# Patient Record
Sex: MALE | Race: WHITE | NOT HISPANIC OR LATINO | Employment: OTHER | URBAN - METROPOLITAN AREA
[De-identification: names, ages, dates, MRNs, and addresses within clinical notes are randomized per-mention and may not be internally consistent; named-entity substitution may affect disease eponyms.]

---

## 2017-01-03 ENCOUNTER — DOCUMENTATION ONLY (OUTPATIENT)
Dept: FAMILY MEDICINE | Facility: CLINIC | Age: 62
End: 2017-01-03

## 2017-01-03 NOTE — PROGRESS NOTES
Pre-Visit Chart Review  For Appointment Scheduled on 1/4/17    Health Maintenance Due   Topic Date Due    TETANUS VACCINE  01/01/1975    Zoster Vaccine  01/01/2017

## 2017-01-04 ENCOUNTER — LAB VISIT (OUTPATIENT)
Dept: LAB | Facility: HOSPITAL | Age: 62
End: 2017-01-04
Attending: FAMILY MEDICINE
Payer: MEDICARE

## 2017-01-04 ENCOUNTER — OFFICE VISIT (OUTPATIENT)
Dept: FAMILY MEDICINE | Facility: CLINIC | Age: 62
End: 2017-01-04
Payer: MEDICARE

## 2017-01-04 VITALS
DIASTOLIC BLOOD PRESSURE: 76 MMHG | TEMPERATURE: 98 F | HEIGHT: 72 IN | HEART RATE: 69 BPM | WEIGHT: 145.5 LBS | RESPIRATION RATE: 18 BRPM | BODY MASS INDEX: 19.71 KG/M2 | SYSTOLIC BLOOD PRESSURE: 134 MMHG

## 2017-01-04 DIAGNOSIS — Z29.9 PREVENTIVE MEASURE: ICD-10-CM

## 2017-01-04 DIAGNOSIS — K90.829 SHORT BOWEL SYNDROME: Primary | ICD-10-CM

## 2017-01-04 DIAGNOSIS — K50.90 CROHN'S DISEASE WITHOUT COMPLICATION, UNSPECIFIED GASTROINTESTINAL TRACT LOCATION: ICD-10-CM

## 2017-01-04 DIAGNOSIS — E53.8 VITAMIN B 12 DEFICIENCY: ICD-10-CM

## 2017-01-04 DIAGNOSIS — L98.9 SKIN LESION: ICD-10-CM

## 2017-01-04 DIAGNOSIS — Z11.4 ENCOUNTER FOR SCREENING FOR HIV: ICD-10-CM

## 2017-01-04 DIAGNOSIS — E78.5 DYSLIPIDEMIA: ICD-10-CM

## 2017-01-04 DIAGNOSIS — D61.818 PANCYTOPENIA: ICD-10-CM

## 2017-01-04 LAB
25(OH)D3+25(OH)D2 SERPL-MCNC: 20 NG/ML
ALBUMIN SERPL BCP-MCNC: 3.7 G/DL
ALP SERPL-CCNC: 191 U/L
ALT SERPL W/O P-5'-P-CCNC: 45 U/L
ANION GAP SERPL CALC-SCNC: 10 MMOL/L
AST SERPL-CCNC: 38 U/L
BASOPHILS # BLD AUTO: 0.01 K/UL
BASOPHILS NFR BLD: 0.3 %
BILIRUB SERPL-MCNC: 0.3 MG/DL
BUN SERPL-MCNC: 10 MG/DL
CALCIUM SERPL-MCNC: 9 MG/DL
CHLORIDE SERPL-SCNC: 105 MMOL/L
CHOLEST/HDLC SERPL: 4 {RATIO}
CO2 SERPL-SCNC: 26 MMOL/L
CREAT SERPL-MCNC: 0.7 MG/DL
DIFFERENTIAL METHOD: ABNORMAL
EOSINOPHIL # BLD AUTO: 0.1 K/UL
EOSINOPHIL NFR BLD: 3 %
ERYTHROCYTE [DISTWIDTH] IN BLOOD BY AUTOMATED COUNT: 13 %
EST. GFR  (AFRICAN AMERICAN): >60 ML/MIN/1.73 M^2
EST. GFR  (NON AFRICAN AMERICAN): >60 ML/MIN/1.73 M^2
GLUCOSE SERPL-MCNC: 104 MG/DL
HCT VFR BLD AUTO: 38.4 %
HDL/CHOLESTEROL RATIO: 25.2 %
HDLC SERPL-MCNC: 107 MG/DL
HDLC SERPL-MCNC: 27 MG/DL
HGB BLD-MCNC: 13.3 G/DL
HIV 1+2 AB+HIV1 P24 AG SERPL QL IA: NEGATIVE
LDLC SERPL CALC-MCNC: 59 MG/DL
LYMPHOCYTES # BLD AUTO: 0.6 K/UL
LYMPHOCYTES NFR BLD: 19.3 %
MCH RBC QN AUTO: 30.4 PG
MCHC RBC AUTO-ENTMCNC: 34.6 %
MCV RBC AUTO: 88 FL
MONOCYTES # BLD AUTO: 0.4 K/UL
MONOCYTES NFR BLD: 13.8 %
NEUTROPHILS # BLD AUTO: 1.9 K/UL
NEUTROPHILS NFR BLD: 63.6 %
NONHDLC SERPL-MCNC: 80 MG/DL
PLATELET # BLD AUTO: 103 K/UL
PMV BLD AUTO: 10.8 FL
POTASSIUM SERPL-SCNC: 4.2 MMOL/L
PROT SERPL-MCNC: 6.3 G/DL
RBC # BLD AUTO: 4.38 M/UL
SODIUM SERPL-SCNC: 141 MMOL/L
TRIGL SERPL-MCNC: 105 MG/DL
WBC # BLD AUTO: 3.05 K/UL

## 2017-01-04 PROCEDURE — 86703 HIV-1/HIV-2 1 RESULT ANTBDY: CPT

## 2017-01-04 PROCEDURE — 99214 OFFICE O/P EST MOD 30 MIN: CPT | Mod: S$GLB,,, | Performed by: FAMILY MEDICINE

## 2017-01-04 PROCEDURE — 80053 COMPREHEN METABOLIC PANEL: CPT

## 2017-01-04 PROCEDURE — 1159F MED LIST DOCD IN RCRD: CPT | Mod: S$GLB,,, | Performed by: FAMILY MEDICINE

## 2017-01-04 PROCEDURE — 80061 LIPID PANEL: CPT

## 2017-01-04 PROCEDURE — 82306 VITAMIN D 25 HYDROXY: CPT

## 2017-01-04 PROCEDURE — 85025 COMPLETE CBC W/AUTO DIFF WBC: CPT

## 2017-01-04 PROCEDURE — 86592 SYPHILIS TEST NON-TREP QUAL: CPT

## 2017-01-04 PROCEDURE — 99999 PR PBB SHADOW E&M-EST. PATIENT-LVL III: CPT | Mod: PBBFAC,,, | Performed by: FAMILY MEDICINE

## 2017-01-04 PROCEDURE — 36415 COLL VENOUS BLD VENIPUNCTURE: CPT | Mod: PO

## 2017-01-04 NOTE — PROGRESS NOTES
[facility]  Progress Note    Subjective:       Patient ID: Tristin Cerda is a 62 y.o. male.    Chief Complaint: Establish Care    HPI62 y.o.male with current medical history of Crohn's disease, short bowel syndrome, pancytopenia, and B12 deficiency is here today to establish care.  Patient is currently under the care of GI for Crohn's disease and short bowel syndrome.  Patient is under the care of hematology for pancytopenia and B12 deficiency.  Patient has been doing very well on current medications.  Patient does not endorse any complaints at today's visit.  Review of Systems   Constitutional: Negative for chills and fever.   HENT: Negative for congestion.    Eyes: Negative for pain.   Respiratory: Negative for shortness of breath and wheezing.    Cardiovascular: Negative for chest pain, palpitations and leg swelling.   Gastrointestinal: Negative for abdominal pain, nausea and vomiting.   Genitourinary: Negative for difficulty urinating.   Musculoskeletal: Negative for arthralgias, gait problem and myalgias.   Skin: Negative for rash.   Neurological: Negative for seizures.       Objective:      Vitals:    01/04/17 0850   BP: 134/76   BP Location: Right arm   Patient Position: Sitting   BP Method: Automatic   Pulse: 69   Resp: 18   Temp: 98.3 °F (36.8 °C)   TempSrc: Oral   Weight: 66 kg (145 lb 8.1 oz)   Height: 6' (1.829 m)     Body mass index is 19.73 kg/(m^2).  Physical Exam   Constitutional: He is oriented to person, place, and time. He appears well-developed and well-nourished.   HENT:   Head: Normocephalic and atraumatic.   Eyes: Conjunctivae and EOM are normal. Pupils are equal, round, and reactive to light.   Neck: Normal range of motion. Neck supple. No JVD present.   Cardiovascular: Normal rate, regular rhythm, normal heart sounds and intact distal pulses.  Exam reveals no gallop and no friction rub.    No murmur heard.  Pulmonary/Chest: Effort normal and breath sounds normal. No  respiratory distress. He has no wheezes.   Abdominal: Soft. Bowel sounds are normal. There is no tenderness.   Musculoskeletal: Normal range of motion.   Neurological: He is alert and oriented to person, place, and time. No cranial nerve deficit.   Skin: Skin is warm and dry.   Ulcerated lesion right temple   Psychiatric: He has a normal mood and affect. His behavior is normal. Judgment and thought content normal.   Nursing note and vitals reviewed.      Assessment:       1. Short bowel syndrome    2. Crohn's disease without complication, unspecified gastrointestinal tract location    3. Vitamin B 12 deficiency    4. Pancytopenia    5. Preventive measure    6. Encounter for screening for HIV    7. Dyslipidemia    8. Skin lesion        Plan:       Short bowel syndrome        - Well controlled continue current medications        - Follow up with GI for further recommendations     Crohn's disease without complication, unspecified gastrointestinal tract location  -     Comprehensive metabolic panel; Future; Expected date: 1/4/17  -     Vitamin D; Future; Expected date: 1/4/17        - Well controlled continue current medications    Vitamin B 12 deficiency        - Well controlled continue current medications    Pancytopenia  -     CBC auto differential; Future; Expected date: 1/4/17        - Follow up with hem/onc for further recommendations     Preventive measure  -     RPR; Future; Expected date: 1/4/17    Encounter for screening for HIV  -     HIV-1 and HIV-2 antibodies; Future; Expected date: 1/4/17    Dyslipidemia  -     Lipid panel; Future; Expected date: 1/4/17    Skin lesion        - Patient will make appointment with dermatology for evaluation     Return in about 6 months (around 7/4/2017).  Will Albrecht MD  [facility]  1/4/2017 9:04 AM

## 2017-01-04 NOTE — MR AVS SNAPSHOT
[facility]  [address]  [place] [address]  Phone: [phone]  Fax: [phone]                  Tristin Cerda   2017 8:20 AM   Office Visit    Description:  Male : 1955   Provider:  Will Albrecht MD   Department:  [place] [facility]           Reason for Visit     Establish Care           Diagnoses this Visit        Comments    Short bowel syndrome    -  Primary     Crohn's disease without complication, unspecified gastrointestinal tract location         Vitamin B 12 deficiency         Pancytopenia         Preventive measure         Encounter for screening for HIV         Dyslipidemia         Skin lesion                To Do List           Future Appointments        Provider Department Dept Phone    2017 11:15 AM LAB, [facility] - Lab [phone]    2017 7:30 AM [facility]ANIE [facility] - Gastroenterology [phone]    3/23/2017 8:00 AM [facility] - Pain Management [phone]    2017 9:00 AM Will Albrecht MD [facility] Family [facility] [phone]      Goals (5 Years of Data)     None      Follow-Up and Disposition     Return in about 6 months (around 2017).    Follow-up and Disposition History      [facility]     [facility] On Call Nurse Care Line -  Assistance  Registered nurses in the [facility] On Call Center provide clinical advisement, health education, appointment booking, and other advisory services.  Call for this free service at [phone].             Medications           Message regarding Medications     Verify the changes and/or additions to your medication regime listed below are the same as discussed with your clinician today.  If any of these changes or additions are incorrect, please notify your healthcare provider.             Verify that the below list of medications is an accurate representation of the medications you are currently taking.  If none reported, the list  may be blank. If incorrect, please contact your healthcare provider. Carry this list with you in case of emergency.           Current Medications     acetaminophen (TYLENOL) 500 mg Cap     CYANOCOBALAMIN, VITAMIN B-12, (VITAMIN B-12 ORAL) 1000 mg injection once a month    dicyclomine (BENTYL) 20 mg tablet Take 1 tablet (20 mg total) by mouth 4 (four) times daily.    diphenhydramine-acetaminophen 12.5-325 mg Tab     hydrocortisone 2.5 % cream     ketoconazole (NIZORAL) 2 % shampoo Wash face with medicated shampoo at least 2x/week - let sit on scalp at least 5 minutes prior to rinsing    meperidine (DEMEROL) 50 mg tablet Take 1 tablet (50 mg total) by mouth every 8 (eight) hours as needed for Pain.    ondansetron (ZOFRAN) 8 MG tablet Take 1 tablet (8 mg total) by mouth every 12 (twelve) hours as needed for Nausea.    urea (X-VIATE) 40 % Crea Apply topically 2 (two) times daily.    NAPROXEN SODIUM ORAL     phenylephrine-acetaminophen 5-325 mg Tab            Clinical Reference Information           Vital Signs - Last Recorded  Most recent update: 1/4/2017  8:52 AM by Inocente Allen MA    BP Pulse Temp Resp Ht Wt    134/76 (BP Location: Right arm, Patient Position: Sitting, BP Method: Automatic) 69 98.3 °F (36.8 °C) (Oral) 18 6' (1.829 m) 66 kg (145 lb 8.1 oz)    BMI                19.73 kg/m2          Blood Pressure          Most Recent Value    BP  134/76      Allergies as of 1/4/2017     Ciprofloxacin    Codeine    Compazine [Prochlorperazine]    Erythromycin    Keflex [Cephalexin]      Immunizations Administered on Date of Encounter - 1/4/2017     None      Orders Placed During Today's Visit     Future Labs/Procedures Expected by Expires    CBC auto differential  1/4/2017 3/5/2018    Comprehensive metabolic panel  1/4/2017 3/5/2018    HIV-1 and HIV-2 antibodies  1/4/2017 3/5/2018    Lipid panel  1/4/2017 3/5/2018    RPR  1/4/2017 3/5/2018    Vitamin D  1/4/2017 3/5/2018

## 2017-01-05 LAB — RPR SER QL: NORMAL

## 2017-01-09 ENCOUNTER — TELEPHONE (OUTPATIENT)
Dept: FAMILY MEDICINE | Facility: CLINIC | Age: 62
End: 2017-01-09

## 2017-01-09 NOTE — TELEPHONE ENCOUNTER
----- Message from Will Albrecht MD sent at 1/6/2017 10:47 AM CST -----  Please call and inform patient that he has vit d def. Patient also has elevated liver enzymes. Please have patient make appointment to discuss findings.

## 2017-01-12 ENCOUNTER — DOCUMENTATION ONLY (OUTPATIENT)
Dept: FAMILY MEDICINE | Facility: CLINIC | Age: 62
End: 2017-01-12

## 2017-01-12 NOTE — TELEPHONE ENCOUNTER
Spoke with patient about results; he voices understanding. Scheduled 1/13/17 1500  appointment for followup/plan of care

## 2017-01-12 NOTE — PROGRESS NOTES
Pre-Visit Chart Review  For Appointment Scheduled on 1/13/17.    Health Maintenance Due   Topic Date Due    TETANUS VACCINE  01/01/1975    Colonoscopy  08/24/2013    Zoster Vaccine  01/01/2017

## 2017-01-13 ENCOUNTER — LAB VISIT (OUTPATIENT)
Dept: LAB | Facility: HOSPITAL | Age: 62
End: 2017-01-13
Attending: FAMILY MEDICINE
Payer: MEDICARE

## 2017-01-13 ENCOUNTER — OFFICE VISIT (OUTPATIENT)
Dept: FAMILY MEDICINE | Facility: CLINIC | Age: 62
End: 2017-01-13
Payer: MEDICARE

## 2017-01-13 VITALS
DIASTOLIC BLOOD PRESSURE: 73 MMHG | TEMPERATURE: 98 F | SYSTOLIC BLOOD PRESSURE: 127 MMHG | HEART RATE: 67 BPM | HEIGHT: 72 IN | RESPIRATION RATE: 18 BRPM | BODY MASS INDEX: 19.98 KG/M2 | WEIGHT: 147.5 LBS

## 2017-01-13 DIAGNOSIS — R74.01 TRANSAMINITIS: ICD-10-CM

## 2017-01-13 DIAGNOSIS — L57.0 ACTINIC KERATOSIS: ICD-10-CM

## 2017-01-13 DIAGNOSIS — E55.9 VITAMIN D DEFICIENCY: Primary | ICD-10-CM

## 2017-01-13 PROCEDURE — 99214 OFFICE O/P EST MOD 30 MIN: CPT | Mod: S$GLB,,, | Performed by: FAMILY MEDICINE

## 2017-01-13 PROCEDURE — 1159F MED LIST DOCD IN RCRD: CPT | Mod: S$GLB,,, | Performed by: FAMILY MEDICINE

## 2017-01-13 PROCEDURE — 36415 COLL VENOUS BLD VENIPUNCTURE: CPT | Mod: PO

## 2017-01-13 PROCEDURE — 99999 PR PBB SHADOW E&M-EST. PATIENT-LVL III: CPT | Mod: PBBFAC,,, | Performed by: FAMILY MEDICINE

## 2017-01-13 PROCEDURE — 80074 ACUTE HEPATITIS PANEL: CPT

## 2017-01-13 RX ORDER — ERGOCALCIFEROL 1.25 MG/1
50000 CAPSULE ORAL
Qty: 8 CAPSULE | Refills: 0 | Status: SHIPPED | OUTPATIENT
Start: 2017-01-13 | End: 2017-06-06

## 2017-01-13 NOTE — PROGRESS NOTES
[facility]  Progress Note    Subjective:       Patient ID: Tristin Cerda is a 62 y.o. male.    Chief Complaint: Abnormal labs follow up     HPI62 y.o.male is here today to follow-up abnormal labs.  Patient was found to have elevated alkaline phosphatase and liver enzymes as well as vitamin D deficiency.  Patient endorses that he was told in the past that he had hepatitis B.  Patient does not have a recent hepatitis panel.  Patient has follow-up appointment with gastroenterology/hepatology for liver problems.  Patient is not currently on vitamin D supplementation.  No further complaints at the current time.  Review of Systems   Constitutional: Negative for chills and fever.   HENT: Negative for congestion.    Eyes: Negative for pain.   Respiratory: Negative for shortness of breath and wheezing.    Cardiovascular: Negative for chest pain, palpitations and leg swelling.   Gastrointestinal: Negative for abdominal pain, nausea and vomiting.   Genitourinary: Negative for difficulty urinating.   Musculoskeletal: Negative for arthralgias, gait problem and myalgias.   Skin: Negative for rash.   Neurological: Negative for seizures.       Objective:      Vitals:    01/13/17 1457   BP: 127/73   BP Location: Right arm   Patient Position: Sitting   BP Method: Automatic   Pulse: 67   Resp: 18   Temp: 97.7 °F (36.5 °C)   TempSrc: Oral   Weight: 66.9 kg (147 lb 7.8 oz)   Height: 6' (1.829 m)     Body mass index is 20 kg/(m^2).  Physical Exam   Constitutional: He is oriented to person, place, and time. He appears well-developed and well-nourished.   HENT:   Head: Normocephalic and atraumatic.   Eyes: Conjunctivae and EOM are normal. Pupils are equal, round, and reactive to light.   Neck: Normal range of motion. Neck supple. No JVD present.   Cardiovascular: Normal rate, regular rhythm, normal heart sounds and intact distal pulses.  Exam reveals no gallop and no friction rub.    No murmur heard.  Pulmonary/Chest:  Effort normal and breath sounds normal. No respiratory distress. He has no wheezes.   Abdominal: Soft. Bowel sounds are normal. There is no tenderness.   Musculoskeletal: Normal range of motion.   Neurological: He is alert and oriented to person, place, and time. No cranial nerve deficit.   Skin: Skin is warm and dry.   Psychiatric: He has a normal mood and affect. His behavior is normal. Judgment and thought content normal.   Nursing note and vitals reviewed.      Assessment:       1. Vitamin D deficiency    2. Transaminitis    3. Actinic keratosis        Plan:       Vitamin D deficiency  -     ergocalciferol (ERGOCALCIFEROL) 50,000 unit Cap; Take 1 capsule (50,000 Units total) by mouth every 7 days.  Dispense: 8 capsule; Refill: 0    Transaminitis  -     HEPATITIS PANEL, ACUTE; Future; Expected date: 1/13/17  -     US Abdomen Complete; Future; Expected date: 1/13/17    Actinic keratosis  -     Ambulatory referral to Dermatology      Return in about 3 months (around 4/13/2017).  Will Albrecht MD  [facility]  1/13/2017 3:25 PM

## 2017-01-13 NOTE — MR AVS SNAPSHOT
[place] [facility]  [address]  [place] [address]  Phone: [phone]  Fax: [phone]                  Tristin Cerda   2017 3:00 PM   Office Visit    Description:  Male : 1955   Provider:  Will Albrecht MD   Department:  [place] [facility]           Reason for Visit     Follow-up           Diagnoses this Visit        Comments    Vitamin D deficiency    -  Primary     Transaminitis         Actinic keratosis                To Do List           Future Appointments        Provider Department Dept Phone    2017 7:30 AM STE[facility]ANIE [facility]cl[facility] - Gastroenterology [phone]    2017 9:45 AM Ira Bang MD [place] - Dermatology [phone]    3/23/2017 8:00 AM [facility] - Pain Management [phone]    2017 10:40 AM Will Albrecht MD [facility] Family Medicine [phone]    2017 9:00 AM Will Albrecht MD [facility] Family [facility] [phone]      Goals (5 Years of Data)     None      Follow-Up and Disposition     Return in about 3 months (around 2017).       These Medications        Disp Refills Start End    ergocalciferol (ERGOCALCIFEROL) 50,000 unit Cap 8 capsule 0 2017     Take 1 capsule (50,000 Units total) by mouth every 7 days. - Oral    Pharmacy: [facility] #: [phone]         [facility]     [facility] On Call Nurse Care Line -  Assistance  Registered nurses in the [facility] On Call Center provide clinical advisement, health education, appointment booking, and other advisory services.  Call for this free service at [phone].             Medications           Message regarding Medications     Verify the changes and/or additions to your medication regime listed below are the same as discussed with your clinician today.  If any of these changes or additions are incorrect, please notify your healthcare provider.        START taking these  NEW medications        Refills    ergocalciferol (ERGOCALCIFEROL) 50,000 unit Cap 0    Sig: Take 1 capsule (50,000 Units total) by mouth every 7 days.    Class: Normal    Route: Oral      STOP taking these medications     ketoconazole (NIZORAL) 2 % shampoo Wash face with medicated shampoo at least 2x/week - let sit on scalp at least 5 minutes prior to rinsing           Verify that the below list of medications is an accurate representation of the medications you are currently taking.  If none reported, the list may be blank. If incorrect, please contact your healthcare provider. Carry this list with you in case of emergency.           Current Medications     acetaminophen (TYLENOL) 500 mg Cap     CYANOCOBALAMIN, VITAMIN B-12, (VITAMIN B-12 ORAL) 1000 mg injection once a month    dicyclomine (BENTYL) 20 mg tablet Take 1 tablet (20 mg total) by mouth 4 (four) times daily.    diphenhydramine-acetaminophen 12.5-325 mg Tab     hydrocortisone 2.5 % cream     meperidine (DEMEROL) 50 mg tablet Take 1 tablet (50 mg total) by mouth every 8 (eight) hours as needed for Pain.    NAPROXEN SODIUM ORAL     ondansetron (ZOFRAN) 8 MG tablet Take 1 tablet (8 mg total) by mouth every 12 (twelve) hours as needed for Nausea.    phenylephrine-acetaminophen 5-325 mg Tab     urea (X-VIATE) 40 % Crea Apply topically 2 (two) times daily.    ergocalciferol (ERGOCALCIFEROL) 50,000 unit Cap Take 1 capsule (50,000 Units total) by mouth every 7 days.           Clinical Reference Information           Vital Signs - Last Recorded  Most recent update: 1/13/2017  3:04 PM by Inocente Allen MA    BP Pulse Temp Resp Ht Wt    127/73 (BP Location: Right arm, Patient Position: Sitting, BP Method: Automatic) 67 97.7 °F (36.5 °C) (Oral) 18 6' (1.829 m) 66.9 kg (147 lb 7.8 oz)    BMI                20 kg/m2          Blood Pressure          Most Recent Value    BP  127/73      Allergies as of 1/13/2017     Ciprofloxacin    Codeine    Compazine  [Prochlorperazine]    Erythromycin    Keflex [Cephalexin]      Immunizations Administered on Date of Encounter - 1/13/2017     None      Orders Placed During Today's Visit      Normal Orders This Visit    Ambulatory referral to Dermatology     Future Labs/Procedures Expected by Expires    HEPATITIS PANEL, ACUTE  1/13/2017 3/14/2018    US Abdomen Complete  1/13/2017 1/13/2018

## 2017-01-16 LAB
HAV IGM SERPL QL IA: NEGATIVE
HBV CORE IGM SERPL QL IA: POSITIVE
HBV SURFACE AG SERPL QL IA: NEGATIVE
HCV AB SERPL QL IA: NEGATIVE

## 2017-01-18 ENCOUNTER — TELEPHONE (OUTPATIENT)
Dept: GASTROENTEROLOGY | Facility: CLINIC | Age: 62
End: 2017-01-18

## 2017-01-19 DIAGNOSIS — R76.8 HEPATITIS C ANTIBODY POSITIVE IN BLOOD: Primary | ICD-10-CM

## 2017-01-19 DIAGNOSIS — B19.10 HEP B W/O COMA: Primary | ICD-10-CM

## 2017-01-20 ENCOUNTER — TELEPHONE (OUTPATIENT)
Dept: FAMILY MEDICINE | Facility: CLINIC | Age: 62
End: 2017-01-20

## 2017-01-20 ENCOUNTER — TELEPHONE (OUTPATIENT)
Dept: GASTROENTEROLOGY | Facility: CLINIC | Age: 62
End: 2017-01-20

## 2017-01-20 NOTE — TELEPHONE ENCOUNTER
----- Message from Will Albrecht MD sent at 1/19/2017  3:07 PM CST -----  Please call informed patient that his recent lab work indicated hepatitis B.  We will need hepatology appointment.  Appointment has been scheduled.

## 2017-01-23 ENCOUNTER — OFFICE VISIT (OUTPATIENT)
Dept: GASTROENTEROLOGY | Facility: CLINIC | Age: 62
End: 2017-01-23
Payer: MEDICARE

## 2017-01-23 ENCOUNTER — TELEPHONE (OUTPATIENT)
Dept: ENDOSCOPY | Facility: HOSPITAL | Age: 62
End: 2017-01-23

## 2017-01-23 VITALS
SYSTOLIC BLOOD PRESSURE: 137 MMHG | DIASTOLIC BLOOD PRESSURE: 81 MMHG | BODY MASS INDEX: 19.77 KG/M2 | WEIGHT: 145.94 LBS | HEART RATE: 65 BPM | HEIGHT: 72 IN | RESPIRATION RATE: 20 BRPM | TEMPERATURE: 98 F

## 2017-01-23 DIAGNOSIS — K50.90 CROHN'S DISEASE WITHOUT COMPLICATION, UNSPECIFIED GASTROINTESTINAL TRACT LOCATION: Primary | ICD-10-CM

## 2017-01-23 DIAGNOSIS — K90.829 SHORT BOWEL SYNDROME: ICD-10-CM

## 2017-01-23 DIAGNOSIS — R19.7 DIARRHEA, UNSPECIFIED TYPE: ICD-10-CM

## 2017-01-23 PROCEDURE — 1159F MED LIST DOCD IN RCRD: CPT | Mod: S$GLB,,, | Performed by: INTERNAL MEDICINE

## 2017-01-23 PROCEDURE — 99215 OFFICE O/P EST HI 40 MIN: CPT | Mod: S$GLB,,, | Performed by: INTERNAL MEDICINE

## 2017-01-23 PROCEDURE — 99999 PR PBB SHADOW E&M-EST. PATIENT-LVL IV: CPT | Mod: PBBFAC,,, | Performed by: INTERNAL MEDICINE

## 2017-01-23 NOTE — PROGRESS NOTES
[facility]          Inflammatory Bowel Disease New Patient Consultation Note            Dr. Nela Sanchez    TODAY'S VISIT DATE:  1/23/2017    Reason for Consult:    Chief Complaint   Patient presents with    Crohn's Disease       PCP: Will Albrecht  [address] [place] [id number]    Referring MD:   Dr. Agapito Aranda    History of Present Illness:    Dear. Dr. Agapito Aranda    Thank you for requesting a consultation on your patient Tristin Cerda who is a 62 y.o. male seen today at the [facility] on 01/23/2017 for inflammatory bowel disease- Crohn's disease.  Pertinent past medical history includes TB (treated in 2003/2004- positive on bronchial lavage- pt only remembers with 1 tablet, positive TB quantiferon 6/2015), DVT, multiple abdominal surgeries, history of pneumonia (2002), positive Hep B IgM 1/2017.  Subclavian occlusion and vena cava narrowing so unable to get additional lines for TPN    As you know, Tristin Cerda was doing well until age 13 yo in 1969 when he had abdominal pain, constipation and was diagnosed with Crohn's disease by endoscopy and barium imaging.  Patient was started on azulfidine which made him nauseated.  In approximately 1972 he had a spontaneous bowel perforation. He was then intermittent courses of prednisone (steroid induced psychosis, insomnia) until at least 2002/2003.  Due to obstructive symptoms he underwent surgery in 1980. He continued prednisone after the 2nd surgery.  In 1988 he had his 3rd surgery for again obstructive symptoms at which time more small bowel was removed. He was told he only had 3-4 feet of small intestine left and reports having most of his large intestine still intact.  He was placed on TPN after his surgery for approximately 25 years from 3876-2433. In 2002 at the time of having an pneumonia he was on prednisone 10 mg daily and imuran 100 mg daily.  CT abd/pelvis  "during that time showed colonic distension with narrowing of the sigmoid colon and TI wall thickening.  There was a possible nodular lesion in RU lobe of lung on CT chest.  EGD 2/2002 showed grade 1 esophageal varices, grade II esophagitis, H. pylori negative gastritis, and a normal duodenum.  He started Imuran in 2004 (per clinic note) but was only on it for a couple of months due to a positive TB diagnosis.  He had infiltrates in the lung that required a thoracotomy, pleurodesis, and bronch washing.  He received treatment of Rifampin for 6 months for TB but was on no IBD treatment during this time.  ID recs post treatment were no good way to tell if he has been re-infected and has latent TB again (TB Quant may stay positive for life).  Notes indicated that he needed no additional TB treatment at that time.       Clinic notes indicate that he had a course of prednisone in 2006 for several months and remained on no IBD medications from 8600-3993.  He was seeing Dr. Silva, surgeon from 8583-0711.  He saw Dr. Ch with GI in 3/2015 at which time he reported 4-8 loose BMs with occasional nocturnal BMs and has been able to tolerate PO with no nausea or vomiting for the last 2 years.  Pain management clinic note from 9/27/2016 indicates that he was on Demerol and OTC lidocaine cream for chronic abdominal cramping and diarrhea.  The lidocaine cream specifically for his "diaper rash" due to diarrhea. He had a hospitalization 10/11/2016 for pancytopenia secondary to severe B12 deficiency and iron deficiency anemia with a bone marrow biopsy.  He had a f/u visit with Hem/Onc 10/18/2016 that suggest that there was no evidence of a primary marrow pathology from the biopsy.  They recommended B12 injections (weekly and then once monthly) and IV iron infusions.  He had a hospitalization 11/2016 for lower abdominal pain and diarrhea with 3-8 loose BMs/day.  Dr. Aranda with GI met the patient at that time and workup included CT " "and colonoscopy.  A CT scan on 2016 showed mild wall thickening of the rectosigmoid colon and mild distention of the colon.  Colonoscopy on 2016 during the hospitalization showed poor bowel prep with only advancement to the mid transverse colon though the visualized colon that was washed out showed no mucosal abnormalities (no path).  After this scope, it was recommended that the patient hold off on steroids and continue flagyl 250 mg TID and he took this for about 10 days. Patient reports that symptoms improved which may have been due to the flagyl.     Currently patient reports 8 loose nonbloody BMs/day with urgency.  He has nausea with no vomiting and this is controlled with zofran to some extent.  He reports that he prefers phenergan.  He is seeing Dr. Alvin Curry and Anabell Fuller in pain management clinic who are prescribing  demerol 50 mg po TID and OTC lidocaine cream.  He reports diffuse 0-8/10 constant abdominal cramping which worsens a few times/day which is improved with demerol.  He has lost 10 pounds in past 3 months. He reports bilateral knee pain since  but never saw a rheumatologist.   He "grazes" all day and due to tooth infection is edentulous.  He restricts lettuce, nuts, seeds., hard meat.  He has reported elevated LFTs with recent testing for hepatitis B (HBcIgM positive- plans to see hepatology 2017- Dr. King). He has taken lomotil in the past which has helped. Patient has no other gastrointestinal/constitutional complaints including no fevers or chills, weight loss, nausea/vomiting (including no hematemesis), dysphagia, abdominal pain. He reports anxiety and depression.  Also patient does not have any extraintestinal manifestations of their inflammatory bowel disease including no eye pain/redness, skin lesions/rashes, oral ulcers.    Pertinent Endoscopy/Imagin2002 CT abdomen & pelvis w/o IV contrast: mild splenomegaly; catheter into left common vein extending into " left iliac vein and ends at the beginning of the inferior vena cava; distention of the colon and the possibility of CD involvement in the sigmoid colon cannot be excluded due to narrowed segment; the TI is not very distended with thickening of wall raises suspicion for CD involvement; little amount of small bowel is demonstrated  2/4/2002 CT chest w/o contrast: findings involving the anterior segment of left upper lobe, posterior segment of the right temporal lobe, and right upper lobe and mid lung laterally are related to inflammatory process; possibility of nodular lesion in the posterior segment of the right upper lobe cannot be excluded what is being demonstrated is probably related to the atelectasis or scarring related to inflammatory process; f/u CT in 2-3 months; atrophic superior vena cava which appears developmental in nature   2/5/2002 NM Total body Gallium: activity in the right mid abdomen at the level of the pelvic brim is likely pooling of activity which in the cecum/ascending colon; this is a phenomenon which is often observed with gallium; clinical concern for active IBD in right colon, correlation with an Indium labeled WBC scan may be useful  2/7/2002 EGD: grade I varix with no bleeding in the upper third of the esophagus from 18-21, no stigmata of recent bleeding, 3 mm in largest diameter; grade II (confluent erosions) esophagitis with no bleeding found 40 from incisors (path-strip of hyperplastic squamous mucosa with a few intraepithelial inflammatory cells and an occasional zone suggesting elongation of submucosal papillae, features that can be associated with reflux; separate nodular fragment of glandular mucosa shows moderate chronic inflammation in the lamina propria with no prominence of goblet cell metaplasia evident; no tumor or ulceration); patchy erythematous mucosa with no bleeding in the antrum (path-mild chronic gastritis pattern evident characterized by a few scattered round cells  in the superficial lamina propria, no tumor, ulceration, glandular atrophy, or metaplasia-negative H. pylori); normal examined duodenum  10/12/2016 Abdominal US: fatty infiltration of the liver with minimal ascites; prior cholecystectomy; limited visualization of the pancreas  11/7/2016 CT abdomen & pelvis: mild wall thickening of the rectosigmoid colon which could be related to inadequate distention although given history infectious or inflammatory colitis could give a similar appearance; mild distention of the colon, with diffuse colonic fluid levels present-may be sequelae of prior small intestine resection; hepatic steatosis; non-obstructing left renal calculus; blunting of left costopherenic angle could indicate pleural parenchymal scarring or tiny left pleural effusion  11/7/2016 Xray abdomen: diffuse colonic fluid levels is a nonspecific finding which can be seen with diarrhea; blunting of left costophernic angle could indicate pleural parenchymal scarring or tiny left pleural effusion  11/9/2016 Colonoscopy (inpatient): advanced to approximately mid transverse colon due to very poor prep; multiple areas washed throughout the visualized colon without any mucosal abnormalities appreciated;    Previous Surgeries:  no surgical records available  1972, 1980, 1988    Pertinent Labs:  11/8/2016: HAV IgM Negative, HBsAG Negative, HBc IgM AB Positive, HCV AB < 0.1, Methylmalonic Acid Serum 040987 (high), Homocysteine 100.3 (high), HBsAB Non-reactive, HAV Total ABS Negative, HB AN Negative, HBsAF screen Negative, IBD serology Uyen 59 (high), ACCA 66 (normal), ALCA 6 (normal), AMCA 17 (normal), HBV DNA PCR Not detected  11/8/2016: WBC 3.4 (low), RBC 3.55 (low), HgB 11.5 (low), Hct 34.4 (low), MCV 96.9, Platelets 156, BUN 92, Creatinine 0.50 (low), CRP 0.07 (0.00-1.40), Albumin 4.2, Total protein 7.0, ALT 55 (high), AST 43 (high), Alk phos 247 (high), Total Bili 0.6, Lipase 38  11/9/2016: Stool Giardia Negative; Stool  Culture Negative, C. Diff Negative, Stool O/P Negative  No results found for: STOOLCULTURE, [id number], VOERZELLWD8R, CDIFFICILEAN, CDIFFTOX, CDIFFICILEBY  Lab Results   Component Value Date    CRP 0.3 06/11/2015     Lab Results   Component Value Date    [id number] 20 (L) 01/04/2017     Lab Results   Component Value Date    HEPBIGM Positive (A) 01/13/2017     No results found for: VARICELLAZOS, VARICELLAINT  Lab Results   Component Value Date    NIL 0.06 06/11/2015    TBAG 0.45 06/11/2015    TBAGNIL 0.40 06/11/2015    MITOGENNIL 3.11 06/11/2015    TBGOLD Positive (A) 06/11/2015     No results found for: TPMTRESULT  No results found for: ANSADAINIT, INFLIXIMAB, INFLIXINTERP    Prior IBD Therapies:  Imuran 100 mg   Prednisone  Azulfidine  Remicade    Current IBD Therapies:  None    Vaccinations:  Flu shot: 11/2016  Chickenpox status/Varicella vaccine:  No results found for: VARICELLAZOS, VARICELLAINT  Tetanus: recommended >10 years ago  Pneumonia vaccine: 11/2016  Hepatitis B: HBcIgM positive- seeing hepatology  No results found for: HEPBSAB  Hepatitis A: NA  HPV: NA   Meningococcal: NA     NSAID use/indication:  Yes    Narcotic use:  Demerol 50 mg TID, OTC lidocaine cream    Alternative/Complementary Meds for IBD:  No    Review of Systems   Constitutional: Positive for weight loss. Negative for chills and fever.   HENT:        No oral ulcers, dysphagia, oral thrush   Eyes: Negative for blurred vision, pain and redness.   Respiratory: Negative for cough and shortness of breath.    Cardiovascular: Negative for chest pain.   Gastrointestinal: Positive for abdominal pain and nausea. Negative for heartburn and vomiting.   Genitourinary: Negative for dysuria and hematuria.   Musculoskeletal: Negative for back pain and joint pain.   Skin: Negative for rash.   Psychiatric/Behavioral: Negative for depression. The patient is nervous/anxious. The patient does not have insomnia.      Medical/Surgical History:    has a past  medical history of Anxiety; Crohn's disease; Short bowel syndrome; Vitamin B deficiency; and Vitamin D deficiency.   has a past surgical history that includes Colon surgery; Tube thoracotomy; Cholecystectomy; Appendectomy; Small intestine surgery; Colonoscopy; Upper gastrointestinal endoscopy; 3 small bowel resections; and Knee surgery.    Family History: family history includes Diabetes in his father and mother; Irritable bowel syndrome in his cousin; Kidney disease in his father; Stroke in his mother. There is no history of Celiac disease, Cirrhosis, Colon cancer, Colon polyps, Cystic fibrosis, Esophageal cancer, Crohn's disease, Hemochromatosis, Inflammatory bowel disease, Liver cancer, Liver disease, Rectal cancer, Stomach cancer, Ulcerative colitis, or Addison's disease..     Social History:  reports that he quit smoking about 21 years ago. His smoking use included Cigarettes. He has a 15.00 pack-year smoking history. He has never used smokeless tobacco. He reports that he drinks about 1.2 oz of alcohol per week  He reports that he does not use illicit drugs.    Review of patient's allergies indicates:   Allergen Reactions    Ciprofloxacin     Codeine Itching    Compazine [prochlorperazine]     Erythromycin     Keflex [cephalexin]        Outpatient Prescriptions Marked as Taking for the 1/23/17 encounter (Office Visit) with Nela Sanchez MD   Medication Sig Dispense Refill    acetaminophen (TYLENOL) 500 mg Cap       CYANOCOBALAMIN, VITAMIN B-12, (VITAMIN B-12 ORAL) 1000 mg injection once a month      dicyclomine (BENTYL) 20 mg tablet Take 1 tablet (20 mg total) by mouth 4 (four) times daily. 360 tablet 0    diphenhydramine-acetaminophen 12.5-325 mg Tab       ergocalciferol (ERGOCALCIFEROL) 50,000 unit Cap Take 1 capsule (50,000 Units total) by mouth every 7 days. 8 capsule 0    hydrocortisone 2.5 % cream       meperidine (DEMEROL) 50 mg tablet Take 1 tablet (50 mg total) by mouth every 8 (eight)  hours as needed for Pain. 90 tablet 0    NAPROXEN SODIUM ORAL as needed.       ondansetron (ZOFRAN) 8 MG tablet Take 1 tablet (8 mg total) by mouth every 12 (twelve) hours as needed for Nausea. 90 tablet 1    phenylephrine-acetaminophen 5-325 mg Tab       urea (X-VIATE) 40 % Crea Apply topically 2 (two) times daily. 198.4 g 11     Vital Signs:  BP: 137/81 Temp: 98 °F (36.7 °C) Pulse: 65 Resp: 20  Weight: 66.2 kg (145 lb 15.1 oz)    Physical Exam   Constitutional: He is oriented to person, place, and time. He appears well-developed.   HENT:   Mouth/Throat: No oral lesions.   Eyes: Conjunctivae and EOM are normal. Pupils are equal, round, and reactive to light.   Cardiovascular: Normal rate and regular rhythm.    Pulmonary/Chest: Effort normal and breath sounds normal.   Abdominal: Soft. There is no tenderness.   Musculoskeletal:        Right lower leg: He exhibits no swelling.        Left lower leg: He exhibits no swelling.   Neurological: He is alert and oriented to person, place, and time.   Skin: No rash noted.   Psychiatric: He has a normal mood and affect.   Nursing note and vitals reviewed.    Labs:  No results found for: STOOLCULTURE, [id number], UZDCIFKAEL2C, CDIFFICILEAN, CDIFFTOX, CDIFFICILEBY  Lab Results   Component Value Date    WBC 3.05 (L) 01/04/2017    HGB 13.3 (L) 01/04/2017    HCT 38.4 (L) 01/04/2017    MCV 88 01/04/2017     (L) 01/04/2017    ALT 45 (H) 01/04/2017    AST 38 01/04/2017    ALKPHOS 191 (H) 01/04/2017    BILITOT 0.3 01/04/2017    TSH 1.9 04/03/2007    SEDRATE 0 06/11/2015    CRP 0.3 06/11/2015    TTGIGA 25 (H) 06/11/2015     06/11/2015    [id number] 20 (L) 01/04/2017     Lab Results   Component Value Date    HEPBIGM Positive (A) 01/13/2017     No results found for: VARICELLAZOS, VARICELLAINT  Lab Results   Component Value Date    NIL 0.06 06/11/2015    TBAG 0.45 06/11/2015    TBAGNIL 0.40 06/11/2015    MITOGENNIL 3.11 06/11/2015    TBGOLD Positive (A) 06/11/2015      No results found for: TPMTRESULT  No results found for: ANSADAINIT, INFLIXIMAB, INFLIXINTERP    Assessment/Plan:  Tristin Cerda is a 62 y.o. male with ???Ileal Crohn's disease diagnosis at age 14 in 1969 (previous small bowel surgeries 1972, 1980, 1988--no records).  Past medical history includes TB (treated in 2003/2004- positive on bronchial lavage- pt only remembers with 1 tablet, positive TB quantiferon 6/2015), DVT, multiple abdominal surgeries, history of pneumonia (2002), positive Hep B IgM 1/2017.  He also reports subclavian occlusion and vena cava narrowing so unable to get additional lines for TPN.  He was initially treated with azulfidine which caused nausea so it was discontinued.  His first surgery in 1972 was due to a spontaneous bowel perforation.  He was then started on prednisone and stayed on prednisone until approximately 9883-7515.  He had a 2nd and 3rd surgery in 1980 and 1988 due to obstructive symptoms and was told after his last surgery that he only had 3-4 feet of small bowel left with the majority of his colon still intact.  He was placed on TPN in 1988 and remained on it for 25 years.  At some point between 1988 and 2004 he started imuran 100 mg daily.  He had several pneumonia recurrences between 2002 and 2004 that eventually required a thoracotomy, pleurodesis, and a bronch washing.  Biopsies form the bronch washing revealed a positive TB diagnosis and received treatment with Rifampin for 6 months with recommendations for no additional TB treatment at that time.  He remained on no IBD medications from 6684-1793 and was being managed by Dr. Silva, surgeon from 7993-9953.  He established care with Dr. Ch in 3/2015 at which time he was having 4-8 loose BMs/day with occasional nocturnal BMs.  He had a hospitalization in 10/2016 for pancytopenia secondary to severe B12 deficiency and iron deficiency anemia with a normal bone marrow biopsy.  He had an additional hospitalization  in 11/2016 with a CT scan showing mild wall thickening of the rectosigmoid colon and mild colon distention.  Colonoscopy done during this admission showed poor bowel prep with advancement only to the mid transverse colon that showed no mucosal abnormalities.  He continues with 8 loose nonbloody BMs/day with urgency and nausea.  He is being managed by pain management for chronic abdominal pain with demerol 50 mg TID and OTC lidocaine cream.      Mr Cerda has complicated small bowel Crohn's disease though activity of disease and location of disease is unclear to me given limited records.  We discussed the various things that can contribute to his diarrhea including infection, short bowel syndrome related diarrhea including fatty acid and bile acid diarrhea.  In addition we don't have a proper assessment of if the Crohn's disease is active or how much SB is left. We know he has intact colon and per patient he has about 3-4 feet of SB left. By doing a colonoscopy and EGD with MR enterography I hope to better assess this.  If any active Crohn's disease then we will discuss further treatment for this.     # Diarrhea- multifactorial- DDX- short gut related diarrhea including bile acid diarrhea and fatty acid diarrhea, SIBO, active Crohn's may be possible,  infection:   - stool culture  - stool C difficile  - stool elastase  - TTG IgA and total serum IgA to assess for celiac disease  - TSH/FT4  to assess for thyroid disease  - takes demerol which helps with diarrhea  - treated for SIBO with flagyl recently by Dr. Aranda  - lomotil has helped in past but has gotten constipated from taking too much in the past  - may benefit from cholestyramine    # Short gut syndrome with small bowel Crohn's disease- extensive SB resection (per patient 3-4 feet left) with intact colon- much of this history is limited and based on patient history:  - - I had a long discussion with patient regarding epidemiology, potential causes/triggers  (avoiding NSAIDS, antibiotics if possible, stress and smoking), diagnosis, management goals and treatment options   - quit smoking 20 years ago  - EGD and colonoscopy to restage activity and distribution of Crohn's disease  - MR enterography- pt has foreign body in right knee- pt says he has had MRI in past; notation placed on MRI orders and pt told to remind the technician and radiologist to be sure okay to do MRE  - briefly discussed gattex for treatment of short gut syndrome  - basic labs: CBC, CMP, ESR, CRP, vitamin B12  - drug monitoring labs: none    # Nutrition due to short gut syndrome with vitamin B12/iron deficiency- Subclavian occlusion and vena cava narrowing so unable to get additional lines for TPN; on TPN for 25 yrs and stopped in 2012- has weight loss:  - encouraged pt to take ensure or boost- only able to tolerate clear supplements so told to get ensure clear  - low fat and high protein/calorie recommended  - chew food well- pt eats solid food but is edentulous  - needs referral to nutrition when services become available  - may benefit from gattex and briefly discussed this with patient today though this would need to be used with caution if he has any biliary/pancreatic disease or obstructive symptoms; will evaluate for colon polyps at time of colonoscopy  - vitamin deficiencies if pt has short gut syndrome- ordered vit D, A, E, K, B12, calcium, Mg, zinc and selenium today  - B12 deficiency- getting B12 shots and monitored by hematology care  - iron deficiency- getting iron infusions under hematology care    # Elevated LFTs with HBcIgM positive  - was on chronic TPN- ALP elevated with slight increase in ALT  - seeing hepatology on 2/23/17- Dr. King- referral prior to appt with me today by previous GI    # Chronic narcotic use- chronic abdominal pain:  - discussed increased mortality with Crohn's and narcotics  - managed by Dr. Curry in pain management  - on demerol 50 mg TID  - would prefer  transition to non-narcotic meds if possible but defer to pain management    # IBD specific health maintenance- possible plans for long term immunosuppression:  Colorectal cancer risk:    Risks factors: none-average risk    Opthamologic exam recommended yearly    Dermatologic exam recommended yearly     Bone health:  Risk of osteopenia/osteoporosis:  Risks factors: none  Vitamin D:   Lab Results   Component Value Date    [id number] 20 (L) 01/04/2017   - continue High dose Vitamin D, recommend Vitamin D3 2000 IU OTC daily once RX complete    Vaccine counseling:  - No LIVE VACCINES--if plans for immunosuppression  - VZV IgG, HBsAb today   - Tetanus every 10 years recommended with PCP    Follow up: ANISHA Wagner when Dr. Sanchez is in clinic 1-2 weeks after all testing    Total visit time was 80 minutes, more than 50% of which was spent in face-to-face counseling with patient regarding evaluation and management goals and treatment options for Crohn's disease and probable short gut syndrome    Thank you again for sending Tristin Cerda to see Dr. Nela Sanchez today at the [facility]. Please don't hesitate to contact Dr. Sanchez if there are any questions regarding this evaluation, or if you have any other patients with inflammatory bowel disease for whom you would like a consultation. You can reach Dr. Sanchez at [phone] or by email at [email or web address]    Nela Sanchez MD  Department of Gastroenterology  Medical Director, Inflammatory Bowel Disease    Kristy Gentile NP  Department of Gastroenterology

## 2017-01-23 NOTE — PATIENT INSTRUCTIONS
Instructions:  - labs today  - stool studies today  - colonoscopy and EGD--schedule in 4 weeks  - gattex is an option for treatment for short gut syndrome  - MRE--schedule next 1-2 weeks  - low fat, high calorie diet  - chew food well  - increase ensure/boost to 3 cans/daily (there is a juice/clear option)  - Avoid all NSAIDs (Advil, Ibuprofen, Motrin, Aspirin, Naprosyn, Aleve)--do not take any Aleve until after colonoscopy (can continue low dose aspirin)  - Yearly Eye exam  - Yearly Skin exam--wear sun block and hats  - Use antibiotics with caution  - Vaccines: Tetanus vaccination needed  - Follow up with ANISHA Wagner when Dr. Sanchez is in clinic (last appointment of the day on a Monday) 1-2 weeks after colonoscopy

## 2017-01-23 NOTE — LETTER
January 25, 2017      Agapito Aranda MD  [address]  [facility] [address]           [facility] - Gastroenterology  [address]  [facility] [address]  Phone: [phone]  Fax: [phone]          Patient: Tristin Cerda   MR Number: [id number]   YOB: 1955   Date of Visit: 1/23/2017       Dear Dr. Agapito Aranda:    Thank you for referring Tristin Cerda to me for evaluation. Attached you will find relevant portions of my assessment and plan of care.    If you have questions, please do not hesitate to call me. I look forward to following Tristin Cerda along with you.    Sincerely,    Nela Sanchez MD    Enclosure  CC:  No Recipients    If you would like to receive this communication electronically, please contact [email or web address] or [phone] to request more information on [employer] access.    For providers and/or their staff who would like to refer a patient to [facility], please contact us through our one-stop-shop provider referral line, [facility], at [phone].    If you feel you have received this communication in error or would no longer like to receive these types of communications, please e-mail [email or web address]

## 2017-01-23 NOTE — TELEPHONE ENCOUNTER
Patient is scheduled for EGD & Colonoscopy 2/14/2017 with Dr. EDE Sanchez.  Instructions given to patient in clinic.  Prep used: Full Liquid diet & 1 cap full of Miralax daily on 2/11 & 2/12.  PEG bowel prep with Clear Liquid diet on 2/13 & 2/14.

## 2017-01-23 NOTE — MR AVS SNAPSHOT
[facility] Gastroenterology  [address]  [place] [address]  Phone: [phone]  Fax: [phone]                  Tristin Cerda   2017 10:30 AM   Office Visit    Description:  Male : 1955   Provider:  Nela Sanchez MD   Department:  [facility] - Gastroenterology           Reason for Visit     Crohn's Disease           Diagnoses this Visit        Comments    Crohn's disease without complication, unspecified gastrointestinal tract location    -  Primary     Short bowel syndrome         Diarrhea, unspecified type                To Do List           Future Appointments        Provider Department Dept Phone    2017 12:45 PM LAB, SAME DAY [facility] [phone]    2017 9:00 AM [facility] MRI4 [facility] [phone]    2017 9:45 AM Ira Bang MD [place] - Dermatology [phone]    2017 11:20 AM Suzanne King MD [facility] Hepatology [phone]    2017 3:00 PM Kristy Gentile, [facility] Gastroenterology [phone]      Your Future Surgeries/Procedures     2017   Surgery with Nela Sanchez MD   [facility]    [address] [facility] [address] [phone]              Goals (5 Years of Data)     None      [facility]     [facility] Nurse Care Line -  Assistance  Registered nurses in the [facility] provide clinical advisement, health education, appointment booking, and other advisory services.  Call for this free service at [phone].             Medications           Message regarding Medications     Verify the changes and/or additions to your medication regime listed below are the same as discussed with your clinician today.  If any of these changes or additions are incorrect, please notify your healthcare provider.             Verify that the below list of medications is an accurate representation of the  medications you are currently taking.  If none reported, the list may be blank. If incorrect, please contact your healthcare provider. Carry this list with you in case of emergency.           Current Medications     acetaminophen (TYLENOL) 500 mg Cap     CYANOCOBALAMIN, VITAMIN B-12, (VITAMIN B-12 ORAL) 1000 mg injection once a month    dicyclomine (BENTYL) 20 mg tablet Take 1 tablet (20 mg total) by mouth 4 (four) times daily.    diphenhydramine-acetaminophen 12.5-325 mg Tab     ergocalciferol (ERGOCALCIFEROL) 50,000 unit Cap Take 1 capsule (50,000 Units total) by mouth every 7 days.    hydrocortisone 2.5 % cream     meperidine (DEMEROL) 50 mg tablet Take 1 tablet (50 mg total) by mouth every 8 (eight) hours as needed for Pain.    NAPROXEN SODIUM ORAL as needed.     ondansetron (ZOFRAN) 8 MG tablet Take 1 tablet (8 mg total) by mouth every 12 (twelve) hours as needed for Nausea.    phenylephrine-acetaminophen 5-325 mg Tab     urea (X-VIATE) 40 % Crea Apply topically 2 (two) times daily.           Clinical Reference Information           Vital Signs - Last Recorded  Most recent update: 1/23/2017 10:13 AM by Rossana Patel RN    BP Pulse Temp Resp Ht Wt    137/81 (BP Location: Left arm, Patient Position: Sitting, BP Method: Automatic) 65 98 °F (36.7 °C) 20 6' (1.829 m) 66.2 kg (145 lb 15.1 oz)    BMI                19.79 kg/m2          Blood Pressure          Most Recent Value    BP  137/81      Allergies as of 1/23/2017     Ciprofloxacin    Codeine    Compazine [Prochlorperazine]    Erythromycin    Keflex [Cephalexin]      Immunizations Administered on Date of Encounter - 1/23/2017     None      Orders Placed During Today's Visit      Normal Orders This Visit    Case request GI: Colonoscopy, ESOPHAGOGASTRODUODENOSCOPY (EGD)     Future Labs/Procedures Expected by Expires    C-reactive protein  1/23/2017 3/24/2018    Calcium  1/23/2017 3/24/2018    CBC auto differential  1/23/2017 3/24/2018    Clostridium difficile  EIA  1/23/2017 1/23/2018    Comprehensive metabolic panel  1/23/2017 3/24/2018    Gamma GT  1/23/2017 3/24/2018    Magnesium  1/23/2017 3/24/2018    MRI ENTEROGRAPHY  1/23/2017 1/23/2018    Pancreatic elastase, fecal  1/23/2017 3/24/2018    Sedimentation rate, manual  1/23/2017 3/24/2018    Selenium serum  1/23/2017 3/24/2018    TSH  1/23/2017 3/24/2018    Varicella zoster antibody, IgG  1/23/2017 3/24/2018    Vitamin A  1/23/2017 3/24/2018    Vitamin B12  1/23/2017 3/24/2018    Vitamin D  1/23/2017 3/24/2018    Vitamin E  1/23/2017 3/24/2018    Vitamin K  1/23/2017 3/24/2018    Zinc  1/23/2017 3/24/2018      Instructions    Instructions:  - labs today  - stool studies today  - colonoscopy and EGD--schedule in 4 weeks  - gattex is an option for treatment for short gut syndrome  - MRE--schedule next 1-2 weeks  - low fat, high calorie diet  - chew food well  - increase ensure/boost to 3 cans/daily (there is a juice/clear option)  - Avoid all NSAIDs (Advil, Ibuprofen, Motrin, Aspirin, Naprosyn, Aleve)--do not take any Aleve until after colonoscopy (can continue low dose aspirin)  - Yearly Eye exam  - Yearly Skin exam--wear sun block and hats  - Use antibiotics with caution  - Vaccines: Tetanus vaccination needed  - Follow up with ANISHA Wagner when Dr. Sanchez is in clinic (last appointment of the day on a Monday) 1-2 weeks after colonoscopy

## 2017-02-09 ENCOUNTER — TELEPHONE (OUTPATIENT)
Dept: FAMILY MEDICINE | Facility: CLINIC | Age: 62
End: 2017-02-09

## 2017-02-09 DIAGNOSIS — R10.9 CHRONIC ABDOMINAL PAIN: ICD-10-CM

## 2017-02-09 DIAGNOSIS — G89.29 CHRONIC ABDOMINAL PAIN: ICD-10-CM

## 2017-02-09 DIAGNOSIS — K90.829 SHORT BOWEL SYNDROME: ICD-10-CM

## 2017-02-09 RX ORDER — ONDANSETRON HYDROCHLORIDE 8 MG/1
8 TABLET, FILM COATED ORAL EVERY 12 HOURS PRN
Qty: 180 TABLET | Refills: 0 | Status: SHIPPED | OUTPATIENT
Start: 2017-02-09 | End: 2017-05-01 | Stop reason: SDUPTHER

## 2017-02-09 RX ORDER — DICYCLOMINE HYDROCHLORIDE 20 MG/1
20 TABLET ORAL 4 TIMES DAILY
Qty: 360 TABLET | Refills: 0 | Status: SHIPPED | OUTPATIENT
Start: 2017-02-09 | End: 2017-04-12 | Stop reason: SDUPTHER

## 2017-02-09 NOTE — TELEPHONE ENCOUNTER
----- Message from Kasia Centeno sent at 2/9/2017  9:55 AM CST -----  Contact: pt [phone]  Pt came into clinic today to request refills of Benzyl and Zofran.  Please send to [employer] through [employer].  Please call pt to verify complete at [phone].  Thank you.  abd

## 2017-02-13 DIAGNOSIS — Z12.11 SPECIAL SCREENING FOR MALIGNANT NEOPLASMS, COLON: Primary | ICD-10-CM

## 2017-02-13 RX ORDER — POLYETHYLENE GLYCOL 3350, SODIUM SULFATE ANHYDROUS, SODIUM BICARBONATE, SODIUM CHLORIDE, POTASSIUM CHLORIDE 236; 22.74; 6.74; 5.86; 2.97 G/4L; G/4L; G/4L; G/4L; G/4L
4 POWDER, FOR SOLUTION ORAL ONCE
Qty: 4000 ML | Refills: 0 | Status: SHIPPED | OUTPATIENT
Start: 2017-02-13 | End: 2017-02-14

## 2017-02-14 ENCOUNTER — TELEPHONE (OUTPATIENT)
Dept: ENDOSCOPY | Facility: HOSPITAL | Age: 62
End: 2017-02-14

## 2017-02-14 ENCOUNTER — HOSPITAL ENCOUNTER (OUTPATIENT)
Facility: HOSPITAL | Age: 62
Discharge: HOME | End: 2017-02-14
Attending: INTERNAL MEDICINE | Admitting: INTERNAL MEDICINE
Payer: MEDICARE

## 2017-02-14 ENCOUNTER — ANESTHESIA EVENT (OUTPATIENT)
Dept: ENDOSCOPY | Facility: HOSPITAL | Age: 62
End: 2017-02-14
Payer: MEDICARE

## 2017-02-14 ENCOUNTER — ANESTHESIA (OUTPATIENT)
Dept: ENDOSCOPY | Facility: HOSPITAL | Age: 62
End: 2017-02-14
Payer: MEDICARE

## 2017-02-14 ENCOUNTER — SURGERY (OUTPATIENT)
Age: 62
End: 2017-02-14

## 2017-02-14 VITALS
SYSTOLIC BLOOD PRESSURE: 133 MMHG | WEIGHT: 145 LBS | OXYGEN SATURATION: 96 % | DIASTOLIC BLOOD PRESSURE: 92 MMHG | HEIGHT: 72 IN | RESPIRATION RATE: 12 BRPM | RESPIRATION RATE: 20 BRPM | HEART RATE: 59 BPM | BODY MASS INDEX: 19.64 KG/M2 | TEMPERATURE: 98 F

## 2017-02-14 DIAGNOSIS — K50.90 CROHN'S DISEASE: ICD-10-CM

## 2017-02-14 DIAGNOSIS — K50.919 CROHN'S DISEASE WITH COMPLICATION, UNSPECIFIED GASTROINTESTINAL TRACT LOCATION: Primary | ICD-10-CM

## 2017-02-14 DIAGNOSIS — Z12.11 SPECIAL SCREENING FOR MALIGNANT NEOPLASMS, COLON: Primary | ICD-10-CM

## 2017-02-14 DIAGNOSIS — K50.90 CROHN'S DISEASE WITHOUT COMPLICATION, UNSPECIFIED GASTROINTESTINAL TRACT LOCATION: Primary | ICD-10-CM

## 2017-02-14 PROCEDURE — 88305 TISSUE EXAM BY PATHOLOGIST: CPT | Performed by: PATHOLOGY

## 2017-02-14 PROCEDURE — 25000003 PHARM REV CODE 250: Performed by: INTERNAL MEDICINE

## 2017-02-14 PROCEDURE — 27201012 HC FORCEPS, HOT/COLD, DISP: Performed by: INTERNAL MEDICINE

## 2017-02-14 PROCEDURE — 43239 EGD BIOPSY SINGLE/MULTIPLE: CPT | Mod: 51,,, | Performed by: INTERNAL MEDICINE

## 2017-02-14 PROCEDURE — D9220A PRA ANESTHESIA: Mod: CRNA,,, | Performed by: NURSE ANESTHETIST, CERTIFIED REGISTERED

## 2017-02-14 PROCEDURE — 45378 DIAGNOSTIC COLONOSCOPY: CPT | Performed by: INTERNAL MEDICINE

## 2017-02-14 PROCEDURE — D9220A PRA ANESTHESIA: Mod: ANES,,, | Performed by: ANESTHESIOLOGY

## 2017-02-14 PROCEDURE — 45378 DIAGNOSTIC COLONOSCOPY: CPT | Mod: 53,,, | Performed by: INTERNAL MEDICINE

## 2017-02-14 PROCEDURE — 37000008 HC ANESTHESIA 1ST 15 MINUTES: Performed by: INTERNAL MEDICINE

## 2017-02-14 PROCEDURE — 43239 EGD BIOPSY SINGLE/MULTIPLE: CPT | Performed by: INTERNAL MEDICINE

## 2017-02-14 PROCEDURE — 63600175 PHARM REV CODE 636 W HCPCS: Performed by: NURSE ANESTHETIST, CERTIFIED REGISTERED

## 2017-02-14 PROCEDURE — 37000009 HC ANESTHESIA EA ADD 15 MINS: Performed by: INTERNAL MEDICINE

## 2017-02-14 RX ORDER — PROPOFOL 10 MG/ML
VIAL (ML) INTRAVENOUS
Status: DISCONTINUED | OUTPATIENT
Start: 2017-02-14 | End: 2017-02-14

## 2017-02-14 RX ORDER — FENTANYL CITRATE 50 UG/ML
INJECTION, SOLUTION INTRAMUSCULAR; INTRAVENOUS
Status: DISCONTINUED | OUTPATIENT
Start: 2017-02-14 | End: 2017-02-14

## 2017-02-14 RX ORDER — POLYETHYLENE GLYCOL 3350, SODIUM SULFATE ANHYDROUS, SODIUM BICARBONATE, SODIUM CHLORIDE, POTASSIUM CHLORIDE 236; 22.74; 6.74; 5.86; 2.97 G/4L; G/4L; G/4L; G/4L; G/4L
4 POWDER, FOR SOLUTION ORAL ONCE
Qty: 4000 ML | Refills: 0 | Status: SHIPPED | OUTPATIENT
Start: 2017-02-14 | End: 2017-02-14

## 2017-02-14 RX ORDER — SODIUM CHLORIDE 9 MG/ML
INJECTION, SOLUTION INTRAVENOUS CONTINUOUS
Status: DISCONTINUED | OUTPATIENT
Start: 2017-02-14 | End: 2017-02-14 | Stop reason: HOSPADM

## 2017-02-14 RX ORDER — PROPOFOL 10 MG/ML
VIAL (ML) INTRAVENOUS CONTINUOUS PRN
Status: DISCONTINUED | OUTPATIENT
Start: 2017-02-14 | End: 2017-02-14

## 2017-02-14 RX ADMIN — FENTANYL CITRATE 100 MCG: 50 INJECTION, SOLUTION INTRAMUSCULAR; INTRAVENOUS at 11:02

## 2017-02-14 RX ADMIN — PROPOFOL 150 MCG/KG/MIN: 10 INJECTION, EMULSION INTRAVENOUS at 11:02

## 2017-02-14 RX ADMIN — SODIUM CHLORIDE: 0.9 INJECTION, SOLUTION INTRAVENOUS at 10:02

## 2017-02-14 RX ADMIN — PROPOFOL 20 MG: 10 INJECTION, EMULSION INTRAVENOUS at 11:02

## 2017-02-14 RX ADMIN — PROPOFOL 30 MG: 10 INJECTION, EMULSION INTRAVENOUS at 11:02

## 2017-02-14 RX ADMIN — SODIUM CHLORIDE: 0.9 INJECTION, SOLUTION INTRAVENOUS at 12:02

## 2017-02-14 NOTE — IP AVS SNAPSHOT
[facility]  [address]  [facility] [address]  Phone: [phone]           Patient Discharge Instructions     Our goal is to set you up for success. This packet includes information on your condition, medications, and your home care. It will help you to care for yourself so you don't get sicker and need to go back to the hospital.     Please ask your nurse if you have any questions.        There are many details to remember when preparing to leave the hospital. Here is what you will need to do:    1. Take your medicine. If you are prescribed medications, review your Medication List in the following pages. You may have new medications to  at the pharmacy and others that you'll need to stop taking. Review the instructions for how and when to take your medications. Talk with your doctor or nurses if you are unsure of what to do.     2. Go to your follow-up appointments. Specific follow-up information is listed in the following pages. Your may be contacted by a transition nurse or clinical provider about future appointments. Be sure we have all of the phone numbers to reach you, if needed. Please contact your provider's office if you are unable to make an appointment.     3. Watch for warning signs. Your doctor or nurse will give you detailed warning signs to watch for and when to call for assistance. These instructions may also include educational information about your condition. If you experience any of warning signs to your health, call your doctor.               [facility]  Unless otherwise directed by your provider, please contact [facility], our nurse care line that is available for 24/7 assistance.     [phone] (toll-free)    Registered nurses in the [facility] provide clinical advisement, health education, appointment booking, and other advisory services.                    ** Verify the list of medication(s) below is accurate and up  to date. Carry this with you in case of emergency. If your medications have changed, please notify your healthcare provider.             Medication List      CONTINUE taking these medications        Additional Info                      acetaminophen 500 mg Cap   Commonly known as:  TYLENOL   Refills:  0      Begin Date    AM    Noon    PM    Bedtime       dicyclomine 20 mg tablet   Commonly known as:  BENTYL   Quantity:  360 tablet   Refills:  0   Dose:  20 mg    Instructions:  Take 1 tablet (20 mg total) by mouth 4 (four) times daily.     Begin Date    AM    Noon    PM    Bedtime       diphenhydramine-acetaminophen 12.5-325 mg Tab   Refills:  0      Begin Date    AM    Noon    PM    Bedtime       ergocalciferol 50,000 unit Cap   Commonly known as:  ERGOCALCIFEROL   Quantity:  8 capsule   Refills:  0   Dose:  75275 Units    Instructions:  Take 1 capsule (50,000 Units total) by mouth every 7 days.     Begin Date    AM    Noon    PM    Bedtime       hydrocortisone 2.5 % cream   Refills:  0      Begin Date    AM    Noon    PM    Bedtime       NAPROXEN SODIUM ORAL   Refills:  0    Instructions:  as needed.     Begin Date    AM    Noon    PM    Bedtime       ondansetron 8 MG tablet   Commonly known as:  ZOFRAN   Quantity:  180 tablet   Refills:  0   Dose:  8 mg    Instructions:  Take 1 tablet (8 mg total) by mouth every 12 (twelve) hours as needed for Nausea.     Begin Date    AM    Noon    PM    Bedtime       phenylephrine-acetaminophen 5-325 mg Tab   Refills:  0      Begin Date    AM    Noon    PM    Bedtime       polyethylene glycol 236-22.74-6.74 -5.86 gram suspension   Commonly known as:  GoLYTELY,NuLYTELY   Quantity:  4000 mL   Refills:  0   Dose:  4 L    Instructions:  Take 4,000 mLs (4 L total) by mouth once.     Begin Date    AM    Noon    PM    Bedtime       urea 40 % Crea   Commonly known as:  X-VIATE   Quantity:  198.4 g   Refills:  11    Instructions:  Apply topically 2 (two) times daily.     Begin Date     AM    Noon    PM    Bedtime       VITAMIN B-12 ORAL   Refills:  0    Instructions:  1000 mg injection once a month     Begin Date    AM    Noon    PM    Bedtime                  Please bring to all follow up appointments:    1. A copy of your discharge instructions.  2. All medicines you are currently taking in their original bottles.  3. Identification and insurance card.    Please arrive 15 minutes ahead of scheduled appointment time.    Please call 24 hours in advance if you must reschedule your appointment and/or time.        Your Scheduled Appointments     Feb 16, 2017  9:45 AM CST   Consult with MD Ivonne Yunll - Dermatology ([place])    [address] [facility] [address]   [phone]            Feb 23, 2017 11:20 AM CST   Consult with Suzanne King MD   [facility] - Hepatology ([facility] )    [address]  [place] [address]   [phone]            Feb 27, 2017  3:00 PM CST   GASTROENTEROLOGY ESTABLISHED PATIENT with STEPHANIE Barclay   [facility] - Gastroenterology ([facility] )    [address]  [place] [address]   [phone]            Mar 23, 2017  8:00 AM CDT   Established Patient Visit with GINA Galeas - Pain Management ([facility] - [address]  [facility] [address]   [phone]            Apr 13, 2017 10:40 AM CDT   Established Patient Visit with MD Ivonne Samuelll - Family Medicine ([place])    [address]  [facility] [address]   [phone]                Discharge Instructions     Future Orders    Activity as tolerated     Diet general     Questions:    Total calories:      Fat restriction, if any:      Protein restriction, if any:      Na restriction, if any:      Fluid restriction:      Additional restrictions:          Discharge Instructions         Colonoscopy     A camera attached to a flexible tube with a viewing lens is used to take video pictures.      Colonoscopy is a test to view the inside of your lower digestive tract (colon and rectum). Sometimes it can show the last part of the small intestine (ileum). During the test, small pieces of tissue may be removed for testing. This is called a biopsy. Small growths, such as polyps, may also be removed.   Why is colonoscopy done?  The test is done to help look for colon cancer. And it can help find the source of abdominal pain, bleeding, and changes in bowel habits. It may be needed once a year, depending on factors such as your:  · Age  · Health history  · Family health history  · Symptoms  · Results from any prior colonoscopy  Risks and possible complications  These include:  · Bleeding               · A puncture or tear in the colon   · Risks of anesthesia  · A cancer lesion not being seen  Getting ready   To prepare for the test:  · Talk with your healthcare provider about the risks of the test (see below). Also ask your healthcare provider about alternatives to the test.  · Tell your healthcare provider about any medicines you take. Also tell him or her about any health conditions you may have.  · Make sure your rectum and colon are empty for the test. Follow the diet and bowel prep instructions exactly. If you dont, the test may need to be rescheduled.  · Plan for a friend or family member to drive you home after the test.     Colonoscopy provides an inside view of the entire colon.     You may discuss the results with your doctor right away or at a future visit.  During the test   The test is usually done in the hospital on an outpatient basis. This means you go home the same day. The procedure takes about 30 minutes. During that time:  · You are given relaxing (sedating) medicine through an IV line. You may be drowsy, or fully asleep.  · The healthcare provider will first give you a physical exam to check for anal and rectal problems.  · Then the anus is lubricated and the scope inserted.  · If you are  awake, you may have a feeling similar to needing to have a bowel movement. You may also feel pressure as air is pumped into the colon. Its OK to pass gas during the procedure.  · Biopsy, polyp removal, or other treatments may be done during the test.  After the test   You may have gas right after the test. It can help to try to pass it to help prevent later bloating. Your healthcare provider may discuss the results with you right away. Or you may need to schedule a follow-up visit to talk about the results. After the test, you can go back to your normal eating and other activities. You may be tired from the sedation and need to rest for a few hours.  Date Last Reviewed: 11/1/2016 © 2000-2016 [employer]. [address] [place] [address], [place] [address]. All rights reserved. This information is not intended as a substitute for professional medical care. Always follow your healthcare professional's instructions.        Upper GI Endoscopy     During endoscopy, a long, flexible tube is used to view the inside of your upper GI tract.      Upper GI endoscopy allows your healthcare provider to look directly into the beginning of your gastrointestinal (GI) tract. The esophagus, stomach, and duodenum (the first part of the small intestine) make up the upper GI tract.   Before the exam  Follow these and any other instructions you are given before your endoscopy. If you dont follow the healthcare providers instructions carefully, the test may need to be canceled or done over:  · Don't eat or drink anything after midnight the night before your exam. If your exam is in the afternoon, drink only clear liquids in the morning. Don't eat or drink anything for 8 hours before the exam. In some cases, you may be able to take medicines with sips of water until 2 hours before the procedure. Speak with your healthcare provider about this.   · Bring your X-rays and any other test results you have.  · Because you will be sedated,  arrange for an adult to drive you home after the exam.  · Tell your healthcare provider before the exam if you are taking any medicines or have any medical problems.  The procedure  Here is what to expect:  · You will lie on the endoscopy table. Usually patients lie on the left side.  · You will be monitored and given oxygen.  · Your throat may be numbed with a spray or gargle. You are given medicine through an intravenous (IV) line that will help you relax and remain comfortable. You may be awake or asleep during the procedure.  · The healthcare provider will put the endoscope in your mouth and down your esophagus. It is thinner than most pieces of food that you swallow. It will not affect your breathing. The medicine helps keep you from gagging.  · Air is put into your GI tract to expand it. It can make you burp.  · During the procedure, the healthcare provider can take biopsies (tissue samples), remove abnormalities, such as polyps, or treat abnormalities through a variety of devices placed through the endoscope. You will not feel this.   · The endoscope carries images of your upper GI tract to a video screen. If you are awake, you may be able to look at the images.  · After the procedure is done, you will rest for a time. An adult must drive you home.  When to call your healthcare provider  Contact your healthcare provider if you have:  · Black or tarry stools, or blood in your stool  · Fever  · Pain in your belly that does not go away  · Nausea and vomiting, or vomiting blood   Date Last Reviewed: 7/1/2016  © 7381-7024 [employer]. [address] [place] [address], [place] [address]. All rights reserved. This information is not intended as a substitute for professional medical care. Always follow your healthcare professional's instructions.            Admission Information     Date & Time Provider Department CSN    2/14/2017  9:36 AM Nela Sanchez MD [facility] [id number]      [facility]  Providers     Provider Role Specialty Primary office phone    Nela Sanchez MD Attending Provider Gastroenterology [phone]    Nela Sanchez MD Surgeon  Gastroenterology [phone]      Your Vitals Were     BP Pulse Temp Resp Height Weight    102/56 (BP Location: Left arm, Patient Position: Lying, BP Method: Automatic) 58 97.9 °F (36.6 °C) (Oral) 18 6' (1.829 m) 65.8 kg (145 lb)    SpO2 BMI             94% 19.67 kg/m2         Recent Lab Values     No lab values to display.      Pending Labs     Order Current Status    Specimen to Pathology - Surgery Collected (02/14/17 1212)      Allergies as of 2/14/2017        Reactions    Ciprofloxacin     Codeine Itching    Compazine [Prochlorperazine]     Erythromycin     Keflex [Cephalexin]       Advance Directives     An advance directive is a document which, in the event you are no longer able to make decisions for yourself, tells your healthcare team what kind of treatment you do or do not want to receive, or who you would like to make those decisions for you.  If you do not currently have an advance directive, [facility] encourages you to create one.  For more information call:  [phone]), [phone] ([phone]),  or log on to [email or web address].        Smoking Cessation     If you would like to quit smoking:   You may be eligible for free services if you are a [place] resident and started smoking cigarettes before September 1, 1988.  Call the Smoking Cessation Trust ([facility]) toll free at (878) 468-1258 or (348) 849-1027.   Call 5-350-QUIT-NOW if you do not meet the above criteria.            Language Assistance Services     ATTENTION: Language assistance services are available, free of charge. Please call [phone].      ATENCIÓN: Si habla español, tiene a cuadra disposición servicios gratuitos de asistencia lingüística. Llame al [phone].     CHÚ Ý: N?u b?n nói Ti?ng Vi?t, có các d?ch v? h? tr? ngôn ng? mi?n phí dành cho b?n.  G?i s? [phone].         [facility] complies with applicable Federal civil rights laws and does not discriminate on the basis of race, color, national origin, age, disability, or sex.

## 2017-02-14 NOTE — TELEPHONE ENCOUNTER
Patient in Endoscopy Scheduling office to reschedule colonoscopy due to poor prep. Per Dr. Sanchez, patient to reschedule tomorrow and to do 2 days of clear liquid diet. Patient refused to reschedule for tomorrow. Patient stated that he did not have a ride for tomorrow and would have to reschedule in March. Procedure scheduled for 3/14/17.  Patient educated on procedure prep. Verbalized understanding. A set of prep instructions given to patient.

## 2017-02-14 NOTE — TRANSFER OF CARE
Anesthesia Transfer of Care Note    Patient: Tristin Cerda    Procedure(s) Performed: Procedure(s) (LRB):  ESOPHAGOGASTRODUODENOSCOPY (EGD) (N/A)  COLONOSCOPY (N/A)    Patient location: GI    Anesthesia Type: general    Transport from OR: Transported from OR on room air with adequate spontaneous ventilation    Post pain: adequate analgesia    Post assessment: no apparent anesthetic complications and tolerated procedure well    Post vital signs: stable    Level of consciousness: awake    Nausea/Vomiting: no nausea/vomiting    Complications: none          Last vitals:   Visit Vitals    BP (!) 99/56 (BP Location: Left arm, Patient Position: Sitting, BP Method: Automatic)    Pulse 61    Temp 36.6 °C (97.9 °F) (Oral)    Resp 18    Ht 6' (1.829 m)    Wt 65.8 kg (145 lb)    SpO2 (!) 94%    BMI 19.67 kg/m2

## 2017-02-14 NOTE — H&P
Short Stay Endoscopy History and Physical    PCP - Will Albrecht MD  Referring Physician - Nela Sanchez MD  [address] [place] [address]    Procedure - EGD and Colonoscopy  ASA - per anesthesia  Mallampati - per anesthesia  History of Anesthesia problems - no  Family history Anesthesia problems -  no   Plan of anesthesia - General    HPI  62 y.o. male    Reason for procedure:  Crohn's disease follow up    ROS:  Constitutional: No fevers, chills, No weight loss  CV: No chest pain  Pulm: No cough, No shortness of breath  GI: see HPI    Medical History:  has a past medical history of Anxiety; Crohn's disease; Short bowel syndrome; Vitamin B deficiency; and Vitamin D deficiency.    Surgical History:  has a past surgical history that includes Colon surgery; Tube thoracotomy; Cholecystectomy; Appendectomy; Small intestine surgery; Colonoscopy; Upper gastrointestinal endoscopy; 3 small bowel resections; and Knee surgery.    Family History: family history includes Diabetes in his father and mother; Irritable bowel syndrome in his cousin; Kidney disease in his father; Stroke in his mother. There is no history of Celiac disease, Cirrhosis, Colon cancer, Colon polyps, Cystic fibrosis, Esophageal cancer, Crohn's disease, Hemochromatosis, Inflammatory bowel disease, Liver cancer, Liver disease, Rectal cancer, Stomach cancer, Ulcerative colitis, or Addison's disease., see HPI    Social History:  reports that he quit smoking about 21 years ago. His smoking use included Cigarettes. He has a 15.00 pack-year smoking history. He has never used smokeless tobacco. He reports that he drinks about 1.2 oz of alcohol per week  He reports that he does not use illicit drugs.    Review of patient's allergies indicates:   Allergen Reactions    Ciprofloxacin     Codeine Itching    Compazine [prochlorperazine]     Erythromycin     Keflex [cephalexin]        Medications:   Prescriptions Prior to Admission   Medication Sig  Dispense Refill Last Dose    acetaminophen (TYLENOL) 500 mg Cap    2/14/2017 at 0430    CYANOCOBALAMIN, VITAMIN B-12, (VITAMIN B-12 ORAL) 1000 mg injection once a month   Past Month at Unknown time    dicyclomine (BENTYL) 20 mg tablet Take 1 tablet (20 mg total) by mouth 4 (four) times daily. 360 tablet 0 Past Week at Unknown time    diphenhydramine-acetaminophen 12.5-325 mg Tab    Past Week at Unknown time    ergocalciferol (ERGOCALCIFEROL) 50,000 unit Cap Take 1 capsule (50,000 Units total) by mouth every 7 days. 8 capsule 0 Past Week at Unknown time    hydrocortisone 2.5 % cream    Past Week at Unknown time    NAPROXEN SODIUM ORAL as needed.    Past Week at Unknown time    ondansetron (ZOFRAN) 8 MG tablet Take 1 tablet (8 mg total) by mouth every 12 (twelve) hours as needed for Nausea. 180 tablet 0 Past Week at Unknown time    phenylephrine-acetaminophen 5-325 mg Tab    Past Month at Unknown time    polyethylene glycol (GOLYTELY,NULYTELY) 236-22.74-6.74 -5.86 gram suspension Take 4,000 mLs (4 L total) by mouth once. 4000 mL 0 2/14/2017 at 0600    urea (X-VIATE) 40 % Crea Apply topically 2 (two) times daily. 198.4 g 11 Past Month at Unknown time       Physical Exam:    Vital Signs:   Vitals:    02/14/17 1028   BP: (!) 159/73   Pulse: (!) 58   Resp: 18   Temp: 97.6 °F (36.4 °C)       General Appearance: Well appearing in no acute distress  Lungs: CTA anteriorly  Heart:  Regular rate, S1, S2 normal, no murmurs heard.  Abdomen: Soft, non tender, non distended with normal bowel sounds, no masses  Extremities: No edema    Labs:  Lab Results   Component Value Date    WBC 3.63 (L) 01/23/2017    HGB 13.4 (L) 01/23/2017    HCT 39.0 (L) 01/23/2017     (L) 01/23/2017    CHOL 107 (L) 01/04/2017    TRIG 105 01/04/2017    HDL 27 (L) 01/04/2017    ALT 27 01/23/2017    AST 25 01/23/2017     01/23/2017    K 4.7 01/23/2017     01/23/2017    CREATININE 0.8 01/23/2017    BUN 11 01/23/2017    CO2 29  01/23/2017    TSH 3.202 01/23/2017    PSA 1.6 03/10/2015       I have explained the risks and benefits of this endoscopic procedure to the patient including but not limited to bleeding, inflammation, infection, perforation, and death.      Nela Sanchez MD

## 2017-02-14 NOTE — ANESTHESIA PREPROCEDURE EVALUATION
02/14/2017  Tristin Cerda is a 62 y.o., male.    [facility] Anesthesia Evaluation    I have reviewed the Patient Summary Reports.    I have reviewed the Nursing Notes.   I have reviewed the Medications.     Review of Systems      Physical Exam  General:  Well nourished    Airway/Jaw/Neck:  Airway Findings: Mouth Opening: Normal Tongue: Normal  General Airway Assessment: Average  Mallampati: III  Improves to II with phonation.  TM Distance: Normal, at least 6 cm  Jaw/Neck Findings:  Neck ROM: Normal ROM            Mental Status:  Mental Status Findings:  Alert and Oriented         Anesthesia Plan  Type of Anesthesia, risks & benefits discussed:  Anesthesia Type:  general, MAC  Patient's Preference:   Intra-op Monitoring Plan:   Intra-op Monitoring Plan Comments:   Post Op Pain Control Plan:   Post Op Pain Control Plan Comments:   Induction:   IV  Beta Blocker:  Patient is not currently on a Beta-Blocker (No further documentation required).       Informed Consent: Patient understands risks and agrees with Anesthesia plan.  Questions answered. Anesthesia consent signed with patient.  ASA Score: 2     Day of Surgery Review of History & Physical:    H&P update referred to the surgeon.         Ready For Surgery From Anesthesia Perspective.

## 2017-02-14 NOTE — ANESTHESIA POSTPROCEDURE EVALUATION
Anesthesia Post Evaluation    Patient: Tristin Cerda    Procedure(s) Performed: Procedure(s) (LRB):  ESOPHAGOGASTRODUODENOSCOPY (EGD) (N/A)  COLONOSCOPY (N/A)    Final Anesthesia Type: general  Patient location during evaluation: PACU  Patient participation: Yes- Able to Participate  Level of consciousness: awake and alert and oriented  Post-procedure vital signs: reviewed and stable  Pain management: adequate  Airway patency: patent  PONV status at discharge: No PONV  Anesthetic complications: no      Cardiovascular status: stable  Respiratory status: unassisted  Hydration status: euvolemic  Follow-up not needed.        Visit Vitals    BP (!) 133/92    Pulse (!) 59    Temp 36.6 °C (97.9 °F) (Oral)    Resp 20    Ht 6' (1.829 m)    Wt 65.8 kg (145 lb)    SpO2 96%    BMI 19.67 kg/m2       Pain/Selena Score: Pain Assessment Performed: Yes (2/14/2017 12:30 PM)  Presence of Pain: denies (2/14/2017 12:30 PM)  Selena Score: 10 (2/14/2017 12:51 PM)

## 2017-02-14 NOTE — DISCHARGE INSTRUCTIONS

## 2017-02-16 ENCOUNTER — TELEPHONE (OUTPATIENT)
Dept: GASTROENTEROLOGY | Facility: CLINIC | Age: 62
End: 2017-02-16

## 2017-02-16 NOTE — TELEPHONE ENCOUNTER
Called and left msg for return call  To see if wants to reschedule 2/27 appt  Left direct line for call back

## 2017-02-17 NOTE — TELEPHONE ENCOUNTER
Called and spoke with pt  I explained message below. He understood and I rescheduled his 02/27 appoint to 03/16/2017 @ 8:40    Appoint reminder mailed

## 2017-02-17 NOTE — TELEPHONE ENCOUNTER
----- Message from Nela Sanchez MD sent at 2/17/2017 11:04 AM CST -----  Please call patient and let him know that it is best that we see him after his colonoscopy. His colo was bad bowel prep and he is rescheduled in March.  Maybe we can see him same day as his colonoscopy.  Cancel his appt with Kristy on 2/27    SS

## 2017-02-20 ENCOUNTER — TELEPHONE (OUTPATIENT)
Dept: GASTROENTEROLOGY | Facility: CLINIC | Age: 62
End: 2017-02-20

## 2017-02-20 NOTE — TELEPHONE ENCOUNTER
.Outside Labs:   quest  Date of labs:2/9/17    Results:pancreatic elastase  356      IBD MEDICATIONS CURRENTLY ON AND CURRENT DOSAGE (including Prednisone): na      IF CURRENTLY ON A BIOLOGIC, LAST DOSE AND NEXT SCHEDULED DOSE:na    NEXT SCHEDULED APPOINTMENT: procedure 3/14  Clinic 3/16

## 2017-02-23 ENCOUNTER — OFFICE VISIT (OUTPATIENT)
Dept: HEPATOLOGY | Facility: CLINIC | Age: 62
End: 2017-02-23
Payer: MEDICARE

## 2017-02-23 ENCOUNTER — PROCEDURE VISIT (OUTPATIENT)
Dept: HEPATOLOGY | Facility: CLINIC | Age: 62
End: 2017-02-23
Attending: INTERNAL MEDICINE
Payer: MEDICARE

## 2017-02-23 VITALS
HEIGHT: 72 IN | HEART RATE: 62 BPM | SYSTOLIC BLOOD PRESSURE: 118 MMHG | OXYGEN SATURATION: 95 % | TEMPERATURE: 99 F | BODY MASS INDEX: 20.1 KG/M2 | DIASTOLIC BLOOD PRESSURE: 63 MMHG | RESPIRATION RATE: 18 BRPM | WEIGHT: 148.38 LBS

## 2017-02-23 DIAGNOSIS — R74.8 ELEVATED ALKALINE PHOSPHATASE LEVEL: ICD-10-CM

## 2017-02-23 DIAGNOSIS — R76.8 HEPATITIS B CORE ANTIBODY POSITIVE: Primary | ICD-10-CM

## 2017-02-23 DIAGNOSIS — R94.5 ABNORMAL RESULTS OF LIVER FUNCTION STUDIES: ICD-10-CM

## 2017-02-23 PROCEDURE — 99999 PR PBB SHADOW E&M-EST. PATIENT-LVL IV: CPT | Mod: PBBFAC,,, | Performed by: INTERNAL MEDICINE

## 2017-02-23 PROCEDURE — 99214 OFFICE O/P EST MOD 30 MIN: CPT | Mod: S$GLB,,, | Performed by: INTERNAL MEDICINE

## 2017-02-23 PROCEDURE — 91200 LIVER ELASTOGRAPHY: CPT | Mod: S$GLB,,, | Performed by: INTERNAL MEDICINE

## 2017-02-23 PROCEDURE — 1160F RVW MEDS BY RX/DR IN RCRD: CPT | Mod: S$GLB,,, | Performed by: INTERNAL MEDICINE

## 2017-02-23 RX ORDER — MEPERIDINE HYDROCHLORIDE 50 MG/1
50 TABLET ORAL EVERY 8 HOURS
Refills: 0 | COMMUNITY
Start: 2017-01-25 | End: 2017-03-23 | Stop reason: SDUPTHER

## 2017-02-23 NOTE — PATIENT INSTRUCTIONS
You have elevated liver tests.  The cause is not clear.    We will order lab tests to determine cause of elevated alkaline phosphatase as well as the recent positive hepatitis B core antibody.  These results should be faxed to my office.    You would also benefit from MRI of bile ducts (MRCP) to evaluate for a condition called PSC (primary sclerosing cholangitis) that can occur with Crohn's disease.    Please schedule fibroscan for today.     Return to clinic in 3 months

## 2017-02-23 NOTE — MR AVS SNAPSHOT
[facility]  [address]  [place] [address]  Phone: [phone]  Fax: [phone]                  Tristin Cerda   2017 11:20 AM   Office Visit    Description:  Male : 1955   Provider:  Suzanne King MD   Department:  [facility] - Hepat[facility]           Reason for Visit     hep B ab positive           Diagnoses this Visit        Comments    Hepatitis B core antibody positive    -  Primary     Elevated alkaline phosphatase level         Abnormal results of liver function studies                To Do List           Future Appointments        Provider Department Dept Phone    3/16/2017 8:40 AM STEPHANIE Barclay [facility] [phone]    3/23/2017 8:00 AM GINA Galeasll - Pain Management [phone]    2017 10:40 AM MD Kevin Samuel - Family Medicine [phone]    2017 9:00 AM Will Albrecht MD [place] - Family Medicine [phone]      Your Future Surgeries/Procedures     Mar 14, 2017   Surgery with Nela Sanchez MD   [facility]    [address] [facility] [address] [phone]              Goals (5 Years of Data)     None      Follow-Up and Disposition     Return in about 3 months (around 2017).      [facility]     [facility] On [facility] Nurse Care Line -  Assistance  Registered nurses in the [facility] Center provide clinical advisement, health education, appointment booking, and other advisory services.  Call for this free service at [phone].             Medications           Message regarding Medications     Verify the changes and/or additions to your medication regime listed below are the same as discussed with your clinician today.  If any of these changes or additions are incorrect, please notify your healthcare provider.             Verify that the below list of medications is an accurate representation of the medications you  are currently taking.  If none reported, the list may be blank. If incorrect, please contact your healthcare provider. Carry this list with you in case of emergency.           Current Medications     acetaminophen (TYLENOL) 500 mg Cap     CYANOCOBALAMIN, VITAMIN B-12, (VITAMIN B-12 ORAL) 1000 mg injection once a month    dicyclomine (BENTYL) 20 mg tablet Take 1 tablet (20 mg total) by mouth 4 (four) times daily.    diphenhydramine-acetaminophen 12.5-325 mg Tab     ergocalciferol (ERGOCALCIFEROL) 50,000 unit Cap Take 1 capsule (50,000 Units total) by mouth every 7 days.    hydrocortisone 2.5 % cream     meperidine (DEMEROL) 50 mg tablet Take 50 mg by mouth every 8 (eight) hours.    NAPROXEN SODIUM ORAL as needed.     ondansetron (ZOFRAN) 8 MG tablet Take 1 tablet (8 mg total) by mouth every 12 (twelve) hours as needed for Nausea.    phenylephrine-acetaminophen 5-325 mg Tab     urea (X-VIATE) 40 % Crea Apply topically 2 (two) times daily.           Clinical Reference Information           Your Vitals Were     BP Pulse Temp Resp Height Weight    118/63 (BP Location: Left arm, Patient Position: Sitting) 62 98.7 °F (37.1 °C) (Oral) 18 6' (1.829 m) 67.3 kg (148 lb 5.9 oz)    SpO2 BMI             95% 20.12 kg/m2         Blood Pressure          Most Recent Value    BP  118/63      Allergies as of 2/23/2017     Ciprofloxacin    Codeine    Compazine [Prochlorperazine]    Erythromycin    Keflex [Cephalexin]      Immunizations Administered on Date of Encounter - 2/23/2017     None      Orders Placed During Today's Visit     Future Labs/Procedures Expected by Expires    ALKALINE PHOSPHATASE, ISOENZYMES  2/23/2017 (Approximate) 2/23/2018    Antimitochondrial antibody  2/23/2017 (Approximate) 2/23/2018    Gamma GT  2/23/2017 (Approximate) 2/23/2018    HEPATITIS B CORE ANTIBODY, IGM  2/23/2017 (Approximate) 2/23/2018    Hepatitis B core antibody, total  2/23/2017 (Approximate) 2/23/2018    Hepatitis B e antibody  2/23/2017  (Approximate) 2/23/2018    Hepatitis B e antigen  2/23/2017 (Approximate) 2/23/2018    Hepatitis B surface antigen  2/23/2017 (Approximate) 2/23/2018    HEPATITIS B VIRAL DNA, QUANTITATIVE  2/23/2017 (Approximate) 2/23/2018    MRI Abdomen W WO Contrast_INC MRCP  2/23/2017 2/23/2018    US Elastography Liver  2/23/2017 2/23/2018      Instructions    You have elevated liver tests.  The cause is not clear.    We will order lab tests to determine cause of elevated alkaline phosphatase as well as the recent positive hepatitis B core antibody.  These results should be faxed to my office.    You would also benefit from MRI of bile ducts (MRCP) to evaluate for a condition called PSC (primary sclerosing cholangitis) that can occur with Crohn's disease.    Please schedule fibroscan for today.     Return to clinic in 3 months       Language Assistance Services     ATTENTION: Language assistance services are available, free of charge. Please call [phone].      ATENCIÓN: Si habla too, tiene a cuadra disposición servicios gratuitos de asistencia lingüística. Llame al [phone].     ISABEL Ý: N?u b?n nói Ti?ng Vi?t, có các d?ch v? h? tr? ngôn ng? mi?n phí dành cho b?n. G?i s? [phone].         [facility] complies with applicable Federal civil rights laws and does not discriminate on the basis of race, color, national origin, age, disability, or sex.

## 2017-02-23 NOTE — LETTER
February 27, 2017      Bandar Hansen MD  [address]  [facility] [address]           [facility]  [address]  [facility] [address]  Phone: [phone]  Fax: [phone]          Patient: Tristin Cerda   MR Number: [id number]   YOB: 1955   Date of Visit: 2/23/2017       Dear Dr. Bandar Hansen:    Thank you for referring Tristin Cerda to me for evaluation. Attached you will find relevant portions of my assessment and plan of care.    If you have questions, please do not hesitate to call me. I look forward to following Tristin Cerda along with you.    Sincerely,    Suzanne King MD    Enclosure  CC:  MD Nela Samuel MD    If you would like to receive this communication electronically, please contact [email or web address] or [phone] to request more information on [facility] access.    For providers and/or their staff who would like to refer a patient to [facility], please contact us through our one-stop-shop provider referral line, [facility], at [phone].    If you feel you have received this communication in error or would no longer like to receive these types of communications, please e-mail [email or web address]

## 2017-02-23 NOTE — PROGRESS NOTES
Hepatology Consult Note    Referring provider: Dr. Bandar Hansen    Chief complaint:   Chief Complaint   Patient presents with    hep B ab positive       HPI:  Tristin Cerda is a 62 y.o. male that presents to hepatology clinic for consultation of positive hepatitis B core IgM.  He is accompanied by his wife.     The patient reports that he was told of abnormal liver tests more than 20 years ago.  He states that he is unsure of the etiology but has never had hepatic dysfunction associated with these abnormalities.  He specifically denies jaundice, ascites, encephalopathy or variceal bleeding.      The patient has a history of Crohn's disease and has required 3 bowel resections.  He reports that this left him with short bowel syndrome and required TPN for many years.  He reports that TPN was stopped approximately 5 years ago although provider notes report 2 years off of parenteral nutrition due to vascular access issues.  He has does fairly well off of TPN but continues to have issues with vitamin and mineral insufficiencies.  Most recently he has had hospitalization with LE edema, dyspnea and increased fatigue that he reports was associated with vitamin B12 deficiency and is currently receiving supplementation.      Regarding positive hepatitis B IgM, reports no recent high risk sexual activity or blood/body fluid contact.  He has not experienced recent jaundice or RUQ pain.      Patient Active Problem List   Diagnosis    Crohn's disease    Short bowel syndrome    Positive TB test    Inguinal hernia    Chronic abdominal pain    Fatty liver    Hepatitis B antibody positive    Protein malnutrition    Pancytopenia    Vitamin B 12 deficiency    Skin lesion    Actinic keratosis       Past Medical History   Diagnosis Date    Anxiety     Crohn's disease      age 17    Short bowel syndrome     Vitamin B deficiency     Vitamin D deficiency        Past Surgical History   Procedure Laterality  Date    Colon surgery       partial colectomy due to crohns    Tube thoracotomy      Cholecystectomy      Appendectomy      Small intestine surgery      Colonoscopy      Upper gastrointestinal endoscopy      3 small bowel resections      Knee surgery      Colonoscopy N/A 2/14/2017     Procedure: COLONOSCOPY;  Surgeon: Nela Sanchez MD;  Location: [facility] ([address] [place] [address]);  Service: Endoscopy;  Laterality: N/A;       Family History   Problem Relation Age of Onset    Diabetes Mother     Stroke Mother     Diabetes Father     Kidney disease Father     Irritable bowel syndrome Cousin     Celiac disease Neg Hx     Cirrhosis Neg Hx     Colon cancer Neg Hx     Colon polyps Neg Hx     Cystic fibrosis Neg Hx     Esophageal cancer Neg Hx     Crohn's disease Neg Hx     Hemochromatosis Neg Hx     Inflammatory bowel disease Neg Hx     Liver cancer Neg Hx     Liver disease Neg Hx     Rectal cancer Neg Hx     Stomach cancer Neg Hx     Ulcerative colitis Neg Hx     Addison's disease Neg Hx        Social History     Social History    Marital status:      Spouse name: N/A    Number of children: N/A    Years of education: N/A     Social History Main Topics    Smoking status: Former Smoker     Packs/day: 1.00     Years: 15.00     Types: Cigarettes     Quit date: 3/10/1995    Smokeless tobacco: Never Used    Alcohol use 1.2 oz/week     1 Cans of beer, 1 Shots of liquor per week      Comment: 1 every 6 -7 months    Drug use: No    Sexual activity: Yes     Other Topics Concern    None     Social History Narrative    Retired        Current Outpatient Prescriptions   Medication Sig Dispense Refill    acetaminophen (TYLENOL) 500 mg Cap       CYANOCOBALAMIN, VITAMIN B-12, (VITAMIN B-12 ORAL) 1000 mg injection once a month      dicyclomine (BENTYL) 20 mg tablet Take 1 tablet (20 mg total) by mouth 4 (four) times daily. 360 tablet 0    diphenhydramine-acetaminophen 12.5-325 mg Tab        ergocalciferol (ERGOCALCIFEROL) 50,000 unit Cap Take 1 capsule (50,000 Units total) by mouth every 7 days. 8 capsule 0    hydrocortisone 2.5 % cream       meperidine (DEMEROL) 50 mg tablet Take 50 mg by mouth every 8 (eight) hours.  0    NAPROXEN SODIUM ORAL as needed.       ondansetron (ZOFRAN) 8 MG tablet Take 1 tablet (8 mg total) by mouth every 12 (twelve) hours as needed for Nausea. 180 tablet 0    phenylephrine-acetaminophen 5-325 mg Tab       urea (X-VIATE) 40 % Crea Apply topically 2 (two) times daily. 198.4 g 11     No current facility-administered medications for this visit.        Review of patient's allergies indicates:   Allergen Reactions    Ciprofloxacin     Codeine Itching    Compazine [prochlorperazine]     Erythromycin     Keflex [cephalexin]        Review of Systems   Constitutional: Positive for malaise/fatigue. Negative for chills, fever and weight loss.   Eyes: Negative.    Respiratory: Negative for cough and shortness of breath.    Cardiovascular: Positive for leg swelling. Negative for chest pain.   Gastrointestinal: Positive for abdominal pain (chronic). Negative for heartburn, nausea and vomiting.   Musculoskeletal: Negative for joint pain and myalgias.   Skin: Negative for itching and rash.   Neurological: Negative for dizziness, focal weakness and headaches.   Endo/Heme/Allergies: Does not bruise/bleed easily.   Psychiatric/Behavioral: Negative for depression.       Vitals:    02/23/17 1112   BP: 118/63   Pulse: 62   Resp: 18   Temp: 98.7 °F (37.1 °C)   TempSrc: Oral   SpO2: 95%   Weight: 67.3 kg (148 lb 5.9 oz)   Height: 6' (1.829 m)       Physical Exam   Constitutional: He is oriented to person, place, and time. He appears well-developed and well-nourished. No distress.   HENT:   Head: Normocephalic and atraumatic.   Mouth/Throat: Oropharynx is clear and moist.   Eyes: Conjunctivae are normal. Pupils are equal, round, and reactive to light.   Neck: Normal range of motion.  Neck supple. No thyromegaly present.   Cardiovascular: Normal rate, regular rhythm and normal heart sounds.  Exam reveals no gallop and no friction rub.    No murmur heard.  Pulmonary/Chest: Effort normal and breath sounds normal. No respiratory distress. He has no wheezes. He has no rales.   Abdominal: Soft. Bowel sounds are normal. He exhibits no distension. There is tenderness (mild). There is no rebound and no guarding.   Well healed abdominal surgical scars   Musculoskeletal: Normal range of motion.   Lymphadenopathy:     He has no cervical adenopathy.   Neurological: He is alert and oriented to person, place, and time.   Skin: Skin is warm and dry. No erythema.   Psychiatric: He has a normal mood and affect. His behavior is normal.   Vitals reviewed.      Lab Results   Component Value Date    ALT 27 01/23/2017    AST 25 01/23/2017    GGT 90 (H) 01/23/2017    ALKPHOS 168 (H) 01/23/2017    BILITOT 0.6 01/23/2017       Lab Results   Component Value Date    WBC 3.63 (L) 01/23/2017    HGB 13.4 (L) 01/23/2017    HCT 39.0 (L) 01/23/2017    MCV 88 01/23/2017     (L) 01/23/2017       Lab Results   Component Value Date     01/23/2017    K 4.7 01/23/2017     01/23/2017    CO2 29 01/23/2017    BUN 11 01/23/2017    CREATININE 0.8 01/23/2017    CALCIUM 9.1 01/23/2017    CALCIUM 9.1 01/23/2017    ANIONGAP 6 (L) 01/23/2017    ESTGFRAFRICA >60.0 01/23/2017    EGFRNONAA >60.0 01/23/2017     Imaging: outside U/S and CT reports reviewed with hepatic steatosis    Assessment:  62 y.o. male presenting with abnormal liver tests of unclear etiology and positive hepatitis B core IgM without clinical evidence of acute hepatitis or chronic liver disease.    Hepatitis B core IgM:  Hepatitis B serologies ordered.  Patient requires labs externally and faxed number provided to obtain results.  Suspect this is a false positive given absence of risk factors and pattern of liver tests.  Patient may have had prior exposure  with clearance and IgG detected in IgM titer.  If patient is positive for acute hepatitis B, would not treat with antiviral therapy at this time given the absence of fulminant liver disease.  Would allow 3-6 months to determine achievement of spontaneous clearance given 85-90% of adults are able to clear infection without therapy.  This will have implications for the patient in long term management if he is core IgG positive as he would need consideration of antiviral therapy if immunosuppression required in the future.    Abnormal LFTs:  Patient noted to have abnormal liver tests, mostly AP.  Serologic w/u to include isoenzymes for fractionation to ensure hepatic source as well as AMA for evaluation of PBC.  The patient would benefit from MRCP for biliary evaluation including PSC given his history of IBD.  He is also scheduled for MR elastrography for Crohn's management and we will see if study can be protocoled for both evaluations simultaneously. If unable to perform MRI for biliary evaluation, then EUS with liver biopsy would also be an option.  The patient is noted to have F2 fibrosis and therefore understanding of the underlying cause for fibrosis is necessary to attempt to reduce or stop progression.  He is found to have steatosis on imaging.  Given limited alcohol use and lack of tradition risk factors for [facility], considerations include medications including prior TPN use.      RTC in 3 months

## 2017-02-23 NOTE — PROCEDURES
Procedures     Fibroscan Procedure     Name: Tristin Cerda  Date of Procedure : 2017   :: Suzanne King MD  Diagnosis: CRYPTOGENIC  Probe: M    Fibroscan readin.0 KPa    IQR/med:10 %    Fibrosis:F2

## 2017-03-01 ENCOUNTER — TELEPHONE (OUTPATIENT)
Dept: GASTROENTEROLOGY | Facility: CLINIC | Age: 62
End: 2017-03-01

## 2017-03-01 NOTE — TELEPHONE ENCOUNTER
.Outside Labs: [facility]    Date of labs:1/30/17    Results: c diff Not isolated      IBD MEDICATIONS CURRENTLY ON AND CURRENT DOSAGE (including Prednisone):na    IF CURRENTLY ON A BIOLOGIC, LAST DOSE AND NEXT SCHEDULED DOSE:na    NEXT SCHEDULED APPOINTMENT:  Clinic 3/16

## 2017-03-03 LAB
ALP ISOS SERPL LEV INH-CCNC: 155 U/L
BONE ISOENZYME: 25
DEPRECATED MITOCHONDRIA M2 IGG SER-ACNC: NEGATIVE
GGT: 80
HBV CORE IGM SERPL QL IA: NEGATIVE
HBV E AB SERPL QL IA: NORMAL
HBV E AG SERPL QL IA: NORMAL
HBV SURFACE AG SERPL QL IA: NEGATIVE
HEPATITIS B CORE AB TOTAL: NORMAL
HEPATITIS B VIRUS DNA: NORMAL
INTESTINAL ISOENZYMES: 0
LIVER ISOENZYME: 75
PLACENTAL ISOENZYME: 0

## 2017-03-08 ENCOUNTER — TELEPHONE (OUTPATIENT)
Dept: GASTROENTEROLOGY | Facility: CLINIC | Age: 62
End: 2017-03-08

## 2017-03-14 ENCOUNTER — HOSPITAL ENCOUNTER (OUTPATIENT)
Facility: HOSPITAL | Age: 62
Discharge: HOME | End: 2017-03-14
Attending: INTERNAL MEDICINE | Admitting: INTERNAL MEDICINE
Payer: MEDICARE

## 2017-03-14 ENCOUNTER — ANESTHESIA EVENT (OUTPATIENT)
Dept: ENDOSCOPY | Facility: HOSPITAL | Age: 62
End: 2017-03-14
Payer: MEDICARE

## 2017-03-14 ENCOUNTER — TELEPHONE (OUTPATIENT)
Dept: GASTROENTEROLOGY | Facility: CLINIC | Age: 62
End: 2017-03-14

## 2017-03-14 ENCOUNTER — SURGERY (OUTPATIENT)
Age: 62
End: 2017-03-14

## 2017-03-14 ENCOUNTER — ANESTHESIA (OUTPATIENT)
Dept: ENDOSCOPY | Facility: HOSPITAL | Age: 62
End: 2017-03-14
Payer: MEDICARE

## 2017-03-14 VITALS
BODY MASS INDEX: 19.64 KG/M2 | DIASTOLIC BLOOD PRESSURE: 70 MMHG | RESPIRATION RATE: 18 BRPM | TEMPERATURE: 97 F | HEART RATE: 64 BPM | RESPIRATION RATE: 9 BRPM | HEIGHT: 72 IN | WEIGHT: 145 LBS | OXYGEN SATURATION: 96 % | SYSTOLIC BLOOD PRESSURE: 109 MMHG

## 2017-03-14 DIAGNOSIS — K50.90 CROHN'S DISEASE: ICD-10-CM

## 2017-03-14 DIAGNOSIS — K50.90 CROHN'S DISEASE WITHOUT COMPLICATION, UNSPECIFIED GASTROINTESTINAL TRACT LOCATION: Primary | ICD-10-CM

## 2017-03-14 PROCEDURE — 25000003 PHARM REV CODE 250: Performed by: INTERNAL MEDICINE

## 2017-03-14 PROCEDURE — 45378 DIAGNOSTIC COLONOSCOPY: CPT | Mod: 53,,, | Performed by: INTERNAL MEDICINE

## 2017-03-14 PROCEDURE — 25000003 PHARM REV CODE 250: Performed by: NURSE ANESTHETIST, CERTIFIED REGISTERED

## 2017-03-14 PROCEDURE — 45378 DIAGNOSTIC COLONOSCOPY: CPT | Performed by: INTERNAL MEDICINE

## 2017-03-14 PROCEDURE — 63600175 PHARM REV CODE 636 W HCPCS: Performed by: NURSE ANESTHETIST, CERTIFIED REGISTERED

## 2017-03-14 PROCEDURE — 37000009 HC ANESTHESIA EA ADD 15 MINS: Performed by: INTERNAL MEDICINE

## 2017-03-14 PROCEDURE — D9220A PRA ANESTHESIA: Mod: CRNA,,, | Performed by: NURSE ANESTHETIST, CERTIFIED REGISTERED

## 2017-03-14 PROCEDURE — D9220A PRA ANESTHESIA: Mod: ANES,,, | Performed by: ANESTHESIOLOGY

## 2017-03-14 PROCEDURE — 37000008 HC ANESTHESIA 1ST 15 MINUTES: Performed by: INTERNAL MEDICINE

## 2017-03-14 RX ORDER — LIDOCAINE HCL/PF 100 MG/5ML
SYRINGE (ML) INTRAVENOUS
Status: DISCONTINUED | OUTPATIENT
Start: 2017-03-14 | End: 2017-03-14

## 2017-03-14 RX ORDER — SODIUM CHLORIDE 9 MG/ML
INJECTION, SOLUTION INTRAVENOUS CONTINUOUS
Status: DISCONTINUED | OUTPATIENT
Start: 2017-03-14 | End: 2017-03-14 | Stop reason: HOSPADM

## 2017-03-14 RX ORDER — PROPOFOL 10 MG/ML
VIAL (ML) INTRAVENOUS
Status: DISCONTINUED | OUTPATIENT
Start: 2017-03-14 | End: 2017-03-14

## 2017-03-14 RX ORDER — GLYCOPYRROLATE 0.2 MG/ML
INJECTION INTRAMUSCULAR; INTRAVENOUS
Status: DISCONTINUED | OUTPATIENT
Start: 2017-03-14 | End: 2017-03-14

## 2017-03-14 RX ORDER — PROPOFOL 10 MG/ML
VIAL (ML) INTRAVENOUS CONTINUOUS PRN
Status: DISCONTINUED | OUTPATIENT
Start: 2017-03-14 | End: 2017-03-14

## 2017-03-14 RX ADMIN — LIDOCAINE HYDROCHLORIDE 75 MG: 20 INJECTION, SOLUTION INTRAVENOUS at 09:03

## 2017-03-14 RX ADMIN — PROPOFOL 50 MG: 10 INJECTION, EMULSION INTRAVENOUS at 09:03

## 2017-03-14 RX ADMIN — SODIUM CHLORIDE: 0.9 INJECTION, SOLUTION INTRAVENOUS at 08:03

## 2017-03-14 RX ADMIN — PROPOFOL 70 MG: 10 INJECTION, EMULSION INTRAVENOUS at 09:03

## 2017-03-14 RX ADMIN — PROPOFOL 20 MG: 10 INJECTION, EMULSION INTRAVENOUS at 09:03

## 2017-03-14 RX ADMIN — GLYCOPYRROLATE 0.2 MG: 0.2 INJECTION, SOLUTION INTRAMUSCULAR; INTRAVENOUS at 09:03

## 2017-03-14 RX ADMIN — PROPOFOL 200 MCG/KG/MIN: 10 INJECTION, EMULSION INTRAVENOUS at 09:03

## 2017-03-14 NOTE — DISCHARGE INSTRUCTIONS

## 2017-03-14 NOTE — ANESTHESIA PREPROCEDURE EVALUATION
03/14/2017  Tristin Cerda is a 62 y.o., male.    [facility] Anesthesia Evaluation         Review of Systems  Anesthesia Hx:  No problems with previous Anesthesia   Social:  Former Smoker, No Alcohol Use    Cardiovascular:  Cardiovascular Normal Exercise tolerance: good     Pulmonary:  Pulmonary Normal    Hepatic/GI:   Crohns Disease   Endocrine:  Endocrine Normal    Psych:   anxiety          Physical Exam  General:  Well nourished    Airway/Jaw/Neck:  Airway Findings: Mouth Opening: Normal Tongue: Normal  General Airway Assessment: Adult  Mallampati: II  TM Distance: Normal, at least 6 cm  Jaw/Neck Findings:     Neck ROM: Normal ROM      Dental:  Dental Findings: Edentulous   Chest/Lungs:  Chest/Lungs Findings: Clear to auscultation, Normal Respiratory Rate     Heart/Vascular:  Heart Findings: Rate: Normal  Rhythm: Regular Rhythm  Sounds: Normal        Mental Status:  Mental Status Findings:  Alert and Oriented, Cooperative         Anesthesia Plan  Type of Anesthesia, risks & benefits discussed:  Anesthesia Type:  general  Patient's Preference:   Intra-op Monitoring Plan:   Intra-op Monitoring Plan Comments:   Post Op Pain Control Plan:   Post Op Pain Control Plan Comments:   Induction:   IV  Beta Blocker:  Patient is not currently on a Beta-Blocker (No further documentation required).       Informed Consent: Patient understands risks and agrees with Anesthesia plan.  Questions answered. Anesthesia consent signed with patient.  ASA Score: 2     Day of Surgery Review of History & Physical:            Ready For Surgery From Anesthesia Perspective.

## 2017-03-14 NOTE — TELEPHONE ENCOUNTER
Called and left msg we are canceling his clinic appt on 16th since he wasn't able to get procedure done today.  Left direct line for endo scheduling ppl  And our direct line for clinic appts

## 2017-03-14 NOTE — H&P
Short Stay Endoscopy History and Physical    PCP - Will Albrecht MD  Referring Physician - Nela Sanchez MD  [address] [place] [address]    Procedure - Colonoscopy  ASA - per anesthesia  Mallampati - per anesthesia  History of Anesthesia problems - no  Family history Anesthesia problems -  no   Plan of anesthesia - General    HPI  62 y.o. male  Reason for procedure: follow up for Crohn's disease to assess disease activity, diarrhea    ROS:  Constitutional: No fevers, chills, No weight loss  CV: No chest pain  Pulm: No cough, No shortness of breath  GI: see HPI    Medical History:  has a past medical history of Anxiety; Crohn's disease; Short bowel syndrome; Vitamin B deficiency; and Vitamin D deficiency.    Surgical History:  has a past surgical history that includes Colon surgery; Tube thoracotomy; Cholecystectomy; Appendectomy; Small intestine surgery; Colonoscopy; Upper gastrointestinal endoscopy; 3 small bowel resections; Knee surgery; and Colonoscopy (N/A, 2/14/2017).    Family History: family history includes Diabetes in his father and mother; Irritable bowel syndrome in his cousin; Kidney disease in his father; Stroke in his mother. There is no history of Celiac disease, Cirrhosis, Colon cancer, Colon polyps, Cystic fibrosis, Esophageal cancer, Crohn's disease, Hemochromatosis, Inflammatory bowel disease, Liver cancer, Liver disease, Rectal cancer, Stomach cancer, Ulcerative colitis, or Addison's disease.. Otherwise no colon cancer, inflammatory bowel disease, or GI malignancies.    Social History:  reports that he quit smoking about 22 years ago. His smoking use included Cigarettes. He has a 15.00 pack-year smoking history. He has never used smokeless tobacco. He reports that he drinks about 1.2 oz of alcohol per week  He reports that he does not use illicit drugs.    Review of patient's allergies indicates:   Allergen Reactions    Ciprofloxacin     Codeine Itching    Compazine  [prochlorperazine]     Erythromycin     Keflex [cephalexin]        Medications:   Prescriptions Prior to Admission   Medication Sig Dispense Refill Last Dose    acetaminophen (TYLENOL) 500 mg Cap    3/13/2017 at Unknown time    CYANOCOBALAMIN, VITAMIN B-12, (VITAMIN B-12 ORAL) 1000 mg injection once a month   Past Week at Unknown time    dicyclomine (BENTYL) 20 mg tablet Take 1 tablet (20 mg total) by mouth 4 (four) times daily. 360 tablet 0 Past Week at Unknown time    diphenhydramine-acetaminophen 12.5-325 mg Tab    Past Month at Unknown time    ergocalciferol (ERGOCALCIFEROL) 50,000 unit Cap Take 1 capsule (50,000 Units total) by mouth every 7 days. 8 capsule 0 Past Month at Unknown time    meperidine (DEMEROL) 50 mg tablet Take 50 mg by mouth every 8 (eight) hours.  0 Past Week at Unknown time    hydrocortisone 2.5 % cream    Taking    NAPROXEN SODIUM ORAL as needed.    More than a month at Unknown time    ondansetron (ZOFRAN) 8 MG tablet Take 1 tablet (8 mg total) by mouth every 12 (twelve) hours as needed for Nausea. 180 tablet 0 More than a month at Unknown time    phenylephrine-acetaminophen 5-325 mg Tab    Unknown at Unknown time    urea (X-VIATE) 40 % Crea Apply topically 2 (two) times daily. 198.4 g 11 Unknown at Unknown time       Physical Exam:    Vital Signs:   Vitals:    03/14/17 0817   BP: 130/61   Pulse: (!) 53   Resp: 17   Temp: 96.9 °F (36.1 °C)       General Appearance: Well appearing in no acute distress  Lungs: CTA anteriorly  Heart:  Regular rate, S1, S2 normal, no murmurs heard.  Abdomen: Soft, non tender, non distended with normal bowel sounds, no masses  Extremities: No edema    Labs:  Lab Results   Component Value Date    WBC 3.63 (L) 01/23/2017    HGB 13.4 (L) 01/23/2017    HCT 39.0 (L) 01/23/2017     (L) 01/23/2017    CHOL 107 (L) 01/04/2017    TRIG 105 01/04/2017    HDL 27 (L) 01/04/2017    ALT 27 01/23/2017    AST 25 01/23/2017     01/23/2017    K 4.7  01/23/2017     01/23/2017    CREATININE 0.8 01/23/2017    BUN 11 01/23/2017    CO2 29 01/23/2017    TSH 3.202 01/23/2017    PSA 1.6 03/10/2015       I have explained the risks and benefits of this endoscopic procedure to the patient including but not limited to bleeding, inflammation, infection, perforation, and death.      Nela Sanchez MD

## 2017-03-14 NOTE — ANESTHESIA POSTPROCEDURE EVALUATION
Anesthesia Post Evaluation    Patient: Tristin Cerda    Procedure(s) Performed: Procedure(s) (LRB):  COLONOSCOPY (N/A)    Final Anesthesia Type: general  Patient location during evaluation: PACU  Patient participation: Yes- Able to Participate  Level of consciousness: awake and alert  Post-procedure vital signs: reviewed and stable  Pain management: adequate  Airway patency: patent  PONV status at discharge: No PONV  Anesthetic complications: no      Cardiovascular status: hemodynamically stable and blood pressure returned to baseline  Respiratory status: unassisted and spontaneous ventilation  Hydration status: euvolemic  Follow-up not needed.        Visit Vitals    /70 (BP Location: Left arm, Patient Position: Lying, BP Method: Automatic)    Pulse 64    Temp 36.3 °C (97.4 °F) (Oral)    Resp 18    Ht 6' (1.829 m)    Wt 65.8 kg (145 lb)    SpO2 96%    BMI 19.67 kg/m2       Pain/Selena Score: Pain Assessment Performed: Yes (3/14/2017  9:53 AM)  Presence of Pain: denies (3/14/2017  9:53 AM)  Pain Rating Prior to Med Admin: 0 (3/14/2017  8:11 AM)  Selena Score: 10 (3/14/2017  9:53 AM)

## 2017-03-14 NOTE — TRANSFER OF CARE
Anesthesia Transfer of Care Note    Patient: Tristin Cerda    Procedure(s) Performed: Procedure(s) (LRB):  COLONOSCOPY (N/A)    Patient location: PACU    Anesthesia Type: general    Transport from OR: Transported from OR on room air with adequate spontaneous ventilation    Post pain: adequate analgesia    Post assessment: no apparent anesthetic complications and tolerated procedure well    Post vital signs: stable    Level of consciousness: sedated    Nausea/Vomiting: no nausea/vomiting    Complications: none          Last vitals:   Visit Vitals    BP (!) 97/58 (BP Location: Left arm, Patient Position: Lying, BP Method: Automatic)    Pulse 71    Temp 36.3 °C (97.4 °F) (Oral)    Resp 16    Ht 6' (1.829 m)    Wt 65.8 kg (145 lb)    SpO2 96%    BMI 19.67 kg/m2

## 2017-03-14 NOTE — IP AVS SNAPSHOT
[facility]  [address]  [facility] [address]  Phone: [phone]           Patient Discharge Instructions     Our goal is to set you up for success. This packet includes information on your condition, medications, and your home care. It will help you to care for yourself so you don't get sicker and need to go back to the hospital.     Please ask your nurse if you have any questions.        There are many details to remember when preparing to leave the hospital. Here is what you will need to do:    1. Take your medicine. If you are prescribed medications, review your Medication List in the following pages. You may have new medications to  at the pharmacy and others that you'll need to stop taking. Review the instructions for how and when to take your medications. Talk with your doctor or nurses if you are unsure of what to do.     2. Go to your follow-up appointments. Specific follow-up information is listed in the following pages. Your may be contacted by a transition nurse or clinical provider about future appointments. Be sure we have all of the phone numbers to reach you, if needed. Please contact your provider's office if you are unable to make an appointment.     3. Watch for warning signs. Your doctor or nurse will give you detailed warning signs to watch for and when to call for assistance. These instructions may also include educational information about your condition. If you experience any of warning signs to your health, call your doctor.               [facility]  Unless otherwise directed by your provider, please contact [facility], our nurse care line that is available for 24/7 assistance.     [phone] (toll-free)    Registered nurses in the [facility] provide clinical advisement, health education, appointment booking, and other advisory services.                    ** Verify the list of medication(s) below is accurate and up  to date. Carry this with you in case of emergency. If your medications have changed, please notify your healthcare provider.             Medication List      CONTINUE taking these medications        Additional Info                      acetaminophen 500 mg Cap   Commonly known as:  TYLENOL   Refills:  0      Begin Date    AM    Noon    PM    Bedtime       dicyclomine 20 mg tablet   Commonly known as:  BENTYL   Quantity:  360 tablet   Refills:  0   Dose:  20 mg    Instructions:  Take 1 tablet (20 mg total) by mouth 4 (four) times daily.     Begin Date    AM    Noon    PM    Bedtime       diphenhydramine-acetaminophen 12.5-325 mg Tab   Refills:  0      Begin Date    AM    Noon    PM    Bedtime       ergocalciferol 50,000 unit Cap   Commonly known as:  ERGOCALCIFEROL   Quantity:  8 capsule   Refills:  0   Dose:  48294 Units    Instructions:  Take 1 capsule (50,000 Units total) by mouth every 7 days.     Begin Date    AM    Noon    PM    Bedtime       hydrocortisone 2.5 % cream   Refills:  0      Begin Date    AM    Noon    PM    Bedtime       meperidine 50 mg tablet   Commonly known as:  DEMEROL   Refills:  0   Dose:  50 mg    Instructions:  Take 50 mg by mouth every 8 (eight) hours.     Begin Date    AM    Noon    PM    Bedtime       NAPROXEN SODIUM ORAL   Refills:  0    Instructions:  as needed.     Begin Date    AM    Noon    PM    Bedtime       ondansetron 8 MG tablet   Commonly known as:  ZOFRAN   Quantity:  180 tablet   Refills:  0   Dose:  8 mg    Instructions:  Take 1 tablet (8 mg total) by mouth every 12 (twelve) hours as needed for Nausea.     Begin Date    AM    Noon    PM    Bedtime       phenylephrine-acetaminophen 5-325 mg Tab   Refills:  0      Begin Date    AM    Noon    PM    Bedtime       urea 40 % Crea   Commonly known as:  X-VIATE   Quantity:  198.4 g   Refills:  11    Instructions:  Apply topically 2 (two) times daily.     Begin Date    AM    Noon    PM    Bedtime       VITAMIN B-12 ORAL   Refills:   0    Instructions:  1000 mg injection once a month     Begin Date    AM    Noon    PM    Bedtime                  Please bring to all follow up appointments:    1. A copy of your discharge instructions.  2. All medicines you are currently taking in their original bottles.  3. Identification and insurance card.    Please arrive 15 minutes ahead of scheduled appointment time.    Please call 24 hours in advance if you must reschedule your appointment and/or time.        Your Scheduled Appointments     Mar 16, 2017  8:40 AM CDT   GASTROENTEROLOGY ESTABLISHED PATIENT with STEPHANIE Barclay lyla - Gastroenterology ([facility] )    [address]  [place] [address]   [phone]            Mar 23, 2017  8:00 AM CDT   Established Patient Visit with GINA Galeas - Pain Management ([facility] - Building [address]  [facility] [address]   [phone]            Apr 13, 2017 10:40 AM CDT   [facility]    [address] [facility] [address]   [phone]            Jul 05, 2017  9:00 AM CDT   [facility]    [address] [facility] [address]   [phone]                Discharge Instructions     Future Orders    Activity as tolerated     Diet general     Questions:    Total calories:      Fat restriction, if any:      Protein restriction, if any:      Na restriction, if any:      Fluid restriction:      Additional restrictions:          Discharge Instructions         Understanding Colon and Rectal Polyps    The colon (also called the large intestine) is a muscular tube that forms the last part of the digestive tract. It absorbs water and stores food waste. The colon is about 4 to 6 feet long. The rectum is the last 6 inches of the colon. The colon and rectum have a smooth lining composed of  millions of cells. Changes in these cells can lead to growths in the colon that can become cancerous and should be removed. Multiple tests are available to screen for colon cancer, but the colonoscopy is the most recommended test. During colonoscopy, these polyps can be removed. How often you need this test depends on many things including your condition, your family history, symptoms, and what the findings were at the previous colonoscopy.   When the colon lining changes  Changes that happen in the cells that line the colon or rectum can lead to growths called polyps. Over a period of years, polyps can turn cancerous. Removing polyps early may prevent cancer from ever forming.  Polyps  Polyps are fleshy clumps of tissue that form on the lining of the colon or rectum. Small polyps are usually benign (not cancerous). However, over time, cells in a polyp can change and become cancerous. Certain types of polyps known as adenomatous polyps are premalignant. The risk for invasive cancer increases with the size of the polyp and certain cell and gene features. This means that they can become cancerous if they're not removed. Hyperplastic polyps are benign. They can grow quite large and not turn cancerous.   Cancer  Almost all colorectal cancers start when polyp cells begin growing abnormally. As a cancerous tumor grows, it may involve more and more of the colon or rectum. In time, cancer can also grow beyond the colon or rectum and spread to nearby organs or to glands called lymph nodes. The cells can also travel to other parts of the body. This is known as metastasis. The earlier a cancerous tumor is removed, the better the chance of preventing its spread.    Date Last Reviewed: 8/1/2016  © 2051-0817 [employer]. [address] [place] [address], [place] [address]. All rights reserved. This information is not intended as a substitute for professional medical care. Always follow your healthcare professional's  instructions.            Admission Information     Date & Time Provider Department CSN    3/14/2017  8:02 AM Nela Sanchez MD [facility] [id number]      Care Providers     Provider Role Specialty Primary office phone    Nela Sanchez MD Attending Provider Gastroenterology [phone]    Nela Sanchez MD Surgeon  Gastroenterology [phone]      Your Vitals Were     BP Pulse Temp Resp Height Weight    104/67 (BP Location: Left arm, Patient Position: Lying, BP Method: Automatic) 59 97.4 °F (36.3 °C) (Oral) 18 6' (1.829 m) 65.8 kg (145 lb)    SpO2 BMI             96% 19.67 kg/m2         Recent Lab Values     No lab values to display.      Allergies as of 3/14/2017        Reactions    Ciprofloxacin     Codeine Itching    Compazine [Prochlorperazine]     Erythromycin     Keflex [Cephalexin]       Advance Directives     An advance directive is a document which, in the event you are no longer able to make decisions for yourself, tells your healthcare team what kind of treatment you do or do not want to receive, or who you would like to make those decisions for you.  If you do not currently have an advance directive, [facility] encourages you to create one.  For more information call:  [phone]), [phone] ([phone]),  or log on to [email or web address].        Language Assistance Services     ATTENTION: Language assistance services are available, free of charge. Please call [phone].      ATENCIÓN: Si habla español, tiene a cuadra disposición servicios gratuitos de asistencia lingüística. Llame al [phone].     [facility] Ý: N?u b?n nói Ti?ng Vi?t, có các d?ch v? h? tr? ngôn ng? mi?n phí dành cho b?n. G?i s? [phone].         [facility] complies with applicable Federal civil rights laws and does not discriminate on the basis of race, color, national origin, age, disability, or sex.

## 2017-03-20 ENCOUNTER — TELEPHONE (OUTPATIENT)
Dept: HEPATOLOGY | Facility: CLINIC | Age: 62
End: 2017-03-20

## 2017-03-20 NOTE — TELEPHONE ENCOUNTER
MA attempted to call patient to inform him of his lab results. Patient unable to reached left him Vm to please give us a callback. LILY

## 2017-03-20 NOTE — TELEPHONE ENCOUNTER
----- Message from Suzanne King MD sent at 3/18/2017  7:39 AM CDT -----  Please inform patient that labs received.  Alkaline phosphatase remains slightly elevated at 155 (nl up to 135) but other labs for hepatitis B and causes of chronic liver disease negative.  This means that prior hepatitis B result was a false positive and the patient has not had recent or prior exposure.  MRI will be helpful for further evaluation to determine if liver biopsy will be necessary.

## 2017-03-21 ENCOUNTER — TELEPHONE (OUTPATIENT)
Dept: ENDOSCOPY | Facility: HOSPITAL | Age: 62
End: 2017-03-21

## 2017-03-22 ENCOUNTER — TELEPHONE (OUTPATIENT)
Dept: HEPATOLOGY | Facility: CLINIC | Age: 62
End: 2017-03-22

## 2017-03-22 NOTE — TELEPHONE ENCOUNTER
MA attempted to call patient to inform him of his labs, patient unable to reached left him VM to please give us a callback. LILY

## 2017-03-23 ENCOUNTER — OFFICE VISIT (OUTPATIENT)
Dept: PAIN MEDICINE | Facility: CLINIC | Age: 62
End: 2017-03-23
Payer: MEDICARE

## 2017-03-23 VITALS
WEIGHT: 139.13 LBS | HEART RATE: 52 BPM | HEIGHT: 72 IN | BODY MASS INDEX: 18.84 KG/M2 | SYSTOLIC BLOOD PRESSURE: 129 MMHG | DIASTOLIC BLOOD PRESSURE: 76 MMHG

## 2017-03-23 DIAGNOSIS — K50.90 CROHN'S DISEASE WITHOUT COMPLICATION, UNSPECIFIED GASTROINTESTINAL TRACT LOCATION: ICD-10-CM

## 2017-03-23 DIAGNOSIS — G89.29 CHRONIC ABDOMINAL PAIN: Primary | ICD-10-CM

## 2017-03-23 DIAGNOSIS — R10.9 CHRONIC ABDOMINAL PAIN: Primary | ICD-10-CM

## 2017-03-23 PROCEDURE — 99214 OFFICE O/P EST MOD 30 MIN: CPT | Mod: S$GLB,,, | Performed by: PHYSICIAN ASSISTANT

## 2017-03-23 PROCEDURE — 99999 PR PBB SHADOW E&M-EST. PATIENT-LVL V: CPT | Mod: PBBFAC,,, | Performed by: PHYSICIAN ASSISTANT

## 2017-03-23 PROCEDURE — 1160F RVW MEDS BY RX/DR IN RCRD: CPT | Mod: S$GLB,,, | Performed by: PHYSICIAN ASSISTANT

## 2017-03-23 RX ORDER — MEPERIDINE HYDROCHLORIDE 50 MG/1
50 TABLET ORAL EVERY 8 HOURS
Qty: 90 TABLET | Refills: 0 | Status: SHIPPED | OUTPATIENT
Start: 2017-04-21 | End: 2017-05-20

## 2017-03-23 RX ORDER — MEPERIDINE HYDROCHLORIDE 50 MG/1
50 TABLET ORAL EVERY 8 HOURS
Qty: 90 TABLET | Refills: 0 | Status: SHIPPED | OUTPATIENT
Start: 2017-05-20 | End: 2017-06-21 | Stop reason: SDUPTHER

## 2017-03-23 RX ORDER — MEPERIDINE HYDROCHLORIDE 50 MG/1
50 TABLET ORAL EVERY 8 HOURS
Qty: 90 TABLET | Refills: 0 | Status: SHIPPED | OUTPATIENT
Start: 2017-03-23 | End: 2017-04-21

## 2017-03-23 NOTE — MR AVS SNAPSHOT
[facility]  [address] [place] [address] Dr [address]  [place] [address]  Phone: [phone]                  Tristin Cerda   3/23/2017 8:00 AM   Office Visit    Description:  Male : 1955   Provider:  Yvonne Fuller PA-C   Department:  [facility]           Reason for Visit     Abdominal Pain                To Do List           Future Appointments        Provider Department Dept Phone    2017 10:40 AM MD [facility] - Family Medicine [phone]    2017 8:00 AM Yvonne Fuller PA-C [facility] [phone]    2017 9:00 AM [facility] [phone]      Goals (5 Years of Data)     None      [facility]     [facility] Nurse Care Line -  Assistance  Registered nurses in the [facility] Call Center provide clinical advisement, health education, appointment booking, and other advisory services.  Call for this free service at [phone].             Medications           Message regarding Medications     Verify the changes and/or additions to your medication regime listed below are the same as discussed with your clinician today.  If any of these changes or additions are incorrect, please notify your healthcare provider.             Verify that the below list of medications is an accurate representation of the medications you are currently taking.  If none reported, the list may be blank. If incorrect, please contact your healthcare provider. Carry this list with you in case of emergency.           Current Medications     acetaminophen (TYLENOL) 500 mg Cap     CYANOCOBALAMIN, VITAMIN B-12, (VITAMIN B-12 ORAL) 1000 mg injection once a month    dicyclomine (BENTYL) 20 mg tablet Take 1 tablet (20 mg total) by mouth 4 (four) times daily.    diphenhydramine-acetaminophen 12.5-325 mg Tab     ergocalciferol (ERGOCALCIFEROL) 50,000 unit Cap Take 1 capsule (50,000 Units total) by mouth every 7  days.    hydrocortisone 2.5 % cream     meperidine (DEMEROL) 50 mg tablet Take 50 mg by mouth every 8 (eight) hours.    NAPROXEN SODIUM ORAL as needed.     ondansetron (ZOFRAN) 8 MG tablet Take 1 tablet (8 mg total) by mouth every 12 (twelve) hours as needed for Nausea.    phenylephrine-acetaminophen 5-325 mg Tab     urea (X-VIATE) 40 % Crea Apply topically 2 (two) times daily.           Clinical Reference Information           Your Vitals Were     BP Pulse Height Weight BMI    129/76 52 6' (1.829 m) 63.1 kg (139 lb 1.8 oz) 18.87 kg/m2      Blood Pressure          Most Recent Value    BP  129/76      Allergies as of 3/23/2017     Ciprofloxacin    Codeine    Compazine [Prochlorperazine]    Erythromycin    Keflex [Cephalexin]      Immunizations Administered on Date of Encounter - 3/23/2017     None      Language Assistance Services     ATTENTION: Language assistance services are available, free of charge. Please call [phone].      ATENCIÓN: Si habla too, tiene a cuadra disposición servicios gratuitos de asistencia lingüística. Llame al [phone].     [facility] Ý: N?u b?n nói Ti?ng Vi?t, có các d?ch v? h? tr? ngôn ng? mi?n phí dành cho b?n. G?i s? [phone].         [facility] complies with applicable Federal civil rights laws and does not discriminate on the basis of race, color, national origin, age, disability, or sex.

## 2017-03-24 ENCOUNTER — TELEPHONE (OUTPATIENT)
Dept: HEPATOLOGY | Facility: CLINIC | Age: 62
End: 2017-03-24

## 2017-03-24 NOTE — TELEPHONE ENCOUNTER
MA called patient inform patient of his lab results. Patient understood but stated that he cannot schedule MRI right now, he said that he still paying for his Colonoscopy. He will call us when he is ready. LILY

## 2017-04-10 ENCOUNTER — DOCUMENTATION ONLY (OUTPATIENT)
Dept: FAMILY MEDICINE | Facility: CLINIC | Age: 62
End: 2017-04-10

## 2017-04-10 NOTE — PROGRESS NOTES
Pre-Visit Chart Review  For Appointment Scheduled on 4/13/17.    Health Maintenance Due   Topic Date Due    TETANUS VACCINE  01/01/1975    Zoster Vaccine  01/01/2017

## 2017-04-13 RX ORDER — DICYCLOMINE HYDROCHLORIDE 20 MG/1
TABLET ORAL
Qty: 360 TABLET | Refills: 0 | Status: SHIPPED | OUTPATIENT
Start: 2017-04-13 | End: 2017-07-06 | Stop reason: SDUPTHER

## 2017-04-28 ENCOUNTER — DOCUMENTATION ONLY (OUTPATIENT)
Dept: FAMILY MEDICINE | Facility: CLINIC | Age: 62
End: 2017-04-28

## 2017-04-28 NOTE — PROGRESS NOTES
Pre-Visit Chart Review  For Appointment Scheduled on 5/1/17.    Health Maintenance Due   Topic Date Due    TETANUS VACCINE  01/01/1975    Zoster Vaccine  01/01/2017

## 2017-05-01 ENCOUNTER — OFFICE VISIT (OUTPATIENT)
Dept: FAMILY MEDICINE | Facility: CLINIC | Age: 62
End: 2017-05-01
Payer: MEDICARE

## 2017-05-01 ENCOUNTER — LAB VISIT (OUTPATIENT)
Dept: LAB | Facility: HOSPITAL | Age: 62
End: 2017-05-01
Attending: FAMILY MEDICINE
Payer: MEDICARE

## 2017-05-01 VITALS
DIASTOLIC BLOOD PRESSURE: 74 MMHG | WEIGHT: 150.38 LBS | BODY MASS INDEX: 20.37 KG/M2 | SYSTOLIC BLOOD PRESSURE: 123 MMHG | TEMPERATURE: 98 F | HEIGHT: 72 IN | HEART RATE: 76 BPM

## 2017-05-01 DIAGNOSIS — R79.9 ABNORMAL BLOOD CHEMISTRY: ICD-10-CM

## 2017-05-01 DIAGNOSIS — G89.29 CHRONIC ABDOMINAL PAIN: ICD-10-CM

## 2017-05-01 DIAGNOSIS — K90.829 SHORT BOWEL SYNDROME: ICD-10-CM

## 2017-05-01 DIAGNOSIS — E55.9 VITAMIN D DEFICIENCY: ICD-10-CM

## 2017-05-01 DIAGNOSIS — R10.9 CHRONIC ABDOMINAL PAIN: ICD-10-CM

## 2017-05-01 DIAGNOSIS — E55.9 VITAMIN D DEFICIENCY: Primary | ICD-10-CM

## 2017-05-01 LAB — 25(OH)D3+25(OH)D2 SERPL-MCNC: 32 NG/ML

## 2017-05-01 PROCEDURE — 82306 VITAMIN D 25 HYDROXY: CPT

## 2017-05-01 PROCEDURE — 83036 HEMOGLOBIN GLYCOSYLATED A1C: CPT

## 2017-05-01 PROCEDURE — 1160F RVW MEDS BY RX/DR IN RCRD: CPT | Mod: S$GLB,,, | Performed by: FAMILY MEDICINE

## 2017-05-01 PROCEDURE — 36415 COLL VENOUS BLD VENIPUNCTURE: CPT | Mod: PO

## 2017-05-01 PROCEDURE — 99999 PR PBB SHADOW E&M-EST. PATIENT-LVL III: CPT | Mod: PBBFAC,,, | Performed by: FAMILY MEDICINE

## 2017-05-01 PROCEDURE — 99214 OFFICE O/P EST MOD 30 MIN: CPT | Mod: S$GLB,,, | Performed by: FAMILY MEDICINE

## 2017-05-01 RX ORDER — ONDANSETRON HYDROCHLORIDE 8 MG/1
8 TABLET, FILM COATED ORAL EVERY 12 HOURS PRN
Qty: 180 TABLET | Refills: 3 | Status: SHIPPED | OUTPATIENT
Start: 2017-05-01 | End: 2018-01-22 | Stop reason: SDUPTHER

## 2017-05-01 NOTE — PROGRESS NOTES
[facility]  Progress Note    Subjective:       Patient ID: Tristin Cerda is a 62 y.o. male.    Chief Complaint: hepatitis b follow up     HPI62 y.o.male is here today for hepatitis B follow-up.  Patient was found to have positive hepatitis B antibody.  Patient was sent to hepatology who have said he had a false positive reaction.  All other patient's medical conditions have been under good control with current medications.  Patient does not have any acute complaints at today's visit.  Review of Systems   Constitutional: Negative for chills, fatigue and fever.   HENT: Negative for congestion, ear pain, postnasal drip, sinus pressure, sneezing and sore throat.    Eyes: Negative for pain.   Respiratory: Negative for cough, shortness of breath and wheezing.    Cardiovascular: Negative for chest pain, palpitations and leg swelling.   Gastrointestinal: Negative for abdominal distention, abdominal pain, constipation, diarrhea, nausea and vomiting.   Genitourinary: Negative for difficulty urinating.   Musculoskeletal: Negative for back pain and myalgias.   Skin: Negative for rash.   Neurological: Negative for dizziness, seizures, syncope, weakness and numbness.   Psychiatric/Behavioral: Negative for sleep disturbance. The patient is not nervous/anxious.        Objective:      Vitals:    05/01/17 1338   BP: 123/74   BP Location: Right arm   Patient Position: Sitting   BP Method: Automatic   Pulse: 76   Temp: 98.2 °F (36.8 °C)   TempSrc: Oral   Weight: 68.2 kg (150 lb 5.7 oz)   Height: 6' (1.829 m)     Body mass index is 20.39 kg/(m^2).  Physical Exam   Constitutional: He is oriented to person, place, and time. He appears well-developed and well-nourished. No distress.   HENT:   Head: Normocephalic and atraumatic.   Cardiovascular: Normal rate, regular rhythm, normal heart sounds and intact distal pulses.  Exam reveals no gallop and no friction rub.    No murmur heard.  Pulmonary/Chest: Effort normal and  breath sounds normal. No respiratory distress. He has no rales.   Abdominal: Soft. He exhibits no distension and no mass. There is no rebound and no guarding. No hernia.   Musculoskeletal: Normal range of motion.   Neurological: He is alert and oriented to person, place, and time.   Skin: Skin is warm.   Psychiatric: He has a normal mood and affect. His behavior is normal. Judgment and thought content normal.   Vitals reviewed.      Assessment:       1. Vitamin D deficiency    2. Abnormal blood chemistry    3. Short bowel syndrome    4. Chronic abdominal pain        Plan:       Vitamin D deficiency  -     Vitamin D; Future; Expected date: 5/1/17    Abnormal blood chemistry  -     Hemoglobin A1c; Future; Expected date: 5/1/17    Short bowel syndrome  -     ondansetron (ZOFRAN) 8 MG tablet; Take 1 tablet (8 mg total) by mouth every 12 (twelve) hours as needed for Nausea.  Dispense: 180 tablet; Refill: 3    Chronic abdominal pain  -     ondansetron (ZOFRAN) 8 MG tablet; Take 1 tablet (8 mg total) by mouth every 12 (twelve) hours as needed for Nausea.  Dispense: 180 tablet; Refill: 3      Return in about 6 months (around 11/1/2017).  Will Albrecht MD  [facility]  5/1/2017 1:58 PM

## 2017-05-01 NOTE — MR AVS SNAPSHOT
[facility]  [address]  [place] [address]  Phone: [phone]  Fax: [phone]                  Tristin Cerda   2017 1:40 PM   Office Visit    Description:  Male : 1955   Provider:  Will Albrecht MD   Department:  [facility]           Reason for Visit     3 month follow-up           Diagnoses this Visit        Comments    Vitamin D deficiency    -  Primary     Abnormal blood chemistry         Short bowel syndrome         Chronic abdominal pain                To Do List           Future Appointments        Provider Department Dept Phone    2017 11:00 AM Ira Bang MD [facility] [phone]    2017 8:00 AM Yvonne Fuller PA-C [facility] [phone]    2017 9:00 AM Will Albrecht MD [facility] [phone]      Goals (5 Years of Data)     None      Follow-Up and Disposition     Return in about 6 months (around 2017).       These Medications        Disp Refills Start End    ondansetron (ZOFRAN) 8 MG tablet 180 tablet 3 2017    Take 1 tablet (8 mg total) by mouth every 12 (twelve) hours as needed for Nausea. - Oral    Pharmacy: [facility] - [place] [id number] [facility] Ph #: [phone]         [facility] On Call     [facility] On Call Nurse Care Line -  Assistance  Unless otherwise directed by your provider, please contact [facility] On-Call, our nurse care line that is available for  assistance.     Registered nurses in the [facility] provide: appointment scheduling, clinical advisement, health education, and other advisory services.  Call: [phone] (toll free)               Medications           Message regarding Medications     Verify the changes and/or additions to your medication regime listed below are the same as discussed with your clinician today.  If any of these changes or additions are incorrect, please  notify your healthcare provider.             Verify that the below list of medications is an accurate representation of the medications you are currently taking.  If none reported, the list may be blank. If incorrect, please contact your healthcare provider. Carry this list with you in case of emergency.           Current Medications     acetaminophen (TYLENOL) 500 mg Cap     CYANOCOBALAMIN, VITAMIN B-12, (VITAMIN B-12 ORAL) 1000 mg injection once a month    dicyclomine (BENTYL) 20 mg tablet TAKE 1 TABLET FOUR TIMES DAILY    diphenhydramine-acetaminophen 12.5-325 mg Tab     ergocalciferol (ERGOCALCIFEROL) 50,000 unit Cap Take 1 capsule (50,000 Units total) by mouth every 7 days.    hydrocortisone 2.5 % cream     meperidine (DEMEROL) 50 mg tablet Take 1 tablet (50 mg total) by mouth every 8 (eight) hours.    meperidine (DEMEROL) 50 mg tablet Starting on May 20, 2017. Take 1 tablet (50 mg total) by mouth every 8 (eight) hours.    NAPROXEN SODIUM ORAL as needed.     ondansetron (ZOFRAN) 8 MG tablet Take 1 tablet (8 mg total) by mouth every 12 (twelve) hours as needed for Nausea.    phenylephrine-acetaminophen 5-325 mg Tab     urea (X-VIATE) 40 % Crea Apply topically 2 (two) times daily.           Clinical Reference Information           Your Vitals Were     BP Pulse Temp Height Weight BMI    123/74 (BP Location: Right arm, Patient Position: Sitting, BP Method: Automatic) 76 98.2 °F (36.8 °C) (Oral) 6' (1.829 m) 68.2 kg (150 lb 5.7 oz) 20.39 kg/m2      Blood Pressure          Most Recent Value    BP  123/74      Allergies as of 5/1/2017     Ciprofloxacin    Codeine    Compazine [Prochlorperazine]    Erythromycin    Keflex [Cephalexin]      Immunizations Administered on Date of Encounter - 5/1/2017     None      Orders Placed During Today's Visit     Future Labs/Procedures Expected by Expires    Hemoglobin A1c  5/1/2017 6/30/2018    Vitamin D  5/1/2017 6/30/2018      [facility] Sign-Up     Activating your [facility]  account is as easy as 1-2-3!     1) Visit [email or web address], select Sign Up Now, enter this activation code and your date of birth, then select Next.  Activation code not generated  Current Patient Portal Status: Account disabled      2) Create a username and password to use when you visit [facility] in the future and select a security question in case you lose your password and select Next.    3) Enter your e-mail address and click Sign Up!    Additional Information  If you have questions, please e-mail [email or web address] or call [phone] to talk to our [facility] staff. Remember, [facility] is NOT to be used for urgent needs. For medical emergencies, dial 911.         Language Assistance Services     ATTENTION: Language assistance services are available, free of charge. Please call [phone].      ATENCIÓN: Si habla español, tiene a cuadra disposición servicios gratuitos de asistencia lingüística. Llame al [phone].     CHÚ Ý: N?u b?n nói Ti?ng Vi?t, có các d?ch v? h? tr? ngôn ng? mi?n phí dành cho b?n. G?i s? [phone].         [facility] complies with applicable Federal civil rights laws and does not discriminate on the basis of race, color, national origin, age, disability, or sex.

## 2017-05-02 LAB
ESTIMATED AVG GLUCOSE: 97 MG/DL
HBA1C MFR BLD HPLC: 5 %

## 2017-06-06 ENCOUNTER — OFFICE VISIT (OUTPATIENT)
Dept: DERMATOLOGY | Facility: CLINIC | Age: 62
End: 2017-06-06
Payer: MEDICARE

## 2017-06-06 VITALS — WEIGHT: 150 LBS | HEIGHT: 72 IN | BODY MASS INDEX: 20.32 KG/M2

## 2017-06-06 DIAGNOSIS — L81.4 SOLAR LENTIGO: ICD-10-CM

## 2017-06-06 DIAGNOSIS — D22.9 MULTIPLE BENIGN NEVI: ICD-10-CM

## 2017-06-06 DIAGNOSIS — D48.9 NEOPLASM OF UNCERTAIN BEHAVIOR: Primary | ICD-10-CM

## 2017-06-06 DIAGNOSIS — L57.0 ACTINIC KERATOSES: ICD-10-CM

## 2017-06-06 PROCEDURE — 99999 PR PBB SHADOW E&M-EST. PATIENT-LVL III: CPT | Mod: PBBFAC,,, | Performed by: DERMATOLOGY

## 2017-06-06 PROCEDURE — 99214 OFFICE O/P EST MOD 30 MIN: CPT | Mod: 25,S$GLB,, | Performed by: DERMATOLOGY

## 2017-06-06 PROCEDURE — 17000 DESTRUCT PREMALG LESION: CPT | Mod: S$GLB,,, | Performed by: DERMATOLOGY

## 2017-06-06 PROCEDURE — 11100 PR BIOPSY OF SKIN LESION: CPT | Mod: 59,S$GLB,, | Performed by: DERMATOLOGY

## 2017-06-06 PROCEDURE — 88305 TISSUE EXAM BY PATHOLOGIST: CPT | Mod: 59 | Performed by: PATHOLOGY

## 2017-06-06 PROCEDURE — 17003 DESTRUCT PREMALG LES 2-14: CPT | Mod: S$GLB,,, | Performed by: DERMATOLOGY

## 2017-06-06 PROCEDURE — 11101 PR BIOPSY, EACH ADDED LESION: CPT | Mod: S$GLB,,, | Performed by: DERMATOLOGY

## 2017-06-06 RX ORDER — ASPIRIN 81 MG/1
81 TABLET ORAL DAILY
COMMUNITY
End: 2022-11-04

## 2017-06-06 NOTE — PATIENT INSTRUCTIONS
Shave Biopsy Wound Care    Your doctor has performed a shave biopsy today.  A band aid and vaseline ointment has been placed over the site.  This should remain in place for 24 hours.  It is recommended that you keep the area dry for the first 24 hours.  After 24 hours, you may remove the band aid and wash the area with warm soap and water and apply Vaseline jelly.  Many patients prefer to use Neosporin or Bacitracin ointment.  This is acceptable; however, know that you can develop an allergy to this medication even if you have used it safely for years.  It is important to keep the area moist.  Letting it dry out and get air slows healing time, and will worsen the scar.  Band aid is optional after first 24 hours.      If you notice increasing redness, tenderness, pain, or yellow drainage at the biopsy site, please notify your doctor.  These are signs of an infection.    If your biopsy site is bleeding, apply firm pressure for 15 minutes straight.  Repeat for another 15 minutes, if it is still bleeding.   If the surgical site continues to bleed, then please contact your doctor.       [facility]  [facility]  [address]  [place] [address]  Dept: [phone]       CRYOSURGERY      Your doctor has used a method called cryosurgery to treat your skin condition. Cryosurgery refers to the use of very cold substances to treat a variety of skin conditions such as warts, pre-skin cancers, molluscum contagiosum, sun spots, and several benign growths. The substance we use in cryosurgery is liquid nitrogen and is so cold (-195 degrees Celsius) that is burns when administered.     Following treatment in the office, the skin may immediately burn and become red. You may find the area around the lesion is affected as well. It is sometimes necessary to treat not only the lesion, but a small area of the surrounding normal skin to achieve a good response.     A blister, and even a blood filled blister,  may form after treatment.   This is a normal response. If the blister is painful, it is acceptable to sterilize a needle and with rubbing alcohol and gently pop the blister. It is important that you gently wash the area with soap and warm water as the blister fluid may contain wart virus if a wart was treated. Do no remove the roof of the blister.     The area treated can take anywhere from 1-3 weeks to heal. Healing time depends on the kind skin lesion treated, the location, and how aggressively the lesion was treated. It is recommended that the areas treated are covered with Vaseline or bacitracin ointment and a band-aid. If a band-aid is not practical, just ointment applied several times per day will do. Keeping these areas moist will speed the healing time.    Treatment with liquid nitrogen can leave a scar. In dark skin, it may be a light or dark scar, in light skin it may be a white or pink scar. These will generally fade with time, but may never go away completely.     If you have any concerns after your treatment, please feel free to call the office.         [facility]  [facility]  [address] [place] [address]  Dept: [phone]

## 2017-06-06 NOTE — PROGRESS NOTES
Subjective:       Patient ID:  Tristin Cerda is a 62 y.o. male who presents for   Chief Complaint   Patient presents with    Skin Check     UBSE    Spot     Forehead     Patient last seen by Dr Martínez 8/2015 with AK's and seb derm  Lesion bx on left helix, [facility]  Here for UBSE and new complaints  No h/o skin cancer      Complex GI history, TPN x several years (Crohn's, Celiac? Not on gluten free diet), resumed PO nutrition 6 yrs ago        Spot  - Initial  Affected locations: forehead  Duration: 1 year  Signs / symptoms: scaling (tight feeling)  Severity: mild  Timing: constant  Aggravated by: nothing  Relieving factors/Treatments tried: nothing        Review of Systems   Constitutional: Positive for night sweats. Negative for fever, chills, weight loss, weight gain, fatigue and malaise.   Skin: Positive for activity-related sunscreen use and wears hat.   Hematologic/Lymphatic: Bruises/bleeds easily.        Objective:    Physical Exam   Constitutional: He appears well-developed and well-nourished. No distress.       HENT:   Mouth/Throat: Lips normal.    Eyes: Lids are normal.  No conjunctival no injection.   Neurological: He is alert and oriented to person, place, and time. He is not disoriented.   Psychiatric: He has a normal mood and affect.   Skin:   Areas Examined (abnormalities noted in diagram):   Scalp / Hair Palpated and Inspected  Head / Face Inspection Performed  Neck Inspection Performed  Chest / Axilla Inspection Performed  Abdomen Inspection Performed  Back Inspection Performed  RUE Inspected  LUE Inspection Performed                   Diagram Legend     Erythematous scaling macule/papule c/w actinic keratosis       Vascular papule c/w angioma      Pigmented verrucoid papule/plaque c/w seborrheic keratosis      Yellow umbilicated papule c/w sebaceous hyperplasia      Irregularly shaped tan macule c/w lentigo     1-2 mm smooth white papules consistent with Milia      Movable subcutaneous  cyst with punctum c/w epidermal inclusion cyst      Subcutaneous movable cyst c/w pilar cyst      Firm pink to brown papule c/w dermatofibroma      Pedunculated fleshy papule(s) c/w skin tag(s)      Evenly pigmented macule c/w junctional nevus     Mildly variegated pigmented, slightly irregular-bordered macule c/w mildly atypical nevus      Flesh colored to evenly pigmented papule c/w intradermal nevus       Pink pearly papule/plaque c/w basal cell carcinoma      Erythematous hyperkeratotic cursted plaque c/w SCC      Surgical scar with no sign of skin cancer recurrence      Open and closed comedones      Inflammatory papules and pustules      Verrucoid papule consistent consistent with wart     Erythematous eczematous patches and plaques     Dystrophic onycholytic nail with subungual debris c/w onychomycosis     Umbilicated papule    Erythematous-base heme-crusted tan verrucoid plaque consistent with inflamed seborrheic keratosis     Erythematous Silvery Scaling Plaque c/w Psoriasis     See annotation                  Assessment / Plan:      Pathology Orders:      Normal Orders This Visit    Tissue Specimen To Pathology, Dermatology     Questions:    Directional Terms:  Other(comment)    Clinical information:  1/3- R forehead, crusted atrophic plaque, BCC vs SCC vs other, 2/3- R temple, pearly papule, BCC vs other  3/3 R upper back, pearly papule, BCC vs other    Specific Site:  1/3- R forehead, 2/3- R temple,  3/3 R upper back        Neoplasm of uncertain behavior  -     Tissue Specimen To Pathology, Dermatology  Shave biopsy procedure note:x3    Shave biopsy performed after verbal consent including risk of infection, scar, recurrence, need for additional treatment of site. Area prepped with alcohol, anesthetized with approximately 1.0cc of 1% lidocaine with epinephrine. Lesional tissue shaved with razor blade. Hemostasis achieved with application of aluminum chloride followed by hyfrecation. No complications.  Dressing applied. Wound care explained.    Actinic keratoses  Cryosurgery Procedure Note    Verbal consent from the patient is obtained and the patient is aware of the precancerous quality and need for treatment of these lesions. Liquid nitrogen cryosurgery is applied to the 4 actinic keratoses, as detailed in the physical exam, to produce a freeze injury. The patient is aware that blisters may form and is instructed on wound care with gentle cleansing and use of vaseline ointment to keep moist until healed. The patient is supplied a handout on cryosurgery and is instructed to call if lesions do not completely resolve.    Multiple benign nevi  Discussed ABCDE's of nevi.  Monitor for new mole or moles that are becoming bigger, darker, irritated, or developing irregular borders. Brochure provided.    Solar lentigo  This is a benign hyperpigmented sun induced lesion. Daily sun protection will reduce the number of new lesions. Treatment of these benign lesions are considered cosmetic.    Patient instructed in importance in daily sun protection of at least spf 30. Sun avoidance and topical protection discussed.   Recommend Elta MD (physician office) COTZ sensitive (available online) for daily use on face and neck.  Patient encouraged to wear hat for all outdoor exposure.   Also discussed sun protective clothing.           Return in about 6 months (around 12/6/2017).

## 2017-06-21 ENCOUNTER — OFFICE VISIT (OUTPATIENT)
Dept: PAIN MEDICINE | Facility: CLINIC | Age: 62
End: 2017-06-21
Payer: MEDICARE

## 2017-06-21 ENCOUNTER — APPOINTMENT (OUTPATIENT)
Dept: LAB | Facility: HOSPITAL | Age: 62
End: 2017-06-21
Attending: ANESTHESIOLOGY
Payer: MEDICARE

## 2017-06-21 VITALS
BODY MASS INDEX: 19.88 KG/M2 | HEIGHT: 73 IN | WEIGHT: 150 LBS | SYSTOLIC BLOOD PRESSURE: 135 MMHG | DIASTOLIC BLOOD PRESSURE: 80 MMHG | HEART RATE: 69 BPM

## 2017-06-21 DIAGNOSIS — R10.9 CHRONIC ABDOMINAL PAIN: ICD-10-CM

## 2017-06-21 DIAGNOSIS — Z79.891 CHRONIC USE OF OPIATE FOR THERAPEUTIC PURPOSE: Primary | ICD-10-CM

## 2017-06-21 DIAGNOSIS — K50.90 CROHN'S DISEASE WITHOUT COMPLICATION, UNSPECIFIED GASTROINTESTINAL TRACT LOCATION: ICD-10-CM

## 2017-06-21 DIAGNOSIS — G89.29 CHRONIC ABDOMINAL PAIN: ICD-10-CM

## 2017-06-21 LAB
AMPHET+METHAMPHET UR QL: NEGATIVE
BARBITURATES UR QL SCN>200 NG/ML: NEGATIVE
BENZODIAZ UR QL SCN>200 NG/ML: NEGATIVE
BZE UR QL SCN: NEGATIVE
CANNABINOIDS UR QL SCN: NEGATIVE
CREAT UR-MCNC: 59 MG/DL
METHADONE UR QL SCN>300 NG/ML: NEGATIVE
OPIATES UR QL SCN: NEGATIVE
PCP UR QL SCN>25 NG/ML: NEGATIVE
TOXICOLOGY INFORMATION: NORMAL

## 2017-06-21 PROCEDURE — 99999 PR PBB SHADOW E&M-EST. PATIENT-LVL V: CPT | Mod: PBBFAC,,, | Performed by: PHYSICIAN ASSISTANT

## 2017-06-21 PROCEDURE — 80365 DRUG SCREENING OXYCODONE: CPT

## 2017-06-21 PROCEDURE — 99214 OFFICE O/P EST MOD 30 MIN: CPT | Mod: S$GLB,,, | Performed by: PHYSICIAN ASSISTANT

## 2017-06-21 PROCEDURE — 80307 DRUG TEST PRSMV CHEM ANLYZR: CPT

## 2017-06-21 RX ORDER — MEPERIDINE HYDROCHLORIDE 50 MG/1
50 TABLET ORAL EVERY 8 HOURS
Qty: 90 TABLET | Refills: 0 | Status: SHIPPED | OUTPATIENT
Start: 2017-06-21 | End: 2017-07-20

## 2017-06-21 RX ORDER — MEPERIDINE HYDROCHLORIDE 50 MG/1
50 TABLET ORAL EVERY 8 HOURS
Qty: 90 TABLET | Refills: 0 | Status: SHIPPED | OUTPATIENT
Start: 2017-07-20 | End: 2017-08-17 | Stop reason: SDUPTHER

## 2017-06-21 NOTE — PROGRESS NOTES
PCP: Will Albrecht MD      CC: abdominal pain    Interval history: Mr. Cerda is a 62 y.o. male with an extensive history of crohn's disease who presents today for medication refill. He continues to take demerol 50mg q8hrs as needed with moderate benefit given that his condition is stable.  No side effects reported.    Abdominal pain remains stable.   He does report diarrhea at times but no blood stools. Reports anal pain and itching 2/2 chronic diarrhea. OTC Lidocaine 2 %provides some minimal relief.  Compounding cream was too expensive.   He has not taken his Demerol in 3 days because he is participating in a study in [place] for short gut syndrome. He rates his pain 6/10.     Prior HPI:   Mr. Cerda is a 60 year old male with PMH of crohn's disease referred by Dr. Hansen.  Patient states being diagnosed with crohn's disease since the age of 14.    He has had multiple GI surgeries and large bowel and small bowel removals.  During that time, he was being managed by providers in [place].  He was TPN dependent for many years until about two years ago.  He is now stable on an oral diet.  He did not have a primary care physician nor a gastroenterologist for many years.  He is currently trying to establish care.  He has future appointment with Dr. Ch.  He states taking Demerol 50mg q8hrs as needed for pain. It has provided relief of his nausea and pain with diarrhea.  He was last prescribed Demerol in July 2014.  Since then, he has been bearing with the pain.  It is a sharp shooting pain in his mid abdomen.      ROS:  CONSTITUTIONAL: No fevers, chills, night sweats, wt. loss, appetite changes  SKIN: no rashes or itching  ENT: No headaches, head trauma, vision changes, or eye pain  LYMPH NODES: None noticed   CV: No chest pain, palpitations.   RESP: No shortness of breath, dyspnea on exertion, cough, wheezing, or hemoptysis  GI: No nausea, emesis, diarrhea, constipation, melena, hematochezia, pain.    :  No dysuria, hematuria, urgency, or frequency   HEME: No easy bruising, bleeding problems  PSYCHIATRIC: No depression, anxiety, psychosis, hallucinations.  NEURO: No seizures, memory loss, dizziness or difficulty sleeping  MSK: no joint pain      Past Medical History:   Diagnosis Date    Anxiety     Crohn's disease     age 17    Short bowel syndrome     Vitamin B deficiency     Vitamin D deficiency      Past Surgical History:   Procedure Laterality Date    3 small bowel resections      APPENDECTOMY      CHOLECYSTECTOMY      COLON SURGERY      partial colectomy due to crohns    COLONOSCOPY      COLONOSCOPY N/A 2/14/2017    Procedure: COLONOSCOPY;  Surgeon: Nela Sanchez MD;  Location: [facility] ENDO ([address] [place] [address]);  Service: Endoscopy;  Laterality: N/A;    COLONOSCOPY N/A 3/14/2017    Procedure: COLONOSCOPY;  Surgeon: Nela Sanchez MD;  Location: [facility] ([address] [place] [address]);  Service: Endoscopy;  Laterality: N/A;  2 days clear liquids before procedure    KNEE SURGERY      SMALL INTESTINE SURGERY      TUBE THORACOTOMY      UPPER GASTROINTESTINAL ENDOSCOPY       Family History   Problem Relation Age of Onset    Diabetes Mother     Stroke Mother     Diabetes Father     Kidney disease Father     Irritable bowel syndrome Cousin     Celiac disease Neg Hx     Cirrhosis Neg Hx     Colon cancer Neg Hx     Colon polyps Neg Hx     Cystic fibrosis Neg Hx     Esophageal cancer Neg Hx     Crohn's disease Neg Hx     Hemochromatosis Neg Hx     Inflammatory bowel disease Neg Hx     Liver cancer Neg Hx     Liver disease Neg Hx     Rectal cancer Neg Hx     Stomach cancer Neg Hx     Ulcerative colitis Neg Hx     Addison's disease Neg Hx     Melanoma Neg Hx     Psoriasis Neg Hx     Lupus Neg Hx     Eczema Neg Hx      Social History     Social History    Marital status:      Spouse name: N/A    Number of children: N/A    Years of education: N/A     Social History Main Topics    Smoking status: Former  "Smoker     Packs/day: 1.00     Years: 15.00     Types: Cigarettes     Quit date: 3/10/1995    Smokeless tobacco: Never Used    Alcohol use 1.2 oz/week     1 Cans of beer, 1 Shots of liquor per week      Comment: 1 every 6 -7 months    Drug use: No    Sexual activity: Yes     Other Topics Concern    None     Social History Narrative    Retired          Medications/Allergies: See med card    Vitals:    06/21/17 0907   BP: 135/80   Pulse: 69   Weight: 68 kg (150 lb)   Height: 6' 1" (1.854 m)   PainSc:   6   PainLoc: Abdomen         Physical exam:    GENERAL: A and O x3, the patient appears well groomed and is in no acute distress.  Skin: No rashes or obvious lesions  HEENT: normocephalic, atraumatic  CARDIOVASCULAR:  RRR  LUNGS: non labored breathing  ABDOMEN: soft, nontender. No rebound   UPPER EXTREMITIES: Normal alignment, normal range of motion, no atrophy, no skin changes,  hair growth and nail growth normal and equal bilaterally. No swelling, no tenderness.    LOWER EXTREMITIES:  Normal alignment, normal range of motion, no atrophy, no skin changes,  hair growth and nail growth normal and equal bilaterally. No swelling, no tenderness.    LUMBAR SPINE  Lumbar spine: ROM is full with flexion extension and oblique extension with no increased pain.    Lam's test causes no increased pain on either side.    Supine straight leg raise is negative bilaterally.    Internal and external rotation of the hip causes no increased pain on either side.  Myofascial exam: No tenderness to palpation across lumbar paraspinous muscles.      MENTAL STATUS: normal orientation, speech, language, and fund of knowledge for social situation.  Emotional state appropriate.    CRANIAL NERVES:  II:  PERRL bilaterally,   III,IV,VI: EOMI.    V:  Facial sensation equal bilaterally  VII:  Facial motor function normal.  VIII:  Hearing equal to finger rub bilaterally  IX/X: Gag normal, palate symmetric  XI:  Shoulder shrug equal, head turn " equal  XII:  Tongue midline without fasciculations      MOTOR: Tone and bulk: normal bilateral upper and lower Strength: normal   SENSATION: Light touch and pinprick intact bilaterally  REFLEXES: normal, symmetric, nonbrisk.  Toes down, no clonus. No hoffmans.  GAIT: normal rise, base, steps, and arm swing.        Imaging:  None    Assessment:  Mr. Cerda is a 62 y.o. male with abdominal pain  1. Chronic use of opiate for therapeutic purpose    2. Chronic abdominal pain    3. Crohn's disease without complication, unspecified gastrointestinal tract location        Plan:  1. Patient with long history of crohn's disease and chronic abdominal pain.  He is in the process of establishing care with appropriate providers. Continue care with GI.  2. He can continue Demerol as needed for pain.  reviewed.  No signs of aberrant behavior.  Demerol 50mg q8hrs as needed for pain today.  Script given for 2  Months. UDS today. Will repeat next visit if negative because he has not taken in 3 days.   3. Continue OTC lidocaine cream  4. F/u 3 months or sooner  All medication management was performed by Dr. Alvin Curry

## 2017-06-26 LAB
CODEINE UR-MCNC: NEGATIVE NG/ML
CODEINE UR-MCNC: NEGATIVE NG/ML
HYDROCODONE UR-MCNC: NEGATIVE NG/ML
HYDROMORPHONE UR-MCNC: NEGATIVE NG/ML
MORPHINE UR-MCNC: NEGATIVE NG/ML
NALOXONE BY LS MS/MS: NEGATIVE NG/ML
NORHYDROCODONE BY LC MS/MS: NEGATIVE NG/ML
NOROXYCODONE BY LC-MS/MS: NEGATIVE NG/ML
NOROXYMORPHONE BY LC-MS/MS: NEGATIVE NG/ML
OXYCODONE UR-MCNC: NEGATIVE NG/ML
OXYCODONE UR-MCNC: NEGATIVE NG/ML
TOXICOLOGIST REVIEW: NORMAL

## 2017-06-27 ENCOUNTER — TELEPHONE (OUTPATIENT)
Dept: DERMATOLOGY | Facility: CLINIC | Age: 62
End: 2017-06-27

## 2017-06-27 NOTE — TELEPHONE ENCOUNTER
----- Message from Mundo Shaver sent at 6/27/2017  8:53 AM CDT -----  Contact: Pt   Pt requesting to speak to staff in regard to rescheduling an appt set for 6/29. Please follow up at soonest convenience

## 2017-06-28 NOTE — TELEPHONE ENCOUNTER
Tried calling patient to confirm whether or not he wanted to keep his surgery appointment tomorrow. Will have to cancel if he does not call back

## 2017-07-05 NOTE — TELEPHONE ENCOUNTER
Pt rescheduled mohs surgery from 6/29 to 7/20 at 800 am for BCC on R [place] . Pt confirmed date, time and location. Appointment letter and pre-op instructions sent in the mail.

## 2017-07-06 RX ORDER — DICYCLOMINE HYDROCHLORIDE 20 MG/1
TABLET ORAL
Qty: 360 TABLET | Refills: 0 | Status: SHIPPED | OUTPATIENT
Start: 2017-07-06 | End: 2017-09-23 | Stop reason: SDUPTHER

## 2017-07-20 ENCOUNTER — PROCEDURE VISIT (OUTPATIENT)
Dept: DERMATOLOGY | Facility: CLINIC | Age: 62
End: 2017-07-20
Payer: MEDICARE

## 2017-07-20 VITALS
HEIGHT: 73 IN | DIASTOLIC BLOOD PRESSURE: 78 MMHG | BODY MASS INDEX: 19.88 KG/M2 | HEART RATE: 58 BPM | WEIGHT: 150 LBS | SYSTOLIC BLOOD PRESSURE: 125 MMHG

## 2017-07-20 DIAGNOSIS — C44.319 BASAL CELL CARCINOMA OF RIGHT TEMPLE REGION: ICD-10-CM

## 2017-07-20 DIAGNOSIS — C44.319 BASAL CELL CARCINOMA OF RIGHT FOREHEAD: Primary | ICD-10-CM

## 2017-07-20 PROCEDURE — 99499 UNLISTED E&M SERVICE: CPT | Mod: S$GLB,,, | Performed by: DERMATOLOGY

## 2017-07-20 PROCEDURE — 13132 CMPLX RPR F/C/C/M/N/AX/G/H/F: CPT | Mod: 51,S$GLB,, | Performed by: DERMATOLOGY

## 2017-07-20 PROCEDURE — 17312 MOHS ADDL STAGE: CPT | Mod: S$GLB,,, | Performed by: DERMATOLOGY

## 2017-07-20 PROCEDURE — 17311 MOHS 1 STAGE H/N/HF/G: CPT | Mod: S$GLB,,, | Performed by: DERMATOLOGY

## 2017-07-20 RX ORDER — TRAMADOL HYDROCHLORIDE 50 MG/1
50 TABLET ORAL 2 TIMES DAILY PRN
Qty: 30 TABLET | Refills: 0 | Status: CANCELLED | OUTPATIENT
Start: 2017-07-20

## 2017-07-20 RX ORDER — DOXYCYCLINE HYCLATE 100 MG
100 TABLET ORAL EVERY 12 HOURS
Qty: 28 TABLET | Refills: 0 | Status: SHIPPED | OUTPATIENT
Start: 2017-07-20 | End: 2017-08-03

## 2017-07-20 NOTE — PROGRESS NOTES
PROCEDURE: Mohs' Micrographic Surgery    INDICATION: Location in non-mask areas of face where maximum conservation of tumor-free tissue is needed. Size > 1.0 cm on face. Biopsy-proven skin cancer of cosmetically and functionally important areas, including head, neck, genital, hand, foot, or areas known for having difficulty in healing, such as the lower anterior legs. Tumors with aggressive clinical behavior (rapidly growing, greater than 1 cm in diameter). Tumor with ill-defined borders.    REFERRING MD: Ira Bang MD    CASE NUMBER:     ANESTHETIC: 7 cc 0.5% Lidocaine with Epi 1:200,000 mixed 1:1 with 0.5% Bupivacaine    SURGICAL PREP: Hibiclens    SURGEON: Teofilo Fernandez MD    ASSISTANTS: Maria Del Carmen Balbuena PA-C and Henrietta Montez, Surg Tech    PREOPERATIVE DIAGNOSIS: basal cell carcinoma    POSTOPERATIVE DIAGNOSIS: basal cell carcinoma    PATHOLOGIC DIAGNOSIS: basal cell carcinoma- nodular, infiltrating    HISTOLOGY OF SPECIMENS IN FIRST STAGE:   Tumor Type: Tumor seen. Nodular basal cell carcinoma: Nodular tumor in dermis composed of basaloid cells exhibiting peripheral palisading and retraction artifact.  Infiltrative basal cell carcinoma: Basaloid tumor in dermis characterized by thin elongated strands of basaloid cells infiltrating between dermal collagen bundles.   Depth of Invasion: epidermis, dermis and subcutaneous tissue  Perineural Invasion: No    HISTOLOGY OF SPECIMENS IN SUBSEQUENT STAGES:  Tumor Type: Tumor seen. Same as in first stage.   Depth of Invasion: epidermis and dermis  Perineural Invasion: No    STAGES OF MOHS' SURGERY PERFORMED: 3    TUMOR-FREE PLANE ACHIEVED: Yes    HEMOSTASIS: electrocoagulation and 4-0 vicryl suture ties     SPECIMENS: 8 (4 in stage A, 3 in stage B and 1 in stage C)    LOCATION: right forehead (lateral). Patient verified location.    INITIAL LESION SIZE: 1.0 x 2.0 cm    FINAL DEFECT SIZE: 2.2 x 3.0 cm    WOUND REPAIR/DISPOSITION: The patient tolerated Mohs'  "Micrographic Surgery for a basal cell carcinoma very well. When the tumor was completely removed, a repair of the surgical defect was undertaken.      PROCEDURE: Complex Linear Repair    INDICATION: Status post Mohs' Micrographic Surgery for basal cell carcinoma.    CASE NUMBER:     SURGEON: Teofilo Fernandez MD    ASSISTANTS: Maria Del Carmen Balbuena PA-C and Jacquelyn Caraballo MA    ANESTHETIC: 5 cc 0.5% Lidocaine with Epi 1:200,000 mixed 1:1 with 0.5% Bupivacaine    SURGICAL PREP: Hibiclens    LOCATION: right forehead (lateral)    DEFECT SIZE: 2.2 x 3.0 cm    WOUND REPAIR/DISPOSITION:  After the patient's carcinoma had been completely removed with Mohs' Micrographic Surgery, a repair of the surgical defect was undertaken. The patient was returned to the operating suite where the area of right forehead was prepped, draped, and anesthetized in the usual sterile fashion. The wound was widely undermined in all directions. Then, electrocoagulation was used to obtain meticulous hemostasis. 4-0 Vicryl buried vertical mattress sutures were placed into the subcutaneous and dermal plane to close the wound and emily the cutaneous wound edge. Bilateral dog ears were identified and were removed by a standard Burow's triangle technique. The cutaneous wound edges were closed using interrupted 5-0 Prolene suture.    The patient tolerated the procedure well.    The area was cleaned and dressed appropriately and the patient was given wound care instructions, as well as appointment for follow-up evaluation. Patient already has Demerol prn pain and was placed on doxycycline 100 mg BID x 2 weeks (disc photosens and GI).    LENGTH OF REPAIR: 5.3 cm    Vitals:    07/20/17 0756 07/20/17 1211   BP: 139/77 125/78   BP Location: Right arm Left arm   Patient Position: Sitting Sitting   BP Method: Automatic Automatic   Pulse: 64 (!) 58   Weight: 68 kg (150 lb)    Height: 6' 1" (1.854 m)          "

## 2017-07-27 ENCOUNTER — PROCEDURE VISIT (OUTPATIENT)
Dept: DERMATOLOGY | Facility: CLINIC | Age: 62
End: 2017-07-27
Payer: MEDICARE

## 2017-07-27 VITALS
SYSTOLIC BLOOD PRESSURE: 135 MMHG | WEIGHT: 150 LBS | DIASTOLIC BLOOD PRESSURE: 78 MMHG | HEIGHT: 73 IN | HEART RATE: 59 BPM | BODY MASS INDEX: 19.88 KG/M2

## 2017-07-27 DIAGNOSIS — C44.319 BASAL CELL CARCINOMA OF RIGHT TEMPLE REGION: Primary | ICD-10-CM

## 2017-07-27 PROCEDURE — 99499 UNLISTED E&M SERVICE: CPT | Mod: S$GLB,,, | Performed by: DERMATOLOGY

## 2017-07-27 PROCEDURE — 13132 CMPLX RPR F/C/C/M/N/AX/G/H/F: CPT | Mod: 79,51,S$GLB, | Performed by: DERMATOLOGY

## 2017-07-27 PROCEDURE — 17311 MOHS 1 STAGE H/N/HF/G: CPT | Mod: 79,S$GLB,, | Performed by: DERMATOLOGY

## 2017-07-27 NOTE — PROGRESS NOTES
PROCEDURE: Mohs' Micrographic Surgery    INDICATION: Location in mask areas of face including central face, nose, eyelids, eyebrows, lips, chin, preauricular, temple, and ear. Biopsy-proven skin cancer of cosmetically and functionally important areas, including head, neck, genital, hand, foot, or areas known for having difficulty in healing, such as the lower anterior legs. Tumor with ill-defined borders.    REFERRING MD: Ira Bang MD    CASE NUMBER:     ANESTHETIC: 3 cc 0.5% Lidocaine with Epi 1:200,000 mixed 1:1 with 0.5% Bupivacaine    SURGICAL PREP: Hibiclens    SURGEON: Teofilo Fernandez MD    ASSISTANTS: Maria Del Carmen Balbuena PA-C and Jacquelyn Caraballo MA    PREOPERATIVE DIAGNOSIS: basal cell carcinoma    POSTOPERATIVE DIAGNOSIS: basal cell carcinoma    PATHOLOGIC DIAGNOSIS: basal cell carcinoma- nodular    HISTOLOGY OF SPECIMENS IN FIRST STAGE:   Tumor Type: No tumor seen.    STAGES OF MOHS' SURGERY PERFORMED: 1    TUMOR-FREE PLANE ACHIEVED: Yes    HEMOSTASIS: electrocoagulation     SPECIMENS: 2     LOCATION: right temple (R lateral periorbital). Patient verified location.    INITIAL LESION SIZE: 0.5 x 0.6 cm    FINAL DEFECT SIZE: 0.8 x 0.9 cm    WOUND REPAIR/DISPOSITION: The patient tolerated Mohs' Micrographic Surgery for a basal cell carcinoma very well. When the tumor was completely removed, a repair of the surgical defect was undertaken.      PROCEDURE: Complex Linear Repair    INDICATION: Status post Mohs' Micrographic Surgery for basal cell carcinoma.    CASE NUMBER:     SURGEON: Teofilo Fernandez MD    ASSISTANTS: Maria Del Carmen Balbuena PA-C and Jacquelyn Caraballo MA    ANESTHETIC: 2 cc 1% Lidocaine with Epinephrine 1:100,000    SURGICAL PREP: Hibiclens    LOCATION: right temple (R lateral periorbital)    DEFECT SIZE: 0.8 x 0.9 cm    WOUND REPAIR/DISPOSITION:  After the patient's carcinoma had been completely removed with Mohs' Micrographic Surgery, a repair of the surgical defect was undertaken. The patient was  "returned to the operating suite where the area of right lateral periorbital was prepped, draped, and anesthetized in the usual sterile fashion. The wound was widely undermined in all directions. Then, electrocoagulation was used to obtain meticulous hemostasis. 5-0 Vicryl buried vertical mattress sutures were placed into the subcutaneous and dermal plane to close the wound and emily the cutaneous wound edge. Bilateral dog ears were identified and were removed by a standard Burow's triangle technique. The cutaneous wound edges were closed using interrupted 6-0 Prolene suture.    The patient tolerated the procedure well.    The area was cleaned and dressed appropriately and the patient was given wound care instructions, as well as appointment for follow-up evaluation.    LENGTH OF REPAIR: 2.7 cm    Vitals:    07/27/17 0725 07/27/17 0943   BP: (!) 160/91 135/78   BP Location: Left arm    Patient Position: Sitting    BP Method: Automatic    Pulse: 66 (!) 59   Weight: 68 kg (150 lb)    Height: 6' 1" (1.854 m)          "

## 2017-07-27 NOTE — PROGRESS NOTES
62 y.o. male patient is here for suture removal following Mohs' surgery.    Patient reports no problems.    WOUND PE:  The R forehead sutures intact. Wound healing well. Good skin edges. No signs or symptoms of infection.    IMPRESSION:  Healing operative site from Mohs' surgery, BCC R forehead s/p Mohs with CLC, postop day #7.    PLAN:  Sutures removed today. Steri-strips applied.  Continue wound care.  Keep moist with Aquaphor.    RTC:  In 1 week for suture removal on R temple s/p Mohs for BCC.

## 2017-08-03 ENCOUNTER — OFFICE VISIT (OUTPATIENT)
Dept: DERMATOLOGY | Facility: CLINIC | Age: 62
End: 2017-08-03
Payer: MEDICARE

## 2017-08-03 DIAGNOSIS — Z09 POSTOP CHECK: Primary | ICD-10-CM

## 2017-08-03 PROCEDURE — 99024 POSTOP FOLLOW-UP VISIT: CPT | Mod: S$GLB,,, | Performed by: DERMATOLOGY

## 2017-08-03 PROCEDURE — 99999 PR PBB SHADOW E&M-EST. PATIENT-LVL II: CPT | Mod: PBBFAC,,, | Performed by: DERMATOLOGY

## 2017-08-03 NOTE — PROGRESS NOTES
62 y.o. male patient is here for suture removal following Mohs' surgery.    Patient reports no problems.    WOUND PE:  The R temple sutures intact. Wound healing well. Good skin edges. No signs or symptoms of infection.    IMPRESSION:  Healing operative site from Mohs' surgery, BCC R temple s/p Mohs with CLC, postop day #7.    PLAN:  Sutures removed today. Steri-strips applied.  Continue wound care.  Keep moist with Aquaphor.    RTC:  In 3-6 months with Ira Bang MD for skin check or sooner if new concern arises.

## 2017-08-17 ENCOUNTER — APPOINTMENT (OUTPATIENT)
Dept: LAB | Facility: HOSPITAL | Age: 62
End: 2017-08-17
Attending: PHYSICIAN ASSISTANT
Payer: MEDICARE

## 2017-08-17 ENCOUNTER — OFFICE VISIT (OUTPATIENT)
Dept: PAIN MEDICINE | Facility: CLINIC | Age: 62
End: 2017-08-17
Payer: MEDICARE

## 2017-08-17 VITALS
DIASTOLIC BLOOD PRESSURE: 83 MMHG | BODY MASS INDEX: 19.88 KG/M2 | WEIGHT: 150 LBS | HEIGHT: 73 IN | SYSTOLIC BLOOD PRESSURE: 148 MMHG | HEART RATE: 77 BPM

## 2017-08-17 DIAGNOSIS — Z79.891 CHRONIC USE OF OPIATE FOR THERAPEUTIC PURPOSE: Primary | ICD-10-CM

## 2017-08-17 DIAGNOSIS — G89.29 CHRONIC ABDOMINAL PAIN: ICD-10-CM

## 2017-08-17 DIAGNOSIS — R10.9 CHRONIC ABDOMINAL PAIN: ICD-10-CM

## 2017-08-17 DIAGNOSIS — K50.90 CROHN'S DISEASE WITHOUT COMPLICATION, UNSPECIFIED GASTROINTESTINAL TRACT LOCATION: ICD-10-CM

## 2017-08-17 LAB
AMPHET+METHAMPHET UR QL: NEGATIVE
BARBITURATES UR QL SCN>200 NG/ML: NEGATIVE
BENZODIAZ UR QL SCN>200 NG/ML: NEGATIVE
BZE UR QL SCN: NEGATIVE
CANNABINOIDS UR QL SCN: NEGATIVE
CREAT UR-MCNC: 70.1 MG/DL
METHADONE UR QL SCN>300 NG/ML: NEGATIVE
OPIATES UR QL SCN: NEGATIVE
PCP UR QL SCN>25 NG/ML: NEGATIVE
TOXICOLOGY INFORMATION: NORMAL

## 2017-08-17 PROCEDURE — 80307 DRUG TEST PRSMV CHEM ANLYZR: CPT

## 2017-08-17 PROCEDURE — 80361 OPIATES 1 OR MORE: CPT

## 2017-08-17 PROCEDURE — 99214 OFFICE O/P EST MOD 30 MIN: CPT | Mod: S$GLB,,, | Performed by: PHYSICIAN ASSISTANT

## 2017-08-17 PROCEDURE — 99999 PR PBB SHADOW E&M-EST. PATIENT-LVL III: CPT | Mod: PBBFAC,,, | Performed by: PHYSICIAN ASSISTANT

## 2017-08-17 PROCEDURE — 3008F BODY MASS INDEX DOCD: CPT | Mod: S$GLB,,, | Performed by: PHYSICIAN ASSISTANT

## 2017-08-17 RX ORDER — MEPERIDINE HYDROCHLORIDE 50 MG/1
50 TABLET ORAL EVERY 8 HOURS
Qty: 90 TABLET | Refills: 0 | Status: SHIPPED | OUTPATIENT
Start: 2017-10-15 | End: 2017-11-06 | Stop reason: SDUPTHER

## 2017-08-17 RX ORDER — MEPERIDINE HYDROCHLORIDE 50 MG/1
50 TABLET ORAL EVERY 8 HOURS
Qty: 90 TABLET | Refills: 0 | Status: SHIPPED | OUTPATIENT
Start: 2017-08-18 | End: 2017-09-16

## 2017-08-17 RX ORDER — MEPERIDINE HYDROCHLORIDE 50 MG/1
50 TABLET ORAL EVERY 8 HOURS
Qty: 90 TABLET | Refills: 0 | Status: SHIPPED | OUTPATIENT
Start: 2017-09-16 | End: 2017-10-15

## 2017-08-17 NOTE — PROGRESS NOTES
PCP: Will Albrecht MD      CC: abdominal pain    Interval history: Mr. Cerda is a 62 y.o. male with an extensive history of crohn's disease who presents today for medication refill. He continues to take demerol 50mg q8hrs as needed with moderate benefit given that his condition is stable.  No side effects reported.    Abdominal pain remains stable.   He does report diarrhea at times but no blood stools. Reports anal pain and itching 2/2 chronic diarrhea. OTC Lidocaine 2 %provides some minimal relief.  Compounding cream was too expensive.  He rates his pain 0/10.     Prior HPI:   Mr. Cerda is a 60 year old male with PMH of crohn's disease referred by Dr. Hansen.  Patient states being diagnosed with crohn's disease since the age of 14.    He has had multiple GI surgeries and large bowel and small bowel removals.  During that time, he was being managed by providers in [place].  He was TPN dependent for many years until about two years ago.  He is now stable on an oral diet.  He did not have a primary care physician nor a gastroenterologist for many years.  He is currently trying to establish care.  He has future appointment with Dr. Ch.  He states taking Demerol 50mg q8hrs as needed for pain. It has provided relief of his nausea and pain with diarrhea.  He was last prescribed Demerol in July 2014.  Since then, he has been bearing with the pain.  It is a sharp shooting pain in his mid abdomen.      ROS:  CONSTITUTIONAL: No fevers, chills, night sweats, wt. loss, appetite changes  SKIN: no rashes or itching  ENT: No headaches, head trauma, vision changes, or eye pain  LYMPH NODES: None noticed   CV: No chest pain, palpitations.   RESP: No shortness of breath, dyspnea on exertion, cough, wheezing, or hemoptysis  GI: No nausea, emesis, diarrhea, constipation, melena, hematochezia, pain.    : No dysuria, hematuria, urgency, or frequency   HEME: No easy bruising, bleeding problems  PSYCHIATRIC: No  depression, anxiety, psychosis, hallucinations.  NEURO: No seizures, memory loss, dizziness or difficulty sleeping  MSK: no joint pain      Past Medical History:   Diagnosis Date    Anxiety     Basal cell carcinoma 07/2017    R forehead and R temple    Crohn's disease     age 17    Short bowel syndrome     Vitamin B deficiency     Vitamin D deficiency      Past Surgical History:   Procedure Laterality Date    3 small bowel resections      APPENDECTOMY      CHOLECYSTECTOMY      COLON SURGERY      partial colectomy due to crohns    COLONOSCOPY      COLONOSCOPY N/A 2/14/2017    Procedure: COLONOSCOPY;  Surgeon: Nela Sanchez MD;  [facility]  Service: Endoscopy;  Laterality: N/A;    COLONOSCOPY N/A 3/14/2017    Procedure: COLONOSCOPY;  Surgeon: Nela Sanchez MD;  [facility]  Service: Endoscopy;  Laterality: N/A;  2 days clear liquids before procedure    KNEE SURGERY      SMALL INTESTINE SURGERY      TUBE THORACOTOMY      UPPER GASTROINTESTINAL ENDOSCOPY       Family History   Problem Relation Age of Onset    Diabetes Mother     Stroke Mother     Diabetes Father     Kidney disease Father     Irritable bowel syndrome Cousin     Celiac disease Neg Hx     Cirrhosis Neg Hx     Colon cancer Neg Hx     Colon polyps Neg Hx     Cystic fibrosis Neg Hx     Esophageal cancer Neg Hx     Crohn's disease Neg Hx     Hemochromatosis Neg Hx     Inflammatory bowel disease Neg Hx     Liver cancer Neg Hx     Liver disease Neg Hx     Rectal cancer Neg Hx     Stomach cancer Neg Hx     Ulcerative colitis Neg Hx     Addison's disease Neg Hx     Melanoma Neg Hx     Psoriasis Neg Hx     Lupus Neg Hx     Eczema Neg Hx      Social History     Social History    Marital status:      Spouse name: N/A    Number of children: N/A    Years of education: N/A     Social History Main Topics    Smoking status: Former Smoker     Packs/day: 1.00     Years: 15.00      "Types: Cigarettes     Quit date: 3/10/1995    Smokeless tobacco: Never Used    Alcohol use 1.2 oz/week     1 Cans of beer, 1 Shots of liquor per week      Comment: 1 every 6 -7 months    Drug use: No    Sexual activity: Yes     Other Topics Concern    None     Social History Narrative    Retired          Medications/Allergies: See med card    Vitals:    08/17/17 0853   BP: (!) 148/83   Pulse: 77   Weight: 68 kg (150 lb)   Height: 6' 1" (1.854 m)   PainSc: 0-No pain         Physical exam:    GENERAL: A and O x3, the patient appears well groomed and is in no acute distress.  Skin: No rashes or obvious lesions  HEENT: normocephalic, atraumatic  CARDIOVASCULAR:  RRR  LUNGS: non labored breathing  ABDOMEN: soft, nontender. No rebound   UPPER EXTREMITIES: Normal alignment, normal range of motion, no atrophy, no skin changes,  hair growth and nail growth normal and equal bilaterally. No swelling, no tenderness.    LOWER EXTREMITIES:  Normal alignment, normal range of motion, no atrophy, no skin changes,  hair growth and nail growth normal and equal bilaterally. No swelling, no tenderness.    LUMBAR SPINE  Lumbar spine: ROM is full with flexion extension and oblique extension with no increased pain.    Lam's test causes no increased pain on either side.    Supine straight leg raise is negative bilaterally.    Internal and external rotation of the hip causes no increased pain on either side.  Myofascial exam: No tenderness to palpation across lumbar paraspinous muscles.      MENTAL STATUS: normal orientation, speech, language, and fund of knowledge for social situation.  Emotional state appropriate.    CRANIAL NERVES:  II:  PERRL bilaterally,   III,IV,VI: EOMI.    V:  Facial sensation equal bilaterally  VII:  Facial motor function normal.  VIII:  Hearing equal to finger rub bilaterally  IX/X: Gag normal, palate symmetric  XI:  Shoulder shrug equal, head turn equal  XII:  Tongue midline without " fasciculations      MOTOR: Tone and bulk: normal bilateral upper and lower Strength: normal   SENSATION: Light touch and pinprick intact bilaterally  REFLEXES: normal, symmetric, nonbrisk.  Toes down, no clonus. No hoffmans.  GAIT: normal rise, base, steps, and arm swing.        Imaging:  None    Assessment:  Mr. Cerda is a 62 y.o. male with abdominal pain  1. Chronic use of opiate for therapeutic purpose    2. Chronic abdominal pain    3. Crohn's disease without complication, unspecified gastrointestinal tract location        Plan:  1. Patient with long history of crohn's disease and chronic abdominal pain.  Continue care with GI.  2. He can continue Demerol as needed for pain.  reviewed.  No signs of aberrant behavior.  Demerol 50mg q8hrs as needed for pain today.  Script given for 3  Months. Repeat UDS today.   3. Continue OTC lidocaine cream  4. F/u 3 months or sooner  All medication management was performed by Dr. Alvin Curry

## 2017-08-21 LAB
CODEINE UR-MCNC: NEGATIVE NG/ML
CODEINE UR-MCNC: NEGATIVE NG/ML
HYDROCODONE UR-MCNC: NEGATIVE NG/ML
HYDROMORPHONE UR-MCNC: NEGATIVE NG/ML
MORPHINE UR-MCNC: NEGATIVE NG/ML
NALOXONE BY LS MS/MS: NEGATIVE NG/ML
NORHYDROCODONE BY LC MS/MS: NEGATIVE NG/ML
NOROXYCODONE BY LC-MS/MS: NEGATIVE NG/ML
NOROXYMORPHONE BY LC-MS/MS: NORMAL NG/ML
OXYCODONE UR-MCNC: NEGATIVE NG/ML
OXYCODONE UR-MCNC: NEGATIVE NG/ML
TOXICOLOGIST REVIEW: NORMAL

## 2017-09-25 RX ORDER — DICYCLOMINE HYDROCHLORIDE 20 MG/1
TABLET ORAL
Qty: 360 TABLET | Refills: 0 | Status: SHIPPED | OUTPATIENT
Start: 2017-09-25 | End: 2017-12-06 | Stop reason: SDUPTHER

## 2017-10-31 ENCOUNTER — DOCUMENTATION ONLY (OUTPATIENT)
Dept: FAMILY MEDICINE | Facility: CLINIC | Age: 62
End: 2017-10-31

## 2017-10-31 NOTE — PROGRESS NOTES
Pre-Visit Chart Review  For Appointment Scheduled on 11/1/17.      Health Maintenance Due   Topic Date Due    TETANUS VACCINE  01[birth year]01/1975    Zoster Vaccine  01/01/2017    Influenza Vaccine  08/01/2017

## 2017-11-03 ENCOUNTER — DOCUMENTATION ONLY (OUTPATIENT)
Dept: FAMILY MEDICINE | Facility: CLINIC | Age: 62
End: 2017-11-03

## 2017-11-03 NOTE — PROGRESS NOTES
Pre-Visit Chart Review  For Appointment Scheduled on 11/8/17.    Health Maintenance Due   Topic Date Due    TETANUS VACCINE  01/01/1975    Zoster Vaccine  01/01/2017    Influenza Vaccine  08/01/2017

## 2017-11-06 ENCOUNTER — OFFICE VISIT (OUTPATIENT)
Dept: PAIN MEDICINE | Facility: CLINIC | Age: 62
End: 2017-11-06
Payer: MEDICARE

## 2017-11-06 VITALS
SYSTOLIC BLOOD PRESSURE: 145 MMHG | HEART RATE: 72 BPM | WEIGHT: 150 LBS | BODY MASS INDEX: 19.88 KG/M2 | HEIGHT: 73 IN | DIASTOLIC BLOOD PRESSURE: 74 MMHG

## 2017-11-06 DIAGNOSIS — Z79.891 CHRONIC USE OF OPIATE FOR THERAPEUTIC PURPOSE: Primary | ICD-10-CM

## 2017-11-06 DIAGNOSIS — G89.29 CHRONIC ABDOMINAL PAIN: ICD-10-CM

## 2017-11-06 DIAGNOSIS — R10.9 CHRONIC ABDOMINAL PAIN: ICD-10-CM

## 2017-11-06 PROCEDURE — 99214 OFFICE O/P EST MOD 30 MIN: CPT | Mod: S$GLB,,, | Performed by: PHYSICIAN ASSISTANT

## 2017-11-06 PROCEDURE — 99999 PR PBB SHADOW E&M-EST. PATIENT-LVL III: CPT | Mod: PBBFAC,,, | Performed by: PHYSICIAN ASSISTANT

## 2017-11-06 RX ORDER — MEPERIDINE HYDROCHLORIDE 50 MG/1
50 TABLET ORAL EVERY 8 HOURS
Qty: 90 TABLET | Refills: 0 | Status: SHIPPED | OUTPATIENT
Start: 2017-12-12 | End: 2017-11-08 | Stop reason: SDUPTHER

## 2017-11-06 RX ORDER — MEPERIDINE HYDROCHLORIDE 50 MG/1
50 TABLET ORAL EVERY 8 HOURS
Qty: 90 TABLET | Refills: 0 | Status: SHIPPED | OUTPATIENT
Start: 2017-11-13 | End: 2018-01-22 | Stop reason: SDUPTHER

## 2017-11-06 RX ORDER — MEPERIDINE HYDROCHLORIDE 50 MG/1
50 TABLET ORAL EVERY 8 HOURS
Qty: 90 TABLET | Refills: 0 | Status: SHIPPED | OUTPATIENT
Start: 2018-01-10 | End: 2017-11-08 | Stop reason: SDUPTHER

## 2017-11-06 NOTE — PROGRESS NOTES
PCP: Will Albrecht MD      CC: abdominal pain    Interval history: Mr. Cerda is a 62 y.o. male with an extensive history of crohn's disease who presents today for medication refill. He continues to take demerol 50mg q8hrs as needed with moderate benefit given that his condition is stable.  No side effects reported.   Abdominal pain remains stable.   He does report diarrhea at times but no blood stools. Reports anal pain and itching 2/2 chronic diarrhea. OTC Lidocaine 2 %provides some minimal relief.  He has a new c/o right medial foot pain that began a couple of days ago. Compounding cream was too expensive.  He rates his pain 8/10.     Prior HPI:   Mr. Cerda is a 60 year old male with PMH of crohn's disease referred by Dr. Hansen.  Patient states being diagnosed with crohn's disease since the age of 14.    He has had multiple GI surgeries and large bowel and small bowel removals.  During that time, he was being managed by providers in [place].  He was TPN dependent for many years until about two years ago.  He is now stable on an oral diet.  He did not have a primary care physician nor a gastroenterologist for many years.  He is currently trying to establish care.  He has future appointment with Dr. Ch.  He states taking Demerol 50mg q8hrs as needed for pain. It has provided relief of his nausea and pain with diarrhea.  He was last prescribed Demerol in July 2014.  Since then, he has been bearing with the pain.  It is a sharp shooting pain in his mid abdomen.      ROS:  CONSTITUTIONAL: No fevers, chills, night sweats, wt. loss, appetite changes  SKIN: no rashes or itching  ENT: No headaches, head trauma, vision changes, or eye pain  LYMPH NODES: None noticed   CV: No chest pain, palpitations.   RESP: No shortness of breath, dyspnea on exertion, cough, wheezing, or hemoptysis  GI: No nausea, emesis, diarrhea, constipation, melena, hematochezia, pain.    : No dysuria, hematuria, urgency, or  frequency   HEME: No easy bruising, bleeding problems  PSYCHIATRIC: No depression, anxiety, psychosis, hallucinations.  NEURO: No seizures, memory loss, dizziness or difficulty sleeping  MSK: no joint pain      Past Medical History:   Diagnosis Date    Anxiety     Basal cell carcinoma 07/2017    R forehead and R temple    Crohn's disease     age 17    Short bowel syndrome     Vitamin B deficiency     Vitamin D deficiency      Past Surgical History:   Procedure Laterality Date    3 small bowel resections      APPENDECTOMY      CHOLECYSTECTOMY      COLON SURGERY      partial colectomy due to crohns    COLONOSCOPY      COLONOSCOPY N/A 2/14/2017    Procedure: COLONOSCOPY;  Surgeon: Nela Sanchez MD;  Location: [facility] ENDO ([address] [place] [address]);  Service: Endoscopy;  Laterality: N/A;    COLONOSCOPY N/A 3/14/2017    Procedure: COLONOSCOPY;  Surgeon: Nela Sanchez MD;  Location: [facility] ENDO ([address] [place] [address]);  Service: Endoscopy;  Laterality: N/A;  2 days clear liquids before procedure    KNEE SURGERY      SMALL INTESTINE SURGERY      TUBE THORACOTOMY      UPPER GASTROINTESTINAL ENDOSCOPY       Family History   Problem Relation Age of Onset    Diabetes Mother     Stroke Mother     Diabetes Father     Kidney disease Father     Irritable bowel syndrome Cousin     Celiac disease Neg Hx     Cirrhosis Neg Hx     Colon cancer Neg Hx     Colon polyps Neg Hx     Cystic fibrosis Neg Hx     Esophageal cancer Neg Hx     Crohn's disease Neg Hx     Hemochromatosis Neg Hx     Inflammatory bowel disease Neg Hx     Liver cancer Neg Hx     Liver disease Neg Hx     Rectal cancer Neg Hx     Stomach cancer Neg Hx     Ulcerative colitis Neg Hx     Addison's disease Neg Hx     Melanoma Neg Hx     Psoriasis Neg Hx     Lupus Neg Hx     Eczema Neg Hx      Social History     Social History    Marital status:      Spouse name: N/A    Number of children: N/A    Years of education: N/A     Social History Main Topics     "Smoking status: Former Smoker     Packs/day: 1.00     Years: 15.00     Types: Cigarettes     Quit date: 3/10/1995    Smokeless tobacco: Never Used    Alcohol use 1.2 oz/week     1 Cans of beer, 1 Shots of liquor per week      Comment: 1 every 6 -7 months    Drug use: No    Sexual activity: Yes     Other Topics Concern    None     Social History Narrative    Retired          Medications/Allergies: See med card    Vitals:    11/06/17 0925   BP: (!) 145/74   Pulse: 72   Weight: 68 kg (150 lb)   Height: 6' 1" (1.854 m)   PainSc:   8   PainLoc: Abdomen         Physical exam:    GENERAL: A and O x3, the patient appears well groomed and is in no acute distress.  Skin: No rashes or obvious lesions  HEENT: normocephalic, atraumatic  CARDIOVASCULAR:  RRR  LUNGS: non labored breathing  ABDOMEN: soft, nontender. No rebound   UPPER EXTREMITIES: Normal alignment, normal range of motion, no atrophy, no skin changes,  hair growth and nail growth normal and equal bilaterally. No swelling, no tenderness.    LOWER EXTREMITIES:  Normal alignment, normal range of motion, no atrophy, no skin changes,  hair growth and nail growth normal and equal bilaterally. No swelling, no tenderness.    LUMBAR SPINE  Lumbar spine: ROM is full with flexion extension and oblique extension with no increased pain.    Lam's test causes no increased pain on either side.    Supine straight leg raise is negative bilaterally.    Internal and external rotation of the hip causes no increased pain on either side.  Myofascial exam: No tenderness to palpation across lumbar paraspinous muscles.      MENTAL STATUS: normal orientation, speech, language, and fund of knowledge for social situation.  Emotional state appropriate.    CRANIAL NERVES:  II:  PERRL bilaterally,   III,IV,VI: EOMI.    V:  Facial sensation equal bilaterally  VII:  Facial motor function normal.  VIII:  Hearing equal to finger rub bilaterally  IX/X: Gag normal, palate symmetric  XI: "  Shoulder shrug equal, head turn equal  XII:  Tongue midline without fasciculations      MOTOR: Tone and bulk: normal bilateral upper and lower Strength: normal   SENSATION: Light touch and pinprick intact bilaterally  REFLEXES: normal, symmetric, nonbrisk.  Toes down, no clonus. No hoffmans.  GAIT: normal rise, base, steps, and arm swing.        Imaging:  None    Assessment:  Mr. Cerda is a 62 y.o. male with abdominal pain  1. Chronic use of opiate for therapeutic purpose    2. Chronic abdominal pain        Plan:  1. Patient with long history of crohn's disease and chronic abdominal pain.  Continue care with GI.  2. He can continue Demerol as needed for pain.  reviewed.  No signs of aberrant behavior.  Demerol 50mg q8hrs as needed for pain today.  Script given for 3  Months.  3. Continue OTC lidocaine cream  4. F/u 3 months or sooner  All medication management was performed by Dr. Alvin Curry

## 2017-11-08 ENCOUNTER — OFFICE VISIT (OUTPATIENT)
Dept: FAMILY MEDICINE | Facility: CLINIC | Age: 62
End: 2017-11-08
Payer: MEDICARE

## 2017-11-08 ENCOUNTER — HOSPITAL ENCOUNTER (OUTPATIENT)
Dept: RADIOLOGY | Facility: CLINIC | Age: 62
Discharge: HOME | End: 2017-11-08
Attending: FAMILY MEDICINE
Payer: MEDICARE

## 2017-11-08 VITALS
HEART RATE: 69 BPM | WEIGHT: 153 LBS | HEIGHT: 72 IN | TEMPERATURE: 98 F | DIASTOLIC BLOOD PRESSURE: 62 MMHG | SYSTOLIC BLOOD PRESSURE: 113 MMHG | RESPIRATION RATE: 20 BRPM | BODY MASS INDEX: 20.72 KG/M2

## 2017-11-08 DIAGNOSIS — M79.671 RIGHT FOOT PAIN: ICD-10-CM

## 2017-11-08 DIAGNOSIS — K76.0 FATTY LIVER: ICD-10-CM

## 2017-11-08 DIAGNOSIS — E46 PROTEIN MALNUTRITION: ICD-10-CM

## 2017-11-08 DIAGNOSIS — M10.9 GOUT, UNSPECIFIED CAUSE, UNSPECIFIED CHRONICITY, UNSPECIFIED SITE: ICD-10-CM

## 2017-11-08 DIAGNOSIS — K50.90 CROHN'S DISEASE WITHOUT COMPLICATION, UNSPECIFIED GASTROINTESTINAL TRACT LOCATION: Primary | ICD-10-CM

## 2017-11-08 DIAGNOSIS — D61.818 PANCYTOPENIA: ICD-10-CM

## 2017-11-08 DIAGNOSIS — E78.5 DYSLIPIDEMIA: ICD-10-CM

## 2017-11-08 PROCEDURE — 73630 X-RAY EXAM OF FOOT: CPT | Mod: TC,PO,RT

## 2017-11-08 PROCEDURE — 90686 IIV4 VACC NO PRSV 0.5 ML IM: CPT | Mod: S$GLB,,, | Performed by: FAMILY MEDICINE

## 2017-11-08 PROCEDURE — 73630 X-RAY EXAM OF FOOT: CPT | Mod: 26,RT,S$GLB, | Performed by: RADIOLOGY

## 2017-11-08 PROCEDURE — G0008 ADMIN INFLUENZA VIRUS VAC: HCPCS | Mod: 59,S$GLB,, | Performed by: FAMILY MEDICINE

## 2017-11-08 PROCEDURE — 96372 THER/PROPH/DIAG INJ SC/IM: CPT | Mod: S$GLB,,, | Performed by: FAMILY MEDICINE

## 2017-11-08 PROCEDURE — 99999 PR PBB SHADOW E&M-EST. PATIENT-LVL III: CPT | Mod: PBBFAC,,, | Performed by: FAMILY MEDICINE

## 2017-11-08 PROCEDURE — 99499 UNLISTED E&M SERVICE: CPT | Mod: S$GLB,,, | Performed by: FAMILY MEDICINE

## 2017-11-08 PROCEDURE — 99214 OFFICE O/P EST MOD 30 MIN: CPT | Mod: 25,S$GLB,, | Performed by: FAMILY MEDICINE

## 2017-11-08 RX ORDER — KETOROLAC TROMETHAMINE 30 MG/ML
30 INJECTION, SOLUTION INTRAMUSCULAR; INTRAVENOUS ONCE
Status: COMPLETED | OUTPATIENT
Start: 2017-11-08 | End: 2017-11-08

## 2017-11-08 RX ADMIN — KETOROLAC TROMETHAMINE 30 MG: 30 INJECTION, SOLUTION INTRAMUSCULAR; INTRAVENOUS at 11:11

## 2017-11-08 NOTE — PROGRESS NOTES
2 patient identifiers used ( name &  ).  Administered 30 mg Toradol IM RUOQG.  Patient tolerated well.  No bleeding at insertion site noted.  Pain scale 0/10.  Allergies reviewed.    Aseptic technique maintained      2 patient identifiers used ( name &  ).  Administered Flu vaccine IM right deltoid.  Patient tolerated well, no bleeding at insertion site noted.  Pain scale 0/10.  Aseptic technique maintained.  Immunization information given to patient. Advised patient to remain in clinic for 15 minutes to monitor for reaction.  No AR noted.

## 2017-11-08 NOTE — PROGRESS NOTES
Patient, Tristin Cerda (MRN #[id number]), presented with a recent Platelet count less than 150 K/uL consistent with the definition of thrombocytopenia (ICD10 - D69.6).    Platelets   Date Value Ref Range Status   01/23/2017 110 (L) 150 - 350 K/uL Final     The patient's thrombocytopenia was monitored, evaluated, addressed and/or treated. This addendum to the medical record is made on 11/08/2017.

## 2017-11-08 NOTE — PROGRESS NOTES
[facility]  Progress Note    Subjective:       Patient ID: Tristin Cerda is a 62 y.o. male.    Chief Complaint: right foot pain    HPI62 y.o.male is here today complaining of right foot pain.  Patient has pain near his big toe.  Patient has a history of gout.  Patient has not had a gout flare for many years.  Foot pain is made worse with any weightbearing activities.  Patient also has a history of fracture in same foot.  Patient is due for annual labs and flu vaccination today.  No further complaints at today's visit.  Review of Systems   Constitutional: Negative for chills and fever.   HENT: Negative for congestion.    Eyes: Negative for pain.   Respiratory: Negative for shortness of breath and wheezing.    Cardiovascular: Negative for chest pain, palpitations and leg swelling.   Gastrointestinal: Negative for abdominal pain, nausea and vomiting.   Genitourinary: Negative for difficulty urinating.   Musculoskeletal: Positive for arthralgias. Negative for gait problem and myalgias.        Right foot pain   Skin: Negative for rash.   Neurological: Negative for seizures.       Objective:      Vitals:    11/08/17 1117   BP: 113/62   BP Location: Right arm   Patient Position: Sitting   BP Method: Medium (Automatic)   Pulse: 69   Resp: 20   Temp: 98.1 °F (36.7 °C)   TempSrc: Oral   Weight: 69.4 kg (153 lb)   Height: 6' (1.829 m)     Body mass index is 20.75 kg/m².  Physical Exam   Constitutional: He is oriented to person, place, and time. He appears well-developed and well-nourished. No distress.   HENT:   Head: Normocephalic and atraumatic.   Cardiovascular: Normal rate, regular rhythm, normal heart sounds and intact distal pulses.  Exam reveals no gallop and no friction rub.    No murmur heard.  Pulmonary/Chest: Effort normal and breath sounds normal. No respiratory distress. He has no rales.   Abdominal: Soft. He exhibits no distension and no mass. There is no rebound and no guarding. No hernia.    Musculoskeletal: Normal range of motion.   Neurological: He is alert and oriented to person, place, and time.   Skin: Skin is warm.   Psychiatric: He has a normal mood and affect. His behavior is normal. Judgment and thought content normal.   Vitals reviewed.      Assessment:       1. Crohn's disease without complication, unspecified gastrointestinal tract location    2. Dyslipidemia    3. Pancytopenia    4. Fatty liver    5. Protein malnutrition    6. Right foot pain    7. Gout, unspecified cause, unspecified chronicity, unspecified site        Plan:       Crohn's disease without complication, unspecified gastrointestinal tract location  -     CBC auto differential; Future; Expected date: 11/08/2017  -     Comprehensive metabolic panel; Future; Expected date: 11/08/2017    Dyslipidemia  -     Lipid panel; Future; Expected date: 11/08/2017    Pancytopenia        - Follow up cbc    Fatty liver        - Follow up CMP    Protein malnutrition  -     TSH; Future; Expected date: 11/08/2017    Right foot pain  -     X-Ray Foot Complete Right; Future; Expected date: 11/08/2017  -     Uric acid; Future; Expected date: 11/22/2017  -     ketorolac injection 30 mg; Inject 30 mg into the muscle once.    Gout, unspecified cause, unspecified chronicity, unspecified site        - Follow up uric acid     Other orders  -     Influenza - Quadrivalent (3 years & older) (PF)      Return in about 4 weeks (around 12/6/2017).  Will Albrecht MD  [facility]  11/8/2017 12:38 PM

## 2017-11-17 ENCOUNTER — TELEPHONE (OUTPATIENT)
Dept: FAMILY MEDICINE | Facility: CLINIC | Age: 62
End: 2017-11-17

## 2017-11-17 NOTE — TELEPHONE ENCOUNTER
----- Message from Will Albrecht MD sent at 11/16/2017 10:02 AM CST -----  Please call informed patient that all his labs are stable.

## 2017-12-06 ENCOUNTER — DOCUMENTATION ONLY (OUTPATIENT)
Dept: FAMILY MEDICINE | Facility: CLINIC | Age: 62
End: 2017-12-06

## 2017-12-06 NOTE — PROGRESS NOTES
Pre-Visit Chart Review  For Appointment Scheduled on 12/7/17.    Health Maintenance Due   Topic Date Due    TETANUS VACCINE  01/01/1975    Zoster Vaccine  01/01/2017

## 2017-12-07 RX ORDER — DICYCLOMINE HYDROCHLORIDE 20 MG/1
TABLET ORAL
Qty: 360 TABLET | Refills: 0 | Status: SHIPPED | OUTPATIENT
Start: 2017-12-07 | End: 2018-02-09 | Stop reason: SDUPTHER

## 2018-01-22 DIAGNOSIS — K90.829 SHORT BOWEL SYNDROME: ICD-10-CM

## 2018-01-22 DIAGNOSIS — G89.29 CHRONIC ABDOMINAL PAIN: ICD-10-CM

## 2018-01-22 DIAGNOSIS — R10.9 CHRONIC ABDOMINAL PAIN: ICD-10-CM

## 2018-01-22 RX ORDER — MEPERIDINE HYDROCHLORIDE 50 MG/1
50 TABLET ORAL EVERY 8 HOURS
Qty: 90 TABLET | Refills: 0 | Status: SHIPPED | OUTPATIENT
Start: 2018-02-08 | End: 2018-02-28 | Stop reason: SDUPTHER

## 2018-01-22 RX ORDER — MEPERIDINE HYDROCHLORIDE 50 MG/1
50 TABLET ORAL EVERY 8 HOURS
Qty: 90 TABLET | Refills: 0 | Status: SHIPPED | OUTPATIENT
Start: 2018-01-22 | End: 2018-01-22 | Stop reason: SDUPTHER

## 2018-01-22 NOTE — TELEPHONE ENCOUNTER
----- Message from Lupe Hudson sent at 1/22/2018 11:48 AM CST -----  Contact: Patient  Tristin, patient [phone], Calling about getting his Rx's picked up. Please advise. Thanks.

## 2018-01-23 RX ORDER — ONDANSETRON HYDROCHLORIDE 8 MG/1
TABLET, FILM COATED ORAL
Qty: 180 TABLET | Refills: 1 | Status: SHIPPED | OUTPATIENT
Start: 2018-01-23 | End: 2018-06-22 | Stop reason: SDUPTHER

## 2018-02-12 RX ORDER — DICYCLOMINE HYDROCHLORIDE 20 MG/1
TABLET ORAL
Qty: 360 TABLET | Refills: 0 | Status: SHIPPED | OUTPATIENT
Start: 2018-02-12 | End: 2018-05-01 | Stop reason: SDUPTHER

## 2018-02-28 ENCOUNTER — OFFICE VISIT (OUTPATIENT)
Dept: PAIN MEDICINE | Facility: CLINIC | Age: 63
End: 2018-02-28
Payer: MEDICARE

## 2018-02-28 VITALS
WEIGHT: 153 LBS | BODY MASS INDEX: 20.28 KG/M2 | HEART RATE: 75 BPM | SYSTOLIC BLOOD PRESSURE: 140 MMHG | HEIGHT: 73 IN | DIASTOLIC BLOOD PRESSURE: 75 MMHG

## 2018-02-28 DIAGNOSIS — Z79.891 CHRONIC USE OF OPIATE FOR THERAPEUTIC PURPOSE: Primary | ICD-10-CM

## 2018-02-28 DIAGNOSIS — G89.29 CHRONIC ABDOMINAL PAIN: ICD-10-CM

## 2018-02-28 DIAGNOSIS — K50.90 CROHN'S DISEASE WITHOUT COMPLICATION, UNSPECIFIED GASTROINTESTINAL TRACT LOCATION: ICD-10-CM

## 2018-02-28 DIAGNOSIS — R10.9 CHRONIC ABDOMINAL PAIN: ICD-10-CM

## 2018-02-28 PROCEDURE — 99999 PR PBB SHADOW E&M-EST. PATIENT-LVL III: CPT | Mod: PBBFAC,,, | Performed by: PHYSICIAN ASSISTANT

## 2018-02-28 PROCEDURE — 99214 OFFICE O/P EST MOD 30 MIN: CPT | Mod: S$GLB,,, | Performed by: PHYSICIAN ASSISTANT

## 2018-02-28 PROCEDURE — 3008F BODY MASS INDEX DOCD: CPT | Mod: S$GLB,,, | Performed by: PHYSICIAN ASSISTANT

## 2018-02-28 RX ORDER — MEPERIDINE HYDROCHLORIDE 50 MG/1
50 TABLET ORAL EVERY 8 HOURS
Qty: 90 TABLET | Refills: 0 | Status: SHIPPED | OUTPATIENT
Start: 2018-03-09 | End: 2018-04-07

## 2018-02-28 RX ORDER — MEPERIDINE HYDROCHLORIDE 50 MG/1
50 TABLET ORAL EVERY 8 HOURS
Qty: 90 TABLET | Refills: 0 | Status: SHIPPED | OUTPATIENT
Start: 2018-04-07 | End: 2018-03-06 | Stop reason: SDUPTHER

## 2018-02-28 RX ORDER — MEPERIDINE HYDROCHLORIDE 50 MG/1
50 TABLET ORAL EVERY 8 HOURS
Qty: 90 TABLET | Refills: 0 | Status: SHIPPED | OUTPATIENT
Start: 2018-05-06 | End: 2018-05-23 | Stop reason: SDUPTHER

## 2018-02-28 NOTE — PROGRESS NOTES
PCP: Will Albrecht MD      CC: abdominal pain    Interval history: Mr. Cerda is a 63 y.o. male with an extensive history of crohn's disease who presents today for medication refill. He continues to take demerol 50mg q8hrs as needed with moderate benefit given that his condition is stable.  No side effects reported.   Abdominal pain remains stable.   He does report diarrhea at times but no blood stools. Reports anal pain and itching 2/2 chronic diarrhea. OTC Lidocaine 2 %provides some minimal relief.  He has a new c/o right medial foot pain that began a couple of days ago. Compounding cream was too expensive.  He rates his pain 8/10.     Prior HPI:   Mr. Cerda is a 60 year old male with PMH of crohn's disease referred by Dr. Hansen.  Patient states being diagnosed with crohn's disease since the age of 14.    He has had multiple GI surgeries and large bowel and small bowel removals.  During that time, he was being managed by providers in [place].  He was TPN dependent for many years until about two years ago.  He is now stable on an oral diet.  He did not have a primary care physician nor a gastroenterologist for many years.  He is currently trying to establish care.  He has future appointment with Dr. Ch.  He states taking Demerol 50mg q8hrs as needed for pain. It has provided relief of his nausea and pain with diarrhea.  He was last prescribed Demerol in July 2014.  Since then, he has been bearing with the pain.  It is a sharp shooting pain in his mid abdomen.      ROS:  CONSTITUTIONAL: No fevers, chills, night sweats, wt. loss, appetite changes  SKIN: no rashes or itching  ENT: No headaches, head trauma, vision changes, or eye pain  LYMPH NODES: None noticed   CV: No chest pain, palpitations.   RESP: No shortness of breath, dyspnea on exertion, cough, wheezing, or hemoptysis  GI: No nausea, emesis, diarrhea, constipation, melena, hematochezia, pain.    : No dysuria, hematuria, urgency, or  frequency   HEME: No easy bruising, bleeding problems  PSYCHIATRIC: No depression, anxiety, psychosis, hallucinations.  NEURO: No seizures, memory loss, dizziness or difficulty sleeping  MSK: no joint pain      Past Medical History:   Diagnosis Date    Anxiety     Basal cell carcinoma 07/2017    R forehead and R temple    Crohn's disease     age 17    Short bowel syndrome     Vitamin B deficiency     Vitamin D deficiency      Past Surgical History:   Procedure Laterality Date    3 small bowel resections      APPENDECTOMY      CHOLECYSTECTOMY      COLON SURGERY      partial colectomy due to crohns    COLONOSCOPY      COLONOSCOPY N/A 2/14/2017    Procedure: COLONOSCOPY;  Surgeon: Nela Sanchez MD;  Location: [facility] ENDO ([address] [place] [address]);  Service: Endoscopy;  Laterality: N/A;    COLONOSCOPY N/A 3/14/2017    Procedure: COLONOSCOPY;  Surgeon: Nela Sanchez MD;  Location: [facility] ENDO ([address] [place] [address]);  Service: Endoscopy;  Laterality: N/A;  2 days clear liquids before procedure    KNEE SURGERY      SMALL INTESTINE SURGERY      TUBE THORACOTOMY      UPPER GASTROINTESTINAL ENDOSCOPY       Family History   Problem Relation Age of Onset    Diabetes Mother     Stroke Mother     Diabetes Father     Kidney disease Father     Irritable bowel syndrome Cousin     Celiac disease Neg Hx     Cirrhosis Neg Hx     Colon cancer Neg Hx     Colon polyps Neg Hx     Cystic fibrosis Neg Hx     Esophageal cancer Neg Hx     Crohn's disease Neg Hx     Hemochromatosis Neg Hx     Inflammatory bowel disease Neg Hx     Liver cancer Neg Hx     Liver disease Neg Hx     Rectal cancer Neg Hx     Stomach cancer Neg Hx     Ulcerative colitis Neg Hx     Addison's disease Neg Hx     Melanoma Neg Hx     Psoriasis Neg Hx     Lupus Neg Hx     Eczema Neg Hx      Social History     Social History    Marital status:      Spouse name: N/A    Number of children: N/A    Years of education: N/A     Social History Main Topics     "Smoking status: Former Smoker     Packs/day: 1.00     Years: 15.00     Types: Cigarettes     Quit date: 3/10/1995    Smokeless tobacco: Never Used    Alcohol use 1.2 oz/week     1 Cans of beer, 1 Shots of liquor per week      Comment: 1 every 6 -7 months    Drug use: No    Sexual activity: Yes     Other Topics Concern    None     Social History Narrative    Retired          Medications/Allergies: See med card    Vitals:    02/28/18 0859   BP: (!) 140/75   Pulse: 75   Weight: 69.4 kg (153 lb)   Height: 6' 1" (1.854 m)   PainSc:   4   PainLoc: Abdomen         Physical exam:    GENERAL: A and O x3, the patient appears well groomed and is in no acute distress.  Skin: No rashes or obvious lesions  HEENT: normocephalic, atraumatic  CARDIOVASCULAR:  RRR  LUNGS: non labored breathing  ABDOMEN: soft, nontender. No rebound   UPPER EXTREMITIES: Normal alignment, normal range of motion, no atrophy, no skin changes,  hair growth and nail growth normal and equal bilaterally. No swelling, no tenderness.    LOWER EXTREMITIES:  Normal alignment, normal range of motion, no atrophy, no skin changes,  hair growth and nail growth normal and equal bilaterally. No swelling, no tenderness.    LUMBAR SPINE  Lumbar spine: ROM is full with flexion extension and oblique extension with no increased pain.    Lam's test causes no increased pain on either side.    Supine straight leg raise is negative bilaterally.    Internal and external rotation of the hip causes no increased pain on either side.  Myofascial exam: No tenderness to palpation across lumbar paraspinous muscles.      MENTAL STATUS: normal orientation, speech, language, and fund of knowledge for social situation.  Emotional state appropriate.    CRANIAL NERVES:  II:  PERRL bilaterally,   III,IV,VI: EOMI.    V:  Facial sensation equal bilaterally  VII:  Facial motor function normal.  VIII:  Hearing equal to finger rub bilaterally  IX/X: Gag normal, palate symmetric  XI: "  Shoulder shrug equal, head turn equal  XII:  Tongue midline without fasciculations      MOTOR: Tone and bulk: normal bilateral upper and lower Strength: normal   SENSATION: Light touch and pinprick intact bilaterally  REFLEXES: normal, symmetric, nonbrisk.  Toes down, no clonus. No hoffmans.  GAIT: normal rise, base, steps, and arm swing.      Imaging:  None    Assessment:  Mr. Cerda is a 63 y.o. male with abdominal pain  1. Chronic use of opiate for therapeutic purpose    2. Chronic abdominal pain    3. Crohn's disease without complication, unspecified gastrointestinal tract location        Plan:  1. Patient with long history of crohn's disease and chronic abdominal pain.  Continue care with GI.  2. He can continue Demerol as needed for pain.  reviewed.  No signs of aberrant behavior.  Demerol 50mg q8hrs as needed for pain today.  Script given for 3  Months.  3. Continue OTC lidocaine cream  4. F/u 3 months or sooner  All medication management was performed by Dr. Alvin Crury

## 2018-02-28 NOTE — PROGRESS NOTES
PCP: Will Albrecht MD      CC: abdominal pain    Interval history: Mr. Cerda is a 63 y.o. male with an extensive history of crohn's disease who presents today for medication refill. He continues to take demerol 50mg q8hrs as needed with moderate benefit given that his condition is stable.  No side effects reported.   Abdominal pain remains stable.   He does report diarrhea at times but no blood stools. Reports anal pain and itching 2/2 chronic diarrhea. OTC Lidocaine 2 %provides some minimal relief.  He has a new c/o right medial foot pain that began a couple of days ago. Compounding cream was too expensive.  He rates his pain 4/10.     Prior HPI:   Mr. Cerda is a 60 year old male with PMH of crohn's disease referred by Dr. Hansen.  Patient states being diagnosed with crohn's disease since the age of 14.    He has had multiple GI surgeries and large bowel and small bowel removals.  During that time, he was being managed by providers in [place].  He was TPN dependent for many years until about two years ago.  He is now stable on an oral diet.  He did not have a primary care physician nor a gastroenterologist for many years.  He is currently trying to establish care.  He has future appointment with Dr. Ch.  He states taking Demerol 50mg q8hrs as needed for pain. It has provided relief of his nausea and pain with diarrhea.  He was last prescribed Demerol in July 2014.  Since then, he has been bearing with the pain.  It is a sharp shooting pain in his mid abdomen.      ROS:  CONSTITUTIONAL: No fevers, chills, night sweats, wt. loss, appetite changes  SKIN: no rashes or itching  ENT: No headaches, head trauma, vision changes, or eye pain  LYMPH NODES: None noticed   CV: No chest pain, palpitations.   RESP: No shortness of breath, dyspnea on exertion, cough, wheezing, or hemoptysis  GI: No nausea, emesis, diarrhea, constipation, melena, hematochezia, pain.    : No dysuria, hematuria, urgency, or  frequency   HEME: No easy bruising, bleeding problems  PSYCHIATRIC: No depression, anxiety, psychosis, hallucinations.  NEURO: No seizures, memory loss, dizziness or difficulty sleeping  MSK: no joint pain      Past Medical History:   Diagnosis Date    Anxiety     Basal cell carcinoma 07/2017    R forehead and R temple    Crohn's disease     age 17    Short bowel syndrome     Vitamin B deficiency     Vitamin D deficiency      Past Surgical History:   Procedure Laterality Date    3 small bowel resections      APPENDECTOMY      CHOLECYSTECTOMY      COLON SURGERY      partial colectomy due to crohns    COLONOSCOPY      COLONOSCOPY N/A 2/14/2017    Procedure: COLONOSCOPY;  Surgeon: Nela Sanchez MD;  Location: [facility] ([address] [place] [address]);  Service: Endoscopy;  Laterality: N/A;    COLONOSCOPY N/A 3/14/2017    Procedure: COLONOSCOPY;  Surgeon: Nela Sanchez MD;  Location: [facility] ([address] [place] [address]);  Service: Endoscopy;  Laterality: N/A;  2 days clear liquids before procedure    KNEE SURGERY      SMALL INTESTINE SURGERY      TUBE THORACOTOMY      UPPER GASTROINTESTINAL ENDOSCOPY       Family History   Problem Relation Age of Onset    Diabetes Mother     Stroke Mother     Diabetes Father     Kidney disease Father     Irritable bowel syndrome Cousin     Celiac disease Neg Hx     Cirrhosis Neg Hx     Colon cancer Neg Hx     Colon polyps Neg Hx     Cystic fibrosis Neg Hx     Esophageal cancer Neg Hx     Crohn's disease Neg Hx     Hemochromatosis Neg Hx     Inflammatory bowel disease Neg Hx     Liver cancer Neg Hx     Liver disease Neg Hx     Rectal cancer Neg Hx     Stomach cancer Neg Hx     Ulcerative colitis Neg Hx     Addison's disease Neg Hx     Melanoma Neg Hx     Psoriasis Neg Hx     Lupus Neg Hx     Eczema Neg Hx      Social History     Social History    Marital status:      Spouse name: N/A    Number of children: N/A    Years of education: N/A     Social History Main Topics     "Smoking status: Former Smoker     Packs/day: 1.00     Years: 15.00     Types: Cigarettes     Quit date: 3/10/1995    Smokeless tobacco: Never Used    Alcohol use 1.2 oz/week     1 Cans of beer, 1 Shots of liquor per week      Comment: 1 every 6 -7 months    Drug use: No    Sexual activity: Yes     Other Topics Concern    None     Social History Narrative    Retired          Medications/Allergies: See med card    Vitals:    02/28/18 0859   BP: (!) 140/75   Pulse: 75   Weight: 69.4 kg (153 lb)   Height: 6' 1" (1.854 m)   PainSc:   4   PainLoc: Abdomen         Physical exam:    GENERAL: A and O x3, the patient appears well groomed and is in no acute distress.  Skin: No rashes or obvious lesions  HEENT: normocephalic, atraumatic  CARDIOVASCULAR:  RRR  LUNGS: non labored breathing  ABDOMEN: soft, nontender. No rebound   UPPER EXTREMITIES: Normal alignment, normal range of motion, no atrophy, no skin changes,  hair growth and nail growth normal and equal bilaterally. No swelling, no tenderness.    LOWER EXTREMITIES:  Normal alignment, normal range of motion, no atrophy, no skin changes,  hair growth and nail growth normal and equal bilaterally. No swelling, no tenderness.    LUMBAR SPINE  Lumbar spine: ROM is full with flexion extension and oblique extension with no increased pain.    Lam's test causes no increased pain on either side.    Supine straight leg raise is negative bilaterally.    Internal and external rotation of the hip causes no increased pain on either side.  Myofascial exam: No tenderness to palpation across lumbar paraspinous muscles.      MENTAL STATUS: normal orientation, speech, language, and fund of knowledge for social situation.  Emotional state appropriate.    CRANIAL NERVES:  II:  PERRL bilaterally,   III,IV,VI: EOMI.    V:  Facial sensation equal bilaterally  VII:  Facial motor function normal.  VIII:  Hearing equal to finger rub bilaterally  IX/X: Gag normal, palate symmetric  XI: "  Shoulder shrug equal, head turn equal  XII:  Tongue midline without fasciculations      MOTOR: Tone and bulk: normal bilateral upper and lower Strength: normal   SENSATION: Light touch and pinprick intact bilaterally  REFLEXES: normal, symmetric, nonbrisk.  Toes down, no clonus. No hoffmans.  GAIT: normal rise, base, steps, and arm swing.      Imaging:  None    Assessment:  Mr. Cerda is a 63 y.o. male with abdominal pain  1. Chronic use of opiate for therapeutic purpose    2. Chronic abdominal pain    3. Crohn's disease without complication, unspecified gastrointestinal tract location        Plan:  1. Patient with long history of crohn's disease and chronic abdominal pain.  Continue care with GI.  2. He can continue Demerol as needed for pain.  reviewed.  No signs of aberrant behavior.  Demerol 50mg q8hrs as needed for pain today.  Script given for 3  Months.  3. Continue OTC lidocaine cream  4. F/u 3 months or sooner  All medication management was performed by Dr. Alvin Curry

## 2018-03-06 ENCOUNTER — LAB VISIT (OUTPATIENT)
Dept: LAB | Facility: HOSPITAL | Age: 63
End: 2018-03-06
Attending: FAMILY MEDICINE
Payer: MEDICARE

## 2018-03-06 ENCOUNTER — OFFICE VISIT (OUTPATIENT)
Dept: FAMILY MEDICINE | Facility: CLINIC | Age: 63
End: 2018-03-06
Payer: MEDICARE

## 2018-03-06 VITALS
TEMPERATURE: 98 F | BODY MASS INDEX: 20.34 KG/M2 | WEIGHT: 153.44 LBS | HEIGHT: 73 IN | SYSTOLIC BLOOD PRESSURE: 124 MMHG | HEART RATE: 69 BPM | DIASTOLIC BLOOD PRESSURE: 68 MMHG

## 2018-03-06 DIAGNOSIS — D64.9 ANEMIA, UNSPECIFIED TYPE: ICD-10-CM

## 2018-03-06 DIAGNOSIS — K76.0 FATTY LIVER: ICD-10-CM

## 2018-03-06 DIAGNOSIS — R74.8 ELEVATED ALKALINE PHOSPHATASE LEVEL: ICD-10-CM

## 2018-03-06 DIAGNOSIS — R74.8 ELEVATED ALKALINE PHOSPHATASE LEVEL: Primary | ICD-10-CM

## 2018-03-06 DIAGNOSIS — L57.0 ACTINIC KERATOSIS: ICD-10-CM

## 2018-03-06 DIAGNOSIS — S00.31XA ABRASION OF NOSE, INITIAL ENCOUNTER: ICD-10-CM

## 2018-03-06 LAB
BASOPHILS # BLD AUTO: 0.01 K/UL
BASOPHILS NFR BLD: 0.3 %
DIFFERENTIAL METHOD: ABNORMAL
EOSINOPHIL # BLD AUTO: 0.1 K/UL
EOSINOPHIL NFR BLD: 2 %
ERYTHROCYTE [DISTWIDTH] IN BLOOD BY AUTOMATED COUNT: 13.9 %
GGT SERPL-CCNC: 70 U/L
HCT VFR BLD AUTO: 37.6 %
HGB BLD-MCNC: 12.5 G/DL
IMM GRANULOCYTES # BLD AUTO: 0.01 K/UL
IMM GRANULOCYTES NFR BLD AUTO: 0.3 %
LYMPHOCYTES # BLD AUTO: 0.7 K/UL
LYMPHOCYTES NFR BLD: 19.1 %
MCH RBC QN AUTO: 29.3 PG
MCHC RBC AUTO-ENTMCNC: 33.2 G/DL
MCV RBC AUTO: 88 FL
MONOCYTES # BLD AUTO: 0.4 K/UL
MONOCYTES NFR BLD: 10 %
NEUTROPHILS # BLD AUTO: 2.4 K/UL
NEUTROPHILS NFR BLD: 68.3 %
NRBC BLD-RTO: 0 /100 WBC
PLATELET # BLD AUTO: 126 K/UL
PMV BLD AUTO: 10.9 FL
RBC # BLD AUTO: 4.26 M/UL
WBC # BLD AUTO: 3.5 K/UL

## 2018-03-06 PROCEDURE — 85025 COMPLETE CBC W/AUTO DIFF WBC: CPT

## 2018-03-06 PROCEDURE — 99214 OFFICE O/P EST MOD 30 MIN: CPT | Mod: S$GLB,,, | Performed by: FAMILY MEDICINE

## 2018-03-06 PROCEDURE — 99999 PR PBB SHADOW E&M-EST. PATIENT-LVL IV: CPT | Mod: PBBFAC,,, | Performed by: FAMILY MEDICINE

## 2018-03-06 PROCEDURE — 36415 COLL VENOUS BLD VENIPUNCTURE: CPT | Mod: PO

## 2018-03-06 PROCEDURE — 82977 ASSAY OF GGT: CPT

## 2018-03-06 RX ORDER — MUPIROCIN 20 MG/G
OINTMENT TOPICAL 3 TIMES DAILY
Qty: 30 G | Refills: 0 | Status: SHIPPED | OUTPATIENT
Start: 2018-03-06 | End: 2019-07-29 | Stop reason: ALTCHOICE

## 2018-03-06 NOTE — PROGRESS NOTES
"[facility]  Progress Note    Subjective:       Patient ID: Tristin Cerda is a 63 y.o. male.    Chief Complaint: sores in nose    HPI63 y.o.male is here today complaining of sores in his nose.  Patient has taking Claritin on a daily basis.  Patient was also prescribed oral antibiotics another provider to help heal the sores.  Antibiotics did help resolve some of the sores but there are still some that have not healed completely.  All other medical conditions are currently stable.  Patient has a history of elevated alkaline phosphatase which has not been worked up.  Other complaints at today's visit.   Review of Systems   HENT:        Sores in nose       Objective:      Vitals:    03/06/18 0855   BP: 124/68   BP Location: Right arm   Patient Position: Sitting   BP Method: Medium (Automatic)   Pulse: 69   Temp: 98 °F (36.7 °C)   TempSrc: Oral   Weight: 69.6 kg (153 lb 7 oz)   Height: 6' 1" (1.854 m)     Body mass index is 20.24 kg/m².  Physical Exam   Constitutional: He is oriented to person, place, and time. He appears well-developed and well-nourished. No distress.   HENT:   Head: Normocephalic and atraumatic.   Cardiovascular: Normal rate, regular rhythm, normal heart sounds and intact distal pulses.  Exam reveals no gallop and no friction rub.    No murmur heard.  Pulmonary/Chest: Effort normal and breath sounds normal. No respiratory distress. He has no rales.   Abdominal: Soft. He exhibits no distension and no mass. There is no rebound and no guarding. No hernia.   Musculoskeletal: Normal range of motion.   Neurological: He is alert and oriented to person, place, and time.   Skin: Skin is warm.   Psychiatric: He has a normal mood and affect. His behavior is normal. Judgment and thought content normal.   Vitals reviewed.      Assessment:       1. Elevated alkaline phosphatase level    2. Fatty liver    3. Anemia, unspecified type    4. Abrasion of nose, initial encounter    5. Actinic keratosis  "       Plan:       Elevated alkaline phosphatase level  -     US Abdomen Complete; Future; Expected date: 03/06/2018  -     Gamma GT; Future; Expected date: 03/06/2018    Fatty liver        -  Stable continue to monitor     Anemia, unspecified type  -     CBC auto differential; Future; Expected date: 03/06/2018    Abrasion of nose, initial encounter  -     mupirocin (BACTROBAN) 2 % ointment; Apply topically 3 (three) times daily.  Dispense: 30 g; Refill: 0    Actinic keratosis  -     Ambulatory referral to Dermatology      Follow-up in about 3 months (around 6/6/2018).  Will Albrecht MD  [facility]  3/6/2018 12:58 PM

## 2018-03-07 ENCOUNTER — HOSPITAL ENCOUNTER (OUTPATIENT)
Dept: RADIOLOGY | Facility: CLINIC | Age: 63
Discharge: HOME | End: 2018-03-07
Attending: FAMILY MEDICINE
Payer: MEDICARE

## 2018-03-07 DIAGNOSIS — R74.8 ELEVATED ALKALINE PHOSPHATASE LEVEL: ICD-10-CM

## 2018-03-07 PROCEDURE — 76700 US EXAM ABDOM COMPLETE: CPT | Mod: 26,,, | Performed by: RADIOLOGY

## 2018-03-07 PROCEDURE — 76700 US EXAM ABDOM COMPLETE: CPT | Mod: TC,PO

## 2018-05-01 ENCOUNTER — TELEPHONE (OUTPATIENT)
Dept: FAMILY MEDICINE | Facility: CLINIC | Age: 63
End: 2018-05-01

## 2018-05-01 RX ORDER — DICYCLOMINE HYDROCHLORIDE 20 MG/1
TABLET ORAL
Qty: 360 TABLET | Refills: 0 | Status: SHIPPED | OUTPATIENT
Start: 2018-05-01 | End: 2018-07-30 | Stop reason: SDUPTHER

## 2018-05-01 NOTE — TELEPHONE ENCOUNTER
Attempted to contact patient to reschedule appointment on 5/25/18. Dr Albrecht not in clinic on 5/25/18.  No answer and VM not set up.

## 2018-05-03 ENCOUNTER — TELEPHONE (OUTPATIENT)
Dept: FAMILY MEDICINE | Facility: CLINIC | Age: 63
End: 2018-05-03

## 2018-05-03 NOTE — TELEPHONE ENCOUNTER
Second attempt to contact patient to reschedule May 25 appointment. Provider out of clinic.  No VM set up could not leave message.

## 2018-05-23 ENCOUNTER — OFFICE VISIT (OUTPATIENT)
Dept: PAIN MEDICINE | Facility: CLINIC | Age: 63
End: 2018-05-23
Payer: MEDICARE

## 2018-05-23 VITALS
DIASTOLIC BLOOD PRESSURE: 77 MMHG | BODY MASS INDEX: 20.28 KG/M2 | HEART RATE: 74 BPM | HEIGHT: 73 IN | SYSTOLIC BLOOD PRESSURE: 141 MMHG | WEIGHT: 153 LBS

## 2018-05-23 DIAGNOSIS — Z79.891 CHRONIC USE OF OPIATE FOR THERAPEUTIC PURPOSE: Primary | ICD-10-CM

## 2018-05-23 DIAGNOSIS — R10.9 CHRONIC ABDOMINAL PAIN: ICD-10-CM

## 2018-05-23 DIAGNOSIS — K50.90 CROHN'S DISEASE WITHOUT COMPLICATION, UNSPECIFIED GASTROINTESTINAL TRACT LOCATION: ICD-10-CM

## 2018-05-23 DIAGNOSIS — G89.29 CHRONIC ABDOMINAL PAIN: ICD-10-CM

## 2018-05-23 PROCEDURE — 99999 PR PBB SHADOW E&M-EST. PATIENT-LVL IV: CPT | Mod: PBBFAC,,, | Performed by: PHYSICIAN ASSISTANT

## 2018-05-23 PROCEDURE — 3008F BODY MASS INDEX DOCD: CPT | Mod: CPTII,S$GLB,, | Performed by: PHYSICIAN ASSISTANT

## 2018-05-23 PROCEDURE — 99214 OFFICE O/P EST MOD 30 MIN: CPT | Mod: S$GLB,,, | Performed by: PHYSICIAN ASSISTANT

## 2018-05-23 RX ORDER — MEPERIDINE HYDROCHLORIDE 50 MG/1
50 TABLET ORAL EVERY 8 HOURS
Qty: 90 TABLET | Refills: 0 | Status: SHIPPED | OUTPATIENT
Start: 2018-08-01 | End: 2018-08-30

## 2018-05-23 RX ORDER — MEPERIDINE HYDROCHLORIDE 50 MG/1
50 TABLET ORAL EVERY 8 HOURS
Qty: 90 TABLET | Refills: 0 | Status: SHIPPED | OUTPATIENT
Start: 2018-07-03 | End: 2018-08-01

## 2018-05-23 RX ORDER — MEPERIDINE HYDROCHLORIDE 50 MG/1
50 TABLET ORAL EVERY 8 HOURS
Qty: 90 TABLET | Refills: 0 | Status: SHIPPED | OUTPATIENT
Start: 2018-06-04 | End: 2018-07-03

## 2018-05-23 NOTE — PROGRESS NOTES
PCP: Will Albrecht MD      CC: abdominal pain    Interval history: Mr. Cerda is a 63 y.o. male with an extensive history of crohn's disease who presents today for medication refill. He continues to take Demerol 50mg q8hrs as needed with moderate benefit given that his condition is stable.  No side effects reported.   Abdominal pain remains stable.   He does report diarrhea at times but no blood stools. Reports anal pain and itching 2/2 chronic diarrhea. OTC Lidocaine 2 % provides some minimal relief.  He has a new c/o right medial foot pain that began a couple of days ago. Compounding cream was too expensive.  He rates his pain 4/10.     Prior HPI:   Mr. Cerda is a 60 year old male with PMH of crohn's disease referred by Dr. Hansen.  Patient states being diagnosed with crohn's disease since the age of 14.    He has had multiple GI surgeries and large bowel and small bowel removals.  During that time, he was being managed by providers in [place].  He was TPN dependent for many years until about two years ago.  He is now stable on an oral diet.  He did not have a primary care physician nor a gastroenterologist for many years.  He is currently trying to establish care.  He has future appointment with Dr. Ch.  He states taking Demerol 50mg q8hrs as needed for pain. It has provided relief of his nausea and pain with diarrhea.  He was last prescribed Demerol in July 2014.  Since then, he has been bearing with the pain.  It is a sharp shooting pain in his mid abdomen.      ROS:  CONSTITUTIONAL: No fevers, chills, night sweats, wt. loss, appetite changes  SKIN: no rashes or itching  ENT: No headaches, head trauma, vision changes, or eye pain  LYMPH NODES: None noticed   CV: No chest pain, palpitations.   RESP: No shortness of breath, dyspnea on exertion, cough, wheezing, or hemoptysis  GI: No nausea, emesis, diarrhea, constipation, melena, hematochezia, pain.    : No dysuria, hematuria, urgency, or  frequency   HEME: No easy bruising, bleeding problems  PSYCHIATRIC: No depression, anxiety, psychosis, hallucinations.  NEURO: No seizures, memory loss, dizziness or difficulty sleeping  MSK: no joint pain      Past Medical History:   Diagnosis Date    Anxiety     Basal cell carcinoma 07/2017    R forehead and R temple    Crohn's disease     age 17    Short bowel syndrome     Vitamin B deficiency     Vitamin D deficiency      Past Surgical History:   Procedure Laterality Date    3 small bowel resections      APPENDECTOMY      CHOLECYSTECTOMY      COLON SURGERY      partial colectomy due to crohns    COLONOSCOPY      COLONOSCOPY N/A 2/14/2017    Procedure: COLONOSCOPY;  Surgeon: Nela Sanchez MD;  Location: [facility] ([address] [place] [address]);  Service: Endoscopy;  Laterality: N/A;    COLONOSCOPY N/A 3/14/2017    Procedure: COLONOSCOPY;  Surgeon: Nela Sanchez MD;  Location: [facility] ([address] [place] [address]);  Service: Endoscopy;  Laterality: N/A;  2 days clear liquids before procedure    KNEE SURGERY      SMALL INTESTINE SURGERY      TUBE THORACOTOMY      UPPER GASTROINTESTINAL ENDOSCOPY       Family History   Problem Relation Age of Onset    Diabetes Mother     Stroke Mother     Diabetes Father     Kidney disease Father     Irritable bowel syndrome Cousin     Celiac disease Neg Hx     Cirrhosis Neg Hx     Colon cancer Neg Hx     Colon polyps Neg Hx     Cystic fibrosis Neg Hx     Esophageal cancer Neg Hx     Crohn's disease Neg Hx     Hemochromatosis Neg Hx     Inflammatory bowel disease Neg Hx     Liver cancer Neg Hx     Liver disease Neg Hx     Rectal cancer Neg Hx     Stomach cancer Neg Hx     Ulcerative colitis Neg Hx     Addison's disease Neg Hx     Melanoma Neg Hx     Psoriasis Neg Hx     Lupus Neg Hx     Eczema Neg Hx      Social History     Social History    Marital status:      Spouse name: N/A    Number of children: N/A    Years of education: N/A     Social History Main Topics     "Smoking status: Former Smoker     Packs/day: 1.00     Years: 15.00     Types: Cigarettes     Quit date: 3/10/1995    Smokeless tobacco: Never Used    Alcohol use 1.2 oz/week     1 Cans of beer, 1 Shots of liquor per week      Comment: 1 every 6 -7 months    Drug use: No    Sexual activity: Yes     Other Topics Concern    None     Social History Narrative    Retired      Medications/Allergies: See med card    Vitals:    05/23/18 0900   BP: (!) 141/77   Pulse: 74   Weight: 69.4 kg (153 lb)   Height: 6' 1" (1.854 m)   PainSc:   4   PainLoc: Abdomen     Physical exam:    GENERAL: A and O x3, the patient appears well groomed and is in no acute distress.  Skin: No rashes or obvious lesions  HEENT: normocephalic, atraumatic  CARDIOVASCULAR:  RRR  LUNGS: non labored breathing  ABDOMEN: soft, nontender. No rebound   UPPER EXTREMITIES: Normal alignment, normal range of motion, no atrophy, no skin changes,  hair growth and nail growth normal and equal bilaterally. No swelling, no tenderness.    LOWER EXTREMITIES:  Normal alignment, normal range of motion, no atrophy, no skin changes,  hair growth and nail growth normal and equal bilaterally. No swelling, no tenderness.    LUMBAR SPINE  Lumbar spine: ROM is full with flexion extension and oblique extension with no increased pain.    Lam's test causes no increased pain on either side.    Supine straight leg raise is negative bilaterally.    Internal and external rotation of the hip causes no increased pain on either side.  Myofascial exam: No tenderness to palpation across lumbar paraspinous muscles.    MENTAL STATUS: normal orientation, speech, language, and fund of knowledge for social situation.  Emotional state appropriate.    CRANIAL NERVES:  II:  PERRL bilaterally,   III,IV,VI: EOMI.    V:  Facial sensation equal bilaterally  VII:  Facial motor function normal.  VIII:  Hearing equal to finger rub bilaterally  IX/X: Gag normal, palate symmetric  XI:  Shoulder shrug " equal, head turn equal  XII:  Tongue midline without fasciculations    MOTOR: Tone and bulk: normal bilateral upper and lower Strength: normal   SENSATION: Light touch and pinprick intact bilaterally  REFLEXES: normal, symmetric, nonbrisk.  Toes down, no clonus. No hoffmans.  GAIT: normal rise, base, steps, and arm swing.      Imaging:  None    Assessment:  Mr. Cerda is a 63 y.o. male with abdominal pain  1. Chronic use of opiate for therapeutic purpose    2. Chronic abdominal pain    3. Crohn's disease without complication, unspecified gastrointestinal tract location      Plan:  1. Patient with long history of crohn's disease and chronic abdominal pain.  Continue care with GI.  2. He can continue Demerol as needed for pain.  reviewed.  No signs of aberrant behavior.  Demerol 50mg q8hrs as needed for pain today.  Script given for 3  Months.  3. Continue OTC lidocaine cream  4. F/u 3 months or sooner  All medication management was performed by Dr. Alvin Curry

## 2018-06-22 DIAGNOSIS — R10.9 CHRONIC ABDOMINAL PAIN: ICD-10-CM

## 2018-06-22 DIAGNOSIS — K90.829 SHORT BOWEL SYNDROME: ICD-10-CM

## 2018-06-22 DIAGNOSIS — G89.29 CHRONIC ABDOMINAL PAIN: ICD-10-CM

## 2018-07-06 RX ORDER — ONDANSETRON HYDROCHLORIDE 8 MG/1
TABLET, FILM COATED ORAL
Qty: 180 TABLET | Refills: 1 | Status: SHIPPED | OUTPATIENT
Start: 2018-07-06 | End: 2018-12-26 | Stop reason: SDUPTHER

## 2018-07-30 RX ORDER — DICYCLOMINE HYDROCHLORIDE 20 MG/1
TABLET ORAL
Qty: 360 TABLET | Refills: 0 | Status: SHIPPED | OUTPATIENT
Start: 2018-07-30 | End: 2018-10-31 | Stop reason: SDUPTHER

## 2018-08-29 ENCOUNTER — OFFICE VISIT (OUTPATIENT)
Dept: PAIN MEDICINE | Facility: CLINIC | Age: 63
End: 2018-08-29
Payer: MEDICARE

## 2018-08-29 VITALS
HEIGHT: 73 IN | DIASTOLIC BLOOD PRESSURE: 70 MMHG | BODY MASS INDEX: 20.28 KG/M2 | WEIGHT: 153 LBS | HEART RATE: 69 BPM | SYSTOLIC BLOOD PRESSURE: 121 MMHG

## 2018-08-29 DIAGNOSIS — R10.9 CHRONIC ABDOMINAL PAIN: Primary | ICD-10-CM

## 2018-08-29 DIAGNOSIS — K50.90 CROHN'S DISEASE WITHOUT COMPLICATION, UNSPECIFIED GASTROINTESTINAL TRACT LOCATION: ICD-10-CM

## 2018-08-29 DIAGNOSIS — G89.29 CHRONIC ABDOMINAL PAIN: Primary | ICD-10-CM

## 2018-08-29 PROCEDURE — 3008F BODY MASS INDEX DOCD: CPT | Mod: CPTII,S$GLB,, | Performed by: PHYSICIAN ASSISTANT

## 2018-08-29 PROCEDURE — 99999 PR PBB SHADOW E&M-EST. PATIENT-LVL III: CPT | Mod: PBBFAC,,, | Performed by: PHYSICIAN ASSISTANT

## 2018-08-29 PROCEDURE — 99214 OFFICE O/P EST MOD 30 MIN: CPT | Mod: S$GLB,,, | Performed by: PHYSICIAN ASSISTANT

## 2018-08-29 RX ORDER — ACETAMINOPHEN 325 MG/10.15ML
LIQUID ORAL
COMMUNITY
Start: 2018-06-01 | End: 2019-07-29 | Stop reason: ALTCHOICE

## 2018-08-29 RX ORDER — MEPERIDINE HYDROCHLORIDE 50 MG/1
50 TABLET ORAL EVERY 8 HOURS PRN
Qty: 90 TABLET | Refills: 0 | Status: SHIPPED | OUTPATIENT
Start: 2018-09-29 | End: 2018-10-28

## 2018-08-29 RX ORDER — MEPERIDINE HYDROCHLORIDE 50 MG/1
50 TABLET ORAL EVERY 8 HOURS PRN
Qty: 90 TABLET | Refills: 0 | Status: SHIPPED | OUTPATIENT
Start: 2018-08-31 | End: 2018-09-29

## 2018-08-29 RX ORDER — MEPERIDINE HYDROCHLORIDE 50 MG/1
50 TABLET ORAL EVERY 8 HOURS PRN
Qty: 90 TABLET | Refills: 0 | Status: SHIPPED | OUTPATIENT
Start: 2018-10-28 | End: 2018-11-26

## 2018-08-29 NOTE — PROGRESS NOTES
PCP: Will Albrecht MD (Inactive)      CC: abdominal pain    Interval history: Mr. Cedra is a 63 y.o. male with an extensive history of crohn's disease who presents today for medication refill. He continues to take Demerol 50mg q8hrs as needed with moderate benefit given that his condition is stable.  No side effects reported.   Abdominal pain remains stable.   He does report diarrhea at times but no blood stools. Reports anal pain and itching 2/2 chronic diarrhea. OTC Lidocaine 2 % provides some minimal relief.  Compounding cream was too expensive. Continues to c/o bilateral knee pain . He has had multiple surgeries in the past. He rates his pain 4/10.     Prior HPI:   Mr. Cerda is a 60 year old male with PMH of crohn's disease referred by Dr. Hansen.  Patient states being diagnosed with crohn's disease since the age of 14.    He has had multiple GI surgeries and large bowel and small bowel removals.  During that time, he was being managed by providers in [place].  He was TPN dependent for many years until about two years ago.  He is now stable on an oral diet.  He did not have a primary care physician nor a gastroenterologist for many years.  He is currently trying to establish care.  He has future appointment with Dr. Ch.  He states taking Demerol 50mg q8hrs as needed for pain. It has provided relief of his nausea and pain with diarrhea.  He was last prescribed Demerol in July 2014.  Since then, he has been bearing with the pain.  It is a sharp shooting pain in his mid abdomen.      ROS:  CONSTITUTIONAL: No fevers, chills, night sweats, wt. loss, appetite changes  SKIN: no rashes or itching  ENT: No headaches, head trauma, vision changes, or eye pain  LYMPH NODES: None noticed   CV: No chest pain, palpitations.   RESP: No shortness of breath, dyspnea on exertion, cough, wheezing, or hemoptysis  GI: No nausea, emesis, diarrhea, constipation, melena, hematochezia, pain.    : No dysuria, hematuria,  urgency, or frequency   HEME: No easy bruising, bleeding problems  PSYCHIATRIC: No depression, anxiety, psychosis, hallucinations.  NEURO: No seizures, memory loss, dizziness or difficulty sleeping  MSK: no joint pain      Past Medical History:   Diagnosis Date    Anxiety     Basal cell carcinoma 07/2017    R forehead and R temple    Crohn's disease     age 17    Short bowel syndrome     Vitamin B deficiency     Vitamin D deficiency      Past Surgical History:   Procedure Laterality Date    3 small bowel resections      APPENDECTOMY      CHOLECYSTECTOMY      COLON SURGERY      partial colectomy due to crohns    COLONOSCOPY      KNEE SURGERY      SMALL INTESTINE SURGERY      TUBE THORACOTOMY      UPPER GASTROINTESTINAL ENDOSCOPY       Family History   Problem Relation Age of Onset    Diabetes Mother     Stroke Mother     Diabetes Father     Kidney disease Father     Irritable bowel syndrome Cousin     Celiac disease Neg Hx     Cirrhosis Neg Hx     Colon cancer Neg Hx     Colon polyps Neg Hx     Cystic fibrosis Neg Hx     Esophageal cancer Neg Hx     Crohn's disease Neg Hx     Hemochromatosis Neg Hx     Inflammatory bowel disease Neg Hx     Liver cancer Neg Hx     Liver disease Neg Hx     Rectal cancer Neg Hx     Stomach cancer Neg Hx     Ulcerative colitis Neg Hx     Addison's disease Neg Hx     Melanoma Neg Hx     Psoriasis Neg Hx     Lupus Neg Hx     Eczema Neg Hx      Social History     Socioeconomic History    Marital status:      Spouse name: None    Number of children: None    Years of education: None    Highest education level: None   Social Needs    Financial resource strain: None    Food insecurity - worry: None    Food insecurity - inability: None    Transportation needs - medical: None    Transportation needs - non-medical: None   Occupational History    None   Tobacco Use    Smoking status: Former Smoker     Packs/day: 1.00     Years: 15.00     Pack  "years: 15.00     Types: Cigarettes     Last attempt to quit: 3/10/1995     Years since quittin.4    Smokeless tobacco: Never Used   Substance and Sexual Activity    Alcohol use: Yes     Alcohol/week: 1.2 oz     Types: 1 Cans of beer, 1 Shots of liquor per week     Comment: 1 every 6 -7 months    Drug use: No    Sexual activity: Yes   Other Topics Concern    None   Social History Narrative    Retired      Medications/Allergies: See med card    Vitals:    18 0908   BP: 121/70   Pulse: 69   Weight: 69.4 kg (153 lb)   Height: 6' 1" (1.854 m)   PainSc:   3   PainLoc: Abdomen     Physical exam:    GENERAL: A and O x3, the patient appears well groomed and is in no acute distress.  Skin: No rashes or obvious lesions  HEENT: normocephalic, atraumatic  CARDIOVASCULAR:  RRR  LUNGS: non labored breathing  ABDOMEN: soft, nontender. No rebound   UPPER EXTREMITIES: Normal alignment, normal range of motion, no atrophy, no skin changes,  hair growth and nail growth normal and equal bilaterally. No swelling, no tenderness.    LOWER EXTREMITIES:  Normal alignment, normal range of motion, no atrophy, no skin changes,  hair growth and nail growth normal and equal bilaterally. No swelling, no tenderness.    LUMBAR SPINE  Lumbar spine: ROM is full with flexion extension and oblique extension with no increased pain.    Lam's test causes no increased pain on either side.    Supine straight leg raise is negative bilaterally.    Internal and external rotation of the hip causes no increased pain on either side.  Myofascial exam: No tenderness to palpation across lumbar paraspinous muscles.    MENTAL STATUS: normal orientation, speech, language, and fund of knowledge for social situation.  Emotional state appropriate.    CRANIAL NERVES:  II:  PERRL bilaterally,   III,IV,VI: EOMI.    V:  Facial sensation equal bilaterally  VII:  Facial motor function normal.  VIII:  Hearing equal to finger rub bilaterally  IX/X: Gag normal, " palate symmetric  XI:  Shoulder shrug equal, head turn equal  XII:  Tongue midline without fasciculations    MOTOR: Tone and bulk: normal bilateral upper and lower Strength: normal   SENSATION: Light touch and pinprick intact bilaterally  REFLEXES: normal, symmetric, nonbrisk.  Toes down, no clonus. No hoffmans.  GAIT: normal rise, base, steps, and arm swing.      Imaging:  None    Assessment:  Mr. Cerda is a 63 y.o. male with abdominal pain  1. Chronic abdominal pain    2. Crohn's disease without complication, unspecified gastrointestinal tract location      Plan:  1. Patient with long history of crohn's disease and chronic abdominal pain.  Continue care with GI.  2. He can continue Demerol as needed for pain.  reviewed.  No signs of aberrant behavior.  Demerol 50mg q8hrs as needed for pain today.  Script given for 3  Months.  3. Continue OTC lidocaine cream  4. May benefit from bilateral knee genicular nerve blocks.   5. F/u 3 months or sooner  All medication management was performed by Dr. Alvin Curry

## 2018-10-31 DIAGNOSIS — R10.9 CHRONIC ABDOMINAL PAIN: Primary | ICD-10-CM

## 2018-10-31 DIAGNOSIS — G89.29 CHRONIC ABDOMINAL PAIN: Primary | ICD-10-CM

## 2018-10-31 NOTE — TELEPHONE ENCOUNTER
I received a refill request for patient's Bentyl.  Who is his gastroenterologist?  Recommend refills per GI due to history.  He also needs to establish care here with a new PCP please schedule appointment.  If he is not currently seeing GI and cannot get Bentyl refilled elsewhere I will go ahead and send in the prescription but we need to see him here in clinic within the next month

## 2018-10-31 NOTE — PROGRESS NOTES
Refill Authorization Note     is requesting a refill authorization.    Brief assessment and rationale for refill: DEFER; not on protocol; needs new pcp  Amount/Quantity of medication ordered: 90d        Refills Authorized: Yes  If authorized number of refills: 0        Medication-related problems identified:   Requires appointment  Therapeutic duplication  Medication Therapy Plan: needs new pcp; no labs needed; defer to you, will queue for 3 more  Name and strength of medication: dicyclomine (BENTYL) 20 mg tablet  How patient will take medication: t1t qid  Medication reconciliation completed: No        Comments:

## 2018-11-01 ENCOUNTER — OFFICE VISIT (OUTPATIENT)
Dept: DERMATOLOGY | Facility: CLINIC | Age: 63
End: 2018-11-01
Payer: MEDICARE

## 2018-11-01 DIAGNOSIS — L82.1 SEBORRHEIC KERATOSES: ICD-10-CM

## 2018-11-01 DIAGNOSIS — L57.0 ACTINIC KERATOSES: ICD-10-CM

## 2018-11-01 DIAGNOSIS — D48.5 NEOPLASM OF UNCERTAIN BEHAVIOR OF SKIN: Primary | ICD-10-CM

## 2018-11-01 DIAGNOSIS — L81.4 SOLAR LENTIGO: ICD-10-CM

## 2018-11-01 DIAGNOSIS — Z85.828 HISTORY OF NONMELANOMA SKIN CANCER: ICD-10-CM

## 2018-11-01 DIAGNOSIS — D22.9 MULTIPLE BENIGN NEVI: ICD-10-CM

## 2018-11-01 PROCEDURE — 88305 TISSUE EXAM BY PATHOLOGIST: CPT | Performed by: PATHOLOGY

## 2018-11-01 PROCEDURE — 99213 OFFICE O/P EST LOW 20 MIN: CPT | Mod: 25,S$GLB,, | Performed by: DERMATOLOGY

## 2018-11-01 PROCEDURE — 11100 PR BIOPSY OF SKIN LESION: CPT | Mod: 59,S$GLB,, | Performed by: DERMATOLOGY

## 2018-11-01 PROCEDURE — 17000 DESTRUCT PREMALG LESION: CPT | Mod: S$GLB,,, | Performed by: DERMATOLOGY

## 2018-11-01 PROCEDURE — 17003 DESTRUCT PREMALG LES 2-14: CPT | Mod: S$GLB,,, | Performed by: DERMATOLOGY

## 2018-11-01 PROCEDURE — 99999 PR PBB SHADOW E&M-EST. PATIENT-LVL III: CPT | Mod: PBBFAC,,, | Performed by: DERMATOLOGY

## 2018-11-01 PROCEDURE — 11101 PR BIOPSY, EACH ADDED LESION: CPT | Mod: S$GLB,,, | Performed by: DERMATOLOGY

## 2018-11-01 PROCEDURE — 99213 OFFICE O/P EST LOW 20 MIN: CPT | Mod: PBBFAC,PO | Performed by: DERMATOLOGY

## 2018-11-01 RX ORDER — HYDROCORTISONE 25 MG/ML
LOTION TOPICAL
Qty: 60 ML | Refills: 2 | Status: SHIPPED | OUTPATIENT
Start: 2018-11-01 | End: 2020-12-24 | Stop reason: CLARIF

## 2018-11-01 NOTE — PROGRESS NOTES
Subjective:       Patient ID:  Tristin Cerda is a 63 y.o. male who presents for   Chief Complaint   Patient presents with    Lesion     nose    Spot     behind L ear    Skin Check     UBSE     Patient last seen 6/2017, bx of 3 lesions, s/p MOhs Dr Fernandez to 2 lesions on face  Monitoring back site (neg bx margins)  This is a high risk patient here to check for the development of new lesions.  New complaints today    FINAL PATHOLOGIC DIAGNOSIS  1. Skin, right forehead, shave biopsy:  - BASAL CELL CARCINOMA WITH NODULAR GROWTH PATTERN.  - THE TUMOR EXTENDS TO THE DEEP AND LATERAL BIOPSY MARGINS.  - THE LESION IS ULCERATED.    2. Skin, right temple, shave biopsy:  - BASAL CELL CARCINOMA WITH NODULAR GROWTH PATTERN.  - THE TUMOR CLOSELY APPROACHES THE DEEP AND LATERAL BIOPSY MARGINS.    3. Skin, right upper back, shave biopsy:  - BASAL CELL CARCINOMA.  - MARGINS ARE NEGATIVE IN THE PLANES OF SECTION.    Complex GI history, TPN x several years (Crohn's), resumed PO nutrition 7 yrs ago      Lesion  - Initial  Affected locations: nose  Duration: 1 month  Signs / symptoms: redness and scaling  Severity: mild  Timing: constant  Aggravated by: nothing  Relieving factors/Treatments tried: nothing    Spot  - Initial  Affected locations: behind L ear.  Duration: few months.  Severity: mild  Timing: constant  Aggravated by: nothing  Relieving factors/Treatments tried: nothing        Review of Systems   Constitutional: Negative for fever, chills and fatigue.   Skin: Positive for activity-related sunscreen use and wears hat.   Hematologic/Lymphatic: Does not bruise/bleed easily.        Objective:    Physical Exam   Constitutional: He appears well-developed and well-nourished. No distress.       HENT:   Mouth/Throat: Lips normal.    Eyes: Lids are normal.  No conjunctival no injection.   Neurological: He is alert and oriented to person, place, and time. He is not disoriented.   Psychiatric: He has a normal mood and  affect.   Skin:   Areas Examined (abnormalities noted in diagram):   Scalp / Hair Palpated and Inspected  Head / Face Inspection Performed  Neck Inspection Performed  Chest / Axilla Inspection Performed  Abdomen Inspection Performed  Back Inspection Performed  RUE Inspected  LUE Inspection Performed                   Diagram Legend     Erythematous scaling macule/papule c/w actinic keratosis       Vascular papule c/w angioma      Pigmented verrucoid papule/plaque c/w seborrheic keratosis      Yellow umbilicated papule c/w sebaceous hyperplasia      Irregularly shaped tan macule c/w lentigo     1-2 mm smooth white papules consistent with Milia      Movable subcutaneous cyst with punctum c/w epidermal inclusion cyst      Subcutaneous movable cyst c/w pilar cyst      Firm pink to brown papule c/w dermatofibroma      Pedunculated fleshy papule(s) c/w skin tag(s)      Evenly pigmented macule c/w junctional nevus     Mildly variegated pigmented, slightly irregular-bordered macule c/w mildly atypical nevus      Flesh colored to evenly pigmented papule c/w intradermal nevus       Pink pearly papule/plaque c/w basal cell carcinoma      Erythematous hyperkeratotic cursted plaque c/w SCC      Surgical scar with no sign of skin cancer recurrence      Open and closed comedones      Inflammatory papules and pustules      Verrucoid papule consistent consistent with wart     Erythematous eczematous patches and plaques     Dystrophic onycholytic nail with subungual debris c/w onychomycosis     Umbilicated papule    Erythematous-base heme-crusted tan verrucoid plaque consistent with inflamed seborrheic keratosis     Erythematous Silvery Scaling Plaque c/w Psoriasis     See annotation                          Assessment / Plan:      Pathology Orders:     Normal Orders This Visit    Tissue Specimen To Pathology, Dermatology     Questions:    Directional Terms:  Other(comment)    Clinical information:  1/6- r/o BCC 2/6- r/o BCC vs SCC  3/6- r/o BCC, 4/6- r/o SCC 5/6- r/o BCC 6/6- r/o SCC    Specific Site:  1/6- R forehead, 2/6- R nasal tip 3/6- left INFRA auricular neck 4/6- left POST auricular neck, 5/6- left shoulder, 6/6- R upper arm        Neoplasm of uncertain behavior of skin  -     Tissue Specimen To Pathology, Dermatology  Shave biopsy procedure note:x6    Shave biopsy performed after verbal consent including risk of infection, scar, recurrence, need for additional treatment of site. Area prepped with alcohol, anesthetized with approximately 1.0cc of 1% lidocaine with epinephrine. Lesional tissue shaved with razor blade. Hemostasis achieved with application of aluminum chloride followed by hyfrecation. No complications. Dressing applied. Wound care explained.    Seborrheic keratoses  These are benign inherited growths without a malignant potential. Reassurance given to patient. No treatment is necessary.     Solar lentigo  This is a benign hyperpigmented sun induced lesion. Daily sun protection will reduce the number of new lesions. Treatment of these benign lesions are considered cosmetic.    Multiple benign nevi  Discussed ABCDE's of nevi.  Monitor for new mole or moles that are becoming bigger, darker, irritated, or developing irregular borders. Brochure provided.    History of nonmelanoma skin cancer  Area of previous NMSC  examined. Site well healed with no signs of recurrence.  Upper body skin examination performed today including at least 9 points as noted in physical examination. No lesions suspicious for malignancy noted.    AKs  Cryosurgery Procedure Note    Verbal consent from the patient is obtained and the patient is aware of the precancerous quality and need for treatment of these lesions. Liquid nitrogen cryosurgery is applied to the 5 actinic keratoses, as detailed in the physical exam, to produce a freeze injury. The patient is aware that blisters may form and is instructed on wound care with gentle cleansing and use of  vaseline ointment to keep moist until healed. The patient is supplied a handout on cryosurgery and is instructed to call if lesions do not completely resolve.    H/o seb derm, requests refill HC2.5%, sent to pharmacy. Uses sparingly, PRN flare           Follow-up in about 6 months (around 5/1/2019).

## 2018-11-01 NOTE — PATIENT INSTRUCTIONS
Shave Biopsy Wound Care    Your doctor has performed a shave biopsy today.  A band aid and vaseline ointment has been placed over the site.  This should remain in place for 24 hours.  It is recommended that you keep the area dry for the first 24 hours.  After 24 hours, you may remove the band aid and wash the area with warm soap and water and apply Vaseline jelly.  Many patients prefer to use Neosporin or Bacitracin ointment.  This is acceptable; however, know that you can develop an allergy to this medication even if you have used it safely for years.  It is important to keep the area moist.  Letting it dry out and get air slows healing time, and will worsen the scar.  Band aid is optional after first 24 hours.      If you notice increasing redness, tenderness, pain, or yellow drainage at the biopsy site, please notify your doctor.  These are signs of an infection.    If your biopsy site is bleeding, apply firm pressure for 15 minutes straight.  Repeat for another 15 minutes, if it is still bleeding.   If the surgical site continues to bleed, then please contact your doctor.       [facility]  [facility]  [address]  [place] [address]  Dept: [phone]  CRYOSURGERY      Your doctor has used a method called cryosurgery to treat your skin condition. Cryosurgery refers to the use of very cold substances to treat a variety of skin conditions such as warts, pre-skin cancers, molluscum contagiosum, sun spots, and several benign growths. The substance we use in cryosurgery is liquid nitrogen and is so cold (-195 degrees Celsius) that is burns when administered.     Following treatment in the office, the skin may immediately burn and become red. You may find the area around the lesion is affected as well. It is sometimes necessary to treat not only the lesion, but a small area of the surrounding normal skin to achieve a good response.     A blister, and even a blood filled blister, may  form after treatment.   This is a normal response. If the blister is painful, it is acceptable to sterilize a needle and with rubbing alcohol and gently pop the blister. It is important that you gently wash the area with soap and warm water as the blister fluid may contain wart virus if a wart was treated. Do no remove the roof of the blister.     The area treated can take anywhere from 1-3 weeks to heal. Healing time depends on the kind skin lesion treated, the location, and how aggressively the lesion was treated. It is recommended that the areas treated are covered with Vaseline or bacitracin ointment and a band-aid. If a band-aid is not practical, just ointment applied several times per day will do. Keeping these areas moist will speed the healing time.    Treatment with liquid nitrogen can leave a scar. In dark skin, it may be a light or dark scar, in light skin it may be a white or pink scar. These will generally fade with time, but may never go away completely.     If you have any concerns after your treatment, please feel free to call the office.         [facility]  [facility]  [address] [place] [address]  Dept: [phone]

## 2018-11-05 ENCOUNTER — DOCUMENTATION ONLY (OUTPATIENT)
Dept: FAMILY MEDICINE | Facility: CLINIC | Age: 63
End: 2018-11-05

## 2018-11-05 RX ORDER — DICYCLOMINE HYDROCHLORIDE 20 MG/1
TABLET ORAL
Qty: 360 TABLET | Refills: 0 | Status: SHIPPED | OUTPATIENT
Start: 2018-11-05 | End: 2019-01-31 | Stop reason: SDUPTHER

## 2018-11-05 NOTE — TELEPHONE ENCOUNTER
The pt stated that he is not seeing Gastroenterologist. He is scheduled to come in tomorrow 11/06/18 regarding his med refill. Scheduled pt for est care w/PCP 12/26/18.

## 2018-11-05 NOTE — PROGRESS NOTES
Pre-Visit Chart Review  For Appointment Scheduled on 11/06/2018    Health Maintenance Due   Topic Date Due    TETANUS VACCINE  01/01/1975    Zoster Vaccine  01/01/2017    Colonoscopy  03/14/2018    Influenza Vaccine  08/01/2018

## 2018-11-06 ENCOUNTER — TELEPHONE (OUTPATIENT)
Dept: FAMILY MEDICINE | Facility: CLINIC | Age: 63
End: 2018-11-06

## 2018-11-06 ENCOUNTER — OFFICE VISIT (OUTPATIENT)
Dept: FAMILY MEDICINE | Facility: CLINIC | Age: 63
End: 2018-11-06
Payer: MEDICARE

## 2018-11-06 VITALS
HEIGHT: 73 IN | WEIGHT: 157.88 LBS | DIASTOLIC BLOOD PRESSURE: 69 MMHG | TEMPERATURE: 98 F | HEART RATE: 60 BPM | SYSTOLIC BLOOD PRESSURE: 133 MMHG | BODY MASS INDEX: 20.92 KG/M2

## 2018-11-06 DIAGNOSIS — K90.829 SHORT BOWEL SYNDROME: ICD-10-CM

## 2018-11-06 DIAGNOSIS — K50.919 CROHN'S DISEASE WITH COMPLICATION, UNSPECIFIED GASTROINTESTINAL TRACT LOCATION: ICD-10-CM

## 2018-11-06 DIAGNOSIS — R74.8 ELEVATED ALKALINE PHOSPHATASE LEVEL: Primary | ICD-10-CM

## 2018-11-06 DIAGNOSIS — G89.29 CHRONIC ABDOMINAL PAIN: Primary | ICD-10-CM

## 2018-11-06 DIAGNOSIS — E53.8 VITAMIN B 12 DEFICIENCY: ICD-10-CM

## 2018-11-06 DIAGNOSIS — Z12.11 SCREEN FOR COLON CANCER: ICD-10-CM

## 2018-11-06 DIAGNOSIS — R10.9 CHRONIC ABDOMINAL PAIN: Primary | ICD-10-CM

## 2018-11-06 DIAGNOSIS — D61.818 PANCYTOPENIA: ICD-10-CM

## 2018-11-06 PROCEDURE — 99999 PR PBB SHADOW E&M-EST. PATIENT-LVL IV: CPT | Mod: PBBFAC,,, | Performed by: PHYSICIAN ASSISTANT

## 2018-11-06 PROCEDURE — 3008F BODY MASS INDEX DOCD: CPT | Mod: CPTII,S$GLB,, | Performed by: PHYSICIAN ASSISTANT

## 2018-11-06 PROCEDURE — 99213 OFFICE O/P EST LOW 20 MIN: CPT | Mod: S$GLB,,, | Performed by: PHYSICIAN ASSISTANT

## 2018-11-06 RX ORDER — DICYCLOMINE HYDROCHLORIDE 20 MG/1
TABLET ORAL
Qty: 360 TABLET | Refills: 0 | Status: CANCELLED | OUTPATIENT
Start: 2018-11-06

## 2018-11-06 NOTE — PROGRESS NOTES
Subjective:       Patient ID: Tristin Cerda is a 63 y.o. male.    Chief Complaint: Medication Refill    Patient with Crohn's disease status post small bowel resection over 15 years ago presents for routine follow-up.  Patient has not had a Crohn's flare in many years.  Symptoms are controlled at the present time.  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  He reports that TPN was stopped approximately 5 years ago although provider notes report 2 years off of parenteral nutrition due to vascular access issues.  He has does fairly well off of TPN but continues to have issues with vitamin and mineral insufficiencies.   He is not seen GI since last year when colonoscopy was recommended.  He unable to tolerate fasting and bowel prep.  He is followed by Hematology for B12 deficiency and pancytopenia.  He was seen by hepatology last year for positive hepatitis B antibody.  This was determined to be a false positive test.  He continues to have slightly elevated alkaline phosphatase and hepatology had considered MRI to determine if liver biopsy would be needed.  It appears he was lost to follow-up.   All of his chronic medical conditions are currently controlled and he has no acute complaints at this time.  Patients patient medical/surgical, social and family histories have been reviewed         Review of Systems   Constitutional: Negative for activity change, appetite change, fatigue and unexpected weight change.   Respiratory: Negative for cough, chest tightness and shortness of breath.    Cardiovascular: Negative for chest pain, palpitations and leg swelling.   Gastrointestinal: Negative for abdominal pain, anal bleeding, blood in stool, constipation, diarrhea and nausea.   Endocrine: Negative for polydipsia and polyuria.   Genitourinary: Negative for difficulty urinating, frequency, hematuria and urgency.   Musculoskeletal: Negative for  arthralgias.   Skin: Negative for rash.   Neurological: Negative for dizziness and headaches.   Psychiatric/Behavioral: Negative for dysphoric mood. The patient is not nervous/anxious.        Objective:      Physical Exam   Constitutional: He appears well-developed and well-nourished. He is cooperative. No distress.   Eyes: Conjunctivae and EOM are normal. No scleral icterus.   Neck: Carotid bruit is not present.   Cardiovascular: Normal rate, regular rhythm and normal heart sounds.   Pulmonary/Chest: Effort normal and breath sounds normal.   Abdominal: Soft. Bowel sounds are normal. There is no tenderness.   Musculoskeletal:        Right lower leg: He exhibits no edema.        Left lower leg: He exhibits no edema.   Neurological: He is alert.   Vitals reviewed.      Assessment:       1. Chronic abdominal pain    2. Short bowel syndrome    3. Pancytopenia    4. Crohn's disease with complication, unspecified gastrointestinal tract location    5. Vitamin B 12 deficiency        Plan:       Tristin was seen today for medication refill.    Diagnoses and all orders for this visit:    Chronic abdominal pain    Short bowel syndrome  Continue current medications  Pancytopenia  Continue per Hematology  Crohn's disease with complication, unspecified gastrointestinal tract location  Continue current medications  Vitamin B 12 deficiency  Continue current medications    Will need to have patient reestablish with hepatology and GI

## 2018-11-06 NOTE — TELEPHONE ENCOUNTER
Please let patient know that I was further reviewing his chart and we need to have him follow up with Gastroenterology- [place] and hepatology since he has been lost to follow-up.  I am placing referral for each.  Hepatology should call him to schedule.  Please try to schedule follow-up with GI doctor Sanchez

## 2018-11-07 ENCOUNTER — TELEPHONE (OUTPATIENT)
Dept: GASTROENTEROLOGY | Facility: CLINIC | Age: 63
End: 2018-11-07

## 2018-11-07 NOTE — TELEPHONE ENCOUNTER
----- Message from Syeda Luis LPN sent at 11/7/2018  2:59 PM CST -----  Per ERIN Mccauley's note:    Please let patient know that I was further reviewing his chart and we need to have him follow up with Gastroenterology- [place] and hepatology since he has been lost to follow-up.  I am placing referral for each.  Hepatology should call him to schedule.  Please try to schedule follow-up with GI doctor Daniel      Spoke with pt, informed the pt that we couldn't schedule for [place] - please contact the pt to get him in for f/u.

## 2018-11-07 NOTE — TELEPHONE ENCOUNTER
"Spoke to pt, advised him message from provider. He stated that he doesn't know why he has to go back to see Hepatologist again. He doesn't want to see Hepatology given that he'd seen them before and nothing "changed" and they couldn't "do anything for him and liver level is still elevated." He stated that he will give GI specialist a "try again."  Will contact Dr. Sanchez's office to get the pt scheduled.   "

## 2018-11-08 ENCOUNTER — TELEPHONE (OUTPATIENT)
Dept: FAMILY MEDICINE | Facility: CLINIC | Age: 63
End: 2018-11-08

## 2018-11-08 NOTE — TELEPHONE ENCOUNTER
Advised pt to please reach out to Dr. Agapito Aranda's office and schedule a clinic appointment.    Pt verbalized understanding. Pt states that he has Dr. Aranda's contact information. No further questions at this time.

## 2018-11-08 NOTE — TELEPHONE ENCOUNTER
Spoke to Kelly Cloud RN at Dr. Sanchez's office. She stated that the pt needs to be seen by primary GI first and only if they feel the need for further consultation, they will be the one sending referral for the pt to see GI specialist for that specific symptoms. She says that Dr. Sanchez is a consultant for IBD.

## 2018-11-08 NOTE — TELEPHONE ENCOUNTER
----- Message from Kelly Cloud RN sent at 11/7/2018  4:44 PM CST -----  Good Afternoon!    Can you please contact me regarding this referral, tomorrow?    [phone].    Thanks!  -Kelly  ----- Message -----  From: Syeda Luis LPN  Sent: 11/7/2018   2:59 PM  To: Daniel Rondon Staff    Per ERIN Mccauley's note:    Please let patient know that I was further reviewing his chart and we need to have him follow up with Gastroenterology- [place] and hepatology since he has been lost to follow-up.  I am placing referral for each.  Hepatology should call him to schedule.  Please try to schedule follow-up with GI doctor Daniel      Spoke with pt, informed the pt that we couldn't schedule for [place] - please contact the pt to get him in for f/u.

## 2018-11-09 ENCOUNTER — TELEPHONE (OUTPATIENT)
Dept: DERMATOLOGY | Facility: CLINIC | Age: 63
End: 2018-11-09

## 2018-11-15 ENCOUNTER — DOCUMENTATION ONLY (OUTPATIENT)
Dept: TRANSPLANT | Facility: CLINIC | Age: 63
End: 2018-11-15

## 2018-11-15 NOTE — LETTER
November 15, 2018    Tristin Muñoz [place] [id number]      Dear Tristin Cerda:    Your doctor has referred you to the [facility]. You will be contacted by our office and an initial appointment will then be scheduled for you.    We look forward to seeing you soon. If you have any further questions, please contact us at [phone].       Sincerely,        [facility]   [address] [place] [id number]  [phone]

## 2018-11-15 NOTE — LETTER
November 15, 2018    ERIN Canales  [address] [facility] [place] [address]      Dear Dr. Mccauley    Patient: Tristin Cerda   MR Number: [id number]   YOB: 1955     Thank you for the referral of Tristin Cerda to the [facility]. An initial appointment will be scheduled for your patient with one of our Hepatologists.      Thank you again for your trust in our program.  If there is anything we can do for you or your staff, please feel free to contact us.        Sincerely,        [facility]  [address] [place] [address]  [phone]

## 2018-11-16 ENCOUNTER — TELEPHONE (OUTPATIENT)
Dept: HEPATOLOGY | Facility: CLINIC | Age: 63
End: 2018-11-16

## 2018-11-16 DIAGNOSIS — R76.8 HEPATITIS B CORE ANTIBODY POSITIVE: Primary | ICD-10-CM

## 2018-11-16 DIAGNOSIS — K76.0 FATTY LIVER: ICD-10-CM

## 2018-11-16 DIAGNOSIS — R93.5 ABNORMAL FINDINGS ON DIAGNOSTIC IMAGING OF ABDOMEN: ICD-10-CM

## 2018-11-16 DIAGNOSIS — R74.8 ELEVATED ALKALINE PHOSPHATASE LEVEL: ICD-10-CM

## 2018-11-16 DIAGNOSIS — K76.9 LIVER DISEASE: ICD-10-CM

## 2018-11-16 NOTE — NURSING
Pt records reviewed.   Pt will be referred to Hepatology.  Elevated alkaline phosphatase level  Initial referral received  from the workque.   Referring Provider/diagnosis  ERIN Canales  Referral letter sent to patient.

## 2018-11-16 NOTE — TELEPHONE ENCOUNTER
MA called patient to schedule his appt he is unable to reached left him VM to please give us a callback.

## 2018-11-16 NOTE — TELEPHONE ENCOUNTER
----- Message from Corinna Bone LPN sent at 11/15/2018  7:18 PM CST -----  Patient needs to be scheduled for follow up appointment      Pt records reviewed.   Pt will be referred to Hepatology.  Elevated alkaline phosphatase level  Initial referral received  from the [facility].   Referring Provider/diagnosis  ERIN Canales  Referral letter sent to robby

## 2018-11-26 ENCOUNTER — TELEPHONE (OUTPATIENT)
Dept: HEPATOLOGY | Facility: CLINIC | Age: 63
End: 2018-11-26

## 2018-11-26 NOTE — TELEPHONE ENCOUNTER
MA called patient to schedule his labs, US and follow up visit to see Dr. King. Per patient he does not need to see his liver specialist nothing is wrong on his liver. Explained to patient that one of his liver enzymes are elevated. He stated that is normal for him.    Sent message to referring MD.

## 2018-11-26 NOTE — TELEPHONE ENCOUNTER
----- Message from Mara Yap MA sent at 11/16/2018  9:55 AM CST -----      ----- Message -----  From: Corinna Bone LPN  Sent: 11/15/2018   7:18 PM  To: [facility] Hep[facility] Non-Clinical Staff    Patient needs to be scheduled for follow up appointment      Pt records reviewed.   Pt will be referred to Hepatology.  Elevated alkaline phosphatase level  Initial referral received  from the workque.   Referring Provider/diagnosis  ERIN Canales  Referral letter sent to robby

## 2018-11-26 NOTE — TELEPHONE ENCOUNTER
He is scheduled to establish care with new PCP in 1 month will keep this appointment and discuss these issues further

## 2018-11-28 ENCOUNTER — OFFICE VISIT (OUTPATIENT)
Dept: PAIN MEDICINE | Facility: CLINIC | Age: 63
End: 2018-11-28
Payer: MEDICARE

## 2018-11-28 VITALS
HEART RATE: 69 BPM | BODY MASS INDEX: 20.81 KG/M2 | DIASTOLIC BLOOD PRESSURE: 80 MMHG | WEIGHT: 157 LBS | HEIGHT: 73 IN | SYSTOLIC BLOOD PRESSURE: 131 MMHG

## 2018-11-28 DIAGNOSIS — K50.90 CROHN'S DISEASE WITHOUT COMPLICATION, UNSPECIFIED GASTROINTESTINAL TRACT LOCATION: ICD-10-CM

## 2018-11-28 DIAGNOSIS — G89.29 CHRONIC ABDOMINAL PAIN: Primary | ICD-10-CM

## 2018-11-28 DIAGNOSIS — R10.9 CHRONIC ABDOMINAL PAIN: Primary | ICD-10-CM

## 2018-11-28 DIAGNOSIS — Z79.891 CHRONIC USE OF OPIATE FOR THERAPEUTIC PURPOSE: ICD-10-CM

## 2018-11-28 PROCEDURE — 3008F BODY MASS INDEX DOCD: CPT | Mod: CPTII,S$GLB,, | Performed by: PHYSICIAN ASSISTANT

## 2018-11-28 PROCEDURE — 99999 PR PBB SHADOW E&M-EST. PATIENT-LVL III: CPT | Mod: PBBFAC,,, | Performed by: PHYSICIAN ASSISTANT

## 2018-11-28 PROCEDURE — 99214 OFFICE O/P EST MOD 30 MIN: CPT | Mod: S$GLB,,, | Performed by: PHYSICIAN ASSISTANT

## 2018-11-28 RX ORDER — MEPERIDINE HYDROCHLORIDE 50 MG/1
50 TABLET ORAL EVERY 8 HOURS PRN
Qty: 90 TABLET | Refills: 0 | Status: SHIPPED | OUTPATIENT
Start: 2018-12-28 | End: 2019-01-17 | Stop reason: SDUPTHER

## 2018-11-28 RX ORDER — MEPERIDINE HYDROCHLORIDE 50 MG/1
50 TABLET ORAL EVERY 8 HOURS PRN
Qty: 90 TABLET | Refills: 0 | Status: SHIPPED | OUTPATIENT
Start: 2018-11-28 | End: 2018-12-28

## 2018-11-28 RX ORDER — MEPERIDINE HYDROCHLORIDE 50 MG/1
50 TABLET ORAL EVERY 8 HOURS PRN
Qty: 90 TABLET | Refills: 0 | Status: SHIPPED | OUTPATIENT
Start: 2019-01-27 | End: 2019-02-26

## 2018-11-28 NOTE — PROGRESS NOTES
PCP: ERIN De La Cruz      CC: abdominal pain    Interval history: Mr. Cerda is a 63 y.o. male with an extensive history of crohn's disease who presents today for medication refill. He continues to take Demerol 50mg q8hrs as needed with moderate benefit given that his condition is stable.  No side effects reported.   Abdominal pain remains stable.   He does report diarrhea at times but no blood stools. Reports anal pain and itching 2/2 chronic diarrhea. OTC Lidocaine 2 % provides some minimal relief.  Compounding cream was too expensive. Continues to c/o bilateral knee pain . He has had multiple surgeries in the past. He rates his pain 1/10.     Prior HPI:   Mr. Cerda is a 60 year old male with PMH of crohn's disease referred by Dr. Hansen.  Patient states being diagnosed with crohn's disease since the age of 14.    He has had multiple GI surgeries and large bowel and small bowel removals.  During that time, he was being managed by providers in [place].  He was TPN dependent for many years until about two years ago.  He is now stable on an oral diet.  He did not have a primary care physician nor a gastroenterologist for many years.  He is currently trying to establish care.  He has future appointment with Dr. Ch.  He states taking Demerol 50mg q8hrs as needed for pain. It has provided relief of his nausea and pain with diarrhea.  He was last prescribed Demerol in July 2014.  Since then, he has been bearing with the pain.  It is a sharp shooting pain in his mid abdomen.      ROS:  CONSTITUTIONAL: No fevers, chills, night sweats, wt. loss, appetite changes  SKIN: no rashes or itching  ENT: No headaches, head trauma, vision changes, or eye pain  LYMPH NODES: None noticed   CV: No chest pain, palpitations.   RESP: No shortness of breath, dyspnea on exertion, cough, wheezing, or hemoptysis  GI: No nausea, emesis, diarrhea, constipation, melena, hematochezia, pain.    : No dysuria, hematuria,  urgency, or frequency   HEME: No easy bruising, bleeding problems  PSYCHIATRIC: No depression, anxiety, psychosis, hallucinations.  NEURO: No seizures, memory loss, dizziness or difficulty sleeping  MSK: no joint pain      Past Medical History:   Diagnosis Date    Anxiety     Basal cell carcinoma 07/2017    R forehead and R temple    Crohn's disease     age 17    Short bowel syndrome     Vitamin B deficiency     Vitamin D deficiency      Past Surgical History:   Procedure Laterality Date    3 small bowel resections      APPENDECTOMY      CHOLECYSTECTOMY      COLONOSCOPY      COLONOSCOPY N/A 2/14/2017    Procedure: COLONOSCOPY;  Surgeon: Nela Sanchez MD;  [facility]  Service: Endoscopy;  Laterality: N/A;    COLONOSCOPY N/A 3/14/2017    Procedure: COLONOSCOPY;  Surgeon: Nela Sanchez MD;  [facility]  Service: Endoscopy;  Laterality: N/A;  2 days clear liquids before procedure    COLONOSCOPY N/A 3/14/2017    Performed by [facility] at [facility] ENDO (4TH FLR)    COLONOSCOPY N/A 2/14/2017    Performed by Nela Sanchez MD at [facility] (4TH FLR)    ESOPHAGOGASTRODUODENOSCOPY (EGD) N/A 2/14/2017    Performed by [facility] at [facility] ENDO (4TH FLR)    KNEE SURGERY      SMALL INTESTINE SURGERY      TUBE THORACOTOMY      UPPER GASTROINTESTINAL ENDOSCOPY       Family History   Problem Relation Age of Onset    Diabetes Mother     Stroke Mother     Diabetes Father     Kidney disease Father     Irritable bowel syndrome Cousin     Celiac disease Neg Hx     Cirrhosis Neg Hx     Colon cancer Neg Hx     Colon polyps Neg Hx     Cystic fibrosis Neg Hx     Esophageal cancer Neg Hx     Crohn's disease Neg Hx     Hemochromatosis Neg Hx     Inflammatory bowel disease Neg Hx     Liver cancer Neg Hx     Liver disease Neg Hx     Rectal cancer Neg Hx     Stomach cancer Neg Hx     Ulcerative colitis Neg Hx     Addison's disease Neg Hx     Melanoma  "Neg Hx     Psoriasis Neg Hx     Lupus Neg Hx     Eczema Neg Hx      Social History     Socioeconomic History    Marital status:      Spouse name: None    Number of children: None    Years of education: None    Highest education level: None   Social Needs    Financial resource strain: None    Food insecurity - worry: None    Food insecurity - inability: None    Transportation needs - medical: None    Transportation needs - non-medical: None   Occupational History    None   Tobacco Use    Smoking status: Former Smoker     Packs/day: 1.00     Years: 15.00     Pack years: 15.00     Types: Cigarettes     Last attempt to quit: 3/10/1995     Years since quittin.7    Smokeless tobacco: Never Used   Substance and Sexual Activity    Alcohol use: Yes     Alcohol/week: 1.2 oz     Types: 1 Cans of beer, 1 Shots of liquor per week     Comment: 1 every 6 -7 months    Drug use: No    Sexual activity: Yes   Other Topics Concern    None   Social History Narrative    Retired      Medications/Allergies: See med card    Vitals:    18 1005   BP: 131/80   Pulse: 69   Weight: 71.2 kg (157 lb)   Height: 6' 1" (1.854 m)   PainSc:   1   PainLoc: Abdomen     Physical exam:    GENERAL: A and O x3, the patient appears well groomed and is in no acute distress.  Skin: No rashes or obvious lesions  HEENT: normocephalic, atraumatic  CARDIOVASCULAR:  RRR  LUNGS: non labored breathing  ABDOMEN: soft, nontender. No rebound   UPPER EXTREMITIES: Normal alignment, normal range of motion, no atrophy, no skin changes,  hair growth and nail growth normal and equal bilaterally. No swelling, no tenderness.    LOWER EXTREMITIES:  Normal alignment, normal range of motion, no atrophy, no skin changes,  hair growth and nail growth normal and equal bilaterally. No swelling, no tenderness.    LUMBAR SPINE  Lumbar spine: ROM is full with flexion extension and oblique extension with no increased pain.    Lam's test causes no " increased pain on either side.    Supine straight leg raise is negative bilaterally.    Internal and external rotation of the hip causes no increased pain on either side.  Myofascial exam: No tenderness to palpation across lumbar paraspinous muscles.    MENTAL STATUS: normal orientation, speech, language, and fund of knowledge for social situation.  Emotional state appropriate.    CRANIAL NERVES:  II:  PERRL bilaterally,   III,IV,VI: EOMI.    V:  Facial sensation equal bilaterally  VII:  Facial motor function normal.  VIII:  Hearing equal to finger rub bilaterally  IX/X: Gag normal, palate symmetric  XI:  Shoulder shrug equal, head turn equal  XII:  Tongue midline without fasciculations    MOTOR: Tone and bulk: normal bilateral upper and lower Strength: normal   SENSATION: Light touch and pinprick intact bilaterally  REFLEXES: normal, symmetric, nonbrisk.  Toes down, no clonus. No hoffmans.  GAIT: normal rise, base, steps, and arm swing.      Imaging:  None    Assessment:  Mr. Cerda is a 63 y.o. male with abdominal pain  1. Chronic abdominal pain    2. Crohn's disease without complication, unspecified gastrointestinal tract location    3. Chronic use of opiate for therapeutic purpose      Plan:  1. Patient with long history of crohn's disease and chronic abdominal pain.  Continue care with GI.  2. He can continue Demerol as needed for pain.  reviewed.  No signs of aberrant behavior.  Demerol 50mg q8hrs as needed for pain today.  Script given for 3  Months.  3. Continue OTC lidocaine cream  4. May benefit from bilateral knee genicular nerve blocks.   5. F/u 3 months or sooner  All medication management was performed by Dr. Alvin Curry

## 2018-12-06 ENCOUNTER — TELEPHONE (OUTPATIENT)
Dept: DERMATOLOGY | Facility: CLINIC | Age: 63
End: 2018-12-06

## 2018-12-06 NOTE — TELEPHONE ENCOUNTER
Pt was scheduled for Mohs sx today for BCC right nasal tip at 7:30. Called pt and pt stated that he will not be able to make it in today due to illness. Pt is scheduled for next week for BCC right forehead on 12/13/18 and stated that he will rescheduled at that time.

## 2018-12-07 ENCOUNTER — PATIENT OUTREACH (OUTPATIENT)
Dept: ADMINISTRATIVE | Facility: HOSPITAL | Age: 63
End: 2018-12-07

## 2018-12-07 NOTE — LETTER
December 7, 2018    Tristin Cerda  [address] [place] [place] [address]             [facility]  [address]  [facility] [address]  Phone: [phone] Dear, Tristin Cerad      [facility].  To help you get the most out of each of your visits, we will review your information to make sure you are up to date on all of your recommended tests and/or procedures.      As a new patient to Dr.JOSEPH PATRICIO, we may not have your complete medical records.  He has found that your chart shows you may be due for a:    COLORECTAL CANCER SCREENING       If you have had any of the above done at another facility, please bring the records or information with you so that your record at [facility] will be complete.      If you are currently taking medication, please bring it with you to your appointment for review.    Also, if you have any type of Advanced Directives, please bring them with you to your office visit so we may scan them into your chart.        Neeta Loaiza LPN Clinical Care Coordinator  [facility] [address] [place] [address]  Phone [phone]  Fax [phone]

## 2018-12-13 ENCOUNTER — PROCEDURE VISIT (OUTPATIENT)
Dept: DERMATOLOGY | Facility: CLINIC | Age: 63
End: 2018-12-13
Payer: MEDICARE

## 2018-12-13 VITALS
WEIGHT: 157 LBS | HEART RATE: 67 BPM | SYSTOLIC BLOOD PRESSURE: 132 MMHG | BODY MASS INDEX: 20.81 KG/M2 | DIASTOLIC BLOOD PRESSURE: 72 MMHG | HEIGHT: 73 IN

## 2018-12-13 DIAGNOSIS — C44.311 BASAL CELL CARCINOMA OF RIGHT NASAL TIP: Primary | ICD-10-CM

## 2018-12-13 PROCEDURE — 99499 UNLISTED E&M SERVICE: CPT | Mod: S$GLB,,, | Performed by: DERMATOLOGY

## 2018-12-13 PROCEDURE — 17312 MOHS ADDL STAGE: CPT | Mod: S$GLB,,, | Performed by: DERMATOLOGY

## 2018-12-13 PROCEDURE — 14060 TIS TRNFR E/N/E/L 10 SQ CM/<: CPT | Mod: S$GLB,,, | Performed by: DERMATOLOGY

## 2018-12-13 PROCEDURE — 17311 MOHS 1 STAGE H/N/HF/G: CPT | Mod: 51,S$GLB,, | Performed by: DERMATOLOGY

## 2018-12-13 RX ORDER — DOXYCYCLINE HYCLATE 100 MG
100 TABLET ORAL EVERY 12 HOURS
Qty: 20 TABLET | Refills: 0 | Status: SHIPPED | OUTPATIENT
Start: 2018-12-13 | End: 2018-12-23

## 2018-12-13 RX ORDER — OXYCODONE AND ACETAMINOPHEN 5; 325 MG/1; MG/1
1 TABLET ORAL
Qty: 20 TABLET | Refills: 0 | Status: SHIPPED | OUTPATIENT
Start: 2018-12-13 | End: 2018-12-26

## 2018-12-13 NOTE — PROGRESS NOTES
PROCEDURE: Mohs' Micrographic Surgery    INDICATION: Location in mask areas of face including central face, nose, eyelids, eyebrows, lips, chin, preauricular, temple, and ear. Biopsy-proven skin cancer of cosmetically and functionally important areas, including head, neck, genital, hand, foot, or areas known for having difficulty in healing, such as the lower anterior legs. Tumor with ill-defined borders.    REFERRING MD: Ira Bang MD    CASE NUMBER:     ANESTHETIC: 2.5 cc 0.5% Lidocaine with Epi 1:200,000 mixed 1:1 with 0.5% Bupivacaine    SURGICAL PREP: Hibiclens    SURGEON: Teofilo Fernandez MD    ASSISTANTS: Maria Del Carmen Balbuena PA-C and Henrietta Montez, Surg Tech    PREOPERATIVE DIAGNOSIS: basal cell carcinoma    POSTOPERATIVE DIAGNOSIS: basal cell carcinoma    PATHOLOGIC DIAGNOSIS: basal cell carcinoma- nodular, infiltrating    HISTOLOGY OF SPECIMENS IN FIRST STAGE:   Tumor Type: Tumor seen. Nodular basal cell carcinoma: Nodular tumor in dermis composed of basaloid cells exhibiting peripheral palisading and retraction artifact.  Infiltrative basal cell carcinoma: Basaloid tumor in dermis characterized by thin elongated strands of basaloid cells infiltrating between dermal collagen bundles.   Depth of Invasion: epidermis and dermis  Perineural Invasion: No    HISTOLOGY OF SPECIMENS IN SUBSEQUENT STAGES:  · Tumor Type: No tumor seen.    STAGES OF MOHS' SURGERY PERFORMED: 2    TUMOR-FREE PLANE ACHIEVED: Yes    HEMOSTASIS: electrocoagulation     SPECIMENS: 3 (2 in stage A and 1 in stage B)    LOCATION: right nasal tip. Patient verified location with hand held mirror.    INITIAL LESION SIZE: 0.4 x 0.4 cm    FINAL DEFECT SIZE: 1.0 x 1.1 cm    WOUND REPAIR/DISPOSITION: The patient tolerated Mohs' Micrographic Surgery for a basal cell carcinoma very well. When the tumor was completely removed, a repair of the surgical defect was undertaken.      PROCEDURE: Burow's Advancement Flap (Adjacent Tissue  Transfer)    INDICATION: Status post Mohs' Micrographic Surgery for basal cell carcinoma.    CASE NUMBER:     ANESTHETIC: 3 cc 1% Lidocaine with Epinephrine 1:100,000    SURGICAL PREP: Hibiclens    SURGEON: Teofilo Fernandez MD    ASSISTANTS: Maria Del Carmen Balbuena PA-C, Henrietta Montez, Surg Tech and Yu Silva Surg Tech    LOCATION: right nasal tip    DEFECT SIZE: 1.0 x 1.1 cm    WOUND REPAIR/DISPOSITION:  After the patient's carcinoma had been completely removed with Mohs' Micrographic Surgery, a repair of the surgical defect was undertaken. The patient was returned to the operating suite where the area of right nasal tip was prepped, draped, and anesthetized in the usual sterile fashion. A Burow's advancement flap was designed in the inferior surrounding tissue of the nasal tip. A Burow's triangle was drawn in superiorly to help with tissue movement. Then with a #15 blade the flap was incised and the Burow's triangle was excised, the area was widely undermined in the submuscular tissue plane. Then, electrocoagulation was used to obtain meticulous hemostasis. A 5-0 Vicryl pexing suture was performed by attaching the underside of the most medial aspect of the flap to the nasal tip. The flap was then advanced in by a complex closure. Then, 5-0 Vicryl buried vertical mattress sutures were placed into the subcutaneous and dermal plane to close the wound and emily the cutaneous wound edge. The flap was then trimmed to fit the defect. The cutaneous wound edges were closed using interrupted 5-0 Prolene sutures.    The patient tolerated the procedure well.    The area was cleaned and dressed appropriately and the patient was given wound care instructions, as well as an appointment for follow-up evaluation. Patient was placed on Percocet 5 prn postop pain and doxycycline 100 mg BID x 10 days. Disc photosens, GI.     SIZE OF FLAP: 6 cm squared    Vitals:    12/13/18 0729   BP: 132/72   BP Location: Right arm   Patient Position:  "Sitting   BP Method: Small (Automatic)   Pulse: 67   Weight: 71.2 kg (157 lb)   Height: 6' 1" (1.854 m)         "

## 2018-12-20 ENCOUNTER — OFFICE VISIT (OUTPATIENT)
Dept: DERMATOLOGY | Facility: CLINIC | Age: 63
End: 2018-12-20
Payer: MEDICARE

## 2018-12-20 DIAGNOSIS — Z09 POSTOP CHECK: Primary | ICD-10-CM

## 2018-12-20 PROCEDURE — 99024 POSTOP FOLLOW-UP VISIT: CPT | Mod: S$GLB,,, | Performed by: DERMATOLOGY

## 2018-12-20 PROCEDURE — 99999 PR PBB SHADOW E&M-EST. PATIENT-LVL III: CPT | Mod: PBBFAC,,, | Performed by: DERMATOLOGY

## 2018-12-20 NOTE — PROGRESS NOTES
63 y.o. male patient is here for suture removal following Mohs' surgery.    Patient reports no problems.    WOUND PE:  The R nasal tip sutures intact. Wound healing well. Good skin edges. No signs or symptoms of infection. Flap is pink and viable.      IMPRESSION:  Healing operative site from Mohs' surgery, BCC R nasal tip s/p Mohs with Burow's Advancement Flap, postop day #7.    PLAN:  Sutures removed today. Steri-strips applied.  Continue wound care.  Keep moist with Aquaphor.    RTC:  In 3 weeks for Mohs surgery on R forehead for BCC.

## 2018-12-24 ENCOUNTER — DOCUMENTATION ONLY (OUTPATIENT)
Dept: FAMILY MEDICINE | Facility: CLINIC | Age: 63
End: 2018-12-24

## 2018-12-24 NOTE — PROGRESS NOTES
Pre-Visit Chart Review  For Appointment Scheduled on 12/26/18    Health Maintenance Due   Topic Date Due    TETANUS VACCINE  01/01/1975    Zoster Vaccine  01/01/2017    Pneumococcal Vaccine (Highest Risk) (2 of 3 - PPSV23) 01/02/2017    Colonoscopy  03/14/2018    Influenza Vaccine  08/01/2018

## 2018-12-26 ENCOUNTER — OFFICE VISIT (OUTPATIENT)
Dept: FAMILY MEDICINE | Facility: CLINIC | Age: 63
End: 2018-12-26
Payer: MEDICARE

## 2018-12-26 VITALS
SYSTOLIC BLOOD PRESSURE: 131 MMHG | HEART RATE: 62 BPM | DIASTOLIC BLOOD PRESSURE: 72 MMHG | TEMPERATURE: 98 F | BODY MASS INDEX: 21.24 KG/M2 | HEIGHT: 73 IN | WEIGHT: 160.25 LBS

## 2018-12-26 DIAGNOSIS — K76.0 FATTY LIVER: ICD-10-CM

## 2018-12-26 DIAGNOSIS — R10.9 CHRONIC ABDOMINAL PAIN: ICD-10-CM

## 2018-12-26 DIAGNOSIS — D63.8 ANEMIA IN OTHER CHRONIC DISEASES CLASSIFIED ELSEWHERE: ICD-10-CM

## 2018-12-26 DIAGNOSIS — K50.919 CROHN'S DISEASE WITH COMPLICATION, UNSPECIFIED GASTROINTESTINAL TRACT LOCATION: Primary | ICD-10-CM

## 2018-12-26 DIAGNOSIS — K90.829 SHORT BOWEL SYNDROME: ICD-10-CM

## 2018-12-26 DIAGNOSIS — G89.29 CHRONIC ABDOMINAL PAIN: ICD-10-CM

## 2018-12-26 PROCEDURE — 3008F BODY MASS INDEX DOCD: CPT | Mod: CPTII,S$GLB,, | Performed by: FAMILY MEDICINE

## 2018-12-26 PROCEDURE — 99999 PR PBB SHADOW E&M-EST. PATIENT-LVL III: CPT | Mod: PBBFAC,,, | Performed by: FAMILY MEDICINE

## 2018-12-26 PROCEDURE — 99214 OFFICE O/P EST MOD 30 MIN: CPT | Mod: 25,S$GLB,, | Performed by: FAMILY MEDICINE

## 2018-12-26 PROCEDURE — 99499 UNLISTED E&M SERVICE: CPT | Mod: S$GLB,,, | Performed by: FAMILY MEDICINE

## 2018-12-26 PROCEDURE — G0009 ADMIN PNEUMOCOCCAL VACCINE: HCPCS | Mod: S$GLB,,, | Performed by: FAMILY MEDICINE

## 2018-12-26 PROCEDURE — 90732 PPSV23 VACC 2 YRS+ SUBQ/IM: CPT | Mod: S$GLB,,, | Performed by: FAMILY MEDICINE

## 2018-12-26 RX ORDER — ONDANSETRON HYDROCHLORIDE 8 MG/1
TABLET, FILM COATED ORAL
Qty: 180 TABLET | Refills: 1 | Status: SHIPPED | OUTPATIENT
Start: 2018-12-26 | End: 2019-06-28 | Stop reason: SDUPTHER

## 2018-12-26 NOTE — PROGRESS NOTES
Administered Pneumovax 23 vaccine, IM. Identified patient's name&. Patient tolerated well, aseptic technique maintained. Pain scale 0/10 with injection. Instructed patient to wait in the clinic for 15 minutes after the shot was given.

## 2018-12-26 NOTE — PROGRESS NOTES
Patient, Tristin Cerda (MRN #[id number]), presented with a recent Platelet count less than 150 K/uL consistent with the definition of thrombocytopenia (ICD10 - D69.6).    Platelets   Date Value Ref Range Status   03/06/2018 126 (L) 150 - 350 K/uL Final     The patient's thrombocytopenia was monitored, evaluated, addressed and/or treated. This addendum to the medical record is made on 12/26/2018.

## 2018-12-26 NOTE — PROGRESS NOTES
Subjective:   Patient ID: Tristin Cerda is a 63 y.o. male     Chief Complaint:Establish Care      Patient with Crohn's disease currently stable.  Patient with chronic short bowel syndrome currently stable.  Patient with chronic fatty liver disease currently stable.      Review of Systems   Respiratory: Negative for shortness of breath.    Cardiovascular: Negative for chest pain.   Gastrointestinal: Positive for abdominal pain, nausea and vomiting.   Genitourinary: Negative for dysuria.     Past Medical History:   Diagnosis Date    Anxiety     Basal cell carcinoma 07/2017    R forehead and R temple    Crohn's disease     age 17    Short bowel syndrome     Vitamin B deficiency     Vitamin D deficiency      Objective:     Vitals:    12/26/18 0910   BP: 131/72   Pulse: 62   Temp: 97.8 °F (36.6 °C)     Body mass index is 21.15 kg/m².  Physical Exam   Neck: Neck supple. No tracheal deviation present.   Cardiovascular: Normal rate, regular rhythm and normal heart sounds.   Pulmonary/Chest: Effort normal. No respiratory distress.   Musculoskeletal: Normal range of motion. He exhibits no edema.     Assessment:     1. Crohn's disease with complication, unspecified gastrointestinal tract location    2. Abnormal finding of blood chemistry     3. Anemia     4. Fatty liver    5. Short bowel syndrome    6. Chronic abdominal pain      Plan:   Crohn's disease with complication, unspecified gastrointestinal tract location  -     CBC auto differential; Future; Expected date: 12/26/2018  -     Comprehensive metabolic panel; Future; Expected date: 12/26/2018  -     Lipid panel; Future; Expected date: 12/26/2018  -     TSH; Future; Expected date: 12/26/2018  -     CBC auto differential; Future; Expected date: 12/26/2018  -     Comprehensive metabolic panel; Future; Expected date: 12/26/2018    Abnormal finding of blood chemistry   -     Lipid panel; Future; Expected date: 12/26/2018    Anemia   -     TSH; Future; Expected  date: 12/26/2018  -     TSH; Future; Expected date: 12/26/2018  Review of blood work from March 2018 shows mild anemia.  Will continue to monitor this regularly.    Fatty liver  -     Lipid panel; Future; Expected date: 12/26/2018  Review of lipid panel from November 2017 shows very well-controlled LDL cholesterol at 50.  Will continue to monitor this annually.    Short bowel syndrome  -     ondansetron (ZOFRAN) 8 MG tablet; TAKE 1 TABLET EVERY 12 HOURS AS NEEDED FOR NAUSEA  Dispense: 180 tablet; Refill: 1    Chronic abdominal pain  -     ondansetron (ZOFRAN) 8 MG tablet; TAKE 1 TABLET EVERY 12 HOURS AS NEEDED FOR NAUSEA  Dispense: 180 tablet; Refill: 1    Other orders  -     (In Office Administered) Pneumococcal Polysaccharide Vaccine (23 Valent) (SQ/IM)    Time spent with patient: 30 minutes and over half of that time was spent on counseling an coordination of care.    Tyler Smith MD  12/26/2018    Portions of this note have been dictated with KAT Zapata

## 2018-12-27 LAB
ALBUMIN SERPL-MCNC: 4.2 G/DL (ref 3.6–5.1)
ALBUMIN/GLOB SERPL: 2 (CALC) (ref 1–2.5)
ALP SERPL-CCNC: 174 U/L (ref 40–115)
ALT SERPL-CCNC: 33 U/L (ref 9–46)
AST SERPL-CCNC: 25 U/L (ref 10–35)
BASOPHILS # BLD AUTO: 21 CELLS/UL (ref 0–200)
BASOPHILS NFR BLD AUTO: 0.6 %
BILIRUB SERPL-MCNC: 0.6 MG/DL (ref 0.2–1.2)
BUN SERPL-MCNC: 14 MG/DL (ref 7–25)
BUN/CREAT SERPL: ABNORMAL (CALC) (ref 6–22)
CALCIUM SERPL-MCNC: 9.1 MG/DL (ref 8.6–10.3)
CHLORIDE SERPL-SCNC: 102 MMOL/L (ref 98–110)
CHOLEST SERPL-MCNC: 121 MG/DL
CHOLEST/HDLC SERPL: 2.7 (CALC)
CO2 SERPL-SCNC: 27 MMOL/L (ref 20–32)
CREAT SERPL-MCNC: 0.73 MG/DL (ref 0.7–1.25)
EOSINOPHIL # BLD AUTO: 88 CELLS/UL (ref 15–500)
EOSINOPHIL NFR BLD AUTO: 2.5 %
ERYTHROCYTE [DISTWIDTH] IN BLOOD BY AUTOMATED COUNT: 13.1 % (ref 11–15)
GFR SERPL CREATININE-BSD FRML MDRD: 100 ML/MIN/1.73M2
GLOBULIN SER CALC-MCNC: 2.1 G/DL (CALC) (ref 1.9–3.7)
GLUCOSE SERPL-MCNC: 98 MG/DL (ref 65–139)
HCT VFR BLD AUTO: 40.9 % (ref 38.5–50)
HDLC SERPL-MCNC: 45 MG/DL
HGB BLD-MCNC: 13.6 G/DL (ref 13.2–17.1)
LDLC SERPL CALC-MCNC: 56 MG/DL (CALC)
LYMPHOCYTES # BLD AUTO: 655 CELLS/UL (ref 850–3900)
LYMPHOCYTES NFR BLD AUTO: 18.7 %
MCH RBC QN AUTO: 29.4 PG (ref 27–33)
MCHC RBC AUTO-ENTMCNC: 33.3 G/DL (ref 32–36)
MCV RBC AUTO: 88.3 FL (ref 80–100)
MONOCYTES # BLD AUTO: 417 CELLS/UL (ref 200–950)
MONOCYTES NFR BLD AUTO: 11.9 %
NEUTROPHILS # BLD AUTO: 2321 CELLS/UL (ref 1500–7800)
NEUTROPHILS NFR BLD AUTO: 66.3 %
NONHDLC SERPL-MCNC: 76 MG/DL (CALC)
PLATELET # BLD AUTO: 121 THOUSAND/UL (ref 140–400)
PMV BLD REES-ECKER: 10.6 FL (ref 7.5–12.5)
POTASSIUM SERPL-SCNC: 4 MMOL/L (ref 3.5–5.3)
PROT SERPL-MCNC: 6.3 G/DL (ref 6.1–8.1)
RBC # BLD AUTO: 4.63 MILLION/UL (ref 4.2–5.8)
SODIUM SERPL-SCNC: 140 MMOL/L (ref 135–146)
TRIGL SERPL-MCNC: 113 MG/DL
TSH SERPL-ACNC: 2.41 MIU/L (ref 0.4–4.5)
WBC # BLD AUTO: 3.5 THOUSAND/UL (ref 3.8–10.8)

## 2019-01-04 ENCOUNTER — PROCEDURE VISIT (OUTPATIENT)
Dept: DERMATOLOGY | Facility: CLINIC | Age: 64
End: 2019-01-04
Payer: MEDICARE

## 2019-01-04 DIAGNOSIS — C44.41 BASAL CELL CARCINOMA (BCC) OF LEFT SIDE OF NECK: Primary | ICD-10-CM

## 2019-01-04 DIAGNOSIS — C44.619 BASAL CELL CARCINOMA (BCC) OF LEFT SHOULDER: ICD-10-CM

## 2019-01-04 DIAGNOSIS — D48.9 NEOPLASM OF UNCERTAIN BEHAVIOR: ICD-10-CM

## 2019-01-04 PROCEDURE — 11102 PR TANGENTIAL BIOPSY, SKIN, SINGLE LESION: ICD-10-PCS | Mod: 59,51,S$GLB, | Performed by: DERMATOLOGY

## 2019-01-04 PROCEDURE — 99499 UNLISTED E&M SERVICE: CPT | Mod: S$GLB,,, | Performed by: DERMATOLOGY

## 2019-01-04 PROCEDURE — 12041 INTMD RPR N-HF/GENIT 2.5CM/<: CPT | Mod: 59,S$GLB,, | Performed by: DERMATOLOGY

## 2019-01-04 PROCEDURE — 17262 PR DESTR MALIG TRUNK,EXTREM 1.1-2 CM: ICD-10-PCS | Mod: 59,S$GLB,, | Performed by: DERMATOLOGY

## 2019-01-04 PROCEDURE — 88305 TISSUE SPECIMEN TO PATHOLOGY, DERMATOLOGY: ICD-10-PCS | Mod: 26,,, | Performed by: PATHOLOGY

## 2019-01-04 PROCEDURE — 88305 TISSUE EXAM BY PATHOLOGIST: CPT | Performed by: PATHOLOGY

## 2019-01-04 PROCEDURE — 12041 PR LAYR CLOS WND REST BODY <2.5 CM: ICD-10-PCS | Mod: 59,S$GLB,, | Performed by: DERMATOLOGY

## 2019-01-04 PROCEDURE — 99499 NO LOS: ICD-10-PCS | Mod: S$GLB,,, | Performed by: DERMATOLOGY

## 2019-01-04 PROCEDURE — 17262 DSTRJ MAL LES T/A/L 1.1-2.0: CPT | Mod: 59,S$GLB,, | Performed by: DERMATOLOGY

## 2019-01-04 PROCEDURE — 11622 PR EXC SKIN MALIG 1.1-2 CM REMAINDR BODY: ICD-10-PCS | Mod: 59,S$GLB,, | Performed by: DERMATOLOGY

## 2019-01-04 PROCEDURE — 11622 EXC S/N/H/F/G MAL+MRG 1.1-2: CPT | Mod: 59,S$GLB,, | Performed by: DERMATOLOGY

## 2019-01-04 PROCEDURE — 11102 TANGNTL BX SKIN SINGLE LES: CPT | Mod: 59,51,S$GLB, | Performed by: DERMATOLOGY

## 2019-01-04 NOTE — PROGRESS NOTES
Subjective:       Patient ID:  Tristin Cerda is a 64 y.o. male who presents for   Chief Complaint   Patient presents with    Basal Cell Carcinoma     L neck and L shoulder, excision and EDC     Pt here for definitive treatment of several lesions bx 11/2018  Feeling well today.   Denies pacemaker, defibrillator or blood thinners.     Also c/o new crusted non healing spot on R forearm x 1 month, bleeds easily      FINAL PATHOLOGIC DIAGNOSIS  1. Skin, right forehead, shave biopsy:-- AWAITING MOHS DR HAYWARD  - BASAL CELL CARCINOMA WITH MIXED SUPERFICIAL AND NODULAR GROWTH PATTERN.  - THE TUMOR CLOSELY APPROACHES THE DEEP BIOPSY MARGIN.    2. Skin, right nasal tip, shave biopsy: -- S/P MOHS DR HAYWARD  - BASAL CELL CARCINOMA WITH NODULAR GROWTH PATTERN.  - THE TUMOR EXTENDS TO THE DEEP AND LATERAL BIOPSY MARGINS.    3. Skin, left infra-auricular neck, shave biopsy: - e&s TODAY  - BASAL CELL CARCINOMA WITH MIXED SUPERFICIAL AND NODULAR GROWTH PATTERN.  - THE TUMOR EXTENDS TO A LATERAL BIOPSY MARGIN.    4. Skin, left postauricular neck, shave biopsy:  -MONITOR FOR RECURRENCE  - SQUAMOUS CELL CARCINOMA IN SITU/ BOWEN'S DISEASE.  - MARGINS ARE NEGATIVE IN THE PLANES OF SECTION.    5. Skin, left shoulder, shave biopsy: - ED&C TODAY  - BASAL CELL CARCINOMA WITH MIXED SUPERFICIAL AND NODULAR GROWTH PATTERN.  - THE TUMOR EXTENDS TO A LATERAL BIOPSY MARGIN.          Review of Systems   Constitutional: Negative for fever and chills.        Objective:    Physical Exam   Constitutional: He appears well-developed and well-nourished. No distress.   Neurological: He is alert and oriented to person, place, and time. He is not disoriented.   Psychiatric: He has a normal mood and affect.   Skin:   Areas Examined (abnormalities noted in diagram):   Neck Inspection Performed  RUE Inspected  LUE Inspection Performed                  Diagram Legend     Erythematous scaling macule/papule c/w actinic keratosis       Vascular papule c/w  angioma      Pigmented verrucoid papule/plaque c/w seborrheic keratosis      Yellow umbilicated papule c/w sebaceous hyperplasia      Irregularly shaped tan macule c/w lentigo     1-2 mm smooth white papules consistent with Milia      Movable subcutaneous cyst with punctum c/w epidermal inclusion cyst      Subcutaneous movable cyst c/w pilar cyst      Firm pink to brown papule c/w dermatofibroma      Pedunculated fleshy papule(s) c/w skin tag(s)      Evenly pigmented macule c/w junctional nevus     Mildly variegated pigmented, slightly irregular-bordered macule c/w mildly atypical nevus      Flesh colored to evenly pigmented papule c/w intradermal nevus       Pink pearly papule/plaque c/w basal cell carcinoma      Erythematous hyperkeratotic cursted plaque c/w SCC      Surgical scar with no sign of skin cancer recurrence      Open and closed comedones      Inflammatory papules and pustules      Verrucoid papule consistent consistent with wart     Erythematous eczematous patches and plaques     Dystrophic onycholytic nail with subungual debris c/w onychomycosis     Umbilicated papule    Erythematous-base heme-crusted tan verrucoid plaque consistent with inflamed seborrheic keratosis     Erythematous Silvery Scaling Plaque c/w Psoriasis     See annotation              Assessment / Plan:      Pathology Orders:     Normal Orders This Visit    Tissue Specimen To Pathology, Dermatology     Questions:    Directional Terms:  Other(comment)    Clinical information:  1/2-left neck, bx proven BCC check margins 2/2- R forearm, non healing crusted papule, r/o SCC vs BCC    Specific Site:  1/2- left neck 2/2- R forearm        Basal cell carcinoma (BCC) of left side of neck  PROCEDURE: Elliptical excision with intermediate layered repair in order to decrease dead space, decrease tension and maintain function of area.    ANESTHETIC: 5 cc 1% Xylocaine with Epinephrine 1:100,000, buffered    SURGEON: Ira Bang MD  ASSISTANTS:  Claudia Ruiz LPN      PREOPERATIVE DIAGNOSIS:  Biopsy-proven Basal Cell Carcinoma    POSTOPERATIVE DIAGNOSIS:  Same as preoperative diagnosis    PATHOLOGIC DIAGNOSIS: Pending    LOCATION: left infra-auricular neck    INITIAL LESION SIZE: 0.3x0.8 cm    EXCISED DIAMETER: 1.6 cm    PREPARATION: The diagnosis, procedure, alternatives, benefits and risks, including but not limited to: infection, bleeding/bruising, drug reactions, pain, scar or cosmetic defect, local sensation disturbances, wound dehiscence (separation of wound edges after sutures removed) and/or recurrence of present condition were explained to the patient. The patient elected to proceed.  Patient's identity was verified using 2 patient identifiers and the side and site was verified.  Time out period with surgeon, assistant and patient in surgical suite was taken.    PROCEDURE: The location noted above was prepped and draped in the usual sterile fashion. The area was anesthetized with intradermal buffered xylocaine. Lesional tissue was carefully marked with at least 3 mm margins of clinically normal skin in all directions. A fusiform elliptical excision was done with #15 blade carried down completely through the dermis into the subcutaneous tissues to the level of the subcutaneous fat, and dissection was carried out in that plane.  Electrocoagulation was used to obtain hemostasis. Blood loss was minimal. The wound was then approximated in a layered fashion with subcutaneous and intradermal sutures of 5.0 Monocryl, approximately 4 in number, and the wound was then superficially closed with simple interrupted sutures of 5.0 Prolene.    The patient tolerated the procedure well.    The area was cleaned and dressed appropriately and the patient was given wound care instructions, as well as an appointment for follow-up evaluation.    LENGTH OF REPAIR: 2.5 cm      Basal cell carcinoma (BCC) of left shoulder    Electrodessication and Curettage Procedure  note:    Verbal consent obtained.Time out period with surgeon, assistant and patient in surgical suite was taken. Lesional tissue marked and prepped with alcohol. Lesion anesthetized with 1% lidocaine with epinephrine. Curettage and Desiccation x 3 cycles to base. Aluminum chloride for hemostasis. Lesion size after primary curettage: 1.4 cm    Area bandaged and wound care explained.    Neoplasm of uncertain behavior  Shave biopsy procedure note:    Shave biopsy performed after verbal consent including risk of infection, scar, recurrence, need for additional treatment of site. Area prepped with alcohol, anesthetized with approximately 1.0cc of 1% lidocaine with epinephrine. Lesional tissue shaved with razor blade. Hemostasis achieved with application of aluminum chloride followed by hyfrecation. No complications. Dressing applied. Wound care explained.             No Follow-up on file.

## 2019-01-04 NOTE — PATIENT INSTRUCTIONS
Surgery Wound Care    Your doctor has performed an excision today.  A bandage and vaseline ointment has been placed over the site.  This should remain in place for 24 hours.  It is recommended that you keep the area dry for the first 24 hours.  After 24 hours, you may remove the band aid and wash the area with warm soap and water and apply Vaseline jelly or aquaphore.  Many patients prefer to use Neosporin or Bacitracin ointment.  This is acceptable; however know that you can develop an allergy to this medication even if you have used it safely for years.  It is important to keep the area moist.  Letting it dry out and get air slows healing time, will worsen the scar, and make it more difficult to remove the stitches if they were placed.        If you notice increasing redness, tenderness, pain, or yellow drainage at the biopsy or surgical site, please notify your doctor.  These are signs of an infection.    If your biopsy/surgical site is bleeding, apply firm pressure for 15 minutes straight.  Repeat for another 15 minutes, if it is still bleeding.   If the surgical site continues to bleed, then please contact your doctor.     [facility]  [facility]  [address] [place] [id number]  Dept: [phone]

## 2019-01-08 ENCOUNTER — DOCUMENTATION ONLY (OUTPATIENT)
Dept: FAMILY MEDICINE | Facility: CLINIC | Age: 64
End: 2019-01-08

## 2019-01-08 NOTE — PROGRESS NOTES
Pre-Visit Chart Review  For Appointment Scheduled on 01/11/2019    Health Maintenance Due   Topic Date Due    TETANUS VACCINE  01/01/1975    Zoster Vaccine  01/01/2017    Colonoscopy  03/14/2018

## 2019-01-10 ENCOUNTER — PROCEDURE VISIT (OUTPATIENT)
Dept: DERMATOLOGY | Facility: CLINIC | Age: 64
End: 2019-01-10
Payer: MEDICARE

## 2019-01-10 VITALS
WEIGHT: 160 LBS | HEIGHT: 73 IN | HEART RATE: 60 BPM | BODY MASS INDEX: 21.2 KG/M2 | SYSTOLIC BLOOD PRESSURE: 114 MMHG | DIASTOLIC BLOOD PRESSURE: 67 MMHG

## 2019-01-10 DIAGNOSIS — C44.319 BASAL CELL CARCINOMA OF RIGHT FOREHEAD: Primary | ICD-10-CM

## 2019-01-10 PROCEDURE — 13131 CMPLX RPR F/C/C/M/N/AX/G/H/F: CPT | Mod: 79,51,S$GLB, | Performed by: DERMATOLOGY

## 2019-01-10 PROCEDURE — 17311: ICD-10-PCS | Mod: 79,S$GLB,, | Performed by: DERMATOLOGY

## 2019-01-10 PROCEDURE — 17311 MOHS 1 STAGE H/N/HF/G: CPT | Mod: 79,S$GLB,, | Performed by: DERMATOLOGY

## 2019-01-10 PROCEDURE — 13131 PR RECMPL WND HEAD,FAC,HAND 1.1-2.5 CM: ICD-10-PCS | Mod: 79,51,S$GLB, | Performed by: DERMATOLOGY

## 2019-01-10 PROCEDURE — 99499 UNLISTED E&M SERVICE: CPT | Mod: S$GLB,,, | Performed by: DERMATOLOGY

## 2019-01-10 PROCEDURE — 99499 NO LOS: ICD-10-PCS | Mod: S$GLB,,, | Performed by: DERMATOLOGY

## 2019-01-10 RX ORDER — OXYCODONE AND ACETAMINOPHEN 5; 325 MG/1; MG/1
1 TABLET ORAL
Qty: 20 TABLET | Refills: 0 | Status: SHIPPED | OUTPATIENT
Start: 2019-01-10 | End: 2019-06-12

## 2019-01-10 NOTE — PROGRESS NOTES
PROCEDURE: Mohs' Micrographic Surgery    INDICATION: Location in non-mask areas of face where maximum conservation of tumor-free tissue is needed. Biopsy-proven skin cancer of cosmetically and functionally important areas, including head, neck, genital, hand, foot, or areas known for having difficulty in healing, such as the lower anterior legs.    REFERRING MD: Ira Bang MD    CASE NUMBER:     ANESTHETIC: 3 cc 0.5% Lidocaine with Epi 1:200,000 mixed 1:1 with 0.5% Bupivacaine    SURGICAL PREP: Hibiclens    SURGEON: Teofilo Fernandez MD    ASSISTANTS: Maria Del Carmen Balbuena PA-C and Henrietta Montez Surg Tech    PREOPERATIVE DIAGNOSIS: basal cell carcinoma    POSTOPERATIVE DIAGNOSIS: basal cell carcinoma    PATHOLOGIC DIAGNOSIS: basal cell carcinoma- superficial, nodular    HISTOLOGY OF SPECIMENS IN FIRST STAGE:   Tumor Type: No tumor seen.    STAGES OF MOHS' SURGERY PERFORMED: 1    TUMOR-FREE PLANE ACHIEVED: Yes    HEMOSTASIS: electrocoagulation and 4-0 vicryl suture ties     SPECIMENS: 2     LOCATION: right (superolateral) forehead. Patient verified location with hand held mirror.    INITIAL LESION SIZE: 0.6 x 0.8 cm    FINAL DEFECT SIZE: 0.9 x 1.0 cm    WOUND REPAIR/DISPOSITION: The patient tolerated Mohs' Micrographic Surgery for a basal cell carcinoma very well. When the tumor was completely removed, a repair of the surgical defect was undertaken.      PROCEDURE: Complex Linear Repair    INDICATION: Status post Mohs' Micrographic Surgery for basal cell carcinoma.    CASE NUMBER:     SURGEON: Teofilo Fernandez MD    ASSISTANTS: Maria Del Carmen Balbuena PA-C and Yu Silva Surg Tech    ANESTHETIC: 1.5 cc 1% Lidocaine with Epinephrine 1:100,000    SURGICAL PREP: Hibiclens    LOCATION: right (superolateral) forehead    DEFECT SIZE: 0.9 x 1.0 cm    WOUND REPAIR/DISPOSITION:  After the patient's carcinoma had been completely removed with Mohs' Micrographic Surgery, a repair of the surgical defect was undertaken. The patient  "was returned to the operating suite where the area of right superolateral forehead was prepped, draped, and anesthetized in the usual sterile fashion. The wound was widely undermined in all directions. Then, electrocoagulation was used to obtain meticulous hemostasis. 4-0 Vicryl buried vertical mattress sutures were placed into the subcutaneous and dermal plane to close the wound and emily the cutaneous wound edge. Bilateral dog ears were identified and were removed by a standard Burow's triangle technique. The cutaneous wound edges were closed using interrupted 5-0 Prolene suture.    The patient tolerated the procedure well.    The area was cleaned and dressed appropriately and the patient was given wound care instructions, as well as appointment for follow-up evaluation. Patient was placed on Percocet 5 prn postop pain.    LENGTH OF REPAIR: 1.6 cm    Vitals:    01/10/19 1050 01/10/19 1159   BP: 127/73 114/67   BP Location: Right arm Left arm   Patient Position: Sitting Sitting   BP Method: Small (Automatic) Small (Automatic)   Pulse: 63 60   Weight: 72.6 kg (160 lb) 72.6 kg (160 lb)   Height: 6' 1" (1.854 m) 6' 1" (1.854 m)         "

## 2019-01-11 ENCOUNTER — OFFICE VISIT (OUTPATIENT)
Dept: FAMILY MEDICINE | Facility: CLINIC | Age: 64
End: 2019-01-11
Payer: MEDICARE

## 2019-01-11 VITALS
HEIGHT: 72 IN | HEART RATE: 75 BPM | DIASTOLIC BLOOD PRESSURE: 74 MMHG | BODY MASS INDEX: 21.68 KG/M2 | SYSTOLIC BLOOD PRESSURE: 128 MMHG | TEMPERATURE: 98 F | WEIGHT: 160.06 LBS

## 2019-01-11 DIAGNOSIS — K90.829 SHORT BOWEL SYNDROME: ICD-10-CM

## 2019-01-11 DIAGNOSIS — K50.919 CROHN'S DISEASE WITH COMPLICATION, UNSPECIFIED GASTROINTESTINAL TRACT LOCATION: ICD-10-CM

## 2019-01-11 DIAGNOSIS — D61.818 PANCYTOPENIA: ICD-10-CM

## 2019-01-11 DIAGNOSIS — E53.8 VITAMIN B 12 DEFICIENCY: Primary | ICD-10-CM

## 2019-01-11 PROCEDURE — 99999 PR PBB SHADOW E&M-EST. PATIENT-LVL V: ICD-10-PCS | Mod: PBBFAC,,, | Performed by: PHYSICIAN ASSISTANT

## 2019-01-11 PROCEDURE — 3008F PR BODY MASS INDEX (BMI) DOCUMENTED: ICD-10-PCS | Mod: CPTII,S$GLB,, | Performed by: PHYSICIAN ASSISTANT

## 2019-01-11 PROCEDURE — 99213 PR OFFICE/OUTPT VISIT, EST, LEVL III, 20-29 MIN: ICD-10-PCS | Mod: S$GLB,,, | Performed by: PHYSICIAN ASSISTANT

## 2019-01-11 PROCEDURE — 3008F BODY MASS INDEX DOCD: CPT | Mod: CPTII,S$GLB,, | Performed by: PHYSICIAN ASSISTANT

## 2019-01-11 PROCEDURE — 99999 PR PBB SHADOW E&M-EST. PATIENT-LVL V: CPT | Mod: PBBFAC,,, | Performed by: PHYSICIAN ASSISTANT

## 2019-01-11 PROCEDURE — 99213 OFFICE O/P EST LOW 20 MIN: CPT | Mod: S$GLB,,, | Performed by: PHYSICIAN ASSISTANT

## 2019-01-11 RX ORDER — CYANOCOBALAMIN 1000 UG/ML
1000 INJECTION, SOLUTION INTRAMUSCULAR; SUBCUTANEOUS
Qty: 10 ML | Refills: 11 | Status: SHIPPED | OUTPATIENT
Start: 2019-01-11 | End: 2023-10-27

## 2019-01-11 RX ORDER — SYRINGE-NEEDLE,INSULIN,0.5 ML 27GX1/2"
SYRINGE, EMPTY DISPOSABLE MISCELLANEOUS
Qty: 3 EACH | Refills: 3 | Status: SHIPPED | OUTPATIENT
Start: 2019-01-11 | End: 2019-06-12

## 2019-01-11 NOTE — PROGRESS NOTES
Subjective:       Patient ID: Tristin Cerda is a 64 y.o. male.    Chief Complaint: Medication Management (discuss B12 injection)    Patient with Crohn's disease status post small bowel resection over 15 years ago presents for routine follow-up.  Patient has not had a Crohn's flare in many years.  Symptoms are controlled at the present time.  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  He reports that TPN was stopped approximately 5 years ago although provider notes report 2 years off of parenteral nutrition due to vascular access issues.  He has does fairly well off of TPN but continues to have issues with vitamin and mineral insufficiencies.   He is not seen GI since last year when colonoscopy was recommended.  He unable to tolerate fasting and bowel prep.  He was previously followed by external Hematology for B12 deficiency and pancytopenia.  He was seen by hepatology last year for positive hepatitis B antibody.  This was determined to be a false positive test.  He continues to have slightly elevated alkaline phosphatase and hepatology had considered MRI to determine if liver biopsy would be needed.  He is not ready to follow up with this recommendation.    Presently he is requesting a refill of his B12.  He states that his previous hematologist has left the practice.  He was receiving B12 injections on a monthly basis.  He has no other complaints today and he is feeling well      Review of Systems   Constitutional: Negative for activity change, appetite change, fatigue and unexpected weight change.   Respiratory: Negative for cough, chest tightness and shortness of breath.    Cardiovascular: Negative for chest pain, palpitations and leg swelling.   Gastrointestinal: Negative for abdominal pain, anal bleeding, blood in stool, constipation, diarrhea and nausea.   Endocrine: Negative for polydipsia and polyuria.   Genitourinary: Negative for  "difficulty urinating, frequency, hematuria and urgency.   Musculoskeletal: Negative for arthralgias.   Skin: Negative for rash.   Neurological: Negative for dizziness and headaches.   Psychiatric/Behavioral: Negative for dysphoric mood. The patient is not nervous/anxious.        Objective:      Physical Exam   Constitutional: He appears well-developed and well-nourished. He is cooperative. No distress.   Eyes: Conjunctivae and EOM are normal. No scleral icterus.   Neck: Carotid bruit is not present.   Cardiovascular: Normal rate, regular rhythm and normal heart sounds.   Pulmonary/Chest: Effort normal and breath sounds normal.   Abdominal: Soft. Bowel sounds are normal. There is no tenderness.   Musculoskeletal:        Right lower leg: He exhibits no edema.        Left lower leg: He exhibits no edema.   Neurological: He is alert.   Vitals reviewed.      Assessment:       1. Vitamin B 12 deficiency    2. Crohn's disease with complication, unspecified gastrointestinal tract location    3. Short bowel syndrome    4. Pancytopenia        Plan:       Tristin was seen today for medication management.    Diagnoses and all orders for this visit:    Vitamin B 12 deficiency  -     Vitamin B12; Future  -     Vitamin B12  -     Ambulatory referral to Hematology / Oncology   cyanocobalamin 1,000 mcg/mL injection; Inject 1 mL (1,000 mcg total) into the muscle every 30 days.  -     insulin syringe-needle U-100 (INSULIN SYRINGE) 1 mL 28 gauge x 1/2" Syrg; For use with b12      Crohn's disease with complication, unspecified gastrointestinal tract location  Again recommending GI follow-up  Short bowel syndrome  Again recommending GI follow-up  Pancytopenia  -     Ambulatory referral to Hematology / Oncology    Other orders  -      "

## 2019-01-13 LAB — VIT B12 SERPL-MCNC: 457 PG/ML (ref 200–1100)

## 2019-01-15 ENCOUNTER — CLINICAL SUPPORT (OUTPATIENT)
Dept: DERMATOLOGY | Facility: CLINIC | Age: 64
End: 2019-01-15
Payer: MEDICARE

## 2019-01-15 VITALS — HEIGHT: 72 IN | WEIGHT: 160 LBS | BODY MASS INDEX: 21.67 KG/M2

## 2019-01-15 DIAGNOSIS — T14.8XXA SUTURE OF SKIN WOUND: Primary | ICD-10-CM

## 2019-01-15 PROCEDURE — 99024 PR POST-OP FOLLOW-UP VISIT: ICD-10-PCS | Mod: S$GLB,,, | Performed by: DERMATOLOGY

## 2019-01-15 PROCEDURE — 99999 PR PBB SHADOW E&M-EST. PATIENT-LVL II: ICD-10-PCS | Mod: PBBFAC,,,

## 2019-01-15 PROCEDURE — 99999 PR PBB SHADOW E&M-EST. PATIENT-LVL II: CPT | Mod: PBBFAC,,,

## 2019-01-15 PROCEDURE — 99024 POSTOP FOLLOW-UP VISIT: CPT | Mod: S$GLB,,, | Performed by: DERMATOLOGY

## 2019-01-15 NOTE — PROGRESS NOTES
Suture Removal note:  CC: 64 y.o. male patient is here for suture removal.         HPI: Patient is s/p excision of left neck.  Patient reports no problems.    WOUND PE:  Sutures intact.  Wound healing well.  Good approximation of skin edges.  No signs or symptoms of infection.        PLAN:  Sutures removed today from neck.  Has f/u with Dr. Fernandez for other sites Thursday.  Continue wound care.    RTC to clinic as planned for re eval of other sites.

## 2019-01-17 ENCOUNTER — TELEPHONE (OUTPATIENT)
Dept: HEMATOLOGY/ONCOLOGY | Facility: CLINIC | Age: 64
End: 2019-01-17

## 2019-01-17 ENCOUNTER — OFFICE VISIT (OUTPATIENT)
Dept: DERMATOLOGY | Facility: CLINIC | Age: 64
End: 2019-01-17
Payer: MEDICARE

## 2019-01-17 DIAGNOSIS — Z09 POSTOP CHECK: Primary | ICD-10-CM

## 2019-01-17 PROCEDURE — 99024 POSTOP FOLLOW-UP VISIT: CPT | Mod: S$GLB,,, | Performed by: DERMATOLOGY

## 2019-01-17 PROCEDURE — 99999 PR PBB SHADOW E&M-EST. PATIENT-LVL III: CPT | Mod: PBBFAC,,, | Performed by: DERMATOLOGY

## 2019-01-17 PROCEDURE — 99999 PR PBB SHADOW E&M-EST. PATIENT-LVL III: ICD-10-PCS | Mod: PBBFAC,,, | Performed by: DERMATOLOGY

## 2019-01-17 PROCEDURE — 99024 PR POST-OP FOLLOW-UP VISIT: ICD-10-PCS | Mod: S$GLB,,, | Performed by: DERMATOLOGY

## 2019-01-17 NOTE — PROGRESS NOTES
64 y.o. male patient is here for suture removal following Mohs' surgery.    Patient reports no problems.    WOUND PE:  The R forehead sutures intact. Wound healing well. Good skin edges. No signs or symptoms of infection.    IMPRESSION:  Healing operative site from Mohs' surgery, BCC R forehead s/p Mohs with CLC, postop day #7.    PLAN:  Sutures removed today. Steri-strips applied.  Continue wound care.  Keep moist with Aquaphor.    RTC:  In 3-6 months with Ira Bang MD for skin check or sooner if new concern arises.

## 2019-01-17 NOTE — TELEPHONE ENCOUNTER
Patient called on both number to schedule referral appointment for  Pancytopenia and B12 def. No voicemail on one number and mailbox full on other. Will continue to try and contact patient.

## 2019-01-18 ENCOUNTER — TELEPHONE (OUTPATIENT)
Dept: FAMILY MEDICINE | Facility: CLINIC | Age: 64
End: 2019-01-18

## 2019-01-18 NOTE — TELEPHONE ENCOUNTER
----- Message from Flex Kennedy sent at 1/18/2019  3:22 PM CST -----  Contact: pt  He's calling in regards to his Rx medication, pt states this an issue with his B12 Injections and his INS, pls advise, [phone] (cell)

## 2019-01-30 ENCOUNTER — INITIAL CONSULT (OUTPATIENT)
Dept: HEMATOLOGY/ONCOLOGY | Facility: CLINIC | Age: 64
End: 2019-01-30
Payer: MEDICARE

## 2019-01-30 VITALS
DIASTOLIC BLOOD PRESSURE: 68 MMHG | HEART RATE: 98 BPM | SYSTOLIC BLOOD PRESSURE: 145 MMHG | RESPIRATION RATE: 16 BRPM | HEIGHT: 72 IN | OXYGEN SATURATION: 96 % | BODY MASS INDEX: 21.36 KG/M2 | WEIGHT: 157.69 LBS

## 2019-01-30 DIAGNOSIS — Z86.2 HISTORY OF ANEMIA: ICD-10-CM

## 2019-01-30 DIAGNOSIS — E63.9 NUTRITIONAL DEFICIENCY DISORDER: ICD-10-CM

## 2019-01-30 DIAGNOSIS — E53.8 VITAMIN B 12 DEFICIENCY: Primary | ICD-10-CM

## 2019-01-30 DIAGNOSIS — D69.6 THROMBOCYTOPENIA: ICD-10-CM

## 2019-01-30 DIAGNOSIS — D72.819 LEUKOPENIA, UNSPECIFIED TYPE: ICD-10-CM

## 2019-01-30 PROCEDURE — 99204 PR OFFICE/OUTPT VISIT, NEW, LEVL IV, 45-59 MIN: ICD-10-PCS | Mod: S$GLB,,, | Performed by: INTERNAL MEDICINE

## 2019-01-30 PROCEDURE — 99999 PR PBB SHADOW E&M-EST. PATIENT-LVL III: ICD-10-PCS | Mod: PBBFAC,,, | Performed by: INTERNAL MEDICINE

## 2019-01-30 PROCEDURE — 99204 OFFICE O/P NEW MOD 45 MIN: CPT | Mod: S$GLB,,, | Performed by: INTERNAL MEDICINE

## 2019-01-30 PROCEDURE — 99999 PR PBB SHADOW E&M-EST. PATIENT-LVL III: CPT | Mod: PBBFAC,,, | Performed by: INTERNAL MEDICINE

## 2019-01-30 PROCEDURE — 3008F BODY MASS INDEX DOCD: CPT | Mod: CPTII,S$GLB,, | Performed by: INTERNAL MEDICINE

## 2019-01-30 PROCEDURE — 3008F PR BODY MASS INDEX (BMI) DOCUMENTED: ICD-10-PCS | Mod: CPTII,S$GLB,, | Performed by: INTERNAL MEDICINE

## 2019-01-30 NOTE — PROGRESS NOTES
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  This pt was referred by KAT Mccauley for B 12 deficiency and anemia. He was on TPN for years and now has no access to continue such  Pt was on B12 injections monthly and ran out after December 2018. His doctor is in [place] and would like to try and get his care here at [place]  Labs recently show normal B12 levels , he is not anemia. He does have leukopenia. He does not suffer with recurring infections . He does have Crohns and suffers with malabsorption    Past Medical History:   Diagnosis Date    Anxiety     Basal cell carcinoma 07/2017    R forehead and R temple    Crohn's disease     age 17    Short bowel syndrome     Vitamin B deficiency     Vitamin D deficiency      Past Surgical History:   Procedure Laterality Date    3 small bowel resections      APPENDECTOMY      CHOLECYSTECTOMY      COLONOSCOPY      COLONOSCOPY N/A 3/14/2017    [facility] ([facility]LR)    COLONOSCOPY N/A 2/14/2017    [facility] ENDO (4TH FLR)    ESOPHAGOGASTRODUODENOSCOPY (EGD) N/A 2/14/2017    [facility] ENDO (4TH FLR)    KNEE SURGERY      SMALL INTESTINE SURGERY      TUBE THORACOTOMY      UPPER GASTROINTESTINAL ENDOSCOPY         Current Outpatient Medications:     acetaminophen (TYLENOL) 325 mg/10.15 mL Soln, , Disp: , Rfl:     acetaminophen (TYLENOL) 500 mg Cap, , Disp: , Rfl:     ASPIRIN (ASPIR-81 ORAL), Take by mouth., Disp: , Rfl:     cyanocobalamin 1,000 mcg/mL injection, Inject 1 mL (1,000 mcg total) into the muscle every 30 days., Disp: 10 mL, Rfl: 11    dicyclomine (BENTYL) 20 mg tablet, TAKE 1 TABLET FOUR TIMES DAILY, Disp: 360 tablet, Rfl: 0    diphenhydramine-acetaminophen 12.5-325 mg Tab, , Disp: , Rfl:     hydrocortisone 2.5 % lotion, AAA face QHS PRN flare, Disp: 60 mL, Rfl: 2    insulin syringe-needle U-100  "(INSULIN SYRINGE) 1 mL 28 gauge x 1/2" Syrg, For use with b12, Disp: 3 each, Rfl: 3    meperidine (DEMEROL) 50 mg tablet, Take 1 tablet (50 mg total) by mouth every 8 (eight) hours as needed for Pain., Disp: 90 tablet, Rfl: 0    mupirocin (BACTROBAN) 2 % ointment, Apply topically 3 (three) times daily., Disp: 30 g, Rfl: 0    NAPROXEN SODIUM ORAL, as needed. , Disp: , Rfl:     ondansetron (ZOFRAN) 8 MG tablet, TAKE 1 TABLET EVERY 12 HOURS AS NEEDED FOR NAUSEA, Disp: 180 tablet, Rfl: 1    oxyCODONE-acetaminophen (PERCOCET) 5-325 mg per tablet, Take 1 tablet by mouth every 4 to 6 hours as needed for Pain., Disp: 20 tablet, Rfl: 0    phenylephrine-acetaminophen 5-325 mg Cap, , Disp: , Rfl:   Review of patient's allergies indicates:   Allergen Reactions    Ciprofloxacin     Codeine Itching    Compazine [prochlorperazine]     Erythromycin     Keflex [cephalexin]      Social History     Tobacco Use    Smoking status: Former Smoker     Packs/day: 1.00     Years: 15.00     Pack years: 15.00     Types: Cigarettes     Last attempt to quit: 3/10/1995     Years since quittin.9    Smokeless tobacco: Never Used   Substance Use Topics    Alcohol use: Yes     Alcohol/week: 1.2 oz     Types: 1 Cans of beer, 1 Shots of liquor per week     Comment: 1 every 6 -7 months    Drug use: No     Family History   Problem Relation Age of Onset    Diabetes Mother     Stroke Mother     Diabetes Father     Kidney disease Father     Irritable bowel syndrome Cousin     Celiac disease Neg Hx     Cirrhosis Neg Hx     Colon cancer Neg Hx     Colon polyps Neg Hx     Cystic fibrosis Neg Hx     Esophageal cancer Neg Hx     Crohn's disease Neg Hx     Hemochromatosis Neg Hx     Inflammatory bowel disease Neg Hx     Liver cancer Neg Hx     Liver disease Neg Hx     Rectal cancer Neg Hx     Stomach cancer Neg Hx     Ulcerative colitis Neg Hx     Addison's disease Neg Hx     Melanoma Neg Hx     Psoriasis Neg Hx     Lupus " Neg Hx     Eczema Neg Hx        CONSTITUTIONAL: No fevers, chills, night sweats, wt. loss, appetite changes  SKIN: no rashes or itching  ENT: No headaches, head trauma, vision changes, or eye pain  LYMPH NODES: None noticed   CV: No chest pain, palpitations.   RESP: No dyspnea on exertion, cough, wheezing, or hemoptysis  GI: No nausea, emesis, diarrhea, constipation, melena, hematochezia, pain.   : No dysuria, hematuria, urgency, or frequency   HEME: No easy bruising, bleeding problems  PSYCHIATRIC: No depression, anxiety, psychosis, hallucinations.  NEURO: No seizures, memory loss, dizziness or difficulty sleeping  MSK: No arthralgias or joint swelling         BP (!) 145/68 (BP Location: Left arm, Patient Position: Sitting, BP Method: Large (Automatic))   Pulse 98   Resp 16   Ht 6' (1.829 m)   Wt 71.5 kg (157 lb 11.2 oz)   SpO2 96%   BMI 21.39 kg/m²   Gen: NAD, A and O x3,   Psych: pleasant affect, normal thought process  Eyes: Pupils round and non dilated, EOM intact  Nose: Nares patent  OP clear, mucosa patent  Neck: suppple, no JVD, trachea midline, no palpable mass, no adenopathy  Lungs: CTAB, no wheezes, no use of accessory muscles  CV: S1S2 with RRR, No mrg  Abd: soft, NTND, + BS, No HSM, no ascites  Extr: No CCE, DENISSE, strength normal, good capillary refill  Neuro: steady gait, CNs grossly intact  Skin: intact, no lesions noted  Rheum: No joint swelling    Lab Results   Component Value Date    WBC 3.5 (L) 12/26/2018    HGB 13.6 12/26/2018    HCT 40.9 12/26/2018    MCV 88.3 12/26/2018     (L) 12/26/2018     CMP  Sodium   Date Value Ref Range Status   12/26/2018 140 135 - 146 mmol/L Final     Potassium   Date Value Ref Range Status   12/26/2018 4.0 3.5 - 5.3 mmol/L Final     Chloride   Date Value Ref Range Status   12/26/2018 102 98 - 110 mmol/L Final     CO2   Date Value Ref Range Status   12/26/2018 27 20 - 32 mmol/L Final     Glucose   Date Value Ref Range Status   12/26/2018 98 65 - 139  mg/dL Final     Comment:               Non-fasting reference interval          BUN, Bld   Date Value Ref Range Status   12/26/2018 14 7 - 25 mg/dL Final     Creatinine   Date Value Ref Range Status   12/26/2018 0.73 0.70 - 1.25 mg/dL Final     Comment:     For patients >49 years of age, the reference limit  for Creatinine is approximately 13% higher for people  identified as -American.          Calcium   Date Value Ref Range Status   12/26/2018 9.1 8.6 - 10.3 mg/dL Final     Total Protein   Date Value Ref Range Status   12/26/2018 6.3 6.1 - 8.1 g/dL Final     Albumin   Date Value Ref Range Status   12/26/2018 4.2 3.6 - 5.1 g/dL Final     Total Bilirubin   Date Value Ref Range Status   12/26/2018 0.6 0.2 - 1.2 mg/dL Final     Alkaline Phosphatase   Date Value Ref Range Status   12/26/2018 174 (H) 40 - 115 U/L Final     AST   Date Value Ref Range Status   12/26/2018 25 10 - 35 U/L Final     ALT   Date Value Ref Range Status   12/26/2018 33 9 - 46 U/L Final     Anion Gap   Date Value Ref Range Status   11/08/2017 6 (L) 8 - 16 mmol/L Final     eGFR if    Date Value Ref Range Status   12/26/2018 116 > OR = 60 mL/min/1.73m2 Final     eGFR if non    Date Value Ref Range Status   12/26/2018 100 > OR = 60 mL/min/1.73m2 Final       Vitamin B 12 deficiency  -     Intrinsic Factor Blocking Ab; Future; Expected date: 01/30/2019  -     Methylmalonic acid, serum; Future; Expected date: 01/30/2019  -     Folate; Future; Expected date: 01/30/2019    Nutritional deficiency disorder  -     Folate; Future; Expected date: 01/30/2019    History of anemia    Leukopenia, unspecified type  -     Folate; Future; Expected date: 01/30/2019    Thrombocytopenia  -     Folate; Future; Expected date: 01/30/2019    Other orders  -     cyanocobalamin injection 1,000 mcg      Pt stats he needs B 12 yet  have no documentation that he is deficient  I explained I need to document tat he needs this replacement  I  will check MMA to see if he has deficiency so that we can get auth to resume his injections here at [place]. I did explain that his alcohol use is causing mild bone marrow suppression leading to leukopenia and thrombocytopenia and it would be best if he could abstain from using alcohol regularly  He should not take any H2 blockers ie pepcid or tagamet or any NSAIDS as this could suppress his marrow further   He does not need a platelet transfusion at this time  He has no evidence of bleeding   RTC after labs for results and further recs  Thank you for allowing me to evaluate and participate in the care of this pleasant patient. Please fell free to call me with any questions or concerns.    Kelsi Andres MD    This note was dictated with Dragon and briefly proofread. Please excuse any sentences that may be unclear or words misspelled

## 2019-01-30 NOTE — LETTER
January 31, 2019      ERIN Canales  [address] [place] [address]           [facility] H[facility]ology [facility]cology  [address] [place] [address]  Phone: [phone]          Patient: Tristin Cerda   MR Number: [id number]   YOB: 1955   Date of Visit: 1/30/2019       Dear Latonia Mccauley:    Thank you for referring Tristin Cerda to me for evaluation. Attached you will find relevant portions of my assessment and plan of care.    If you have questions, please do not hesitate to call me. I look forward to following Tristin Cerda along with you.    Sincerely,    Kelsi Garcia MD    Enclosure  CC:  No Recipients    If you would like to receive this communication electronically, please contact [email or web address] or [phone] to request more information on [employer] Link access.    For providers and/or their staff who would like to refer a patient to [facility], please contact us through our one-stop-shop provider referral line, [facility], at [phone].    If you feel you have received this communication in error or would no longer like to receive these types of communications, please e-mail [email or web address]

## 2019-01-31 DIAGNOSIS — R10.9 CHRONIC ABDOMINAL PAIN: ICD-10-CM

## 2019-01-31 DIAGNOSIS — G89.29 CHRONIC ABDOMINAL PAIN: ICD-10-CM

## 2019-01-31 RX ORDER — DICYCLOMINE HYDROCHLORIDE 20 MG/1
TABLET ORAL
Qty: 360 TABLET | Refills: 3 | Status: SHIPPED | OUTPATIENT
Start: 2019-01-31 | End: 2019-10-16 | Stop reason: SDUPTHER

## 2019-01-31 RX ORDER — CYANOCOBALAMIN 1000 UG/ML
1000 INJECTION, SOLUTION INTRAMUSCULAR; SUBCUTANEOUS
Status: CANCELLED | OUTPATIENT
Start: 2019-02-27

## 2019-01-31 NOTE — TELEPHONE ENCOUNTER
----- Message from Marni Gonzales sent at 1/31/2019  7:46 AM CST -----  Contact: Self  Mr. Crow needs a refill on his dicyclomine (BENTYL) 20 mg tablet. He gets his medication mailed to him through [facility]

## 2019-02-03 LAB — FOLATE SERPL-MCNC: 12.7 NG/ML

## 2019-02-18 RX ORDER — MEPERIDINE HYDROCHLORIDE 50 MG/1
50 TABLET ORAL EVERY 8 HOURS PRN
Qty: 90 TABLET | Refills: 0 | Status: SHIPPED | OUTPATIENT
Start: 2019-02-27 | End: 2019-03-28

## 2019-02-18 RX ORDER — MEPERIDINE HYDROCHLORIDE 50 MG/1
50 TABLET ORAL EVERY 8 HOURS PRN
Qty: 90 TABLET | Refills: 0 | Status: SHIPPED | OUTPATIENT
Start: 2019-03-28 | End: 2019-04-26

## 2019-02-18 RX ORDER — MEPERIDINE HYDROCHLORIDE 50 MG/1
50 TABLET ORAL EVERY 8 HOURS PRN
Qty: 90 TABLET | Refills: 0 | Status: SHIPPED | OUTPATIENT
Start: 2019-04-26 | End: 2019-05-25

## 2019-02-20 ENCOUNTER — OFFICE VISIT (OUTPATIENT)
Dept: PAIN MEDICINE | Facility: CLINIC | Age: 64
End: 2019-02-20
Payer: MEDICARE

## 2019-02-20 VITALS
WEIGHT: 157 LBS | HEIGHT: 73 IN | BODY MASS INDEX: 20.81 KG/M2 | DIASTOLIC BLOOD PRESSURE: 86 MMHG | SYSTOLIC BLOOD PRESSURE: 140 MMHG | HEART RATE: 88 BPM

## 2019-02-20 DIAGNOSIS — K50.90 CROHN'S DISEASE WITHOUT COMPLICATION, UNSPECIFIED GASTROINTESTINAL TRACT LOCATION: ICD-10-CM

## 2019-02-20 DIAGNOSIS — R10.9 CHRONIC ABDOMINAL PAIN: Primary | ICD-10-CM

## 2019-02-20 DIAGNOSIS — G89.29 CHRONIC ABDOMINAL PAIN: Primary | ICD-10-CM

## 2019-02-20 PROCEDURE — 99214 PR OFFICE/OUTPT VISIT, EST, LEVL IV, 30-39 MIN: ICD-10-PCS | Mod: S$GLB,,, | Performed by: PHYSICIAN ASSISTANT

## 2019-02-20 PROCEDURE — 99999 PR PBB SHADOW E&M-EST. PATIENT-LVL IV: ICD-10-PCS | Mod: PBBFAC,,, | Performed by: PHYSICIAN ASSISTANT

## 2019-02-20 PROCEDURE — 99999 PR PBB SHADOW E&M-EST. PATIENT-LVL IV: CPT | Mod: PBBFAC,,, | Performed by: PHYSICIAN ASSISTANT

## 2019-02-20 PROCEDURE — 3008F PR BODY MASS INDEX (BMI) DOCUMENTED: ICD-10-PCS | Mod: CPTII,S$GLB,, | Performed by: PHYSICIAN ASSISTANT

## 2019-02-20 PROCEDURE — 99214 OFFICE O/P EST MOD 30 MIN: CPT | Mod: S$GLB,,, | Performed by: PHYSICIAN ASSISTANT

## 2019-02-20 PROCEDURE — 3008F BODY MASS INDEX DOCD: CPT | Mod: CPTII,S$GLB,, | Performed by: PHYSICIAN ASSISTANT

## 2019-02-20 NOTE — PROGRESS NOTES
PCP: Tyler Smith MD      CC: abdominal pain    Interval history: Mr. Cerda is a 64 y.o. male with an extensive history of crohn's disease who presents today for medication refill. He continues to take Demerol 50mg q 8 hrs as needed with moderate benefit given that his condition is stable.  No side effects reported.   Abdominal pain remains stable.   He does report diarrhea at times but no blood stools. Reports anal pain and itching 2/2 chronic diarrhea. OTC Lidocaine 2 % provides some minimal relief.  Compounding cream was too expensive. Continues to c/o bilateral knee pain . He has had multiple surgeries in the past. He rates his pain 3/10.     Prior HPI:   Mr. Cerda is a 60 year old male with PMH of crohn's disease referred by Dr. Hansen.  Patient states being diagnosed with crohn's disease since the age of 14.    He has had multiple GI surgeries and large bowel and small bowel removals.  During that time, he was being managed by providers in [place].  He was TPN dependent for many years until about two years ago.  He is now stable on an oral diet.  He did not have a primary care physician nor a gastroenterologist for many years.  He is currently trying to establish care.  He has future appointment with Dr. Ch.  He states taking Demerol 50mg q8hrs as needed for pain. It has provided relief of his nausea and pain with diarrhea.  He was last prescribed Demerol in July 2014.  Since then, he has been bearing with the pain.  It is a sharp shooting pain in his mid abdomen.      ROS:  CONSTITUTIONAL: No fevers, chills, night sweats, wt. loss, appetite changes  SKIN: no rashes or itching  ENT: No headaches, head trauma, vision changes, or eye pain  LYMPH NODES: None noticed   CV: No chest pain, palpitations.   RESP: No shortness of breath, dyspnea on exertion, cough, wheezing, or hemoptysis  GI: No nausea, emesis, diarrhea, constipation, melena, hematochezia, pain.    : No dysuria, hematuria,  urgency, or frequency   HEME: No easy bruising, bleeding problems  PSYCHIATRIC: No depression, anxiety, psychosis, hallucinations.  NEURO: No seizures, memory loss, dizziness or difficulty sleeping  MSK: no joint pain      Past Medical History:   Diagnosis Date    Anxiety     Basal cell carcinoma 07/2017    R forehead and R temple    Crohn's disease     age 17    Short bowel syndrome     Vitamin B deficiency     Vitamin D deficiency      Past Surgical History:   Procedure Laterality Date    3 small bowel resections      APPENDECTOMY      CHOLECYSTECTOMY      COLONOSCOPY      COLONOSCOPY N/A 3/14/2017    [facility] END[facility] (4TH FLR)    COLONOSCOPY N/A 2/14/2017    [facility] ENDO (4TH FLR)    ESOPHAGOGASTRODUODENOSCOPY (EGD) N/A 2/14/2017    [facility] ENDO (4TH FLR)    KNEE SURGERY      SMALL INTESTINE SURGERY      TUBE THORACOTOMY      UPPER GASTROINTESTINAL ENDOSCOPY       Family History   Problem Relation Age of Onset    Diabetes Mother     Stroke Mother     Diabetes Father     Kidney disease Father     Irritable bowel syndrome Cousin     Celiac disease Neg Hx     Cirrhosis Neg Hx     Colon cancer Neg Hx     Colon polyps Neg Hx     Cystic fibrosis Neg Hx     Esophageal cancer Neg Hx     Crohn's disease Neg Hx     Hemochromatosis Neg Hx     Inflammatory bowel disease Neg Hx     Liver cancer Neg Hx     Liver disease Neg Hx     Rectal cancer Neg Hx     Stomach cancer Neg Hx     Ulcerative colitis Neg Hx     Addison's disease Neg Hx     Melanoma Neg Hx     Psoriasis Neg Hx     Lupus Neg Hx     Eczema Neg Hx      Social History     Socioeconomic History    Marital status:      Spouse name: None    Number of children: None    Years of education: None    Highest education level: None   Social Needs    Financial resource strain: None    Food insecurity - worry: None    Food  "insecurity - inability: None    Transportation needs - medical: None    Transportation needs - non-medical: None   Occupational History    None   Tobacco Use    Smoking status: Former Smoker     Packs/day: 1.00     Years: 15.00     Pack years: 15.00     Types: Cigarettes     Last attempt to quit: 3/10/1995     Years since quittin.9    Smokeless tobacco: Never Used   Substance and Sexual Activity    Alcohol use: Yes     Alcohol/week: 1.2 oz     Types: 1 Cans of beer, 1 Shots of liquor per week     Comment: 1 every 6 -7 months    Drug use: No    Sexual activity: Yes   Other Topics Concern    None   Social History Narrative    Retired      Medications/Allergies: See med card    Vitals:    19 1021   BP: (!) 140/86   Pulse: 88   Weight: 71.2 kg (157 lb)   Height: 6' 1" (1.854 m)   PainSc:   3   PainLoc: Abdomen     Physical exam:    GENERAL: A and O x3, the patient appears well groomed and is in no acute distress.  Skin: No rashes or obvious lesions  HEENT: normocephalic, atraumatic  CARDIOVASCULAR:  RRR  LUNGS: non labored breathing  ABDOMEN: soft, nontender. No rebound   UPPER EXTREMITIES: Normal alignment, normal range of motion, no atrophy, no skin changes,  hair growth and nail growth normal and equal bilaterally. No swelling, no tenderness.    LOWER EXTREMITIES:  Normal alignment, normal range of motion, no atrophy, no skin changes,  hair growth and nail growth normal and equal bilaterally. No swelling, no tenderness.    LUMBAR SPINE  Lumbar spine: ROM is full with flexion extension and oblique extension with no increased pain.    Lam's test causes no increased pain on either side.    Supine straight leg raise is negative bilaterally.    Internal and external rotation of the hip causes no increased pain on either side.  Myofascial exam: No tenderness to palpation across lumbar paraspinous muscles.    MENTAL STATUS: normal orientation, speech, language, and fund of knowledge for social " situation.  Emotional state appropriate.    CRANIAL NERVES:  II:  PERRL bilaterally,   III,IV,VI: EOMI.    V:  Facial sensation equal bilaterally  VII:  Facial motor function normal.  VIII:  Hearing equal to finger rub bilaterally  IX/X: Gag normal, palate symmetric  XI:  Shoulder shrug equal, head turn equal  XII:  Tongue midline without fasciculations    MOTOR: Tone and bulk: normal bilateral upper and lower Strength: normal   SENSATION: Light touch and pinprick intact bilaterally  REFLEXES: normal, symmetric, nonbrisk.  Toes down, no clonus. No hoffmans.  GAIT: normal rise, base, steps, and arm swing.      Imaging:  None    Assessment:  Mr. Cerda is a 64 y.o. male with abdominal pain  1. Chronic abdominal pain    2. Crohn's disease without complication, unspecified gastrointestinal tract location      Plan:  1. Patient with long history of crohn's disease and chronic abdominal pain.  Continue care with GI.  2. He can continue Demerol as needed for pain.  reviewed.  No signs of aberrant behavior.  Demerol 50mg q8hrs as needed for pain today.  Script given for 3  Months.  3. Continue OTC lidocaine cream  4. May benefit from bilateral knee genicular nerve blocks.   5. F/u 3 months or sooner  All medication management was performed by Dr. Alvin Curry

## 2019-02-26 ENCOUNTER — TELEPHONE (OUTPATIENT)
Dept: HEMATOLOGY/ONCOLOGY | Facility: CLINIC | Age: 64
End: 2019-02-26

## 2019-02-26 NOTE — TELEPHONE ENCOUNTER
Called and left message for pt informing him of his apt tomorrow in [place] with Dr. Garcia, pt was supposed to get labs done at [facility] but I dont see any labs completed. Instructed pt to call

## 2019-02-27 ENCOUNTER — OFFICE VISIT (OUTPATIENT)
Dept: HEMATOLOGY/ONCOLOGY | Facility: CLINIC | Age: 64
End: 2019-02-27
Payer: MEDICARE

## 2019-02-27 VITALS
HEART RATE: 75 BPM | RESPIRATION RATE: 16 BRPM | TEMPERATURE: 98 F | SYSTOLIC BLOOD PRESSURE: 145 MMHG | BODY MASS INDEX: 21.07 KG/M2 | WEIGHT: 159 LBS | HEIGHT: 73 IN | DIASTOLIC BLOOD PRESSURE: 81 MMHG

## 2019-02-27 DIAGNOSIS — E53.8 VITAMIN B 12 DEFICIENCY: ICD-10-CM

## 2019-02-27 DIAGNOSIS — Z86.2 HISTORY OF ANEMIA: ICD-10-CM

## 2019-02-27 DIAGNOSIS — K50.919 CROHN'S DISEASE WITH COMPLICATION, UNSPECIFIED GASTROINTESTINAL TRACT LOCATION: ICD-10-CM

## 2019-02-27 DIAGNOSIS — D69.6 THROMBOCYTOPENIA: ICD-10-CM

## 2019-02-27 DIAGNOSIS — K90.829 SHORT BOWEL SYNDROME: ICD-10-CM

## 2019-02-27 DIAGNOSIS — R76.11 POSITIVE TB TEST: Primary | ICD-10-CM

## 2019-02-27 DIAGNOSIS — D72.810 LYMPHOCYTOPENIA: ICD-10-CM

## 2019-02-27 PROCEDURE — 99215 PR OFFICE/OUTPT VISIT, EST, LEVL V, 40-54 MIN: ICD-10-PCS | Mod: S$GLB,,, | Performed by: INTERNAL MEDICINE

## 2019-02-27 PROCEDURE — 3008F BODY MASS INDEX DOCD: CPT | Mod: CPTII,S$GLB,, | Performed by: INTERNAL MEDICINE

## 2019-02-27 PROCEDURE — 99999 PR PBB SHADOW E&M-EST. PATIENT-LVL III: ICD-10-PCS | Mod: PBBFAC,,, | Performed by: INTERNAL MEDICINE

## 2019-02-27 PROCEDURE — 99215 OFFICE O/P EST HI 40 MIN: CPT | Mod: S$GLB,,, | Performed by: INTERNAL MEDICINE

## 2019-02-27 PROCEDURE — 3008F PR BODY MASS INDEX (BMI) DOCUMENTED: ICD-10-PCS | Mod: CPTII,S$GLB,, | Performed by: INTERNAL MEDICINE

## 2019-02-27 PROCEDURE — 99999 PR PBB SHADOW E&M-EST. PATIENT-LVL III: CPT | Mod: PBBFAC,,, | Performed by: INTERNAL MEDICINE

## 2019-02-27 NOTE — PROGRESS NOTES
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  This pt was referred by KAT Mccauley for B 12 deficiency and anemia. He was on TPN for years and now has no access to continue such  Pt was on B12 injections monthly and ran out after December 2018.   Labs recently show normal B12 levels , he is severely fatigued and has documented short bowel syndrome.  He does have leukopenia. With thrombocytopenia    He has a history  of recurring infections . He does have Crohns and suffers with malabsorption    Past Medical History:   Diagnosis Date    Anxiety     Basal cell carcinoma 07/2017    R forehead and R temple    Crohn's disease     age 17    Short bowel syndrome     Vitamin B deficiency     Vitamin D deficiency      Past Surgical History:   Procedure Laterality Date    3 small bowel resections      APPENDECTOMY      CHOLECYSTECTOMY      COLONOSCOPY      COLONOSCOPY N/A 3/14/2017    [facility] at [facility] (4TH FLR)    COLONOSCOPY N/A 2/14/2017    [facility] at [facility] (4TH FLR)    ESOPHAGOGASTRODUODENOSCOPY (EGD) N/A 2/14/2017    [facility] at [facility] (4TH FLR)    KNEE SURGERY      SMALL INTESTINE SURGERY      TUBE THORACOTOMY      UPPER GASTROINTESTINAL ENDOSCOPY         Current Outpatient Medications:     acetaminophen (TYLENOL) 325 mg/10.15 mL Soln, , Disp: , Rfl:     acetaminophen (TYLENOL) 500 mg Cap, , Disp: , Rfl:     ASPIRIN (ASPIR-81 ORAL), Take by mouth., Disp: , Rfl:     cyanocobalamin 1,000 mcg/mL injection, Inject 1 mL (1,000 mcg total) into the muscle every 30 days., Disp: 10 mL, Rfl: 11    dicyclomine (BENTYL) 20 mg tablet, TAKE 1 TABLET FOUR TIMES DAILY, Disp: 360 tablet, Rfl: 3    diphenhydramine-acetaminophen 12.5-325 mg Tab, , Disp: , Rfl:     hydrocortisone 2.5 % lotion, AAA face QHS PRN flare, Disp: 60 mL, Rfl: 2    insulin syringe-needle U-100 (INSULIN  "SYRINGE) 1 mL 28 gauge x 1/2" Syrg, For use with b12, Disp: 3 each, Rfl: 3    meperidine (DEMEROL) 50 mg tablet, Take 1 tablet (50 mg total) by mouth every 8 (eight) hours as needed for Pain., Disp: 90 tablet, Rfl: 0    [START ON 3/28/2019] meperidine (DEMEROL) 50 mg tablet, Take 1 tablet (50 mg total) by mouth every 8 (eight) hours as needed for Pain., Disp: 90 tablet, Rfl: 0    [START ON 2019] meperidine (DEMEROL) 50 mg tablet, Take 1 tablet (50 mg total) by mouth every 8 (eight) hours as needed for Pain., Disp: 90 tablet, Rfl: 0    mupirocin (BACTROBAN) 2 % ointment, Apply topically 3 (three) times daily., Disp: 30 g, Rfl: 0    NAPROXEN SODIUM ORAL, as needed. , Disp: , Rfl:     ondansetron (ZOFRAN) 8 MG tablet, TAKE 1 TABLET EVERY 12 HOURS AS NEEDED FOR NAUSEA, Disp: 180 tablet, Rfl: 1    oxyCODONE-acetaminophen (PERCOCET) 5-325 mg per tablet, Take 1 tablet by mouth every 4 to 6 hours as needed for Pain., Disp: 20 tablet, Rfl: 0    phenylephrine-acetaminophen 5-325 mg Cap, , Disp: , Rfl:   Review of patient's allergies indicates:   Allergen Reactions    Ciprofloxacin     Codeine Itching    Compazine [prochlorperazine]     Erythromycin     Keflex [cephalexin]      Social History     Tobacco Use    Smoking status: Former Smoker     Packs/day: 1.00     Years: 15.00     Pack years: 15.00     Types: Cigarettes     Last attempt to quit: 3/10/1995     Years since quittin.9    Smokeless tobacco: Never Used   Substance Use Topics    Alcohol use: Yes     Alcohol/week: 1.2 oz     Types: 1 Cans of beer, 1 Shots of liquor per week     Comment: 1 every 6 -7 months    Drug use: No     Family History   Problem Relation Age of Onset    Diabetes Mother     Stroke Mother     Diabetes Father     Kidney disease Father     Irritable bowel syndrome Cousin     Celiac disease Neg Hx     Cirrhosis Neg Hx     Colon cancer Neg Hx     Colon polyps Neg Hx     Cystic fibrosis Neg Hx     Esophageal cancer " "Neg Hx     Crohn's disease Neg Hx     Hemochromatosis Neg Hx     Inflammatory bowel disease Neg Hx     Liver cancer Neg Hx     Liver disease Neg Hx     Rectal cancer Neg Hx     Stomach cancer Neg Hx     Ulcerative colitis Neg Hx     Addison's disease Neg Hx     Melanoma Neg Hx     Psoriasis Neg Hx     Lupus Neg Hx     Eczema Neg Hx        CONSTITUTIONAL: No fevers, chills, night sweats, NO further wt. loss, appetite changes  SKIN: no rashes or itching  ENT: No headaches, head trauma, vision changes, or eye pain  LYMPH NODES: None noticed   CV: No chest pain, palpitations.   RESP: No dyspnea on exertion, cough, wheezing, or hemoptysis  GI: No nausea, emesis, diarrhea, constipation, melena, hematochezia, pain.   : No dysuria, hematuria, urgency, or frequency   HEME: No easy bruising, bleeding problems  PSYCHIATRIC: No depression, anxiety, psychosis   NEURO: No seizures, memory loss, dizziness     Excessive sleeping  MSK: No arthralgias or joint swelling         BP (!) 145/81   Pulse 75   Temp 98 °F (36.7 °C) (Oral)   Resp 16   Ht 6' 1" (1.854 m)   Wt 72.1 kg (159 lb)   BMI 20.98 kg/m²   Gen: NAD, A and O x3, thin body habitus  Psych: pleasant affect, normal thought process  Eyes: Pupils round and non dilated, EOM intact , conj pink   Nose: Nares patent  OP clear, mucosa patent  Neck: suppple, no JVD, trachea midline, no palpable mass, no adenopathy  Lungs: CTAB, no wheezes, no FREMITUS   CV: S1S2 with RRR, No mrg  Abd: soft, NTND, + BS, No HSM, no ascites  Extr: No CCE, DENISSE, strength normal, good capillary refill  Neuro: steady gait  Skin: intact, no lesions noted  Rheum: No joint swelling    Lab Results   Component Value Date    WBC 3.5 (L) 12/26/2018    HGB 13.6 12/26/2018    HCT 40.9 12/26/2018    MCV 88.3 12/26/2018     (L) 12/26/2018     CMP  Sodium   Date Value Ref Range Status   12/26/2018 140 135 - 146 mmol/L Final     Potassium   Date Value Ref Range Status   12/26/2018 4.0 3.5 - " 5.3 mmol/L Final     Chloride   Date Value Ref Range Status   12/26/2018 102 98 - 110 mmol/L Final     CO2   Date Value Ref Range Status   12/26/2018 27 20 - 32 mmol/L Final     Glucose   Date Value Ref Range Status   12/26/2018 98 65 - 139 mg/dL Final     Comment:               Non-fasting reference interval          BUN, Bld   Date Value Ref Range Status   12/26/2018 14 7 - 25 mg/dL Final     Creatinine   Date Value Ref Range Status   12/26/2018 0.73 0.70 - 1.25 mg/dL Final     Comment:     For patients >49 years of age, the reference limit  for Creatinine is approximately 13% higher for people  identified as -American.          Calcium   Date Value Ref Range Status   12/26/2018 9.1 8.6 - 10.3 mg/dL Final     Total Protein   Date Value Ref Range Status   12/26/2018 6.3 6.1 - 8.1 g/dL Final     Albumin   Date Value Ref Range Status   12/26/2018 4.2 3.6 - 5.1 g/dL Final     Total Bilirubin   Date Value Ref Range Status   12/26/2018 0.6 0.2 - 1.2 mg/dL Final     Alkaline Phosphatase   Date Value Ref Range Status   12/26/2018 174 (H) 40 - 115 U/L Final     AST   Date Value Ref Range Status   12/26/2018 25 10 - 35 U/L Final     ALT   Date Value Ref Range Status   12/26/2018 33 9 - 46 U/L Final     Anion Gap   Date Value Ref Range Status   11/08/2017 6 (L) 8 - 16 mmol/L Final     eGFR if    Date Value Ref Range Status   12/26/2018 116 > OR = 60 mL/min/1.73m2 Final     eGFR if non    Date Value Ref Range Status   12/26/2018 100 > OR = 60 mL/min/1.73m2 Final       Positive TB test, s/p tx     History of anemia  -     CBC auto differential; Future; Expected date: 02/27/2019  -     Iron and TIBC; Future; Expected date: 02/27/2019    Lymphocytopenia  -     CBC auto differential; Future; Expected date: 02/27/2019  -     Iron and TIBC; Future; Expected date: 02/27/2019    Thrombocytopenia  -     CBC auto differential; Future; Expected date: 02/27/2019  -     Iron and TIBC; Future;  Expected date: 02/27/2019    Short bowel syndrome    Vitamin B 12 deficiency    Crohn's disease with complication, unspecified gastrointestinal tract location      He is due to resume B12 . His baseline Hb likely 14-15   Discussed  Abstaining  from using alcohol regularly  He should not take any H2 blockers ie pepcid or tagamet or any NSAIDS as this could suppress his marrow further   He does not need a platelet transfusion at this time  He has no evidence of bleeding   Cont zofran prn nausea  Cont bentyl for muscle spasms  He is to abstain from NSAIDS   RTC 3 months with B12 and iron levels with cbc       Advance Care Planning     Living Will  During this visit, I engaged the patient in the advance care planning process.  The patient and I reviewed the role for advance directives and their purpose in directing future healthcare if the patient's unable to speak for him/herself.  At this point in time, the patient does have full decision-making capacity.  We discussed different extreme health states that he could experience, and reviewed what kind of medical care he would want in those situations.  The patient communicated that if he were comatose and had little chance of a meaningful recovery, he would not want machines/life-sustaining treatments used.    The patient  Has  already designated a healthcare power of  to make decisions on his behalf.   I spent a total of 10  minutes engaging the patient in this advance care planning discussion.           Thank you for allowing me to evaluate and participate in the care of this pleasant patient. Please fell free to call me with any questions or concerns.    Warmly,  Kelsi Garcia MD    This note was dictated with Dragon and briefly proofread. Please excuse any sentences that may be unclear or words misspelled

## 2019-02-28 ENCOUNTER — INFUSION (OUTPATIENT)
Dept: INFUSION THERAPY | Facility: HOSPITAL | Age: 64
End: 2019-02-28
Attending: INTERNAL MEDICINE
Payer: MEDICARE

## 2019-02-28 VITALS
OXYGEN SATURATION: 96 % | RESPIRATION RATE: 16 BRPM | SYSTOLIC BLOOD PRESSURE: 114 MMHG | HEART RATE: 61 BPM | TEMPERATURE: 98 F | DIASTOLIC BLOOD PRESSURE: 65 MMHG

## 2019-02-28 DIAGNOSIS — D72.810 LYMPHOCYTOPENIA: ICD-10-CM

## 2019-02-28 DIAGNOSIS — K50.919 CROHN'S DISEASE WITH COMPLICATION, UNSPECIFIED GASTROINTESTINAL TRACT LOCATION: ICD-10-CM

## 2019-02-28 DIAGNOSIS — K90.829 SHORT BOWEL SYNDROME: ICD-10-CM

## 2019-02-28 DIAGNOSIS — E53.8 VITAMIN B 12 DEFICIENCY: ICD-10-CM

## 2019-02-28 DIAGNOSIS — Z86.2 HISTORY OF ANEMIA: Primary | ICD-10-CM

## 2019-02-28 DIAGNOSIS — D69.6 THROMBOCYTOPENIA: ICD-10-CM

## 2019-02-28 PROCEDURE — 96372 THER/PROPH/DIAG INJ SC/IM: CPT

## 2019-02-28 PROCEDURE — 63600175 PHARM REV CODE 636 W HCPCS: Performed by: INTERNAL MEDICINE

## 2019-02-28 RX ORDER — CYANOCOBALAMIN 1000 UG/ML
1000 INJECTION, SOLUTION INTRAMUSCULAR; SUBCUTANEOUS
Status: CANCELLED | OUTPATIENT
Start: 2019-02-28

## 2019-02-28 RX ORDER — CYANOCOBALAMIN 1000 UG/ML
1000 INJECTION, SOLUTION INTRAMUSCULAR; SUBCUTANEOUS
Status: DISCONTINUED | OUTPATIENT
Start: 2019-02-28 | End: 2019-02-28 | Stop reason: HOSPADM

## 2019-02-28 RX ADMIN — CYANOCOBALAMIN 1000 MCG: 1000 INJECTION, SOLUTION INTRAMUSCULAR at 09:02

## 2019-03-06 ENCOUNTER — TELEPHONE (OUTPATIENT)
Dept: DERMATOLOGY | Facility: CLINIC | Age: 64
End: 2019-03-06

## 2019-03-06 NOTE — TELEPHONE ENCOUNTER
Pt had surgery on 1/10 for BCC on R forehead. Pt states it is not healing with scab. Advised pt to remove the scab with peroxide and keep moist with Aquaphor until I show Dr. Fernandez on Monday. Pt will send photo. Understands I will call him on Monday to re-evaluate.

## 2019-03-13 NOTE — TELEPHONE ENCOUNTER
Called pt today to check on the area that he said was not healing and he stated once he removed the scab and kept it moist it healed over and patient is fine.

## 2019-03-25 ENCOUNTER — TELEPHONE (OUTPATIENT)
Dept: PAIN MEDICINE | Facility: CLINIC | Age: 64
End: 2019-03-25

## 2019-03-25 NOTE — TELEPHONE ENCOUNTER
----- Message from Sisi Holbrook sent at 3/25/2019 12:17 PM CDT -----  Contact: self  Patient [phone] is calling to speak with the nurse concerning his medication/please call

## 2019-03-25 NOTE — TELEPHONE ENCOUNTER
Pt was not approved per insurance for demerol and he stated it is to expensive to pay out of pocket.

## 2019-03-28 ENCOUNTER — INFUSION (OUTPATIENT)
Dept: INFUSION THERAPY | Facility: HOSPITAL | Age: 64
End: 2019-03-28
Attending: INTERNAL MEDICINE
Payer: MEDICARE

## 2019-03-28 VITALS
DIASTOLIC BLOOD PRESSURE: 75 MMHG | TEMPERATURE: 96 F | HEART RATE: 56 BPM | SYSTOLIC BLOOD PRESSURE: 149 MMHG | RESPIRATION RATE: 18 BRPM

## 2019-03-28 DIAGNOSIS — Z86.2 HISTORY OF ANEMIA: Primary | ICD-10-CM

## 2019-03-28 DIAGNOSIS — D69.6 THROMBOCYTOPENIA: ICD-10-CM

## 2019-03-28 DIAGNOSIS — K90.829 SHORT BOWEL SYNDROME: ICD-10-CM

## 2019-03-28 DIAGNOSIS — D72.810 LYMPHOCYTOPENIA: ICD-10-CM

## 2019-03-28 DIAGNOSIS — K50.919 CROHN'S DISEASE WITH COMPLICATION, UNSPECIFIED GASTROINTESTINAL TRACT LOCATION: ICD-10-CM

## 2019-03-28 DIAGNOSIS — E53.8 VITAMIN B 12 DEFICIENCY: ICD-10-CM

## 2019-03-28 PROCEDURE — 96372 THER/PROPH/DIAG INJ SC/IM: CPT

## 2019-03-28 PROCEDURE — 63600175 PHARM REV CODE 636 W HCPCS: Performed by: INTERNAL MEDICINE

## 2019-03-28 RX ORDER — CYANOCOBALAMIN 1000 UG/ML
1000 INJECTION, SOLUTION INTRAMUSCULAR; SUBCUTANEOUS
Status: CANCELLED | OUTPATIENT
Start: 2019-03-28

## 2019-03-28 RX ORDER — CYANOCOBALAMIN 1000 UG/ML
1000 INJECTION, SOLUTION INTRAMUSCULAR; SUBCUTANEOUS
Status: DISCONTINUED | OUTPATIENT
Start: 2019-03-28 | End: 2019-03-28 | Stop reason: HOSPADM

## 2019-03-28 RX ADMIN — CYANOCOBALAMIN 1000 MCG: 1000 INJECTION, SOLUTION INTRAMUSCULAR at 09:03

## 2019-04-25 ENCOUNTER — INFUSION (OUTPATIENT)
Dept: INFUSION THERAPY | Facility: HOSPITAL | Age: 64
End: 2019-04-25
Attending: INTERNAL MEDICINE
Payer: MEDICARE

## 2019-04-25 VITALS
TEMPERATURE: 97 F | DIASTOLIC BLOOD PRESSURE: 61 MMHG | HEART RATE: 56 BPM | RESPIRATION RATE: 18 BRPM | SYSTOLIC BLOOD PRESSURE: 128 MMHG

## 2019-04-25 DIAGNOSIS — K50.919 CROHN'S DISEASE WITH COMPLICATION, UNSPECIFIED GASTROINTESTINAL TRACT LOCATION: ICD-10-CM

## 2019-04-25 DIAGNOSIS — E53.8 VITAMIN B 12 DEFICIENCY: ICD-10-CM

## 2019-04-25 DIAGNOSIS — Z86.2 HISTORY OF ANEMIA: Primary | ICD-10-CM

## 2019-04-25 DIAGNOSIS — D69.6 THROMBOCYTOPENIA: ICD-10-CM

## 2019-04-25 DIAGNOSIS — D72.810 LYMPHOCYTOPENIA: ICD-10-CM

## 2019-04-25 DIAGNOSIS — K90.829 SHORT BOWEL SYNDROME: ICD-10-CM

## 2019-04-25 PROCEDURE — 96372 THER/PROPH/DIAG INJ SC/IM: CPT

## 2019-04-25 PROCEDURE — 63600175 PHARM REV CODE 636 W HCPCS: Performed by: INTERNAL MEDICINE

## 2019-04-25 RX ORDER — CYANOCOBALAMIN 1000 UG/ML
1000 INJECTION, SOLUTION INTRAMUSCULAR; SUBCUTANEOUS
Status: CANCELLED | OUTPATIENT
Start: 2019-04-25

## 2019-04-25 RX ORDER — CYANOCOBALAMIN 1000 UG/ML
1000 INJECTION, SOLUTION INTRAMUSCULAR; SUBCUTANEOUS
Status: DISCONTINUED | OUTPATIENT
Start: 2019-04-25 | End: 2019-04-25 | Stop reason: HOSPADM

## 2019-04-25 RX ADMIN — CYANOCOBALAMIN 1000 MCG: 1000 INJECTION, SOLUTION INTRAMUSCULAR at 09:04

## 2019-05-21 ENCOUNTER — TELEPHONE (OUTPATIENT)
Dept: HEMATOLOGY/ONCOLOGY | Facility: CLINIC | Age: 64
End: 2019-05-21

## 2019-05-21 NOTE — TELEPHONE ENCOUNTER
----- Message from Syeda Coronel sent at 5/21/2019  9:15 AM CDT -----    Pt is calling to reschedule  The   Yolanda // please call [phone]

## 2019-05-22 ENCOUNTER — OFFICE VISIT (OUTPATIENT)
Dept: PAIN MEDICINE | Facility: CLINIC | Age: 64
End: 2019-05-22
Payer: MEDICARE

## 2019-05-22 VITALS
SYSTOLIC BLOOD PRESSURE: 131 MMHG | DIASTOLIC BLOOD PRESSURE: 70 MMHG | HEART RATE: 71 BPM | BODY MASS INDEX: 21.07 KG/M2 | HEIGHT: 73 IN | WEIGHT: 159 LBS

## 2019-05-22 DIAGNOSIS — K50.90 CROHN'S DISEASE WITHOUT COMPLICATION, UNSPECIFIED GASTROINTESTINAL TRACT LOCATION: ICD-10-CM

## 2019-05-22 DIAGNOSIS — G89.29 CHRONIC ABDOMINAL PAIN: Primary | ICD-10-CM

## 2019-05-22 DIAGNOSIS — R10.9 CHRONIC ABDOMINAL PAIN: Primary | ICD-10-CM

## 2019-05-22 PROCEDURE — 99999 PR PBB SHADOW E&M-EST. PATIENT-LVL IV: CPT | Mod: PBBFAC,,, | Performed by: PHYSICIAN ASSISTANT

## 2019-05-22 PROCEDURE — 3008F PR BODY MASS INDEX (BMI) DOCUMENTED: ICD-10-PCS | Mod: CPTII,S$GLB,, | Performed by: PHYSICIAN ASSISTANT

## 2019-05-22 PROCEDURE — 99214 OFFICE O/P EST MOD 30 MIN: CPT | Mod: S$GLB,,, | Performed by: PHYSICIAN ASSISTANT

## 2019-05-22 PROCEDURE — 99999 PR PBB SHADOW E&M-EST. PATIENT-LVL IV: ICD-10-PCS | Mod: PBBFAC,,, | Performed by: PHYSICIAN ASSISTANT

## 2019-05-22 PROCEDURE — 3008F BODY MASS INDEX DOCD: CPT | Mod: CPTII,S$GLB,, | Performed by: PHYSICIAN ASSISTANT

## 2019-05-22 PROCEDURE — 99214 PR OFFICE/OUTPT VISIT, EST, LEVL IV, 30-39 MIN: ICD-10-PCS | Mod: S$GLB,,, | Performed by: PHYSICIAN ASSISTANT

## 2019-05-22 RX ORDER — TRAMADOL HYDROCHLORIDE 50 MG/1
50 TABLET ORAL EVERY 8 HOURS PRN
Qty: 90 TABLET | Refills: 2 | Status: SHIPPED | OUTPATIENT
Start: 2019-05-25 | End: 2019-06-23

## 2019-05-22 RX ORDER — TRAMADOL HYDROCHLORIDE 50 MG/1
TABLET ORAL
Refills: 1 | COMMUNITY
Start: 2019-04-26 | End: 2019-06-12

## 2019-05-22 NOTE — PROGRESS NOTES
PCP: Tyler Smith MD      CC: abdominal pain    Interval history: Mr. Cerda is a 64 y.o. male with an extensive history of crohn's disease who presents today for medication refill. LCV we discontinued Demerol 50mg q 8 hrs and started Tramadol 50 mg q 8 h. He reports this is beneficial.  No side effects reported.   Abdominal pain remains stable.   He does report diarrhea at times but no blood stools. Reports anal pain and itching 2/2 chronic diarrhea. OTC Lidocaine 2 % provides some minimal relief.  Compounding cream was too expensive. Continues to c/o bilateral knee pain . He has had multiple surgeries in the past. He rates his pain 2/10.     Prior HPI:   Mr. Cerda is a 60 year old male with PMH of crohn's disease referred by Dr. Hansen.  Patient states being diagnosed with crohn's disease since the age of 14.    He has had multiple GI surgeries and large bowel and small bowel removals.  During that time, he was being managed by providers in [place].  He was TPN dependent for many years until about two years ago.  He is now stable on an oral diet.  He did not have a primary care physician nor a gastroenterologist for many years.  He is currently trying to establish care.  He has future appointment with Dr. Ch.  He states taking Demerol 50mg q8hrs as needed for pain. It has provided relief of his nausea and pain with diarrhea.  He was last prescribed Demerol in July 2014.  Since then, he has been bearing with the pain.  It is a sharp shooting pain in his mid abdomen.      ROS:  CONSTITUTIONAL: No fevers, chills, night sweats, wt. loss, appetite changes  SKIN: no rashes or itching  ENT: No headaches, head trauma, vision changes, or eye pain  LYMPH NODES: None noticed   CV: No chest pain, palpitations.   RESP: No shortness of breath, dyspnea on exertion, cough, wheezing, or hemoptysis  GI: No nausea, emesis, diarrhea, constipation, melena, hematochezia, pain.    : No dysuria, hematuria, urgency,  or frequency   HEME: No easy bruising, bleeding problems  PSYCHIATRIC: No depression, anxiety, psychosis, hallucinations.  NEURO: No seizures, memory loss, dizziness or difficulty sleeping  MSK: no joint pain      Past Medical History:   Diagnosis Date    Anxiety     Basal cell carcinoma 07/2017    R forehead and R temple    Crohn's disease     age 17    Short bowel syndrome     Vitamin B deficiency     Vitamin D deficiency      Past Surgical History:   Procedure Laterality Date    3 small bowel resections      APPENDECTOMY      CHOLECYSTECTOMY      COLONOSCOPY      COLONOSCOPY N/A 3/14/2017    [facility] (4TH FLR)    COLONOSCOPY N/A 2/14/2017    [facility] (4TH FLR)    ESOPHAGOGASTRODUODENOSCOPY (EGD) N/A 2/14/2017    [facility] (4TH FLR)    KNEE SURGERY      SMALL INTESTINE SURGERY      TUBE THORACOTOMY      UPPER GASTROINTESTINAL ENDOSCOPY       Family History   Problem Relation Age of Onset    Diabetes Mother     Stroke Mother     Diabetes Father     Kidney disease Father     Irritable bowel syndrome Cousin     Celiac disease Neg Hx     Cirrhosis Neg Hx     Colon cancer Neg Hx     Colon polyps Neg Hx     Cystic fibrosis Neg Hx     Esophageal cancer Neg Hx     Crohn's disease Neg Hx     Hemochromatosis Neg Hx     Inflammatory bowel disease Neg Hx     Liver cancer Neg Hx     Liver disease Neg Hx     Rectal cancer Neg Hx     Stomach cancer Neg Hx     Ulcerative colitis Neg Hx     Addison's disease Neg Hx     Melanoma Neg Hx     Psoriasis Neg Hx     Lupus Neg Hx     Eczema Neg Hx      Social History     Socioeconomic History    Marital status:      Spouse name: Not on file    Number of children: Not on file    Years of education: Not on file    Highest education level: Not on file   Occupational History    Not on file   Social Needs    Financial resource  "strain: Not on file    Food insecurity:     Worry: Not on file     Inability: Not on file    Transportation needs:     Medical: Not on file     Non-medical: Not on file   Tobacco Use    Smoking status: Former Smoker     Packs/day: 1.00     Years: 15.00     Pack years: 15.00     Types: Cigarettes     Last attempt to quit: 3/10/1995     Years since quittin.2    Smokeless tobacco: Never Used   Substance and Sexual Activity    Alcohol use: Yes     Alcohol/week: 1.2 oz     Types: 1 Cans of beer, 1 Shots of liquor per week     Comment: 1 every 6 -7 months    Drug use: No    Sexual activity: Yes   Lifestyle    Physical activity:     Days per week: Not on file     Minutes per session: Not on file    Stress: Not on file   Relationships    Social connections:     Talks on phone: Not on file     Gets together: Not on file     Attends Congregation service: Not on file     Active member of club or organization: Not on file     Attends meetings of clubs or organizations: Not on file     Relationship status: Not on file   Other Topics Concern    Not on file   Social History Narrative    Retired      Medications/Allergies: See med card    Vitals:    19 0949   BP: 131/70   Pulse: 71   Weight: 72.1 kg (159 lb)   Height: 6' 1" (1.854 m)   PainSc:   2   PainLoc: Abdomen     Physical exam:    GENERAL: A and O x3, the patient appears well groomed and is in no acute distress.  Skin: No rashes or obvious lesions  HEENT: normocephalic, atraumatic  CARDIOVASCULAR:  RRR  LUNGS: non labored breathing  ABDOMEN: soft, nontender. No rebound   UPPER EXTREMITIES: Normal alignment, normal range of motion, no atrophy, no skin changes,  hair growth and nail growth normal and equal bilaterally. No swelling, no tenderness.    LOWER EXTREMITIES:  Normal alignment, normal range of motion, no atrophy, no skin changes,  hair growth and nail growth normal and equal bilaterally. No swelling, no tenderness.    LUMBAR SPINE  Lumbar spine: ROM " is full with flexion extension and oblique extension with no increased pain.    Lam's test causes no increased pain on either side.    Supine straight leg raise is negative bilaterally.    Internal and external rotation of the hip causes no increased pain on either side.  Myofascial exam: No tenderness to palpation across lumbar paraspinous muscles.    MENTAL STATUS: normal orientation, speech, language, and fund of knowledge for social situation.  Emotional state appropriate.    CRANIAL NERVES:  II:  PERRL bilaterally,   III,IV,VI: EOMI.    V:  Facial sensation equal bilaterally  VII:  Facial motor function normal.  VIII:  Hearing equal to finger rub bilaterally  IX/X: Gag normal, palate symmetric  XI:  Shoulder shrug equal, head turn equal  XII:  Tongue midline without fasciculations    MOTOR: Tone and bulk: normal bilateral upper and lower Strength: normal   SENSATION: Light touch and pinprick intact bilaterally  REFLEXES: normal, symmetric, nonbrisk.  Toes down, no clonus. No hoffmans.  GAIT: normal rise, base, steps, and arm swing.      Imaging:  None    Assessment:  Mr. Cerda is a 64 y.o. male with abdominal pain  1. Chronic abdominal pain    2. Crohn's disease without complication, unspecified gastrointestinal tract location         Plan:  1. Patient with long history of crohn's disease and chronic abdominal pain.  Continue care with GI.  2. Tramadol 50 mg q 8 h prn  as needed for pain.  reviewed.  No signs of aberrant behavior.    3. Continue OTC lidocaine cream  4. May benefit from bilateral knee genicular nerve blocks.   5. F/u 3 months or sooner  All medication management was performed by Dr. Alvin Curry

## 2019-05-23 ENCOUNTER — INFUSION (OUTPATIENT)
Dept: INFUSION THERAPY | Facility: HOSPITAL | Age: 64
End: 2019-05-23
Attending: INTERNAL MEDICINE
Payer: MEDICARE

## 2019-05-23 NOTE — NURSING
0900:  Scheduling error.  Patient only had 3 doses ordered for monthly injection.  Patient needs to follow up with Dr. Garcia. Appointment 6/12/19 with Dr. Chan-ks

## 2019-05-24 LAB
BASOPHILS # BLD AUTO: 19 CELLS/UL (ref 0–200)
BASOPHILS NFR BLD AUTO: 0.6 %
EOSINOPHIL # BLD AUTO: 42 CELLS/UL (ref 15–500)
EOSINOPHIL NFR BLD AUTO: 1.3 %
ERYTHROCYTE [DISTWIDTH] IN BLOOD BY AUTOMATED COUNT: 13.2 % (ref 11–15)
HCT VFR BLD AUTO: 41.2 % (ref 38.5–50)
HGB BLD-MCNC: 13.2 G/DL (ref 13.2–17.1)
IRON SATN MFR SERPL: 34 % (CALC) (ref 15–60)
IRON SERPL-MCNC: 118 MCG/DL (ref 50–180)
LYMPHOCYTES # BLD AUTO: 723 CELLS/UL (ref 850–3900)
LYMPHOCYTES NFR BLD AUTO: 22.6 %
MCH RBC QN AUTO: 28.7 PG (ref 27–33)
MCHC RBC AUTO-ENTMCNC: 32 G/DL (ref 32–36)
MCV RBC AUTO: 89.6 FL (ref 80–100)
MONOCYTES # BLD AUTO: 291 CELLS/UL (ref 200–950)
MONOCYTES NFR BLD AUTO: 9.1 %
NEUTROPHILS # BLD AUTO: 2125 CELLS/UL (ref 1500–7800)
NEUTROPHILS NFR BLD AUTO: 66.4 %
PLATELET # BLD AUTO: 129 THOUSAND/UL (ref 140–400)
PMV BLD REES-ECKER: 11.1 FL (ref 7.5–12.5)
RBC # BLD AUTO: 4.6 MILLION/UL (ref 4.2–5.8)
SERVICE CMNT-IMP: ABNORMAL
TIBC SERPL-MCNC: 352 MCG/DL (CALC) (ref 250–425)
WBC # BLD AUTO: 3.2 THOUSAND/UL (ref 3.8–10.8)

## 2019-06-12 ENCOUNTER — OFFICE VISIT (OUTPATIENT)
Dept: HEMATOLOGY/ONCOLOGY | Facility: CLINIC | Age: 64
End: 2019-06-12
Payer: MEDICARE

## 2019-06-12 VITALS
HEART RATE: 66 BPM | TEMPERATURE: 98 F | WEIGHT: 155 LBS | BODY MASS INDEX: 20.99 KG/M2 | SYSTOLIC BLOOD PRESSURE: 137 MMHG | DIASTOLIC BLOOD PRESSURE: 80 MMHG | HEIGHT: 72 IN

## 2019-06-12 DIAGNOSIS — R10.9 ABDOMINAL CRAMPING: ICD-10-CM

## 2019-06-12 DIAGNOSIS — D69.6 THROMBOCYTOPENIA: ICD-10-CM

## 2019-06-12 DIAGNOSIS — D72.819 LEUKOPENIA, UNSPECIFIED TYPE: ICD-10-CM

## 2019-06-12 DIAGNOSIS — E53.8 B12 DEFICIENCY: Primary | ICD-10-CM

## 2019-06-12 DIAGNOSIS — Z78.9 ALCOHOL USE: ICD-10-CM

## 2019-06-12 PROCEDURE — 3008F BODY MASS INDEX DOCD: CPT | Mod: CPTII,S$GLB,, | Performed by: INTERNAL MEDICINE

## 2019-06-12 PROCEDURE — 99214 PR OFFICE/OUTPT VISIT, EST, LEVL IV, 30-39 MIN: ICD-10-PCS | Mod: S$GLB,,, | Performed by: INTERNAL MEDICINE

## 2019-06-12 PROCEDURE — 99214 OFFICE O/P EST MOD 30 MIN: CPT | Mod: S$GLB,,, | Performed by: INTERNAL MEDICINE

## 2019-06-12 PROCEDURE — 99999 PR PBB SHADOW E&M-EST. PATIENT-LVL III: ICD-10-PCS | Mod: PBBFAC,,, | Performed by: INTERNAL MEDICINE

## 2019-06-12 PROCEDURE — 99999 PR PBB SHADOW E&M-EST. PATIENT-LVL III: CPT | Mod: PBBFAC,,, | Performed by: INTERNAL MEDICINE

## 2019-06-12 PROCEDURE — 3008F PR BODY MASS INDEX (BMI) DOCUMENTED: ICD-10-PCS | Mod: CPTII,S$GLB,, | Performed by: INTERNAL MEDICINE

## 2019-06-12 RX ORDER — CYANOCOBALAMIN 1000 UG/ML
1000 INJECTION, SOLUTION INTRAMUSCULAR; SUBCUTANEOUS
Status: CANCELLED | OUTPATIENT
Start: 2019-06-12

## 2019-06-12 NOTE — PROGRESS NOTES
CC I have some loose stools    Tristin Cerda is a 64 y.o.  This pt was referred by KAT Mccauley for B 12 deficiency and anemia. He was on TPN for years and now has no access to continue such  Pt was on B12 injections monthly and ran out after December 2018.   Labs recently show normal B12 levels , he is severely fatigued and has documented short bowel syndrome.  He has run out of his B12 again  He does have leukopenia. With thrombocytopenia  he is experiencing fatigue no melena or hematochezia intermittent loose stools   He has a history  of recurring infections . He does have Crohns and suffers with malabsorption    Past Medical History:   Diagnosis Date    Anxiety     Basal cell carcinoma 07/2017    R forehead and R temple    Crohn's disease     age 17    Short bowel syndrome     Vitamin B deficiency     Vitamin D deficiency      Past Surgical History:   Procedure Laterality Date    3 small bowel resections      APPENDECTOMY      CHOLECYSTECTOMY      COLONOSCOPY      COLONOSCOPY N/A 3/14/2017    [facility] ([facility])    COLONOSCOPY N/A 2/14/2017    [facility] ([facility])    ESOPHAGOGASTRODUODENOSCOPY (EGD) N/A 2/14/2017    [facility] (4TH FLR)    KNEE SURGERY      SMALL INTESTINE SURGERY      TUBE THORACOTOMY      UPPER GASTROINTESTINAL ENDOSCOPY      He remains on Bentyl which helps with his abdominal cramping and Zofran as needed for intermittent nausea.  He is on chronic pain medications    Current Outpatient Medications:     acetaminophen (TYLENOL) 325 mg/10.15 mL Soln, , Disp: , Rfl:     acetaminophen (TYLENOL) 500 mg Cap, , Disp: , Rfl:     ASPIRIN (ASPIR-81 ORAL), Take by mouth., Disp: , Rfl:     cyanocobalamin 1,000 mcg/mL injection, Inject 1 mL (1,000 mcg total) into the muscle every 30 days., Disp: 10 mL, Rfl: 11    dicyclomine (BENTYL) 20 mg tablet, TAKE 1 TABLET FOUR TIMES  "DAILY, Disp: 360 tablet, Rfl: 3    diphenhydramine-acetaminophen 12.5-325 mg Tab, , Disp: , Rfl:     hydrocortisone 2.5 % lotion, AAA face QHS PRN flare, Disp: 60 mL, Rfl: 2    insulin syringe-needle U-100 (INSULIN SYRINGE) 1 mL 28 gauge x 1/2" Syrg, For use with b12, Disp: 3 each, Rfl: 3    mupirocin (BACTROBAN) 2 % ointment, Apply topically 3 (three) times daily., Disp: 30 g, Rfl: 0    NAPROXEN SODIUM ORAL, as needed. , Disp: , Rfl:     ondansetron (ZOFRAN) 8 MG tablet, TAKE 1 TABLET EVERY 12 HOURS AS NEEDED FOR NAUSEA, Disp: 180 tablet, Rfl: 1    oxyCODONE-acetaminophen (PERCOCET) 5-325 mg per tablet, Take 1 tablet by mouth every 4 to 6 hours as needed for Pain., Disp: 20 tablet, Rfl: 0    phenylephrine-acetaminophen 5-325 mg Cap, , Disp: , Rfl:     traMADol (ULTRAM) 50 mg tablet, TK 1 T PO  Q 8 H, Disp: , Rfl: 1    traMADol (ULTRAM) 50 mg tablet, Take 1 tablet (50 mg total) by mouth every 8 (eight) hours as needed for Pain., Disp: 90 tablet, Rfl: 2  Review of patient's allergies indicates:   Allergen Reactions    Ciprofloxacin     Codeine Itching    Compazine [prochlorperazine]     Erythromycin     Keflex [cephalexin]      Social History     Tobacco Use    Smoking status: Former Smoker     Packs/day: 1.00     Years: 15.00     Pack years: 15.00     Types: Cigarettes     Last attempt to quit: 3/10/1995     Years since quittin.2    Smokeless tobacco: Never Used   Substance Use Topics    Alcohol use: Yes     Alcohol/week: 1.2 oz     Types: 1 Cans of beer, 1 Shots of liquor per week     Comment: 1 every 6 -7 months    Drug use: No     Family History   Problem Relation Age of Onset    Diabetes Mother     Stroke Mother     Diabetes Father     Kidney disease Father     Irritable bowel syndrome Cousin     Celiac disease Neg Hx     Cirrhosis Neg Hx     Colon cancer Neg Hx     Colon polyps Neg Hx     Cystic fibrosis Neg Hx     Esophageal cancer Neg Hx     Crohn's disease Neg Hx     " Hemochromatosis Neg Hx     Inflammatory bowel disease Neg Hx     Liver cancer Neg Hx     Liver disease Neg Hx     Rectal cancer Neg Hx     Stomach cancer Neg Hx     Ulcerative colitis Neg Hx     Addison's disease Neg Hx     Melanoma Neg Hx     Psoriasis Neg Hx     Lupus Neg Hx     Eczema Neg Hx        CONSTITUTIONAL: No fevers, chills, night sweats, NO further wt. loss, appetite changes  SKIN: no rashes or itching  ENT: No headaches, head trauma, vision changes, or eye pain  LYMPH NODES: None noticed   CV: No chest pain, palpitations.  No orthopnea or PND  RESP: No dyspnea on exertion, cough, wheezing, or hemoptysis  GI:  Intermittent diarrhea intermittent nausea and no emesis no melena or hematochezia currently no pain today  : No dysuria, hematuria, urgency, or frequency   HEME: No easy bruising, bleeding problems  PSYCHIATRIC: No depression, anxiety, psychosis   NEURO: No seizures, memory loss, dizziness  intermittent insomnia  MSK: No arthralgias or joint swelling         /80   Pulse 66   Temp 97.8 °F (36.6 °C) (Oral)   Ht 6' (1.829 m)   Wt 70.3 kg (155 lb)   BMI 21.02 kg/m²   Gen: NAD, A and O x3, thin body habitus  Psych: pleasant affect, normal thought process  Eyes: Pupils round and non dilated, EOM intact , conj pink   Nose: Nares patent  OP clear, mucosa patent  Neck: suppple, no JVD, trachea midline,no palpable thyromegaly  Lungs: CTAB, no wheezes, no FREMITUS   CV: S1S2 with RRR, No mrg no loud S1 or S2  Abd: soft, NTND, + BS, No HSM, no ascites  Extr: No CCE, DENISSE, strength normal, good capillary refill  Neuro: steady gait  Skin: intact, no lesions noted  Rheum: No joint swelling  Hepatitis B Surface AgNegative Hep B C IgMPositiveAbnormal  Hep A IgMNegative Hepatitis C AbNegative   NILSee text IU/mL0.06 TB AntigenSee text IU/mL0.45 TB Antigen - NilSee text IU/mL0.40 Mitogen - NilSee text IU/mL3.11 TB GoldPositiveAbnormal    TTG IgA<20 [id number]      Ref Range & Units 2wk ago 5mo  ago   WBC 3.8 - 10.8 Thousand/uL 3.2Low   3.5Low     RBC 4.20 - 5.80 Million/uL 4.60  4.63    Hemoglobin 13.2 - 17.1 g/dL 13.2  13.6    Hematocrit 38.5 - 50.0 % 41.2  40.9    Mean Corpuscular Volume 80.0 - 100.0 fL 89.6  88.3    Mean Corpuscular Hemoglobin 27.0 - 33.0 pg 28.7  29.4    Mean Corpuscular Hemoglobin Conc 32.0 - 36.0 g/dL 32.0  33.3    RDW 11.0 - 15.0 % 13.2  13.1    Platelets 140 - 400 Thousand/uL 129Low   121Low     Comment: Review of the peripheral smear reveals decreased numbers of platelets.   MPV 7.5 - 12.5 fL 11.1  10.6    Neutrophils Absolute 1,500 - 7,800 cells/uL 2,125  2,321    Lymph # 850 - 3,900 cells/uL 723Low   655Low     Mono # 200 - 950 cells/uL 291  417    Eos # 15 - 500 cells/uL 42  88    Baso # 0 - 200 cells/uL 19  21    Neutrophils Relative % 66.4  66.3    Lymph% % 22.6  18.7    Mono% % 9.1  11.9    Eosinophil% % 1.3  2.5    Basophil% % 0.6  0.6    Differential Comment  Review of peripheral smear confirms automated results.     Resulting Agency  [employer]      Narrative   Performed by: [facility]   FASTING:NO  FASTING: NO   Resulting Agency's Comment     Performing Organization Information:      Site ID: [id number]      [facility]      Address: [address] [place] [id number]      Director: Deepika Garcia       Pt also positive for varicella       Lab Results   Component Value Date    WBC 3.2 (L) 05/23/2019    HGB 13.2 05/23/2019    HCT 41.2 05/23/2019    MCV 89.6 05/23/2019     (L) 05/23/2019     CMP  Sodium   Date Value Ref Range Status   12/26/2018 140 135 - 146 mmol/L Final     Potassium   Date Value Ref Range Status   12/26/2018 4.0 3.5 - 5.3 mmol/L Final     Chloride   Date Value Ref Range Status   12/26/2018 102 98 - 110 mmol/L Final     CO2   Date Value Ref Range Status   12/26/2018 27 20 - 32 mmol/L Final     Glucose   Date Value Ref Range Status   12/26/2018 98 65 - 139 mg/dL Final     Comment:               Non-fasting reference interval           BUN, Bld   Date Value Ref Range Status   12/26/2018 14 7 - 25 mg/dL Final     Creatinine   Date Value Ref Range Status   12/26/2018 0.73 0.70 - 1.25 mg/dL Final     Comment:     For patients >49 years of age, the reference limit  for Creatinine is approximately 13% higher for people  identified as -American.          Calcium   Date Value Ref Range Status   12/26/2018 9.1 8.6 - 10.3 mg/dL Final     Total Protein   Date Value Ref Range Status   12/26/2018 6.3 6.1 - 8.1 g/dL Final     Albumin   Date Value Ref Range Status   12/26/2018 4.2 3.6 - 5.1 g/dL Final     Total Bilirubin   Date Value Ref Range Status   12/26/2018 0.6 0.2 - 1.2 mg/dL Final     Alkaline Phosphatase   Date Value Ref Range Status   12/26/2018 174 (H) 40 - 115 U/L Final     AST   Date Value Ref Range Status   12/26/2018 25 10 - 35 U/L Final     ALT   Date Value Ref Range Status   12/26/2018 33 9 - 46 U/L Final     Anion Gap   Date Value Ref Range Status   11/08/2017 6 (L) 8 - 16 mmol/L Final     eGFR if    Date Value Ref Range Status   12/26/2018 116 > OR = 60 mL/min/1.73m2 Final     eGFR if non    Date Value Ref Range Status   12/26/2018 100 > OR = 60 mL/min/1.73m2 Final       B12 deficiency  -     CBC auto differential; Future; Expected date: 06/12/2019    Leukopenia, unspecified type  -     CBC auto differential; Future; Expected date: 06/12/2019    Thrombocytopenia    Alcohol use    Abdominal cramping      He is due to resume B12 . His baseline Hb likely 14-15   Leukopenia and thrombocytopenia relatively stable no evidence of bleeding no need for transfusion at this time  Malabsorption: positive TTA celiac   He is having increased gas and bloating which can be controlled with diet.  Chewable Pepto-Bismol and Tums also safe from to use intermittently  Diet explained over 15 minutes for foods rich in iron folic acid as well as B12 and foods that will not cause further colitis  Resume B 12  Discussed   Abstaining  from using alcohol regularly  Cont zofran prn nausea  Cont bentyl for muscle spasms  He is to abstain from NSAIDS as this can increase his risk of bleeding and also cause further bone marrow suppression  RTC 3 months with B12 and iron levels with cbc       Advance Care Planning     Living Will  During this visit, I engaged the patient in the advance care planning process.  The patient and I reviewed the role for advance directives and their purpose in directing future healthcare if the patient's unable to speak for him/herself.  At this point in time, the patient does have full decision-making capacity.  We discussed different extreme health states that he could experience, and reviewed what kind of medical care he would want in those situations.  The patient communicated that if he were comatose and had little chance of a meaningful recovery, he would not want machines/life-sustaining treatments used.    The patient  Has  already designated a healthcare power of  to make decisions on his behalf.   I spent a total of 10  minutes engaging the patient in this advance care planning discussion.           Thank you for allowing me to evaluate and participate in the care of this pleasant patient. Please fell free to call me with any questions or concerns.    Warmly,  Kelsi Garcia MD    This note was dictated with Dragon and briefly proofread. Please excuse any sentences that may be unclear or words misspelled

## 2019-06-12 NOTE — LETTER
June 17, 2019      ERIN Canales  [address] [facility] [place] [address]           [facility]  [address] [place] [address]  Phone: [phone]          Patient: Tristin Cerda   MR Number: [id number]   YOB: 1955   Date of Visit: 6/12/2019       Dear Latonia Mccauley:    Thank you for referring Tristin Cerda to me for evaluation. Attached you will find relevant portions of my assessment and plan of care.    If you have questions, please do not hesitate to call me. I look forward to following Tristin Cerda along with you.    Sincerely,    Kelsi Garcia MD    Enclosure  CC:  No Recipients    If you would like to receive this communication electronically, please contact [email or web address] or [phone] to request more information on [facility] Link access.    For providers and/or their staff who would like to refer a patient to [facility], please contact us through our one-stop-shop provider referral line, [facility] , at [phone].    If you feel you have received this communication in error or would no longer like to receive these types of communications, please e-mail [email or web address]

## 2019-06-28 ENCOUNTER — LAB VISIT (OUTPATIENT)
Dept: LAB | Facility: HOSPITAL | Age: 64
End: 2019-06-28
Attending: INTERNAL MEDICINE
Payer: MEDICARE

## 2019-06-28 ENCOUNTER — OFFICE VISIT (OUTPATIENT)
Dept: GASTROENTEROLOGY | Facility: CLINIC | Age: 64
End: 2019-06-28
Payer: MEDICARE

## 2019-06-28 VITALS
OXYGEN SATURATION: 98 % | DIASTOLIC BLOOD PRESSURE: 87 MMHG | BODY MASS INDEX: 21.02 KG/M2 | HEIGHT: 72 IN | WEIGHT: 155.19 LBS | HEART RATE: 79 BPM | SYSTOLIC BLOOD PRESSURE: 149 MMHG

## 2019-06-28 DIAGNOSIS — E53.8 B12 DEFICIENCY: ICD-10-CM

## 2019-06-28 DIAGNOSIS — K52.9 CHRONIC DIARRHEA: ICD-10-CM

## 2019-06-28 DIAGNOSIS — R10.9 CHRONIC ABDOMINAL PAIN: ICD-10-CM

## 2019-06-28 DIAGNOSIS — K90.829 SHORT BOWEL SYNDROME: ICD-10-CM

## 2019-06-28 DIAGNOSIS — K50.018 CROHN'S DISEASE OF SMALL INTESTINE WITH OTHER COMPLICATION: Primary | ICD-10-CM

## 2019-06-28 DIAGNOSIS — G89.29 CHRONIC ABDOMINAL PAIN: ICD-10-CM

## 2019-06-28 DIAGNOSIS — K50.018 CROHN'S DISEASE OF SMALL INTESTINE WITH OTHER COMPLICATION: ICD-10-CM

## 2019-06-28 PROCEDURE — 99214 OFFICE O/P EST MOD 30 MIN: CPT | Mod: 25,S$GLB,, | Performed by: INTERNAL MEDICINE

## 2019-06-28 PROCEDURE — 36415 COLL VENOUS BLD VENIPUNCTURE: CPT

## 2019-06-28 PROCEDURE — 83516 IMMUNOASSAY NONANTIBODY: CPT | Mod: 59

## 2019-06-28 PROCEDURE — 99214 PR OFFICE/OUTPT VISIT, EST, LEVL IV, 30-39 MIN: ICD-10-PCS | Mod: 25,S$GLB,, | Performed by: INTERNAL MEDICINE

## 2019-06-28 PROCEDURE — 3008F PR BODY MASS INDEX (BMI) DOCUMENTED: ICD-10-PCS | Mod: CPTII,S$GLB,, | Performed by: INTERNAL MEDICINE

## 2019-06-28 PROCEDURE — 99999 PR PBB SHADOW E&M-EST. PATIENT-LVL IV: ICD-10-PCS | Mod: PBBFAC,,, | Performed by: INTERNAL MEDICINE

## 2019-06-28 PROCEDURE — 3008F BODY MASS INDEX DOCD: CPT | Mod: CPTII,S$GLB,, | Performed by: INTERNAL MEDICINE

## 2019-06-28 PROCEDURE — 96372 THER/PROPH/DIAG INJ SC/IM: CPT | Mod: S$GLB,,, | Performed by: INTERNAL MEDICINE

## 2019-06-28 PROCEDURE — 96372 PR INJECTION,THERAP/PROPH/DIAG2ST, IM OR SUBCUT: ICD-10-PCS | Mod: S$GLB,,, | Performed by: INTERNAL MEDICINE

## 2019-06-28 PROCEDURE — 99999 PR PBB SHADOW E&M-EST. PATIENT-LVL IV: CPT | Mod: PBBFAC,,, | Performed by: INTERNAL MEDICINE

## 2019-06-28 RX ORDER — CYANOCOBALAMIN 1000 UG/ML
1000 INJECTION, SOLUTION INTRAMUSCULAR; SUBCUTANEOUS
Status: COMPLETED | OUTPATIENT
Start: 2019-06-28 | End: 2019-06-28

## 2019-06-28 RX ORDER — ONDANSETRON HYDROCHLORIDE 8 MG/1
TABLET, FILM COATED ORAL
Qty: 180 TABLET | Refills: 1 | Status: SHIPPED | OUTPATIENT
Start: 2019-06-28 | End: 2019-10-16 | Stop reason: SDUPTHER

## 2019-06-28 RX ORDER — TRAMADOL HYDROCHLORIDE 50 MG/1
50 TABLET ORAL EVERY 8 HOURS
Refills: 2 | COMMUNITY
Start: 2019-06-23 | End: 2019-09-18

## 2019-06-28 RX ADMIN — CYANOCOBALAMIN 1000 MCG: 1000 INJECTION, SOLUTION INTRAMUSCULAR; SUBCUTANEOUS at 09:06

## 2019-06-28 NOTE — LETTER
June 28, 2019      ERIN Canales  [address] [place] [address]           [facility]  [place] [facility]  [address] [place] [address]  [place] [address]  Phone: [phone]  Fax: [phone]          Patient: Tristin Cerda   MR Number: [id number]   YOB: 1955   Date of Visit: 6/28/2019       Dear Latonia Mccauley:    Thank you for referring Tristin Cerda to me for evaluation. Attached you will find relevant portions of my assessment and plan of care.    If you have questions, please do not hesitate to call me. I look forward to following Tristin Cerda along with you.    Sincerely,    Andrea Shaver MD    Enclosure  CC:  No Recipients    If you would like to receive this communication electronically, please contact [email or web address] or [phone] to request more information on [facility] Link access.    For providers and/or their staff who would like to refer a patient to [facility], please contact us through our one-stop-shop provider referral line, [facility], at [phone].    If you feel you have received this communication in error or would no longer like to receive these types of communications, please e-mail [email or web address]

## 2019-06-28 NOTE — PATIENT INSTRUCTIONS
He will continue his current medications and activities.  He continues his current diet.  Ultrasound and upper GI series will be scheduled.  He has possibly could celiac disease and adequate labs will be obtained. I will review his hospital records.

## 2019-06-28 NOTE — PROGRESS NOTES
Subjective:       Patient ID: Tristin Cerda is a 64 y.o. male.    Chief Complaint: Crohn's Disease    He was diagnosis age 14 with Crohn's disease.  Apparently at a perforation.  He states he has had 3 small bowel resections and has a short bowel .  He was on TPN until 6 years ago.  Then he ran out of vascular access.  In the last 6 years he has been able to maintain his weight at 150 lb.  He uses tramadol does slow my intestine down  .  He believes his Crohn's disease is in remission.  He has 4-6 bowel movements per day.  Sometimes postprandial.  They are not associated with the specific food.  He was told that he might have celiac disease and he has tried to decrease the ingestion of grains.  This is not influenced his symptoms.  He is intolerant to milk.  He is edentulous.  He has ingest a a soft diet.  He has difficulty with foods such as steak.  He can eat a hamburger.  He denies dysphagia aspiration.  He denies cardiopulmonary symptoms.  He has quite a bit of flatulence.  He states he has been unable to have a colonoscopy because he has known large colon and is unable to take the prep kit.  He denies fever chills. He developed a per kilos is when he was on the Remicade.  He remembers the Remicade did not help him.      Allergies:  Review of patient's allergies indicates:   Allergen Reactions    Ciprofloxacin     Codeine Itching    Compazine [prochlorperazine]     Erythromycin     Keflex [cephalexin]        Medications:    Current Outpatient Medications:     acetaminophen (TYLENOL) 500 mg Cap, , Disp: , Rfl:     ASPIRIN (ASPIR-81 ORAL), Take by mouth., Disp: , Rfl:     cyanocobalamin 1,000 mcg/mL injection, Inject 1 mL (1,000 mcg total) into the muscle every 30 days., Disp: 10 mL, Rfl: 11    dicyclomine (BENTYL) 20 mg tablet, TAKE 1 TABLET FOUR TIMES DAILY, Disp: 360 tablet, Rfl: 3    hydrocortisone 2.5 % lotion, AAA face QHS PRN flare, Disp: 60 mL, Rfl: 2    ondansetron (ZOFRAN) 8 MG  tablet, TAKE 1 TABLET EVERY 12 HOURS AS NEEDED FOR NAUSEA, Disp: 180 tablet, Rfl: 1    traMADol (ULTRAM) 50 mg tablet, Take 50 mg by mouth every 8 (eight) hours., Disp: , Rfl: 2    acetaminophen (TYLENOL) 325 mg/10.15 mL Soln, , Disp: , Rfl:     diphenhydramine-acetaminophen 12.5-325 mg Tab, , Disp: , Rfl:     mupirocin (BACTROBAN) 2 % ointment, Apply topically 3 (three) times daily., Disp: 30 g, Rfl: 0    phenylephrine-acetaminophen 5-325 mg Cap, , Disp: , Rfl:     Current Facility-Administered Medications:     cyanocobalamin injection 1,000 mcg, 1,000 mcg, Intramuscular, 1 time in Clinic/HOD, Andrea Shaver MD    Past Medical History:   Diagnosis Date    Anxiety     Basal cell carcinoma 07/2017    R forehead and R temple    Crohn's disease     age 17    Short bowel syndrome     Vitamin B deficiency     Vitamin D deficiency        Past Surgical History:   Procedure Laterality Date    3 small bowel resections      APPENDECTOMY      CHOLECYSTECTOMY      COLONOSCOPY      COLONOSCOPY N/A 3/14/2017    [facility] (4TH F[facility])    COLONOSCOPY N/A 2/14/2017    [facility] (4TH FLR)    ESOPHAGOGASTRODUODENOSCOPY (EGD) N/A 2/14/2017    [facility] ([facility])    KNEE SURGERY      SMALL INTESTINE SURGERY      TUBE THORACOTOMY      UPPER GASTROINTESTINAL ENDOSCOPY           Review of Systems   Constitutional: Negative for appetite change, fever and unexpected weight change.   HENT: Negative for trouble swallowing.         No jaundice.   Respiratory: Negative for cough, shortness of breath and wheezing.         He is a former cigarette smoker   Cardiovascular: Negative for chest pain.        He denies cardiopulmonary symptoms.   Gastrointestinal:        He complains of nausea without vomiting. He states the dicyclomine has helped the abdominal cramping.  He denies jaundice hematemesis hematochezia.  At 1 time  was thought he might have hepatitis B although this was thought to be a false positive.  He has had a cholecystectomy.  He has been told that he may have a fatty liver.   Musculoskeletal: Positive for arthralgias and back pain. Negative for neck pain.        He has had difficulty with calcium and arthritis.  He has had knee surgery.  He complains of arthralgias and nonspecific back pain.  He denies swollen   Skin: Negative for pallor and rash.   Neurological: Negative for dizziness, seizures, syncope, speech difficulty, weakness and numbness.   Hematological: Negative for adenopathy.        He is followed by the hematologist for his his cytopenia and also for his B12 deficiency.   Psychiatric/Behavioral: Negative for confusion.       Objective:      Physical Exam   Constitutional: He is oriented to person, place, and time. He appears well-developed and well-nourished.   Thin nonicteric white male who is edentulous   HENT:   Head: Normocephalic.   Eyes: Pupils are equal, round, and reactive to light. EOM are normal.   Neck: Normal range of motion. Neck supple. No tracheal deviation present. No thyromegaly present.   Cardiovascular: Normal rate, regular rhythm and normal heart sounds.   Pulmonary/Chest: Effort normal and breath sounds normal.   Abdominal: Soft.   The abdomen is above plane.  There are multiple healed scars.  There is very prominent vascular pattern.  The liver appears to be 11-12 cm.  There is mild tenderness in the epigastrium.  Bowel sounds are normal. Masses are absent.   Musculoskeletal: Normal range of motion.   He can't ambulate without difficulty. He can go from the sitting to the standing position without difficulty. He can get on the exam table without difficulty   Lymphadenopathy:     He has no cervical adenopathy.   Neurological: He is alert and oriented to person, place, and time. No cranial nerve deficit.   Skin: Skin is warm and dry.   Psychiatric: He has a normal mood and affect. His  behavior is normal.   Vitals reviewed.        Plan:       Crohn's disease of small intestine with other complication  -     Celiac Disease Panel; Future; Expected date: 06/28/2019  -     US Abdomen Complete; Future; Expected date: 06/28/2019  -     FL Upper GI With Small Bowel; Future; Expected date: 06/28/2019    Chronic diarrhea  -     Celiac Disease Panel; Future; Expected date: 06/28/2019  -     US Abdomen Complete; Future; Expected date: 06/28/2019  -     FL Upper GI With Small Bowel; Future; Expected date: 06/28/2019    B12 deficiency  -     cyanocobalamin injection 1,000 mcg    Short bowel syndrome  -     ondansetron (ZOFRAN) 8 MG tablet; TAKE 1 TABLET EVERY 12 HOURS AS NEEDED FOR NAUSEA  Dispense: 180 tablet; Refill: 1    Chronic abdominal pain  -     ondansetron (ZOFRAN) 8 MG tablet; TAKE 1 TABLET EVERY 12 HOURS AS NEEDED FOR NAUSEA  Dispense: 180 tablet; Refill: 1

## 2019-06-28 NOTE — PROGRESS NOTES
He is given the Zocor for nausea.  He denies vomiting.  He is given the B12 today.  Ultrasound and upper GI series will be scheduled.  Additionally a celiac disease panel will be obtained. He will continue his other medications vitamins and minerals.

## 2019-07-01 LAB
GLIADIN PEPTIDE IGA SER-ACNC: 3 UNITS
GLIADIN PEPTIDE IGG SER-ACNC: 3 UNITS
IGA SERPL-MCNC: 222 MG/DL (ref 70–400)
TTG IGA SER-ACNC: 7 UNITS
TTG IGG SER-ACNC: 2 UNITS

## 2019-07-19 ENCOUNTER — HOSPITAL ENCOUNTER (OUTPATIENT)
Dept: RADIOLOGY | Facility: HOSPITAL | Age: 64
Discharge: HOME | End: 2019-07-19
Attending: INTERNAL MEDICINE
Payer: MEDICARE

## 2019-07-19 DIAGNOSIS — K50.018 CROHN'S DISEASE OF SMALL INTESTINE WITH OTHER COMPLICATION: ICD-10-CM

## 2019-07-19 DIAGNOSIS — K52.9 CHRONIC DIARRHEA: ICD-10-CM

## 2019-07-19 PROCEDURE — 76700 US EXAM ABDOM COMPLETE: CPT | Mod: 26,,, | Performed by: RADIOLOGY

## 2019-07-19 PROCEDURE — 25500020 PHARM REV CODE 255: Performed by: INTERNAL MEDICINE

## 2019-07-19 PROCEDURE — 76700 US ABDOMEN COMPLETE: ICD-10-PCS | Mod: 26,,, | Performed by: RADIOLOGY

## 2019-07-19 PROCEDURE — 74245 FL UPPER GI WITH SMALL BOWEL: CPT | Mod: TC,FY

## 2019-07-19 PROCEDURE — 74245 FL UPPER GI WITH SMALL BOWEL: ICD-10-PCS | Mod: 26,,, | Performed by: RADIOLOGY

## 2019-07-19 PROCEDURE — 74245 FL UPPER GI WITH SMALL BOWEL: CPT | Mod: 26,,, | Performed by: RADIOLOGY

## 2019-07-19 PROCEDURE — 76700 US EXAM ABDOM COMPLETE: CPT | Mod: TC

## 2019-07-19 PROCEDURE — A9698 NON-RAD CONTRAST MATERIALNOC: HCPCS | Performed by: INTERNAL MEDICINE

## 2019-07-19 RX ADMIN — BARIUM SULFATE 135 ML: 980 POWDER, FOR SUSPENSION ORAL at 10:07

## 2019-07-29 ENCOUNTER — OFFICE VISIT (OUTPATIENT)
Dept: GASTROENTEROLOGY | Facility: CLINIC | Age: 64
End: 2019-07-29
Payer: MEDICARE

## 2019-07-29 VITALS
BODY MASS INDEX: 21.13 KG/M2 | HEIGHT: 72 IN | OXYGEN SATURATION: 97 % | TEMPERATURE: 98 F | SYSTOLIC BLOOD PRESSURE: 125 MMHG | DIASTOLIC BLOOD PRESSURE: 72 MMHG | HEART RATE: 65 BPM | WEIGHT: 156 LBS

## 2019-07-29 DIAGNOSIS — Z87.19 HISTORY OF CROHN'S DISEASE: ICD-10-CM

## 2019-07-29 DIAGNOSIS — E53.8 B12 DEFICIENCY: ICD-10-CM

## 2019-07-29 DIAGNOSIS — R10.9 CHRONIC ABDOMINAL PAIN: Primary | ICD-10-CM

## 2019-07-29 DIAGNOSIS — R16.1 SPLENOMEGALY: ICD-10-CM

## 2019-07-29 DIAGNOSIS — G89.29 CHRONIC ABDOMINAL PAIN: Primary | ICD-10-CM

## 2019-07-29 DIAGNOSIS — D72.819 LEUKOPENIA, UNSPECIFIED TYPE: ICD-10-CM

## 2019-07-29 DIAGNOSIS — D69.6 THROMBOCYTOPENIC: ICD-10-CM

## 2019-07-29 PROCEDURE — 3008F PR BODY MASS INDEX (BMI) DOCUMENTED: ICD-10-PCS | Mod: CPTII,S$GLB,, | Performed by: INTERNAL MEDICINE

## 2019-07-29 PROCEDURE — 99213 PR OFFICE/OUTPT VISIT, EST, LEVL III, 20-29 MIN: ICD-10-PCS | Mod: S$GLB,,, | Performed by: INTERNAL MEDICINE

## 2019-07-29 PROCEDURE — 99999 PR PBB SHADOW E&M-EST. PATIENT-LVL IV: CPT | Mod: PBBFAC,,, | Performed by: INTERNAL MEDICINE

## 2019-07-29 PROCEDURE — 3008F BODY MASS INDEX DOCD: CPT | Mod: CPTII,S$GLB,, | Performed by: INTERNAL MEDICINE

## 2019-07-29 PROCEDURE — 99213 OFFICE O/P EST LOW 20 MIN: CPT | Mod: S$GLB,,, | Performed by: INTERNAL MEDICINE

## 2019-07-29 PROCEDURE — 99999 PR PBB SHADOW E&M-EST. PATIENT-LVL IV: ICD-10-PCS | Mod: PBBFAC,,, | Performed by: INTERNAL MEDICINE

## 2019-07-29 RX ORDER — MAGNESIUM HYDROXIDE 400 MG/5ML
SUSPENSION, ORAL (FINAL DOSE FORM) ORAL
Status: ON HOLD | COMMUNITY
Start: 2019-07-01 | End: 2020-07-29 | Stop reason: CLARIF

## 2019-07-29 RX ORDER — LANOLIN ALCOHOL/MO/W.PET/CERES
400 CREAM (GRAM) TOPICAL DAILY
Status: ON HOLD | COMMUNITY
End: 2020-07-29 | Stop reason: CLARIF

## 2019-07-29 NOTE — PATIENT INSTRUCTIONS
He will continue follow-up in the Pain Clinic.  The pain medications have reduced his diarrhea to 3-4 times per day.  He continues his vitamins and minerals particularly the vitamin B12.  He is referred back to the hematologist because his white count and platelets are low.  The up ultrasound showed that his spleen was enlarged.  The x-ray showed that the stomach was normal.  The small bowel pattern was normal. He will continue the nutritious diet.

## 2019-07-29 NOTE — LETTER
July 29, 2019      Tyler Smith MD  [address] [facility] [address]           [facility]  [place] [facility]  [address], [address]  [place] [address]  Phone: [phone]  Fax: [phone]          Patient: Tristin Cerda   MR Number: [id number]   YOB: 1955   Date of Visit: 7/29/2019       Dear Dr. Tyler Smtih:    Thank you for referring Tristin Cerda to me for evaluation. Attached you will find relevant portions of my assessment and plan of care.    If you have questions, please do not hesitate to call me. I look forward to following Tristin Cerda along with you.    Sincerely,    Andrea Shaver MD    Enclosure  CC:  No Recipients    If you would like to receive this communication electronically, please contact [email or web address] or [phone] to request more information on [facility] Link access.    For providers and/or their staff who would like to refer a patient to [facility], please contact us through our one-stop-shop provider referral line, [facility], at [phone].    If you feel you have received this communication in error or would no longer like to receive these types of communications, please e-mail [email or web address]

## 2019-07-29 NOTE — PROGRESS NOTES
Subjective:       Patient ID: Tristin Cerda is a 64 y.o. male.    Chief Complaint: Follow-up (1 mo f/u)    The upper GI series revealed the stomach was normal.  The small bowel was shortened but the mucosal pattern was normal.  Inflammation was not seen. He states that he has been in remission from Crohn's disease for 10 years.  He is having 3-4 bowel movements per day.  He states that the pain medications have helped with the diarrhea.  He denies fever chills hematochezia jaundice or bleeding.  He goes to the [facility] for chronic joint symptoms back pain and abdominal pain.  He only takes his medications as prescribed.  He was found to have splenomegaly leukopenia and thrombocytopenia.  He has had a cholecystectomy.  He states that he did not have the vitamin B12 for 2 months prior to his 1st visit with me.  He has a history of B12 deficiency and is seen the hematologist.  He also has a history of iron deficiency.  He denies GI bleeding.      Allergies:  Review of patient's allergies indicates:   Allergen Reactions    Ciprofloxacin     Codeine Itching    Compazine [prochlorperazine]     Erythromycin     Keflex [cephalexin]        Medications:    Current Outpatient Medications:     acetaminophen (TYLENOL) 500 mg Cap, , Disp: , Rfl:     ASPIRIN (ASPIR-81 ORAL), Take by mouth., Disp: , Rfl:     aspirin-acetaminophen-caffeine 250-250-65 mg (EXCEDRIN MIGRAINE) 250-250-65 mg per tablet, , Disp: , Rfl:     cyanocobalamin 1,000 mcg/mL injection, Inject 1 mL (1,000 mcg total) into the muscle every 30 days., Disp: 10 mL, Rfl: 11    dicyclomine (BENTYL) 20 mg tablet, TAKE 1 TABLET FOUR TIMES DAILY, Disp: 360 tablet, Rfl: 3    diphenhydramine-acetaminophen 12.5-325 mg Tab, , Disp: , Rfl:     hydrocortisone 2.5 % lotion, AAA face QHS PRN flare, Disp: 60 mL, Rfl: 2    magnesium oxide (MAG-OX) 400 mg (241.3 mg magnesium) tablet, Take 400 mg by mouth once daily., Disp: , Rfl:     ondansetron (ZOFRAN) 8 MG  tablet, TAKE 1 TABLET EVERY 12 HOURS AS NEEDED FOR NAUSEA, Disp: 180 tablet, Rfl: 1    phenylephrine-acetaminophen 5-325 mg Cap, , Disp: , Rfl:     potassium gluconate 595 mg (99 mg) Tab, , Disp: , Rfl:     traMADol (ULTRAM) 50 mg tablet, Take 50 mg by mouth every 8 (eight) hours., Disp: , Rfl: 2    Past Medical History:   Diagnosis Date    Anxiety     Basal cell carcinoma 07/2017    R forehead and R temple    Crohn's disease     age 17    Short bowel syndrome     Vitamin B deficiency     Vitamin D deficiency        Past Surgical History:   Procedure Laterality Date    3 small bowel resections      APPENDECTOMY      CHOLECYSTECTOMY      COLONOSCOPY      COLONOSCOPY N/A 3/14/2017    [facility] (4TH FLR)    COLONOSCOPY N/A 2/14/2017    [facility] END[facility] (4TH FLR)    ESOPHAGOGASTRODUODENOSCOPY (EGD) N/A 2/14/2017    Performed by [facility] at [facility] END[facility] (4TH FLR)    KNEE SURGERY      SMALL INTESTINE SURGERY      TUBE THORACOTOMY      UPPER GASTROINTESTINAL ENDOSCOPY           Review of Systems   Constitutional: Negative for appetite change, fever and unexpected weight change.   HENT: Negative for trouble swallowing.         No jaundice.   Respiratory: Negative for cough, shortness of breath and wheezing.         No Rales, Rhonchi, or Dyspnea.   Cardiovascular: Negative for chest pain.   Gastrointestinal: Positive for abdominal distention, diarrhea and nausea. Negative for abdominal pain, blood in stool and constipation.        He denies significant pyrosis and dyspepsia.  He denies dysphagia aspiration.  He denies jaundice or bleeding.  His bowel movements a semi solid without incontinence.  There is mild urgency.  They are not nocturnal.  He has occasional nausea without vomiting.   Musculoskeletal: Positive for arthralgias and back pain. Negative for neck pain.   Skin: Negative for pallor and rash.   Neurological: Negative for  dizziness, seizures, syncope, speech difficulty, weakness and numbness.   Hematological: Negative for adenopathy.   Psychiatric/Behavioral: Negative for confusion.       Objective:      Physical Exam   Constitutional: He is oriented to person, place, and time. He appears well-developed and well-nourished.   Thin well-nourished well-hydrated nonicteric white male   HENT:   Head: Normocephalic.   Eyes: Pupils are equal, round, and reactive to light. EOM are normal.   Neck: Normal range of motion. Neck supple. No tracheal deviation present. No thyromegaly present.   Cardiovascular: Normal rate, regular rhythm and normal heart sounds.   Pulmonary/Chest: Effort normal and breath sounds normal.   Abdominal: Soft. Bowel sounds are normal. He exhibits no distension and no mass. There is tenderness. There is no guarding.   Mildly obese with healed scars.  Definite masses organomegaly not detected.  Bowel sounds are normal.  The spleen tip is not felt.  Masses are absent.  There is minimal tenderness in the epigastrium.   Musculoskeletal: Normal range of motion.   He can ambulate normally.  He can go from the sitting to the standing position without difficulty.  He can get on the examination table without difficulty.   Lymphadenopathy:     He has no cervical adenopathy.   Neurological: He is alert and oriented to person, place, and time. No cranial nerve deficit.   Skin: Skin is warm and dry.   Psychiatric: He has a normal mood and affect. His behavior is normal.   Vitals reviewed.        Plan:       Chronic abdominal pain  -     Cancel: Ambulatory Referral to Hematology / Oncology    History of Crohn's disease    Splenomegaly    Leukopenia, unspecified type    Thrombocytopenic    B12 deficiency        he will continue his current medications vitamins and minerals.  He is referred to the hematologist.  Additionally he will follow up in the Pain Clinic.  He will be given the B12.  He is to continue his usual activity and  nutritious diet.

## 2019-08-21 ENCOUNTER — OFFICE VISIT (OUTPATIENT)
Dept: PAIN MEDICINE | Facility: CLINIC | Age: 64
End: 2019-08-21
Payer: MEDICARE

## 2019-08-21 VITALS
BODY MASS INDEX: 20.67 KG/M2 | DIASTOLIC BLOOD PRESSURE: 77 MMHG | WEIGHT: 156 LBS | HEIGHT: 73 IN | SYSTOLIC BLOOD PRESSURE: 120 MMHG

## 2019-08-21 DIAGNOSIS — Z79.891 CHRONIC USE OF OPIATE FOR THERAPEUTIC PURPOSE: ICD-10-CM

## 2019-08-21 DIAGNOSIS — K50.90 CROHN'S DISEASE WITHOUT COMPLICATION, UNSPECIFIED GASTROINTESTINAL TRACT LOCATION: ICD-10-CM

## 2019-08-21 DIAGNOSIS — G89.29 CHRONIC ABDOMINAL PAIN: Primary | ICD-10-CM

## 2019-08-21 DIAGNOSIS — R10.9 CHRONIC ABDOMINAL PAIN: Primary | ICD-10-CM

## 2019-08-21 PROCEDURE — 99999 PR PBB SHADOW E&M-EST. PATIENT-LVL III: CPT | Mod: PBBFAC,,, | Performed by: PHYSICIAN ASSISTANT

## 2019-08-21 PROCEDURE — 99214 PR OFFICE/OUTPT VISIT, EST, LEVL IV, 30-39 MIN: ICD-10-PCS | Mod: S$GLB,,, | Performed by: PHYSICIAN ASSISTANT

## 2019-08-21 PROCEDURE — 99999 PR PBB SHADOW E&M-EST. PATIENT-LVL III: ICD-10-PCS | Mod: PBBFAC,,, | Performed by: PHYSICIAN ASSISTANT

## 2019-08-21 PROCEDURE — 3008F PR BODY MASS INDEX (BMI) DOCUMENTED: ICD-10-PCS | Mod: CPTII,S$GLB,, | Performed by: PHYSICIAN ASSISTANT

## 2019-08-21 PROCEDURE — 99214 OFFICE O/P EST MOD 30 MIN: CPT | Mod: S$GLB,,, | Performed by: PHYSICIAN ASSISTANT

## 2019-08-21 PROCEDURE — 3008F BODY MASS INDEX DOCD: CPT | Mod: CPTII,S$GLB,, | Performed by: PHYSICIAN ASSISTANT

## 2019-08-21 RX ORDER — TRAMADOL HYDROCHLORIDE 50 MG/1
50 TABLET ORAL EVERY 8 HOURS PRN
Qty: 90 TABLET | Refills: 2 | Status: SHIPPED | OUTPATIENT
Start: 2019-08-21 | End: 2019-09-19

## 2019-08-21 NOTE — PROGRESS NOTES
PCP: Tyler Smith MD      CC: abdominal pain    Interval history: Mr. Cerda is a 64 y.o. male with an extensive history of crohn's disease who presents today for medication refill. We discontinued Demerol 50mg q 8 hrs and started Tramadol 50 mg q 8 h. He reports this is beneficial.  No side effects reported.   Abdominal pain remains stable.   He does report diarrhea at times but no blood stools. Reports anal pain and itching 2/2 chronic diarrhea. OTC Lidocaine 2 % provides some minimal relief.  Compounding cream was too expensive. Continues to c/o bilateral knee pain . He has had multiple surgeries in the past. He rates his pain 4/10.     Prior HPI:   Mr. Cerda is a 60 year old male with PMH of crohn's disease referred by Dr. Hansen.  Patient states being diagnosed with crohn's disease since the age of 14.    He has had multiple GI surgeries and large bowel and small bowel removals.  During that time, he was being managed by providers in [place].  He was TPN dependent for many years until about two years ago.  He is now stable on an oral diet.  He did not have a primary care physician nor a gastroenterologist for many years.  He is currently trying to establish care.  He has future appointment with Dr. Ch.  He states taking Demerol 50mg q8hrs as needed for pain. It has provided relief of his nausea and pain with diarrhea.  He was last prescribed Demerol in July 2014.  Since then, he has been bearing with the pain.  It is a sharp shooting pain in his mid abdomen.      ROS:  CONSTITUTIONAL: No fevers, chills, night sweats, wt. loss, appetite changes  SKIN: no rashes or itching  ENT: No headaches, head trauma, vision changes, or eye pain  LYMPH NODES: None noticed   CV: No chest pain, palpitations.   RESP: No shortness of breath, dyspnea on exertion, cough, wheezing, or hemoptysis  GI: No nausea, emesis, diarrhea, constipation, melena, hematochezia, pain.    : No dysuria, hematuria, urgency, or  frequency   HEME: No easy bruising, bleeding problems  PSYCHIATRIC: No depression, anxiety, psychosis, hallucinations.  NEURO: No seizures, memory loss, dizziness or difficulty sleeping  MSK: no joint pain      Past Medical History:   Diagnosis Date    Anxiety     Basal cell carcinoma 07/2017    R forehead and R temple    Crohn's disease     age 17    Short bowel syndrome     Vitamin B deficiency     Vitamin D deficiency      Past Surgical History:   Procedure Laterality Date    3 small bowel resections      APPENDECTOMY      CHOLECYSTECTOMY      COLONOSCOPY      COLONOSCOPY N/A 3/14/2017    [facility] (4TH FLR)    COLONOSCOPY N/A 2/14/2017    [facility] ENDO (4TH FLR)    ESOPHAGOGASTRODUODENOSCOPY (EGD) N/A 2/14/2017    Per[facility] by [facility] ENDO (4TH FLR)    KNEE SURGERY      SMALL INTESTINE SURGERY      TUBE THORACOTOMY      UPPER GASTROINTESTINAL ENDOSCOPY       Family History   Problem Relation Age of Onset    Diabetes Mother     Stroke Mother     Diabetes Father     Kidney disease Father     Irritable bowel syndrome Cousin     Celiac disease Neg Hx     Cirrhosis Neg Hx     Colon cancer Neg Hx     Colon polyps Neg Hx     Cystic fibrosis Neg Hx     Esophageal cancer Neg Hx     Crohn's disease Neg Hx     Hemochromatosis Neg Hx     Inflammatory bowel disease Neg Hx     Liver cancer Neg Hx     Liver disease Neg Hx     Rectal cancer Neg Hx     Stomach cancer Neg Hx     Ulcerative colitis Neg Hx     Addison's disease Neg Hx     Melanoma Neg Hx     Psoriasis Neg Hx     Lupus Neg Hx     Eczema Neg Hx      Social History     Socioeconomic History    Marital status:      Spouse name: Not on file    Number of children: Not on file    Years of education: Not on file    Highest education level: Not on file   Occupational History    Not on file   Social Needs    Financial resource strain:  "Not on file    Food insecurity:     Worry: Not on file     Inability: Not on file    Transportation needs:     Medical: Not on file     Non-medical: Not on file   Tobacco Use    Smoking status: Former Smoker     Packs/day: 1.00     Years: 15.00     Pack years: 15.00     Types: Cigarettes     Last attempt to quit: 3/10/1995     Years since quittin.4    Smokeless tobacco: Never Used   Substance and Sexual Activity    Alcohol use: Yes     Alcohol/week: 1.2 oz     Types: 1 Cans of beer, 1 Shots of liquor per week     Comment: 1 every 6 -7 months    Drug use: No    Sexual activity: Yes   Lifestyle    Physical activity:     Days per week: Not on file     Minutes per session: Not on file    Stress: Not on file   Relationships    Social connections:     Talks on phone: Not on file     Gets together: Not on file     Attends Latter day service: Not on file     Active member of club or organization: Not on file     Attends meetings of clubs or organizations: Not on file     Relationship status: Not on file   Other Topics Concern    Not on file   Social History Narrative    Retired      Medications/Allergies: See med card    Vitals:    19 1004   BP: 120/77   Weight: 70.8 kg (156 lb)   Height: 6' 1" (1.854 m)   PainSc:   4   PainLoc: Knee     Physical exam:    GENERAL: A and O x3, the patient appears well groomed and is in no acute distress.  Skin: No rashes or obvious lesions  HEENT: normocephalic, atraumatic  CARDIOVASCULAR:  RRR  LUNGS: non labored breathing  ABDOMEN: soft, nontender. No rebound   UPPER EXTREMITIES: Normal alignment, normal range of motion, no atrophy, no skin changes,  hair growth and nail growth normal and equal bilaterally. No swelling, no tenderness.    LOWER EXTREMITIES:  Normal alignment, normal range of motion, no atrophy, no skin changes,  hair growth and nail growth normal and equal bilaterally. No swelling, no tenderness.    LUMBAR SPINE  Lumbar spine: ROM is full with flexion " extension and oblique extension with no increased pain.    Lam's test causes no increased pain on either side.    Supine straight leg raise is negative bilaterally.    Internal and external rotation of the hip causes no increased pain on either side.  Myofascial exam: No tenderness to palpation across lumbar paraspinous muscles.    MENTAL STATUS: normal orientation, speech, language, and fund of knowledge for social situation.  Emotional state appropriate.    CRANIAL NERVES:  II:  PERRL bilaterally,   III,IV,VI: EOMI.    V:  Facial sensation equal bilaterally  VII:  Facial motor function normal.  VIII:  Hearing equal to finger rub bilaterally  IX/X: Gag normal, palate symmetric  XI:  Shoulder shrug equal, head turn equal  XII:  Tongue midline without fasciculations    MOTOR: Tone and bulk: normal bilateral upper and lower Strength: normal   SENSATION: Light touch and pinprick intact bilaterally  REFLEXES: normal, symmetric, nonbrisk.  Toes down, no clonus. No hoffmans.  GAIT: normal rise, base, steps, and arm swing.      Imaging:  None    Assessment:  Mr. Cerda is a 64 y.o. male with abdominal pain  1. Chronic abdominal pain    2. Crohn's disease without complication, unspecified gastrointestinal tract location    3. Chronic use of opiate for therapeutic purpose         Plan:  1. Patient with long history of crohn's disease and chronic abdominal pain.  Continue care with GI.  2. Tramadol 50 mg q 8 h prn  as needed for pain.  reviewed.  No signs of aberrant behavior.    3. Continue OTC lidocaine cream  4. May benefit from bilateral knee genicular nerve blocks.   5. F/u 3 months or sooner  All medication management was performed by Dr. Alvin Curry

## 2019-09-09 ENCOUNTER — TELEPHONE (OUTPATIENT)
Dept: HEMATOLOGY/ONCOLOGY | Facility: CLINIC | Age: 64
End: 2019-09-09

## 2019-09-09 NOTE — TELEPHONE ENCOUNTER
----- Message from Nikkie Vick sent at 9/9/2019  9:33 AM CDT -----  Type:  Sooner Apoointment Request    Caller is requesting a sooner appointment.  Caller declined first available appointment listed below.  Caller will not accept being placed on the waitlist and is requesting a message be sent to doctor.    Name of Caller:  Self   When is the first available appointment?  Symptoms:    Best Call Back Number:  [phone]   Additional Information: Pt requesting to reschedule appointment for next week.

## 2019-09-18 ENCOUNTER — OFFICE VISIT (OUTPATIENT)
Dept: HEMATOLOGY/ONCOLOGY | Facility: CLINIC | Age: 64
End: 2019-09-18
Payer: MEDICARE

## 2019-09-18 ENCOUNTER — LAB VISIT (OUTPATIENT)
Dept: LAB | Facility: HOSPITAL | Age: 64
End: 2019-09-18
Attending: INTERNAL MEDICINE
Payer: MEDICARE

## 2019-09-18 VITALS
BODY MASS INDEX: 21.26 KG/M2 | DIASTOLIC BLOOD PRESSURE: 68 MMHG | HEART RATE: 64 BPM | HEIGHT: 72 IN | WEIGHT: 157 LBS | SYSTOLIC BLOOD PRESSURE: 142 MMHG | OXYGEN SATURATION: 95 % | RESPIRATION RATE: 16 BRPM

## 2019-09-18 DIAGNOSIS — E53.8 B12 DEFICIENCY: Primary | ICD-10-CM

## 2019-09-18 DIAGNOSIS — D69.6 THROMBOCYTOPENIC: ICD-10-CM

## 2019-09-18 DIAGNOSIS — E53.8 B12 DEFICIENCY: ICD-10-CM

## 2019-09-18 DIAGNOSIS — Z86.2 HISTORY OF ANEMIA: ICD-10-CM

## 2019-09-18 DIAGNOSIS — D69.6 THROMBOCYTOPENIA: ICD-10-CM

## 2019-09-18 DIAGNOSIS — D72.819 LEUKOPENIA, UNSPECIFIED TYPE: ICD-10-CM

## 2019-09-18 DIAGNOSIS — K90.829 SHORT BOWEL SYNDROME: ICD-10-CM

## 2019-09-18 DIAGNOSIS — R16.1 SPLENOMEGALY: ICD-10-CM

## 2019-09-18 DIAGNOSIS — D72.810 LYMPHOCYTOPENIA: ICD-10-CM

## 2019-09-18 DIAGNOSIS — K50.018 CROHN'S DISEASE OF SMALL INTESTINE WITH OTHER COMPLICATION: ICD-10-CM

## 2019-09-18 DIAGNOSIS — E53.8 VITAMIN B 12 DEFICIENCY: ICD-10-CM

## 2019-09-18 DIAGNOSIS — E63.9 NUTRITIONAL DEFICIENCY DISORDER: ICD-10-CM

## 2019-09-18 LAB
BASOPHILS # BLD AUTO: 0.02 K/UL (ref 0–0.2)
BASOPHILS # BLD AUTO: 0.02 K/UL (ref 0–0.2)
BASOPHILS NFR BLD: 0.4 % (ref 0–1.9)
BASOPHILS NFR BLD: 0.4 % (ref 0–1.9)
DIFFERENTIAL METHOD: ABNORMAL
DIFFERENTIAL METHOD: ABNORMAL
EOSINOPHIL # BLD AUTO: 0.2 K/UL (ref 0–0.5)
EOSINOPHIL # BLD AUTO: 0.2 K/UL (ref 0–0.5)
EOSINOPHIL NFR BLD: 3.9 % (ref 0–8)
EOSINOPHIL NFR BLD: 3.9 % (ref 0–8)
ERYTHROCYTE [DISTWIDTH] IN BLOOD BY AUTOMATED COUNT: 13.1 % (ref 11.5–14.5)
ERYTHROCYTE [DISTWIDTH] IN BLOOD BY AUTOMATED COUNT: 13.1 % (ref 11.5–14.5)
HCT VFR BLD AUTO: 40 % (ref 40–54)
HCT VFR BLD AUTO: 40 % (ref 40–54)
HGB BLD-MCNC: 13.1 G/DL (ref 14–18)
HGB BLD-MCNC: 13.1 G/DL (ref 14–18)
IMM GRANULOCYTES # BLD AUTO: 0.01 K/UL (ref 0–0.04)
IMM GRANULOCYTES # BLD AUTO: 0.01 K/UL (ref 0–0.04)
IMM GRANULOCYTES NFR BLD AUTO: 0.2 % (ref 0–0.5)
IMM GRANULOCYTES NFR BLD AUTO: 0.2 % (ref 0–0.5)
IRON SERPL-MCNC: 109 UG/DL (ref 45–160)
LYMPHOCYTES # BLD AUTO: 0.9 K/UL (ref 1–4.8)
LYMPHOCYTES # BLD AUTO: 0.9 K/UL (ref 1–4.8)
LYMPHOCYTES NFR BLD: 19.8 % (ref 18–48)
LYMPHOCYTES NFR BLD: 19.8 % (ref 18–48)
MCH RBC QN AUTO: 29 PG (ref 27–31)
MCH RBC QN AUTO: 29 PG (ref 27–31)
MCHC RBC AUTO-ENTMCNC: 32.8 G/DL (ref 32–36)
MCHC RBC AUTO-ENTMCNC: 32.8 G/DL (ref 32–36)
MCV RBC AUTO: 89 FL (ref 82–98)
MCV RBC AUTO: 89 FL (ref 82–98)
MONOCYTES # BLD AUTO: 0.4 K/UL (ref 0.3–1)
MONOCYTES # BLD AUTO: 0.4 K/UL (ref 0.3–1)
MONOCYTES NFR BLD: 8.1 % (ref 4–15)
MONOCYTES NFR BLD: 8.1 % (ref 4–15)
NEUTROPHILS # BLD AUTO: 3.1 K/UL (ref 1.8–7.7)
NEUTROPHILS # BLD AUTO: 3.1 K/UL (ref 1.8–7.7)
NEUTROPHILS NFR BLD: 67.6 % (ref 38–73)
NEUTROPHILS NFR BLD: 67.6 % (ref 38–73)
NRBC BLD-RTO: 0 /100 WBC
NRBC BLD-RTO: 0 /100 WBC
PLATELET # BLD AUTO: 132 K/UL (ref 150–350)
PLATELET # BLD AUTO: 132 K/UL (ref 150–350)
PMV BLD AUTO: 10.3 FL (ref 9.2–12.9)
PMV BLD AUTO: 10.3 FL (ref 9.2–12.9)
RBC # BLD AUTO: 4.51 M/UL (ref 4.6–6.2)
RBC # BLD AUTO: 4.51 M/UL (ref 4.6–6.2)
SATURATED IRON: 26 % (ref 20–50)
TOTAL IRON BINDING CAPACITY: 414 UG/DL (ref 250–450)
TRANSFERRIN SERPL-MCNC: 280 MG/DL (ref 200–375)
WBC # BLD AUTO: 4.59 K/UL (ref 3.9–12.7)
WBC # BLD AUTO: 4.59 K/UL (ref 3.9–12.7)

## 2019-09-18 PROCEDURE — 83540 ASSAY OF IRON: CPT

## 2019-09-18 PROCEDURE — 99999 PR PBB SHADOW E&M-EST. PATIENT-LVL III: CPT | Mod: PBBFAC,,, | Performed by: INTERNAL MEDICINE

## 2019-09-18 PROCEDURE — 83921 ORGANIC ACID SINGLE QUANT: CPT

## 2019-09-18 PROCEDURE — 82746 ASSAY OF FOLIC ACID SERUM: CPT

## 2019-09-18 PROCEDURE — 3008F PR BODY MASS INDEX (BMI) DOCUMENTED: ICD-10-PCS | Mod: CPTII,S$GLB,, | Performed by: INTERNAL MEDICINE

## 2019-09-18 PROCEDURE — 99214 OFFICE O/P EST MOD 30 MIN: CPT | Mod: S$GLB,,, | Performed by: INTERNAL MEDICINE

## 2019-09-18 PROCEDURE — 3008F BODY MASS INDEX DOCD: CPT | Mod: CPTII,S$GLB,, | Performed by: INTERNAL MEDICINE

## 2019-09-18 PROCEDURE — 36415 COLL VENOUS BLD VENIPUNCTURE: CPT

## 2019-09-18 PROCEDURE — 85025 COMPLETE CBC W/AUTO DIFF WBC: CPT

## 2019-09-18 PROCEDURE — 86340 INTRINSIC FACTOR ANTIBODY: CPT

## 2019-09-18 PROCEDURE — 99999 PR PBB SHADOW E&M-EST. PATIENT-LVL III: ICD-10-PCS | Mod: PBBFAC,,, | Performed by: INTERNAL MEDICINE

## 2019-09-18 PROCEDURE — 99214 PR OFFICE/OUTPT VISIT, EST, LEVL IV, 30-39 MIN: ICD-10-PCS | Mod: S$GLB,,, | Performed by: INTERNAL MEDICINE

## 2019-09-18 NOTE — PROGRESS NOTES
CC I had I labs done at [facility]    Tristin Sahil Cerda is a 64 y.o.  This pt was referred by KAT Mccauley for B 12 deficiency and anemia. He was on TPN for years and now has no access to continue such  Pt was on B12 injections monthly and ran out after December 2018.   Labs recently show normal B12 levels , he is severely fatigued and has documented short bowel syndrome.  He has run out of his B12 again  He does have leukopenia. With thrombocytopenia  he is experiencing fatigue no melena or hematochezia intermittent loose stools   He has a history  of recurring infections . He does have Crohns and suffers with malabsorption    Patient here for lab review yet [facility] states they do not have any results for this patient as they did not draw his blood  He is not having weight loss no neuropathy  Is not having any symptoms of depression no crying spells no excessive fatigue he is remaining active  Past Medical History:   Diagnosis Date    Anxiety     Basal cell carcinoma 07/2017    R forehead and R temple    Crohn's disease     age 17    Short bowel syndrome     Vitamin B deficiency     Vitamin D deficiency      Past Surgical History:   Procedure Laterality Date    3 small bowel resections      APPENDECTOMY      CHOLECYSTECTOMY      COLONOSCOPY      COLONOSCOPY N/A 3/14/2017    Performed by [facility] at [facility] ENDO (4TH FLR)    COLONOSCOPY N/A 2/14/2017    Performed by [facility] at [facility] ENDO (4TH FLR)    ESOPHAGOGASTRODUODENOSCOPY (EGD) N/A 2/14/2017    Performed by [facility] at [facility] ENDO (4TH FLR)    KNEE SURGERY      SMALL INTESTINE SURGERY      TUBE THORACOTOMY      UPPER GASTROINTESTINAL ENDOSCOPY      He remains on Bentyl which helps with his abdominal cramping and Zofran as needed for intermittent nausea.  He is on chronic pain medications    Current Outpatient Medications:     acetaminophen (TYLENOL) 500 mg Cap, , Disp: , Rfl:     ASPIRIN (ASPIR-81 ORAL), Take by  mouth., Disp: , Rfl:     aspirin-acetaminophen-caffeine 250-250-65 mg (EXCEDRIN MIGRAINE) 250-250-65 mg per tablet, , Disp: , Rfl:     cyanocobalamin 1,000 mcg/mL injection, Inject 1 mL (1,000 mcg total) into the muscle every 30 days., Disp: 10 mL, Rfl: 11    dicyclomine (BENTYL) 20 mg tablet, TAKE 1 TABLET FOUR TIMES DAILY, Disp: 360 tablet, Rfl: 3    diphenhydramine-acetaminophen 12.5-325 mg Tab, , Disp: , Rfl:     hydrocortisone 2.5 % lotion, AAA face QHS PRN flare, Disp: 60 mL, Rfl: 2    magnesium oxide (MAG-OX) 400 mg (241.3 mg magnesium) tablet, Take 400 mg by mouth once daily., Disp: , Rfl:     ondansetron (ZOFRAN) 8 MG tablet, TAKE 1 TABLET EVERY 12 HOURS AS NEEDED FOR NAUSEA, Disp: 180 tablet, Rfl: 1    phenylephrine-acetaminophen 5-325 mg Cap, , Disp: , Rfl:     potassium gluconate 595 mg (99 mg) Tab, , Disp: , Rfl:     traMADol (ULTRAM) 50 mg tablet, Take 50 mg by mouth every 8 (eight) hours., Disp: , Rfl: 2    traMADol (ULTRAM) 50 mg tablet, Take 1 tablet (50 mg total) by mouth every 8 (eight) hours as needed for Pain (medically necessary for > 7 days for chronic pain)., Disp: 90 tablet, Rfl: 2  Review of patient's allergies indicates:   Allergen Reactions    Ciprofloxacin     Codeine Itching    Compazine [prochlorperazine]     Erythromycin     Keflex [cephalexin]      Social History     Tobacco Use    Smoking status: Former Smoker     Packs/day: 1.00     Years: 15.00     Pack years: 15.00     Types: Cigarettes     Last attempt to quit: 3/10/1995     Years since quittin.5    Smokeless tobacco: Never Used   Substance Use Topics    Alcohol use: Yes     Alcohol/week: 1.2 oz     Types: 1 Cans of beer, 1 Shots of liquor per week     Comment: 1 every 6 -7 months    Drug use: No         CONSTITUTIONAL: No fevers, chills, night sweats, no further wt. loss, appetite changes  SKIN: no rashes or itching  ENT: No headaches, head trauma, vision changes, or eye pain  LYMPH NODES: None noticed    CV: No chest pain, palpitations.  No orthopnea or PND  RESP: No dyspnea on exertion, cough, wheezing, or hemoptysis  GI:  Intermittent diarrhea intermittent nausea and no emesis no melena or hematochezia   : No dysuria, hematuria, urgency, or frequency   HEME: No easy bruising, bleeding problems  PSYCHIATRIC: No depression, anxiety, psychosis   NEURO: No seizures, memory loss, dizziness  intermittent insomnia  MSK: No arthralgias   No pain       BP (!) 142/68   Pulse 64   Resp 16   Ht 6' (1.829 m)   Wt 71.2 kg (157 lb)   SpO2 95%   BMI 21.29 kg/m²   Gen: NAD, A and O x3, thin body habitus  Psych: pleasant affect, normal thought process  Eyes: Pupils round and non dilated, EOM intact , conj pink   Nose: Nares patent  OP clear, mucosa patent  Neck: suppple, no JVD, trachea midline,no palpable thyromegaly  Lungs: CTAB, no wheezes, no fremitus  CV: S1S2 with RRR, No mrg no loud S1 or S2  Abd: soft, NTND, + BS, No HSM, no ascites  Extr: No CCE, DENISSE, strength normal, good capillary refill  Neuro: steady gait  Skin: intact, no lesions noted  Rheum: No joint swelling  Hepatitis B Surface AgNegative Hep B C IgMPositiveAbnormal  Hep A IgMNegative Hepatitis C AbNegative   NILSee text IU/mL0.06 TB AntigenSee text IU/mL0.45 TB Antigen - NilSee text IU/mL0.40 Mitogen - NilSee text IU/mL3.11 TB GoldPositiveAbnormal    TTG IgA<20 [id number]          Ref Range & Units 2wk ago 5mo ago   WBC 3.8 - 10.8 Thousand/uL 3.2Low   3.5Low     RBC 4.20 - 5.80 Million/uL 4.60  4.63    Hemoglobin 13.2 - 17.1 g/dL 13.2  13.6    Hematocrit 38.5 - 50.0 % 41.2  40.9    Mean Corpuscular Volume 80.0 - 100.0 fL 89.6  88.3    Mean Corpuscular Hemoglobin 27.0 - 33.0 pg 28.7  29.4    Mean Corpuscular Hemoglobin Conc 32.0 - 36.0 g/dL 32.0  33.3    RDW 11.0 - 15.0 % 13.2  13.1    Platelets 140 - 400 Thousand/uL 129Low   121Low     Comment: Review of the peripheral smear reveals decreased numbers of platelets.   MPV 7.5 - 12.5 fL 11.1  10.6     Neutrophils Absolute 1,500 - 7,800 cells/uL 2,125  2,321    Lymph # 850 - 3,900 cells/uL 723Low   655Low     Mono # 200 - 950 cells/uL 291  417    Eos # 15 - 500 cells/uL 42  88    Baso # 0 - 200 cells/uL 19  21    Neutrophils Relative % 66.4  66.3    Lymph% % 22.6  18.7    Mono% % 9.1  11.9    Eosinophil% % 1.3  2.5    Basophil% % 0.6  0.6    Differential Comment  Review of peripheral smear confirms automated results.     Resulting Agency  [facility]      Narrative   Performed by: [employer]   FASTING:NO  FASTING: NO   Resulting Agency's Comment     Performing Organization Information:      Site ID: [id number]      [facility]      Address: [address] [place] [id number]      Director: Deepika Garcia       Pt also positive for varicella       Lab Results   Component Value Date    WBC 3.2 (L) 05/23/2019    HGB 13.2 05/23/2019    HCT 41.2 05/23/2019    MCV 89.6 05/23/2019     (L) 05/23/2019     CMP  Sodium   Date Value Ref Range Status   12/26/2018 140 135 - 146 mmol/L Final     Potassium   Date Value Ref Range Status   12/26/2018 4.0 3.5 - 5.3 mmol/L Final     Chloride   Date Value Ref Range Status   12/26/2018 102 98 - 110 mmol/L Final     CO2   Date Value Ref Range Status   12/26/2018 27 20 - 32 mmol/L Final     Glucose   Date Value Ref Range Status   12/26/2018 98 65 - 139 mg/dL Final     Comment:               Non-fasting reference interval          BUN, Bld   Date Value Ref Range Status   12/26/2018 14 7 - 25 mg/dL Final     Creatinine   Date Value Ref Range Status   12/26/2018 0.73 0.70 - 1.25 mg/dL Final     Comment:     For patients >49 years of age, the reference limit  for Creatinine is approximately 13% higher for people  identified as -American.          Calcium   Date Value Ref Range Status   12/26/2018 9.1 8.6 - 10.3 mg/dL Final     Total Protein   Date Value Ref Range Status   12/26/2018 6.3 6.1 - 8.1 g/dL Final     Albumin   Date Value Ref Range Status    12/26/2018 4.2 3.6 - 5.1 g/dL Final     Total Bilirubin   Date Value Ref Range Status   12/26/2018 0.6 0.2 - 1.2 mg/dL Final     Alkaline Phosphatase   Date Value Ref Range Status   12/26/2018 174 (H) 40 - 115 U/L Final     AST   Date Value Ref Range Status   12/26/2018 25 10 - 35 U/L Final     ALT   Date Value Ref Range Status   12/26/2018 33 9 - 46 U/L Final     Anion Gap   Date Value Ref Range Status   11/08/2017 6 (L) 8 - 16 mmol/L Final     eGFR if    Date Value Ref Range Status   12/26/2018 116 > OR = 60 mL/min/1.73m2 Final     eGFR if non    Date Value Ref Range Status   12/26/2018 100 > OR = 60 mL/min/1.73m2 Final       B12 deficiency    Leukopenia, unspecified type    Thrombocytopenic    Crohn's disease of small intestine with other complication    Short bowel syndrome    Splenomegaly       Splenomegaly is also adding to his cytopenias.  He is to increase his muscle capacity decreased BMI and increase his activity  I had explained to the patient I have no further recommendations for him at this time is I need to see more recent blood work in his response to taking B12  Leukopenia and thrombocytopenia relatively stable no evidence of bleeding no need for transfusion at this time  Malabsorption: positive TTA celiac   He politely agrees to have his labs drawn here today side again assess them and  I will call his results make further recommendations as needed.  Discussed  Abstaining  from using alcohol regularly as this will cause bone marrow suppression and also cause progression of the cytopenias which could then lead to recurring infections and bleeding difficulties.  Cont zofran prn nausea  Cont bentyl for muscle spasms  He is to abstain from NSAIDS as this can increase his risk of bleeding and also cause further bone marrow suppression  RTC 3 months with B12 and iron levels with cbc       Addendum methylmalonic acid is elevated calling patient to resume B12  injections    Advance Care Planning     Living Will  During this visit, I engaged the patient in the advance care planning process.  The patient and I reviewed the role for advance directives and their purpose in directing future healthcare if the patient's unable to speak for him/herself.  At this point in time, the patient does have full decision-making capacity.  We discussed different extreme health states that he could experience, and reviewed what kind of medical care he would want in those situations.  The patient communicated that if he were comatose and had little chance of a meaningful recovery, he would not want machines/life-sustaining treatments used.    The patient  Has  already designated a healthcare power of  to make decisions on his behalf.   I spent a total of 10  minutes engaging the patient in this advance care planning discussion.           Thank you for allowing me to evaluate and participate in the care of this pleasant patient. Please fell free to call me with any questions or concerns.    Warmly,  Kelsi Garcia MD    This note was dictated with Dragon and briefly proofread. Please excuse any sentences that may be unclear or words misspelled

## 2019-09-18 NOTE — LETTER
September 25, 2019      ERIN Canales  [address] [place] [address]           [facility]  [address] [place] [address]  Phone: [phone]          Patient: Tristin Cerda   MR Number: [id number]   YOB: 1955   Date of Visit: 9/18/2019       Dear Latonia Mccauley:    Thank you for referring Tristin Cerda to me for evaluation. Attached you will find relevant portions of my assessment and plan of care.    If you have questions, please do not hesitate to call me. I look forward to following Tristin Cerda along with you.    Sincerely,    Kelsi Garcia MD    Enclosure  CC:  No Recipients    If you would like to receive this communication electronically, please contact [email or web address] or [phone] to request more information on [employer] access.    For providers and/or their staff who would like to refer a patient to [facility], please contact us through our one-stop-shop provider referral line, [facility] , at [phone].    If you feel you have received this communication in error or would no longer like to receive these types of communications, please e-mail [email or web address]

## 2019-09-19 LAB — FOLATE SERPL-MCNC: 12.8 NG/ML (ref 4–24)

## 2019-09-20 LAB
ANNOTATION COMMENT IMP: NORMAL
IF BLOCK AB SER QL: NEGATIVE

## 2019-09-21 LAB — METHYLMALONATE SERPL-SCNC: 1.12 UMOL/L

## 2019-09-25 ENCOUNTER — TELEPHONE (OUTPATIENT)
Dept: HEMATOLOGY/ONCOLOGY | Facility: CLINIC | Age: 64
End: 2019-09-25

## 2019-09-25 NOTE — TELEPHONE ENCOUNTER
----- Message from Aileen Roman sent at 9/25/2019  3:36 PM CDT -----  Contact: [phone]  Patient is returning nurse's phone call.  Please call patient back at [phone].

## 2019-09-25 NOTE — TELEPHONE ENCOUNTER
Left voicemail for patient to return call to schedule appt for B12 injection in [place], 3 month f/u appt with Dr. Garcia and labs.

## 2019-09-25 NOTE — TELEPHONE ENCOUNTER
----- Message from Kelsi Garcia MD sent at 9/25/2019  9:33 AM CDT -----  Can you please call him and set him up to see [facility] for B12 injections again and I will see him in 3 months.  Can use schedule him to have B12 levels which is methylmalonic acid and a CBC I am entering orders now

## 2019-09-26 ENCOUNTER — INFUSION (OUTPATIENT)
Dept: INFUSION THERAPY | Facility: HOSPITAL | Age: 64
End: 2019-09-26
Payer: MEDICARE

## 2019-09-26 VITALS
HEART RATE: 56 BPM | RESPIRATION RATE: 18 BRPM | OXYGEN SATURATION: 97 % | SYSTOLIC BLOOD PRESSURE: 154 MMHG | TEMPERATURE: 97 F | DIASTOLIC BLOOD PRESSURE: 82 MMHG

## 2019-09-26 DIAGNOSIS — K90.829 SHORT BOWEL SYNDROME: ICD-10-CM

## 2019-09-26 DIAGNOSIS — E53.8 B12 DEFICIENCY: ICD-10-CM

## 2019-09-26 DIAGNOSIS — Z86.2 HISTORY OF ANEMIA: Primary | ICD-10-CM

## 2019-09-26 DIAGNOSIS — K50.919 CROHN'S DISEASE WITH COMPLICATION, UNSPECIFIED GASTROINTESTINAL TRACT LOCATION: ICD-10-CM

## 2019-09-26 DIAGNOSIS — D69.6 THROMBOCYTOPENIC: ICD-10-CM

## 2019-09-26 DIAGNOSIS — D72.810 LYMPHOCYTOPENIA: ICD-10-CM

## 2019-09-26 PROCEDURE — 63600175 PHARM REV CODE 636 W HCPCS: Performed by: INTERNAL MEDICINE

## 2019-09-26 PROCEDURE — 96372 THER/PROPH/DIAG INJ SC/IM: CPT

## 2019-09-26 RX ORDER — CYANOCOBALAMIN 1000 UG/ML
1000 INJECTION, SOLUTION INTRAMUSCULAR; SUBCUTANEOUS
Status: CANCELLED | OUTPATIENT
Start: 2019-09-26

## 2019-09-26 RX ORDER — CYANOCOBALAMIN 1000 UG/ML
1000 INJECTION, SOLUTION INTRAMUSCULAR; SUBCUTANEOUS
Status: DISCONTINUED | OUTPATIENT
Start: 2019-09-26 | End: 2019-09-26 | Stop reason: HOSPADM

## 2019-09-26 RX ADMIN — CYANOCOBALAMIN 1000 MCG: 1000 INJECTION, SOLUTION INTRAMUSCULAR at 08:09

## 2019-09-27 ENCOUNTER — TELEPHONE (OUTPATIENT)
Dept: HEMATOLOGY/ONCOLOGY | Facility: CLINIC | Age: 64
End: 2019-09-27

## 2019-09-27 NOTE — TELEPHONE ENCOUNTER
9/25/19 - patient came in to clinic to schedule appts.  Pt took hard copy of lab orders to bring to [facility].  Pt verbalized understanding to have labs completed and sent to us before future office visit.

## 2019-10-16 DIAGNOSIS — G89.29 CHRONIC ABDOMINAL PAIN: ICD-10-CM

## 2019-10-16 DIAGNOSIS — R10.9 CHRONIC ABDOMINAL PAIN: ICD-10-CM

## 2019-10-16 DIAGNOSIS — K90.829 SHORT BOWEL SYNDROME: ICD-10-CM

## 2019-10-16 RX ORDER — ONDANSETRON HYDROCHLORIDE 8 MG/1
TABLET, FILM COATED ORAL
Qty: 180 TABLET | Refills: 1 | Status: SHIPPED | OUTPATIENT
Start: 2019-10-16 | End: 2020-04-06 | Stop reason: SDUPTHER

## 2019-10-16 RX ORDER — DICYCLOMINE HYDROCHLORIDE 20 MG/1
TABLET ORAL
Qty: 360 TABLET | Refills: 3 | Status: SHIPPED | OUTPATIENT
Start: 2019-10-16 | End: 2020-06-09 | Stop reason: SDUPTHER

## 2019-10-28 ENCOUNTER — INFUSION (OUTPATIENT)
Dept: INFUSION THERAPY | Facility: HOSPITAL | Age: 64
End: 2019-10-28
Attending: INTERNAL MEDICINE
Payer: MEDICARE

## 2019-10-28 VITALS
SYSTOLIC BLOOD PRESSURE: 142 MMHG | RESPIRATION RATE: 16 BRPM | DIASTOLIC BLOOD PRESSURE: 65 MMHG | OXYGEN SATURATION: 97 % | HEART RATE: 60 BPM | TEMPERATURE: 96 F

## 2019-10-28 DIAGNOSIS — K50.919 CROHN'S DISEASE WITH COMPLICATION, UNSPECIFIED GASTROINTESTINAL TRACT LOCATION: ICD-10-CM

## 2019-10-28 DIAGNOSIS — E53.8 B12 DEFICIENCY: ICD-10-CM

## 2019-10-28 DIAGNOSIS — D72.810 LYMPHOCYTOPENIA: ICD-10-CM

## 2019-10-28 DIAGNOSIS — K90.829 SHORT BOWEL SYNDROME: ICD-10-CM

## 2019-10-28 DIAGNOSIS — Z86.2 HISTORY OF ANEMIA: Primary | ICD-10-CM

## 2019-10-28 DIAGNOSIS — D69.6 THROMBOCYTOPENIC: ICD-10-CM

## 2019-10-28 PROCEDURE — 63600175 PHARM REV CODE 636 W HCPCS: Performed by: INTERNAL MEDICINE

## 2019-10-28 PROCEDURE — 96372 THER/PROPH/DIAG INJ SC/IM: CPT

## 2019-10-28 RX ORDER — CYANOCOBALAMIN 1000 UG/ML
1000 INJECTION, SOLUTION INTRAMUSCULAR; SUBCUTANEOUS
Status: DISCONTINUED | OUTPATIENT
Start: 2019-10-28 | End: 2019-10-28 | Stop reason: HOSPADM

## 2019-10-28 RX ORDER — CYANOCOBALAMIN 1000 UG/ML
1000 INJECTION, SOLUTION INTRAMUSCULAR; SUBCUTANEOUS
Status: CANCELLED | OUTPATIENT
Start: 2019-10-28

## 2019-10-28 RX ADMIN — CYANOCOBALAMIN 1000 MCG: 1000 INJECTION, SOLUTION INTRAMUSCULAR at 08:10

## 2019-10-28 NOTE — NURSING
Patient arrived for B12 injection.  VSS. Alert and oriented.  Medication administered to right buttock as ordered.  Patient tolerated well. AZAM, RN

## 2019-10-30 ENCOUNTER — OFFICE VISIT (OUTPATIENT)
Dept: GASTROENTEROLOGY | Facility: CLINIC | Age: 64
End: 2019-10-30
Payer: MEDICARE

## 2019-10-30 ENCOUNTER — LAB VISIT (OUTPATIENT)
Dept: LAB | Facility: HOSPITAL | Age: 64
End: 2019-10-30
Attending: INTERNAL MEDICINE
Payer: MEDICARE

## 2019-10-30 ENCOUNTER — TELEPHONE (OUTPATIENT)
Dept: LAB | Facility: HOSPITAL | Age: 64
End: 2019-10-30

## 2019-10-30 VITALS
HEIGHT: 72 IN | SYSTOLIC BLOOD PRESSURE: 129 MMHG | HEART RATE: 60 BPM | BODY MASS INDEX: 21.54 KG/M2 | WEIGHT: 159 LBS | RESPIRATION RATE: 16 BRPM | OXYGEN SATURATION: 96 % | DIASTOLIC BLOOD PRESSURE: 9 MMHG

## 2019-10-30 DIAGNOSIS — R16.1 SPLENOMEGALY: ICD-10-CM

## 2019-10-30 DIAGNOSIS — E53.8 B12 DEFICIENCY: ICD-10-CM

## 2019-10-30 DIAGNOSIS — Z90.49 HISTORY OF CHOLECYSTECTOMY: ICD-10-CM

## 2019-10-30 DIAGNOSIS — Z01.818 PRE-OP EXAM: ICD-10-CM

## 2019-10-30 DIAGNOSIS — G89.29 CHRONIC ABDOMINAL PAIN: Primary | ICD-10-CM

## 2019-10-30 DIAGNOSIS — K90.0 CELIAC DISEASE: ICD-10-CM

## 2019-10-30 DIAGNOSIS — K90.829 SHORT BOWEL SYNDROME: ICD-10-CM

## 2019-10-30 DIAGNOSIS — Z87.19 HISTORY OF CROHN'S DISEASE: ICD-10-CM

## 2019-10-30 DIAGNOSIS — G89.29 CHRONIC ABDOMINAL PAIN: ICD-10-CM

## 2019-10-30 DIAGNOSIS — D69.6 THROMBOCYTOPENIC: ICD-10-CM

## 2019-10-30 DIAGNOSIS — K52.9 CHRONIC DIARRHEA: ICD-10-CM

## 2019-10-30 DIAGNOSIS — R10.9 CHRONIC ABDOMINAL PAIN: ICD-10-CM

## 2019-10-30 DIAGNOSIS — K44.9 HIATAL HERNIA: ICD-10-CM

## 2019-10-30 DIAGNOSIS — R10.9 CHRONIC ABDOMINAL PAIN: Primary | ICD-10-CM

## 2019-10-30 LAB
ALBUMIN SERPL BCP-MCNC: 4.1 G/DL (ref 3.5–5.2)
ALP SERPL-CCNC: 106 U/L (ref 55–135)
ALT SERPL W/O P-5'-P-CCNC: 57 U/L (ref 10–44)
ANION GAP SERPL CALC-SCNC: 9 MMOL/L (ref 8–16)
AST SERPL-CCNC: 45 U/L (ref 10–40)
BASOPHILS # BLD AUTO: 0.04 K/UL (ref 0–0.2)
BASOPHILS NFR BLD: 1.2 % (ref 0–1.9)
BILIRUB SERPL-MCNC: 0.5 MG/DL (ref 0.1–1)
BUN SERPL-MCNC: 11 MG/DL (ref 8–23)
CALCIUM SERPL-MCNC: 9.1 MG/DL (ref 8.7–10.5)
CHLORIDE SERPL-SCNC: 105 MMOL/L (ref 95–110)
CO2 SERPL-SCNC: 27 MMOL/L (ref 23–29)
CREAT SERPL-MCNC: 0.7 MG/DL (ref 0.5–1.4)
DIFFERENTIAL METHOD: ABNORMAL
EOSINOPHIL # BLD AUTO: 0.2 K/UL (ref 0–0.5)
EOSINOPHIL NFR BLD: 5.5 % (ref 0–8)
ERYTHROCYTE [DISTWIDTH] IN BLOOD BY AUTOMATED COUNT: 12.9 % (ref 11.5–14.5)
EST. GFR  (AFRICAN AMERICAN): >60 ML/MIN/1.73 M^2
EST. GFR  (NON AFRICAN AMERICAN): >60 ML/MIN/1.73 M^2
GLUCOSE SERPL-MCNC: 96 MG/DL (ref 70–110)
HCT VFR BLD AUTO: 40.9 % (ref 40–54)
HGB BLD-MCNC: 13.4 G/DL (ref 14–18)
IMM GRANULOCYTES # BLD AUTO: 0 K/UL (ref 0–0.04)
IMM GRANULOCYTES NFR BLD AUTO: 0 % (ref 0–0.5)
LYMPHOCYTES # BLD AUTO: 0.7 K/UL (ref 1–4.8)
LYMPHOCYTES NFR BLD: 21.4 % (ref 18–48)
MCH RBC QN AUTO: 29.6 PG (ref 27–31)
MCHC RBC AUTO-ENTMCNC: 32.8 G/DL (ref 32–36)
MCV RBC AUTO: 90 FL (ref 82–98)
MONOCYTES # BLD AUTO: 0.3 K/UL (ref 0.3–1)
MONOCYTES NFR BLD: 9.8 % (ref 4–15)
NEUTROPHILS # BLD AUTO: 2 K/UL (ref 1.8–7.7)
NEUTROPHILS NFR BLD: 62.1 % (ref 38–73)
NRBC BLD-RTO: 0 /100 WBC
PLATELET # BLD AUTO: 117 K/UL (ref 150–350)
PMV BLD AUTO: 11.3 FL (ref 9.2–12.9)
POTASSIUM SERPL-SCNC: 4 MMOL/L (ref 3.5–5.1)
PROT SERPL-MCNC: 6.6 G/DL (ref 6–8.4)
RBC # BLD AUTO: 4.53 M/UL (ref 4.6–6.2)
SODIUM SERPL-SCNC: 141 MMOL/L (ref 136–145)
WBC # BLD AUTO: 3.27 K/UL (ref 3.9–12.7)

## 2019-10-30 PROCEDURE — 99214 PR OFFICE/OUTPT VISIT, EST, LEVL IV, 30-39 MIN: ICD-10-PCS | Mod: S$GLB,,, | Performed by: INTERNAL MEDICINE

## 2019-10-30 PROCEDURE — 99214 OFFICE O/P EST MOD 30 MIN: CPT | Mod: S$GLB,,, | Performed by: INTERNAL MEDICINE

## 2019-10-30 PROCEDURE — 3008F PR BODY MASS INDEX (BMI) DOCUMENTED: ICD-10-PCS | Mod: CPTII,S$GLB,, | Performed by: INTERNAL MEDICINE

## 2019-10-30 PROCEDURE — 99999 PR PBB SHADOW E&M-EST. PATIENT-LVL IV: ICD-10-PCS | Mod: PBBFAC,,, | Performed by: INTERNAL MEDICINE

## 2019-10-30 PROCEDURE — 36415 COLL VENOUS BLD VENIPUNCTURE: CPT

## 2019-10-30 PROCEDURE — 80074 ACUTE HEPATITIS PANEL: CPT

## 2019-10-30 PROCEDURE — 99999 PR PBB SHADOW E&M-EST. PATIENT-LVL IV: CPT | Mod: PBBFAC,,, | Performed by: INTERNAL MEDICINE

## 2019-10-30 PROCEDURE — 80053 COMPREHEN METABOLIC PANEL: CPT

## 2019-10-30 PROCEDURE — 85025 COMPLETE CBC W/AUTO DIFF WBC: CPT

## 2019-10-30 PROCEDURE — 3008F BODY MASS INDEX DOCD: CPT | Mod: CPTII,S$GLB,, | Performed by: INTERNAL MEDICINE

## 2019-10-30 RX ORDER — TRAMADOL HYDROCHLORIDE 50 MG/1
TABLET ORAL
Refills: 1 | COMMUNITY
Start: 2019-10-18 | End: 2019-11-14 | Stop reason: SDUPTHER

## 2019-10-30 RX ORDER — MONTELUKAST SODIUM 4 MG/1
1 TABLET, CHEWABLE ORAL 2 TIMES DAILY
Qty: 180 TABLET | Refills: 3 | Status: ON HOLD | OUTPATIENT
Start: 2019-10-30 | End: 2022-10-21

## 2019-10-30 NOTE — PROGRESS NOTES
He will be scheduled for upper endoscopy.  He has good health knowledge.  He understands the procedure of upper endoscopy.  Specifically he realizes there is an extremely small incidence of bleeding perforation or aspiration.  He has a hiatal hernia in positive serology for celiac disease.  He follows up in the Pain Clinic.  He follows up with the hematologist.  He continues his vitamins minerals and current medications.

## 2019-10-30 NOTE — TELEPHONE ENCOUNTER
Pt called and requested Colestipol to be sent to [facility] in [place], medication called into pharmacy.

## 2019-10-30 NOTE — PATIENT INSTRUCTIONS
He has positive serology for celiac disease.  He be scheduled for upper endoscopy.  The x-rays revealed a hiatal hernia. The small bowel x-rays showed that the intestine was shortened but signs of Crohn's disease was not present.  He is having 4-5 bowel movements per day.  He is started on the Colestid.  He has had a cholecystectomy.  He will follow up with the hematologist and also in the Pain Clinic.  He continues his current medications vitamins and minerals.

## 2019-10-30 NOTE — PROGRESS NOTES
Subjective:       Patient ID: Tristin Cerda is a 64 y.o. male.    Chief Complaint: Follow-up    He has positive serology for celiac disease.  He will need upper endoscopy.  He also has a hiatal hernia.  Years ago he states he had severe pyrosis and dyspepsia but then went on the TPN with the low oral intake any symptoms diminished.  He denies dysphagia hematemesis hematochezia jaundice or bleeding.  He has a chronic pain syndrome and is followed in the Pain Clinic.  He states the tramadol has helped with the diarrhea.  He no longer has 15 bowel movements per day but has only 4 bowel per day.  He has quite a bit of flatulence.  He denies incontinence or fever chills. The small-bowel series shows that he has short-bowel but signed of Crohn's disease are present.  In 2000 for he was put on Remicade for flare of his Crohn's disease and it did not help him.  He developed tuberculin uses he states.  His illness started when he was a child with severe constipation. He states he was hospitalized and found to have Crohn's disease.  Then fiber section clears later is a hospitalized for small bowel perforation and then had another episode of a small bowel perforation.  He has had 3 small bowel resections including the 1 in 2000.  He has multiple vitamin deficiencies and is followed by the hematologist.  He denies jaundice hematemesis hematochezia.  The ultrasound shows splenomegaly as thrombocytopenia with a fatty liver.  He appears to have chronic liver disease but he denies a history of hepatitis.      Allergies:  Review of patient's allergies indicates:   Allergen Reactions    Ciprofloxacin     Codeine Itching    Compazine [prochlorperazine]     Erythromycin     Keflex [cephalexin]        Medications:    Current Outpatient Medications:     acetaminophen (TYLENOL) 500 mg Cap, , Disp: , Rfl:     ASPIRIN (ASPIR-81 ORAL), Take by mouth., Disp: , Rfl:     aspirin-acetaminophen-caffeine 250-250-65 mg (EXCEDRIN  MIGRAINE) 250-250-65 mg per tablet, , Disp: , Rfl:     cyanocobalamin 1,000 mcg/mL injection, Inject 1 mL (1,000 mcg total) into the muscle every 30 days., Disp: 10 mL, Rfl: 11    dicyclomine (BENTYL) 20 mg tablet, TAKE 1 TABLET FOUR TIMES DAILY, Disp: 360 tablet, Rfl: 3    diphenhydramine-acetaminophen 12.5-325 mg Tab, , Disp: , Rfl:     hydrocortisone 2.5 % lotion, AAA face QHS PRN flare, Disp: 60 mL, Rfl: 2    magnesium oxide (MAG-OX) 400 mg (241.3 mg magnesium) tablet, Take 400 mg by mouth once daily., Disp: , Rfl:     ondansetron (ZOFRAN) 8 MG tablet, TAKE 1 TABLET EVERY 12 HOURS AS NEEDED FOR NAUSEA, Disp: 180 tablet, Rfl: 1    phenylephrine-acetaminophen 5-325 mg Cap, , Disp: , Rfl:     potassium gluconate 595 mg (99 mg) Tab, , Disp: , Rfl:     traMADol (ULTRAM) 50 mg tablet, TK 1 T PO Q 8 H PRN P, Disp: , Rfl: 1    Past Medical History:   Diagnosis Date    Anxiety     Basal cell carcinoma 07/2017    R forehead and R temple    Crohn's disease     age 17    Short bowel syndrome     Vitamin B deficiency     Vitamin D deficiency        Past Surgical History:   Procedure Laterality Date    3 small bowel resections      APPENDECTOMY      CHOLECYSTECTOMY      COLONOSCOPY      COLONOSCOPY N/A 2/14/2017    Procedure: COLONOSCOPY;  Surgeon: Nela Sanchez MD;  Location: [address]);  Service: Endoscopy;  Laterality: N/A;    COLONOSCOPY N/A 3/14/2017    Procedure: COLONOSCOPY;  Surgeon: Nela Sanchez MD;  Location: [address];  Service: Endoscopy;  Laterality: N/A;  2 days clear liquids before procedure    KNEE SURGERY      SMALL INTESTINE SURGERY      TUBE THORACOTOMY      UPPER GASTROINTESTINAL ENDOSCOPY           Review of Systems   Constitutional: Negative for appetite change, fever and unexpected weight change.   HENT: Negative for trouble swallowing.         No jaundice.   Respiratory: Negative for cough, shortness of breath and wheezing.         He is a former  smoker.  He denies dyspnea with exertion but is not as physically active.  He denies aspiration or hemoptysis.  Has occasional coughing but he denies chronic cough or chronic sputum production.   Cardiovascular: Negative for chest pain.        He denies exertional chest pain or rhythm disturbances.   Gastrointestinal: Positive for abdominal distention, abdominal pain, diarrhea and nausea. Negative for anal bleeding, blood in stool and constipation.   Musculoskeletal: Positive for arthralgias and back pain. Negative for neck pain.   Skin: Negative for pallor and rash.   Neurological: Negative for dizziness, seizures, syncope, speech difficulty, weakness and numbness.   Hematological: Negative for adenopathy.   Psychiatric/Behavioral: Negative for confusion.       Objective:      Physical Exam   Constitutional: He is oriented to person, place, and time.   Thin nonicteric white male.  He has a firm hand .   HENT:   Head: Normocephalic.   Eyes: Pupils are equal, round, and reactive to light. EOM are normal.   Neck: Normal range of motion. Neck supple. No tracheal deviation present. No thyromegaly present.   Cardiovascular: Normal rate, regular rhythm and normal heart sounds.   Pulmonary/Chest: Effort normal and breath sounds normal.   Abdominal: Soft. Bowel sounds are normal. He exhibits no distension. There is tenderness. There is no rebound and no guarding.   Abdomen is soft with mild tenderness in the epigastrium.  Has multiple healed scars.  Masses are absent.  Bowel sounds are normal.  The spleen cannot be palpated.   Musculoskeletal: Normal range of motion.   He can ambulate normally.  He can go from the sitting to the standing position without difficulty.   Lymphadenopathy:     He has no cervical adenopathy.   Neurological: He is alert and oriented to person, place, and time. No cranial nerve deficit.   Skin: Skin is warm and dry.   Psychiatric: He has a normal mood and affect. His behavior is normal.    Vitals reviewed.        Plan:       Chronic abdominal pain  -     Hepatitis panel, acute; Future; Expected date: 10/30/2019  -     Comprehensive metabolic panel; Future; Expected date: 10/30/2019    Thrombocytopenic  -     CBC auto differential; Future; Expected date: 10/30/2019    Short bowel syndrome    History of Crohn's disease    B12 deficiency  -     CBC auto differential; Future; Expected date: 10/30/2019  -     Comprehensive metabolic panel; Future; Expected date: 10/30/2019    Splenomegaly  -     Hepatitis panel, acute; Future; Expected date: 10/30/2019    Chronic diarrhea    Hiatal hernia    Celiac disease  -     Comprehensive metabolic panel; Future; Expected date: 10/30/2019    Pre-op exam  -     IN OFFICE EKG 12-LEAD (to Muse); Future; Expected date: 11/06/2019     He will be scheduled for upper endoscopy small-bowel biopsies.  He has positive serology for celiac disease. He has good health knowledge.  He understands the procedures of upper endoscopy.  Specifically he realizes there is an extremely small incidence of bleeding perforation or aspiration.  He will be put on the Colestid.  He has had a cholecystectomy.  He will continue his treatment in the Pain Clinic can follow up with his hematologist and continue his vitamins and minerals.  He has a fatty liver.  Further evaluation of the splenomegaly and the elevated alkaline phosphatases in progress.

## 2019-10-30 NOTE — H&P (VIEW-ONLY)
Subjective:       Patient ID: Tristin Cerda is a 64 y.o. male.    Chief Complaint: Follow-up    He has positive serology for celiac disease.  He will need upper endoscopy.  He also has a hiatal hernia.  Years ago he states he had severe pyrosis and dyspepsia but then went on the TPN with the low oral intake any symptoms diminished.  He denies dysphagia hematemesis hematochezia jaundice or bleeding.  He has a chronic pain syndrome and is followed in the Pain Clinic.  He states the tramadol has helped with the diarrhea.  He no longer has 15 bowel movements per day but has only 4 bowel per day.  He has quite a bit of flatulence.  He denies incontinence or fever chills. The small-bowel series shows that he has short-bowel but signed of Crohn's disease are present.  In 2000 for he was put on Remicade for flare of his Crohn's disease and it did not help him.  He developed tuberculin uses he states.  His illness started when he was a child with severe constipation. He states he was hospitalized and found to have Crohn's disease.  Then fiber section clears later is a hospitalized for small bowel perforation and then had another episode of a small bowel perforation.  He has had 3 small bowel resections including the 1 in 2000.  He has multiple vitamin deficiencies and is followed by the hematologist.  He denies jaundice hematemesis hematochezia.  The ultrasound shows splenomegaly as thrombocytopenia with a fatty liver.  He appears to have chronic liver disease but he denies a history of hepatitis.      Allergies:  Review of patient's allergies indicates:   Allergen Reactions    Ciprofloxacin     Codeine Itching    Compazine [prochlorperazine]     Erythromycin     Keflex [cephalexin]        Medications:    Current Outpatient Medications:     acetaminophen (TYLENOL) 500 mg Cap, , Disp: , Rfl:     ASPIRIN (ASPIR-81 ORAL), Take by mouth., Disp: , Rfl:     aspirin-acetaminophen-caffeine 250-250-65 mg (EXCEDRIN  MIGRAINE) 250-250-65 mg per tablet, , Disp: , Rfl:     cyanocobalamin 1,000 mcg/mL injection, Inject 1 mL (1,000 mcg total) into the muscle every 30 days., Disp: 10 mL, Rfl: 11    dicyclomine (BENTYL) 20 mg tablet, TAKE 1 TABLET FOUR TIMES DAILY, Disp: 360 tablet, Rfl: 3    diphenhydramine-acetaminophen 12.5-325 mg Tab, , Disp: , Rfl:     hydrocortisone 2.5 % lotion, AAA face QHS PRN flare, Disp: 60 mL, Rfl: 2    magnesium oxide (MAG-OX) 400 mg (241.3 mg magnesium) tablet, Take 400 mg by mouth once daily., Disp: , Rfl:     ondansetron (ZOFRAN) 8 MG tablet, TAKE 1 TABLET EVERY 12 HOURS AS NEEDED FOR NAUSEA, Disp: 180 tablet, Rfl: 1    phenylephrine-acetaminophen 5-325 mg Cap, , Disp: , Rfl:     potassium gluconate 595 mg (99 mg) Tab, , Disp: , Rfl:     traMADol (ULTRAM) 50 mg tablet, TK 1 T PO Q 8 H PRN P, Disp: , Rfl: 1    Past Medical History:   Diagnosis Date    Anxiety     Basal cell carcinoma 07/2017    R forehead and R temple    Crohn's disease     age 17    Short bowel syndrome     Vitamin B deficiency     Vitamin D deficiency        Past Surgical History:   Procedure Laterality Date    3 small bowel resections      APPENDECTOMY      CHOLECYSTECTOMY      COLONOSCOPY      COLONOSCOPY N/A 2/14/2017    Procedure: COLONOSCOPY;  Surgeon: Nela Sanchez MD;  Location: [address]);  Service: Endoscopy;  Laterality: N/A;    COLONOSCOPY N/A 3/14/2017    Procedure: COLONOSCOPY;  Surgeon: Nela Sanchez MD;  Location: [address];  Service: Endoscopy;  Laterality: N/A;  2 days clear liquids before procedure    KNEE SURGERY      SMALL INTESTINE SURGERY      TUBE THORACOTOMY      UPPER GASTROINTESTINAL ENDOSCOPY           Review of Systems   Constitutional: Negative for appetite change, fever and unexpected weight change.   HENT: Negative for trouble swallowing.         No jaundice.   Respiratory: Negative for cough, shortness of breath and wheezing.         He is a former  smoker.  He denies dyspnea with exertion but is not as physically active.  He denies aspiration or hemoptysis.  Has occasional coughing but he denies chronic cough or chronic sputum production.   Cardiovascular: Negative for chest pain.        He denies exertional chest pain or rhythm disturbances.   Gastrointestinal: Positive for abdominal distention, abdominal pain, diarrhea and nausea. Negative for anal bleeding, blood in stool and constipation.   Musculoskeletal: Positive for arthralgias and back pain. Negative for neck pain.   Skin: Negative for pallor and rash.   Neurological: Negative for dizziness, seizures, syncope, speech difficulty, weakness and numbness.   Hematological: Negative for adenopathy.   Psychiatric/Behavioral: Negative for confusion.       Objective:      Physical Exam   Constitutional: He is oriented to person, place, and time.   Thin nonicteric white male.  He has a firm hand .   HENT:   Head: Normocephalic.   Eyes: Pupils are equal, round, and reactive to light. EOM are normal.   Neck: Normal range of motion. Neck supple. No tracheal deviation present. No thyromegaly present.   Cardiovascular: Normal rate, regular rhythm and normal heart sounds.   Pulmonary/Chest: Effort normal and breath sounds normal.   Abdominal: Soft. Bowel sounds are normal. He exhibits no distension. There is tenderness. There is no rebound and no guarding.   Abdomen is soft with mild tenderness in the epigastrium.  Has multiple healed scars.  Masses are absent.  Bowel sounds are normal.  The spleen cannot be palpated.   Musculoskeletal: Normal range of motion.   He can ambulate normally.  He can go from the sitting to the standing position without difficulty.   Lymphadenopathy:     He has no cervical adenopathy.   Neurological: He is alert and oriented to person, place, and time. No cranial nerve deficit.   Skin: Skin is warm and dry.   Psychiatric: He has a normal mood and affect. His behavior is normal.    Vitals reviewed.        Plan:       Chronic abdominal pain  -     Hepatitis panel, acute; Future; Expected date: 10/30/2019  -     Comprehensive metabolic panel; Future; Expected date: 10/30/2019    Thrombocytopenic  -     CBC auto differential; Future; Expected date: 10/30/2019    Short bowel syndrome    History of Crohn's disease    B12 deficiency  -     CBC auto differential; Future; Expected date: 10/30/2019  -     Comprehensive metabolic panel; Future; Expected date: 10/30/2019    Splenomegaly  -     Hepatitis panel, acute; Future; Expected date: 10/30/2019    Chronic diarrhea    Hiatal hernia    Celiac disease  -     Comprehensive metabolic panel; Future; Expected date: 10/30/2019    Pre-op exam  -     IN OFFICE EKG 12-LEAD (to Muse); Future; Expected date: 11/06/2019     He will be scheduled for upper endoscopy small-bowel biopsies.  He has positive serology for celiac disease. He has good health knowledge.  He understands the procedures of upper endoscopy.  Specifically he realizes there is an extremely small incidence of bleeding perforation or aspiration.  He will be put on the Colestid.  He has had a cholecystectomy.  He will continue his treatment in the Pain Clinic can follow up with his hematologist and continue his vitamins and minerals.  He has a fatty liver.  Further evaluation of the splenomegaly and the elevated alkaline phosphatases in progress.

## 2019-10-30 NOTE — LETTER
October 30, 2019      Tyler Smith MD  [address] [facility] [address]           [facility]  [place] [facility]  [address], [address]  [place] [address]  Phone: [phone]  Fax: [phone]          Patient: Tristin Cerda   MR Number: [id number]   YOB: 1955   Date of Visit: 10/30/2019       Dear Dr. Tyler Smith:    Thank you for referring Tristin Cerda to me for evaluation. Attached you will find relevant portions of my assessment and plan of care.    If you have questions, please do not hesitate to call me. I look forward to following Tristin Cerda along with you.    Sincerely,    Andrea Shaver MD    Enclosure  CC:  No Recipients    If you would like to receive this communication electronically, please contact [email or web address] or [phone] to request more information on [facility] access.    For providers and/or their staff who would like to refer a patient to [facility], please contact us through our one-stop-shop provider referral line, [facility], at [phone].    If you feel you have received this communication in error or would no longer like to receive these types of communications, please e-mail [email or web address]

## 2019-10-31 LAB
HAV IGM SERPL QL IA: NEGATIVE
HBV CORE IGM SERPL QL IA: ABNORMAL
HBV SURFACE AG SERPL QL IA: NEGATIVE
HCV AB SERPL QL IA: NEGATIVE

## 2019-11-01 ENCOUNTER — HOSPITAL ENCOUNTER (OUTPATIENT)
Dept: CARDIOLOGY | Facility: HOSPITAL | Age: 64
Discharge: HOME | End: 2019-11-01
Attending: INTERNAL MEDICINE
Payer: MEDICARE

## 2019-11-01 DIAGNOSIS — Z01.818 PRE-OP EXAM: ICD-10-CM

## 2019-11-01 PROCEDURE — 93005 ELECTROCARDIOGRAM TRACING: CPT

## 2019-11-01 PROCEDURE — 93010 EKG 12-LEAD: ICD-10-PCS | Mod: ,,, | Performed by: INTERNAL MEDICINE

## 2019-11-01 PROCEDURE — 93010 ELECTROCARDIOGRAM REPORT: CPT | Mod: ,,, | Performed by: INTERNAL MEDICINE

## 2019-11-04 DIAGNOSIS — K44.9 HIATAL HERNIA: Primary | ICD-10-CM

## 2019-11-04 DIAGNOSIS — Z01.818 PREOP EXAMINATION: ICD-10-CM

## 2019-11-14 ENCOUNTER — OFFICE VISIT (OUTPATIENT)
Dept: PAIN MEDICINE | Facility: CLINIC | Age: 64
End: 2019-11-14
Payer: MEDICARE

## 2019-11-14 VITALS
SYSTOLIC BLOOD PRESSURE: 134 MMHG | HEIGHT: 73 IN | DIASTOLIC BLOOD PRESSURE: 76 MMHG | WEIGHT: 159 LBS | HEART RATE: 72 BPM | BODY MASS INDEX: 21.07 KG/M2

## 2019-11-14 DIAGNOSIS — R10.9 CHRONIC ABDOMINAL PAIN: Primary | ICD-10-CM

## 2019-11-14 DIAGNOSIS — K50.90 CROHN'S DISEASE WITHOUT COMPLICATION, UNSPECIFIED GASTROINTESTINAL TRACT LOCATION: ICD-10-CM

## 2019-11-14 DIAGNOSIS — G89.29 CHRONIC ABDOMINAL PAIN: Primary | ICD-10-CM

## 2019-11-14 PROCEDURE — 3008F BODY MASS INDEX DOCD: CPT | Mod: CPTII,S$GLB,, | Performed by: ANESTHESIOLOGY

## 2019-11-14 PROCEDURE — 99213 OFFICE O/P EST LOW 20 MIN: CPT | Mod: S$GLB,,, | Performed by: ANESTHESIOLOGY

## 2019-11-14 PROCEDURE — 99999 PR PBB SHADOW E&M-EST. PATIENT-LVL IV: ICD-10-PCS | Mod: PBBFAC,,, | Performed by: ANESTHESIOLOGY

## 2019-11-14 PROCEDURE — 3008F PR BODY MASS INDEX (BMI) DOCUMENTED: ICD-10-PCS | Mod: CPTII,S$GLB,, | Performed by: ANESTHESIOLOGY

## 2019-11-14 PROCEDURE — 99213 PR OFFICE/OUTPT VISIT, EST, LEVL III, 20-29 MIN: ICD-10-PCS | Mod: S$GLB,,, | Performed by: ANESTHESIOLOGY

## 2019-11-14 PROCEDURE — 99999 PR PBB SHADOW E&M-EST. PATIENT-LVL IV: CPT | Mod: PBBFAC,,, | Performed by: ANESTHESIOLOGY

## 2019-11-14 RX ORDER — TRAMADOL HYDROCHLORIDE 50 MG/1
50 TABLET ORAL EVERY 8 HOURS PRN
Qty: 90 TABLET | Refills: 2 | Status: SHIPPED | OUTPATIENT
Start: 2019-11-14 | End: 2020-02-10 | Stop reason: SDUPTHER

## 2019-11-14 NOTE — PROGRESS NOTES
PCP: Tyler Smith MD      CC: abdominal pain    Interval history: Mr. Cerda is a 64 y.o. male with an extensive history of crohn's disease who presents today for medication refill. We discontinued Demerol 50mg q 8 hrs and started Tramadol 50 mg q 8 h. He reports this is beneficial.  No side effects reported.   Abdominal pain remains stable.   He does report diarrhea at times but no blood stools. Reports anal pain and itching 2/2 chronic diarrhea.  He is currently being evaluated by gastroenterology.  OTC Lidocaine 2 % provides some minimal relief.  Compounding cream was too expensive. Continues to c/o bilateral knee pain . He has had multiple surgeries in the past. He rates his pain 4/10.     Prior HPI:   Mr. Cerda is a 60 year old male with PMH of crohn's disease referred by Dr. Hansen.  Patient states being diagnosed with crohn's disease since the age of 14.    He has had multiple GI surgeries and large bowel and small bowel removals.  During that time, he was being managed by providers in [place].  He was TPN dependent for many years until about two years ago.  He is now stable on an oral diet.  He did not have a primary care physician nor a gastroenterologist for many years.  He is currently trying to establish care.  He has future appointment with Dr. Ch.  He states taking Demerol 50mg q8hrs as needed for pain. It has provided relief of his nausea and pain with diarrhea.  He was last prescribed Demerol in July 2014.  Since then, he has been bearing with the pain.  It is a sharp shooting pain in his mid abdomen.      ROS:  CONSTITUTIONAL: No fevers, chills, night sweats, wt. loss, appetite changes  SKIN: no rashes or itching  ENT: No headaches, head trauma, vision changes, or eye pain  LYMPH NODES: None noticed   CV: No chest pain, palpitations.   RESP: No shortness of breath, dyspnea on exertion, cough, wheezing, or hemoptysis  GI: No nausea, emesis, diarrhea, constipation, melena,  hematochezia, pain.    : No dysuria, hematuria, urgency, or frequency   HEME: No easy bruising, bleeding problems  PSYCHIATRIC: No depression, anxiety, psychosis, hallucinations.  NEURO: No seizures, memory loss, dizziness or difficulty sleeping  MSK: no joint pain      Past Medical History:   Diagnosis Date    Anxiety     Basal cell carcinoma 07/2017    R forehead and R temple    Crohn's disease     age 17    Short bowel syndrome     Vitamin B deficiency     Vitamin D deficiency      Past Surgical History:   Procedure Laterality Date    3 small bowel resections      APPENDECTOMY      CHOLECYSTECTOMY      COLONOSCOPY      COLONOSCOPY N/A 2/14/2017    Procedure: COLONOSCOPY;  Surgeon: Nela Sanchez MD;  [facility]  Service: Endoscopy;  Laterality: N/A;    COLONOSCOPY N/A 3/14/2017    Procedure: COLONOSCOPY;  Surgeon: Nela Sanchez MD;  [facility]  Service: Endoscopy;  Laterality: N/A;  2 days clear liquids before procedure    KNEE SURGERY      SMALL INTESTINE SURGERY      TUBE THORACOTOMY      UPPER GASTROINTESTINAL ENDOSCOPY       Family History   Problem Relation Age of Onset    Diabetes Mother     Stroke Mother     Diabetes Father     Kidney disease Father     Irritable bowel syndrome Cousin     Celiac disease Neg Hx     Cirrhosis Neg Hx     Colon cancer Neg Hx     Colon polyps Neg Hx     Cystic fibrosis Neg Hx     Esophageal cancer Neg Hx     Crohn's disease Neg Hx     Hemochromatosis Neg Hx     Inflammatory bowel disease Neg Hx     Liver cancer Neg Hx     Liver disease Neg Hx     Rectal cancer Neg Hx     Stomach cancer Neg Hx     Ulcerative colitis Neg Hx     Addison's disease Neg Hx     Melanoma Neg Hx     Psoriasis Neg Hx     Lupus Neg Hx     Eczema Neg Hx      Social History     Socioeconomic History    Marital status:      Spouse name: Not on file    Number of children: Not on file    Years of education: Not on  "file    Highest education level: Not on file   Occupational History    Not on file   Social Needs    Financial resource strain: Not on file    Food insecurity:     Worry: Not on file     Inability: Not on file    Transportation needs:     Medical: Not on file     Non-medical: Not on file   Tobacco Use    Smoking status: Former Smoker     Packs/day: 1.00     Years: 15.00     Pack years: 15.00     Types: Cigarettes     Last attempt to quit: 3/10/1995     Years since quittin.6    Smokeless tobacco: Never Used   Substance and Sexual Activity    Alcohol use: Yes     Alcohol/week: 2.0 standard drinks     Types: 1 Cans of beer, 1 Shots of liquor per week     Comment: 1 every 6 -7 months    Drug use: No    Sexual activity: Yes   Lifestyle    Physical activity:     Days per week: Not on file     Minutes per session: Not on file    Stress: Not on file   Relationships    Social connections:     Talks on phone: Not on file     Gets together: Not on file     Attends Sabianist service: Not on file     Active member of club or organization: Not on file     Attends meetings of clubs or organizations: Not on file     Relationship status: Not on file   Other Topics Concern    Not on file   Social History Narrative    Retired      Medications/Allergies: See med card    Vitals:    19 0914   BP: 134/76   Pulse: 72   Weight: 72.1 kg (159 lb)   Height: 6' 1" (1.854 m)   PainSc:   3   PainLoc: Abdomen     Physical exam:    GENERAL: A and O x3, the patient appears well groomed and is in no acute distress.  Skin: No rashes or obvious lesions  HEENT: normocephalic, atraumatic  CARDIOVASCULAR:  RRR  LUNGS: non labored breathing  ABDOMEN: soft, nontender. No rebound   UPPER EXTREMITIES: Normal alignment, normal range of motion, no atrophy, no skin changes,  hair growth and nail growth normal and equal bilaterally. No swelling, no tenderness.    LOWER EXTREMITIES:  Normal alignment, normal range of motion, no atrophy, no " skin changes,  hair growth and nail growth normal and equal bilaterally. No swelling, no tenderness.    LUMBAR SPINE  Lumbar spine: ROM is full with flexion extension and oblique extension with no increased pain.    Lam's test causes no increased pain on either side.    Supine straight leg raise is negative bilaterally.    Internal and external rotation of the hip causes no increased pain on either side.  Myofascial exam: No tenderness to palpation across lumbar paraspinous muscles.    MENTAL STATUS: normal orientation, speech, language, and fund of knowledge for social situation.  Emotional state appropriate.    CRANIAL NERVES:  II:  PERRL bilaterally,   III,IV,VI: EOMI.    V:  Facial sensation equal bilaterally  VII:  Facial motor function normal.  VIII:  Hearing equal to finger rub bilaterally  IX/X: Gag normal, palate symmetric  XI:  Shoulder shrug equal, head turn equal  XII:  Tongue midline without fasciculations    MOTOR: Tone and bulk: normal bilateral upper and lower Strength: normal   SENSATION: Light touch and pinprick intact bilaterally  REFLEXES: normal, symmetric, nonbrisk.  Toes down, no clonus. No hoffmans.  GAIT: normal rise, base, steps, and arm swing.      Imaging:  None    Assessment:  Mr. Cerda is a 64 y.o. male with abdominal pain  1. Chronic abdominal pain    2. Crohn's disease without complication, unspecified gastrointestinal tract location         Plan:  1. Patient with long history of crohn's disease and chronic abdominal pain.  Continue care with GI.  2. Tramadol 50 mg q 8 h prn  as needed for pain.  reviewed.  No signs of aberrant behavior.  Plan for UDS next visit  3. Continue OTC lidocaine cream  4. May benefit from bilateral knee genicular nerve blocks.   5. F/u 3 months or sooner

## 2019-11-19 ENCOUNTER — HOSPITAL ENCOUNTER (OUTPATIENT)
Facility: HOSPITAL | Age: 64
Discharge: HOME | End: 2019-11-19
Attending: INTERNAL MEDICINE | Admitting: INTERNAL MEDICINE
Payer: MEDICARE

## 2019-11-19 ENCOUNTER — ANESTHESIA (OUTPATIENT)
Dept: SURGERY | Facility: HOSPITAL | Age: 64
End: 2019-11-19
Payer: MEDICARE

## 2019-11-19 ENCOUNTER — ANESTHESIA EVENT (OUTPATIENT)
Dept: SURGERY | Facility: HOSPITAL | Age: 64
End: 2019-11-19
Payer: MEDICARE

## 2019-11-19 VITALS
TEMPERATURE: 98 F | WEIGHT: 150 LBS | RESPIRATION RATE: 13 BRPM | OXYGEN SATURATION: 96 % | SYSTOLIC BLOOD PRESSURE: 126 MMHG | DIASTOLIC BLOOD PRESSURE: 67 MMHG | BODY MASS INDEX: 20.32 KG/M2 | HEIGHT: 72 IN | HEART RATE: 55 BPM

## 2019-11-19 DIAGNOSIS — K44.9 HIATAL HERNIA: ICD-10-CM

## 2019-11-19 DIAGNOSIS — Z01.818 PREOP EXAMINATION: ICD-10-CM

## 2019-11-19 PROCEDURE — 88305 TISSUE EXAM BY PATHOLOGIST: CPT | Mod: 26,,, | Performed by: PATHOLOGY

## 2019-11-19 PROCEDURE — D9220A PRA ANESTHESIA: Mod: CRNA,,, | Performed by: NURSE ANESTHETIST, CERTIFIED REGISTERED

## 2019-11-19 PROCEDURE — 88305 TISSUE EXAM BY PATHOLOGIST: CPT | Performed by: PATHOLOGY

## 2019-11-19 PROCEDURE — 88305 TISSUE EXAM BY PATHOLOGIST: ICD-10-PCS | Mod: 26,,, | Performed by: PATHOLOGY

## 2019-11-19 PROCEDURE — 43239 EGD BIOPSY SINGLE/MULTIPLE: CPT | Mod: ,,, | Performed by: INTERNAL MEDICINE

## 2019-11-19 PROCEDURE — 27201012 HC FORCEPS, HOT/COLD, DISP: Performed by: INTERNAL MEDICINE

## 2019-11-19 PROCEDURE — 37000008 HC ANESTHESIA 1ST 15 MINUTES: Performed by: INTERNAL MEDICINE

## 2019-11-19 PROCEDURE — D9220A PRA ANESTHESIA: Mod: ANES,,, | Performed by: ANESTHESIOLOGY

## 2019-11-19 PROCEDURE — 37000009 HC ANESTHESIA EA ADD 15 MINS: Performed by: INTERNAL MEDICINE

## 2019-11-19 PROCEDURE — 43239 EGD BIOPSY SINGLE/MULTIPLE: CPT | Performed by: INTERNAL MEDICINE

## 2019-11-19 PROCEDURE — 63600175 PHARM REV CODE 636 W HCPCS: Performed by: NURSE ANESTHETIST, CERTIFIED REGISTERED

## 2019-11-19 PROCEDURE — D9220A PRA ANESTHESIA: ICD-10-PCS | Mod: CRNA,,, | Performed by: NURSE ANESTHETIST, CERTIFIED REGISTERED

## 2019-11-19 PROCEDURE — 43239 PR EGD, FLEX, W/BIOPSY, SGL/MULTI: ICD-10-PCS | Mod: ,,, | Performed by: INTERNAL MEDICINE

## 2019-11-19 PROCEDURE — D9220A PRA ANESTHESIA: ICD-10-PCS | Mod: ANES,,, | Performed by: ANESTHESIOLOGY

## 2019-11-19 RX ORDER — SODIUM CHLORIDE, SODIUM LACTATE, POTASSIUM CHLORIDE, CALCIUM CHLORIDE 600; 310; 30; 20 MG/100ML; MG/100ML; MG/100ML; MG/100ML
INJECTION, SOLUTION INTRAVENOUS CONTINUOUS PRN
Status: DISCONTINUED | OUTPATIENT
Start: 2019-11-19 | End: 2019-11-19

## 2019-11-19 RX ORDER — PROPOFOL 10 MG/ML
VIAL (ML) INTRAVENOUS
Status: DISCONTINUED | OUTPATIENT
Start: 2019-11-19 | End: 2019-11-19

## 2019-11-19 RX ADMIN — PROPOFOL 30 MG: 10 INJECTION, EMULSION INTRAVENOUS at 02:11

## 2019-11-19 RX ADMIN — SODIUM CHLORIDE, POTASSIUM CHLORIDE, SODIUM LACTATE AND CALCIUM CHLORIDE: 600; 310; 30; 20 INJECTION, SOLUTION INTRAVENOUS at 02:11

## 2019-11-19 RX ADMIN — PROPOFOL 20 MG: 10 INJECTION, EMULSION INTRAVENOUS at 02:11

## 2019-11-19 NOTE — ANESTHESIA PREPROCEDURE EVALUATION
11/19/2019  Tristin Cerda is a 64 y.o., male.    Pre-op Assessment    I have reviewed the Patient Summary Reports.    I have reviewed the Nursing Notes.   I have reviewed the Medications.     Review of Systems  Anesthesia Hx:  No problems with previous Anesthesia  Neg history of prior surgery. Denies Family Hx of Anesthesia complications.   Denies Personal Hx of Anesthesia complications.   Social:  Non-Smoker    Hematology/Oncology:  Hematology Normal   Oncology Normal     EENT/Dental:EENT/Dental Normal   Cardiovascular:  Cardiovascular Normal     Pulmonary:  Pulmonary Normal    Renal/:  Renal/ Normal     Hepatic/GI:   Hiatal Hernia, Liver Disease,    Musculoskeletal:  Musculoskeletal Normal    Neurological:  Neurology Normal    Endocrine:  Endocrine Normal    Dermatological:  Skin Normal    Psych:  Psychiatric Normal           Physical Exam  General:  Well nourished    Airway/Jaw/Neck:  Airway Findings: Mouth Opening: Normal Tongue: Normal  General Airway Assessment: Adult  Mallampati: II  TM Distance: 4 - 6 cm        Eyes/Ears/Nose:  EYES/EARS/NOSE FINDINGS: Normal   Dental:  DENTAL FINDINGS: Normal   Chest/Lungs:  Chest/Lungs Clear    Heart/Vascular:  Heart Findings: Normal Heart murmur: negative Vascular Findings: Normal    Abdomen:  Abdomen Findings: Normal    Musculoskeletal:  Musculoskeletal Findings: Normal   Skin:  Skin Findings: Normal    Mental Status:  Mental Status Findings: Normal        Anesthesia Plan  Type of Anesthesia, risks & benefits discussed:  Anesthesia Type:  general  Patient's Preference:   Intra-op Monitoring Plan: standard ASA monitors  Intra-op Monitoring Plan Comments:   Post Op Pain Control Plan:   Post Op Pain Control Plan Comments:   Induction:   IV  Beta Blocker:  Patient is not currently on a Beta-Blocker (No further documentation required).       Informed  Consent: Patient understands risks and agrees with Anesthesia plan.  Questions answered. Anesthesia consent signed with patient.  ASA Score: 2     Day of Surgery Review of History & Physical:    H&P update referred to the provider.

## 2019-11-19 NOTE — OP NOTE
Procedure. Esophageal gastroduodenoscopy with biopsies.  Indications pyrosis dyspepsia and positive celiac serology.  He has good health knowledge.  He understands the procedures of upper endoscopy and biopsies.  Specifically he realizes there is an extremely small incidence of bleeding perforation or aspiration.  Anesthesia.  Monitored anesthesia coverage without difficulty. Hyper because 39 cm this could junction at 38-39 cm.  Scope was passed in the stomach in the cardia body and antrum was mildly hyperemic.  There is minimal retained secretions.  There was minimal hyperemia of the mucosa.  There was minimal deformity of the pylorus.  There was mild hyperemia of the 2nd and 3rd parts of the duodenum. Biopsies were obtained.  There were limited by his easy bleeding.  Patient tolerated the procedure well.  Impression 1.  Esophagitis LA grade A 2.  Gastritis 3.  Duodenitis possibly celiac disease.

## 2019-11-19 NOTE — ANESTHESIA POSTPROCEDURE EVALUATION
Anesthesia Post Evaluation    Patient: Tristin Cerda    Procedure(s) Performed: Procedure(s) (LRB):  EGD (ESOPHAGOGASTRODUODENOSCOPY) (N/A)    Final Anesthesia Type: general    Patient location during evaluation: PACU  Patient participation: Yes- Able to Participate  Level of consciousness: awake and awake and alert  Post-procedure vital signs: reviewed and stable  Pain management: adequate  Airway patency: patent    PONV status at discharge: No PONV  Anesthetic complications: no      Cardiovascular status: blood pressure returned to baseline  Respiratory status: unassisted and spontaneous ventilation  Hydration status: euvolemic  Follow-up not needed.          Vitals Value Taken Time   /67 11/19/2019  3:01 PM   Temp 36.6 °C (97.9 °F) 11/19/2019 12:23 PM   Pulse 57 11/19/2019  3:05 PM   Resp 15 11/19/2019  3:05 PM   SpO2 96 % 11/19/2019  3:05 PM   Vitals shown include unvalidated device data.      Event Time     Out of Recovery 15:00:00          Pain/Selena Score: Selena Score: 10 (11/19/2019  3:00 PM)

## 2019-11-19 NOTE — TRANSFER OF CARE
Anesthesia Transfer of Care Note    Patient: Tristin Cerda    Procedure(s) Performed: Procedure(s) (LRB):  EGD (ESOPHAGOGASTRODUODENOSCOPY) (N/A)    Patient location: PACU    Anesthesia Type: general    Transport from OR: Transported from OR on room air with adequate spontaneous ventilation    Post pain: adequate analgesia    Post assessment: no apparent anesthetic complications and tolerated procedure well    Post vital signs: stable    Level of consciousness: awake, alert and oriented    Nausea/Vomiting: no nausea/vomiting    Complications: none    Transfer of care protocol was followed      Last vitals:   Visit Vitals  /81 (BP Location: Right arm, Patient Position: Lying)   Pulse (!) 52   Temp 36.6 °C (97.9 °F) (Oral)   Resp 12   Ht 6' (1.829 m)   Wt 68 kg (150 lb)   SpO2 98%   BMI 20.34 kg/m²

## 2019-11-19 NOTE — H&P
Office Visit     10/30/2019  [facility] - Gastro   Andrea Shaver MD   Gastroenterology   Chronic abdominal pain +10 more   Dx   Follow-up ; Referred by Tyler Smith MD   Reason for Visit    Additional Documentation     Vitals:    /9  (BP Location: Left arm, Patient Position: Sitting, BP Method: Medium (Automatic))    Pulse 60    Resp 16    Ht 6' (1.829 m)    Wt 72.1 kg (159 lb)    SpO2 96%    BMI 21.56 kg/m²    BSA 1.91 m²    Flowsheets:    Anthropometrics       SmartForms:     [facility] AMB - FALL RISK       Encounter Info:    Billing Info,    History,    Allergies,    Detailed Report       Instructions         Follow up in about 4 weeks (around 11/27/2019).   He has positive serology for celiac disease.  He be scheduled for upper endoscopy.  The x-rays revealed a hiatal hernia. The small bowel x-rays showed that the intestine was shortened but signs of Crohn's disease was not present.  He is having 4-5 bowel movements per day.  He is started on the Colestid.  He has had a cholecystectomy.  He will follow up with the hematologist and also in the Pain Clinic.  He continues his current medications vitamins and minerals.           After Visit Summary (Printed 10/30/2019)   Progress Notes        He will be scheduled for upper endoscopy.  He has good health knowledge.  He understands the procedure of upper endoscopy.  Specifically he realizes there is an extremely small incidence of bleeding perforation or aspiration.  He has a hiatal hernia in positive serology for celiac disease.  He follows up in the Pain Clinic.  He follows up with the hematologist.  He continues his vitamins minerals and current medications.      Progress Notes        Subjective:       Patient ID: Tristin Cerda is a 64 y.o. male.     Chief Complaint: Follow-up     He has positive serology for celiac disease.  He will need upper endoscopy.  He also has a hiatal hernia.  Years ago he states he had severe  pyrosis and dyspepsia but then went on the TPN with the low oral intake any symptoms diminished.  He denies dysphagia hematemesis hematochezia jaundice or bleeding.  He has a chronic pain syndrome and is followed in the Pain Clinic.  He states the tramadol has helped with the diarrhea.  He no longer has 15 bowel movements per day but has only 4 bowel per day.  He has quite a bit of flatulence.  He denies incontinence or fever chills. The small-bowel series shows that he has short-bowel but signed of Crohn's disease are present.  In 2000 for he was put on Remicade for flare of his Crohn's disease and it did not help him.  He developed tuberculin uses he states.  His illness started when he was a child with severe constipation. He states he was hospitalized and found to have Crohn's disease.  Then fiber section clears later is a hospitalized for small bowel perforation and then had another episode of a small bowel perforation.  He has had 3 small bowel resections including the 1 in 2000.  He has multiple vitamin deficiencies and is followed by the hematologist.  He denies jaundice hematemesis hematochezia.  The ultrasound shows splenomegaly as thrombocytopenia with a fatty liver.  He appears to have chronic liver disease but he denies a history of hepatitis.        Allergies:       Review of patient's allergies indicates:   Allergen Reactions    Ciprofloxacin      Codeine Itching    Compazine [prochlorperazine]      Erythromycin      Keflex [cephalexin]           Medications:     Current Outpatient Medications:     acetaminophen (TYLENOL) 500 mg Cap, , Disp: , Rfl:     ASPIRIN (ASPIR-81 ORAL), Take by mouth., Disp: , Rfl:     aspirin-acetaminophen-caffeine 250-250-65 mg (EXCEDRIN MIGRAINE) 250-250-65 mg per tablet, , Disp: , Rfl:     cyanocobalamin 1,000 mcg/mL injection, Inject 1 mL (1,000 mcg total) into the muscle every 30 days., Disp: 10 mL, Rfl: 11    dicyclomine (BENTYL) 20 mg tablet, TAKE 1 TABLET  FOUR TIMES DAILY, Disp: 360 tablet, Rfl: 3    diphenhydramine-acetaminophen 12.5-325 mg Tab, , Disp: , Rfl:     hydrocortisone 2.5 % lotion, AAA face QHS PRN flare, Disp: 60 mL, Rfl: 2    magnesium oxide (MAG-OX) 400 mg (241.3 mg magnesium) tablet, Take 400 mg by mouth once daily., Disp: , Rfl:     ondansetron (ZOFRAN) 8 MG tablet, TAKE 1 TABLET EVERY 12 HOURS AS NEEDED FOR NAUSEA, Disp: 180 tablet, Rfl: 1    phenylephrine-acetaminophen 5-325 mg Cap, , Disp: , Rfl:     potassium gluconate 595 mg (99 mg) Tab, , Disp: , Rfl:     traMADol (ULTRAM) 50 mg tablet, TK 1 T PO Q 8 H PRN P, Disp: , Rfl: 1          Past Medical History:   Diagnosis Date    Anxiety      Basal cell carcinoma 07/2017     R forehead and R temple    Crohn's disease       age 17    Short bowel syndrome      Vitamin B deficiency      Vitamin D deficiency                 Past Surgical History:   Procedure Laterality Date    3 small bowel resections        APPENDECTOMY        CHOLECYSTECTOMY        COLONOSCOPY        COLONOSCOPY N/A 2/14/2017     Procedure: COLONOSCOPY;  Surgeon: Nela Sanchez MD;  Location: [address]);  Service: Endoscopy;  Laterality: N/A;    COLONOSCOPY N/A 3/14/2017     Procedure: COLONOSCOPY;  Surgeon: Nela Sanchez MD;  Location: [address]);  Service: Endoscopy;  Laterality: N/A;  2 days clear liquids before procedure    KNEE SURGERY        SMALL INTESTINE SURGERY        TUBE THORACOTOMY        UPPER GASTROINTESTINAL ENDOSCOPY                Review of Systems   Constitutional: Negative for appetite change, fever and unexpected weight change.   HENT: Negative for trouble swallowing.         No jaundice.   Respiratory: Negative for cough, shortness of breath and wheezing.         He is a former smoker.  He denies dyspnea with exertion but is not as physically active.  He denies aspiration or hemoptysis.  Has occasional coughing but he denies chronic cough or chronic sputum production.    Cardiovascular: Negative for chest pain.        He denies exertional chest pain or rhythm disturbances.   Gastrointestinal: Positive for abdominal distention, abdominal pain, diarrhea and nausea. Negative for anal bleeding, blood in stool and constipation.   Musculoskeletal: Positive for arthralgias and back pain. Negative for neck pain.   Skin: Negative for pallor and rash.   Neurological: Negative for dizziness, seizures, syncope, speech difficulty, weakness and numbness.   Hematological: Negative for adenopathy.   Psychiatric/Behavioral: Negative for confusion.       Objective:   Physical Exam   Constitutional: He is oriented to person, place, and time.   Thin nonicteric white male.  He has a firm hand .   HENT:   Head: Normocephalic.   Eyes: Pupils are equal, round, and reactive to light. EOM are normal.   Neck: Normal range of motion. Neck supple. No tracheal deviation present. No thyromegaly present.   Cardiovascular: Normal rate, regular rhythm and normal heart sounds.   Pulmonary/Chest: Effort normal and breath sounds normal.   Abdominal: Soft. Bowel sounds are normal. He exhibits no distension. There is tenderness. There is no rebound and no guarding.   Abdomen is soft with mild tenderness in the epigastrium.  Has multiple healed scars.  Masses are absent.  Bowel sounds are normal.  The spleen cannot be palpated.   Musculoskeletal: Normal range of motion.   He can ambulate normally.  He can go from the sitting to the standing position without difficulty.   Lymphadenopathy:     He has no cervical adenopathy.   Neurological: He is alert and oriented to person, place, and time. No cranial nerve deficit.   Skin: Skin is warm and dry.   Psychiatric: He has a normal mood and affect. His behavior is normal.   Vitals reviewed.         Plan:       Chronic abdominal pain  -     Hepatitis panel, acute; Future; Expected date: 10/30/2019  -     Comprehensive metabolic panel; Future; Expected date:  10/30/2019     Thrombocytopenic  -     CBC auto differential; Future; Expected date: 10/30/2019     Short bowel syndrome     History of Crohn's disease     B12 deficiency  -     CBC auto differential; Future; Expected date: 10/30/2019  -     Comprehensive metabolic panel; Future; Expected date: 10/30/2019     Splenomegaly  -     Hepatitis panel, acute; Future; Expected date: 10/30/2019     Chronic diarrhea     Hiatal hernia     Celiac disease  -     Comprehensive metabolic panel; Future; Expected date: 10/30/2019     Pre-op exam  -     IN OFFICE EKG 12-LEAD (to Muse); Future; Expected date: 11/06/2019      He will be scheduled for upper endoscopy small-bowel biopsies.  He has positive serology for celiac disease. He has good health knowledge.  He understands the procedures of upper endoscopy.  Specifically he realizes there is an extremely small incidence of bleeding perforation or aspiration.  He will be put on the Colestid.  He has had a cholecystectomy.  He will continue his treatment in the Pain Clinic can follow up with his hematologist and continue his vitamins and minerals.  He has a fatty liver.  Further evaluation of the splenomegaly and the elevated alkaline phosphatases in progress.          Other Notes      All notes   Communications         Letter sent to Tyler Smith MD    AMB Visit Summary: Provider Version   Active Diagnosis Review (HCC)     Active Diagnosis Review (HCC)   Not recorded   All Charges for This Encounter     Code Description Service Date Service Provider Modifiers Qty   31917 UT OFFICE/OUTPT VISIT,EST,LEVL IV 10/30/2019 Andrea Shaver MD S$GLB 1   3008F UT BODY MASS INDEX (BMI) DOCUMENTED 10/30/2019 Andrea Shaver MD S$GLB, CPTII 1   [id number] UT PBB SHADOW E&M-EST. PATIENT-LVL IV 10/30/2019 Andrea Shaver MD PBBFAC 1   Level of Service     Level of Service   UT OFFICE/OUTPT VISIT,EST,LEVL IV [95938]   LOS History   BestPractice Advisories     Click to view BestPractice Advisory  history   AVS Reports     Date/Time Report Action User   10/30/2019  9:00 AM After Visit Summary Printed Trang Tyler RN   Encounter-Level Documents - 10/30/2019:     After Visit Summary - Document on 10/30/2019 9:00 AM by Trang Tyler RN: After Visit Summary   Orders Placed         CBC auto differential       Comprehensive metabolic panel       Hepatitis panel, acute       IN OFFICE EKG 12-LEAD (to Muse)      All Encounter Results    Medication Changes         colestipol HCl 1 g Oral 2 times daily      Medication List    Fall Risk     Patient Mobility Status: Ambulatory   Number of falls in the past 12 months?: 0   Fall Risk?: No   Visit Diagnoses         Chronic abdominal pain      Thrombocytopenic      Short bowel syndrome      History of Crohn's disease      B12 deficiency      Splenomegaly      Chronic diarrhea      Hiatal hernia      Celiac disease      Pre-op exam      History of cholecystectomy      Problem List    He is here for upper endoscopy.  He has good health knowledge.  He understands the procedure. Specifically he realizes there is an extremely small incidence of bleeding perforation or aspiration.  He has positive serology for Crohn's disease. He has a history of Crohn's disease.  He describes pyrosis dyspepsia with occasional nausea but no vomiting.  History and physical are unchanged.

## 2019-11-19 NOTE — [employer] PATIENT INSTRUCTIONS
Discharge Summary/Instructions after an Endoscopic Procedure  Patient Name: Tristin Cerda  Patient MRN: [id number]  Patient YOB: 1955 Tuesday, November 19, 2019  Andrea Shaver MD  RESTRICTIONS:  During your procedure today, you received medications for sedation.  These   medications may affect your judgment, balance and coordination.  Therefore,   for 24 hours, you have the following restrictions:   - DO NOT drive a car, operate machinery, make legal/financial decisions,   sign important papers or drink alcohol.    ACTIVITY:  Today: no heavy lifting, straining or running due to procedural   sedation/anesthesia.  The following day: return to full activity including work.  DIET:  Eat and drink normally unless instructed otherwise.     TREATMENT FOR COMMON SIDE EFFECTS:  - Mild abdominal pain, nausea, belching, bloating or excessive gas:  rest,   eat lightly and use a heating pad.  - Sore Throat: treat with throat lozenges and/or gargle with warm salt   water.  - Because air was used during the procedure, expelling large amounts of air   from your rectum or belching is normal.  - If a bowel prep was taken, you may not have a bowel movement for 1-3 days.    This is normal.  SYMPTOMS TO WATCH FOR AND REPORT TO YOUR PHYSICIAN:  1. Abdominal pain or bloating, other than gas cramps.  2. Chest pain.  3. Back pain.  4. Signs of infection such as: chills or fever occurring within 24 hours   after the procedure.  5. Rectal bleeding, which would show as bright red, maroon, or black stools.   (A tablespoon of blood from the rectum is not serious, especially if   hemorrhoids are present.)  6. Vomiting.  7. Weakness or dizziness.  GO DIRECTLY TO THE NEAREST EMERGENCY ROOM IF YOU HAVE ANY OF THE FOLLOWING:      Difficulty breathing              Chills and/or fever over 101 F   Persistent vomiting and/or vomiting blood   Severe abdominal pain   Severe chest pain   Black, tarry stools   Bleeding- more than one  tablespoon   Any other symptom or condition that you feel may need urgent attention  Your doctor recommends these additional instructions:  If any biopsies were taken, your doctors clinic will contact you in 1 to 2   weeks with any results.  - Discharge patient to home (ambulatory).   - Resume previous diet.  For questions, problems or results please call your physician - Andrea Shaver MD at Work:  [phone].  [facility] EMERGENCY ROOM PHONE NUMBER: [phone]  IF A COMPLICATION OR EMERGENCY SITUATION ARISES AND YOU ARE UNABLE TO REACH   YOUR PHYSICIAN - GO DIRECTLY TO THE EMERGENCY ROOM.  MD Andrea Alvarado MD  11/19/2019 2:31:53 PM  This report has been verified and signed electronically.  [employer]

## 2019-11-19 NOTE — DISCHARGE SUMMARY
He was found to have gastroesophageal reflux.  The duodenal biopsies pending to evaluate him for celiac disease.  He continues his vitamins minerals and reflux regimen.  He has a followup upon the office.  He can resume his usual activities.

## 2019-11-22 ENCOUNTER — TELEPHONE (OUTPATIENT)
Dept: GASTROENTEROLOGY | Facility: CLINIC | Age: 64
End: 2019-11-22

## 2019-11-22 NOTE — TELEPHONE ENCOUNTER
Spoke with pt. Rescheduled appt per request. Verbalized an understanding.     ----- Message from Sushma Bui sent at 11/22/2019 11:19 AM CST -----  Contact: Tristin pt  Type:  Sooner Apoointment Request    Caller is requesting a sooner appointment.  Caller declined first available appointment listed below.  Caller will not accept being placed on the waitlist and is requesting a message be sent to doctor.    Name of Caller:  Tristin  When is the first available appointment?  11/25/19  Symptoms:  Follow up/EGD  Best Call Back Number:  [phone]  Additional Information:  Pls call pt regarding rescheduling appt. It will not allow me to schedule w/ dr cadet. Says cannot schedule

## 2019-11-26 LAB
FINAL PATHOLOGIC DIAGNOSIS: NORMAL
GROSS: NORMAL

## 2019-12-09 LAB
BASOPHILS # BLD AUTO: 20 CELLS/UL (ref 0–200)
BASOPHILS NFR BLD AUTO: 0.5 %
EOSINOPHIL # BLD AUTO: 140 CELLS/UL (ref 15–500)
EOSINOPHIL NFR BLD AUTO: 3.5 %
ERYTHROCYTE [DISTWIDTH] IN BLOOD BY AUTOMATED COUNT: 13.1 % (ref 11–15)
HCT VFR BLD AUTO: 41.5 % (ref 38.5–50)
HGB BLD-MCNC: 13.3 G/DL (ref 13.2–17.1)
LYMPHOCYTES # BLD AUTO: 912 CELLS/UL (ref 850–3900)
LYMPHOCYTES NFR BLD AUTO: 22.8 %
MCH RBC QN AUTO: 29.2 PG (ref 27–33)
MCHC RBC AUTO-ENTMCNC: 32 G/DL (ref 32–36)
MCV RBC AUTO: 91 FL (ref 80–100)
METHYLMALONATE SERPL-SCNC: 536 NMOL/L (ref 87–318)
MONOCYTES # BLD AUTO: 408 CELLS/UL (ref 200–950)
MONOCYTES NFR BLD AUTO: 10.2 %
NEUTROPHILS # BLD AUTO: 2520 CELLS/UL (ref 1500–7800)
NEUTROPHILS NFR BLD AUTO: 63 %
PLATELET # BLD AUTO: 163 THOUSAND/UL (ref 140–400)
PMV BLD REES-ECKER: 10.7 FL (ref 7.5–12.5)
RBC # BLD AUTO: 4.56 MILLION/UL (ref 4.2–5.8)
WBC # BLD AUTO: 4 THOUSAND/UL (ref 3.8–10.8)

## 2019-12-12 ENCOUNTER — OFFICE VISIT (OUTPATIENT)
Dept: GASTROENTEROLOGY | Facility: CLINIC | Age: 64
End: 2019-12-12
Payer: MEDICARE

## 2019-12-12 VITALS
BODY MASS INDEX: 20.99 KG/M2 | WEIGHT: 155 LBS | DIASTOLIC BLOOD PRESSURE: 68 MMHG | HEART RATE: 57 BPM | SYSTOLIC BLOOD PRESSURE: 137 MMHG | HEIGHT: 72 IN | RESPIRATION RATE: 18 BRPM | OXYGEN SATURATION: 98 %

## 2019-12-12 DIAGNOSIS — E53.8 B12 DEFICIENCY: ICD-10-CM

## 2019-12-12 DIAGNOSIS — R10.9 CHRONIC ABDOMINAL PAIN: ICD-10-CM

## 2019-12-12 DIAGNOSIS — G89.29 CHRONIC ABDOMINAL PAIN: ICD-10-CM

## 2019-12-12 DIAGNOSIS — K44.9 HIATAL HERNIA: ICD-10-CM

## 2019-12-12 DIAGNOSIS — B19.10 HEPATITIS B INFECTION WITHOUT DELTA AGENT WITHOUT HEPATIC COMA, UNSPECIFIED CHRONICITY: Primary | ICD-10-CM

## 2019-12-12 DIAGNOSIS — Z87.19 HISTORY OF CROHN'S DISEASE: ICD-10-CM

## 2019-12-12 DIAGNOSIS — K52.9 CHRONIC DIARRHEA: ICD-10-CM

## 2019-12-12 PROCEDURE — G0008 FLU VACCINE (QUAD) GREATER THAN OR EQUAL TO 3YO PRESERVATIVE FREE IM: ICD-10-PCS | Mod: 59,S$GLB,, | Performed by: INTERNAL MEDICINE

## 2019-12-12 PROCEDURE — 3008F PR BODY MASS INDEX (BMI) DOCUMENTED: ICD-10-PCS | Mod: CPTII,S$GLB,, | Performed by: INTERNAL MEDICINE

## 2019-12-12 PROCEDURE — 90686 FLU VACCINE (QUAD) GREATER THAN OR EQUAL TO 3YO PRESERVATIVE FREE IM: ICD-10-PCS | Mod: S$GLB,,, | Performed by: INTERNAL MEDICINE

## 2019-12-12 PROCEDURE — G0008 ADMIN INFLUENZA VIRUS VAC: HCPCS | Mod: 59,S$GLB,, | Performed by: INTERNAL MEDICINE

## 2019-12-12 PROCEDURE — 99213 PR OFFICE/OUTPT VISIT, EST, LEVL III, 20-29 MIN: ICD-10-PCS | Mod: 25,S$GLB,, | Performed by: INTERNAL MEDICINE

## 2019-12-12 PROCEDURE — 99999 PR PBB SHADOW E&M-EST. PATIENT-LVL III: ICD-10-PCS | Mod: PBBFAC,,, | Performed by: INTERNAL MEDICINE

## 2019-12-12 PROCEDURE — 99999 PR PBB SHADOW E&M-EST. PATIENT-LVL III: CPT | Mod: PBBFAC,,, | Performed by: INTERNAL MEDICINE

## 2019-12-12 PROCEDURE — 99213 OFFICE O/P EST LOW 20 MIN: CPT | Mod: 25,S$GLB,, | Performed by: INTERNAL MEDICINE

## 2019-12-12 PROCEDURE — 3008F BODY MASS INDEX DOCD: CPT | Mod: CPTII,S$GLB,, | Performed by: INTERNAL MEDICINE

## 2019-12-12 PROCEDURE — 90686 IIV4 VACC NO PRSV 0.5 ML IM: CPT | Mod: S$GLB,,, | Performed by: INTERNAL MEDICINE

## 2019-12-12 PROCEDURE — 96372 PR INJECTION,THERAP/PROPH/DIAG2ST, IM OR SUBCUT: ICD-10-PCS | Mod: S$GLB,,, | Performed by: INTERNAL MEDICINE

## 2019-12-12 PROCEDURE — 96372 THER/PROPH/DIAG INJ SC/IM: CPT | Mod: S$GLB,,, | Performed by: INTERNAL MEDICINE

## 2019-12-12 RX ORDER — CYANOCOBALAMIN 1000 UG/ML
1000 INJECTION, SOLUTION INTRAMUSCULAR; SUBCUTANEOUS
Status: DISCONTINUED | OUTPATIENT
Start: 2019-12-12 | End: 2023-10-27

## 2019-12-12 RX ADMIN — CYANOCOBALAMIN 1000 MCG: 1000 INJECTION, SOLUTION INTRAMUSCULAR; SUBCUTANEOUS at 01:12

## 2019-12-12 NOTE — PATIENT INSTRUCTIONS
Continue his reflux regimen.  Upper endoscopy revealed hiatal hernia gastroesophageal reflux.  The biopsies were negative for celiac disease.  Current the Crohn's disease is in remission.  You continue the nutritious diet.  You will need to receive  B12 on a monthly basis.  He was immunized for the flu today.  He continues his vitamins minerals a follow up in the Pain Clinic.

## 2019-12-12 NOTE — PROGRESS NOTES
Subjective:       Patient ID: Tristin Cerda is a 64 y.o. male.    Chief Complaint: Follow-up (procedure f/u)    Upper endoscopy revealed a hiatal hernia gastroesophageal reflux.  Biopsies were negative for celiac disease. He denies pyrosis dyspepsia dysphagia aspiration hematemesis hematochezia jaundice or bleeding.  He is having daily bowel movements generally 1-2 times per day.  He states that his Crohn's disease can be in remission for several years at a time.  He has abdominal pain is followed in the Pain Clinic and uses tramadol.  He avoids the anti-inflammatory agents.  He denies cardiopulmonary symptoms.  He denies fever chills and is physically active.  The hepatitis B c antibody was present.  He denies a history of hepatitis.  The A and C panels were negative.  He has had extensive gastrointestinal surgery.  She has had an appendectomy and cholecystectomy.      Allergies:  Review of patient's allergies indicates:   Allergen Reactions    Ciprofloxacin     Codeine Itching    Compazine [prochlorperazine]     Erythromycin     Keflex [cephalexin]        Medications:    Current Outpatient Medications:     acetaminophen (TYLENOL) 500 mg Cap, , Disp: , Rfl:     ASPIRIN (ASPIR-81 ORAL), Take by mouth., Disp: , Rfl:     aspirin-acetaminophen-caffeine 250-250-65 mg (EXCEDRIN MIGRAINE) 250-250-65 mg per tablet, , Disp: , Rfl:     colestipol (COLESTID) 1 gram Tab, Take 1 tablet (1 g total) by mouth 2 (two) times daily., Disp: 180 tablet, Rfl: 3    cyanocobalamin 1,000 mcg/mL injection, Inject 1 mL (1,000 mcg total) into the muscle every 30 days., Disp: 10 mL, Rfl: 11    dicyclomine (BENTYL) 20 mg tablet, TAKE 1 TABLET FOUR TIMES DAILY, Disp: 360 tablet, Rfl: 3    diphenhydramine-acetaminophen 12.5-325 mg Tab, , Disp: , Rfl:     hydrocortisone 2.5 % lotion, AAA face QHS PRN flare, Disp: 60 mL, Rfl: 2    magnesium oxide (MAG-OX) 400 mg (241.3 mg magnesium) tablet, Take 400 mg by mouth once daily.,  Disp: , Rfl:     ondansetron (ZOFRAN) 8 MG tablet, TAKE 1 TABLET EVERY 12 HOURS AS NEEDED FOR NAUSEA, Disp: 180 tablet, Rfl: 1    phenylephrine-acetaminophen 5-325 mg Cap, , Disp: , Rfl:     potassium gluconate 595 mg (99 mg) Tab, , Disp: , Rfl:     traMADol (ULTRAM) 50 mg tablet, Take 1 tablet (50 mg total) by mouth every 8 (eight) hours as needed for Pain. Medically necessary for greater than 7 days for chronic pain, Disp: 90 tablet, Rfl: 2    Current Facility-Administered Medications:     cyanocobalamin injection 1,000 mcg, 1,000 mcg, Intramuscular, Q30 Days, Andrea Shaver MD, 1,000 mcg at 12/12/19 1326    Past Medical History:   Diagnosis Date    Anxiety     Basal cell carcinoma 07/2017    R forehead and R temple    Crohn's disease     age 17    Short bowel syndrome     Vitamin B deficiency     Vitamin D deficiency        Past Surgical History:   Procedure Laterality Date    3 small bowel resections      APPENDECTOMY      CHOLECYSTECTOMY      COLONOSCOPY      COLONOSCOPY N/A 2/14/2017    Procedure: COLONOSCOPY;  Surgeon: Nela Sanchez MD;  Location: [address]);  Service: Endoscopy;  Laterality: N/A;    COLONOSCOPY N/A 3/14/2017    Procedure: COLONOSCOPY;  Surgeon: Nela Sanchez MD;  Location: [address]);  Service: Endoscopy;  Laterality: N/A;  2 days clear liquids before procedure    ESOPHAGOGASTRODUODENOSCOPY N/A 11/19/2019    Procedure: EGD (ESOPHAGOGASTRODUODENOSCOPY);  Surgeon: Andrea Shaver MD;  Location: [facility] [place] [place];  Service: Endoscopy;  Laterality: N/A;    KNEE SURGERY      SMALL INTESTINE SURGERY      TUBE THORACOTOMY      UPPER GASTROINTESTINAL ENDOSCOPY           Review of Systems   Constitutional: Negative for appetite change, fever and unexpected weight change.   HENT: Negative for trouble swallowing.         No jaundice.   Respiratory: Negative for cough, shortness of breath and wheezing.         He is a former cigarette smoker.  He denies dyspnea on  exertion.  He denies aspiration hemoptysis chronic cough or chronic sputum production.   Cardiovascular: Negative for chest pain.        He denies exertional chest pain or rhythm disturbance.   Gastrointestinal: Positive for abdominal pain, diarrhea and nausea. Negative for abdominal distention, anal bleeding, blood in stool and constipation.        He has occasional nausea without vomiting.  He denies a precipitating factor.  He generally has 2-3 loose bowel movements per day.   Musculoskeletal: Positive for arthralgias. Negative for back pain and neck pain.        He has a chronic pain syndrome.  He used to be on the Demerol but denies on the tramadol.  He states that is controlled his symptoms.  He denies swollen joints. He has occasional stiffness in the back.   Skin: Negative for pallor and rash.   Neurological: Negative for dizziness, seizures, syncope, speech difficulty, weakness and numbness.   Hematological: Negative for adenopathy.        He is followed by the hematologist for the B12 deficiency.  He has not had his B12 the last couple months and wants it today.  He wants the B12 on a monthly basis.  He does better when he receives his B12.  He generally takes his vitamins including vitamin-D on a daily basis.   Psychiatric/Behavioral: Negative for confusion.       Objective:      Physical Exam   Constitutional: He is oriented to person, place, and time. He appears well-developed and well-nourished.   Thin well-nourished well hydrated nonicteric white man.  He has a firm hand .   HENT:   Head: Normocephalic.   Eyes: Pupils are equal, round, and reactive to light. EOM are normal.   Neck: Normal range of motion. Neck supple. No tracheal deviation present. No thyromegaly present.   Cardiovascular: Normal rate, regular rhythm and normal heart sounds.   Pulmonary/Chest: Effort normal and breath sounds normal.   Abdominal: Soft. Bowel sounds are normal. He exhibits no distension and no mass. There is  tenderness. There is no rebound and no guarding.   There surgical scars.  The abdomen is soft with mild tenderness in the epigastrium.  Organomegaly masses are not detected.  Bowel sounds normal.   Musculoskeletal: Normal range of motion.   He can ambulate normally.  He can go from the sitting to the standing position without difficulty.   Lymphadenopathy:     He has no cervical adenopathy.   Neurological: He is alert and oriented to person, place, and time. No cranial nerve deficit.   Skin: Skin is warm and dry.   Psychiatric: He has a normal mood and affect. His behavior is normal.   Vitals reviewed.        Plan:       Hepatitis B infection without delta agent without hepatic coma, unspecified chronicity  -     Hepatitis B DNA, Quant; Future; Expected date: 12/12/2019    Hiatal hernia    Chronic diarrhea    History of Crohn's disease    Chronic abdominal pain    B12 deficiency    Other orders  -     cyanocobalamin injection 1,000 mcg  -     Influenza - Quadrivalent (6 months+) (PF)     He will continue his reflux regimen current medications vitamins and minerals.  He will receive the B12 a monthly basis.  He continues his fiber supplements.  He continues his usual activities.  He follows up in the Pain Clinic.  He avoids the anti-inflammatory agents.  He was immunized for the flu today.

## 2019-12-16 LAB
HBV DNA SERPL NAA+PROBE-ACNC: NORMAL IU/ML
HBV DNA SERPL NAA+PROBE-LOG IU: NORMAL LOG IU/ML

## 2019-12-17 ENCOUNTER — TELEPHONE (OUTPATIENT)
Dept: GASTROENTEROLOGY | Facility: CLINIC | Age: 64
End: 2019-12-17

## 2019-12-17 NOTE — TELEPHONE ENCOUNTER
----- Message from Andrea Shaver MD sent at 12/17/2019  7:17 AM CST -----  Tell me does not have hepatitis B.

## 2019-12-20 ENCOUNTER — DOCUMENTATION ONLY (OUTPATIENT)
Dept: FAMILY MEDICINE | Facility: CLINIC | Age: 64
End: 2019-12-20

## 2019-12-20 NOTE — PROGRESS NOTES
Pre-Visit Chart Review  For Appointment Scheduled on 12/26/19    Health Maintenance Due   Topic Date Due    TETANUS VACCINE  01/01/1975    Colonoscopy  03/14/2018

## 2020-01-20 ENCOUNTER — TELEPHONE (OUTPATIENT)
Dept: GASTROENTEROLOGY | Facility: CLINIC | Age: 65
End: 2020-01-20

## 2020-01-20 ENCOUNTER — CLINICAL SUPPORT (OUTPATIENT)
Dept: GASTROENTEROLOGY | Facility: CLINIC | Age: 65
End: 2020-01-20
Payer: MEDICARE

## 2020-01-20 DIAGNOSIS — E53.8 B12 DEFICIENCY: Primary | ICD-10-CM

## 2020-01-20 PROCEDURE — 96372 THER/PROPH/DIAG INJ SC/IM: CPT | Mod: S$GLB,,, | Performed by: INTERNAL MEDICINE

## 2020-01-20 PROCEDURE — 96372 PR INJECTION,THERAP/PROPH/DIAG2ST, IM OR SUBCUT: ICD-10-PCS | Mod: S$GLB,,, | Performed by: INTERNAL MEDICINE

## 2020-01-20 RX ORDER — CYANOCOBALAMIN 1000 UG/ML
1000 INJECTION, SOLUTION INTRAMUSCULAR; SUBCUTANEOUS
Status: COMPLETED | OUTPATIENT
Start: 2020-01-20 | End: 2020-01-20

## 2020-01-20 RX ADMIN — CYANOCOBALAMIN 1000 MCG: 1000 INJECTION, SOLUTION INTRAMUSCULAR; SUBCUTANEOUS at 11:01

## 2020-01-20 NOTE — TELEPHONE ENCOUNTER
Called pt. No answer. LVM stating he could come in any time, but to please call back with an estimated time so that he could be added to the schedule.

## 2020-01-20 NOTE — TELEPHONE ENCOUNTER
----- Message from Massiel Reilly sent at 1/20/2020  9:08 AM CST -----  Contact: self  Patient wants to come in the get his B12 shot today.  Call back at [phone] (home).  Thanks

## 2020-01-20 NOTE — TELEPHONE ENCOUNTER
----- Message from Tamika Hunter sent at 1/20/2020 10:15 AM CST -----  Type:  Patient Returning Call    Who Called:  Patient  Who Left Message for Patient:  n/a  Does the patient know what this is regarding?:  B 12 shot  Best Call Back Number:  [phone] (home)

## 2020-01-20 NOTE — PROGRESS NOTES
Pt arrived for nurse visit. B12 1,000mcg given IM on the right dorso-gluteal. Pt tolerated well. No bleeding noted. Pt stated he would return in a month for next injection.

## 2020-02-04 ENCOUNTER — TELEPHONE (OUTPATIENT)
Dept: PAIN MEDICINE | Facility: CLINIC | Age: 65
End: 2020-02-04

## 2020-02-04 NOTE — TELEPHONE ENCOUNTER
----- Message from Bentley Guevara sent at 2/4/2020  8:18 AM CST -----  Contact: self   Patient want to remind office he will be out of tramadol rx on February 14th please send a refill to [facility], any questions please call back at [phone] (home)       [id number], [place] [address] AT [facility] [address]  [address] [place] [address]  Phone: [phone] Fax: [phone]

## 2020-02-05 ENCOUNTER — PATIENT OUTREACH (OUTPATIENT)
Dept: ADMINISTRATIVE | Facility: OTHER | Age: 65
End: 2020-02-05

## 2020-02-05 NOTE — PROGRESS NOTES
Chart reviewed.   Requested updates from Care Everywhere.  Immunizations reconciled.   HM updated.

## 2020-02-10 ENCOUNTER — OFFICE VISIT (OUTPATIENT)
Dept: PAIN MEDICINE | Facility: CLINIC | Age: 65
End: 2020-02-10
Payer: MEDICARE

## 2020-02-10 VITALS
SYSTOLIC BLOOD PRESSURE: 133 MMHG | DIASTOLIC BLOOD PRESSURE: 81 MMHG | BODY MASS INDEX: 20.54 KG/M2 | WEIGHT: 155 LBS | HEIGHT: 73 IN | HEART RATE: 69 BPM

## 2020-02-10 DIAGNOSIS — K50.90 CROHN'S DISEASE WITHOUT COMPLICATION, UNSPECIFIED GASTROINTESTINAL TRACT LOCATION: ICD-10-CM

## 2020-02-10 DIAGNOSIS — G89.29 CHRONIC ABDOMINAL PAIN: ICD-10-CM

## 2020-02-10 DIAGNOSIS — G89.4 CHRONIC PAIN DISORDER: ICD-10-CM

## 2020-02-10 DIAGNOSIS — F11.90 CHRONIC, CONTINUOUS USE OF OPIOIDS: Primary | ICD-10-CM

## 2020-02-10 DIAGNOSIS — R10.9 CHRONIC ABDOMINAL PAIN: ICD-10-CM

## 2020-02-10 PROCEDURE — 99999 PR PBB SHADOW E&M-EST. PATIENT-LVL IV: ICD-10-PCS | Mod: PBBFAC,,, | Performed by: PHYSICIAN ASSISTANT

## 2020-02-10 PROCEDURE — 99999 PR PBB SHADOW E&M-EST. PATIENT-LVL IV: CPT | Mod: PBBFAC,,, | Performed by: PHYSICIAN ASSISTANT

## 2020-02-10 PROCEDURE — 80307 DRUG TEST PRSMV CHEM ANLYZR: CPT

## 2020-02-10 PROCEDURE — 99214 OFFICE O/P EST MOD 30 MIN: CPT | Mod: S$GLB,,, | Performed by: PHYSICIAN ASSISTANT

## 2020-02-10 PROCEDURE — 3008F BODY MASS INDEX DOCD: CPT | Mod: CPTII,S$GLB,, | Performed by: PHYSICIAN ASSISTANT

## 2020-02-10 PROCEDURE — 99214 PR OFFICE/OUTPT VISIT, EST, LEVL IV, 30-39 MIN: ICD-10-PCS | Mod: S$GLB,,, | Performed by: PHYSICIAN ASSISTANT

## 2020-02-10 PROCEDURE — 3008F PR BODY MASS INDEX (BMI) DOCUMENTED: ICD-10-PCS | Mod: CPTII,S$GLB,, | Performed by: PHYSICIAN ASSISTANT

## 2020-02-10 RX ORDER — TRAMADOL HYDROCHLORIDE 50 MG/1
50 TABLET ORAL EVERY 8 HOURS PRN
Qty: 90 TABLET | Refills: 2 | Status: SHIPPED | OUTPATIENT
Start: 2020-02-12 | End: 2020-03-12

## 2020-02-10 NOTE — PROGRESS NOTES
PCP: Tyler Smith MD      CC: abdominal pain    Interval history: Mr. Cerda is a 65 y.o. male with an extensive history of crohn's disease who presents today for medication refill. We discontinued Demerol 50mg q 8 hrs and started Tramadol 50 mg q 8 h. He reports this is beneficial.  No side effects reported.   Abdominal pain remains stable.   He does report diarrhea at times but no blood stools. Reports anal pain and itching 2/2 chronic diarrhea.  He is currently being evaluated by gastroenterology.  OTC Lidocaine 2 % provides some minimal relief.  Compounding cream was too expensive. Continues to c/o bilateral knee pain . He has had multiple surgeries in the past. He rates his pain 4/10.     Prior HPI:   Mr. Cerda is a 60 year old male with PMH of crohn's disease referred by Dr. Hansen.  Patient states being diagnosed with crohn's disease since the age of 14.    He has had multiple GI surgeries and large bowel and small bowel removals.  During that time, he was being managed by providers in [place].  He was TPN dependent for many years until about two years ago.  He is now stable on an oral diet.  He did not have a primary care physician nor a gastroenterologist for many years.  He is currently trying to establish care.  He has future appointment with Dr. Ch.  He states taking Demerol 50mg q8hrs as needed for pain. It has provided relief of his nausea and pain with diarrhea.  He was last prescribed Demerol in July 2014.  Since then, he has been bearing with the pain.  It is a sharp shooting pain in his mid abdomen.      ROS:  CONSTITUTIONAL: No fevers, chills, night sweats, wt. loss, appetite changes  SKIN: no rashes or itching  ENT: No headaches, head trauma, vision changes, or eye pain  LYMPH NODES: None noticed   CV: No chest pain, palpitations.   RESP: No shortness of breath, dyspnea on exertion, cough, wheezing, or hemoptysis  GI: No nausea, emesis, diarrhea, constipation, melena,  hematochezia, pain.    : No dysuria, hematuria, urgency, or frequency   HEME: No easy bruising, bleeding problems  PSYCHIATRIC: No depression, anxiety, psychosis, hallucinations.  NEURO: No seizures, memory loss, dizziness or difficulty sleeping  MSK: no joint pain      Past Medical History:   Diagnosis Date    Anxiety     Basal cell carcinoma 07/2017    R forehead and R temple    Crohn's disease     age 17    Short bowel syndrome     Vitamin B deficiency     Vitamin D deficiency      Past Surgical History:   Procedure Laterality Date    3 small bowel resections      APPENDECTOMY      CHOLECYSTECTOMY      COLONOSCOPY      COLONOSCOPY N/A 2/14/2017    Procedure: COLONOSCOPY;  Surgeon: Nlea Sanchez MD;  Location: [address]);  Service: Endoscopy;  Laterality: N/A;    COLONOSCOPY N/A 3/14/2017    Procedure: COLONOSCOPY;  Surgeon: Nela Sanchez MD;  Location: [facility] ([address] [place] [address]);  Service: Endoscopy;  Laterality: N/A;  2 days clear liquids before procedure    ESOPHAGOGASTRODUODENOSCOPY N/A 11/19/2019    Procedure: EGD (ESOPHAGOGASTRODUODENOSCOPY);  Surgeon: Andrea Shaver MD;  Location: [facility];  Service: Endoscopy;  Laterality: N/A;    KNEE SURGERY      SMALL INTESTINE SURGERY      TUBE THORACOTOMY      UPPER GASTROINTESTINAL ENDOSCOPY       Family History   Problem Relation Age of Onset    Diabetes Mother     Stroke Mother     Diabetes Father     Kidney disease Father     Irritable bowel syndrome Cousin     Celiac disease Neg Hx     Cirrhosis Neg Hx     Colon cancer Neg Hx     Colon polyps Neg Hx     Cystic fibrosis Neg Hx     Esophageal cancer Neg Hx     Crohn's disease Neg Hx     Hemochromatosis Neg Hx     Inflammatory bowel disease Neg Hx     Liver cancer Neg Hx     Liver disease Neg Hx     Rectal cancer Neg Hx     Stomach cancer Neg Hx     Ulcerative colitis Neg Hx     Addison's disease Neg Hx     Melanoma Neg Hx     Psoriasis Neg Hx     Lupus Neg Hx      "Eczema Neg Hx      Social History     Socioeconomic History    Marital status:      Spouse name: Not on file    Number of children: Not on file    Years of education: Not on file    Highest education level: Not on file   Occupational History    Not on file   Social Needs    Financial resource strain: Not on file    Food insecurity:     Worry: Not on file     Inability: Not on file    Transportation needs:     Medical: Not on file     Non-medical: Not on file   Tobacco Use    Smoking status: Former Smoker     Packs/day: 1.00     Years: 15.00     Pack years: 15.00     Types: Cigarettes     Last attempt to quit: 3/10/1995     Years since quittin.9    Smokeless tobacco: Never Used   Substance and Sexual Activity    Alcohol use: Yes     Alcohol/week: 2.0 standard drinks     Types: 1 Cans of beer, 1 Shots of liquor per week     Comment: 1 every 6 -7 months    Drug use: No    Sexual activity: Yes   Lifestyle    Physical activity:     Days per week: Not on file     Minutes per session: Not on file    Stress: Not on file   Relationships    Social connections:     Talks on phone: Not on file     Gets together: Not on file     Attends Jainism service: Not on file     Active member of club or organization: Not on file     Attends meetings of clubs or organizations: Not on file     Relationship status: Not on file   Other Topics Concern    Not on file   Social History Narrative    Retired      Medications/Allergies: See med card    Vitals:    02/10/20 1019   BP: 133/81   Pulse: 69   Weight: 70.3 kg (155 lb)   Height: 6' 1" (1.854 m)   PainSc:   2   PainLoc: Abdomen     Physical exam:    GENERAL: A and O x3, the patient appears well groomed and is in no acute distress.  Skin: No rashes or obvious lesions  HEENT: normocephalic, atraumatic  CARDIOVASCULAR:  RRR  LUNGS: non labored breathing  ABDOMEN: soft, nontender. No rebound   UPPER EXTREMITIES: Normal alignment, normal range of motion, no atrophy, " no skin changes,  hair growth and nail growth normal and equal bilaterally. No swelling, no tenderness.    LOWER EXTREMITIES:  Normal alignment, normal range of motion, no atrophy, no skin changes,  hair growth and nail growth normal and equal bilaterally. No swelling, no tenderness.    LUMBAR SPINE  Lumbar spine: ROM is full with flexion extension and oblique extension with no increased pain.    Lam's test causes no increased pain on either side.    Supine straight leg raise is negative bilaterally.    Internal and external rotation of the hip causes no increased pain on either side.  Myofascial exam: No tenderness to palpation across lumbar paraspinous muscles.    MENTAL STATUS: normal orientation, speech, language, and fund of knowledge for social situation.  Emotional state appropriate.    CRANIAL NERVES:  II:  PERRL bilaterally,   III,IV,VI: EOMI.    V:  Facial sensation equal bilaterally  VII:  Facial motor function normal.  VIII:  Hearing equal to finger rub bilaterally  IX/X: Gag normal, palate symmetric  XI:  Shoulder shrug equal, head turn equal  XII:  Tongue midline without fasciculations    MOTOR: Tone and bulk: normal bilateral upper and lower Strength: normal   SENSATION: Light touch and pinprick intact bilaterally  REFLEXES: normal, symmetric, nonbrisk.  Toes down, no clonus. No hoffmans.  GAIT: normal rise, base, steps, and arm swing.      Imaging:  None    Assessment:  Mr. Cerda is a 65 y.o. male with abdominal pain  1. Chronic, continuous use of opioids    2. Chronic abdominal pain    3. Crohn's disease without complication, unspecified gastrointestinal tract location       Plan:  1. Patient with long history of crohn's disease and chronic abdominal pain.  Continue care with GI.  2. Tramadol 50 mg q 8 h prn  as needed for pain.  reviewed.  No signs of aberrant behavior.  UDS today  3. Continue OTC lidocaine cream  4. May benefit from bilateral knee genicular nerve blocks.   5. F/u 3 months  or sooner

## 2020-02-14 LAB
6MAM UR QL: NOT DETECTED
7AMINOCLONAZEPAM UR QL: NOT DETECTED
A-OH ALPRAZ UR QL: NOT DETECTED
ALPRAZ UR QL: NOT DETECTED
AMPHET UR QL SCN: NOT DETECTED
ANNOTATION COMMENT IMP: NORMAL
ANNOTATION COMMENT IMP: NORMAL
BARBITURATES UR QL: NOT DETECTED
BUPRENORPHINE UR QL: NOT DETECTED
BZE UR QL: NOT DETECTED
CARBOXYTHC UR QL: NOT DETECTED
CARISOPRODOL UR QL: NOT DETECTED
CLONAZEPAM UR QL: NOT DETECTED
CODEINE UR QL: NOT DETECTED
CREAT UR-MCNC: 132 MG/DL (ref 20–400)
DIAZEPAM UR QL: NOT DETECTED
ETHYL GLUCURONIDE UR QL: NOT DETECTED
FENTANYL UR QL: NOT DETECTED
HYDROCODONE UR QL: NOT DETECTED
HYDROMORPHONE UR QL: NOT DETECTED
LORAZEPAM UR QL: NOT DETECTED
MDA UR QL: NOT DETECTED
MDEA UR QL: NOT DETECTED
MDMA UR QL: NOT DETECTED
ME-PHENIDATE UR QL: NOT DETECTED
MEPERIDINE UR QL: NOT DETECTED
METHADONE UR QL: NOT DETECTED
METHAMPHET UR QL: NOT DETECTED
MIDAZOLAM UR QL SCN: NOT DETECTED
MORPHINE UR QL: NOT DETECTED
NORBUPRENORPHINE UR QL CFM: NOT DETECTED
NORDIAZEPAM UR QL: NOT DETECTED
NORFENTANYL UR QL: NOT DETECTED
NORHYDROCODONE UR QL CFM: NOT DETECTED
NOROXYCODONE UR QL CFM: NOT DETECTED
NOROXYMORPHONE: NOT DETECTED
OXAZEPAM UR QL: NOT DETECTED
OXYCODONE UR QL: NOT DETECTED
OXYMORPHONE UR QL: NOT DETECTED
PATHOLOGY STUDY: NORMAL
PCP UR QL: NOT DETECTED
PHENTERMINE UR QL: NOT DETECTED
PROPOXYPH UR QL: NOT DETECTED
SERVICE CMNT-IMP: NORMAL
TAPENTADOL UR QL SCN: NOT DETECTED
TAPENTADOL-O-SULF: NOT DETECTED
TEMAZEPAM UR QL: NOT DETECTED
TRAMADOL UR QL: PRESENT
ZOLPIDEM UR QL: NOT DETECTED

## 2020-02-27 ENCOUNTER — TELEPHONE (OUTPATIENT)
Dept: GASTROENTEROLOGY | Facility: CLINIC | Age: 65
End: 2020-02-27

## 2020-02-27 NOTE — TELEPHONE ENCOUNTER
----- Message from Gladys Lemos LPN sent at 2/27/2020  9:24 AM CST -----  Contact: Ptnt       ----- Message -----  From: Joel Garcia  Sent: 2/27/2020   9:07 AM CST  To: [facility] Staff    Type: Needs Medical Advice    Who Called:  Ptnt     Additional Information:     Please call to schedule nurse visit for B-12 shot.

## 2020-03-12 ENCOUNTER — OFFICE VISIT (OUTPATIENT)
Dept: GASTROENTEROLOGY | Facility: CLINIC | Age: 65
End: 2020-03-12
Payer: MEDICARE

## 2020-03-12 VITALS
OXYGEN SATURATION: 96 % | RESPIRATION RATE: 16 BRPM | HEIGHT: 73 IN | HEART RATE: 67 BPM | SYSTOLIC BLOOD PRESSURE: 131 MMHG | WEIGHT: 157 LBS | DIASTOLIC BLOOD PRESSURE: 75 MMHG | BODY MASS INDEX: 20.81 KG/M2

## 2020-03-12 DIAGNOSIS — K44.9 HIATAL HERNIA: ICD-10-CM

## 2020-03-12 DIAGNOSIS — K52.9 CHRONIC DIARRHEA: ICD-10-CM

## 2020-03-12 DIAGNOSIS — K90.829 SHORT BOWEL SYNDROME: ICD-10-CM

## 2020-03-12 DIAGNOSIS — Z87.19 HISTORY OF CROHN'S DISEASE: Primary | ICD-10-CM

## 2020-03-12 DIAGNOSIS — E53.8 B12 DEFICIENCY: ICD-10-CM

## 2020-03-12 PROCEDURE — 3008F PR BODY MASS INDEX (BMI) DOCUMENTED: ICD-10-PCS | Mod: CPTII,S$GLB,, | Performed by: INTERNAL MEDICINE

## 2020-03-12 PROCEDURE — 96372 THER/PROPH/DIAG INJ SC/IM: CPT | Mod: S$GLB,,, | Performed by: INTERNAL MEDICINE

## 2020-03-12 PROCEDURE — 96372 PR INJECTION,THERAP/PROPH/DIAG2ST, IM OR SUBCUT: ICD-10-PCS | Mod: S$GLB,,, | Performed by: INTERNAL MEDICINE

## 2020-03-12 PROCEDURE — 99213 PR OFFICE/OUTPT VISIT, EST, LEVL III, 20-29 MIN: ICD-10-PCS | Mod: 25,S$GLB,, | Performed by: INTERNAL MEDICINE

## 2020-03-12 PROCEDURE — 3008F BODY MASS INDEX DOCD: CPT | Mod: CPTII,S$GLB,, | Performed by: INTERNAL MEDICINE

## 2020-03-12 PROCEDURE — 99213 OFFICE O/P EST LOW 20 MIN: CPT | Mod: 25,S$GLB,, | Performed by: INTERNAL MEDICINE

## 2020-03-12 PROCEDURE — 99999 PR PBB SHADOW E&M-EST. PATIENT-LVL IV: ICD-10-PCS | Mod: PBBFAC,,, | Performed by: INTERNAL MEDICINE

## 2020-03-12 PROCEDURE — 99999 PR PBB SHADOW E&M-EST. PATIENT-LVL IV: CPT | Mod: PBBFAC,,, | Performed by: INTERNAL MEDICINE

## 2020-03-12 RX ORDER — CYANOCOBALAMIN 1000 UG/ML
1000 INJECTION, SOLUTION INTRAMUSCULAR; SUBCUTANEOUS ONCE
Status: COMPLETED | OUTPATIENT
Start: 2020-03-12 | End: 2020-03-12

## 2020-03-12 RX ADMIN — CYANOCOBALAMIN 1000 MCG: 1000 INJECTION, SOLUTION INTRAMUSCULAR; SUBCUTANEOUS at 02:03

## 2020-03-12 NOTE — LETTER
March 16, 2020      Tyler Smith MD  [address] [place] [address]           [facility]  [address] [facility], [address]  [place] [address]  Phone: [phone]  Fax: [phone]          Patient: Tristin Cerda   MR Number: [id number]   YOB: 1955   Date of Visit: 3/12/2020       Dear Dr. Tyler Smith:    Thank you for referring Tristin Cerda to me for evaluation. Attached you will find relevant portions of my assessment and plan of care.    If you have questions, please do not hesitate to call me. I look forward to following Tristin Cerda along with you.    Sincerely,    Andrea Shaver MD    Enclosure  CC:  No Recipients    If you would like to receive this communication electronically, please contact [email or web address] or [phone] to request more information on [employer] access.    For providers and/or their staff who would like to refer a patient to [facility], please contact us through our one-stop-shop provider referral line, [facility], at [phone].    If you feel you have received this communication in error or would no longer like to receive these types of communications, please e-mail [email or web address]

## 2020-03-12 NOTE — PATIENT INSTRUCTIONS
He receives the vitamin B12 on a monthly basis.  He has had extensive gastrointestinal surgery for Crohn's disease. He believes his Crohn's disease is in remission because he is not having abdominal pain.  He is having frequent bowel movements and stool analysis will be obtained. He continues his other medications vitamins and minerals.  He continues the Colestid.

## 2020-03-12 NOTE — PROGRESS NOTES
Subjective:       Patient ID: Tristin Cerda is a 65 y.o. male.    Chief Complaint: Follow-up (3m-b12 def, hep b, abd pain)    He has a history of Crohn's disease and has had extensive gastrointestinal surgery.  He was started on the Colestid and the diarrhea has decreased to 5- 6 times per day.  He describes the diarrhea as semi solid.  He does not associate the diarrhea with pain specific food.  Does have urgency and occasional nocturnal diarrhea without incontinence.  He denies fever chills hematemesis hematochezia jaundice .  He believes that his Crohn's disease is in remission because he does not have abdominal pain.  He does have a chronic pain syndrome with chronic back hip and joint pain. He is followed in Pain Clinic and receives his pain medications.  Avoids the anti-inflammatory agents.  Receives the monthly B12 injections.  His weight remains stable. He denies pyrosis dyspepsia aspiration or dysphagia.  He has a history of hiatal hernia gastroesophageal reflux but he is symptom-free with the current medications.      Allergies:  Review of patient's allergies indicates:   Allergen Reactions    Ciprofloxacin     Codeine Itching    Compazine [prochlorperazine]     Erythromycin     Keflex [cephalexin]        Medications:    Current Outpatient Medications:     acetaminophen (TYLENOL) 500 mg Cap, , Disp: , Rfl:     ASPIRIN (ASPIR-81 ORAL), Take by mouth., Disp: , Rfl:     aspirin-acetaminophen-caffeine 250-250-65 mg (EXCEDRIN MIGRAINE) 250-250-65 mg per tablet, , Disp: , Rfl:     colestipol (COLESTID) 1 gram Tab, Take 1 tablet (1 g total) by mouth 2 (two) times daily., Disp: 180 tablet, Rfl: 3    cyanocobalamin 1,000 mcg/mL injection, Inject 1 mL (1,000 mcg total) into the muscle every 30 days., Disp: 10 mL, Rfl: 11    dicyclomine (BENTYL) 20 mg tablet, TAKE 1 TABLET FOUR TIMES DAILY, Disp: 360 tablet, Rfl: 3    diphenhydramine-acetaminophen 12.5-325 mg Tab, , Disp: , Rfl:      hydrocortisone 2.5 % lotion, AAA face QHS PRN flare, Disp: 60 mL, Rfl: 2    magnesium oxide (MAG-OX) 400 mg (241.3 mg magnesium) tablet, Take 400 mg by mouth once daily., Disp: , Rfl:     ondansetron (ZOFRAN) 8 MG tablet, TAKE 1 TABLET EVERY 12 HOURS AS NEEDED FOR NAUSEA, Disp: 180 tablet, Rfl: 1    phenylephrine-acetaminophen 5-325 mg Cap, , Disp: , Rfl:     potassium gluconate 595 mg (99 mg) Tab, , Disp: , Rfl:     traMADol (ULTRAM) 50 mg tablet, Take 1 tablet (50 mg total) by mouth every 8 (eight) hours as needed for Pain. Medically necessary for greater than 7 days for chronic pain, Disp: 90 tablet, Rfl: 2    Current Facility-Administered Medications:     cyanocobalamin injection 1,000 mcg, 1,000 mcg, Intramuscular, Q30 Days, Andrea Shaver MD, 1,000 mcg at 12/12/19 1326    cyanocobalamin injection 1,000 mcg, 1,000 mcg, Intramuscular, Once, Andrea Shaver MD    Past Medical History:   Diagnosis Date    Anxiety     Basal cell carcinoma 07/2017    R forehead and R temple    Crohn's disease     age 17    Short bowel syndrome     Vitamin B deficiency     Vitamin D deficiency        Past Surgical History:   Procedure Laterality Date    3 small bowel resections      APPENDECTOMY      CHOLECYSTECTOMY      COLONOSCOPY      COLONOSCOPY N/A 2/14/2017    Procedure: COLONOSCOPY;  Surgeon: Nela Sanchez MD;  Location: [address]);  Service: Endoscopy;  Laterality: N/A;    COLONOSCOPY N/A 3/14/2017    Procedure: COLONOSCOPY;  Surgeon: Nela Sanchez MD;  Location: [address]);  Service: Endoscopy;  Laterality: N/A;  2 days clear liquids before procedure    ESOPHAGOGASTRODUODENOSCOPY N/A 11/19/2019    Procedure: EGD (ESOPHAGOGASTRODUODENOSCOPY);  Surgeon: Andrea Shaver MD;  Location: [facility];  Service: Endoscopy;  Laterality: N/A;    KNEE SURGERY      SMALL INTESTINE SURGERY      TUBE THORACOTOMY      UPPER GASTROINTESTINAL ENDOSCOPY           Review of Systems    Objective:       Physical Exam      Plan:       History of Crohn's disease    Short bowel syndrome    Hiatal hernia    Chronic diarrhea  -     Gastrointestinal Pathogens Panel, PCR; Future; Expected date: 03/12/2020  -     Pancreatic elastase, fecal; Future; Expected date: 03/12/2020    B12 deficiency    Other orders  -     cyanocobalamin injection 1,000 mcg

## 2020-03-19 ENCOUNTER — LAB VISIT (OUTPATIENT)
Dept: LAB | Facility: HOSPITAL | Age: 65
End: 2020-03-19
Attending: INTERNAL MEDICINE
Payer: MEDICARE

## 2020-03-19 DIAGNOSIS — K52.9 CHRONIC DIARRHEA: ICD-10-CM

## 2020-03-19 PROCEDURE — 83993 ASSAY FOR CALPROTECTIN FECAL: CPT

## 2020-03-19 PROCEDURE — 87507 IADNA-DNA/RNA PROBE TQ 12-25: CPT

## 2020-03-19 PROCEDURE — 82656 EL-1 FECAL QUAL/SEMIQ: CPT

## 2020-03-24 LAB
CALPROTECTIN STL-MCNT: <27.1 MCG/G
GPP - ADENOVIRUS 40/41: NOT DETECTED
GPP - CAMPYLOBACTER: NOT DETECTED
GPP - CLOSTRIDIUM DIFFICILE TOXIN A/B: NOT DETECTED
GPP - CRYPTOSPORIDIUM: NOT DETECTED
GPP - E COLI O157: NOT DETECTED
GPP - ENTAMOEBA HISTOLYTICA: NOT DETECTED
GPP - ENTEROTOXIGENIC E COLI (ETEC): NOT DETECTED
GPP - GIARDIA LAMBLIA: NOT DETECTED
GPP - NOROVIRUS GI/GII: NOT DETECTED
GPP - ROTAVIRUS A: NOT DETECTED
GPP - SALMONELLA: NOT DETECTED
GPP - SHIGELLA: NOT DETECTED
GPP - VIBRIO CHOLERA: NOT DETECTED
GPP - YERSINIA ENTEROCOLITICA: NOT DETECTED
LACTATE PLASV-SCNC: NOT DETECTED MMOL/L

## 2020-03-31 LAB — ELASTASE 1, FECAL: 406 MCG/G

## 2020-04-06 DIAGNOSIS — G89.29 CHRONIC ABDOMINAL PAIN: ICD-10-CM

## 2020-04-06 DIAGNOSIS — R10.9 CHRONIC ABDOMINAL PAIN: ICD-10-CM

## 2020-04-06 DIAGNOSIS — K90.829 SHORT BOWEL SYNDROME: ICD-10-CM

## 2020-04-06 NOTE — TELEPHONE ENCOUNTER
----- Message from Scott Bekc sent at 4/6/2020  3:16 PM CDT -----  Type:  RX Refill Request    Who Called:  Patient  Refill or New Rx:  Refill  RX Name and Strength:  ondansetron (ZOFRAN) 8 MG tablet  How is the patient currently taking it? (ex. 1XDay):  TAKE 1 TABLET EVERY 12 HOURS AS NEEDED FOR NAUSEA  Is this a 30 day or 90 day RX:  90  Preferred Pharmacy with phone number:   [facility] - [place], [place] - [address]  [address] [facility] [address]  Phone: [phone] Fax: [phone]    Local or Mail Order:  Mail Order  Ordering Provider:  Sivakumar Vázquez Call Back Number:  [phone]  Additional Information:

## 2020-04-07 RX ORDER — ONDANSETRON HYDROCHLORIDE 8 MG/1
TABLET, FILM COATED ORAL
Qty: 180 TABLET | Refills: 1 | Status: SHIPPED | OUTPATIENT
Start: 2020-04-07 | End: 2020-09-29 | Stop reason: SDUPTHER

## 2020-04-09 ENCOUNTER — PATIENT OUTREACH (OUTPATIENT)
Dept: ADMINISTRATIVE | Facility: OTHER | Age: 65
End: 2020-04-09

## 2020-04-13 ENCOUNTER — CLINICAL SUPPORT (OUTPATIENT)
Dept: GASTROENTEROLOGY | Facility: CLINIC | Age: 65
End: 2020-04-13
Payer: MEDICARE

## 2020-04-13 DIAGNOSIS — E53.8 B12 DEFICIENCY: Primary | ICD-10-CM

## 2020-04-13 PROCEDURE — 96372 PR INJECTION,THERAP/PROPH/DIAG2ST, IM OR SUBCUT: ICD-10-PCS | Mod: S$GLB,,, | Performed by: INTERNAL MEDICINE

## 2020-04-13 PROCEDURE — 96372 THER/PROPH/DIAG INJ SC/IM: CPT | Mod: S$GLB,,, | Performed by: INTERNAL MEDICINE

## 2020-04-13 RX ORDER — CYANOCOBALAMIN 1000 UG/ML
1000 INJECTION, SOLUTION INTRAMUSCULAR; SUBCUTANEOUS
Status: COMPLETED | OUTPATIENT
Start: 2020-04-13 | End: 2020-04-13

## 2020-04-13 RX ADMIN — CYANOCOBALAMIN 1000 MCG: 1000 INJECTION, SOLUTION INTRAMUSCULAR; SUBCUTANEOUS at 08:04

## 2020-05-08 ENCOUNTER — PATIENT OUTREACH (OUTPATIENT)
Dept: ADMINISTRATIVE | Facility: OTHER | Age: 65
End: 2020-05-08

## 2020-05-11 ENCOUNTER — DOCUMENTATION ONLY (OUTPATIENT)
Dept: GASTROENTEROLOGY | Facility: CLINIC | Age: 65
End: 2020-05-11

## 2020-05-11 ENCOUNTER — CLINICAL SUPPORT (OUTPATIENT)
Dept: GASTROENTEROLOGY | Facility: CLINIC | Age: 65
End: 2020-05-11
Payer: MEDICARE

## 2020-05-11 DIAGNOSIS — E53.8 B12 DEFICIENCY: Primary | ICD-10-CM

## 2020-05-11 PROCEDURE — 96372 THER/PROPH/DIAG INJ SC/IM: CPT | Mod: S$GLB,,, | Performed by: INTERNAL MEDICINE

## 2020-05-11 PROCEDURE — 96372 PR INJECTION,THERAP/PROPH/DIAG2ST, IM OR SUBCUT: ICD-10-PCS | Mod: S$GLB,,, | Performed by: INTERNAL MEDICINE

## 2020-05-11 RX ADMIN — CYANOCOBALAMIN 1000 MCG: 1000 INJECTION, SOLUTION INTRAMUSCULAR; SUBCUTANEOUS at 08:05

## 2020-05-11 NOTE — PROGRESS NOTES
Pt came into office for nurse visit. B12 administered IM into right dorsogluteal. Pt tolerated well.

## 2020-05-12 ENCOUNTER — OFFICE VISIT (OUTPATIENT)
Dept: PAIN MEDICINE | Facility: CLINIC | Age: 65
End: 2020-05-12
Payer: MEDICARE

## 2020-05-12 DIAGNOSIS — G89.29 CHRONIC ABDOMINAL PAIN: Primary | ICD-10-CM

## 2020-05-12 DIAGNOSIS — Z79.891 CHRONIC USE OF OPIATE FOR THERAPEUTIC PURPOSE: ICD-10-CM

## 2020-05-12 DIAGNOSIS — G89.4 CHRONIC PAIN DISORDER: Primary | ICD-10-CM

## 2020-05-12 DIAGNOSIS — R10.9 CHRONIC ABDOMINAL PAIN: Primary | ICD-10-CM

## 2020-05-12 DIAGNOSIS — K50.90 CROHN'S DISEASE WITHOUT COMPLICATION, UNSPECIFIED GASTROINTESTINAL TRACT LOCATION: ICD-10-CM

## 2020-05-12 PROCEDURE — 99442 PR PHYSICIAN TELEPHONE EVALUATION 11-20 MIN: ICD-10-PCS | Mod: 95,,, | Performed by: PHYSICIAN ASSISTANT

## 2020-05-12 PROCEDURE — 99442 PR PHYSICIAN TELEPHONE EVALUATION 11-20 MIN: CPT | Mod: 95,,, | Performed by: PHYSICIAN ASSISTANT

## 2020-05-12 RX ORDER — TRAMADOL HYDROCHLORIDE 50 MG/1
50 TABLET ORAL EVERY 8 HOURS PRN
Qty: 90 TABLET | Refills: 0 | Status: SHIPPED | OUTPATIENT
Start: 2020-05-12 | End: 2020-06-11 | Stop reason: SDUPTHER

## 2020-05-12 RX ORDER — TRAMADOL HYDROCHLORIDE 50 MG/1
50 TABLET ORAL EVERY 8 HOURS PRN
Qty: 90 TABLET | Refills: 0 | Status: SHIPPED | OUTPATIENT
Start: 2020-05-12 | End: 2020-05-12 | Stop reason: SDUPTHER

## 2020-05-12 NOTE — TELEPHONE ENCOUNTER
----- Message from Barry Avila sent at 5/12/2020 12:43 PM CDT -----  Contact: Patient  Type: Needs Medical Advice    Who Called:  Patient  Pharmacy name and phone #:    [id number] - [place], [place] - [address]  [address]  [place] [address]  Phone: [phone] Fax: [phone]  Best Call Back Number: [phone]  Additional Information: Patient states traMADoL (ULTRAM) 50 mg tablet was requested for the above pharmacy. Please call call to advise. Thanks!

## 2020-05-12 NOTE — PROGRESS NOTES
Established Patient - Audio Only Telehealth Visit     The patient location is: safe at home in [place]  The chief complaint leading to consultation is: abdominal pain   Visit type: Virtual visit with audio only (telephone)  Total time spent with patient: 12 minutes       The reason for the audio only service rather than synchronous audio and video virtual visit was related to technical difficulties or patient preference/necessity.     Each patient to whom I provide medical services by telemedicine is:  (1) informed of the relationship between the physician and patient and the respective role of any other health care provider with respect to management of the patient; and (2) notified that they may decline to receive medical services by telemedicine and may withdraw from such care at any time. Patient verbally consented to receive this service via voice-only telephone call.       HPI: Mr. Cerda is a 65 y.o. male with an extensive history of crohn's disease.. We discontinued Demerol 50mg q 8 hrs and started Tramadol 50 mg q 8 h. This continues to bebeneficial.  No side effects reported.   Abdominal pain remains stable.   He does report diarrhea at times but no blood stools. Reports anal pain and itching 2/2 chronic diarrhea.  He is currently being evaluated by gastroenterology.  OTC Lidocaine 2 % provides some minimal relief.  Compounding cream was too expensive. Continues to c/o bilateral knee pain . He has had multiple surgeries in the past. He rates his pain 0/10.      Assessment and plan:    1. Chronic abdominal pain     2. Crohn's disease without complication, unspecified gastrointestinal tract location     3. Chronic use of opiate for therapeutic purpose       1. Patient with long history of crohn's disease and chronic abdominal pain.  Continue care with GI.  2. Tramadol 50 mg q 8 h prn  as needed for pain.  reviewed.  No signs of aberrant behavior.  UDS LCV consistent   3. Continue OTC lidocaine  cream  4. May benefit from bilateral knee genicular nerve blocks.   5. F/u 1 month or sooner if needed.     This service was not originating from a related E/M service provided within the previous 7 days nor will  to an E/M service or procedure within the next 24 hours or my soonest available appointment.  Prevailing standard of care was able to be met in this audio-only visit.

## 2020-05-21 NOTE — ANESTHESIA RELEASE NOTE
Anesthesia Release from PACU Note    Patient: Tristin Cerda    Procedure(s) Performed: Procedure(s) (LRB):  ESOPHAGOGASTRODUODENOSCOPY (EGD) (N/A)  COLONOSCOPY (N/A)    Anesthesia type: general    Post pain: Adequate analgesia    Post assessment: no apparent anesthetic complications, tolerated procedure well and no evidence of recall    Last Vitals:   Visit Vitals    BP (!) 133/92    Pulse (!) 59    Temp 36.6 °C (97.9 °F) (Oral)    Resp 20    Ht 6' (1.829 m)    Wt 65.8 kg (145 lb)    SpO2 96%    BMI 19.67 kg/m2       Post vital signs: stable    Level of consciousness: awake, alert  and oriented    Nausea/Vomiting: no nausea/no vomiting    Complications: none    Airway Patency: patent    Respiratory: unassisted    Cardiovascular: stable and blood pressure at baseline    Hydration: euvolemic   Stephanie

## 2020-05-27 ENCOUNTER — TELEPHONE (OUTPATIENT)
Dept: PAIN MEDICINE | Facility: CLINIC | Age: 65
End: 2020-05-27

## 2020-05-27 NOTE — TELEPHONE ENCOUNTER
----- Message from Wade Swanson sent at 5/27/2020  9:31 AM CDT -----  Contact: pt  Type: Needs Medical Advice    Who Called:  pt    Best Call Back Number: [phone]  Additional Information: pt needs to discuss the medication traMADoL (ULTRAM) 50 mg tablet. Please call to advise.

## 2020-06-04 ENCOUNTER — PATIENT OUTREACH (OUTPATIENT)
Dept: ADMINISTRATIVE | Facility: OTHER | Age: 65
End: 2020-06-04

## 2020-06-09 ENCOUNTER — OFFICE VISIT (OUTPATIENT)
Dept: GASTROENTEROLOGY | Facility: CLINIC | Age: 65
End: 2020-06-09
Payer: MEDICARE

## 2020-06-09 VITALS
TEMPERATURE: 99 F | BODY MASS INDEX: 20.41 KG/M2 | HEART RATE: 64 BPM | OXYGEN SATURATION: 98 % | DIASTOLIC BLOOD PRESSURE: 74 MMHG | RESPIRATION RATE: 18 BRPM | SYSTOLIC BLOOD PRESSURE: 136 MMHG | HEIGHT: 73 IN | WEIGHT: 154 LBS

## 2020-06-09 DIAGNOSIS — R19.7 DIARRHEA, UNSPECIFIED TYPE: ICD-10-CM

## 2020-06-09 DIAGNOSIS — R10.9 CHRONIC ABDOMINAL PAIN: ICD-10-CM

## 2020-06-09 DIAGNOSIS — K50.919 CROHN'S DISEASE WITH COMPLICATION, UNSPECIFIED GASTROINTESTINAL TRACT LOCATION: Primary | ICD-10-CM

## 2020-06-09 DIAGNOSIS — Z90.49 HISTORY OF CHOLECYSTECTOMY: ICD-10-CM

## 2020-06-09 DIAGNOSIS — Z01.818 PREOP TESTING: ICD-10-CM

## 2020-06-09 DIAGNOSIS — Z87.19 HISTORY OF CROHN'S DISEASE: ICD-10-CM

## 2020-06-09 DIAGNOSIS — E53.8 B12 DEFICIENCY: Primary | ICD-10-CM

## 2020-06-09 DIAGNOSIS — G89.29 CHRONIC ABDOMINAL PAIN: ICD-10-CM

## 2020-06-09 DIAGNOSIS — K52.9 CHRONIC DIARRHEA: ICD-10-CM

## 2020-06-09 DIAGNOSIS — K90.829 SHORT BOWEL SYNDROME: ICD-10-CM

## 2020-06-09 DIAGNOSIS — K44.9 HIATAL HERNIA: ICD-10-CM

## 2020-06-09 DIAGNOSIS — B18.1 CHRONIC VIRAL HEPATITIS B WITHOUT DELTA AGENT AND WITHOUT COMA: ICD-10-CM

## 2020-06-09 DIAGNOSIS — K50.90 CROHN'S DISEASE: ICD-10-CM

## 2020-06-09 PROCEDURE — 96372 PR INJECTION,THERAP/PROPH/DIAG2ST, IM OR SUBCUT: ICD-10-PCS | Mod: S$GLB,,, | Performed by: INTERNAL MEDICINE

## 2020-06-09 PROCEDURE — 99214 OFFICE O/P EST MOD 30 MIN: CPT | Mod: 25,S$GLB,, | Performed by: INTERNAL MEDICINE

## 2020-06-09 PROCEDURE — 99214 PR OFFICE/OUTPT VISIT, EST, LEVL IV, 30-39 MIN: ICD-10-PCS | Mod: 25,S$GLB,, | Performed by: INTERNAL MEDICINE

## 2020-06-09 PROCEDURE — 99999 PR PBB SHADOW E&M-EST. PATIENT-LVL III: CPT | Mod: PBBFAC,,, | Performed by: INTERNAL MEDICINE

## 2020-06-09 PROCEDURE — 3008F BODY MASS INDEX DOCD: CPT | Mod: CPTII,S$GLB,, | Performed by: INTERNAL MEDICINE

## 2020-06-09 PROCEDURE — 96372 THER/PROPH/DIAG INJ SC/IM: CPT | Mod: S$GLB,,, | Performed by: INTERNAL MEDICINE

## 2020-06-09 PROCEDURE — 99999 PR PBB SHADOW E&M-EST. PATIENT-LVL III: ICD-10-PCS | Mod: PBBFAC,,, | Performed by: INTERNAL MEDICINE

## 2020-06-09 PROCEDURE — 3008F PR BODY MASS INDEX (BMI) DOCUMENTED: ICD-10-PCS | Mod: CPTII,S$GLB,, | Performed by: INTERNAL MEDICINE

## 2020-06-09 RX ORDER — CYANOCOBALAMIN 1000 UG/ML
1000 INJECTION, SOLUTION INTRAMUSCULAR; SUBCUTANEOUS
Status: COMPLETED | OUTPATIENT
Start: 2020-06-09 | End: 2020-06-09

## 2020-06-09 RX ORDER — DICYCLOMINE HYDROCHLORIDE 20 MG/1
TABLET ORAL
Qty: 360 TABLET | Refills: 3 | Status: SHIPPED | OUTPATIENT
Start: 2020-06-09 | End: 2021-01-29 | Stop reason: SDUPTHER

## 2020-06-09 RX ORDER — SODIUM, POTASSIUM,MAG SULFATES 17.5-3.13G
1 SOLUTION, RECONSTITUTED, ORAL ORAL DAILY
Qty: 1 KIT | Refills: 0 | Status: SHIPPED | OUTPATIENT
Start: 2020-06-09 | End: 2020-06-11

## 2020-06-09 RX ADMIN — CYANOCOBALAMIN 1000 MCG: 1000 INJECTION, SOLUTION INTRAMUSCULAR; SUBCUTANEOUS at 09:06

## 2020-06-09 NOTE — LETTER
June 12, 2020      Tyler Smith MD  [address] [place] [address]           [facility]  [address], [address]  [place] [address]  Phone: [phone]  Fax: [phone]          Patient: Tristin Cerda   MR Number: [id number]   YOB: 1955   Date of Visit: 6/9/2020       Dear Dr. Tyler Smith:    Thank you for referring Tristin Cerda to me for evaluation. Attached you will find relevant portions of my assessment and plan of care.    If you have questions, please do not hesitate to call me. I look forward to following Tristin Cerda along with you.    Sincerely,    Andrea Shaver MD    Enclosure  CC:  No Recipients    If you would like to receive this communication electronically, please contact [email or web address] or [phone] to request more information on [employer] access.    For providers and/or their staff who would like to refer a patient to [facility], please contact us through our one-stop-shop provider referral line, [facility], at [phone].    If you feel you have received this communication in error or would no longer like to receive these types of communications, please e-mail [email or web address]

## 2020-06-09 NOTE — PATIENT INSTRUCTIONS
He receives his vitamin B12 on a monthly basis.  He is taking his vitamins and minerals.  On the Colestid.  The dicyclomine has helped.  He is scheduled for colonoscopy.  He is having cramping with increasing diarrhea and increased gassiness.  He will make a food diary and avoid the offending foods.  He continues his reflux regimen.

## 2020-06-09 NOTE — PROGRESS NOTES
Subjective:       Patient ID: Tristin Cerda is a 65 y.o. male.    Chief Complaint: Follow-up    He has a history of gastroesophageal reflux.  Additionally as Crohn's disease these is had multiple surgeries.  He has been negative.  He has chronic back and joint pain and is followed in the Pain Clinic.  He avoids the anti-inflammatory agents he states.  He denies hematemesis hematochezia jaundice or bleeding.  He receives the B12 on a monthly basis.  He takes his medications vitamins and minerals.  He has had a cholecystectomy.  He has been on the Colestid which has helped.  He also has use the dicyclomine for occasional abdominal cramping.  He generally has postprandial urge to defecate.  He is able to maintain his weight.  Years ago he was on TPN.  He denies dysphagia aspiration.  He has experienced increase gassiness and he means increased flatulence.  He does not associated with the specific food.  He is on tramadol for the chronic pain syndrome.  He believes that he has helped with the diarrhea.      Allergies:  Review of patient's allergies indicates:   Allergen Reactions    Ciprofloxacin     Codeine Itching    Compazine [prochlorperazine]     Erythromycin     Keflex [cephalexin]        Medications:    Current Outpatient Medications:     acetaminophen (TYLENOL) 500 mg Cap, , Disp: , Rfl:     ASPIRIN (ASPIR-81 ORAL), Take by mouth., Disp: , Rfl:     aspirin-acetaminophen-caffeine 250-250-65 mg (EXCEDRIN MIGRAINE) 250-250-65 mg per tablet, , Disp: , Rfl:     colestipol (COLESTID) 1 gram Tab, Take 1 tablet (1 g total) by mouth 2 (two) times daily., Disp: 180 tablet, Rfl: 3    cyanocobalamin 1,000 mcg/mL injection, Inject 1 mL (1,000 mcg total) into the muscle every 30 days., Disp: 10 mL, Rfl: 11    dicyclomine (BENTYL) 20 mg tablet, TAKE 1 TABLET FOUR TIMES DAILY, Disp: 360 tablet, Rfl: 3    diphenhydramine-acetaminophen 12.5-325 mg Tab, , Disp: , Rfl:     hydrocortisone 2.5 % lotion, AAA face  QHS PRN flare, Disp: 60 mL, Rfl: 2    magnesium oxide (MAG-OX) 400 mg (241.3 mg magnesium) tablet, Take 400 mg by mouth once daily., Disp: , Rfl:     ondansetron (ZOFRAN) 8 MG tablet, TAKE 1 TABLET EVERY 12 HOURS AS NEEDED FOR NAUSEA, Disp: 180 tablet, Rfl: 1    phenylephrine-acetaminophen 5-325 mg Cap, , Disp: , Rfl:     potassium gluconate 595 mg (99 mg) Tab, , Disp: , Rfl:     traMADoL (ULTRAM) 50 mg tablet, Take 1 tablet (50 mg total) by mouth every 8 (eight) hours as needed for Pain., Disp: 90 tablet, Rfl: 0    Current Facility-Administered Medications:     cyanocobalamin injection 1,000 mcg, 1,000 mcg, Intramuscular, Q30 Days, Andrea Shaver MD, 1,000 mcg at 05/11/20 0857    Past Medical History:   Diagnosis Date    Anxiety     Basal cell carcinoma 07/2017    R forehead and R temple    Crohn's disease     age 17    Short bowel syndrome     Vitamin B deficiency     Vitamin D deficiency        Past Surgical History:   Procedure Laterality Date    3 small bowel resections      APPENDECTOMY      CHOLECYSTECTOMY      COLONOSCOPY      COLONOSCOPY N/A 2/14/2017    Procedure: COLONOSCOPY;  Surgeon: Nela Sanchez MD;  Location: [address]);  Service: Endoscopy;  Laterality: N/A;    COLONOSCOPY N/A 3/14/2017    Procedure: COLONOSCOPY;  Surgeon: Nela Sanchez MD;  Location: [address]);  Service: Endoscopy;  Laterality: N/A;  2 days clear liquids before procedure    ESOPHAGOGASTRODUODENOSCOPY N/A 11/19/2019    Procedure: EGD (ESOPHAGOGASTRODUODENOSCOPY);  Surgeon: Andrea Shaver MD;  Location: [facility];  Service: Endoscopy;  Laterality: N/A;    KNEE SURGERY      SMALL INTESTINE SURGERY      TUBE THORACOTOMY      UPPER GASTROINTESTINAL ENDOSCOPY           Review of Systems   Constitutional: Negative for appetite change, fever and unexpected weight change.   HENT: Negative for trouble swallowing.         No jaundice.   Respiratory: Negative for cough, shortness of breath and  wheezing.         He is a former smoker.  He denies dysphagia aspiration or hemoptysis.  He denies significant coughing or sputum production.  He denies dyspnea on exertion.   Cardiovascular: Negative for chest pain.        He denies exertional chest pain or rhythm disturbance.   Gastrointestinal: Positive for abdominal pain, diarrhea and nausea. Negative for abdominal distention, anal bleeding, blood in stool, constipation, rectal pain and vomiting.   Musculoskeletal: Positive for arthralgias and back pain. Negative for neck pain.        He has chronic back and joint pain but denies swollen joints.  He is able to perform his usual activities.   Skin: Positive for rash. Negative for pallor.        He has developed a skin rash of the left forearm.  He states he has had is skin cancer removed from that area.  Additionally he has had other skin cancers.  He scheduled to see the dermatologist.  He denies significant pruritus.   Neurological: Negative for dizziness, seizures, syncope, speech difficulty, weakness and numbness.   Hematological: Negative for adenopathy.   Psychiatric/Behavioral: Negative for confusion.       Objective:      Physical Exam   Constitutional: He is oriented to person, place, and time. He appears well-developed and well-nourished.   Thin afebrile nonicteric white male.  He is normocephalic.  Jaundice is absent.  Pupils are normal.  He is oriented x3.  He can relate his history and answer questions appropriately.   HENT:   Head: Normocephalic.   Eyes: Pupils are equal, round, and reactive to light. EOM are normal.   Neck: Normal range of motion. Neck supple. No tracheal deviation present. No thyromegaly present.   Cardiovascular: Normal rate, regular rhythm and normal heart sounds.   Pulmonary/Chest: Effort normal and breath sounds normal.   Abdominal: Soft. Bowel sounds are normal. He exhibits no distension and no mass. There is tenderness. There is no rebound and no guarding. No hernia.   The   abdomen is soft on plane with surgical scars.  There is mild tenderness in left lower quadrant.  Organomegaly masses are not detected.  Bowel sounds are normal.   Musculoskeletal: Normal range of motion.   He can ambulate normally.  He can go from the sitting to the standing position without difficulty.   Lymphadenopathy:     He has no cervical adenopathy.   Neurological: He is alert and oriented to person, place, and time. No cranial nerve deficit.   Skin: Skin is warm and dry.   Psychiatric: He has a normal mood and affect. His behavior is normal.   Vitals reviewed.        Plan:       There are no diagnoses linked to this encounter.

## 2020-06-09 NOTE — PROGRESS NOTES
Subjective:       Patient ID: Tristin Cerda is a 65 y.o. male.    Chief Complaint: Follow-up    HPI    Allergies:  Review of patient's allergies indicates:   Allergen Reactions    Ciprofloxacin     Codeine Itching    Compazine [prochlorperazine]     Erythromycin     Keflex [cephalexin]        Medications:    Current Outpatient Medications:     acetaminophen (TYLENOL) 500 mg Cap, , Disp: , Rfl:     ASPIRIN (ASPIR-81 ORAL), Take by mouth., Disp: , Rfl:     aspirin-acetaminophen-caffeine 250-250-65 mg (EXCEDRIN MIGRAINE) 250-250-65 mg per tablet, , Disp: , Rfl:     colestipol (COLESTID) 1 gram Tab, Take 1 tablet (1 g total) by mouth 2 (two) times daily., Disp: 180 tablet, Rfl: 3    cyanocobalamin 1,000 mcg/mL injection, Inject 1 mL (1,000 mcg total) into the muscle every 30 days., Disp: 10 mL, Rfl: 11    dicyclomine (BENTYL) 20 mg tablet, TAKE 1 TABLET FOUR TIMES DAILY, Disp: 360 tablet, Rfl: 3    diphenhydramine-acetaminophen 12.5-325 mg Tab, , Disp: , Rfl:     hydrocortisone 2.5 % lotion, AAA face QHS PRN flare, Disp: 60 mL, Rfl: 2    magnesium oxide (MAG-OX) 400 mg (241.3 mg magnesium) tablet, Take 400 mg by mouth once daily., Disp: , Rfl:     ondansetron (ZOFRAN) 8 MG tablet, TAKE 1 TABLET EVERY 12 HOURS AS NEEDED FOR NAUSEA, Disp: 180 tablet, Rfl: 1    phenylephrine-acetaminophen 5-325 mg Cap, , Disp: , Rfl:     potassium gluconate 595 mg (99 mg) Tab, , Disp: , Rfl:     traMADoL (ULTRAM) 50 mg tablet, Take 1 tablet (50 mg total) by mouth every 8 (eight) hours as needed for Pain., Disp: 90 tablet, Rfl: 2    Current Facility-Administered Medications:     cyanocobalamin injection 1,000 mcg, 1,000 mcg, Intramuscular, Q30 Days, Andrea Shaver MD, 1,000 mcg at 05/11/20 0857    Past Medical History:   Diagnosis Date    Anxiety     Basal cell carcinoma 07/2017    R forehead and R temple    Crohn's disease     age 17    Short bowel syndrome     Vitamin B deficiency     Vitamin D deficiency         Past Surgical History:   Procedure Laterality Date    3 small bowel resections      APPENDECTOMY      CHOLECYSTECTOMY      COLONOSCOPY      COLONOSCOPY N/A 2/14/2017    Procedure: COLONOSCOPY;  Surgeon: Nela Sanchez MD;  Location: [address]);  Service: Endoscopy;  Laterality: N/A;    COLONOSCOPY N/A 3/14/2017    Procedure: COLONOSCOPY;  Surgeon: Nela Sanchez MD;  [facility]  Service: Endoscopy;  Laterality: N/A;  2 days clear liquids before procedure    ESOPHAGOGASTRODUODENOSCOPY N/A 11/19/2019    Procedure: EGD (ESOPHAGOGASTRODUODENOSCOPY);  Surgeon: Andrea Shaver MD;  Location: [facility];  Service: Endoscopy;  Laterality: N/A;    KNEE SURGERY      SMALL INTESTINE SURGERY      TUBE THORACOTOMY      UPPER GASTROINTESTINAL ENDOSCOPY           Review of Systems    Objective:      Physical Exam      Plan:       B12 deficiency  -     cyanocobalamin injection 1,000 mcg    Hiatal hernia    History of Crohn's disease    Chronic diarrhea    Short bowel syndrome    History of cholecystectomy    Chronic abdominal pain  -     dicyclomine (BENTYL) 20 mg tablet; TAKE 1 TABLET FOUR TIMES DAILY  Dispense: 360 tablet; Refill: 3    Chronic viral hepatitis B without delta agent and without coma  -     HEPATITIS B VIRAL DNA, QUANTITATIVE; Future; Expected date: 06/09/2020  -     CBC auto differential; Future; Expected date: 06/09/2020  -     Comprehensive metabolic panel; Future; Expected date: 06/09/2020  -     C-reactive protein; Future; Expected date: 06/09/2020    Preop testing  -     COVID-19 Routine Screening; Future; Expected date: 06/12/2020  -     sodium,potassium,mag sulfates (SUPREP BOWEL PREP KIT) 17.5-3.13-1.6 gram SolR; Take 177 mLs by mouth once daily. for 2 days  Dispense: 1 kit; Refill: 0     He will continue his vitamins and minerals.  He is given the B12.  He will continue his nutritious diet.  He scheduled for colonoscopy.  He has good health knowledge.  He  understands the procedures.  Specifically he realizes there is an extremely small incidence of bleeding perforation or aspiration.    Pt requested print out of labs. He stated there is a place in [place] he has his blood work done that is much cheaper than [facility]. Lab orders printed and given to pt with provider card with fax number for results.

## 2020-06-10 ENCOUNTER — PATIENT OUTREACH (OUTPATIENT)
Dept: ADMINISTRATIVE | Facility: OTHER | Age: 65
End: 2020-06-10

## 2020-06-11 ENCOUNTER — OFFICE VISIT (OUTPATIENT)
Dept: PAIN MEDICINE | Facility: CLINIC | Age: 65
End: 2020-06-11
Payer: MEDICARE

## 2020-06-11 VITALS
DIASTOLIC BLOOD PRESSURE: 80 MMHG | SYSTOLIC BLOOD PRESSURE: 123 MMHG | WEIGHT: 154 LBS | HEIGHT: 73 IN | BODY MASS INDEX: 20.41 KG/M2 | HEART RATE: 62 BPM

## 2020-06-11 DIAGNOSIS — K50.90 CROHN'S DISEASE WITHOUT COMPLICATION, UNSPECIFIED GASTROINTESTINAL TRACT LOCATION: ICD-10-CM

## 2020-06-11 DIAGNOSIS — G89.29 CHRONIC ABDOMINAL PAIN: Primary | ICD-10-CM

## 2020-06-11 DIAGNOSIS — R10.9 CHRONIC ABDOMINAL PAIN: Primary | ICD-10-CM

## 2020-06-11 DIAGNOSIS — G89.4 CHRONIC PAIN DISORDER: ICD-10-CM

## 2020-06-11 PROCEDURE — 99213 OFFICE O/P EST LOW 20 MIN: CPT | Mod: S$GLB,,, | Performed by: ANESTHESIOLOGY

## 2020-06-11 PROCEDURE — 99999 PR PBB SHADOW E&M-EST. PATIENT-LVL IV: ICD-10-PCS | Mod: PBBFAC,,, | Performed by: ANESTHESIOLOGY

## 2020-06-11 PROCEDURE — 3008F BODY MASS INDEX DOCD: CPT | Mod: CPTII,S$GLB,, | Performed by: ANESTHESIOLOGY

## 2020-06-11 PROCEDURE — 99213 PR OFFICE/OUTPT VISIT, EST, LEVL III, 20-29 MIN: ICD-10-PCS | Mod: S$GLB,,, | Performed by: ANESTHESIOLOGY

## 2020-06-11 PROCEDURE — 99999 PR PBB SHADOW E&M-EST. PATIENT-LVL IV: CPT | Mod: PBBFAC,,, | Performed by: ANESTHESIOLOGY

## 2020-06-11 PROCEDURE — 3008F PR BODY MASS INDEX (BMI) DOCUMENTED: ICD-10-PCS | Mod: CPTII,S$GLB,, | Performed by: ANESTHESIOLOGY

## 2020-06-11 RX ORDER — TRAMADOL HYDROCHLORIDE 50 MG/1
50 TABLET ORAL EVERY 8 HOURS PRN
Qty: 90 TABLET | Refills: 2 | Status: SHIPPED | OUTPATIENT
Start: 2020-06-11 | End: 2020-09-10

## 2020-06-11 NOTE — PROGRESS NOTES
PCP: Tyler Smith MD      CC: abdominal pain    Interval history: Mr. Cerda is a 65 y.o. male with an extensive history of crohn's disease who returns to our clinic. We discontinued Demerol 50mg q 8 hrs and started Tramadol 50 mg q 8 h. He reports this is beneficial.  No side effects reported.   Abdominal pain remains stable.   He does report diarrhea at times but no blood stools. Reports anal pain and itching 2/2 chronic diarrhea.  He is currently being evaluated by gastroenterology.  OTC Lidocaine 2 % provides some minimal relief.  Compounding cream was too expensive. Continues to c/o bilateral knee pain. He has had multiple surgeries in the past.  Currently not interested in genicular nerve blocks at the moment.  He rates his pain 4/10.     Prior HPI:   Mr. Cerda is a 60 year old male with PMH of crohn's disease referred by Dr. Hansen.  Patient states being diagnosed with crohn's disease since the age of 14.    He has had multiple GI surgeries and large bowel and small bowel removals.  During that time, he was being managed by providers in [place].  He was TPN dependent for many years until about two years ago.  He is now stable on an oral diet.  He did not have a primary care physician nor a gastroenterologist for many years.  He is currently trying to establish care.  He has future appointment with Dr. Ch.  He states taking Demerol 50mg q8hrs as needed for pain. It has provided relief of his nausea and pain with diarrhea.  He was last prescribed Demerol in July 2014.  Since then, he has been bearing with the pain.  It is a sharp shooting pain in his mid abdomen.      ROS:  CONSTITUTIONAL: No fevers, chills, night sweats, wt. loss, appetite changes  SKIN: no rashes or itching  ENT: No headaches, head trauma, vision changes, or eye pain  LYMPH NODES: None noticed   CV: No chest pain, palpitations.   RESP: No shortness of breath, dyspnea on exertion, cough, wheezing, or hemoptysis  GI: No  nausea, emesis, diarrhea, constipation, melena, hematochezia, pain.    : No dysuria, hematuria, urgency, or frequency   HEME: No easy bruising, bleeding problems  PSYCHIATRIC: No depression, anxiety, psychosis, hallucinations.  NEURO: No seizures, memory loss, dizziness or difficulty sleeping  MSK: no joint pain      Past Medical History:   Diagnosis Date    Anxiety     Basal cell carcinoma 07/2017    R forehead and R temple    Crohn's disease     age 17    Short bowel syndrome     Vitamin B deficiency     Vitamin D deficiency      Past Surgical History:   Procedure Laterality Date    3 small bowel resections      APPENDECTOMY      CHOLECYSTECTOMY      COLONOSCOPY      COLONOSCOPY N/A 2/14/2017    Procedure: COLONOSCOPY;  Surgeon: Nela Sanchez MD;  Location: [address]);  Service: Endoscopy;  Laterality: N/A;    COLONOSCOPY N/A 3/14/2017    Procedure: COLONOSCOPY;  Surgeon: Nela Sanchez MD;  Location: [address]);  Service: Endoscopy;  Laterality: N/A;  2 days clear liquids before procedure    ESOPHAGOGASTRODUODENOSCOPY N/A 11/19/2019    Procedure: EGD (ESOPHAGOGASTRODUODENOSCOPY);  Surgeon: Andrea Shaver MD;  Location: [facility];  Service: Endoscopy;  Laterality: N/A;    KNEE SURGERY      SMALL INTESTINE SURGERY      TUBE THORACOTOMY      UPPER GASTROINTESTINAL ENDOSCOPY       Family History   Problem Relation Age of Onset    Diabetes Mother     Stroke Mother     Diabetes Father     Kidney disease Father     Irritable bowel syndrome Cousin     Celiac disease Neg Hx     Cirrhosis Neg Hx     Colon cancer Neg Hx     Colon polyps Neg Hx     Cystic fibrosis Neg Hx     Esophageal cancer Neg Hx     Crohn's disease Neg Hx     Hemochromatosis Neg Hx     Inflammatory bowel disease Neg Hx     Liver cancer Neg Hx     Liver disease Neg Hx     Rectal cancer Neg Hx     Stomach cancer Neg Hx     Ulcerative colitis Neg Hx     Addison's disease Neg Hx     Melanoma Neg  "Hx     Psoriasis Neg Hx     Lupus Neg Hx     Eczema Neg Hx      Social History     Socioeconomic History    Marital status:      Spouse name: Not on file    Number of children: Not on file    Years of education: Not on file    Highest education level: Not on file   Occupational History    Not on file   Social Needs    Financial resource strain: Not on file    Food insecurity:     Worry: Not on file     Inability: Not on file    Transportation needs:     Medical: Not on file     Non-medical: Not on file   Tobacco Use    Smoking status: Former Smoker     Packs/day: 1.00     Years: 15.00     Pack years: 15.00     Types: Cigarettes     Last attempt to quit: 3/10/1995     Years since quittin.2    Smokeless tobacco: Never Used   Substance and Sexual Activity    Alcohol use: Yes     Alcohol/week: 2.0 standard drinks     Types: 1 Cans of beer, 1 Shots of liquor per week     Comment: 1 every 6 -7 months    Drug use: No    Sexual activity: Yes   Lifestyle    Physical activity:     Days per week: Not on file     Minutes per session: Not on file    Stress: Not on file   Relationships    Social connections:     Talks on phone: Not on file     Gets together: Not on file     Attends Church service: Not on file     Active member of club or organization: Not on file     Attends meetings of clubs or organizations: Not on file     Relationship status: Not on file   Other Topics Concern    Not on file   Social History Narrative    Retired      Medications/Allergies: See med card    Vitals:    20 1109   BP: 123/80   Pulse: 62   Weight: 69.9 kg (154 lb)   Height: 6' 1" (1.854 m)   PainSc: 0-No pain   PainLoc: Abdomen     Physical exam:    GENERAL: A and O x3, the patient appears well groomed and is in no acute distress.  Skin: No rashes or obvious lesions  HEENT: normocephalic, atraumatic  CARDIOVASCULAR:  RRR  LUNGS: non labored breathing  ABDOMEN: soft, nontender. No rebound   UPPER EXTREMITIES: " Normal alignment, normal range of motion, no atrophy, no skin changes,  hair growth and nail growth normal and equal bilaterally. No swelling, no tenderness.    LOWER EXTREMITIES:  Normal alignment, normal range of motion, no atrophy, no skin changes,  hair growth and nail growth normal and equal bilaterally. No swelling, no tenderness.    LUMBAR SPINE  Lumbar spine: ROM is full with flexion extension and oblique extension with no increased pain.    Lam's test causes no increased pain on either side.    Supine straight leg raise is negative bilaterally.    Internal and external rotation of the hip causes no increased pain on either side.  Myofascial exam: No tenderness to palpation across lumbar paraspinous muscles.    MENTAL STATUS: normal orientation, speech, language, and fund of knowledge for social situation.  Emotional state appropriate.    CRANIAL NERVES:  II:  PERRL bilaterally,   III,IV,VI: EOMI.    V:  Facial sensation equal bilaterally  VII:  Facial motor function normal.  VIII:  Hearing equal to finger rub bilaterally  IX/X: Gag normal, palate symmetric  XI:  Shoulder shrug equal, head turn equal  XII:  Tongue midline without fasciculations    MOTOR: Tone and bulk: normal bilateral upper and lower Strength: normal   SENSATION: Light touch and pinprick intact bilaterally  REFLEXES: normal, symmetric, nonbrisk.  Toes down, no clonus. No hoffmans.  GAIT: normal rise, base, steps, and arm swing.      Imaging:  None    Assessment:  Mr. Cerda is a 65 y.o. male with abdominal pain  1. Chronic abdominal pain    2. Chronic pain disorder    3. Crohn's disease without complication, unspecified gastrointestinal tract location       Plan:  1. Patient with long history of crohn's disease and chronic abdominal pain.  Continue care with GI.  2. Tramadol 50 mg q 8 h prn  as needed for pain.  reviewed.  No signs of aberrant behavior.  UDS last visit consistent with use.  Script provided for 3 months  3. Continue  OTC lidocaine cream  4. May benefit from bilateral knee genicular nerve blocks.   5. F/u 3 months or sooner

## 2020-06-12 PROBLEM — Z01.818 PREOP TESTING: Status: ACTIVE | Noted: 2020-06-12

## 2020-06-15 ENCOUNTER — TELEPHONE (OUTPATIENT)
Dept: DERMATOLOGY | Facility: CLINIC | Age: 65
End: 2020-06-15

## 2020-06-15 LAB
ALBUMIN SERPL-MCNC: 4.2 G/DL (ref 3.6–5.1)
ALBUMIN/GLOB SERPL: 2.3 (CALC) (ref 1–2.5)
ALP SERPL-CCNC: 119 U/L (ref 35–144)
ALT SERPL-CCNC: 33 U/L (ref 9–46)
AST SERPL-CCNC: 26 U/L (ref 10–35)
BASOPHILS # BLD AUTO: 32 CELLS/UL (ref 0–200)
BASOPHILS NFR BLD AUTO: 0.8 %
BILIRUB SERPL-MCNC: 0.5 MG/DL (ref 0.2–1.2)
BUN SERPL-MCNC: 17 MG/DL (ref 7–25)
BUN/CREAT SERPL: ABNORMAL (CALC) (ref 6–22)
CALCIUM SERPL-MCNC: 8.9 MG/DL (ref 8.6–10.3)
CHLORIDE SERPL-SCNC: 108 MMOL/L (ref 98–110)
CO2 SERPL-SCNC: 27 MMOL/L (ref 20–32)
CREAT SERPL-MCNC: 0.73 MG/DL (ref 0.7–1.25)
CRP SERPL-MCNC: 0.3 MG/L
EOSINOPHIL # BLD AUTO: 92 CELLS/UL (ref 15–500)
EOSINOPHIL NFR BLD AUTO: 2.3 %
ERYTHROCYTE [DISTWIDTH] IN BLOOD BY AUTOMATED COUNT: 13.2 % (ref 11–15)
GFRSERPLBLD MDRD-ARVRAT: 99 ML/MIN/1.73M2
GLOBULIN SER CALC-MCNC: 1.8 G/DL (CALC) (ref 1.9–3.7)
GLUCOSE SERPL-MCNC: 86 MG/DL (ref 65–139)
HBV DNA SERPL NAA+PROBE-ACNC: NORMAL IU/ML
HBV DNA SERPL NAA+PROBE-LOG IU: NORMAL LOG IU/ML
HCT VFR BLD AUTO: 40.5 % (ref 38.5–50)
HGB BLD-MCNC: 13.2 G/DL (ref 13.2–17.1)
LYMPHOCYTES # BLD AUTO: 880 CELLS/UL (ref 850–3900)
LYMPHOCYTES NFR BLD AUTO: 22 %
MCH RBC QN AUTO: 29.3 PG (ref 27–33)
MCHC RBC AUTO-ENTMCNC: 32.6 G/DL (ref 32–36)
MCV RBC AUTO: 89.8 FL (ref 80–100)
MONOCYTES # BLD AUTO: 356 CELLS/UL (ref 200–950)
MONOCYTES NFR BLD AUTO: 8.9 %
NEUTROPHILS # BLD AUTO: 2640 CELLS/UL (ref 1500–7800)
NEUTROPHILS NFR BLD AUTO: 66 %
PLATELET # BLD AUTO: 142 THOUSAND/UL (ref 140–400)
PMV BLD REES-ECKER: 11.1 FL (ref 7.5–12.5)
POTASSIUM SERPL-SCNC: 3.8 MMOL/L (ref 3.5–5.3)
PROT SERPL-MCNC: 6 G/DL (ref 6.1–8.1)
RBC # BLD AUTO: 4.51 MILLION/UL (ref 4.2–5.8)
SODIUM SERPL-SCNC: 143 MMOL/L (ref 135–146)
WBC # BLD AUTO: 4 THOUSAND/UL (ref 3.8–10.8)

## 2020-06-15 NOTE — TELEPHONE ENCOUNTER
----- Message from Emilie Cho sent at 6/15/2020  9:33 AM CDT -----  Type:  Sooner Apoointment Request    Caller is requesting a sooner appointment.  Caller declined first available appointment listed below.  Caller will not accept being placed on the waitlist and is requesting a message be sent to doctor.    Name of Caller:  Patient  When is the first available appointment?  Scheduled for 7/16 states it is urgent now  Symptoms:  Lump on arm  Best Call Back Number:    Additional Information:  Please advise-thank you

## 2020-06-16 ENCOUNTER — PATIENT OUTREACH (OUTPATIENT)
Dept: ADMINISTRATIVE | Facility: OTHER | Age: 65
End: 2020-06-16

## 2020-06-17 ENCOUNTER — OFFICE VISIT (OUTPATIENT)
Dept: DERMATOLOGY | Facility: CLINIC | Age: 65
End: 2020-06-17
Payer: MEDICARE

## 2020-06-17 DIAGNOSIS — D48.5 NEOPLASM OF UNCERTAIN BEHAVIOR OF SKIN: Primary | ICD-10-CM

## 2020-06-17 DIAGNOSIS — Z85.828 PERSONAL HISTORY OF OTHER MALIGNANT NEOPLASM OF SKIN: ICD-10-CM

## 2020-06-17 PROCEDURE — 11102 TANGNTL BX SKIN SINGLE LES: CPT | Mod: S$GLB,,, | Performed by: DERMATOLOGY

## 2020-06-17 PROCEDURE — 88305 TISSUE EXAM BY PATHOLOGIST: ICD-10-PCS | Mod: 26,,, | Performed by: PATHOLOGY

## 2020-06-17 PROCEDURE — 99999 PR PBB SHADOW E&M-EST. PATIENT-LVL III: CPT | Mod: PBBFAC,,, | Performed by: DERMATOLOGY

## 2020-06-17 PROCEDURE — 11103 TANGNTL BX SKIN EA SEP/ADDL: CPT | Mod: S$GLB,,, | Performed by: DERMATOLOGY

## 2020-06-17 PROCEDURE — 99499 UNLISTED E&M SERVICE: CPT | Mod: S$GLB,,, | Performed by: DERMATOLOGY

## 2020-06-17 PROCEDURE — 11102 PR TANGENTIAL BIOPSY, SKIN, SINGLE LESION: ICD-10-PCS | Mod: S$GLB,,, | Performed by: DERMATOLOGY

## 2020-06-17 PROCEDURE — 11103 PR TANGENTIAL BIOPSY, SKIN, EA ADDTL LESION: ICD-10-PCS | Mod: S$GLB,,, | Performed by: DERMATOLOGY

## 2020-06-17 PROCEDURE — 99499 NO LOS: ICD-10-PCS | Mod: S$GLB,,, | Performed by: DERMATOLOGY

## 2020-06-17 PROCEDURE — 88305 TISSUE EXAM BY PATHOLOGIST: CPT | Mod: 59 | Performed by: PATHOLOGY

## 2020-06-17 PROCEDURE — 88305 TISSUE EXAM BY PATHOLOGIST: CPT | Mod: 26,,, | Performed by: PATHOLOGY

## 2020-06-17 PROCEDURE — 99999 PR PBB SHADOW E&M-EST. PATIENT-LVL III: ICD-10-PCS | Mod: PBBFAC,,, | Performed by: DERMATOLOGY

## 2020-06-17 NOTE — PROGRESS NOTES
"  Subjective:       Patient ID:  Tristin Cerda is a 65 y.o. male who presents for   Chief Complaint   Patient presents with    Mass     LOV: 01- by Dr. Fernandez     65 y.o. male who presents for a spot on his L arm. Started off as a small bump, about 2 weeks ago it started to grow and within the last week has started "oozing" and is very sore to touch.   Spot on his L temple and cheek that he would like checked.   Spot on neck that itches.     Derm HX:  BCC - R upper back, R temple, R forehead x 2, R nasal tip, L infra-auricular neck, L postauricular Neck, L shoulder  SCC in Situ  - R forearm       Review of Systems   Constitutional: Negative for fever and chills.   Respiratory: Negative for cough and shortness of breath.    Skin: Negative for itching, rash, dry skin and sun sensitivity.   Hematologic/Lymphatic: Bruises/bleeds easily.      Past Medical History:   Diagnosis Date    Anxiety     Basal cell carcinoma 07/2017    R forehead and R temple    Crohn's disease     age 17    Short bowel syndrome     Vitamin B deficiency     Vitamin D deficiency        Objective:    Physical Exam   Constitutional: He appears well-developed and well-nourished. No distress.   Neurological: He is alert and oriented to person, place, and time. He is not disoriented.   Psychiatric: He has a normal mood and affect.   Skin:   Areas Examined (abnormalities noted in diagram):   Neck Inspection Performed  RUE Inspected  LUE Inspection Performed                       Diagram Legend     Erythematous scaling macule/papule c/w actinic keratosis       Vascular papule c/w angioma      Pigmented verrucoid papule/plaque c/w seborrheic keratosis      Yellow umbilicated papule c/w sebaceous hyperplasia      Irregularly shaped tan macule c/w lentigo     1-2 mm smooth white papules consistent with Milia      Movable subcutaneous cyst with punctum c/w epidermal inclusion cyst      Subcutaneous movable cyst c/w pilar cyst      Firm " pink to brown papule c/w dermatofibroma      Pedunculated fleshy papule(s) c/w skin tag(s)      Evenly pigmented macule c/w junctional nevus     Mildly variegated pigmented, slightly irregular-bordered macule c/w mildly atypical nevus      Flesh colored to evenly pigmented papule c/w intradermal nevus       Pink pearly papule/plaque c/w basal cell carcinoma      Erythematous hyperkeratotic cursted plaque c/w SCC      Surgical scar with no sign of skin cancer recurrence      Open and closed comedones      Inflammatory papules and pustules      Verrucoid papule consistent consistent with wart     Erythematous eczematous patches and plaques     Dystrophic onycholytic nail with subungual debris c/w onychomycosis     Umbilicated papule    Erythematous-base heme-crusted tan verrucoid plaque consistent with inflamed seborrheic keratosis     Erythematous Silvery Scaling Plaque c/w Psoriasis     See annotation                  Assessment / Plan:      Pathology Orders:     Normal Orders This Visit    Specimen to Pathology, Dermatology     Comments:    Number of Specimens:->3  ------------------------->-------------------------  Spec 1 Procedure:->Biopsy  Spec 1 Clinical Impression:->scc vs other  Spec 1 Source:->left forearm  ------------------------->-------------------------  Spec 2 Procedure:->Biopsy  Spec 2 Clinical Impression:->bcc vs scc vs other  Spec 2 Source:->left cheek  ------------------------->-------------------------  Spec 3 Procedure:->Biopsy  Spec 3 Clinical Impression:->scc vs bcc vs other  Spec 3 Source:->right neck    Questions:    Procedure Type: Dermatology and skin neoplasms    Number of Specimens: 3    ------------------------: -------------------------    Spec 1 Procedure: Biopsy    Spec 1 Clinical Impression: scc vs other    Spec 1 Source: left forearm    ------------------------: -------------------------    Spec 2 Procedure: Biopsy    Spec 2 Clinical Impression: bcc vs scc vs other    Spec 2  Source: left cheek    ------------------------: -------------------------    Spec 3 Procedure: Biopsy    Spec 3 Clinical Impression: scc vs bcc vs other    Spec 3 Source: right neck    Clinical Information: 1/3 painful friable pink plaque 2/3 pink papule 3/3 crusted erosion        Neoplasm of uncertain behavior of skin  -     Specimen to Pathology, Dermatology    Shave biopsy procedure note:    Shave biopsy performed after verbal consent including risk of infection, scar, recurrence, need for additional treatment of site. Area prepped with alcohol, anesthetized with approximately 1.0cc of 1% lidocaine with epinephrine. Lesional tissue shaved with razor blade. Hemostasis achieved with application of aluminum chloride followed by hyfrecation. No complications. Dressing applied. Wound care explained.    Will need mohs Dr. Alan if malignant    Personal history of other malignant neoplasm of skin  Needs UBSE, will schedule within next 3 months           Follow up in about 3 months (around 9/17/2020).

## 2020-06-22 LAB
FINAL PATHOLOGIC DIAGNOSIS: NORMAL
GROSS: NORMAL

## 2020-06-23 ENCOUNTER — TELEPHONE (OUTPATIENT)
Dept: DERMATOLOGY | Facility: CLINIC | Age: 65
End: 2020-06-23

## 2020-06-23 DIAGNOSIS — C44.319 BASAL CELL CARCINOMA (BCC) OF LEFT CHEEK: Primary | ICD-10-CM

## 2020-06-23 RX ORDER — FLUOROURACIL 50 MG/G
CREAM TOPICAL 2 TIMES DAILY
Qty: 40 G | Refills: 1 | Status: SHIPPED | OUTPATIENT
Start: 2020-06-23 | End: 2020-12-24 | Stop reason: CLARIF

## 2020-06-23 NOTE — TELEPHONE ENCOUNTER
Called patient with path report and he is scheduled for 7-9-20. Told patient no surgery will be done thhis day.

## 2020-06-23 NOTE — TELEPHONE ENCOUNTER
----- Message from Celena Martínez MD sent at 6/22/2020  5:37 PM CDT -----  Please call pt with results and schedule consultation with Dr. Alan for Mohs surgery on [place] for right neck and left arm malignancies. The left cheek lesion was a superficial cancer. Can treat with efudex bid x 4 weeks. Does he have any at home? Thank you.         Diagnosis 1.  Skin, left arm, shave biopsy:   - INVASIVE SQUAMOUS CELL CARCINOMA.   - THE TUMOR EXTENDS TO THE DEEP BIOPSY MARGIN.   MICROSCOPIC DESCRIPTION: Sections show a proliferation of squamous cells   exhibiting atypia and infiltrating within the dermis.   2.  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   - THE TUMOR EXTENDS TO A LATERAL BIOPSY MARGIN.   MICROSCOPIC DESCRIPTION: Sections show epidermis with full-thickness atypia,   parakeratosis and variable epidermal maturation. Dermal involvement is not   seen.   3.  Skin, right neck, shave biopsy:   - BASAL CELL CARCINOMA.   - THE TUMOR EXTENDS TO THE DEEP BIOPSY MARGIN.   MICROSCOPIC DESCRIPTION: Sections show a basaloid tumor within the dermis   exhibiting peripheral palisading, retraction artifact and apoptosis.   Comment: Interpreted by: Jaqueline Whitlock M.D., Signed on 06/22/2020 at 16:39

## 2020-06-23 NOTE — TELEPHONE ENCOUNTER
Spoke w/ pt. Informed pt about results and recommendations per provider. pt verbalized understanding.    Pended order for efudex to [facility] for cheaper cost.

## 2020-06-23 NOTE — TELEPHONE ENCOUNTER
----- Message from Celena Martínez MD sent at 6/22/2020  5:37 PM CDT -----  Please call pt with results and schedule consultation with Dr. Alan for Mohs surgery on [place] for right neck and left arm malignancies. The left cheek lesion was a superficial cancer. Can treat with efudex bid x 4 weeks. Does he have any at home? Thank you.         Diagnosis 1.  Skin, left arm, shave biopsy:   - INVASIVE SQUAMOUS CELL CARCINOMA.   - THE TUMOR EXTENDS TO THE DEEP BIOPSY MARGIN.   MICROSCOPIC DESCRIPTION: Sections show a proliferation of squamous cells   exhibiting atypia and infiltrating within the dermis.   2.  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   - THE TUMOR EXTENDS TO A LATERAL BIOPSY MARGIN.   MICROSCOPIC DESCRIPTION: Sections show epidermis with full-thickness atypia,   parakeratosis and variable epidermal maturation. Dermal involvement is not   seen.   3.  Skin, right neck, shave biopsy:   - BASAL CELL CARCINOMA.   - THE TUMOR EXTENDS TO THE DEEP BIOPSY MARGIN.   MICROSCOPIC DESCRIPTION: Sections show a basaloid tumor within the dermis   exhibiting peripheral palisading, retraction artifact and apoptosis.   Comment: Interpreted by: Jaqueline Whitlock M.D., Signed on 06/22/2020 at 16:39

## 2020-07-07 ENCOUNTER — CLINICAL SUPPORT (OUTPATIENT)
Dept: GASTROENTEROLOGY | Facility: CLINIC | Age: 65
End: 2020-07-07
Payer: MEDICARE

## 2020-07-07 DIAGNOSIS — E53.8 B12 DEFICIENCY: Primary | ICD-10-CM

## 2020-07-07 PROCEDURE — 96372 PR INJECTION,THERAP/PROPH/DIAG2ST, IM OR SUBCUT: ICD-10-PCS | Mod: S$GLB,,, | Performed by: INTERNAL MEDICINE

## 2020-07-07 PROCEDURE — 96372 THER/PROPH/DIAG INJ SC/IM: CPT | Mod: S$GLB,,, | Performed by: INTERNAL MEDICINE

## 2020-07-07 RX ORDER — CYANOCOBALAMIN 1000 UG/ML
1000 INJECTION, SOLUTION INTRAMUSCULAR; SUBCUTANEOUS
Status: COMPLETED | OUTPATIENT
Start: 2020-07-07 | End: 2020-07-07

## 2020-07-07 RX ADMIN — CYANOCOBALAMIN 1000 MCG: 1000 INJECTION, SOLUTION INTRAMUSCULAR; SUBCUTANEOUS at 08:07

## 2020-07-08 ENCOUNTER — PATIENT OUTREACH (OUTPATIENT)
Dept: ADMINISTRATIVE | Facility: OTHER | Age: 65
End: 2020-07-08

## 2020-07-08 NOTE — PROGRESS NOTES
Requested updates within Care Everywhere.  Patient's chart was reviewed for overdue YINKA topics.  Immunizations reconciled.

## 2020-07-09 ENCOUNTER — INITIAL CONSULT (OUTPATIENT)
Dept: DERMATOLOGY | Facility: CLINIC | Age: 65
End: 2020-07-09
Payer: MEDICARE

## 2020-07-09 VITALS
HEART RATE: 58 BPM | WEIGHT: 155 LBS | DIASTOLIC BLOOD PRESSURE: 77 MMHG | HEIGHT: 72 IN | SYSTOLIC BLOOD PRESSURE: 132 MMHG | BODY MASS INDEX: 20.99 KG/M2

## 2020-07-09 DIAGNOSIS — C44.41 BASAL CELL CARCINOMA OF NECK: Primary | ICD-10-CM

## 2020-07-09 DIAGNOSIS — C44.629 SQUAMOUS CELL CANCER OF SKIN OF FOREARM, LEFT: ICD-10-CM

## 2020-07-09 PROCEDURE — 3008F BODY MASS INDEX DOCD: CPT | Mod: CPTII,S$GLB,, | Performed by: DERMATOLOGY

## 2020-07-09 PROCEDURE — 99214 PR OFFICE/OUTPT VISIT, EST, LEVL IV, 30-39 MIN: ICD-10-PCS | Mod: S$GLB,,, | Performed by: DERMATOLOGY

## 2020-07-09 PROCEDURE — 3008F PR BODY MASS INDEX (BMI) DOCUMENTED: ICD-10-PCS | Mod: CPTII,S$GLB,, | Performed by: DERMATOLOGY

## 2020-07-09 PROCEDURE — 99214 OFFICE O/P EST MOD 30 MIN: CPT | Mod: S$GLB,,, | Performed by: DERMATOLOGY

## 2020-07-09 PROCEDURE — 99999 PR PBB SHADOW E&M-EST. PATIENT-LVL IV: ICD-10-PCS | Mod: PBBFAC,,, | Performed by: DERMATOLOGY

## 2020-07-09 PROCEDURE — 99999 PR PBB SHADOW E&M-EST. PATIENT-LVL IV: CPT | Mod: PBBFAC,,, | Performed by: DERMATOLOGY

## 2020-07-09 NOTE — LETTER
July 10, 2020      Celena Martínez MD  [address] [place] [address] Dr. Lee  [facility] [address]           [facility] [address] [place] [address]  Phone: [phone]          Patient: Tristin Cerda   MR Number: [id number]   YOB: 1955   Date of Visit: 7/9/2020       Dear Dr. Celena Martínez:    Thank you for referring Tristin Cerda to me for evaluation. Attached you will find relevant portions of my assessment and plan of care.    If you have questions, please do not hesitate to call me. I look forward to following Tristin Cerda along with you.    Sincerely,    Ketan Alan MD    Enclosure  CC:  No Recipients    If you would like to receive this communication electronically, please contact [email or web address] or [phone] to request more information on [employer] access.    For providers and/or their staff who would like to refer a patient to [facility], please contact us through our one-stop-shop provider referral line, [facility], at [phone].    If you feel you have received this communication in error or would no longer like to receive these types of communications, please e-mail [email or web address]

## 2020-07-09 NOTE — PROGRESS NOTES
ALLERGIES:  Ciprofloxacin, Codeine, Compazine [prochlorperazine], Erythromycin, and Keflex [cephalexin]    CHIEF COMPLAINT:  This 65 y.o. male comes for evaluation for Mohs' Micrographic Surgery, Fresh Tissue Technique, for treatment of a biopsy-proven invasive squamous cell carcinoma on the left arm and of a biopsy-proven basal cell carcinoma on the right neck. Consultation requested by Celena Martínez M.D..    HISTORY OF PRESENT ILLNESS:   Location(s): left arm and right neck  Duration: 3-4 months right neck and 1-2 months left arm  Quality: persistent   Context: status post biopsies by Celena Martínez M.D.; path = invasive squamous cell carcinoma on the left arm and basal cell carcinoma on the right neck; pathology accession #[id number]  Pathology [facility]   Prior Treatment: none  See also the handwritten notes/diagrams scanned to chart for additional details.    Defibrillator: No  Pacemaker: No  Artificial heart valves: No  Artificial joints: No    REVIEW OF SYSTEMS:   General: general health good  Skin: previous skin cancer(s) Yes   If yes, details: face and arm  Relevant other:  No   Cardiovascular:   High Blood Pressure: No   Chest Pain:  No   Defibrillator: as above  Pacemaker: as above  Artificial heart valves: as above  Prior Endocarditis: No   Prior Heart Attack/MI: No     If yes, when:   Prior Cardiac Bypass or Stents:  No   If yes, when:   Mitral Valve Prolapse: No   Relevant other:  No   Respiratory:   Shortness of breath:  No   Relevant other:  No   Endocrine:   Diabetes:  No   Relevant other:  No   Hem/Lymph:   Taking Prescribed Blood Thinners:  No   Easy Bleeding:  No   Relevant other:  No   Allergy/Immuno: as noted above  Relevant other:  No   GI:   Prior Hepatitis:  No     If yes, details:   Relevant other:  Yes; see below  Musculoskeletal:   Artificial joints: as above  Relevant other:  No   Neurologic:   Prior Stroke:  No     If yes, details:   Relevant other:  No   Relevant other info:  Yes   crohns disease,small bowel resection x 3,tube thoracotomy,vit B and D deficiency     PAST MEDICAL HISTORY:  Past Medical History:   Diagnosis Date    Anxiety     Basal cell carcinoma 2017    R forehead and R temple    Crohn's disease     age 17    Short bowel syndrome     Vitamin B deficiency     Vitamin D deficiency        PAST SURGICAL HISTORY:  Past Surgical History:   Procedure Laterality Date    3 small bowel resections      APPENDECTOMY      CHOLECYSTECTOMY      COLONOSCOPY      COLONOSCOPY N/A 2017    Procedure: COLONOSCOPY;  Surgeon: Nela Sanchez MD;  Location: [address]);  Service: Endoscopy;  Laterality: N/A;    COLONOSCOPY N/A 3/14/2017    Procedure: COLONOSCOPY;  Surgeon: Nela Sanchez MD;  Location: [facility] ([address] [place] [address]);  Service: Endoscopy;  Laterality: N/A;  2 days clear liquids before procedure    ESOPHAGOGASTRODUODENOSCOPY N/A 2019    Procedure: EGD (ESOPHAGOGASTRODUODENOSCOPY);  Surgeon: Andrea Shaver MD;  Location: [facility];  Service: Endoscopy;  Laterality: N/A;    KNEE SURGERY      SMALL INTESTINE SURGERY      TUBE THORACOTOMY      UPPER GASTROINTESTINAL ENDOSCOPY          SOCIAL HISTORY:  Dependencies: smoking status as noted below  Social History     Tobacco Use    Smoking status: Former Smoker     Packs/day: 1.00     Years: 15.00     Pack years: 15.00     Types: Cigarettes     Quit date: 3/10/1995     Years since quittin.3    Smokeless tobacco: Never Used   Substance Use Topics    Alcohol use: Yes     Alcohol/week: 2.0 standard drinks     Types: 1 Cans of beer, 1 Shots of liquor per week     Comment: 1 every 6 -7 months    Drug use: No       PERTINENT MEDICATIONS:  See medications list.  Current Outpatient Medications on File Prior to Visit   Medication Sig Dispense Refill    acetaminophen (TYLENOL) 500 mg Cap       ASPIRIN (ASPIR-81 ORAL) Take by mouth.      aspirin-acetaminophen-caffeine 250-250-65 mg (EXCEDRIN MIGRAINE) 250-250-65  mg per tablet       colestipol (COLESTID) 1 gram Tab Take 1 tablet (1 g total) by mouth 2 (two) times daily. 180 tablet 3    cyanocobalamin 1,000 mcg/mL injection Inject 1 mL (1,000 mcg total) into the muscle every 30 days. 10 mL 11    dicyclomine (BENTYL) 20 mg tablet TAKE 1 TABLET FOUR TIMES DAILY 360 tablet 3    diphenhydramine-acetaminophen 12.5-325 mg Tab       fluorouraciL (EFUDEX) 5 % cream Apply topically 2 (two) times daily. For 4 weeks to L cheek 40 g 1    hydrocortisone 2.5 % lotion AAA face QHS PRN flare 60 mL 2    ondansetron (ZOFRAN) 8 MG tablet TAKE 1 TABLET EVERY 12 HOURS AS NEEDED FOR NAUSEA 180 tablet 1    phenylephrine-acetaminophen 5-325 mg Cap       potassium gluconate 595 mg (99 mg) Tab       traMADoL (ULTRAM) 50 mg tablet Take 1 tablet (50 mg total) by mouth every 8 (eight) hours as needed for Pain. 90 tablet 2    magnesium oxide (MAG-OX) 400 mg (241.3 mg magnesium) tablet Take 400 mg by mouth once daily.       Current Facility-Administered Medications on File Prior to Visit   Medication Dose Route Frequency Provider Last Rate Last Dose    cyanocobalamin injection 1,000 mcg  1,000 mcg Intramuscular Q30 Days Andrea Shaver MD   1,000 mcg at 05/11/20 0857       ALLERGIES:  Ciprofloxacin, Codeine, Compazine [prochlorperazine], Erythromycin, and Keflex [cephalexin]    EXAM:  See also the handwritten notes/diagrams scanned to chart for additional details.  Constitutional  General appearance: well-developed, well-nourished, well-kempt older white male    Eyes  Inspection of conjunctivae and lids reveals no abnormalities; sclerae anicteric  Neurologic/Psychiatric  Alert,  normal orientation to time, place, person  Normal mood and affect with no evidence of depression, anxiety, agitation  Skin: see photo(s)  Head: background marked solar damage to exposed areas of skin  Neck: examination reveals marked chronic solar damage  On his right neck there is an approximately 8 mm somewhat scaly  violaceous site at the site of prior biopsy   Right upper extremity: examination reveals marked chronic solar damage; some ecchymosis/ecchymoses   Left upper extremity: examination reveals marked chronic solar damage; some ecchymosis/ecchymoses   On his left proximal forearm there is a 2.5+ cm somewhat crateriform tumor which is draining some purulosanguinous fluid, as noted in the photo below   he confirmed these sites as the sites of the prior biopsies, site(s) confirmed by reference to the photograph(s) attached below taken at the time of the biopsy/biopsies by the referring physician    Photo(s) this visit:               Photo(s) from biopsy visit:             ASSESSMENT: biopsy-proven squamous cell carcinoma  of the left forearm;  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 In particular, the discussion included, but was not limited to, the following:    I discussed with the him the diagnosis of squamous cell carcinoma vs keratoacanthoma-type squamous cell carcinoma at length and in detail. I reviewed the usual natural history of such lesions, including the relatively rapid growth which is commonly seen, and the potential for spontaneous resolution over 3-4 months, either without any treatment or following a biopsy. I explained that, at the same time, the lesion might not resolve and might instead prove to be a non-keratoacanthomatous squamous  cell carcinoma. I explained the difficulty in predicting how such a lesion might evolve or behave. I discussed the small but real risk of metastasis with the patient. I reviewed options for management of this lesion, as noted below, and the risks and benefits of these alternatives. I also discussed the possibility of recurrence of the lesion despite clear margins on Mohs' surgery if it is performed.    One alternative at this point would be to defer further treatment and observe the lesions. With some skin cancers of this kind, it is possible that a biopsy can be sufficient to definitively treat a small skin cancer of this kind. Alternatively, some skin cancers are slow growing and do not require immediate treatment. The potential advantage of this choice would be to avoid the need for possibly unnecessary additional surgery. Among the potential disadvantages of this would be the possibility of enlargement of the lesions, more extensive spread of the lesions or recurrence at a later date, which might necessitate larger and more complex surgeries.    Radiation treatment can be an effective treatment for these types of skin cancer. The usual course of treatment is every weekday for several weeks. Local irritation will result from treatment, although no systemic side effects are expected. The potential advantage of radiation treatment is that it avoids the need for surgery. Among the disadvantages of radiation treatment are the length of treatment, the local inflammatory response, the absence of pathologic confirmation of the removal of the skin cancer, a possible increased risk of additional skin cancer in the treated area in later years, and a somewhat increased risk of recurrence at a later date.     Excisional surgery can be an effective treatment for these types of skin cancer. This would involve excision of the lesions with margin evaluation by submitting the specimens to a pathologist for either immediate  marginal assessment via frozen section processing, or delayed marginal assessment by fixed-tissue processing. The potential advantage of this technique is that it offers a way of treating the lesions with some degree of histologic confirmation of tumor removal. Among the disadvantages of this treatment are the possible need for re-excision if marginal involvement is identified, a somewhat greater likelihood of recurrence as compared to Mohs' surgery because of the less comprehensive margin evaluation inherent in the technique, and the general potential risks of surgery, including allergic reactions to the anesthetic and other materials used, infection, injury to nerves in the area with consequent loss of sensation or muscle function, and scarring or distortion of surrounding structures.    Mohs' surgery is a very effective treatment for these types of skin cancer. The potential advantage of Mohs' surgery is that this technique offers the greatest possible certainty of knowing that the skin cancer has been completely removed, with the removal of the least amount of normal tissue. The potential disadvantages of Mohs' surgery include the duration of the surgery, the possible need for a separate surgery for reconstruction following tumor removal, and scarring as a result. In addition, general potential risks of surgery as noted above also apply to treatment via Mohs' surgery.    In light of the nature of these tumors and the size and locations, Mohs' micrographic surgery was thought to be the most appropriate management choice, and these diagnoses are appropriate for treatment by Mohs' micrographic surgery.     Sufficient time was available for questions, and all questions were answered to his satisfaction. He fully understands the aims, risks, alternatives, and possible complications, and has elected to proceed with the surgery, and verbally consented to do so. We will prioritize treatment of the left arm lesion, and  surgery for this will be scheduled in the near future. Treatment of the right neck lesion will be deferred until after treatment of the left arm lesion.    Routine pre-op instructions were given to him.  --------------------------------------  Note: Some or all of this note may have been generated using voice recognition software. There may be voice recognition errors including grammatical and/or spelling errors found in the text. Attempts were made to correct these errors prior to signature.

## 2020-07-12 ENCOUNTER — TELEPHONE (OUTPATIENT)
Dept: FAMILY MEDICINE | Facility: CLINIC | Age: 65
End: 2020-07-12

## 2020-07-12 NOTE — TELEPHONE ENCOUNTER
I called mr bazan in regards to the covid test he misses o 07/12, he informed me that he will need to cancel  His procedure that is scheduled for Wed.   Pt said he will try to call the office on Monday as well

## 2020-07-15 PROCEDURE — 99900103 DSU ONLY-NO CHARGE-INITIAL HR (STAT)

## 2020-07-16 ENCOUNTER — OFFICE VISIT (OUTPATIENT)
Dept: FAMILY MEDICINE | Facility: CLINIC | Age: 65
End: 2020-07-16
Payer: MEDICARE

## 2020-07-16 VITALS
HEART RATE: 65 BPM | OXYGEN SATURATION: 96 % | SYSTOLIC BLOOD PRESSURE: 118 MMHG | TEMPERATURE: 98 F | HEIGHT: 72 IN | BODY MASS INDEX: 21.08 KG/M2 | WEIGHT: 155.63 LBS | DIASTOLIC BLOOD PRESSURE: 64 MMHG

## 2020-07-16 DIAGNOSIS — E53.8 B12 DEFICIENCY: Primary | ICD-10-CM

## 2020-07-16 DIAGNOSIS — Z12.5 SCREENING FOR PROSTATE CANCER: ICD-10-CM

## 2020-07-16 DIAGNOSIS — K50.919 CROHN'S DISEASE WITH COMPLICATION, UNSPECIFIED GASTROINTESTINAL TRACT LOCATION: ICD-10-CM

## 2020-07-16 DIAGNOSIS — Z13.220 SCREENING FOR LIPID DISORDERS: ICD-10-CM

## 2020-07-16 DIAGNOSIS — K90.829 SHORT BOWEL SYNDROME: ICD-10-CM

## 2020-07-16 DIAGNOSIS — Z13.6 SCREENING FOR CARDIOVASCULAR CONDITION: ICD-10-CM

## 2020-07-16 PROCEDURE — 3008F BODY MASS INDEX DOCD: CPT | Mod: CPTII,S$GLB,, | Performed by: PHYSICIAN ASSISTANT

## 2020-07-16 PROCEDURE — 99999 PR PBB SHADOW E&M-EST. PATIENT-LVL V: ICD-10-PCS | Mod: PBBFAC,,, | Performed by: PHYSICIAN ASSISTANT

## 2020-07-16 PROCEDURE — 3008F PR BODY MASS INDEX (BMI) DOCUMENTED: ICD-10-PCS | Mod: CPTII,S$GLB,, | Performed by: PHYSICIAN ASSISTANT

## 2020-07-16 PROCEDURE — 99213 OFFICE O/P EST LOW 20 MIN: CPT | Mod: S$GLB,,, | Performed by: PHYSICIAN ASSISTANT

## 2020-07-16 PROCEDURE — 99213 PR OFFICE/OUTPT VISIT, EST, LEVL III, 20-29 MIN: ICD-10-PCS | Mod: S$GLB,,, | Performed by: PHYSICIAN ASSISTANT

## 2020-07-16 PROCEDURE — 99999 PR PBB SHADOW E&M-EST. PATIENT-LVL V: CPT | Mod: PBBFAC,,, | Performed by: PHYSICIAN ASSISTANT

## 2020-07-16 NOTE — PROGRESS NOTES
Subjective:       Patient ID: Tristin Cerda is a 65 y.o. male.    Chief Complaint: Annual Exam    Patient with Crohn's disease status post small bowel resection over 15 years ago presents for routine follow-up.  Patient has not had a Crohn's flare in many years.  Symptoms are controlled at the present time.  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    He is scheduled for Gastro follow up and probable scope.   he is followed by derm for skin cancers.     Patients patient medical/surgical, social and family histories have been reviewed            Review of Systems   Constitutional: Negative for activity change, appetite change, fatigue and unexpected weight change.   Respiratory: Negative for cough, chest tightness and shortness of breath.    Cardiovascular: Negative for chest pain, palpitations and leg swelling.   Gastrointestinal: Positive for abdominal pain. Negative for anal bleeding, blood in stool, constipation, diarrhea and nausea.   Endocrine: Negative for polydipsia and polyuria.   Genitourinary: Negative for difficulty urinating, frequency, hematuria and urgency.   Musculoskeletal: Negative for arthralgias.   Skin: Negative for rash.   Neurological: Negative for dizziness and headaches.   Psychiatric/Behavioral: Negative for dysphoric mood. The patient is not nervous/anxious.        Objective:      Physical Exam  Vitals signs reviewed.   Constitutional:       General: He is not in acute distress.     Appearance: He is well-developed.   Eyes:      General: No scleral icterus.     Conjunctiva/sclera: Conjunctivae normal.   Neck:      Vascular: No carotid bruit.   Cardiovascular:      Rate and Rhythm: Normal rate and regular rhythm.      Heart sounds: Normal heart sounds.   Pulmonary:      Effort: Pulmonary effort is normal.      Breath sounds: Normal breath sounds.   Abdominal:      General: Bowel sounds are normal.      Palpations:  Abdomen is soft.      Tenderness: There is no abdominal tenderness.   Musculoskeletal:      Right lower leg: No edema.      Left lower leg: No edema.   Neurological:      Mental Status: He is alert.   Psychiatric:         Behavior: Behavior is cooperative.         Assessment:       1. B12 deficiency    2. Screening for prostate cancer    3. Screening for lipid disorders    4. Screening for cardiovascular condition    5. Short bowel syndrome    6. Crohn's disease with complication, unspecified gastrointestinal tract location        Plan:       Tristin was seen today for annual exam.    Diagnoses and all orders for this visit:    B12 deficiency  Continue q mo injection   Screening for prostate cancer  -     PSA, Screening; Future  -     PSA, Screening    Screening for lipid disorders    Screening for cardiovascular condition  -     Lipid Panel; Future  -     Lipid Panel    Short bowel syndrome  Continue per GI   Crohn's disease with complication, unspecified gastrointestinal tract location     per GI    Recommend tdap and shingrix at local pharmacy       Follow up in about 6 months (around 1/16/2021) for pcp.

## 2020-07-17 LAB
CHOLEST SERPL-MCNC: 107 MG/DL
CHOLEST/HDLC SERPL: 2.8 (CALC)
HDLC SERPL-MCNC: 38 MG/DL
LDLC SERPL CALC-MCNC: 55 MG/DL (CALC)
NONHDLC SERPL-MCNC: 69 MG/DL (CALC)
PSA SERPL-MCNC: 1.8 NG/ML
TRIGL SERPL-MCNC: 68 MG/DL

## 2020-07-19 ENCOUNTER — PATIENT OUTREACH (OUTPATIENT)
Dept: ADMINISTRATIVE | Facility: OTHER | Age: 65
End: 2020-07-19

## 2020-07-19 NOTE — PROGRESS NOTES
Prior photo(s) of site(s) to confirm location(s):      ALLERGIES:   Ciprofloxacin, Codeine, Compazine [prochlorperazine], Erythromycin, and Keflex [cephalexin]      Current Outpatient Medications:     acetaminophen (TYLENOL) 500 mg Cap, , Disp: , Rfl:     ASPIRIN (ASPIR-81 ORAL), Take by mouth., Disp: , Rfl:     aspirin-acetaminophen-caffeine 250-250-65 mg (EXCEDRIN MIGRAINE) 250-250-65 mg per tablet, , Disp: , Rfl:     colestipol (COLESTID) 1 gram Tab, Take 1 tablet (1 g total) by mouth 2 (two) times daily., Disp: 180 tablet, Rfl: 3    cyanocobalamin 1,000 mcg/mL injection, Inject 1 mL (1,000 mcg total) into the muscle every 30 days., Disp: 10 mL, Rfl: 11    dicyclomine (BENTYL) 20 mg tablet, TAKE 1 TABLET FOUR TIMES DAILY, Disp: 360 tablet, Rfl: 3    diphenhydramine-acetaminophen 12.5-325 mg Tab, , Disp: , Rfl:     fluorouraciL (EFUDEX) 5 % cream, Apply topically 2 (two) times daily. For 4 weeks to L cheek, Disp: 40 g, Rfl: 1    hydrocortisone 2.5 % lotion, AAA face QHS PRN flare, Disp: 60 mL, Rfl: 2    magnesium oxide (MAG-OX) 400 mg (241.3 mg magnesium) tablet, Take 400 mg by mouth once daily., Disp: , Rfl:     ondansetron (ZOFRAN) 8 MG tablet, TAKE 1 TABLET EVERY 12 HOURS AS NEEDED FOR NAUSEA, Disp: 180 tablet, Rfl: 1    phenylephrine-acetaminophen 5-325 mg Cap, , Disp: , Rfl:     potassium gluconate 595 mg (99 mg) Tab, , Disp: , Rfl:     traMADoL (ULTRAM) 50 mg tablet, Take 1 tablet (50 mg total) by mouth every 8 (eight) hours as needed for Pain., Disp: 90 tablet, Rfl: 2    Current Facility-Administered Medications:     cyanocobalamin injection 1,000 mcg, 1,000 mcg, Intramuscular, Q30 Days, Andrea Shaver MD, 1,000 mcg at 05/11/20 0857  -------------------------------------------------------------  PROCEDURE: Mohs' Micrographic Surgery    SITE: left arm    INDICATION: squamous cell carcinoma, greater than 2 cm     CASE NUMBER: [id number]      ANESTHETIC: 4.5 mL 1% Lidocaine with Epinephrine  1:100,000    SURGICAL PREP: Ethanol and Hibiclens    SURGEON: Ketan Alan MD    ASSISTANTS: Angel Armenta CST     PREOPERATIVE DIAGNOSIS: squamous cell carcinoma     POSTOPERATIVE DIAGNOSIS: squamous cell carcinoma     PATHOLOGIC DIAGNOSIS: squamous cell carcinoma     STAGES OF MOHS' SURGERY PERFORMED: one    TUMOR-FREE PLANE ACHIEVED: yes    HEMOSTASIS: Hyfrecation    SPECIMENS: two (two in stage A)    INITIAL LESION SIZE: 2.5 x 3.7 cm    FINAL DEFECT SIZE: 3.0 x 3.8 cm    WOUND REPAIR/DISPOSITION: see below    NARRATIVE:    The patient is a 65 y.o.male referred by Celena Martínez MD with a history of cancer on the left forearm which was biopsied - pathology accession #[id number]-, [facility]. Findings revealed squamous cell carcinoma. Examination revealed a greater than 2 cm area of pink tumor and hyperkeratotic changes on the proximal left dorsal forearm at the site of prior biopsy, which was confirmed by reference to the photograph taken at the previous patient visit, as attached above. In light of the nature of this tumor and the location on the forearm and the size greater than 2 cm, Mohs' micrographic surgery was thought to be the most appropriate management choice, and this diagnosis is appropriate for treatment by Mohs' micrographic surgery.  I discussed it with the patient and he fully understands the aims, risks, alternatives, and possible complications, and elects to proceed.  There are no medical or surgical contraindications to the procedure.     A signed informed consent was obtained.    PROCEDURE:  The patient was placed in the semi-recumbent position on the operating table in the Mohs' Surgery Suite, with his arm resting on an arm board. Hair overlying and adjacent to the site was removed with an electric clipper. The area in question was thoroughly prepped with ethanol and Hibiclens. A sterile surgical marker was used to outline the clinically apparent margins of the involved area,  "and a narrow margin of normal-appearing skin. Reference marks were made at the periphery of the outlined area with the surgical marker. The proposed area of excision was measured and photographed. Local anesthesia as noted above was administered.  The total volume of anesthetic used throughout this portion of the procedure was as documented above. The area was prepared and draped in the standard manner. All of the grossly identifiable area of clinically abnormal tissue and an underlying/peripheral layer was taken and processed by the Mohs' technique.  Hemostasis was obtained with the hyfrecator. Tissue was taken from any areas of residual marginal involvement (if present) and processed by the Mohs' technique in as many stages as needed until a tumor-free plane was achieved.    Colors of inks used in the reference nicks at epidermal margins (if present) and/or inking of non-epithelial edges, if applicable, is represented on the Mohs map as follows: solid lines represent red ink, dots represent blue ink, jagged lines represent black ink, curlicues represent green ink, "xxx" represents yellow ink.    The first Mohs' layer consisted of two section(s) with 12 slide(s) evaluated. No residual tumor was noted at the margins of the first Mohs' layer. Histology of the specimen(s) showed changes consistent with chronic solar damage.    A total of two section(s) and 12 slide(s) were examined under the microscope via the Mohs technique.  A cancer free plane was reached after layer number one. Defect final size was as noted above.      The wound was covered with a nonadherent dressing between stages, and the patient allowed to wait in the waiting area during these periods. The final defect was photographed at the completion of the Mohs' procedure.    See the separate procedure note which follows regarding repair of the defect following Mohs' surgery.        -----------------------------------------------    REPAIR FOLLOWING MOHS' " MICROGRAPHIC SURGERY    PREOPERATIVE DIAGNOSIS: defect following Mohs' surgery for a squamous cell carcinoma    POSTOPERATIVE DIAGNOSIS: same    PROCEDURE PERFORMED: intermediate (layered) closure     ANESTHETIC: 6 mL 1% Lidocaine with Epinephrine 1:100,000     SURGICAL PREP: Hibiclens    SURGEON: Ketan Alan MD     ASSISTANTS: as above    LOCATION: left arm      INDICATIONS:  Earlier in the day, the patient underwent Mohs' micrographic surgical excision of a squamous cell carcinoma on the left forearm. Tumor free margins were achieved after layer number one.  Later in the day, the management of the resulting wound was addressed with the patient. I discussed the various wound management options with the patient and he fully understands the aims, risks, alternatives, and possible complications of the alternatives, and he elects to proceed with closure of the defect in the manner noted below.  There are no medical or surgical contraindications to the procedure.    A signed informed consent was previously obtained.    PROCEDURE:  Repair via intermediate closure:  The patient was returned to the procedure room following completion of the Mohs' procedure and final slide review. Because of the size, shape and location of the defect, simple closure could not be achieved without excessive tension on the wound margins and an unacceptable risk of wound dehiscence and standing cone deformities. After surgical prepping, additional anesthetic was infiltrated into the tissues surrounding the defect and the anticipated area of repair, to maintain anesthesia during the procedure. Preparation of the site was carried out by extending the defect through excision of small triangles of superfluous tissue on either side of the wound to square the shoulders of the defect and to allow closure without distortion by standing cone deformities, creating a semi-fusiform defect with a distal M-plasty. Wound margins were minimally  undermined to allow closure with minimal tension. After hemostasis was achieved with the hyfrecator, closure was accomplished in layered fashion with:      multiple #4-0 buried interrupted Vicryl suture(s) and    multiple #3-0 simple interrupted, horizontal mattress and vertical mattress Prolene suture(s) for final approximation of the wound margins.    Total length of the final closure, including the limbs of the M-plasty, was 8.0 cm.     The site was photographed following completion of the repair. Final dressing consisted of petrolatum, Telfa and tape.    Estimated blood loss for the total procedure was less than 10 mL.    Total operative time including tissue processing in the Mohs' laboratory and microscopic Mohs' frozen section slide review was 2 hour(s). Verbal and written wound care instructions were given to the patient, and he expressed understanding of these instructions. The patient tolerated the procedure well and left the operating room in good condition; he is to return in 14 days for suture removal.     Dr. Alan's cell phone number was given to the patient with instructions to call prn with any problems.

## 2020-07-20 ENCOUNTER — PROCEDURE VISIT (OUTPATIENT)
Dept: DERMATOLOGY | Facility: CLINIC | Age: 65
End: 2020-07-20
Payer: MEDICARE

## 2020-07-20 VITALS
WEIGHT: 155 LBS | HEIGHT: 72 IN | DIASTOLIC BLOOD PRESSURE: 78 MMHG | BODY MASS INDEX: 20.99 KG/M2 | HEART RATE: 59 BPM | SYSTOLIC BLOOD PRESSURE: 149 MMHG

## 2020-07-20 DIAGNOSIS — C44.629 SQUAMOUS CELL CANCER OF SKIN OF FOREARM, LEFT: Primary | ICD-10-CM

## 2020-07-20 PROCEDURE — 17313 MOHS 1 STAGE T/A/L: CPT | Mod: S$GLB,,, | Performed by: DERMATOLOGY

## 2020-07-20 PROCEDURE — 99499 NO LOS: ICD-10-PCS | Mod: S$GLB,,, | Performed by: DERMATOLOGY

## 2020-07-20 PROCEDURE — 12034 PR LAYR CLOS WND TRUNK,ARM,LEG 7.6-12.5 CM: ICD-10-PCS | Mod: 51,S$GLB,, | Performed by: DERMATOLOGY

## 2020-07-20 PROCEDURE — 17313: ICD-10-PCS | Mod: S$GLB,,, | Performed by: DERMATOLOGY

## 2020-07-20 PROCEDURE — 12034 INTMD RPR S/TR/EXT 7.6-12.5: CPT | Mod: 51,S$GLB,, | Performed by: DERMATOLOGY

## 2020-07-20 PROCEDURE — 99499 UNLISTED E&M SERVICE: CPT | Mod: S$GLB,,, | Performed by: DERMATOLOGY

## 2020-07-26 ENCOUNTER — LAB VISIT (OUTPATIENT)
Dept: FAMILY MEDICINE | Facility: CLINIC | Age: 65
End: 2020-07-26
Payer: MEDICARE

## 2020-07-26 DIAGNOSIS — Z01.818 PREOP TESTING: ICD-10-CM

## 2020-07-26 PROCEDURE — U0003 INFECTIOUS AGENT DETECTION BY NUCLEIC ACID (DNA OR RNA); SEVERE ACUTE RESPIRATORY SYNDROME CORONAVIRUS 2 (SARS-COV-2) (CORONAVIRUS DISEASE [COVID-19]), AMPLIFIED PROBE TECHNIQUE, MAKING USE OF HIGH THROUGHPUT TECHNOLOGIES AS DESCRIBED BY CMS-2020-01-R: HCPCS

## 2020-07-27 LAB — SARS-COV-2 RNA RESP QL NAA+PROBE: NOT DETECTED

## 2020-07-29 ENCOUNTER — ANESTHESIA EVENT (OUTPATIENT)
Dept: SURGERY | Facility: HOSPITAL | Age: 65
End: 2020-07-29
Payer: MEDICARE

## 2020-07-29 ENCOUNTER — HOSPITAL ENCOUNTER (OUTPATIENT)
Facility: HOSPITAL | Age: 65
Discharge: HOME | End: 2020-07-29
Attending: INTERNAL MEDICINE | Admitting: INTERNAL MEDICINE
Payer: MEDICARE

## 2020-07-29 ENCOUNTER — ANESTHESIA (OUTPATIENT)
Dept: SURGERY | Facility: HOSPITAL | Age: 65
End: 2020-07-29
Payer: MEDICARE

## 2020-07-29 VITALS
OXYGEN SATURATION: 95 % | DIASTOLIC BLOOD PRESSURE: 62 MMHG | HEIGHT: 72 IN | WEIGHT: 150 LBS | RESPIRATION RATE: 13 BRPM | BODY MASS INDEX: 20.32 KG/M2 | SYSTOLIC BLOOD PRESSURE: 111 MMHG | TEMPERATURE: 98 F | HEART RATE: 51 BPM

## 2020-07-29 DIAGNOSIS — R19.7 DIARRHEA, UNSPECIFIED TYPE: ICD-10-CM

## 2020-07-29 DIAGNOSIS — K50.90 CROHN'S DISEASE: ICD-10-CM

## 2020-07-29 DIAGNOSIS — K50.919 CROHN'S DISEASE WITH COMPLICATION, UNSPECIFIED GASTROINTESTINAL TRACT LOCATION: ICD-10-CM

## 2020-07-29 PROCEDURE — 37000008 HC ANESTHESIA 1ST 15 MINUTES: Performed by: INTERNAL MEDICINE

## 2020-07-29 PROCEDURE — 87507 IADNA-DNA/RNA PROBE TQ 12-25: CPT

## 2020-07-29 PROCEDURE — 45380 COLONOSCOPY AND BIOPSY: CPT | Mod: ,,, | Performed by: INTERNAL MEDICINE

## 2020-07-29 PROCEDURE — D9220A PRA ANESTHESIA: Mod: CRNA,,, | Performed by: NURSE ANESTHETIST, CERTIFIED REGISTERED

## 2020-07-29 PROCEDURE — 88305 TISSUE EXAM BY PATHOLOGIST: ICD-10-PCS | Mod: 26,,, | Performed by: PATHOLOGY

## 2020-07-29 PROCEDURE — D9220A PRA ANESTHESIA: ICD-10-PCS | Mod: CRNA,,, | Performed by: NURSE ANESTHETIST, CERTIFIED REGISTERED

## 2020-07-29 PROCEDURE — 45380 COLONOSCOPY AND BIOPSY: CPT | Performed by: INTERNAL MEDICINE

## 2020-07-29 PROCEDURE — 37000009 HC ANESTHESIA EA ADD 15 MINS: Performed by: INTERNAL MEDICINE

## 2020-07-29 PROCEDURE — 45380 PR COLONOSCOPY,BIOPSY: ICD-10-PCS | Mod: ,,, | Performed by: INTERNAL MEDICINE

## 2020-07-29 PROCEDURE — 27201423 OPTIME MED/SURG SUP & DEVICES STERILE SUPPLY: Performed by: INTERNAL MEDICINE

## 2020-07-29 PROCEDURE — D9220A PRA ANESTHESIA: Mod: ANES,,, | Performed by: ANESTHESIOLOGY

## 2020-07-29 PROCEDURE — 88305 TISSUE EXAM BY PATHOLOGIST: CPT | Mod: 26,,, | Performed by: PATHOLOGY

## 2020-07-29 PROCEDURE — 63600175 PHARM REV CODE 636 W HCPCS: Performed by: NURSE ANESTHETIST, CERTIFIED REGISTERED

## 2020-07-29 PROCEDURE — D9220A PRA ANESTHESIA: ICD-10-PCS | Mod: ANES,,, | Performed by: ANESTHESIOLOGY

## 2020-07-29 PROCEDURE — 25000003 PHARM REV CODE 250: Performed by: NURSE ANESTHETIST, CERTIFIED REGISTERED

## 2020-07-29 PROCEDURE — 88305 TISSUE EXAM BY PATHOLOGIST: CPT | Performed by: PATHOLOGY

## 2020-07-29 RX ORDER — SODIUM CHLORIDE, SODIUM LACTATE, POTASSIUM CHLORIDE, CALCIUM CHLORIDE 600; 310; 30; 20 MG/100ML; MG/100ML; MG/100ML; MG/100ML
INJECTION, SOLUTION INTRAVENOUS CONTINUOUS PRN
Status: DISCONTINUED | OUTPATIENT
Start: 2020-07-29 | End: 2020-07-29

## 2020-07-29 RX ORDER — ONDANSETRON 2 MG/ML
4 INJECTION INTRAMUSCULAR; INTRAVENOUS DAILY PRN
Status: DISCONTINUED | OUTPATIENT
Start: 2020-07-29 | End: 2020-07-29 | Stop reason: HOSPADM

## 2020-07-29 RX ORDER — SODIUM CHLORIDE, SODIUM LACTATE, POTASSIUM CHLORIDE, CALCIUM CHLORIDE 600; 310; 30; 20 MG/100ML; MG/100ML; MG/100ML; MG/100ML
INJECTION, SOLUTION INTRAVENOUS CONTINUOUS
Status: CANCELLED | OUTPATIENT
Start: 2020-07-29

## 2020-07-29 RX ORDER — DIPHENHYDRAMINE HYDROCHLORIDE 50 MG/ML
12.5 INJECTION INTRAMUSCULAR; INTRAVENOUS
Status: DISCONTINUED | OUTPATIENT
Start: 2020-07-29 | End: 2020-07-29 | Stop reason: HOSPADM

## 2020-07-29 RX ORDER — LIDOCAINE HYDROCHLORIDE 10 MG/ML
1 INJECTION, SOLUTION EPIDURAL; INFILTRATION; INTRACAUDAL; PERINEURAL ONCE
Status: CANCELLED | OUTPATIENT
Start: 2020-07-29 | End: 2020-07-29

## 2020-07-29 RX ORDER — LIDOCAINE HYDROCHLORIDE 20 MG/ML
INJECTION, SOLUTION EPIDURAL; INFILTRATION; INTRACAUDAL; PERINEURAL
Status: DISCONTINUED | OUTPATIENT
Start: 2020-07-29 | End: 2020-07-29

## 2020-07-29 RX ORDER — FAMOTIDINE 10 MG/ML
20 INJECTION INTRAVENOUS ONCE
Status: CANCELLED | OUTPATIENT
Start: 2020-07-29 | End: 2020-07-29

## 2020-07-29 RX ORDER — PROPOFOL 10 MG/ML
VIAL (ML) INTRAVENOUS
Status: DISCONTINUED | OUTPATIENT
Start: 2020-07-29 | End: 2020-07-29

## 2020-07-29 RX ORDER — SODIUM CHLORIDE, SODIUM LACTATE, POTASSIUM CHLORIDE, CALCIUM CHLORIDE 600; 310; 30; 20 MG/100ML; MG/100ML; MG/100ML; MG/100ML
125 INJECTION, SOLUTION INTRAVENOUS CONTINUOUS
Status: DISCONTINUED | OUTPATIENT
Start: 2020-07-29 | End: 2020-07-29 | Stop reason: HOSPADM

## 2020-07-29 RX ADMIN — PROPOFOL 50 MG: 10 INJECTION, EMULSION INTRAVENOUS at 10:07

## 2020-07-29 RX ADMIN — SODIUM CHLORIDE, POTASSIUM CHLORIDE, SODIUM LACTATE AND CALCIUM CHLORIDE: 600; 310; 30; 20 INJECTION, SOLUTION INTRAVENOUS at 10:07

## 2020-07-29 RX ADMIN — LIDOCAINE HYDROCHLORIDE 50 MG: 20 INJECTION, SOLUTION EPIDURAL; INFILTRATION; INTRACAUDAL; PERINEURAL at 10:07

## 2020-07-29 NOTE — ANESTHESIA POSTPROCEDURE EVALUATION
Anesthesia Post Evaluation    Patient: Tristin Cerda    Procedure(s) Performed: Procedure(s) (LRB):  COLONOSCOPY (N/A)    Final Anesthesia Type: general    Patient location during evaluation: PACU  Patient participation: Yes- Able to Participate  Level of consciousness: awake and alert  Post-procedure vital signs: reviewed and stable  Pain management: adequate  Airway patency: patent    PONV status at discharge: No PONV  Anesthetic complications: no      Cardiovascular status: blood pressure returned to baseline  Respiratory status: unassisted  Hydration status: euvolemic  Follow-up not needed.          Vitals Value Taken Time   /62 07/29/20 1047   Temp 36.7 °C (98 °F) 07/29/20 1030   Pulse 51 07/29/20 1046   Resp 10 07/29/20 1047   SpO2 95 % 07/29/20 1046   Vitals shown include unvalidated device data.      Event Time   Out of Recovery 10:45:00         Pain/Selena Score: Selena Score: 10 (7/29/2020 10:40 AM)  Modified Selena Score: 20 (7/29/2020 11:05 AM)

## 2020-07-29 NOTE — PLAN OF CARE
Ambulating to bathroom. Steady gait observed. Clothing placed in bathroom. Call light cord placed in reach. Instructed on use. V/u.

## 2020-07-29 NOTE — PLAN OF CARE
Has met unit/department guidelines for discharge from each phase of the post procedure continuum. Leaving floor per w/c with RN. LIZZY x3. Resp even and unlabored room air. No distress noted. All personal belongings returned to pt. VALERIA paperwork reviewed with pts daughter.

## 2020-07-29 NOTE — OP NOTE
Procedure.  Colonoscopy.  Indications Crohn's disease with history of multiple intestinal surgeries with diarrhea and abdominal cramping left lower quadrant.  He has good health knowledge.  He understands the procedures of colonoscopy.  Specifically he realizes there is an extremely small incidence of bleeding perforation or aspiration.  He thinks he is prepared but had difficulty the prep kit and had some nausea and vomiting.  Anesthesia.  Monitored anesthesia coverage.  Procedure.  With the patient the procedure room left lateral supine position the perianal area was examined there is noted be perianal hyperemia and palpable hemorrhoids.  The Olympus colonoscope I believe was passed to the cecum identified by the ileocecal valve.  It was a difficult procedure with retained fecal material.  Vigorous washing was applied.  Definite polyps were not identified.  The hyperemic of sigmoid colon was biopsied.  Stool was obtained for culture.  The scope was retroflexed the rectum there is no be hemorrhoids.  Impression 1.  History of Crohn's disease 2.  Hyperemia the rectosigmoid 3.  Hemorrhoids.  Patient tolerated the procedure well.

## 2020-07-29 NOTE — ANESTHESIA PREPROCEDURE EVALUATION
07/29/2020  Tristin Cerda is a 65 y.o., male.    Anesthesia Evaluation    I have reviewed the Patient Summary Reports.    I have reviewed the Nursing Notes. I have reviewed the NPO Status.   I have reviewed the Medications.     Review of Systems  Social:  Non-Smoker    Hematology/Oncology:     Oncology Normal     EENT/Dental:EENT/Dental Normal   Cardiovascular:  Cardiovascular Normal     Pulmonary:  Pulmonary Normal    Hepatic/GI:   Hiatal Hernia, Hepatitis    Musculoskeletal:  Musculoskeletal Normal    Neurological:  Neurology Normal    Endocrine:  Endocrine Normal    Dermatological:  Skin Normal    Psych:  Psychiatric Normal           Physical Exam  General:  Well nourished    Airway/Jaw/Neck:  Airway Findings: Tongue: Normal General Airway Assessment: Adult  Mallampati: II  TM Distance: Normal, at least 6 cm      Dental:  Dental Findings: Edentulous   Chest/Lungs:  Chest/Lungs Findings: Clear to auscultation     Heart/Vascular:  Heart Findings: Rate: Normal  Rhythm: Regular Rhythm        Mental Status:  Mental Status Findings:  Cooperative, Alert and Oriented         Anesthesia Plan  Type of Anesthesia, risks & benefits discussed:  Anesthesia Type:  general  Patient's Preference:   Intra-op Monitoring Plan: standard ASA monitors  Intra-op Monitoring Plan Comments:   Post Op Pain Control Plan: IV/PO Opioids PRN  Post Op Pain Control Plan Comments:   Induction:   IV  Beta Blocker:  Patient is not currently on a Beta-Blocker (No further documentation required).       Informed Consent: Patient understands risks and agrees with Anesthesia plan.  Questions answered. Anesthesia consent signed with patient.  ASA Score: 2     Day of Surgery Review of History & Physical: I have interviewed and examined the patient. I have reviewed the patient's H&P dated:            Ready For Surgery From Anesthesia  Perspective.

## 2020-07-29 NOTE — DISCHARGE INSTRUCTIONS
[facility]   [phone]  [facility]      [phone]    Managing nausea    Some people have an upset stomach after surgery. This is often because of anesthesia, pain, or pain medicine, or the stress of surgery. These tips will help you handle nausea and eat healthy foods as you get better. If you were on a special food plan before surgery, ask your healthcare provider if you should follow it while you get better. These tips may help:  · Do not push yourself to eat. Your body will tell you when to eat and how much.  · Start off with clear liquids and soup. They are easier to digest.  · Next try semi-solid foods, such as mashed potatoes, applesauce, and gelatin, as you feel ready.  · Slowly move to solid foods. Dont eat fatty, rich, or spicy foods at first.  · Do not force yourself to have 3 large meals a day. Instead eat smaller amounts more often.  · Take pain medicines with a small amount of solid food, such as crackers or toast, to avoid nausea.

## 2020-07-29 NOTE — DISCHARGE SUMMARY
Colonoscopy revealed hemorrhoids and hyperemia of the sigmoid colon.  The prep was extremely poor.  He had difficulty tolerating the prep kit and had nausea and vomiting.  The procedure was degraded because of retained fecal material.  Stool was obtained for analysis and is pending.  He continues his vitamins minerals and nutritious diet.  He will make a food diary and avoid the offending foods.  Has a follow-up appointment in the office.

## 2020-07-29 NOTE — H&P
"Office Visit    6/9/2020  [facility] - Gastro     Andrea Shaver MD  Gastroenterology  B12 deficiency +8 more  Dx  Follow-up ; Referred by Tyler Smith MD  Reason for Visit   Additional Documentation    Vitals:    /74 (BP Location: Right arm, Patient Position: Sitting, BP Method: Medium (Automatic))   Pulse 64   Temp 98.8 °F (37.1 °C) (Oral)   Resp 18   Ht 6' 1" (1.854 m)   Wt 69.9 kg (154 lb)   SpO2 98%   BMI 20.32 kg/m²   BSA 1.9 m²   Flowsheets:    Anthropometrics      SmartForms:     [facility] AMB - FALL RISK     [facility] Y[facility]A HM TOPICS ADVANCED      Encounter Info:    Billing Info,   History,   Allergies,   Detailed Report      Instructions       Follow up in about 6 weeks (around 7/21/2020).  He receives his vitamin B12 on a monthly basis.  He is taking his vitamins and minerals.  On the Colestid.  The dicyclomine has helped.  He is scheduled for colonoscopy.  He is having cramping with increasing diarrhea and increased gassiness.  He will make a food diary and avoid the offending foods.  He continues his reflux regimen.          After Visit Summary (Printed 6/9/2020)  Progress Notes  Andrea Shaver MD (Physician)   Gastroenterology   6/9/2020  9:40 AM   Signed     Subjective:       Patient ID: Tristin Cerda is a 65 y.o. male.     Chief Complaint: Follow-up    HPI     Allergies:       Review of patient's allergies indicates:   Allergen Reactions    Ciprofloxacin      Codeine Itching    Compazine [prochlorperazine]      Erythromycin      Keflex [cephalexin]          Medications:    Current Outpatient Medications:     acetaminophen (TYLENOL) 500 mg Cap, , Disp: , Rfl:     ASPIRIN (ASPIR-81 ORAL), Take by mouth., Disp: , Rfl:     aspirin-acetaminophen-caffeine 250-250-65 mg (EXCEDRIN MIGRAINE) 250-250-65 mg per tablet, , Disp: , Rfl:     colestipol (COLESTID) 1 gram Tab, Take 1 tablet (1 g total) by mouth 2 (two) times daily., Disp: 180 tablet, Rfl: 3    " cyanocobalamin 1,000 mcg/mL injection, Inject 1 mL (1,000 mcg total) into the muscle every 30 days., Disp: 10 mL, Rfl: 11    dicyclomine (BENTYL) 20 mg tablet, TAKE 1 TABLET FOUR TIMES DAILY, Disp: 360 tablet, Rfl: 3    diphenhydramine-acetaminophen 12.5-325 mg Tab, , Disp: , Rfl:     hydrocortisone 2.5 % lotion, AAA face QHS PRN flare, Disp: 60 mL, Rfl: 2    magnesium oxide (MAG-OX) 400 mg (241.3 mg magnesium) tablet, Take 400 mg by mouth once daily., Disp: , Rfl:     ondansetron (ZOFRAN) 8 MG tablet, TAKE 1 TABLET EVERY 12 HOURS AS NEEDED FOR NAUSEA, Disp: 180 tablet, Rfl: 1    phenylephrine-acetaminophen 5-325 mg Cap, , Disp: , Rfl:     potassium gluconate 595 mg (99 mg) Tab, , Disp: , Rfl:     traMADoL (ULTRAM) 50 mg tablet, Take 1 tablet (50 mg total) by mouth every 8 (eight) hours as needed for Pain., Disp: 90 tablet, Rfl: 2     Current Facility-Administered Medications:     cyanocobalamin injection 1,000 mcg, 1,000 mcg, Intramuscular, Q30 Days, Andrea Shaver MD, 1,000 mcg at 05/11/20 0857          Past Medical History:   Diagnosis Date    Anxiety      Basal cell carcinoma 07/2017     R forehead and R temple    Crohn's disease       age 17    Short bowel syndrome      Vitamin B deficiency      Vitamin D deficiency                Past Surgical History:   Procedure Laterality Date    3 small bowel resections        APPENDECTOMY        CHOLECYSTECTOMY        COLONOSCOPY        COLONOSCOPY N/A 2/14/2017     Procedure: COLONOSCOPY;  Surgeon: Nela Sanchez MD;  Location: [address]);  Service: Endoscopy;  Laterality: N/A;    COLONOSCOPY N/A 3/14/2017     Procedure: COLONOSCOPY;  Surgeon: Nela Sanchez MD;  Location: [facility] ([address] [place] [address]);  Service: Endoscopy;  Laterality: N/A;  2 days clear liquids before procedure    ESOPHAGOGASTRODUODENOSCOPY N/A 11/19/2019     Procedure: EGD (ESOPHAGOGASTRODUODENOSCOPY);  Surgeon: Andrea Shaver MD;  Location: [facility];  Service: Endoscopy;   Laterality: N/A;    KNEE SURGERY        SMALL INTESTINE SURGERY        TUBE THORACOTOMY        UPPER GASTROINTESTINAL ENDOSCOPY               Review of Systems    Objective:      Physical Exam       Plan:       B12 deficiency  -     cyanocobalamin injection 1,000 mcg     Hiatal hernia     History of Crohn's disease     Chronic diarrhea     Short bowel syndrome     History of cholecystectomy     Chronic abdominal pain  -     dicyclomine (BENTYL) 20 mg tablet; TAKE 1 TABLET FOUR TIMES DAILY  Dispense: 360 tablet; Refill: 3     Chronic viral hepatitis B without delta agent and without coma  -     HEPATITIS B VIRAL DNA, QUANTITATIVE; Future; Expected date: 06/09/2020  -     CBC auto differential; Future; Expected date: 06/09/2020  -     Comprehensive metabolic panel; Future; Expected date: 06/09/2020  -     C-reactive protein; Future; Expected date: 06/09/2020     Preop testing  -     COVID-19 Routine Screening; Future; Expected date: 06/12/2020  -     sodium,potassium,mag sulfates (SUPREP BOWEL PREP KIT) 17.5-3.13-1.6 gram SolR; Take 177 mLs by mouth once daily. for 2 days  Dispense: 1 kit; Refill: 0      He will continue his vitamins and minerals.  He is given the B12.  He will continue his nutritious diet.  He scheduled for colonoscopy.  He has good health knowledge.  He understands the procedures.  Specifically he realizes there is an extremely small incidence of bleeding perforation or aspiration.    Pt requested print out of labs. He stated there is a place in [place] he has his blood work done that is much cheaper than [facility]. Lab orders printed and given to pt with provider card with fax number for results.         Progress Notes  Andrea Shaver MD (Physician)   Gastroenterology   6/9/2020  9:40 AM   Signed     Subjective:       Patient ID: Tristin Cerda is a 65 y.o. male.     Chief Complaint: Follow-up    He has a history of gastroesophageal reflux.  Additionally as Crohn's disease these is  had multiple surgeries.  He has been negative.  He has chronic back and joint pain and is followed in the [facility].  He avoids the anti-inflammatory agents he states.  He denies hematemesis hematochezia jaundice or bleeding.  He receives the B12 on a monthly basis.  He takes his medications vitamins and minerals.  He has had a cholecystectomy.  He has been on the Colestid which has helped.  He also has use the dicyclomine for occasional abdominal cramping.  He generally has postprandial urge to defecate.  He is able to maintain his weight.  Years ago he was on TPN.  He denies dysphagia aspiration.  He has experienced increase gassiness and he means increased flatulence.  He does not associated with the specific food.  He is on tramadol for the chronic pain syndrome.  He believes that he has helped with the diarrhea.        Allergies:       Review of patient's allergies indicates:   Allergen Reactions    Ciprofloxacin      Codeine Itching    Compazine [prochlorperazine]      Erythromycin      Keflex [cephalexin]          Medications:    Current Outpatient Medications:     acetaminophen (TYLENOL) 500 mg Cap, , Disp: , Rfl:     ASPIRIN (ASPIR-81 ORAL), Take by mouth., Disp: , Rfl:     aspirin-acetaminophen-caffeine 250-250-65 mg (EXCEDRIN MIGRAINE) 250-250-65 mg per tablet, , Disp: , Rfl:     colestipol (COLESTID) 1 gram Tab, Take 1 tablet (1 g total) by mouth 2 (two) times daily., Disp: 180 tablet, Rfl: 3    cyanocobalamin 1,000 mcg/mL injection, Inject 1 mL (1,000 mcg total) into the muscle every 30 days., Disp: 10 mL, Rfl: 11    dicyclomine (BENTYL) 20 mg tablet, TAKE 1 TABLET FOUR TIMES DAILY, Disp: 360 tablet, Rfl: 3    diphenhydramine-acetaminophen 12.5-325 mg Tab, , Disp: , Rfl:     hydrocortisone 2.5 % lotion, AAA face QHS PRN flare, Disp: 60 mL, Rfl: 2    magnesium oxide (MAG-OX) 400 mg (241.3 mg magnesium) tablet, Take 400 mg by mouth once daily., Disp: , Rfl:     ondansetron (ZOFRAN) 8 MG  tablet, TAKE 1 TABLET EVERY 12 HOURS AS NEEDED FOR NAUSEA, Disp: 180 tablet, Rfl: 1    phenylephrine-acetaminophen 5-325 mg Cap, , Disp: , Rfl:     potassium gluconate 595 mg (99 mg) Tab, , Disp: , Rfl:     traMADoL (ULTRAM) 50 mg tablet, Take 1 tablet (50 mg total) by mouth every 8 (eight) hours as needed for Pain., Disp: 90 tablet, Rfl: 0     Current Facility-Administered Medications:     cyanocobalamin injection 1,000 mcg, 1,000 mcg, Intramuscular, Q30 Days, Andrea Shaver MD, 1,000 mcg at 05/11/20 0857          Past Medical History:   Diagnosis Date    Anxiety      Basal cell carcinoma 07/2017     R forehead and R temple    Crohn's disease       age 17    Short bowel syndrome      Vitamin B deficiency      Vitamin D deficiency                Past Surgical History:   Procedure Laterality Date    3 small bowel resections        APPENDECTOMY        CHOLECYSTECTOMY        COLONOSCOPY        COLONOSCOPY N/A 2/14/2017     Procedure: COLONOSCOPY;  Surgeon: Nela Sanchez MD;  Location: [address]);  Service: Endoscopy;  Laterality: N/A;    COLONOSCOPY N/A 3/14/2017     Procedure: COLONOSCOPY;  Surgeon: Nela Sanchez MD;  Location: [address]);  Service: Endoscopy;  Laterality: N/A;  2 days clear liquids before procedure    ESOPHAGOGASTRODUODENOSCOPY N/A 11/19/2019     Procedure: EGD (ESOPHAGOGASTRODUODENOSCOPY);  Surgeon: Andrea Shaver MD;  Location: [facility];  Service: Endoscopy;  Laterality: N/A;    KNEE SURGERY        SMALL INTESTINE SURGERY        TUBE THORACOTOMY        UPPER GASTROINTESTINAL ENDOSCOPY               Review of Systems   Constitutional: Negative for appetite change, fever and unexpected weight change.   HENT: Negative for trouble swallowing.         No jaundice.   Respiratory: Negative for cough, shortness of breath and wheezing.         He is a former smoker.  He denies dysphagia aspiration or hemoptysis.  He denies significant coughing or sputum  production.  He denies dyspnea on exertion.   Cardiovascular: Negative for chest pain.        He denies exertional chest pain or rhythm disturbance.   Gastrointestinal: Positive for abdominal pain, diarrhea and nausea. Negative for abdominal distention, anal bleeding, blood in stool, constipation, rectal pain and vomiting.   Musculoskeletal: Positive for arthralgias and back pain. Negative for neck pain.        He has chronic back and joint pain but denies swollen joints.  He is able to perform his usual activities.   Skin: Positive for rash. Negative for pallor.        He has developed a skin rash of the left forearm.  He states he has had is skin cancer removed from that area.  Additionally he has had other skin cancers.  He scheduled to see the dermatologist.  He denies significant pruritus.   Neurological: Negative for dizziness, seizures, syncope, speech difficulty, weakness and numbness.   Hematological: Negative for adenopathy.   Psychiatric/Behavioral: Negative for confusion.       Objective:      Physical Exam   Constitutional: He is oriented to person, place, and time. He appears well-developed and well-nourished.   Thin afebrile nonicteric white male.  He is normocephalic.  Jaundice is absent.  Pupils are normal.  He is oriented x3.  He can relate his history and answer questions appropriately.   HENT:   Head: Normocephalic.   Eyes: Pupils are equal, round, and reactive to light. EOM are normal.   Neck: Normal range of motion. Neck supple. No tracheal deviation present. No thyromegaly present.   Cardiovascular: Normal rate, regular rhythm and normal heart sounds.   Pulmonary/Chest: Effort normal and breath sounds normal.   Abdominal: Soft. Bowel sounds are normal. He exhibits no distension and no mass. There is tenderness. There is no rebound and no guarding. No hernia.   The  abdomen is soft on plane with surgical scars.  There is mild tenderness in left lower quadrant.  Organomegaly masses are not  detected.  Bowel sounds are normal.   Musculoskeletal: Normal range of motion.   He can ambulate normally.  He can go from the sitting to the standing position without difficulty.   Lymphadenopathy:     He has no cervical adenopathy.   Neurological: He is alert and oriented to person, place, and time. No cranial nerve deficit.   Skin: Skin is warm and dry.   Psychiatric: He has a normal mood and affect. His behavior is normal.   Vitals reviewed.         Plan:       There are no diagnoses linked to this encounter.             Other Notes     All notes  Communications       Letter sent to Tyler Smith MD    AMB Visit Summary: Provider Version   Sent 6/12/2020  Active Diagnosis Review (HCC)    Active Diagnosis Review ([facility])   Not recorded   All Charges for This Encounter    Code Description Service Date Service Provider Modifiers Qty    [place] VITAMIN B12 INJ UP TO 1,000 MCG 6/9/2020 Andrea Shaver MD S$GLB 1   69426 [place] OFFICE/OUTPT VISIT,EST,LEVL IV 6/9/2020 Andrea Shaver MD 25, S$GLB 1   [id number] OH PBB SHADOW E&M-EST. PATIENT-LVL III 6/9/2020 Andrea Shaver MD PBBFAC 1   38779 OH INJECTION,THERAP/PROPH/OVME9ZK, IM OR SUBCUT 6/9/2020 Andrea Shaver MD S$GLB 1   3008F [place] BODY MASS INDEX (BMI) DOCUMENTED 6/9/2020 Andrea Shaver MD S$GLB, CPTII 1   Level of Service    Level of Service   OH OFFICE/OUTPT VISIT,EST,LEVL IV [25507]   LOS History   BestPractice Advisories    Click to view BestPractice Advisory history   AVS Reports    Date/Time Report Action User   6/9/2020  9:52 AM After Visit Summary Printed Toyin Alvarez LPN   Encounter-Level Documents - 06/09/2020:    After Visit Summary - Document on 6/9/2020 9:52 AM by Toyin Alvarez LPN: After Visit Summary  Orders Placed       COVID-19 Routine Screening     C-reactive protein     CBC auto differential     Comprehensive metabolic panel     HEPATITIS B VIRAL DNA, QUANTITATIVE  Medication Changes       sodium, potassium,mag sulfates 17.5-3.13-1.6 gram 177 mLs Oral  Daily     Medication List   Fall Risk    Patient Mobility Status: Ambulatory   Number of falls in the past 12 months?: 0   Fall Risk?: No  Medications Administered     cyanocobalamin (vitamin B-12) 1000 mcg  Visit Diagnoses       B12 deficiency     Hiatal hernia     History of Crohn's disease     Chronic diarrhea     Short bowel syndrome     History of cholecystectomy     Chronic abdominal pain     Chronic viral hepatitis B without delta agent and without coma     Preop testing     Problem List     He is here for colonoscopy.  He has Crohn's disease abdominal pain and diarrhea.  He understands the procedures of colonoscopy.  Specifically he realizes there is an extremely small incidence of bleeding perforation or aspiration.  History and physical is unchanged.  Physical examination.  Well-nourished well-hydrated thin nonicteric white male.  He is normocephalic.  Pupils are normal.  Lungs reveal symmetrical respirations at rales rhonchi.  Heart reveals regular rhythm.  The abdomen is soft with tenderness in both lower quadrants.  There surgical scars.  Bowel sounds are normal.  Neurologic reveals he is oriented x3.

## 2020-07-29 NOTE — TRANSFER OF CARE
Anesthesia Transfer of Care Note    Patient: Tristin Cerda    Procedure(s) Performed: Procedure(s) (LRB):  COLONOSCOPY (N/A)    Patient location: PACU    Anesthesia Type: MAC    Transport from OR: Transported from OR on 2-3 L/min O2 by NC with adequate spontaneous ventilation    Post pain: adequate analgesia    Post assessment: no apparent anesthetic complications    Post vital signs: stable    Level of consciousness: awake, alert and oriented    Nausea/Vomiting: no nausea/vomiting    Complications: none    Transfer of care protocol was followed      Last vitals:   Visit Vitals  BP (!) 153/78 (BP Location: Right arm)   Pulse 61   Temp 36.7 °C (98 °F) (Oral)   Resp 15   Ht 6' (1.829 m)   Wt 68 kg (150 lb)   SpO2 96%   BMI 20.34 kg/m²

## 2020-07-29 NOTE — [employer] PATIENT INSTRUCTIONS
Discharge Summary/Instructions after an Endoscopic Procedure  Patient Name: Tristin Cerda  Patient MRN: [id number]  Patient YOB: 1955 Wednesday, July 29, 2020  Andrea Shaver MD  RESTRICTIONS:  During your procedure today, you received medications for sedation.  These   medications may affect your judgment, balance and coordination.  Therefore,   for 24 hours, you have the following restrictions:   - DO NOT drive a car, operate machinery, make legal/financial decisions,   sign important papers or drink alcohol.    ACTIVITY:  Today: no heavy lifting, straining or running due to procedural   sedation/anesthesia.  The following day: return to full activity including work.  DIET:  Eat and drink normally unless instructed otherwise.     TREATMENT FOR COMMON SIDE EFFECTS:  - Mild abdominal pain, nausea, belching, bloating or excessive gas:  rest,   eat lightly and use a heating pad.  - Sore Throat: treat with throat lozenges and/or gargle with warm salt   water.  - Because air was used during the procedure, expelling large amounts of air   from your rectum or belching is normal.  - If a bowel prep was taken, you may not have a bowel movement for 1-3 days.    This is normal.  SYMPTOMS TO WATCH FOR AND REPORT TO YOUR PHYSICIAN:  1. Abdominal pain or bloating, other than gas cramps.  2. Chest pain.  3. Back pain.  4. Signs of infection such as: chills or fever occurring within 24 hours   after the procedure.  5. Rectal bleeding, which would show as bright red, maroon, or black stools.   (A tablespoon of blood from the rectum is not serious, especially if   hemorrhoids are present.)  6. Vomiting.  7. Weakness or dizziness.  GO DIRECTLY TO THE NEAREST EMERGENCY ROOM IF YOU HAVE ANY OF THE FOLLOWING:      Difficulty breathing              Chills and/or fever over 101 F   Persistent vomiting and/or vomiting blood   Severe abdominal pain   Severe chest pain   Black, tarry stools   Bleeding- more than one tablespoon   Any  other symptom or condition that you feel may need urgent attention  Your doctor recommends these additional instructions:  If any biopsies were taken, your doctors clinic will contact you in 1 to 2   weeks with any results.  - Discharge patient to home (ambulatory).   - Resume previous diet.  For questions, problems or results please call your physician - Andrea Shaver MD at Work:  [phone].  [facility] EMERGENCY ROOM PHONE NUMBER: [phone]  IF A COMPLICATION OR EMERGENCY SITUATION ARISES AND YOU ARE UNABLE TO REACH   YOUR PHYSICIAN - GO DIRECTLY TO THE EMERGENCY ROOM.  MD Andrea Alvarado MD  7/29/2020 10:35:30 AM  This report has been verified and signed electronically.  [employer]

## 2020-07-31 LAB
FINAL PATHOLOGIC DIAGNOSIS: NORMAL
GROSS: NORMAL

## 2020-08-03 ENCOUNTER — OFFICE VISIT (OUTPATIENT)
Dept: DERMATOLOGY | Facility: CLINIC | Age: 65
End: 2020-08-03
Payer: MEDICARE

## 2020-08-03 DIAGNOSIS — Z48.02 VISIT FOR SUTURE REMOVAL: Primary | ICD-10-CM

## 2020-08-03 LAB
GPP - ADENOVIRUS 40/41: NOT DETECTED
GPP - CAMPYLOBACTER: NOT DETECTED
GPP - CLOSTRIDIUM DIFFICILE TOXIN A/B: NOT DETECTED
GPP - CRYPTOSPORIDIUM: NOT DETECTED
GPP - E COLI O157: NOT DETECTED
GPP - ENTAMOEBA HISTOLYTICA: NOT DETECTED
GPP - ENTEROTOXIGENIC E COLI (ETEC): NOT DETECTED
GPP - GIARDIA LAMBLIA: NOT DETECTED
GPP - NOROVIRUS GI/GII: NOT DETECTED
GPP - ROTAVIRUS A: NOT DETECTED
GPP - SALMONELLA: NOT DETECTED
GPP - SHIGELLA: NOT DETECTED
GPP - VIBRIO CHOLERA: NOT DETECTED
GPP - YERSINIA ENTEROCOLITICA: NOT DETECTED
LACTATE PLASV-SCNC: NOT DETECTED MMOL/L

## 2020-08-03 PROCEDURE — 99024 PR POST-OP FOLLOW-UP VISIT: ICD-10-PCS | Mod: S$GLB,,, | Performed by: DERMATOLOGY

## 2020-08-03 PROCEDURE — 99024 POSTOP FOLLOW-UP VISIT: CPT | Mod: S$GLB,,, | Performed by: DERMATOLOGY

## 2020-08-03 PROCEDURE — 99999 PR PBB SHADOW E&M-EST. PATIENT-LVL III: ICD-10-PCS | Mod: PBBFAC,,, | Performed by: DERMATOLOGY

## 2020-08-03 PROCEDURE — 99999 PR PBB SHADOW E&M-EST. PATIENT-LVL III: CPT | Mod: PBBFAC,,, | Performed by: DERMATOLOGY

## 2020-08-03 NOTE — PROGRESS NOTES
CC: 65 y.o.male patient is here for suture removal.     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    EXAM:  Sutures intact.  Wound healing well.  Good approximation of skin edges.  No undue erythema to surrounding skin or signs or symptoms of infection.    IMPRESSION:  Healing well post Mohs' micrographic surgery and repair    PLAN:  Site cleaned with peroxide, sutures removed  Dressed with petrolatum   Reviewed further care and expected course  Will schedule for Mohs surgery to basal cell carcinoma right neck in the near future

## 2020-08-07 ENCOUNTER — PATIENT OUTREACH (OUTPATIENT)
Dept: ADMINISTRATIVE | Facility: OTHER | Age: 65
End: 2020-08-07

## 2020-08-07 NOTE — PROGRESS NOTES
Requested updates within Care Everywhere.  Patient's chart was reviewed for overdue YINKA topics.  Immunizations not able to be reconciled.

## 2020-08-11 ENCOUNTER — CLINICAL SUPPORT (OUTPATIENT)
Dept: GASTROENTEROLOGY | Facility: CLINIC | Age: 65
End: 2020-08-11
Payer: MEDICARE

## 2020-08-11 DIAGNOSIS — E53.8 B12 DEFICIENCY: Primary | ICD-10-CM

## 2020-08-11 PROCEDURE — 96372 THER/PROPH/DIAG INJ SC/IM: CPT | Mod: S$GLB,,, | Performed by: INTERNAL MEDICINE

## 2020-08-11 PROCEDURE — 96372 PR INJECTION,THERAP/PROPH/DIAG2ST, IM OR SUBCUT: ICD-10-PCS | Mod: S$GLB,,, | Performed by: INTERNAL MEDICINE

## 2020-08-11 RX ORDER — CYANOCOBALAMIN 1000 UG/ML
1000 INJECTION, SOLUTION INTRAMUSCULAR; SUBCUTANEOUS
Status: COMPLETED | OUTPATIENT
Start: 2020-08-11 | End: 2020-08-11

## 2020-08-11 RX ADMIN — CYANOCOBALAMIN 1000 MCG: 1000 INJECTION, SOLUTION INTRAMUSCULAR; SUBCUTANEOUS at 08:08

## 2020-09-08 RX ORDER — ONDANSETRON HYDROCHLORIDE 8 MG/1
TABLET, FILM COATED ORAL
Qty: 180 TABLET | Refills: 1 | Status: SHIPPED | OUTPATIENT
Start: 2020-09-08 | End: 2023-06-06 | Stop reason: SDUPTHER

## 2020-09-09 ENCOUNTER — PATIENT OUTREACH (OUTPATIENT)
Dept: ADMINISTRATIVE | Facility: OTHER | Age: 65
End: 2020-09-09

## 2020-09-09 NOTE — PROGRESS NOTES
Chart was reviewed for overdue Proactive [facility] Encounters (YINKA)  topics  Updates were requested from care everywhere  Health Maintenance has been updated  [place] immunization registry triggered

## 2020-09-10 ENCOUNTER — OFFICE VISIT (OUTPATIENT)
Dept: PAIN MEDICINE | Facility: CLINIC | Age: 65
End: 2020-09-10
Payer: MEDICARE

## 2020-09-10 VITALS
WEIGHT: 150 LBS | HEIGHT: 73 IN | BODY MASS INDEX: 19.88 KG/M2 | HEART RATE: 62 BPM | SYSTOLIC BLOOD PRESSURE: 124 MMHG | DIASTOLIC BLOOD PRESSURE: 68 MMHG

## 2020-09-10 DIAGNOSIS — R10.9 CHRONIC ABDOMINAL PAIN: ICD-10-CM

## 2020-09-10 DIAGNOSIS — M17.11 OSTEOARTHRITIS OF RIGHT KNEE, UNSPECIFIED OSTEOARTHRITIS TYPE: ICD-10-CM

## 2020-09-10 DIAGNOSIS — G89.29 CHRONIC ABDOMINAL PAIN: ICD-10-CM

## 2020-09-10 DIAGNOSIS — G89.4 CHRONIC PAIN DISORDER: Primary | ICD-10-CM

## 2020-09-10 DIAGNOSIS — K50.90 CROHN'S DISEASE WITHOUT COMPLICATION, UNSPECIFIED GASTROINTESTINAL TRACT LOCATION: ICD-10-CM

## 2020-09-10 PROCEDURE — 99214 OFFICE O/P EST MOD 30 MIN: CPT | Mod: S$GLB,,, | Performed by: ANESTHESIOLOGY

## 2020-09-10 PROCEDURE — 99999 PR PBB SHADOW E&M-EST. PATIENT-LVL IV: ICD-10-PCS | Mod: PBBFAC,,, | Performed by: ANESTHESIOLOGY

## 2020-09-10 PROCEDURE — 99999 PR PBB SHADOW E&M-EST. PATIENT-LVL IV: CPT | Mod: PBBFAC,,, | Performed by: ANESTHESIOLOGY

## 2020-09-10 PROCEDURE — 99214 PR OFFICE/OUTPT VISIT, EST, LEVL IV, 30-39 MIN: ICD-10-PCS | Mod: S$GLB,,, | Performed by: ANESTHESIOLOGY

## 2020-09-10 PROCEDURE — 3008F BODY MASS INDEX DOCD: CPT | Mod: CPTII,S$GLB,, | Performed by: ANESTHESIOLOGY

## 2020-09-10 PROCEDURE — 3008F PR BODY MASS INDEX (BMI) DOCUMENTED: ICD-10-PCS | Mod: CPTII,S$GLB,, | Performed by: ANESTHESIOLOGY

## 2020-09-10 RX ORDER — TRAMADOL HYDROCHLORIDE 50 MG/1
50 TABLET ORAL EVERY 8 HOURS PRN
Qty: 90 TABLET | Refills: 2 | Status: SHIPPED | OUTPATIENT
Start: 2020-09-10 | End: 2020-12-09 | Stop reason: SDUPTHER

## 2020-09-10 NOTE — PROGRESS NOTES
PCP: Tyler Smith MD      CC: abdominal pain    Interval history: Mr. Cerda is a 65 y.o. male with an extensive history of crohn's disease who returns to our clinic. We discontinued Demerol 50mg q 8 hrs and started Tramadol 50 mg q 8 h. He reports this is beneficial.  No side effects reported.   Abdominal pain remains stable.   He does report diarrhea at times but no blood stools. Reports anal pain and itching 2/2 chronic diarrhea.  He is currently being evaluated by gastroenterology.  OTC Lidocaine 2 % provides some minimal relief.  Compounding cream was too expensive. Continues to c/o bilateral knee pain. He has had multiple surgeries in the past.  He may be interested in genicular nerve blocks in the future.  He rates his pain 4/10.     Prior HPI:   Mr. Cerda is a 60 year old male with PMH of crohn's disease referred by Dr. Hansen.  Patient states being diagnosed with crohn's disease since the age of 14.    He has had multiple GI surgeries and large bowel and small bowel removals.  During that time, he was being managed by providers in [place].  He was TPN dependent for many years until about two years ago.  He is now stable on an oral diet.  He did not have a primary care physician nor a gastroenterologist for many years.  He is currently trying to establish care.  He has future appointment with Dr. Ch.  He states taking Demerol 50mg q8hrs as needed for pain. It has provided relief of his nausea and pain with diarrhea.  He was last prescribed Demerol in July 2014.  Since then, he has been bearing with the pain.  It is a sharp shooting pain in his mid abdomen.      ROS:  CONSTITUTIONAL: No fevers, chills, night sweats, wt. loss, appetite changes  SKIN: no rashes or itching  ENT: No headaches, head trauma, vision changes, or eye pain  LYMPH NODES: None noticed   CV: No chest pain, palpitations.   RESP: No shortness of breath, dyspnea on exertion, cough, wheezing, or hemoptysis  GI: No nausea,  emesis, diarrhea, constipation, melena, hematochezia, pain.    : No dysuria, hematuria, urgency, or frequency   HEME: No easy bruising, bleeding problems  PSYCHIATRIC: No depression, anxiety, psychosis, hallucinations.  NEURO: No seizures, memory loss, dizziness or difficulty sleeping  MSK: no joint pain      Past Medical History:   Diagnosis Date    Anxiety     Basal cell carcinoma 07/2017    R forehead and R temple    Crohn's disease     age 17    Short bowel syndrome     Vitamin B deficiency     Vitamin D deficiency      Past Surgical History:   Procedure Laterality Date    3 small bowel resections      APPENDECTOMY      CHOLECYSTECTOMY      COLONOSCOPY      COLONOSCOPY N/A 2/14/2017    Procedure: COLONOSCOPY;  Surgeon: Nela Sanchez MD;  Location: [facility] ([address] [place] [address]);  Service: Endoscopy;  Laterality: N/A;    COLONOSCOPY N/A 3/14/2017    Procedure: COLONOSCOPY;  Surgeon: Nela Sanchez MD;  Location: [facility] ([address] [place] [address]);  Service: Endoscopy;  Laterality: N/A;  2 days clear liquids before procedure    COLONOSCOPY N/A 7/29/2020    Procedure: COLONOSCOPY;  Surgeon: Andrea Shaver MD;  Location: [facility];  Service: Endoscopy;  Laterality: N/A;    ESOPHAGOGASTRODUODENOSCOPY N/A 11/19/2019    Procedure: EGD (ESOPHAGOGASTRODUODENOSCOPY);  Surgeon: Andrea Shaver MD;  Location: [facility];  Service: Endoscopy;  Laterality: N/A;    KNEE SURGERY      SMALL INTESTINE SURGERY      TUBE THORACOTOMY      UPPER GASTROINTESTINAL ENDOSCOPY       Family History   Problem Relation Age of Onset    Diabetes Mother     Stroke Mother     Diabetes Father     Kidney disease Father     Irritable bowel syndrome Cousin     Celiac disease Neg Hx     Cirrhosis Neg Hx     Colon cancer Neg Hx     Colon polyps Neg Hx     Cystic fibrosis Neg Hx     Esophageal cancer Neg Hx     Crohn's disease Neg Hx     Hemochromatosis Neg Hx     Inflammatory bowel disease Neg Hx     Liver cancer Neg Hx     Liver disease  "Neg Hx     Rectal cancer Neg Hx     Stomach cancer Neg Hx     Ulcerative colitis Neg Hx     Addison's disease Neg Hx     Melanoma Neg Hx     Psoriasis Neg Hx     Lupus Neg Hx     Eczema Neg Hx      Social History     Socioeconomic History    Marital status:      Spouse name: Not on file    Number of children: Not on file    Years of education: Not on file    Highest education level: Not on file   Occupational History    Not on file   Social Needs    Financial resource strain: Not on file    Food insecurity     Worry: Not on file     Inability: Not on file    Transportation needs     Medical: Not on file     Non-medical: Not on file   Tobacco Use    Smoking status: Former Smoker     Packs/day: 1.00     Years: 15.00     Pack years: 15.00     Types: Cigarettes     Quit date: 3/10/1995     Years since quittin.5    Smokeless tobacco: Never Used   Substance and Sexual Activity    Alcohol use: Yes     Alcohol/week: 2.0 standard drinks     Types: 1 Cans of beer, 1 Shots of liquor per week     Comment: 1 every 6 -7 months    Drug use: No    Sexual activity: Yes   Lifestyle    Physical activity     Days per week: Not on file     Minutes per session: Not on file    Stress: Not on file   Relationships    Social connections     Talks on phone: Not on file     Gets together: Not on file     Attends Scientologist service: Not on file     Active member of club or organization: Not on file     Attends meetings of clubs or organizations: Not on file     Relationship status: Not on file   Other Topics Concern    Not on file   Social History Narrative    Retired      Medications/Allergies: See med card    Vitals:    09/10/20 1032   BP: 124/68   Pulse: 62   Weight: 68 kg (150 lb)   Height: 6' 1" (1.854 m)   PainSc:   4   PainLoc: Abdomen     Physical exam:    GENERAL: A and O x3, the patient appears well groomed and is in no acute distress.  Skin: No rashes or obvious lesions  HEENT: normocephalic, " atraumatic  CARDIOVASCULAR:  RRR  LUNGS: non labored breathing  ABDOMEN: soft, nontender. No rebound   UPPER EXTREMITIES: Normal alignment, normal range of motion, no atrophy, no skin changes,  hair growth and nail growth normal and equal bilaterally. No swelling, no tenderness.    LOWER EXTREMITIES:  Normal alignment, normal range of motion, no atrophy, no skin changes,  hair growth and nail growth normal and equal bilaterally. No swelling, no tenderness.    LUMBAR SPINE  Lumbar spine: ROM is full with flexion extension and oblique extension with no increased pain.    Lam's test causes no increased pain on either side.    Supine straight leg raise is negative bilaterally.    Internal and external rotation of the hip causes no increased pain on either side.  Myofascial exam: No tenderness to palpation across lumbar paraspinous muscles.    MENTAL STATUS: normal orientation, speech, language, and fund of knowledge for social situation.  Emotional state appropriate.    CRANIAL NERVES:  II:  PERRL bilaterally,   III,IV,VI: EOMI.    V:  Facial sensation equal bilaterally  VII:  Facial motor function normal.  VIII:  Hearing equal to finger rub bilaterally  IX/X: Gag normal, palate symmetric  XI:  Shoulder shrug equal, head turn equal  XII:  Tongue midline without fasciculations    MOTOR: Tone and bulk: normal bilateral upper and lower Strength: normal   SENSATION: Light touch and pinprick intact bilaterally  REFLEXES: normal, symmetric, nonbrisk.  Toes down, no clonus. No hoffmans.  GAIT: normal rise, base, steps, and arm swing.      Imaging:  None    Assessment:  Mr. Cerda is a 65 y.o. male with abdominal pain  1. Chronic pain disorder    2. Chronic abdominal pain    3. Crohn's disease without complication, unspecified gastrointestinal tract location    4. Osteoarthritis of right knee, unspecified osteoarthritis type       Plan:  1. Patient with long history of crohn's disease and chronic abdominal pain.  Continue  care with GI.  2. Tramadol 50 mg q 8 h prn  as needed for pain.  reviewed.  No signs of aberrant behavior.  UDS last visit consistent with use.  Script provided for 3 months  3. Continue OTC lidocaine cream  4. May benefit from bilateral knee genicular nerve blocks.  He will consider this option contact us he wishes to proceed   5. F/u 3 months or sooner

## 2020-09-29 ENCOUNTER — OFFICE VISIT (OUTPATIENT)
Dept: GASTROENTEROLOGY | Facility: CLINIC | Age: 65
End: 2020-09-29
Payer: MEDICARE

## 2020-09-29 VITALS
BODY MASS INDEX: 20.41 KG/M2 | WEIGHT: 154 LBS | RESPIRATION RATE: 18 BRPM | HEART RATE: 75 BPM | DIASTOLIC BLOOD PRESSURE: 76 MMHG | SYSTOLIC BLOOD PRESSURE: 138 MMHG | OXYGEN SATURATION: 95 % | TEMPERATURE: 98 F | HEIGHT: 73 IN

## 2020-09-29 DIAGNOSIS — Z87.19 HISTORY OF CROHN'S DISEASE: ICD-10-CM

## 2020-09-29 DIAGNOSIS — K90.0 CELIAC DISEASE: ICD-10-CM

## 2020-09-29 DIAGNOSIS — Z23 ENCOUNTER FOR ADMINISTRATION OF VACCINE: ICD-10-CM

## 2020-09-29 DIAGNOSIS — B19.10 HEPATITIS B INFECTION WITHOUT DELTA AGENT WITHOUT HEPATIC COMA, UNSPECIFIED CHRONICITY: ICD-10-CM

## 2020-09-29 DIAGNOSIS — K44.9 HIATAL HERNIA: ICD-10-CM

## 2020-09-29 DIAGNOSIS — R10.9 CHRONIC ABDOMINAL PAIN: ICD-10-CM

## 2020-09-29 DIAGNOSIS — K90.829 SHORT BOWEL SYNDROME: ICD-10-CM

## 2020-09-29 DIAGNOSIS — E53.8 B12 DEFICIENCY: Primary | ICD-10-CM

## 2020-09-29 DIAGNOSIS — K76.0 FATTY LIVER: ICD-10-CM

## 2020-09-29 DIAGNOSIS — R16.1 SPLENOMEGALY: ICD-10-CM

## 2020-09-29 DIAGNOSIS — K50.018 CROHN'S DISEASE OF SMALL INTESTINE WITH OTHER COMPLICATION: ICD-10-CM

## 2020-09-29 DIAGNOSIS — Z90.49 HISTORY OF CHOLECYSTECTOMY: ICD-10-CM

## 2020-09-29 DIAGNOSIS — G89.29 CHRONIC ABDOMINAL PAIN: ICD-10-CM

## 2020-09-29 DIAGNOSIS — K52.9 CHRONIC DIARRHEA: ICD-10-CM

## 2020-09-29 PROCEDURE — 90686 FLU VACCINE (QUAD) GREATER THAN OR EQUAL TO 3YO PRESERVATIVE FREE IM: ICD-10-PCS | Mod: S$GLB,,, | Performed by: INTERNAL MEDICINE

## 2020-09-29 PROCEDURE — 90686 IIV4 VACC NO PRSV 0.5 ML IM: CPT | Mod: S$GLB,,, | Performed by: INTERNAL MEDICINE

## 2020-09-29 PROCEDURE — 3008F BODY MASS INDEX DOCD: CPT | Mod: CPTII,S$GLB,, | Performed by: INTERNAL MEDICINE

## 2020-09-29 PROCEDURE — 96372 PR INJECTION,THERAP/PROPH/DIAG2ST, IM OR SUBCUT: ICD-10-PCS | Mod: S$GLB,,, | Performed by: INTERNAL MEDICINE

## 2020-09-29 PROCEDURE — 3008F PR BODY MASS INDEX (BMI) DOCUMENTED: ICD-10-PCS | Mod: CPTII,S$GLB,, | Performed by: INTERNAL MEDICINE

## 2020-09-29 PROCEDURE — 99999 PR PBB SHADOW E&M-EST. PATIENT-LVL IV: ICD-10-PCS | Mod: PBBFAC,,, | Performed by: INTERNAL MEDICINE

## 2020-09-29 PROCEDURE — 96372 THER/PROPH/DIAG INJ SC/IM: CPT | Mod: S$GLB,,, | Performed by: INTERNAL MEDICINE

## 2020-09-29 PROCEDURE — G0008 ADMIN INFLUENZA VIRUS VAC: HCPCS | Mod: 59,S$GLB,, | Performed by: INTERNAL MEDICINE

## 2020-09-29 PROCEDURE — 99999 PR PBB SHADOW E&M-EST. PATIENT-LVL IV: CPT | Mod: PBBFAC,,, | Performed by: INTERNAL MEDICINE

## 2020-09-29 PROCEDURE — 99214 OFFICE O/P EST MOD 30 MIN: CPT | Mod: 25,S$GLB,, | Performed by: INTERNAL MEDICINE

## 2020-09-29 PROCEDURE — G0008 FLU VACCINE (QUAD) GREATER THAN OR EQUAL TO 3YO PRESERVATIVE FREE IM: ICD-10-PCS | Mod: 59,S$GLB,, | Performed by: INTERNAL MEDICINE

## 2020-09-29 PROCEDURE — 99214 PR OFFICE/OUTPT VISIT, EST, LEVL IV, 30-39 MIN: ICD-10-PCS | Mod: 25,S$GLB,, | Performed by: INTERNAL MEDICINE

## 2020-09-29 RX ORDER — CYANOCOBALAMIN 1000 UG/ML
1000 INJECTION, SOLUTION INTRAMUSCULAR; SUBCUTANEOUS
Status: COMPLETED | OUTPATIENT
Start: 2020-09-29 | End: 2020-09-29

## 2020-09-29 RX ORDER — ONDANSETRON HYDROCHLORIDE 8 MG/1
TABLET, FILM COATED ORAL
Qty: 180 TABLET | Refills: 1 | Status: SHIPPED | OUTPATIENT
Start: 2020-09-29 | End: 2021-01-29 | Stop reason: SDUPTHER

## 2020-09-29 RX ADMIN — CYANOCOBALAMIN 1000 MCG: 1000 INJECTION, SOLUTION INTRAMUSCULAR; SUBCUTANEOUS at 09:09

## 2020-09-29 NOTE — LETTER
September 29, 2020      Tyler Smith MD  [address] [place] [id number]           [facility]  [address], [address]  [place] [address]  Phone: [phone]  Fax: [phone]          Patient: Tristin Cerda   MR Number: [id number]   YOB: 1955   Date of Visit: 9/29/2020       Dear Dr. Tyler Smith:    Thank you for referring Tristin Cerda to me for evaluation. Attached you will find relevant portions of my assessment and plan of care.    If you have questions, please do not hesitate to call me. I look forward to following Tristin Cerda along with you.    Sincerely,    Andrea Shaver MD    Enclosure  CC:  No Recipients    If you would like to receive this communication electronically, please contact [email or web address] or [phone] to request more information on [employer] access.    For providers and/or their staff who would like to refer a patient to [facility], please contact us through our one-stop-shop provider referral line, [facility], at [phone].    If you feel you have received this communication in error or would no longer like to receive these types of communications, please e-mail [email or web address]

## 2020-09-29 NOTE — H&P (VIEW-ONLY)
Subjective:       Patient ID: Tristin Cerda is a 65 y.o. male.    Chief Complaint: Follow-up    Colonoscopy revealed hemorrhoids.  The biopsies of the hyperemic sigmoid colon were negative.  The colonoscopy prep was in adequate.  The prep was only fair.  He apparently had difficulty with the prep kit.  He could not tolerate the split dose.  He has a history of multiple gastrointestinal surgeries probably small-bowel in nature which required prolonged TPN for Crohn's disease.  He has been receiving the B12.  He is able to tolerate his oral intake and maintain his weight.  He denies fever chills.  He states years ago he had a colonoscopy and that it took 1 week prep to get me clear .  He realizes he was not iwell prepared.  He is tolerating his diet.  He generally has 2 3 bowel movements per 24 hr without incontinence but does have urgency.  He denies fever chills hematemesis hematochezia jaundice or bleeding.  The Zofran is controlled the occasional nausea.  He cannot pinpoint a precipitating factor for the nausea such as a specific food..  He does have a PCP in [place] that he sees occasionally.      Allergies:  Review of patient's allergies indicates:   Allergen Reactions    Ciprofloxacin     Codeine Itching    Compazine [prochlorperazine]     Erythromycin     Keflex [cephalexin]        Medications:    Current Outpatient Medications:     acetaminophen (TYLENOL) 500 mg Cap, , Disp: , Rfl:     ASPIRIN (ASPIR-81 ORAL), Take by mouth., Disp: , Rfl:     aspirin-acetaminophen-caffeine 250-250-65 mg (EXCEDRIN MIGRAINE) 250-250-65 mg per tablet, , Disp: , Rfl:     colestipol (COLESTID) 1 gram Tab, Take 1 tablet (1 g total) by mouth 2 (two) times daily., Disp: 180 tablet, Rfl: 3    cyanocobalamin 1,000 mcg/mL injection, Inject 1 mL (1,000 mcg total) into the muscle every 30 days., Disp: 10 mL, Rfl: 11    dicyclomine (BENTYL) 20 mg tablet, TAKE 1 TABLET FOUR TIMES DAILY, Disp: 360 tablet, Rfl: 3     diphenhydramine-acetaminophen 12.5-325 mg Tab, , Disp: , Rfl:     fluorouraciL (EFUDEX) 5 % cream, Apply topically 2 (two) times daily. For 4 weeks to L cheek, Disp: 40 g, Rfl: 1    hydrocortisone 2.5 % lotion, AAA face QHS PRN flare, Disp: 60 mL, Rfl: 2    ondansetron (ZOFRAN) 8 MG tablet, TAKE 1 TABLET EVERY 12 HOURS AS NEEDED FOR NAUSEA, Disp: 180 tablet, Rfl: 1    phenylephrine-acetaminophen 5-325 mg Cap, , Disp: , Rfl:     traMADoL (ULTRAM) 50 mg tablet, Take 1 tablet (50 mg total) by mouth every 8 (eight) hours as needed for Pain. Medically necessary for greater than 7 days for chronic pain, Disp: 90 tablet, Rfl: 2    Current Facility-Administered Medications:     cyanocobalamin injection 1,000 mcg, 1,000 mcg, Intramuscular, Q30 Days, Andrea Shaver MD, 1,000 mcg at 05/11/20 0857    cyanocobalamin injection 1,000 mcg, 1,000 mcg, Intramuscular, 1 time in Clinic/HOD, Andrea Shaver MD    diptheria-tetanus toxoids 2-2 Lf unit/0.5 mL injection 0.5 mL, 0.5 mL, Intramuscular, 1 time in Clinic/HOD, Andrea Shaver MD    Past Medical History:   Diagnosis Date    Anxiety     Basal cell carcinoma 07/2017    R forehead and R temple    Crohn's disease     age 17    Short bowel syndrome     Vitamin B deficiency     Vitamin D deficiency        Past Surgical History:   Procedure Laterality Date    3 small bowel resections      APPENDECTOMY      CHOLECYSTECTOMY      COLONOSCOPY      COLONOSCOPY N/A 2/14/2017    Procedure: COLONOSCOPY;  Surgeon: Nela Sanchez MD;  Location: [address]);  Service: Endoscopy;  Laterality: N/A;    COLONOSCOPY N/A 3/14/2017    Procedure: COLONOSCOPY;  Surgeon: Nela Sanchez MD;  Location: [facility] ([address] [place] [address]);  Service: Endoscopy;  Laterality: N/A;  2 days clear liquids before procedure    COLONOSCOPY N/A 7/29/2020    Procedure: COLONOSCOPY;  Surgeon: Andrea Shaver MD;  Location: [facility];  Service: Endoscopy;  Laterality: N/A;    ESOPHAGOGASTRODUODENOSCOPY N/A  11/19/2019    Procedure: EGD (ESOPHAGOGASTRODUODENOSCOPY);  Surgeon: Andrea Shaver MD;  Location: [facility];  Service: Endoscopy;  Laterality: N/A;    KNEE SURGERY      SMALL INTESTINE SURGERY      TUBE THORACOTOMY      UPPER GASTROINTESTINAL ENDOSCOPY           Review of Systems   Constitutional: Negative for appetite change, fever and unexpected weight change.   HENT: Negative for trouble swallowing.         No jaundice.   Respiratory: Negative for cough, shortness of breath and wheezing.         He has never been a cigarette smoker.  He currently is not using tobacco.  He does consume small amounts of alcohol.  He denies dysphagia aspiration hemoptysis chronic cough chronic sputum production or dyspnea on exertion.   Cardiovascular: Negative for chest pain.        He denies exertional chest pain or rhythm disturbance.   Gastrointestinal: Positive for abdominal pain, diarrhea and nausea. Negative for abdominal distention, anal bleeding, blood in stool and constipation.        He has had multiple small-bowel resections for Crohn's disease.  He required TPN for years.  He is able tolerate the oral intake and maintaining his weight.  He has occasional nausea.  He cannot pinpoint a precipitating factor.  She has had a cholecystectomy.  He denies specific abdominal pain except for occasional cramping with signal a bowel movement.  He states he was told the past he had celiac disease.  He avoids gluten.  He is taking his vitamins.  He has fatty liver.  In the past it was thought he had chronic hepatitis B but his DNA viral titers were negative.   Musculoskeletal: Positive for arthralgias. Negative for back pain and neck pain.   Skin: Negative for pallor and rash.   Neurological: Negative for dizziness, seizures, syncope, speech difficulty, weakness and numbness.   Hematological: Negative for adenopathy.   Psychiatric/Behavioral: Negative for confusion.       Objective:      Physical Exam  Vitals signs reviewed.    Constitutional:       Appearance: He is well-developed.      Comments: Thin afebrile nonicteric well-nourished well-hydrated white male.  He is sitting comfortably in the chair.  He is oriented x3.  He can answer questions relate his history normally.  He is normocephalic.  Pupils are normal.   HENT:      Head: Normocephalic.   Eyes:      Pupils: Pupils are equal, round, and reactive to light.   Neck:      Musculoskeletal: Normal range of motion and neck supple.      Thyroid: No thyromegaly.      Trachea: No tracheal deviation.   Cardiovascular:      Rate and Rhythm: Normal rate and regular rhythm.      Heart sounds: Normal heart sounds.   Pulmonary:      Effort: Pulmonary effort is normal.      Breath sounds: Normal breath sounds.   Abdominal:      General: Bowel sounds are normal. There is no distension.      Palpations: Abdomen is soft. There is no mass.      Tenderness: There is no abdominal tenderness. There is no right CVA tenderness, left CVA tenderness, guarding or rebound.      Hernia: No hernia is present.      Comments: The abdomen is soft with surgical scars.  Organomegaly masses or tenderness are not present.   Musculoskeletal: Normal range of motion.      Comments: He can ambulate normally.  He can go from the sitting the standing position without difficulty   Lymphadenopathy:      Cervical: No cervical adenopathy.   Skin:     General: Skin is warm and dry.   Neurological:      Mental Status: He is alert and oriented to person, place, and time.      Cranial Nerves: No cranial nerve deficit.   Psychiatric:         Behavior: Behavior normal.           Plan:       B12 deficiency  -     cyanocobalamin injection 1,000 mcg    Encounter for administration of vaccine  -     Influenza - Quadrivalent *Preferred* (6 months+) (PF)  -     diptheria-tetanus toxoids 2-2 Lf unit/0.5 mL injection 0.5 mL    Short bowel syndrome  -     ondansetron (ZOFRAN) 8 MG tablet; TAKE 1 TABLET EVERY 12 HOURS AS NEEDED FOR NAUSEA   Dispense: 180 tablet; Refill: 1    Chronic abdominal pain  -     ondansetron (ZOFRAN) 8 MG tablet; TAKE 1 TABLET EVERY 12 HOURS AS NEEDED FOR NAUSEA  Dispense: 180 tablet; Refill: 1    History of cholecystectomy    Hepatitis B infection without delta agent without hepatic coma, unspecified chronicity    Hiatal hernia    Celiac disease    History of Crohn's disease    Splenomegaly    Crohn's disease of small intestine with other complication    Chronic diarrhea    Fatty liver      .  He continues his vitamins and minerals.  He will continue his gluten free diet.  He continues his reflux regimen.  He will make a food diary avoid the offending foods.  He had more fiber to the diet.  He continues his vitamin B12.  He will continue his current medications.  He does not want further evaluation of his GI tract at this point.

## 2020-09-29 NOTE — PATIENT INSTRUCTIONS
Colonoscopy revealed hemorrhoids and the biopsies were negative for Crohn's disease.  He is tolerating his diet without difficulty.  He gets monthly B12 injections.  He will make a food diary and avoid the offending foods.  He uses Zofran as needed for nausea.  He continues his other medications vitamins and minerals.  He may need to add fiber to the diet.

## 2020-09-29 NOTE — PROGRESS NOTES
Subjective:       Patient ID: Tristin Cerda is a 65 y.o. male.    Chief Complaint: Follow-up    Colonoscopy revealed hemorrhoids.  The biopsies of the hyperemic sigmoid colon were negative.  The colonoscopy prep was in adequate.  The prep was only fair.  He apparently had difficulty with the prep kit.  He could not tolerate the split dose.  He has a history of multiple gastrointestinal surgeries probably small-bowel in nature which required prolonged TPN for Crohn's disease.  He has been receiving the B12.  He is able to tolerate his oral intake and maintain his weight.  He denies fever chills.  He states years ago he had a colonoscopy and that it took 1 week prep to get me clear .  He realizes he was not iwell prepared.  He is tolerating his diet.  He generally has 2 3 bowel movements per 24 hr without incontinence but does have urgency.  He denies fever chills hematemesis hematochezia jaundice or bleeding.  The Zofran is controlled the occasional nausea.  He cannot pinpoint a precipitating factor for the nausea such as a specific food..  He does have a PCP in [place] that he sees occasionally.      Allergies:  Review of patient's allergies indicates:   Allergen Reactions    Ciprofloxacin     Codeine Itching    Compazine [prochlorperazine]     Erythromycin     Keflex [cephalexin]        Medications:    Current Outpatient Medications:     acetaminophen (TYLENOL) 500 mg Cap, , Disp: , Rfl:     ASPIRIN (ASPIR-81 ORAL), Take by mouth., Disp: , Rfl:     aspirin-acetaminophen-caffeine 250-250-65 mg (EXCEDRIN MIGRAINE) 250-250-65 mg per tablet, , Disp: , Rfl:     colestipol (COLESTID) 1 gram Tab, Take 1 tablet (1 g total) by mouth 2 (two) times daily., Disp: 180 tablet, Rfl: 3    cyanocobalamin 1,000 mcg/mL injection, Inject 1 mL (1,000 mcg total) into the muscle every 30 days., Disp: 10 mL, Rfl: 11    dicyclomine (BENTYL) 20 mg tablet, TAKE 1 TABLET FOUR TIMES DAILY, Disp: 360 tablet, Rfl: 3     diphenhydramine-acetaminophen 12.5-325 mg Tab, , Disp: , Rfl:     fluorouraciL (EFUDEX) 5 % cream, Apply topically 2 (two) times daily. For 4 weeks to L cheek, Disp: 40 g, Rfl: 1    hydrocortisone 2.5 % lotion, AAA face QHS PRN flare, Disp: 60 mL, Rfl: 2    ondansetron (ZOFRAN) 8 MG tablet, TAKE 1 TABLET EVERY 12 HOURS AS NEEDED FOR NAUSEA, Disp: 180 tablet, Rfl: 1    phenylephrine-acetaminophen 5-325 mg Cap, , Disp: , Rfl:     traMADoL (ULTRAM) 50 mg tablet, Take 1 tablet (50 mg total) by mouth every 8 (eight) hours as needed for Pain. Medically necessary for greater than 7 days for chronic pain, Disp: 90 tablet, Rfl: 2    Current Facility-Administered Medications:     cyanocobalamin injection 1,000 mcg, 1,000 mcg, Intramuscular, Q30 Days, Andrea Shaver MD, 1,000 mcg at 05/11/20 0857    cyanocobalamin injection 1,000 mcg, 1,000 mcg, Intramuscular, 1 time in Clinic/HOD, Andrea Shaver MD    diptheria-tetanus toxoids 2-2 Lf unit/0.5 mL injection 0.5 mL, 0.5 mL, Intramuscular, 1 time in Clinic/HOD, Andrea Shaver MD    Past Medical History:   Diagnosis Date    Anxiety     Basal cell carcinoma 07/2017    R forehead and R temple    Crohn's disease     age 17    Short bowel syndrome     Vitamin B deficiency     Vitamin D deficiency        Past Surgical History:   Procedure Laterality Date    3 small bowel resections      APPENDECTOMY      CHOLECYSTECTOMY      COLONOSCOPY      COLONOSCOPY N/A 2/14/2017    Procedure: COLONOSCOPY;  Surgeon: Nela Sanchez MD;  Location: [address]);  Service: Endoscopy;  Laterality: N/A;    COLONOSCOPY N/A 3/14/2017    Procedure: COLONOSCOPY;  Surgeon: Nela Sanchez MD;  Location: [facility] ([address] [place] [address]);  Service: Endoscopy;  Laterality: N/A;  2 days clear liquids before procedure    COLONOSCOPY N/A 7/29/2020    Procedure: COLONOSCOPY;  Surgeon: Andrea Shaver MD;  Location: [facility];  Service: Endoscopy;  Laterality: N/A;    ESOPHAGOGASTRODUODENOSCOPY N/A  11/19/2019    Procedure: EGD (ESOPHAGOGASTRODUODENOSCOPY);  Surgeon: Andrea Shaver MD;  Location[facility];  Service: Endoscopy;  Laterality: N/A;    KNEE SURGERY      SMALL INTESTINE SURGERY      TUBE THORACOTOMY      UPPER GASTROINTESTINAL ENDOSCOPY           Review of Systems   Constitutional: Negative for appetite change, fever and unexpected weight change.   HENT: Negative for trouble swallowing.         No jaundice.   Respiratory: Negative for cough, shortness of breath and wheezing.         He has never been a cigarette smoker.  He currently is not using tobacco.  He does consume small amounts of alcohol.  He denies dysphagia aspiration hemoptysis chronic cough chronic sputum production or dyspnea on exertion.   Cardiovascular: Negative for chest pain.        He denies exertional chest pain or rhythm disturbance.   Gastrointestinal: Positive for abdominal pain, diarrhea and nausea. Negative for abdominal distention, anal bleeding, blood in stool and constipation.        He has had multiple small-bowel resections for Crohn's disease.  He required TPN for years.  He is able tolerate the oral intake and maintaining his weight.  He has occasional nausea.  He cannot pinpoint a precipitating factor.  She has had a cholecystectomy.  He denies specific abdominal pain except for occasional cramping with signal a bowel movement.  He states he was told the past he had celiac disease.  He avoids gluten.  He is taking his vitamins.  He has fatty liver.  In the past it was thought he had chronic hepatitis B but his DNA viral titers were negative.   Musculoskeletal: Positive for arthralgias. Negative for back pain and neck pain.   Skin: Negative for pallor and rash.   Neurological: Negative for dizziness, seizures, syncope, speech difficulty, weakness and numbness.   Hematological: Negative for adenopathy.   Psychiatric/Behavioral: Negative for confusion.       Objective:      Physical Exam  Vitals signs reviewed.    Constitutional:       Appearance: He is well-developed.      Comments: Thin afebrile nonicteric well-nourished well-hydrated white male.  He is sitting comfortably in the chair.  He is oriented x3.  He can answer questions relate his history normally.  He is normocephalic.  Pupils are normal.   HENT:      Head: Normocephalic.   Eyes:      Pupils: Pupils are equal, round, and reactive to light.   Neck:      Musculoskeletal: Normal range of motion and neck supple.      Thyroid: No thyromegaly.      Trachea: No tracheal deviation.   Cardiovascular:      Rate and Rhythm: Normal rate and regular rhythm.      Heart sounds: Normal heart sounds.   Pulmonary:      Effort: Pulmonary effort is normal.      Breath sounds: Normal breath sounds.   Abdominal:      General: Bowel sounds are normal. There is no distension.      Palpations: Abdomen is soft. There is no mass.      Tenderness: There is no abdominal tenderness. There is no right CVA tenderness, left CVA tenderness, guarding or rebound.      Hernia: No hernia is present.      Comments: The abdomen is soft with surgical scars.  Organomegaly masses or tenderness are not present.   Musculoskeletal: Normal range of motion.      Comments: He can ambulate normally.  He can go from the sitting the standing position without difficulty   Lymphadenopathy:      Cervical: No cervical adenopathy.   Skin:     General: Skin is warm and dry.   Neurological:      Mental Status: He is alert and oriented to person, place, and time.      Cranial Nerves: No cranial nerve deficit.   Psychiatric:         Behavior: Behavior normal.           Plan:       B12 deficiency  -     cyanocobalamin injection 1,000 mcg    Encounter for administration of vaccine  -     Influenza - Quadrivalent *Preferred* (6 months+) (PF)  -     diptheria-tetanus toxoids 2-2 Lf unit/0.5 mL injection 0.5 mL    Short bowel syndrome  -     ondansetron (ZOFRAN) 8 MG tablet; TAKE 1 TABLET EVERY 12 HOURS AS NEEDED FOR NAUSEA   Dispense: 180 tablet; Refill: 1    Chronic abdominal pain  -     ondansetron (ZOFRAN) 8 MG tablet; TAKE 1 TABLET EVERY 12 HOURS AS NEEDED FOR NAUSEA  Dispense: 180 tablet; Refill: 1    History of cholecystectomy    Hepatitis B infection without delta agent without hepatic coma, unspecified chronicity    Hiatal hernia    Celiac disease    History of Crohn's disease    Splenomegaly    Crohn's disease of small intestine with other complication    Chronic diarrhea    Fatty liver      .  He continues his vitamins and minerals.  He will continue his gluten free diet.  He continues his reflux regimen.  He will make a food diary avoid the offending foods.  He had more fiber to the diet.  He continues his vitamin B12.  He will continue his current medications.  He does not want further evaluation of his GI tract at this point.

## 2020-10-05 ENCOUNTER — PATIENT MESSAGE (OUTPATIENT)
Dept: RESEARCH | Facility: OTHER | Age: 65
End: 2020-10-05

## 2020-10-06 ENCOUNTER — PATIENT MESSAGE (OUTPATIENT)
Dept: PAIN MEDICINE | Facility: CLINIC | Age: 65
End: 2020-10-06

## 2020-10-09 DIAGNOSIS — G89.29 CHRONIC PAIN OF RIGHT KNEE: Primary | ICD-10-CM

## 2020-10-09 DIAGNOSIS — M25.561 CHRONIC PAIN OF RIGHT KNEE: Primary | ICD-10-CM

## 2020-10-09 DIAGNOSIS — Z01.818 PRE-OP TESTING: ICD-10-CM

## 2020-10-17 ENCOUNTER — LAB VISIT (OUTPATIENT)
Dept: PRIMARY CARE CLINIC | Facility: CLINIC | Age: 65
End: 2020-10-17
Payer: MEDICARE

## 2020-10-17 DIAGNOSIS — Z01.818 PRE-OP TESTING: ICD-10-CM

## 2020-10-17 PROCEDURE — U0003 INFECTIOUS AGENT DETECTION BY NUCLEIC ACID (DNA OR RNA); SEVERE ACUTE RESPIRATORY SYNDROME CORONAVIRUS 2 (SARS-COV-2) (CORONAVIRUS DISEASE [COVID-19]), AMPLIFIED PROBE TECHNIQUE, MAKING USE OF HIGH THROUGHPUT TECHNOLOGIES AS DESCRIBED BY CMS-2020-01-R: HCPCS

## 2020-10-18 LAB — SARS-COV-2 RNA RESP QL NAA+PROBE: NOT DETECTED

## 2020-10-18 NOTE — PROGRESS NOTES
Prior photo(s) of site(s) to confirm location(s):  3    Defibrillator: No  Pacemaker: No  Artificial heart valves: No  Artificial joints: No    ALLERGIES:   Ciprofloxacin, Codeine, Compazine [prochlorperazine], Erythromycin, and Keflex [cephalexin]      Current Outpatient Medications:     acetaminophen (TYLENOL) 500 mg Cap, , Disp: , Rfl:     ASPIRIN (ASPIR-81 ORAL), Take by mouth., Disp: , Rfl:     aspirin-acetaminophen-caffeine 250-250-65 mg (EXCEDRIN MIGRAINE) 250-250-65 mg per tablet, , Disp: , Rfl:     colestipol (COLESTID) 1 gram Tab, Take 1 tablet (1 g total) by mouth 2 (two) times daily., Disp: 180 tablet, Rfl: 3    cyanocobalamin 1,000 mcg/mL injection, Inject 1 mL (1,000 mcg total) into the muscle every 30 days., Disp: 10 mL, Rfl: 11    dicyclomine (BENTYL) 20 mg tablet, TAKE 1 TABLET FOUR TIMES DAILY, Disp: 360 tablet, Rfl: 3    diphenhydramine-acetaminophen 12.5-325 mg Tab, , Disp: , Rfl:     fluorouraciL (EFUDEX) 5 % cream, Apply topically 2 (two) times daily. For 4 weeks to L cheek, Disp: 40 g, Rfl: 1    hydrocortisone 2.5 % lotion, AAA face QHS PRN flare, Disp: 60 mL, Rfl: 2    ondansetron (ZOFRAN) 8 MG tablet, TAKE 1 TABLET EVERY 12 HOURS AS NEEDED FOR NAUSEA, Disp: 180 tablet, Rfl: 1    phenylephrine-acetaminophen 5-325 mg Cap, , Disp: , Rfl:     traMADoL (ULTRAM) 50 mg tablet, Take 1 tablet (50 mg total) by mouth every 8 (eight) hours as needed for Pain. Medically necessary for greater than 7 days for chronic pain, Disp: 90 tablet, Rfl: 2    Current Facility-Administered Medications:     cyanocobalamin injection 1,000 mcg, 1,000 mcg, Intramuscular, Q30 Days, Andrea Shaver MD, 1,000 mcg at 05/11/20 0857  -------------------------------------------------------------  PROCEDURE: Mohs' Micrographic Surgery    SITE: right neck    INDICATION: basal cell carcinoma in an area at increased risk of recurrence    CASE NUMBER: [id number]      ANESTHETIC: 3.5 mL 1% Lidocaine with Epinephrine  1:100,000    SURGICAL PREP: Ethanol and Hibiclens    SURGEON: Ketan Alan MD    ASSISTANTS: Mary Mcmanus CST     PREOPERATIVE DIAGNOSIS: basal cell carcinoma    POSTOPERATIVE DIAGNOSIS: basal cell carcinoma    PATHOLOGIC DIAGNOSIS: basal cell carcinoma    STAGES OF MOHS' SURGERY PERFORMED: one    TUMOR-FREE PLANE ACHIEVED: yes    HEMOSTASIS: Hyfrecation     SPECIMENS: one (one in stage A)    INITIAL LESION SIZE: 1.8 x 2.2 cm    FINAL DEFECT SIZE: 2.2 x 2.2 cm    WOUND REPAIR/DISPOSITION: see below    NARRATIVE:    The patient is a 65 y.o.male referred by Celena Martínez MD with a history of cancer on the right neck which was biopsied - pathology accession #[id number]-, [facility]. Findings revealed basal cell carcinoma. Examination revealed a pearly plaque on the right neck at the site of prior biopsy, which was confirmed by reference to the photograph taken at the previous patient visit, as attached above. In light of the nature of this tumor and the location on the neck, Mohs' micrographic surgery was thought to be the most appropriate management choice, and this diagnosis is appropriate for treatment by Mohs' micrographic surgery.  I discussed it with the patient and he fully understands the aims, risks, alternatives, and possible complications, and elects to proceed.  There are no medical or surgical contraindications to the procedure.     A signed informed consent was obtained.    PROCEDURE:  The patient was placed in the left lateral decubitus position on the operating table in the Mohs' Surgery Suite. The area in question was thoroughly prepped with ethanol and Hibiclens, with particular care to avoid introduction into the external auditory meatus. A sterile surgical marker was used to outline the clinically apparent margins of the involved area, and a narrow margin of normal-appearing skin. Reference marks were made at the periphery of the outlined area with the surgical marker. The proposed  "area of excision was measured and photographed. Local anesthesia as noted above was administered.  The total volume of anesthetic used throughout this portion of the procedure was as documented above. The area was prepared and draped in the standard manner. All of the grossly identifiable area of clinically abnormal tissue and an underlying/peripheral layer was taken and processed by the Mohs' technique.  Hemostasis was obtained with the hyfrecator. Tissue was taken from any areas of residual marginal involvement (if present) and processed by the Mohs' technique in as many stages as needed until a tumor-free plane was achieved.    Colors of inks used in the reference nicks at epidermal margins (if present) and/or inking of non-epithelial edges, if applicable, is represented on the Mohs map as follows: solid lines represent red ink, dots represent blue ink, jagged lines represent black ink, curlicues represent green ink, "xxx" represents yellow ink.    The first Mohs' layer consisted of one section(s) with 7 slide(s) evaluated. No residual tumor was noted at the margins of the first Mohs' layer. Histology of the specimen(s) showed changes consistent with chronic solar damage.     A total of one section(s) and 7 slide(s) were examined under the microscope via the Mohs technique.  A cancer free plane was reached after layer number one. Defect final size was as noted above.      The wound was covered with a nonadherent dressing between stages, and the patient allowed to wait in the waiting area during these periods. The final defect was photographed at the completion of the Mohs' procedure.    See the separate procedure note which follows regarding repair of the defect following Mohs' surgery.       -----------------------------------------------    REPAIR FOLLOWING MOHS' MICROGRAPHIC SURGERY    PREOPERATIVE DIAGNOSIS: defect following Mohs' surgery for a basal cell carcinoma    POSTOPERATIVE DIAGNOSIS: " same    PROCEDURE PERFORMED: rhombic transposition flap    ANESTHETIC: 5.5 mL 1% Lidocaine with Epinephrine 1:100,000    SURGICAL PREP: Hibiclens    SURGEON: Ketan Alan MD     ASSISTANTS: as above    LOCATION: right neck      INDICATIONS:  Earlier in the day, the patient underwent Mohs' micrographic surgical excision of a basal cell carcinoma on the right neck. Tumor free margins were achieved after layer number one.  Later in the day, the management of the resulting wound was addressed with the patient. I discussed the various wound management options with the patient and he fully understands the aims, risks, alternatives, and possible complications of the alternatives, and he elects to proceed with closure of the defect in the manner noted below.  There are no medical or surgical contraindications to the procedure.    A signed informed consent was previously obtained.    PROCEDURE:  Repair via adjacent tissue transfer:  The patient was returned to the procedure room following completion of the Mohs' procedure and final slide review. Because of the size and location of the defect, primary closure could not be achieved without significant distortion of the surrounding structures and an unacceptable risk of wound dehiscence. After discussing options for management of the wound with the patient, a local rhombic transposition flap was chosen to close the wound. The flap was outlined on the adjacent skin with a sterile surgical marker. Infiltration of local anesthetic to include the defect and the area of the flap was carried out. The flap was incised and undermined sufficiently to allow movement into the defect. The surrounding skin was undermined to minimize the tension of closure. Areas of redundant skin were excised to permit closure without creation of standing cone deformities. Hemostasis was achieved by hyfrecation. The flap was sutured into place using:      multiple #4-0 buried interrupted Vicryl  suture(s) and    multiple #4-0 simple interrupted Prolene suture(s) and    two #4-0 running locked Prolene suture(s) for final approximation of the wound margins.    Total area of the closure was approximately 6 square centimeters.      The site was photographed following completion of the repair. Final dressing consisted of petrolatum, Telfa and tape.    Estimated blood loss for the total procedure was less than 10 mL.    Total operative time including tissue processing in the Mohs' laboratory and microscopic Mohs' frozen section slide review was 2 hour(s). Verbal and written wound care instructions were given to the patient, and he expressed understanding of these instructions. The patient tolerated the procedure well and left the operating room in good condition; he is to return in 7 days for suture removal.     Dr. Alan's cell phone number was given to the patient with instructions to call prn with any problems.

## 2020-10-19 ENCOUNTER — PROCEDURE VISIT (OUTPATIENT)
Dept: DERMATOLOGY | Facility: CLINIC | Age: 65
End: 2020-10-19
Payer: MEDICARE

## 2020-10-19 VITALS
HEART RATE: 66 BPM | SYSTOLIC BLOOD PRESSURE: 128 MMHG | HEIGHT: 72 IN | BODY MASS INDEX: 20.72 KG/M2 | WEIGHT: 153 LBS | DIASTOLIC BLOOD PRESSURE: 75 MMHG

## 2020-10-19 DIAGNOSIS — C44.41 BASAL CELL CARCINOMA OF NECK: Primary | ICD-10-CM

## 2020-10-19 PROCEDURE — 14040 PR ADJ TISS XFER HEAD,FAC,HAND <10 SQCM: ICD-10-PCS | Mod: S$GLB,,, | Performed by: DERMATOLOGY

## 2020-10-19 PROCEDURE — 14040 TIS TRNFR F/C/C/M/N/A/G/H/F: CPT | Mod: S$GLB,,, | Performed by: DERMATOLOGY

## 2020-10-19 PROCEDURE — 17311 MOHS 1 STAGE H/N/HF/G: CPT | Mod: 51,S$GLB,, | Performed by: DERMATOLOGY

## 2020-10-19 PROCEDURE — 17311: ICD-10-PCS | Mod: 51,S$GLB,, | Performed by: DERMATOLOGY

## 2020-10-19 PROCEDURE — 99499 NO LOS: ICD-10-PCS | Mod: S$GLB,,, | Performed by: DERMATOLOGY

## 2020-10-19 PROCEDURE — 99499 UNLISTED E&M SERVICE: CPT | Mod: S$GLB,,, | Performed by: DERMATOLOGY

## 2020-10-20 ENCOUNTER — HOSPITAL ENCOUNTER (OUTPATIENT)
Facility: AMBULARY SURGERY CENTER | Age: 65
Discharge: HOME | End: 2020-10-20
Attending: ANESTHESIOLOGY | Admitting: ANESTHESIOLOGY
Payer: MEDICARE

## 2020-10-20 DIAGNOSIS — M25.569 KNEE PAIN: ICD-10-CM

## 2020-10-20 DIAGNOSIS — M25.569 KNEE PAIN, UNSPECIFIED CHRONICITY, UNSPECIFIED LATERALITY: Primary | ICD-10-CM

## 2020-10-20 PROCEDURE — 77002 NEEDLE LOCALIZATION BY XRAY: CPT | Performed by: ANESTHESIOLOGY

## 2020-10-20 PROCEDURE — 64454 NJX AA&/STRD GNCLR NRV BRNCH: CPT | Mod: RT,,, | Performed by: ANESTHESIOLOGY

## 2020-10-20 PROCEDURE — 64454 NJX AA&/STRD GNCLR NRV BRNCH: CPT | Performed by: ANESTHESIOLOGY

## 2020-10-20 PROCEDURE — 64454 PR NERVE BLOCK INJ, ANES/STEROID, GENICULAR NERVE, W/IMG: ICD-10-PCS | Mod: RT,,, | Performed by: ANESTHESIOLOGY

## 2020-10-20 PROCEDURE — 64640 INJECTION TREATMENT OF NERVE: CPT | Performed by: ANESTHESIOLOGY

## 2020-10-20 RX ORDER — SODIUM CHLORIDE, SODIUM LACTATE, POTASSIUM CHLORIDE, CALCIUM CHLORIDE 600; 310; 30; 20 MG/100ML; MG/100ML; MG/100ML; MG/100ML
INJECTION, SOLUTION INTRAVENOUS ONCE AS NEEDED
Status: DISCONTINUED | OUTPATIENT
Start: 2020-10-20 | End: 2020-10-20 | Stop reason: HOSPADM

## 2020-10-20 RX ORDER — MIDAZOLAM HYDROCHLORIDE 1 MG/ML
INJECTION INTRAMUSCULAR; INTRAVENOUS
Status: DISCONTINUED | OUTPATIENT
Start: 2020-10-20 | End: 2020-10-20 | Stop reason: HOSPADM

## 2020-10-20 RX ORDER — LIDOCAINE HYDROCHLORIDE 20 MG/ML
INJECTION, SOLUTION EPIDURAL; INFILTRATION; INTRACAUDAL; PERINEURAL
Status: DISCONTINUED | OUTPATIENT
Start: 2020-10-20 | End: 2020-10-20 | Stop reason: HOSPADM

## 2020-10-20 NOTE — DISCHARGE INSTRUCTIONS
Nerve Block  This was a test, not a treatment. Your pain may return.  Keep your pain journal Dr office will call to check your pain levels within 3 days   Perform activities that normally cause you pain during this testing period    Home care instructions  You may apply ice pack to the injection site for 2 days , NO HEAT for 2 days  You may take a shower but no soaking above level of injection in tub or pool for 2 days  Resume Aspirin, Plavix, or Coumadin the day after the procedure unless otherwise instructed.  Do not drive for 24 hr      SEEK  IMMEDIATE MEDICAL HELP FOR:  Severe increase in your usual pain or appearance of new pain  Prolonged  ( more than 8 hours)or increasing weakness or numbness in the legs or arms  Drainage, redness, active bleeding, or increased swelling at the injection site  Temperature over 100.0 degrees F.  Headache that increases when your head is upright and decreases when you lie flat  Shortness of breath, chest pain, or problems breathing      After Surgery:  Always be aware that any surgery can cause these symptoms:    Pain- After this  test, we expect your pain to decrease but  then it may return as the local anesthetic wears off. Try to NOT take your pain medicine during this testing period. Ice and rest can help with pain relief.    Bleeding- a little bleeding after a surgery is usually within normal.  If there is a lot of blood you need to notify your MD.  Emergency treatments of bleeding are cold application, elevation of the bleeding site and compression.    Infection- Infection after surgery is NOT a normal occurrence.  Signs of infection are fever, swelling, hot to touch the incision.  If this occurs notify your MD immediately.    Nausea- this can be common after a surgery especially if you have had anesthesia medicine or are taking pain medicine.  Staying on clear liquids, bland foods, gingerale, or over the counter anti nausea medicines can help.  If you vomit more than  once, notify your MD.  Anti Nausea medicines can be prescribed.  Before leaving, please make sure you have all your personal belongings such as glasses, purses, wallets, keys, cell phones, jewelry, jackets etc

## 2020-10-20 NOTE — DISCHARGE SUMMARY
[facility]  Discharge Note  Short Stay    Procedure(s) (LRB):  Block, Nerve genicular (Right)    OUTCOME: Patient tolerated treatment/procedure well without complication and is now ready for discharge.    DISPOSITION: Home or Self Care    FINAL DIAGNOSIS:  Knee pain    FOLLOWUP: In clinic    DISCHARGE INSTRUCTIONS:    Discharge Procedure Orders   Call MD for:  temperature >100.4     Call MD for:  persistent nausea and vomiting or diarrhea     Call MD for:  severe uncontrolled pain     Call MD for:  redness, tenderness, or signs of infection (pain, swelling, redness, odor or green/yellow discharge around incision site)     Call MD for:  difficulty breathing or increased cough     Call MD for:  severe persistent headache

## 2020-10-20 NOTE — OP NOTE
PROCEDURE DATE: 10/20/2020    PROCEDURE: Superior lateral, Superior medial and Inferior medial genicular nerve blocks, right-sided    Diagnosis: Right knee pain     Post Op Diagnosis: Same     PHYSICIAN: Alvin Curry M.D.     LOCAL ANESTHESIA: Lidocaine 1%, 3 ml total.     MEDICATION INJECTED: 2% Lidocaine 2ml at each nerve     SEDATION MEDICATIONS: RN IV Versed    COMPLICATIONS: None     ESTIMATED BLOOD LOSS: None     TECHNIQUE: A time-out was taken to identify patient and procedure side prior to starting procedure.   With the patient laying supine and the knees semi-flexed, the right knee was prepped and draped in the usual sterile fashion using ChloraPrep and a fenestrated drape. Knee joint line was determined under fluoroscopic guidance. The targets included the superior lateral (SL), superior medial (SM) and inferior medial (IM) genicular nerves which past periosteal areas connecting the shaft of the femur to bilateral epicondyles and the shaft of tibia to the medial epicondyle. The local anesthetic was given using a 25-gauge 1.5 inch needle. 3.5in 25g spinal needed was introduced into each target area. 0.5ml contrast was injected to confirm placement and to rule out intravascular uptake. After negative aspiration for blood, the medication was then injected. The patient tolerated the procedure well.     If found to have greater than a 50% recovery and so will be scheduled for a radiofrequency ablation of the corresponding nerves.     Patient was given post procedure and discharge instructions to follow at home. The patient was discharged in a stable condition

## 2020-10-20 NOTE — INTERVAL H&P NOTE
The patient has been examined and the H&P has been reviewed:    I concur with the findings and no changes have occurred since H&P was written.    Surgery risks, benefits and alternative options discussed and understood by patient/family.  This patient has been cleared for surgery in an ambulatory surgical facility    ASA 3,  Mallampatti Score 3  No history of anesthetic complications  Plan for RN IV sedation            Active Hospital Problems    Diagnosis  POA    Knee pain [M25.569]  Yes      Resolved Hospital Problems   No resolved problems to display.

## 2020-10-21 VITALS
RESPIRATION RATE: 18 BRPM | WEIGHT: 154 LBS | HEART RATE: 61 BPM | DIASTOLIC BLOOD PRESSURE: 65 MMHG | BODY MASS INDEX: 20.41 KG/M2 | SYSTOLIC BLOOD PRESSURE: 117 MMHG | TEMPERATURE: 98 F | OXYGEN SATURATION: 93 % | HEIGHT: 73 IN

## 2020-10-26 ENCOUNTER — OFFICE VISIT (OUTPATIENT)
Dept: DERMATOLOGY | Facility: CLINIC | Age: 65
End: 2020-10-26
Payer: MEDICARE

## 2020-10-26 DIAGNOSIS — Z48.02 VISIT FOR SUTURE REMOVAL: Primary | ICD-10-CM

## 2020-10-26 PROCEDURE — 99999 PR PBB SHADOW E&M-EST. PATIENT-LVL III: CPT | Mod: PBBFAC,,, | Performed by: DERMATOLOGY

## 2020-10-26 PROCEDURE — 99999 PR PBB SHADOW E&M-EST. PATIENT-LVL III: ICD-10-PCS | Mod: PBBFAC,,, | Performed by: DERMATOLOGY

## 2020-10-26 PROCEDURE — 99024 POSTOP FOLLOW-UP VISIT: CPT | Mod: S$GLB,,, | Performed by: DERMATOLOGY

## 2020-10-26 PROCEDURE — 99024 PR POST-OP FOLLOW-UP VISIT: ICD-10-PCS | Mod: S$GLB,,, | Performed by: DERMATOLOGY

## 2020-10-26 NOTE — PROGRESS NOTES
CC: 65 y.o.male patient is here for suture removal.     HPI: Patient is one week(s) s/p Mohs' micrographic surgery, fresh tissue technique of a basal cell carcinomaa on the right neck, with subsequent repair via rhombic transposition flap  Patient reports some pain the first 2 days; better now.    EXAM:  Sutures intact.  Wound healing well.  Good approximation of skin edges.  No undue erythema to surrounding skin or signs or symptoms of infection.    IMPRESSION:  Healing well post Mohs' micrographic surgery and repair    PLAN:  Site cleaned with peroxide, sutures removed  Dressed with petrolatum  Reviewed further care and expected course  Followup 6 weeks; call prn sooner

## 2020-10-27 ENCOUNTER — CLINICAL SUPPORT (OUTPATIENT)
Dept: GASTROENTEROLOGY | Facility: CLINIC | Age: 65
End: 2020-10-27
Payer: MEDICARE

## 2020-10-27 DIAGNOSIS — E53.8 B12 DEFICIENCY: Primary | ICD-10-CM

## 2020-10-27 PROCEDURE — 96372 PR INJECTION,THERAP/PROPH/DIAG2ST, IM OR SUBCUT: ICD-10-PCS | Mod: S$GLB,,, | Performed by: INTERNAL MEDICINE

## 2020-10-27 PROCEDURE — 96372 THER/PROPH/DIAG INJ SC/IM: CPT | Mod: S$GLB,,, | Performed by: INTERNAL MEDICINE

## 2020-10-27 RX ADMIN — CYANOCOBALAMIN 1000 MCG: 1000 INJECTION, SOLUTION INTRAMUSCULAR; SUBCUTANEOUS at 09:10

## 2020-11-30 ENCOUNTER — CLINICAL SUPPORT (OUTPATIENT)
Dept: GASTROENTEROLOGY | Facility: CLINIC | Age: 65
End: 2020-11-30
Payer: MEDICARE

## 2020-11-30 DIAGNOSIS — E53.8 B12 DEFICIENCY: Primary | ICD-10-CM

## 2020-11-30 PROCEDURE — 96372 THER/PROPH/DIAG INJ SC/IM: CPT | Mod: S$GLB,,, | Performed by: INTERNAL MEDICINE

## 2020-11-30 PROCEDURE — 96372 PR INJECTION,THERAP/PROPH/DIAG2ST, IM OR SUBCUT: ICD-10-PCS | Mod: S$GLB,,, | Performed by: INTERNAL MEDICINE

## 2020-11-30 RX ORDER — CYANOCOBALAMIN 1000 UG/ML
1000 INJECTION, SOLUTION INTRAMUSCULAR; SUBCUTANEOUS
Status: CANCELLED | OUTPATIENT
Start: 2020-11-30 | End: 2020-11-30

## 2020-11-30 RX ADMIN — CYANOCOBALAMIN 1000 MCG: 1000 INJECTION, SOLUTION INTRAMUSCULAR; SUBCUTANEOUS at 09:11

## 2020-11-30 NOTE — PROGRESS NOTES
Patient received Cyanocobalamin 1,000 MCG  IM right dorsogluteal without difficulty.  Patient tolerated well.

## 2020-12-09 ENCOUNTER — OFFICE VISIT (OUTPATIENT)
Dept: PAIN MEDICINE | Facility: CLINIC | Age: 65
End: 2020-12-09
Payer: MEDICARE

## 2020-12-09 VITALS
HEART RATE: 63 BPM | BODY MASS INDEX: 20.28 KG/M2 | HEIGHT: 73 IN | DIASTOLIC BLOOD PRESSURE: 83 MMHG | SYSTOLIC BLOOD PRESSURE: 141 MMHG | WEIGHT: 153 LBS

## 2020-12-09 DIAGNOSIS — M25.561 CHRONIC PAIN OF RIGHT KNEE: Primary | ICD-10-CM

## 2020-12-09 DIAGNOSIS — G89.29 CHRONIC ABDOMINAL PAIN: ICD-10-CM

## 2020-12-09 DIAGNOSIS — G89.29 CHRONIC PAIN OF RIGHT KNEE: Primary | ICD-10-CM

## 2020-12-09 DIAGNOSIS — G89.4 CHRONIC PAIN DISORDER: ICD-10-CM

## 2020-12-09 DIAGNOSIS — R10.9 CHRONIC ABDOMINAL PAIN: ICD-10-CM

## 2020-12-09 PROCEDURE — 1125F AMNT PAIN NOTED PAIN PRSNT: CPT | Mod: S$GLB,,, | Performed by: ANESTHESIOLOGY

## 2020-12-09 PROCEDURE — 99214 OFFICE O/P EST MOD 30 MIN: CPT | Mod: S$GLB,,, | Performed by: ANESTHESIOLOGY

## 2020-12-09 PROCEDURE — 99999 PR PBB SHADOW E&M-EST. PATIENT-LVL IV: CPT | Mod: PBBFAC,,, | Performed by: ANESTHESIOLOGY

## 2020-12-09 PROCEDURE — 1125F PR PAIN SEVERITY QUANTIFIED, PAIN PRESENT: ICD-10-PCS | Mod: S$GLB,,, | Performed by: ANESTHESIOLOGY

## 2020-12-09 PROCEDURE — 99999 PR PBB SHADOW E&M-EST. PATIENT-LVL IV: ICD-10-PCS | Mod: PBBFAC,,, | Performed by: ANESTHESIOLOGY

## 2020-12-09 PROCEDURE — 99214 PR OFFICE/OUTPT VISIT, EST, LEVL IV, 30-39 MIN: ICD-10-PCS | Mod: S$GLB,,, | Performed by: ANESTHESIOLOGY

## 2020-12-09 PROCEDURE — 3008F BODY MASS INDEX DOCD: CPT | Mod: CPTII,S$GLB,, | Performed by: ANESTHESIOLOGY

## 2020-12-09 PROCEDURE — 3008F PR BODY MASS INDEX (BMI) DOCUMENTED: ICD-10-PCS | Mod: CPTII,S$GLB,, | Performed by: ANESTHESIOLOGY

## 2020-12-09 RX ORDER — TRAMADOL HYDROCHLORIDE 50 MG/1
50 TABLET ORAL EVERY 8 HOURS PRN
Qty: 90 TABLET | Refills: 2 | Status: SHIPPED | OUTPATIENT
Start: 2020-12-09 | End: 2021-02-12 | Stop reason: SDUPTHER

## 2020-12-09 NOTE — PROGRESS NOTES
PCP: Tyler Smith MD      CC: abdominal pain    Interval history: Mr. Cerda is a 65 y.o. male with an extensive history of crohn's disease who returns to our clinic. We discontinued Demerol 50mg q 8 hrs and started Tramadol 50 mg q 8 h. He reports this is beneficial.  No side effects reported.   Abdominal pain remains stable.   He does report diarrhea at times but no blood stools. Reports anal pain and itching 2/2 chronic diarrhea.  He is currently being evaluated by gastroenterology.  OTC Lidocaine 2 % provides some minimal relief.  Compounding cream was too expensive. Continues to c/o bilateral knee pain. He has had multiple surgeries in the past.  He underwent genicular nerve block and states having over 50% relief for over a day.  He wishes to proceed with RFA.   He rates his pain 4/10.     Prior HPI:   Mr. Cerda is a 60 year old male with PMH of crohn's disease referred by Dr. Hansen.  Patient states being diagnosed with crohn's disease since the age of 14.    He has had multiple GI surgeries and large bowel and small bowel removals.  During that time, he was being managed by providers in [place].  He was TPN dependent for many years until about two years ago.  He is now stable on an oral diet.  He did not have a primary care physician nor a gastroenterologist for many years.  He is currently trying to establish care.  He has future appointment with Dr. Ch.  He states taking Demerol 50mg q8hrs as needed for pain. It has provided relief of his nausea and pain with diarrhea.  He was last prescribed Demerol in July 2014.  Since then, he has been bearing with the pain.  It is a sharp shooting pain in his mid abdomen.      ROS:  CONSTITUTIONAL: No fevers, chills, night sweats, wt. loss, appetite changes  SKIN: no rashes or itching  ENT: No headaches, head trauma, vision changes, or eye pain  LYMPH NODES: None noticed   CV: No chest pain, palpitations.   RESP: No shortness of breath, dyspnea on  exertion, cough, wheezing, or hemoptysis  GI: No nausea, emesis, diarrhea, constipation, melena, hematochezia, pain.    : No dysuria, hematuria, urgency, or frequency   HEME: No easy bruising, bleeding problems  PSYCHIATRIC: No depression, anxiety, psychosis, hallucinations.  NEURO: No seizures, memory loss, dizziness or difficulty sleeping  MSK: no joint pain      Past Medical History:   Diagnosis Date    Anxiety     Basal cell carcinoma 07/2017    R forehead and R temple    Crohn's disease     age 17    Short bowel syndrome     Vitamin B deficiency     Vitamin D deficiency      Past Surgical History:   Procedure Laterality Date    3 small bowel resections      APPENDECTOMY      CHOLECYSTECTOMY      COLONOSCOPY      COLONOSCOPY N/A 2/14/2017    Procedure: COLONOSCOPY;  Surgeon: Nela Sanchez MD;  Location: [address]);  Service: Endoscopy;  Laterality: N/A;    COLONOSCOPY N/A 3/14/2017    Procedure: COLONOSCOPY;  Surgeon: Nela Sanchez MD;  Location: [address]);  Service: Endoscopy;  Laterality: N/A;  2 days clear liquids before procedure    COLONOSCOPY N/A 7/29/2020    Procedure: COLONOSCOPY;  Surgeon: Andrea Shaver MD;  Location: [facility];  Service: Endoscopy;  Laterality: N/A;    ESOPHAGOGASTRODUODENOSCOPY N/A 11/19/2019    Procedure: EGD (ESOPHAGOGASTRODUODENOSCOPY);  Surgeon: Andrea Shaver MD;  Location: [facility];  Service: Endoscopy;  Laterality: N/A;    INJECTION OF ANESTHETIC AGENT AROUND NERVE Right 10/20/2020    Procedure: Block, Nerve genicular;  Surgeon: Alvin Curry MD;  Location[facility];  Service: Pain Management;  Laterality: Right;  right knee    KNEE SURGERY      SMALL INTESTINE SURGERY      TUBE THORACOTOMY      UPPER GASTROINTESTINAL ENDOSCOPY       Family History   Problem Relation Age of Onset    Diabetes Mother     Stroke Mother     Diabetes Father     Kidney disease Father     Irritable bowel syndrome Cousin     Celiac disease Neg Hx      Cirrhosis Neg Hx     Colon cancer Neg Hx     Colon polyps Neg Hx     Cystic fibrosis Neg Hx     Esophageal cancer Neg Hx     Crohn's disease Neg Hx     Hemochromatosis Neg Hx     Inflammatory bowel disease Neg Hx     Liver cancer Neg Hx     Liver disease Neg Hx     Rectal cancer Neg Hx     Stomach cancer Neg Hx     Ulcerative colitis Neg Hx     Addison's disease Neg Hx     Melanoma Neg Hx     Psoriasis Neg Hx     Lupus Neg Hx     Eczema Neg Hx      Social History     Socioeconomic History    Marital status:      Spouse name: Not on file    Number of children: Not on file    Years of education: Not on file    Highest education level: Not on file   Occupational History    Not on file   Social Needs    Financial resource strain: Not on file    Food insecurity     Worry: Not on file     Inability: Not on file    Transportation needs     Medical: Not on file     Non-medical: Not on file   Tobacco Use    Smoking status: Former Smoker     Packs/day: 1.00     Years: 15.00     Pack years: 15.00     Types: Cigarettes     Quit date: 3/10/1995     Years since quittin.7    Smokeless tobacco: Never Used   Substance and Sexual Activity    Alcohol use: Yes     Alcohol/week: 2.0 standard drinks     Types: 1 Cans of beer, 1 Shots of liquor per week     Comment: 1 every 6 -7 months    Drug use: No    Sexual activity: Yes   Lifestyle    Physical activity     Days per week: Not on file     Minutes per session: Not on file    Stress: Not on file   Relationships    Social connections     Talks on phone: Not on file     Gets together: Not on file     Attends [employer] service: Not on file     Active member of club or organization: Not on file     Attends meetings of clubs or organizations: Not on file     Relationship status: Not on file   Other Topics Concern    Not on file   Social History Narrative    Retired      Medications/Allergies: See med card    Vitals:    20 1016   BP: (!)  "141/83   Pulse: 63   Weight: 69.4 kg (153 lb)   Height: 6' 1" (1.854 m)   PainSc:   4   PainLoc: Abdomen     Physical exam:    GENERAL: A and O x3, the patient appears well groomed and is in no acute distress.  Skin: No rashes or obvious lesions  HEENT: normocephalic, atraumatic  CARDIOVASCULAR:  RRR  LUNGS: non labored breathing  ABDOMEN: soft, nontender. No rebound   UPPER EXTREMITIES: Normal alignment, normal range of motion, no atrophy, no skin changes,  hair growth and nail growth normal and equal bilaterally. No swelling, no tenderness.    LOWER EXTREMITIES:  Normal alignment, normal range of motion, no atrophy, no skin changes,  hair growth and nail growth normal and equal bilaterally. No swelling, no tenderness.    LUMBAR SPINE  Lumbar spine: ROM is full with flexion extension and oblique extension with no increased pain.    Lam's test causes no increased pain on either side.    Supine straight leg raise is negative bilaterally.    Internal and external rotation of the hip causes no increased pain on either side.  Myofascial exam: No tenderness to palpation across lumbar paraspinous muscles.    MENTAL STATUS: normal orientation, speech, language, and fund of knowledge for social situation.  Emotional state appropriate.    CRANIAL NERVES:  II:  PERRL bilaterally,   III,IV,VI: EOMI.    V:  Facial sensation equal bilaterally  VII:  Facial motor function normal.  VIII:  Hearing equal to finger rub bilaterally  IX/X: Gag normal, palate symmetric  XI:  Shoulder shrug equal, head turn equal  XII:  Tongue midline without fasciculations    MOTOR: Tone and bulk: normal bilateral upper and lower Strength: normal   SENSATION: Light touch and pinprick intact bilaterally  REFLEXES: normal, symmetric, nonbrisk.  Toes down, no clonus. No hoffmans.  GAIT: normal rise, base, steps, and arm swing.      Imaging:  None    Assessment:  Mr. Cerda is a 65 y.o. male with abdominal pain  1. Chronic pain of right knee    2. " Chronic pain disorder    3. Chronic abdominal pain       Plan:  1. Patient with long history of crohn's disease and chronic abdominal pain.  Continue care with GI.  2. Tramadol 50 mg q 8 h prn  as needed for pain.  reviewed.  No signs of aberrant behavior.  UDS next visit.  Script provided for 3 months  3. Continue OTC lidocaine cream  4. Proceed with bilateral knee RFA, right side first.    5. F/u 3 months or sooner

## 2020-12-10 ENCOUNTER — OFFICE VISIT (OUTPATIENT)
Dept: DERMATOLOGY | Facility: CLINIC | Age: 65
End: 2020-12-10
Payer: MEDICARE

## 2020-12-10 DIAGNOSIS — Z98.890 HISTORY OF MOHS MICROGRAPHIC SURGERY FOR SKIN CANCER: Primary | ICD-10-CM

## 2020-12-10 DIAGNOSIS — Z85.828 HISTORY OF MOHS MICROGRAPHIC SURGERY FOR SKIN CANCER: Primary | ICD-10-CM

## 2020-12-10 DIAGNOSIS — G89.29 CHRONIC PAIN OF RIGHT KNEE: Primary | ICD-10-CM

## 2020-12-10 DIAGNOSIS — M25.561 CHRONIC PAIN OF RIGHT KNEE: Primary | ICD-10-CM

## 2020-12-10 DIAGNOSIS — Z01.818 PRE-OP TESTING: ICD-10-CM

## 2020-12-10 PROCEDURE — 99024 PR POST-OP FOLLOW-UP VISIT: ICD-10-PCS | Mod: S$GLB,,, | Performed by: DERMATOLOGY

## 2020-12-10 PROCEDURE — 99999 PR PBB SHADOW E&M-EST. PATIENT-LVL III: ICD-10-PCS | Mod: PBBFAC,,, | Performed by: DERMATOLOGY

## 2020-12-10 PROCEDURE — 1126F PR PAIN SEVERITY QUANTIFIED, NO PAIN PRESENT: ICD-10-PCS | Mod: S$GLB,,, | Performed by: DERMATOLOGY

## 2020-12-10 PROCEDURE — 99024 POSTOP FOLLOW-UP VISIT: CPT | Mod: S$GLB,,, | Performed by: DERMATOLOGY

## 2020-12-10 PROCEDURE — 99999 PR PBB SHADOW E&M-EST. PATIENT-LVL III: CPT | Mod: PBBFAC,,, | Performed by: DERMATOLOGY

## 2020-12-10 PROCEDURE — 1126F AMNT PAIN NOTED NONE PRSNT: CPT | Mod: S$GLB,,, | Performed by: DERMATOLOGY

## 2020-12-10 NOTE — PROGRESS NOTES
CC: 65 y.o.male patient is here for followup     HPI: Patient is 6 week(s) s/p Mohs' micrographic surgery, fresh tissue technique, of a basal cell carcinoma on the right neck; with subsequent repair   Patient reports no problems    EXAM: Site appears well healed. Some residual erythema as anticipated.    IMPRESSION: Well healed post Mohs' micrographic surgery    PLAN:  Discussed anticipated course  Followup to Dr. Martínez in 2-3 months; prn to me

## 2020-12-24 ENCOUNTER — HOSPITAL ENCOUNTER (EMERGENCY)
Facility: HOSPITAL | Age: 65
Discharge: HOME | End: 2020-12-24
Attending: EMERGENCY MEDICINE
Payer: MEDICARE

## 2020-12-24 VITALS
SYSTOLIC BLOOD PRESSURE: 151 MMHG | WEIGHT: 153 LBS | RESPIRATION RATE: 16 BRPM | BODY MASS INDEX: 20.28 KG/M2 | HEIGHT: 73 IN | HEART RATE: 70 BPM | DIASTOLIC BLOOD PRESSURE: 79 MMHG | TEMPERATURE: 98 F | OXYGEN SATURATION: 97 %

## 2020-12-24 DIAGNOSIS — S76.012A HIP STRAIN, LEFT, INITIAL ENCOUNTER: ICD-10-CM
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
## 2020-12-24 DIAGNOSIS — S46.812A STRAIN OF LEFT TRAPEZIUS MUSCLE, INITIAL ENCOUNTER: Primary | ICD-10-CM

## 2020-12-24 DIAGNOSIS — G44.319 ACUTE POST-TRAUMATIC HEADACHE, NOT INTRACTABLE: ICD-10-CM

## 2020-12-24 PROCEDURE — 99285 EMERGENCY DEPT VISIT HI MDM: CPT | Mod: 25
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
## 2020-12-29 ENCOUNTER — CLINICAL SUPPORT (OUTPATIENT)
Dept: GASTROENTEROLOGY | Facility: CLINIC | Age: 65
End: 2020-12-29
Payer: MEDICARE

## 2020-12-29 VITALS — HEIGHT: 73 IN | BODY MASS INDEX: 20.28 KG/M2 | TEMPERATURE: 98 F | WEIGHT: 153 LBS

## 2020-12-29 DIAGNOSIS — E53.8 B12 DEFICIENCY: Primary | ICD-10-CM

## 2020-12-29 PROCEDURE — 99999 PR PBB SHADOW E&M-EST. PATIENT-LVL I: ICD-10-PCS | Mod: PBBFAC,,, | Performed by: INTERNAL MEDICINE

## 2020-12-29 PROCEDURE — 96372 PR INJECTION,THERAP/PROPH/DIAG2ST, IM OR SUBCUT: ICD-10-PCS | Mod: S$GLB,,, | Performed by: INTERNAL MEDICINE

## 2020-12-29 PROCEDURE — 99999 PR PBB SHADOW E&M-EST. PATIENT-LVL I: CPT | Mod: PBBFAC,,, | Performed by: INTERNAL MEDICINE

## 2020-12-29 PROCEDURE — 96372 THER/PROPH/DIAG INJ SC/IM: CPT | Mod: S$GLB,,, | Performed by: INTERNAL MEDICINE

## 2020-12-29 RX ADMIN — CYANOCOBALAMIN 1000 MCG: 1000 INJECTION, SOLUTION INTRAMUSCULAR; SUBCUTANEOUS at 09:12

## 2020-12-29 NOTE — PROGRESS NOTES
Patient received Vitamin B12 injection 1,000mcg IM right dorsogluteal without difficulty. Tolerated well.

## 2020-12-29 NOTE — PROGRESS NOTES
Subjective:       Patient ID: Tristin Cerda is a 65 y.o. male.    Chief Complaint: PHN Nurse Visit (B12 inj)    He is here for nurse visit.      Allergies:  Review of patient's allergies indicates:   Allergen Reactions    Ciprofloxacin     Codeine Itching    Compazine [prochlorperazine]     Erythromycin     Keflex [cephalexin]        Medications:    Current Outpatient Medications:     aspirin (ASPIR-81) 81 MG EC tablet, Take 81 mg by mouth once daily. , Disp: , Rfl:     aspirin-acetaminophen-caffeine 250-250-65 mg (EXCEDRIN MIGRAINE) 250-250-65 mg per tablet, Take 1 tablet by mouth every 8 (eight) hours as needed for Pain. , Disp: , Rfl:     colestipol (COLESTID) 1 gram Tab, Take 1 tablet (1 g total) by mouth 2 (two) times daily., Disp: 180 tablet, Rfl: 3    cyanocobalamin 1,000 mcg/mL injection, Inject 1 mL (1,000 mcg total) into the muscle every 30 days., Disp: 10 mL, Rfl: 11    dicyclomine (BENTYL) 20 mg tablet, TAKE 1 TABLET FOUR TIMES DAILY (Patient taking differently: Take 20 mg by mouth 4 (four) times daily. TAKE 1 TABLET FOUR TIMES DAILY), Disp: 360 tablet, Rfl: 3    ondansetron (ZOFRAN) 8 MG tablet, TAKE 1 TABLET EVERY 12 HOURS AS NEEDED FOR NAUSEA (Patient taking differently: Take 8 mg by mouth every 12 (twelve) hours as needed for Nausea. TAKE 1 TABLET EVERY 12 HOURS AS NEEDED FOR NAUSEA), Disp: 180 tablet, Rfl: 1    traMADoL (ULTRAM) 50 mg tablet, Take 1 tablet (50 mg total) by mouth every 8 (eight) hours as needed for Pain. Medically necessary for greater than 7 days for chronic pain, Disp: 90 tablet, Rfl: 2    Current Facility-Administered Medications:     cyanocobalamin injection 1,000 mcg, 1,000 mcg, Intramuscular, Q30 Days, Andrea Shaver MD, 1,000 mcg at 12/29/20 0915    Past Medical History:   Diagnosis Date    Anxiety     Basal cell carcinoma 07/2017    R forehead and R temple    Crohn's disease     age 17    Short bowel syndrome     Vitamin B deficiency     Vitamin D  deficiency        Past Surgical History:   Procedure Laterality Date    3 small bowel resections      APPENDECTOMY      CHOLECYSTECTOMY      COLONOSCOPY      COLONOSCOPY N/A 2/14/2017    Procedure: COLONOSCOPY;  Surgeon: Nela Sanchez MD;  Location: [address]);  Service: Endoscopy;  Laterality: N/A;    COLONOSCOPY N/A 3/14/2017    Procedure: COLONOSCOPY;  Surgeon: Nela Sanchez MD;  Location: [facility] ([address] [place] [address]);  Service: Endoscopy;  Laterality: N/A;  2 days clear liquids before procedure    COLONOSCOPY N/A 7/29/2020    Procedure: COLONOSCOPY;  Surgeon: Andrea Shaver MD;  Location: [facility];  Service: Endoscopy;  Laterality: N/A;    ESOPHAGOGASTRODUODENOSCOPY N/A 11/19/2019    Procedure: EGD (ESOPHAGOGASTRODUODENOSCOPY);  Surgeon: Andrea Shaver MD;  Location: [facility];  Service: Endoscopy;  Laterality: N/A;    INJECTION OF ANESTHETIC AGENT AROUND NERVE Right 10/20/2020    Procedure: Block, Nerve genicular;  Surgeon: Alvin Curry MD;  Location: [facility];  Service: Pain Management;  Laterality: Right;  right knee    KNEE SURGERY      SMALL INTESTINE SURGERY      TUBE THORACOTOMY      UPPER GASTROINTESTINAL ENDOSCOPY           Review of Systems    Objective:      Physical Exam      Plan:       B12 deficiency

## 2020-12-30 ENCOUNTER — PATIENT MESSAGE (OUTPATIENT)
Dept: SURGERY | Facility: HOSPITAL | Age: 65
End: 2020-12-30

## 2021-01-01 ENCOUNTER — PATIENT MESSAGE (OUTPATIENT)
Dept: FAMILY MEDICINE | Facility: CLINIC | Age: 66
End: 2021-01-01

## 2021-01-04 ENCOUNTER — OFFICE VISIT (OUTPATIENT)
Dept: FAMILY MEDICINE | Facility: CLINIC | Age: 66
End: 2021-01-04
Payer: MEDICARE

## 2021-01-04 VITALS
DIASTOLIC BLOOD PRESSURE: 74 MMHG | HEART RATE: 71 BPM | RESPIRATION RATE: 18 BRPM | OXYGEN SATURATION: 96 % | HEIGHT: 73 IN | TEMPERATURE: 98 F | WEIGHT: 151.69 LBS | BODY MASS INDEX: 20.11 KG/M2 | SYSTOLIC BLOOD PRESSURE: 132 MMHG

## 2021-01-04 DIAGNOSIS — K90.829 SHORT BOWEL SYNDROME: Primary | ICD-10-CM

## 2021-01-04 PROCEDURE — 3008F PR BODY MASS INDEX (BMI) DOCUMENTED: ICD-10-PCS | Mod: CPTII,S$GLB,, | Performed by: FAMILY MEDICINE

## 2021-01-04 PROCEDURE — 1125F PR PAIN SEVERITY QUANTIFIED, PAIN PRESENT: ICD-10-PCS | Mod: S$GLB,,, | Performed by: FAMILY MEDICINE

## 2021-01-04 PROCEDURE — 99396 PR PREVENTIVE VISIT,EST,40-64: ICD-10-PCS | Mod: S$GLB,,, | Performed by: FAMILY MEDICINE

## 2021-01-04 PROCEDURE — 99999 PR PBB SHADOW E&M-EST. PATIENT-LVL III: ICD-10-PCS | Mod: PBBFAC,,, | Performed by: FAMILY MEDICINE

## 2021-01-04 PROCEDURE — 99396 PREV VISIT EST AGE 40-64: CPT | Mod: S$GLB,,, | Performed by: FAMILY MEDICINE

## 2021-01-04 PROCEDURE — 99999 PR PBB SHADOW E&M-EST. PATIENT-LVL III: CPT | Mod: PBBFAC,,, | Performed by: FAMILY MEDICINE

## 2021-01-04 PROCEDURE — 1125F AMNT PAIN NOTED PAIN PRSNT: CPT | Mod: S$GLB,,, | Performed by: FAMILY MEDICINE

## 2021-01-04 PROCEDURE — 3008F BODY MASS INDEX DOCD: CPT | Mod: CPTII,S$GLB,, | Performed by: FAMILY MEDICINE

## 2021-01-05 ENCOUNTER — LAB VISIT (OUTPATIENT)
Dept: FAMILY MEDICINE | Facility: CLINIC | Age: 66
End: 2021-01-05
Payer: MEDICARE

## 2021-01-05 DIAGNOSIS — Z01.818 PREOP TESTING: ICD-10-CM

## 2021-01-05 DIAGNOSIS — Z01.818 PRE-OP TESTING: ICD-10-CM

## 2021-01-05 LAB — SARS-COV-2 RNA RESP QL NAA+PROBE: NOT DETECTED

## 2021-01-05 PROCEDURE — U0003 INFECTIOUS AGENT DETECTION BY NUCLEIC ACID (DNA OR RNA); SEVERE ACUTE RESPIRATORY SYNDROME CORONAVIRUS 2 (SARS-COV-2) (CORONAVIRUS DISEASE [COVID-19]), AMPLIFIED PROBE TECHNIQUE, MAKING USE OF HIGH THROUGHPUT TECHNOLOGIES AS DESCRIBED BY CMS-2020-01-R: HCPCS

## 2021-01-07 NOTE — LETTER
-- DO NOT REPLY / DO NOT REPLY ALL --  -- Message is from the [facility]--    COVID-19 Universal Screening: N/A - Not about scheduling    General Patient Message      Reason for Call: patient is asking for a refill for  hydrALAZINE (APRESOLINE) 10 MG tablet, atenolol (TENORMIN) 50 MG tablet and enalapril (VASOTEC) 20 MG tablet  To be sent to [place] [address] phone number is [phone]    Caller Information       Type Contact Phone    01/07/2021 01:29 PM CST Phone (Incoming) Tessie Lewis (Self) [phone] (H)          Alternative phone number: n    Turnaround time given to caller:   \"This message will be sent to [state Provider's name]. The clinical team will fulfill your request as soon as they review your message.\"     December 12, 2019      Tyler Smith MD  [address] [facility] [address]           [facility]  [facility]  [address], [address]  [place] [address]  Phone: [phone]  Fax: [phone]          Patient: Tristin Cerda   MR Number: [id number]   YOB: 1955   Date of Visit: 12/12/2019       Dear Dr. Tyler Smith:    Thank you for referring Tristin Cerda to me for evaluation. Attached you will find relevant portions of my assessment and plan of care.    If you have questions, please do not hesitate to call me. I look forward to following Tristin Cerda along with you.    Sincerely,    Andrea Shaver MD    Enclosure  CC:  No Recipients    If you would like to receive this communication electronically, please contact [email or web address] or [phone] to request more information on [facility] access.    For providers and/or their staff who would like to refer a patient to [facility], please contact us through our one-stop-shop provider referral line, [facility], at [phone].    If you feel you have received this communication in error or would no longer like to receive these types of communications, please e-mail [email or web address]

## 2021-01-08 ENCOUNTER — HOSPITAL ENCOUNTER (OUTPATIENT)
Facility: HOSPITAL | Age: 66
Discharge: HOME | End: 2021-01-08
Attending: ANESTHESIOLOGY | Admitting: ANESTHESIOLOGY
Payer: MEDICARE

## 2021-01-08 ENCOUNTER — HOSPITAL ENCOUNTER (OUTPATIENT)
Dept: RADIOLOGY | Facility: HOSPITAL | Age: 66
Discharge: HOME | End: 2021-01-08
Attending: ANESTHESIOLOGY | Admitting: ANESTHESIOLOGY
Payer: MEDICARE

## 2021-01-08 VITALS
HEART RATE: 57 BPM | OXYGEN SATURATION: 98 % | SYSTOLIC BLOOD PRESSURE: 145 MMHG | WEIGHT: 150 LBS | RESPIRATION RATE: 16 BRPM | HEIGHT: 72 IN | DIASTOLIC BLOOD PRESSURE: 81 MMHG | BODY MASS INDEX: 20.32 KG/M2 | TEMPERATURE: 98 F

## 2021-01-08 DIAGNOSIS — M25.569 KNEE PAIN, UNSPECIFIED CHRONICITY, UNSPECIFIED LATERALITY: Primary | ICD-10-CM

## 2021-01-08 DIAGNOSIS — M25.569 KNEE PAIN: ICD-10-CM

## 2021-01-08 PROCEDURE — 71000015 HC POSTOP RECOV 1ST HR: Performed by: ANESTHESIOLOGY

## 2021-01-08 PROCEDURE — 76000 FLUOROSCOPY <1 HR PHYS/QHP: CPT | Mod: TC

## 2021-01-08 PROCEDURE — 99900104 DSU ONLY-NO CHARGE-EA ADD'L HR (STAT): Performed by: ANESTHESIOLOGY

## 2021-01-08 PROCEDURE — 36000704 HC OR TIME LEV I 1ST 15 MIN: Performed by: ANESTHESIOLOGY

## 2021-01-08 PROCEDURE — 64624 DSTRJ NULYT AGT GNCLR NRV: CPT | Mod: RT,,, | Performed by: ANESTHESIOLOGY

## 2021-01-08 PROCEDURE — 25000003 PHARM REV CODE 250: Performed by: ANESTHESIOLOGY

## 2021-01-08 PROCEDURE — 27201423 OPTIME MED/SURG SUP & DEVICES STERILE SUPPLY: Performed by: ANESTHESIOLOGY

## 2021-01-08 PROCEDURE — 36000705 HC OR TIME LEV I EA ADD 15 MIN: Performed by: ANESTHESIOLOGY

## 2021-01-08 PROCEDURE — 63600175 PHARM REV CODE 636 W HCPCS: Performed by: ANESTHESIOLOGY

## 2021-01-08 PROCEDURE — 99900103 DSU ONLY-NO CHARGE-INITIAL HR (STAT): Performed by: ANESTHESIOLOGY

## 2021-01-08 PROCEDURE — 64624 PR DESTRUCT, NEUROLYTIC AGENT, GENICULAR NERVE, W/IMG: ICD-10-PCS | Mod: RT,,, | Performed by: ANESTHESIOLOGY

## 2021-01-08 RX ORDER — FENTANYL CITRATE 50 UG/ML
INJECTION, SOLUTION INTRAMUSCULAR; INTRAVENOUS
Status: DISCONTINUED | OUTPATIENT
Start: 2021-01-08 | End: 2021-01-08 | Stop reason: HOSPADM

## 2021-01-08 RX ORDER — MIDAZOLAM HYDROCHLORIDE 1 MG/ML
INJECTION INTRAMUSCULAR; INTRAVENOUS
Status: DISCONTINUED | OUTPATIENT
Start: 2021-01-08 | End: 2021-01-08 | Stop reason: HOSPADM

## 2021-01-08 RX ORDER — BUPIVACAINE HYDROCHLORIDE 2.5 MG/ML
INJECTION, SOLUTION EPIDURAL; INFILTRATION; INTRACAUDAL
Status: DISCONTINUED | OUTPATIENT
Start: 2021-01-08 | End: 2021-01-08 | Stop reason: HOSPADM

## 2021-01-08 RX ORDER — SODIUM CHLORIDE, SODIUM LACTATE, POTASSIUM CHLORIDE, CALCIUM CHLORIDE 600; 310; 30; 20 MG/100ML; MG/100ML; MG/100ML; MG/100ML
INJECTION, SOLUTION INTRAVENOUS CONTINUOUS
Status: ACTIVE | OUTPATIENT
Start: 2021-01-08

## 2021-01-08 RX ORDER — METHYLPREDNISOLONE ACETATE 80 MG/ML
INJECTION, SUSPENSION INTRA-ARTICULAR; INTRALESIONAL; INTRAMUSCULAR; SOFT TISSUE
Status: DISCONTINUED | OUTPATIENT
Start: 2021-01-08 | End: 2021-01-08 | Stop reason: HOSPADM

## 2021-01-08 RX ORDER — LIDOCAINE HYDROCHLORIDE 20 MG/ML
INJECTION, SOLUTION EPIDURAL; INFILTRATION; INTRACAUDAL; PERINEURAL
Status: DISCONTINUED | OUTPATIENT
Start: 2021-01-08 | End: 2021-01-08 | Stop reason: HOSPADM

## 2021-01-29 ENCOUNTER — CLINICAL SUPPORT (OUTPATIENT)
Dept: GASTROENTEROLOGY | Facility: CLINIC | Age: 66
End: 2021-01-29
Payer: MEDICARE

## 2021-01-29 DIAGNOSIS — G89.29 CHRONIC ABDOMINAL PAIN: ICD-10-CM

## 2021-01-29 DIAGNOSIS — E53.8 B12 DEFICIENCY: Primary | ICD-10-CM

## 2021-01-29 DIAGNOSIS — K90.829 SHORT BOWEL SYNDROME: ICD-10-CM

## 2021-01-29 DIAGNOSIS — R10.9 CHRONIC ABDOMINAL PAIN: ICD-10-CM

## 2021-01-29 PROCEDURE — 96372 PR INJECTION,THERAP/PROPH/DIAG2ST, IM OR SUBCUT: ICD-10-PCS | Mod: S$GLB,,, | Performed by: INTERNAL MEDICINE

## 2021-01-29 PROCEDURE — 96372 THER/PROPH/DIAG INJ SC/IM: CPT | Mod: S$GLB,,, | Performed by: INTERNAL MEDICINE

## 2021-01-29 RX ORDER — CYANOCOBALAMIN 1000 UG/ML
1000 INJECTION, SOLUTION INTRAMUSCULAR; SUBCUTANEOUS
Status: COMPLETED | OUTPATIENT
Start: 2021-01-29 | End: 2021-01-29

## 2021-01-29 RX ORDER — ONDANSETRON HYDROCHLORIDE 8 MG/1
TABLET, FILM COATED ORAL
Qty: 180 TABLET | Refills: 1 | Status: SHIPPED | OUTPATIENT
Start: 2021-01-29 | End: 2021-04-01 | Stop reason: SDUPTHER

## 2021-01-29 RX ORDER — DICYCLOMINE HYDROCHLORIDE 20 MG/1
TABLET ORAL
Qty: 360 TABLET | Refills: 3 | Status: SHIPPED | OUTPATIENT
Start: 2021-01-29 | End: 2021-04-01 | Stop reason: SDUPTHER

## 2021-01-29 RX ADMIN — CYANOCOBALAMIN 1000 MCG: 1000 INJECTION, SOLUTION INTRAMUSCULAR; SUBCUTANEOUS at 09:01

## 2021-02-12 DIAGNOSIS — G89.4 CHRONIC PAIN DISORDER: ICD-10-CM

## 2021-02-12 RX ORDER — TRAMADOL HYDROCHLORIDE 50 MG/1
50 TABLET ORAL EVERY 8 HOURS PRN
Qty: 90 TABLET | Refills: 2 | Status: SHIPPED | OUTPATIENT
Start: 2021-03-06 | End: 2021-04-04

## 2021-02-19 ENCOUNTER — OFFICE VISIT (OUTPATIENT)
Dept: PAIN MEDICINE | Facility: CLINIC | Age: 66
End: 2021-02-19
Payer: MEDICARE

## 2021-02-19 VITALS
SYSTOLIC BLOOD PRESSURE: 121 MMHG | HEART RATE: 62 BPM | BODY MASS INDEX: 20.32 KG/M2 | DIASTOLIC BLOOD PRESSURE: 65 MMHG | HEIGHT: 72 IN | WEIGHT: 150 LBS

## 2021-02-19 DIAGNOSIS — G89.29 CHRONIC PAIN OF RIGHT KNEE: Primary | ICD-10-CM

## 2021-02-19 DIAGNOSIS — K50.90 CROHN'S DISEASE WITHOUT COMPLICATION, UNSPECIFIED GASTROINTESTINAL TRACT LOCATION: ICD-10-CM

## 2021-02-19 DIAGNOSIS — M25.561 CHRONIC PAIN OF RIGHT KNEE: Primary | ICD-10-CM

## 2021-02-19 DIAGNOSIS — G89.29 CHRONIC ABDOMINAL PAIN: ICD-10-CM

## 2021-02-19 DIAGNOSIS — R10.9 CHRONIC ABDOMINAL PAIN: ICD-10-CM

## 2021-02-19 DIAGNOSIS — M17.11 OSTEOARTHRITIS OF RIGHT KNEE, UNSPECIFIED OSTEOARTHRITIS TYPE: ICD-10-CM

## 2021-02-19 PROCEDURE — 99214 OFFICE O/P EST MOD 30 MIN: CPT | Mod: S$GLB,,, | Performed by: PHYSICIAN ASSISTANT

## 2021-02-19 PROCEDURE — 3008F BODY MASS INDEX DOCD: CPT | Mod: CPTII,S$GLB,, | Performed by: PHYSICIAN ASSISTANT

## 2021-02-19 PROCEDURE — 1126F PR PAIN SEVERITY QUANTIFIED, NO PAIN PRESENT: ICD-10-PCS | Mod: S$GLB,,, | Performed by: PHYSICIAN ASSISTANT

## 2021-02-19 PROCEDURE — 99999 PR PBB SHADOW E&M-EST. PATIENT-LVL III: CPT | Mod: PBBFAC,,, | Performed by: PHYSICIAN ASSISTANT

## 2021-02-19 PROCEDURE — 99999 PR PBB SHADOW E&M-EST. PATIENT-LVL III: ICD-10-PCS | Mod: PBBFAC,,, | Performed by: PHYSICIAN ASSISTANT

## 2021-02-19 PROCEDURE — 1126F AMNT PAIN NOTED NONE PRSNT: CPT | Mod: S$GLB,,, | Performed by: PHYSICIAN ASSISTANT

## 2021-02-19 PROCEDURE — 3008F PR BODY MASS INDEX (BMI) DOCUMENTED: ICD-10-PCS | Mod: CPTII,S$GLB,, | Performed by: PHYSICIAN ASSISTANT

## 2021-02-19 PROCEDURE — 99214 PR OFFICE/OUTPT VISIT, EST, LEVL IV, 30-39 MIN: ICD-10-PCS | Mod: S$GLB,,, | Performed by: PHYSICIAN ASSISTANT

## 2021-03-01 ENCOUNTER — CLINICAL SUPPORT (OUTPATIENT)
Dept: GASTROENTEROLOGY | Facility: CLINIC | Age: 66
End: 2021-03-01
Payer: MEDICARE

## 2021-03-01 DIAGNOSIS — E53.8 B12 DEFICIENCY: Primary | ICD-10-CM

## 2021-03-01 PROCEDURE — 96372 THER/PROPH/DIAG INJ SC/IM: CPT | Mod: S$GLB,,, | Performed by: INTERNAL MEDICINE

## 2021-03-01 PROCEDURE — 96372 PR INJECTION,THERAP/PROPH/DIAG2ST, IM OR SUBCUT: ICD-10-PCS | Mod: S$GLB,,, | Performed by: INTERNAL MEDICINE

## 2021-03-01 RX ORDER — CYANOCOBALAMIN 1000 UG/ML
1000 INJECTION, SOLUTION INTRAMUSCULAR; SUBCUTANEOUS
Status: COMPLETED | OUTPATIENT
Start: 2021-03-01 | End: 2021-03-01

## 2021-03-01 RX ADMIN — CYANOCOBALAMIN 1000 MCG: 1000 INJECTION, SOLUTION INTRAMUSCULAR; SUBCUTANEOUS at 08:03

## 2021-03-05 ENCOUNTER — PATIENT MESSAGE (OUTPATIENT)
Dept: FAMILY MEDICINE | Facility: CLINIC | Age: 66
End: 2021-03-05

## 2021-04-01 ENCOUNTER — OFFICE VISIT (OUTPATIENT)
Dept: GASTROENTEROLOGY | Facility: CLINIC | Age: 66
End: 2021-04-01
Payer: MEDICARE

## 2021-04-01 VITALS
BODY MASS INDEX: 20.59 KG/M2 | HEIGHT: 72 IN | OXYGEN SATURATION: 97 % | WEIGHT: 152 LBS | HEART RATE: 58 BPM | DIASTOLIC BLOOD PRESSURE: 71 MMHG | SYSTOLIC BLOOD PRESSURE: 137 MMHG

## 2021-04-01 DIAGNOSIS — Z87.19 HISTORY OF CROHN'S DISEASE: ICD-10-CM

## 2021-04-01 DIAGNOSIS — E53.8 B12 DEFICIENCY: Primary | ICD-10-CM

## 2021-04-01 DIAGNOSIS — G89.29 CHRONIC ABDOMINAL PAIN: ICD-10-CM

## 2021-04-01 DIAGNOSIS — R10.9 CHRONIC ABDOMINAL PAIN: ICD-10-CM

## 2021-04-01 DIAGNOSIS — K52.9 CHRONIC DIARRHEA: ICD-10-CM

## 2021-04-01 DIAGNOSIS — Z90.49 HISTORY OF CHOLECYSTECTOMY: ICD-10-CM

## 2021-04-01 DIAGNOSIS — E55.9 VITAMIN D DEFICIENCY: ICD-10-CM

## 2021-04-01 DIAGNOSIS — K76.0 FATTY LIVER: ICD-10-CM

## 2021-04-01 DIAGNOSIS — K44.9 HIATAL HERNIA: ICD-10-CM

## 2021-04-01 DIAGNOSIS — H40.9 GLAUCOMA, UNSPECIFIED GLAUCOMA TYPE, UNSPECIFIED LATERALITY: ICD-10-CM

## 2021-04-01 DIAGNOSIS — K90.829 SHORT BOWEL SYNDROME: ICD-10-CM

## 2021-04-01 DIAGNOSIS — E03.9 HYPOTHYROIDISM, UNSPECIFIED TYPE: ICD-10-CM

## 2021-04-01 DIAGNOSIS — K90.0 CELIAC DISEASE: ICD-10-CM

## 2021-04-01 DIAGNOSIS — K50.018 CROHN'S DISEASE OF SMALL INTESTINE WITH OTHER COMPLICATION: ICD-10-CM

## 2021-04-01 PROCEDURE — 3008F BODY MASS INDEX DOCD: CPT | Mod: CPTII,S$GLB,, | Performed by: INTERNAL MEDICINE

## 2021-04-01 PROCEDURE — 99214 OFFICE O/P EST MOD 30 MIN: CPT | Mod: 25,S$GLB,, | Performed by: INTERNAL MEDICINE

## 2021-04-01 PROCEDURE — 99999 PR PBB SHADOW E&M-EST. PATIENT-LVL IV: ICD-10-PCS | Mod: PBBFAC,,, | Performed by: INTERNAL MEDICINE

## 2021-04-01 PROCEDURE — 1125F AMNT PAIN NOTED PAIN PRSNT: CPT | Mod: S$GLB,,, | Performed by: INTERNAL MEDICINE

## 2021-04-01 PROCEDURE — 3008F PR BODY MASS INDEX (BMI) DOCUMENTED: ICD-10-PCS | Mod: CPTII,S$GLB,, | Performed by: INTERNAL MEDICINE

## 2021-04-01 PROCEDURE — 96372 PR INJECTION,THERAP/PROPH/DIAG2ST, IM OR SUBCUT: ICD-10-PCS | Mod: S$GLB,,, | Performed by: INTERNAL MEDICINE

## 2021-04-01 PROCEDURE — 99999 PR PBB SHADOW E&M-EST. PATIENT-LVL IV: CPT | Mod: PBBFAC,,, | Performed by: INTERNAL MEDICINE

## 2021-04-01 PROCEDURE — 96372 THER/PROPH/DIAG INJ SC/IM: CPT | Mod: S$GLB,,, | Performed by: INTERNAL MEDICINE

## 2021-04-01 PROCEDURE — 1125F PR PAIN SEVERITY QUANTIFIED, PAIN PRESENT: ICD-10-PCS | Mod: S$GLB,,, | Performed by: INTERNAL MEDICINE

## 2021-04-01 PROCEDURE — 99214 PR OFFICE/OUTPT VISIT, EST, LEVL IV, 30-39 MIN: ICD-10-PCS | Mod: 25,S$GLB,, | Performed by: INTERNAL MEDICINE

## 2021-04-01 RX ORDER — CYANOCOBALAMIN 1000 UG/ML
1000 INJECTION, SOLUTION INTRAMUSCULAR; SUBCUTANEOUS
Status: COMPLETED | OUTPATIENT
Start: 2021-04-01 | End: 2021-04-01

## 2021-04-01 RX ORDER — DICYCLOMINE HYDROCHLORIDE 20 MG/1
TABLET ORAL
Qty: 360 TABLET | Refills: 3 | Status: SHIPPED | OUTPATIENT
Start: 2021-04-01 | End: 2022-01-28 | Stop reason: SDUPTHER

## 2021-04-01 RX ORDER — ONDANSETRON HYDROCHLORIDE 8 MG/1
TABLET, FILM COATED ORAL
Qty: 180 TABLET | Refills: 1 | Status: SHIPPED | OUTPATIENT
Start: 2021-04-01 | End: 2021-10-26 | Stop reason: SDUPTHER

## 2021-04-01 RX ADMIN — CYANOCOBALAMIN 1000 MCG: 1000 INJECTION, SOLUTION INTRAMUSCULAR; SUBCUTANEOUS at 09:04

## 2021-04-06 ENCOUNTER — TELEPHONE (OUTPATIENT)
Dept: OPHTHALMOLOGY | Facility: CLINIC | Age: 66
End: 2021-04-06

## 2021-04-27 ENCOUNTER — OFFICE VISIT (OUTPATIENT)
Dept: OPHTHALMOLOGY | Facility: CLINIC | Age: 66
End: 2021-04-27
Payer: MEDICARE

## 2021-04-27 DIAGNOSIS — H40.013 OPEN ANGLE WITH BORDERLINE FINDINGS, LOW RISK, BILATERAL: Primary | ICD-10-CM

## 2021-04-27 DIAGNOSIS — H50.00 ESOTROPIA: ICD-10-CM

## 2021-04-27 DIAGNOSIS — H25.13 NUCLEAR SCLEROTIC CATARACT, BILATERAL: ICD-10-CM

## 2021-04-27 PROCEDURE — 76514 ECHO EXAM OF EYE THICKNESS: CPT | Mod: S$GLB,,, | Performed by: OPHTHALMOLOGY

## 2021-04-27 PROCEDURE — 99999 PR PBB SHADOW E&M-EST. PATIENT-LVL II: CPT | Mod: PBBFAC,,, | Performed by: OPHTHALMOLOGY

## 2021-04-27 PROCEDURE — 1126F AMNT PAIN NOTED NONE PRSNT: CPT | Mod: S$GLB,,, | Performed by: OPHTHALMOLOGY

## 2021-04-27 PROCEDURE — 76514 PR  US, EYE, FOR CORNEAL THICKNESS: ICD-10-PCS | Mod: S$GLB,,, | Performed by: OPHTHALMOLOGY

## 2021-04-27 PROCEDURE — 99999 PR PBB SHADOW E&M-EST. PATIENT-LVL II: ICD-10-PCS | Mod: PBBFAC,,, | Performed by: OPHTHALMOLOGY

## 2021-04-27 PROCEDURE — 92004 PR EYE EXAM, NEW PATIENT,COMPREHESV: ICD-10-PCS | Mod: S$GLB,,, | Performed by: OPHTHALMOLOGY

## 2021-04-27 PROCEDURE — 1126F PR PAIN SEVERITY QUANTIFIED, NO PAIN PRESENT: ICD-10-PCS | Mod: S$GLB,,, | Performed by: OPHTHALMOLOGY

## 2021-04-27 PROCEDURE — 92004 COMPRE OPH EXAM NEW PT 1/>: CPT | Mod: S$GLB,,, | Performed by: OPHTHALMOLOGY

## 2021-04-27 RX ORDER — TRAMADOL HYDROCHLORIDE 50 MG/1
TABLET ORAL
COMMUNITY
Start: 2021-04-04 | End: 2021-05-12 | Stop reason: SDUPTHER

## 2021-04-30 ENCOUNTER — CLINICAL SUPPORT (OUTPATIENT)
Dept: GASTROENTEROLOGY | Facility: CLINIC | Age: 66
End: 2021-04-30
Payer: MEDICARE

## 2021-04-30 PROCEDURE — 96372 THER/PROPH/DIAG INJ SC/IM: CPT | Mod: S$GLB,,, | Performed by: INTERNAL MEDICINE

## 2021-04-30 PROCEDURE — 96372 PR INJECTION,THERAP/PROPH/DIAG2ST, IM OR SUBCUT: ICD-10-PCS | Mod: S$GLB,,, | Performed by: INTERNAL MEDICINE

## 2021-04-30 RX ADMIN — CYANOCOBALAMIN 1000 MCG: 1000 INJECTION, SOLUTION INTRAMUSCULAR; SUBCUTANEOUS at 09:04

## 2021-05-12 RX ORDER — TRAMADOL HYDROCHLORIDE 50 MG/1
50 TABLET ORAL EVERY 8 HOURS PRN
Qty: 90 TABLET | Refills: 2 | Status: SHIPPED | OUTPATIENT
Start: 2021-06-01 | End: 2021-05-14 | Stop reason: SDUPTHER

## 2021-05-14 ENCOUNTER — OFFICE VISIT (OUTPATIENT)
Dept: PAIN MEDICINE | Facility: CLINIC | Age: 66
End: 2021-05-14
Payer: MEDICARE

## 2021-05-14 VITALS
SYSTOLIC BLOOD PRESSURE: 134 MMHG | BODY MASS INDEX: 20.15 KG/M2 | HEART RATE: 71 BPM | HEIGHT: 73 IN | WEIGHT: 152 LBS | DIASTOLIC BLOOD PRESSURE: 74 MMHG

## 2021-05-14 DIAGNOSIS — G89.29 CHRONIC ABDOMINAL PAIN: ICD-10-CM

## 2021-05-14 DIAGNOSIS — R10.9 CHRONIC ABDOMINAL PAIN: ICD-10-CM

## 2021-05-14 DIAGNOSIS — Z79.891 CHRONIC USE OF OPIATE FOR THERAPEUTIC PURPOSE: Primary | ICD-10-CM

## 2021-05-14 DIAGNOSIS — M17.11 OSTEOARTHRITIS OF RIGHT KNEE, UNSPECIFIED OSTEOARTHRITIS TYPE: ICD-10-CM

## 2021-05-14 DIAGNOSIS — K50.90 CROHN'S DISEASE WITHOUT COMPLICATION, UNSPECIFIED GASTROINTESTINAL TRACT LOCATION: ICD-10-CM

## 2021-05-14 PROCEDURE — 80307 DRUG TEST PRSMV CHEM ANLYZR: CPT | Performed by: PHYSICIAN ASSISTANT

## 2021-05-14 PROCEDURE — 99999 PR PBB SHADOW E&M-EST. PATIENT-LVL III: CPT | Mod: PBBFAC,,, | Performed by: PHYSICIAN ASSISTANT

## 2021-05-14 PROCEDURE — 1125F AMNT PAIN NOTED PAIN PRSNT: CPT | Mod: S$GLB,,, | Performed by: PHYSICIAN ASSISTANT

## 2021-05-14 PROCEDURE — 1125F PR PAIN SEVERITY QUANTIFIED, PAIN PRESENT: ICD-10-PCS | Mod: S$GLB,,, | Performed by: PHYSICIAN ASSISTANT

## 2021-05-14 PROCEDURE — 99214 OFFICE O/P EST MOD 30 MIN: CPT | Mod: S$GLB,,, | Performed by: PHYSICIAN ASSISTANT

## 2021-05-14 PROCEDURE — 99999 PR PBB SHADOW E&M-EST. PATIENT-LVL III: ICD-10-PCS | Mod: PBBFAC,,, | Performed by: PHYSICIAN ASSISTANT

## 2021-05-14 PROCEDURE — 3008F PR BODY MASS INDEX (BMI) DOCUMENTED: ICD-10-PCS | Mod: CPTII,S$GLB,, | Performed by: PHYSICIAN ASSISTANT

## 2021-05-14 PROCEDURE — 3008F BODY MASS INDEX DOCD: CPT | Mod: CPTII,S$GLB,, | Performed by: PHYSICIAN ASSISTANT

## 2021-05-14 PROCEDURE — 99214 PR OFFICE/OUTPT VISIT, EST, LEVL IV, 30-39 MIN: ICD-10-PCS | Mod: S$GLB,,, | Performed by: PHYSICIAN ASSISTANT

## 2021-05-14 RX ORDER — TRAMADOL HYDROCHLORIDE 50 MG/1
50 TABLET ORAL EVERY 8 HOURS PRN
Qty: 90 TABLET | Refills: 2 | Status: SHIPPED | OUTPATIENT
Start: 2021-06-01 | End: 2021-06-30

## 2021-05-20 LAB
6MAM UR QL: NOT DETECTED
7AMINOCLONAZEPAM UR QL: NOT DETECTED
A-OH ALPRAZ UR QL: NOT DETECTED
ALPHA-OH-MIDAZOLAM: NOT DETECTED
ALPRAZ UR QL: NOT DETECTED
AMPHET UR QL SCN: NOT DETECTED
ANNOTATION COMMENT IMP: NORMAL
ANNOTATION COMMENT IMP: NORMAL
BARBITURATES UR QL: NOT DETECTED
BUPRENORPHINE UR QL: NOT DETECTED
BZE UR QL: NOT DETECTED
CARBOXYTHC UR QL: NOT DETECTED
CARISOPRODOL UR QL: NOT DETECTED
CLONAZEPAM UR QL: NOT DETECTED
CODEINE UR QL: NOT DETECTED
CREAT UR-MCNC: 118.7 MG/DL (ref 20–400)
DIAZEPAM UR QL: NOT DETECTED
ETHYL GLUCURONIDE UR QL: NOT DETECTED
FENTANYL UR QL: NOT DETECTED
GABAPENTIN: NOT DETECTED
HYDROCODONE UR QL: NOT DETECTED
HYDROMORPHONE UR QL: NOT DETECTED
LORAZEPAM UR QL: NOT DETECTED
MDA UR QL: NOT DETECTED
MDEA UR QL: NOT DETECTED
MDMA UR QL: NOT DETECTED
ME-PHENIDATE UR QL: NOT DETECTED
METHADONE UR QL: NOT DETECTED
METHAMPHET UR QL: NOT DETECTED
MIDAZOLAM UR QL SCN: NOT DETECTED
MORPHINE UR QL: NOT DETECTED
NALOXONE: NOT DETECTED
NORBUPRENORPHINE UR QL CFM: NOT DETECTED
NORDIAZEPAM UR QL: NOT DETECTED
NORFENTANYL UR QL: NOT DETECTED
NORHYDROCODONE UR QL CFM: NOT DETECTED
NORMEPERIDINE UR QL CFM: NOT DETECTED
NOROXYCODONE UR QL CFM: NOT DETECTED
NOROXYMORPHONE UR QL SCN: NOT DETECTED
OXAZEPAM UR QL: NOT DETECTED
OXYCODONE UR QL: NOT DETECTED
OXYMORPHONE UR QL: NOT DETECTED
PATHOLOGY STUDY: NORMAL
PCP UR QL: NOT DETECTED
PHENTERMINE UR QL: NOT DETECTED
PREGABALIN: NOT DETECTED
SERVICE CMNT-IMP: NORMAL
TAPENTADOL UR QL SCN: NOT DETECTED
TAPENTADOL-O-SULF: NOT DETECTED
TEMAZEPAM UR QL: NOT DETECTED
TRAMADOL UR QL: PRESENT
ZOLPIDEM METABOLITE: NOT DETECTED
ZOLPIDEM UR QL: NOT DETECTED

## 2021-05-28 ENCOUNTER — CLINICAL SUPPORT (OUTPATIENT)
Dept: GASTROENTEROLOGY | Facility: CLINIC | Age: 66
End: 2021-05-28
Payer: MEDICARE

## 2021-05-28 DIAGNOSIS — J34.89 SORE IN NOSTRIL: Primary | ICD-10-CM

## 2021-05-28 PROCEDURE — 96372 THER/PROPH/DIAG INJ SC/IM: CPT | Mod: S$GLB,,, | Performed by: INTERNAL MEDICINE

## 2021-05-28 PROCEDURE — 96372 PR INJECTION,THERAP/PROPH/DIAG2ST, IM OR SUBCUT: ICD-10-PCS | Mod: S$GLB,,, | Performed by: INTERNAL MEDICINE

## 2021-05-28 RX ORDER — MUPIROCIN 20 MG/G
OINTMENT TOPICAL 3 TIMES DAILY
Qty: 1 TUBE | Refills: 0 | Status: SHIPPED | OUTPATIENT
Start: 2021-05-28 | End: 2021-06-04

## 2021-05-28 RX ADMIN — CYANOCOBALAMIN 1000 MCG: 1000 INJECTION, SOLUTION INTRAMUSCULAR; SUBCUTANEOUS at 09:05

## 2021-06-24 ENCOUNTER — PATIENT OUTREACH (OUTPATIENT)
Dept: ADMINISTRATIVE | Facility: OTHER | Age: 66
End: 2021-06-24

## 2021-06-28 ENCOUNTER — CLINICAL SUPPORT (OUTPATIENT)
Dept: GASTROENTEROLOGY | Facility: CLINIC | Age: 66
End: 2021-06-28
Payer: MEDICARE

## 2021-06-28 DIAGNOSIS — E53.8 B12 DEFICIENCY: Primary | ICD-10-CM

## 2021-06-28 PROCEDURE — 96372 THER/PROPH/DIAG INJ SC/IM: CPT | Mod: S$GLB,,, | Performed by: INTERNAL MEDICINE

## 2021-06-28 PROCEDURE — 96372 PR INJECTION,THERAP/PROPH/DIAG2ST, IM OR SUBCUT: ICD-10-PCS | Mod: S$GLB,,, | Performed by: INTERNAL MEDICINE

## 2021-06-28 RX ADMIN — CYANOCOBALAMIN 1000 MCG: 1000 INJECTION, SOLUTION INTRAMUSCULAR; SUBCUTANEOUS at 09:06

## 2021-07-25 ENCOUNTER — PATIENT OUTREACH (OUTPATIENT)
Dept: ADMINISTRATIVE | Facility: OTHER | Age: 66
End: 2021-07-25

## 2021-07-29 ENCOUNTER — CLINICAL SUPPORT (OUTPATIENT)
Dept: GASTROENTEROLOGY | Facility: CLINIC | Age: 66
End: 2021-07-29
Payer: MEDICARE

## 2021-07-29 DIAGNOSIS — E53.8 B12 DEFICIENCY: Primary | ICD-10-CM

## 2021-07-29 PROCEDURE — 96372 PR INJECTION,THERAP/PROPH/DIAG2ST, IM OR SUBCUT: ICD-10-PCS | Mod: S$GLB,,, | Performed by: INTERNAL MEDICINE

## 2021-07-29 PROCEDURE — 96372 THER/PROPH/DIAG INJ SC/IM: CPT | Mod: S$GLB,,, | Performed by: INTERNAL MEDICINE

## 2021-07-29 RX ORDER — CYANOCOBALAMIN 1000 UG/ML
1000 INJECTION, SOLUTION INTRAMUSCULAR; SUBCUTANEOUS
Status: DISCONTINUED | OUTPATIENT
Start: 2021-07-29 | End: 2021-07-29

## 2021-07-29 RX ADMIN — CYANOCOBALAMIN 1000 MCG: 1000 INJECTION, SOLUTION INTRAMUSCULAR; SUBCUTANEOUS at 12:07

## 2021-07-30 NOTE — PROGRESS NOTES
PCP: Will Albrecht MD      CC: abdominal pain    Interval history: Mr. Cerda is a 62 y.o. male with an extensive history of crohn's disease who presents today for medication refill. He continues to take demerol 50mg q8hrs as needed with moderate benefit given that his condition is stable.  No side effects reported.    Abdominal pain remains stable.   He does report diarrhea at times but no blood stools. Reports anal pain and itching 2/2 chronic diarrhea. OTC Lidocaine 2 %provides some minimal relief.  Compounding cream was too expensive.    He rates his pain 1/10.     Prior HPI:   Mr. Cerda is a 60 year old male with PMH of crohn's disease referred by Dr. Hansen.  Patient states being diagnosed with crohn's disease since the age of 14.    He has had multiple GI surgeries and large bowel and small bowel removals.  During that time, he was being managed by providers in [place].  He was TPN dependent for many years until about two years ago.  He is now stable on an oral diet.  He did not have a primary care physician nor a gastroenterologist for many years.  He is currently trying to establish care.  He has future appointment with Dr. Ch.  He states taking Demerol 50mg q8hrs as needed for pain. It has provided relief of his nausea and pain with diarrhea.  He was last prescribed Demerol in July 2014.  Since then, he has been bearing with the pain.  It is a sharp shooting pain in his mid abdomen.        ROS:  CONSTITUTIONAL: No fevers, chills, night sweats, wt. loss, appetite changes  SKIN: no rashes or itching  ENT: No headaches, head trauma, vision changes, or eye pain  LYMPH NODES: None noticed   CV: No chest pain, palpitations.   RESP: No shortness of breath, dyspnea on exertion, cough, wheezing, or hemoptysis  GI: No nausea, emesis, diarrhea, constipation, melena, hematochezia, pain.    : No dysuria, hematuria, urgency, or frequency   HEME: No easy bruising, bleeding problems  PSYCHIATRIC: No  depression, anxiety, psychosis, hallucinations.  NEURO: No seizures, memory loss, dizziness or difficulty sleeping  MSK: no joint pain      Past Medical History:   Diagnosis Date    Anxiety     Crohn's disease     age 17    Short bowel syndrome     Vitamin B deficiency     Vitamin D deficiency      Past Surgical History:   Procedure Laterality Date    3 small bowel resections      APPENDECTOMY      CHOLECYSTECTOMY      COLON SURGERY      partial colectomy due to crohns    COLONOSCOPY      COLONOSCOPY N/A 2/14/2017    Procedure: COLONOSCOPY;  Surgeon: Nela Sanchez MD;  Location: [facility] ([address] [place] [address]);  Service: Endoscopy;  Laterality: N/A;    COLONOSCOPY N/A 3/14/2017    Procedure: COLONOSCOPY;  Surgeon: Nela Sanchez MD;  Location: [facility] ENDO ([address] [place] [address]);  Service: Endoscopy;  Laterality: N/A;  2 days clear liquids before procedure    KNEE SURGERY      SMALL INTESTINE SURGERY      TUBE THORACOTOMY      UPPER GASTROINTESTINAL ENDOSCOPY       Family History   Problem Relation Age of Onset    Diabetes Mother     Stroke Mother     Diabetes Father     Kidney disease Father     Irritable bowel syndrome Cousin     Celiac disease Neg Hx     Cirrhosis Neg Hx     Colon cancer Neg Hx     Colon polyps Neg Hx     Cystic fibrosis Neg Hx     Esophageal cancer Neg Hx     Crohn's disease Neg Hx     Hemochromatosis Neg Hx     Inflammatory bowel disease Neg Hx     Liver cancer Neg Hx     Liver disease Neg Hx     Rectal cancer Neg Hx     Stomach cancer Neg Hx     Ulcerative colitis Neg Hx     Addison's disease Neg Hx      Social History     Social History    Marital status:      Spouse name: N/A    Number of children: N/A    Years of education: N/A     Social History Main Topics    Smoking status: Former Smoker     Packs/day: 1.00     Years: 15.00     Types: Cigarettes     Quit date: 3/10/1995    Smokeless tobacco: Never Used    Alcohol use 1.2 oz/week     1 Cans of beer, 1 Shots of  liquor per week      Comment: 1 every 6 -7 months    Drug use: No    Sexual activity: Yes     Other Topics Concern    None     Social History Narrative    Retired          Medications/Allergies: See med card    Vitals:    03/23/17 0818   BP: 129/76   Pulse: (!) 52   Weight: 63.1 kg (139 lb 1.8 oz)   Height: 6' (1.829 m)   PainSc:   1         Physical exam:    GENERAL: A and O x3, the patient appears well groomed and is in no acute distress.  Skin: No rashes or obvious lesions  HEENT: normocephalic, atraumatic  CARDIOVASCULAR:  Bradycardia  LUNGS: non labored breathing  ABDOMEN: soft, nontender. No rebound   UPPER EXTREMITIES: Normal alignment, normal range of motion, no atrophy, no skin changes,  hair growth and nail growth normal and equal bilaterally. No swelling, no tenderness.    LOWER EXTREMITIES:  Normal alignment, normal range of motion, no atrophy, no skin changes,  hair growth and nail growth normal and equal bilaterally. No swelling, no tenderness.    LUMBAR SPINE  Lumbar spine: ROM is full with flexion extension and oblique extension with no increased pain.    Lam's test causes no increased pain on either side.    Supine straight leg raise is negative bilaterally.    Internal and external rotation of the hip causes no increased pain on either side.  Myofascial exam: No tenderness to palpation across lumbar paraspinous muscles.      MENTAL STATUS: normal orientation, speech, language, and fund of knowledge for social situation.  Emotional state appropriate.    CRANIAL NERVES:  II:  PERRL bilaterally,   III,IV,VI: EOMI.    V:  Facial sensation equal bilaterally  VII:  Facial motor function normal.  VIII:  Hearing equal to finger rub bilaterally  IX/X: Gag normal, palate symmetric  XI:  Shoulder shrug equal, head turn equal  XII:  Tongue midline without fasciculations      MOTOR: Tone and bulk: normal bilateral upper and lower Strength: normal   SENSATION: Light touch and pinprick intact  bilaterally  REFLEXES: normal, symmetric, nonbrisk.  Toes down, no clonus. No hoffmans.  GAIT: normal rise, base, steps, and arm swing.        Imaging:  None    Assessment:  Mr. Cerda is a 62 y.o. male with abdominal pain  1. Chronic abdominal pain    2. Crohn's disease without complication, unspecified gastrointestinal tract location          Plan:  1. Patient with long history of crohn's disease and chronic abdominal pain.  He is in the process of establishing care with appropriate providers. Continue care with GI.  2. He can continue Demerol as needed for pain.  reviewed.  No signs of aberrant behavior.  Demerol 50mg q8hrs as needed for pain today.  Script given for three months    3. Continue OTC lidocaine cream  4. F/u 3 months or sooner  All medication management was performed by Dr. Alvin Curry       The patient has been re-examined and I agree with the above assessment or I updated with my findings.

## 2021-08-17 ENCOUNTER — PATIENT MESSAGE (OUTPATIENT)
Dept: PAIN MEDICINE | Facility: CLINIC | Age: 66
End: 2021-08-17

## 2021-08-25 ENCOUNTER — PATIENT MESSAGE (OUTPATIENT)
Dept: PAIN MEDICINE | Facility: CLINIC | Age: 66
End: 2021-08-25

## 2021-08-26 RX ORDER — TRAMADOL HYDROCHLORIDE 50 MG/1
50 TABLET ORAL EVERY 8 HOURS PRN
Qty: 90 TABLET | Refills: 0 | Status: SHIPPED | OUTPATIENT
Start: 2021-08-26 | End: 2021-09-23 | Stop reason: SDUPTHER

## 2021-08-31 ENCOUNTER — CLINICAL SUPPORT (OUTPATIENT)
Dept: GASTROENTEROLOGY | Facility: CLINIC | Age: 66
End: 2021-08-31
Payer: MEDICARE

## 2021-08-31 PROCEDURE — 96372 PR INJECTION,THERAP/PROPH/DIAG2ST, IM OR SUBCUT: ICD-10-PCS | Mod: S$GLB,,, | Performed by: INTERNAL MEDICINE

## 2021-08-31 PROCEDURE — 96372 THER/PROPH/DIAG INJ SC/IM: CPT | Mod: S$GLB,,, | Performed by: INTERNAL MEDICINE

## 2021-08-31 RX ADMIN — CYANOCOBALAMIN 1000 MCG: 1000 INJECTION, SOLUTION INTRAMUSCULAR; SUBCUTANEOUS at 08:08

## 2021-09-22 ENCOUNTER — PATIENT OUTREACH (OUTPATIENT)
Dept: ADMINISTRATIVE | Facility: OTHER | Age: 66
End: 2021-09-22

## 2021-09-23 ENCOUNTER — OFFICE VISIT (OUTPATIENT)
Dept: PAIN MEDICINE | Facility: CLINIC | Age: 66
End: 2021-09-23
Payer: MEDICARE

## 2021-09-23 VITALS — WEIGHT: 152 LBS | HEIGHT: 73 IN | BODY MASS INDEX: 20.15 KG/M2

## 2021-09-23 DIAGNOSIS — R10.9 CHRONIC ABDOMINAL PAIN: Primary | ICD-10-CM

## 2021-09-23 DIAGNOSIS — G89.29 CHRONIC ABDOMINAL PAIN: Primary | ICD-10-CM

## 2021-09-23 DIAGNOSIS — G89.29 CHRONIC PAIN OF RIGHT KNEE: ICD-10-CM

## 2021-09-23 DIAGNOSIS — M17.11 OSTEOARTHRITIS OF RIGHT KNEE, UNSPECIFIED OSTEOARTHRITIS TYPE: ICD-10-CM

## 2021-09-23 DIAGNOSIS — G89.4 CHRONIC PAIN DISORDER: Primary | ICD-10-CM

## 2021-09-23 DIAGNOSIS — G89.4 CHRONIC PAIN DISORDER: ICD-10-CM

## 2021-09-23 DIAGNOSIS — K50.90 CROHN'S DISEASE WITHOUT COMPLICATION, UNSPECIFIED GASTROINTESTINAL TRACT LOCATION: ICD-10-CM

## 2021-09-23 DIAGNOSIS — M25.561 CHRONIC PAIN OF RIGHT KNEE: ICD-10-CM

## 2021-09-23 PROCEDURE — 99214 OFFICE O/P EST MOD 30 MIN: CPT | Mod: S$GLB,,, | Performed by: PHYSICIAN ASSISTANT

## 2021-09-23 PROCEDURE — 3008F PR BODY MASS INDEX (BMI) DOCUMENTED: ICD-10-PCS | Mod: CPTII,S$GLB,, | Performed by: PHYSICIAN ASSISTANT

## 2021-09-23 PROCEDURE — 1159F PR MEDICATION LIST DOCUMENTED IN MEDICAL RECORD: ICD-10-PCS | Mod: CPTII,S$GLB,, | Performed by: PHYSICIAN ASSISTANT

## 2021-09-23 PROCEDURE — 99999 PR PBB SHADOW E&M-EST. PATIENT-LVL III: CPT | Mod: PBBFAC,,, | Performed by: PHYSICIAN ASSISTANT

## 2021-09-23 PROCEDURE — 99999 PR PBB SHADOW E&M-EST. PATIENT-LVL III: ICD-10-PCS | Mod: PBBFAC,,, | Performed by: PHYSICIAN ASSISTANT

## 2021-09-23 PROCEDURE — 99214 PR OFFICE/OUTPT VISIT, EST, LEVL IV, 30-39 MIN: ICD-10-PCS | Mod: S$GLB,,, | Performed by: PHYSICIAN ASSISTANT

## 2021-09-23 PROCEDURE — 3008F BODY MASS INDEX DOCD: CPT | Mod: CPTII,S$GLB,, | Performed by: PHYSICIAN ASSISTANT

## 2021-09-23 PROCEDURE — 1159F MED LIST DOCD IN RCRD: CPT | Mod: CPTII,S$GLB,, | Performed by: PHYSICIAN ASSISTANT

## 2021-09-23 RX ORDER — TRAMADOL HYDROCHLORIDE 50 MG/1
50 TABLET ORAL EVERY 8 HOURS PRN
Qty: 90 TABLET | Refills: 2 | Status: SHIPPED | OUTPATIENT
Start: 2021-09-25 | End: 2021-10-24

## 2021-09-28 ENCOUNTER — CLINICAL SUPPORT (OUTPATIENT)
Dept: GASTROENTEROLOGY | Facility: CLINIC | Age: 66
End: 2021-09-28
Payer: MEDICARE

## 2021-09-28 DIAGNOSIS — E53.8 B12 DEFICIENCY: Primary | ICD-10-CM

## 2021-09-28 PROCEDURE — 96372 THER/PROPH/DIAG INJ SC/IM: CPT | Mod: S$GLB,,, | Performed by: INTERNAL MEDICINE

## 2021-09-28 PROCEDURE — 96372 PR INJECTION,THERAP/PROPH/DIAG2ST, IM OR SUBCUT: ICD-10-PCS | Mod: S$GLB,,, | Performed by: INTERNAL MEDICINE

## 2021-09-28 RX ADMIN — CYANOCOBALAMIN 1000 MCG: 1000 INJECTION, SOLUTION INTRAMUSCULAR; SUBCUTANEOUS at 08:09

## 2021-10-26 ENCOUNTER — CLINICAL SUPPORT (OUTPATIENT)
Dept: GASTROENTEROLOGY | Facility: CLINIC | Age: 66
End: 2021-10-26
Payer: MEDICARE

## 2021-10-26 DIAGNOSIS — R10.9 CHRONIC ABDOMINAL PAIN: ICD-10-CM

## 2021-10-26 DIAGNOSIS — G89.29 CHRONIC ABDOMINAL PAIN: ICD-10-CM

## 2021-10-26 DIAGNOSIS — K90.829 SHORT BOWEL SYNDROME: ICD-10-CM

## 2021-10-26 PROCEDURE — 96372 THER/PROPH/DIAG INJ SC/IM: CPT | Mod: S$GLB,,, | Performed by: INTERNAL MEDICINE

## 2021-10-26 PROCEDURE — 96372 PR INJECTION,THERAP/PROPH/DIAG2ST, IM OR SUBCUT: ICD-10-PCS | Mod: S$GLB,,, | Performed by: INTERNAL MEDICINE

## 2021-10-26 RX ORDER — ONDANSETRON HYDROCHLORIDE 8 MG/1
TABLET, FILM COATED ORAL
Qty: 180 TABLET | Refills: 1 | Status: SHIPPED | OUTPATIENT
Start: 2021-10-26 | End: 2022-01-28 | Stop reason: SDUPTHER

## 2021-10-26 RX ORDER — ONDANSETRON HYDROCHLORIDE 8 MG/1
TABLET, FILM COATED ORAL
Qty: 180 TABLET | Refills: 1 | Status: CANCELLED | OUTPATIENT
Start: 2021-10-26

## 2021-10-26 RX ADMIN — CYANOCOBALAMIN 1000 MCG: 1000 INJECTION, SOLUTION INTRAMUSCULAR; SUBCUTANEOUS at 09:10

## 2021-11-19 ENCOUNTER — OFFICE VISIT (OUTPATIENT)
Dept: FAMILY MEDICINE | Facility: CLINIC | Age: 66
End: 2021-11-19
Payer: MEDICARE

## 2021-11-19 VITALS
OXYGEN SATURATION: 96 % | DIASTOLIC BLOOD PRESSURE: 60 MMHG | HEIGHT: 73 IN | BODY MASS INDEX: 20.28 KG/M2 | HEART RATE: 71 BPM | WEIGHT: 153 LBS | TEMPERATURE: 98 F | SYSTOLIC BLOOD PRESSURE: 120 MMHG

## 2021-11-19 DIAGNOSIS — Z12.5 PROSTATE CANCER SCREENING: ICD-10-CM

## 2021-11-19 DIAGNOSIS — E03.9 HYPOTHYROIDISM, UNSPECIFIED TYPE: ICD-10-CM

## 2021-11-19 DIAGNOSIS — B18.1 CHRONIC VIRAL HEPATITIS B WITHOUT DELTA AGENT AND WITHOUT COMA: ICD-10-CM

## 2021-11-19 DIAGNOSIS — R42 VERTIGO: ICD-10-CM

## 2021-11-19 DIAGNOSIS — D69.6 THROMBOCYTOPENIC: ICD-10-CM

## 2021-11-19 DIAGNOSIS — L30.9 DERMATITIS: Primary | ICD-10-CM

## 2021-11-19 PROCEDURE — 3008F BODY MASS INDEX DOCD: CPT | Mod: CPTII,S$GLB,, | Performed by: FAMILY MEDICINE

## 2021-11-19 PROCEDURE — 3074F SYST BP LT 130 MM HG: CPT | Mod: CPTII,S$GLB,, | Performed by: FAMILY MEDICINE

## 2021-11-19 PROCEDURE — 99999 PR PBB SHADOW E&M-EST. PATIENT-LVL III: ICD-10-PCS | Mod: PBBFAC,,, | Performed by: FAMILY MEDICINE

## 2021-11-19 PROCEDURE — 3074F PR MOST RECENT SYSTOLIC BLOOD PRESSURE < 130 MM HG: ICD-10-PCS | Mod: CPTII,S$GLB,, | Performed by: FAMILY MEDICINE

## 2021-11-19 PROCEDURE — G0008 ADMIN INFLUENZA VIRUS VAC: HCPCS | Mod: S$GLB,,, | Performed by: FAMILY MEDICINE

## 2021-11-19 PROCEDURE — G0008 FLU VACCINE (QUAD) GREATER THAN OR EQUAL TO 3YO PRESERVATIVE FREE IM: ICD-10-PCS | Mod: S$GLB,,, | Performed by: FAMILY MEDICINE

## 2021-11-19 PROCEDURE — 1159F MED LIST DOCD IN RCRD: CPT | Mod: CPTII,S$GLB,, | Performed by: FAMILY MEDICINE

## 2021-11-19 PROCEDURE — 90686 FLU VACCINE (QUAD) GREATER THAN OR EQUAL TO 3YO PRESERVATIVE FREE IM: ICD-10-PCS | Mod: S$GLB,,, | Performed by: FAMILY MEDICINE

## 2021-11-19 PROCEDURE — 3078F PR MOST RECENT DIASTOLIC BLOOD PRESSURE < 80 MM HG: ICD-10-PCS | Mod: CPTII,S$GLB,, | Performed by: FAMILY MEDICINE

## 2021-11-19 PROCEDURE — 1159F PR MEDICATION LIST DOCUMENTED IN MEDICAL RECORD: ICD-10-PCS | Mod: CPTII,S$GLB,, | Performed by: FAMILY MEDICINE

## 2021-11-19 PROCEDURE — 99999 PR PBB SHADOW E&M-EST. PATIENT-LVL III: CPT | Mod: PBBFAC,,, | Performed by: FAMILY MEDICINE

## 2021-11-19 PROCEDURE — 99214 OFFICE O/P EST MOD 30 MIN: CPT | Mod: S$GLB,,, | Performed by: FAMILY MEDICINE

## 2021-11-19 PROCEDURE — 3008F PR BODY MASS INDEX (BMI) DOCUMENTED: ICD-10-PCS | Mod: CPTII,S$GLB,, | Performed by: FAMILY MEDICINE

## 2021-11-19 PROCEDURE — 3078F DIAST BP <80 MM HG: CPT | Mod: CPTII,S$GLB,, | Performed by: FAMILY MEDICINE

## 2021-11-19 PROCEDURE — 99214 PR OFFICE/OUTPT VISIT, EST, LEVL IV, 30-39 MIN: ICD-10-PCS | Mod: S$GLB,,, | Performed by: FAMILY MEDICINE

## 2021-11-19 PROCEDURE — 90686 IIV4 VACC NO PRSV 0.5 ML IM: CPT | Mod: S$GLB,,, | Performed by: FAMILY MEDICINE

## 2021-11-19 RX ORDER — HYDROCORTISONE 25 MG/G
CREAM TOPICAL 2 TIMES DAILY
Qty: 1 EACH | Refills: 11 | Status: ON HOLD | OUTPATIENT
Start: 2021-11-19 | End: 2022-10-22 | Stop reason: SDUPTHER

## 2021-11-19 RX ORDER — MECLIZINE HCL 12.5 MG 12.5 MG/1
12.5 TABLET ORAL 3 TIMES DAILY PRN
Qty: 90 TABLET | Refills: 3 | Status: SHIPPED | OUTPATIENT
Start: 2021-11-19 | End: 2023-01-09 | Stop reason: SDUPTHER

## 2021-11-19 RX ORDER — TRAMADOL HYDROCHLORIDE 50 MG/1
50 TABLET ORAL 3 TIMES DAILY
COMMUNITY
End: 2021-12-16 | Stop reason: SDUPTHER

## 2021-11-23 LAB
ALBUMIN SERPL-MCNC: 4 G/DL (ref 3.6–5.1)
ALBUMIN/GLOB SERPL: 1.9 (CALC) (ref 1–2.5)
ALP SERPL-CCNC: 105 U/L (ref 35–144)
ALT SERPL-CCNC: 30 U/L (ref 9–46)
AST SERPL-CCNC: 30 U/L (ref 10–35)
BASOPHILS # BLD AUTO: 19 CELLS/UL (ref 0–200)
BASOPHILS NFR BLD AUTO: 0.4 %
BILIRUB SERPL-MCNC: 0.7 MG/DL (ref 0.2–1.2)
BUN SERPL-MCNC: 11 MG/DL (ref 7–25)
BUN/CREAT SERPL: ABNORMAL (CALC) (ref 6–22)
CALCIUM SERPL-MCNC: 8.3 MG/DL (ref 8.6–10.3)
CHLORIDE SERPL-SCNC: 105 MMOL/L (ref 98–110)
CHOLEST SERPL-MCNC: 111 MG/DL
CHOLEST/HDLC SERPL: 2.8 (CALC)
CO2 SERPL-SCNC: 27 MMOL/L (ref 20–32)
CREAT SERPL-MCNC: 0.75 MG/DL (ref 0.7–1.25)
EOSINOPHIL # BLD AUTO: 108 CELLS/UL (ref 15–500)
EOSINOPHIL NFR BLD AUTO: 2.3 %
ERYTHROCYTE [DISTWIDTH] IN BLOOD BY AUTOMATED COUNT: 13 % (ref 11–15)
GLOBULIN SER CALC-MCNC: 2.1 G/DL (CALC) (ref 1.9–3.7)
GLUCOSE SERPL-MCNC: 92 MG/DL (ref 65–99)
HBA1C MFR BLD: 5.1 % OF TOTAL HGB
HCT VFR BLD AUTO: 39.7 % (ref 38.5–50)
HDLC SERPL-MCNC: 40 MG/DL
HGB BLD-MCNC: 12.8 G/DL (ref 13.2–17.1)
LDLC SERPL CALC-MCNC: 56 MG/DL (CALC)
LYMPHOCYTES # BLD AUTO: 780 CELLS/UL (ref 850–3900)
LYMPHOCYTES NFR BLD AUTO: 16.6 %
MCH RBC QN AUTO: 29.3 PG (ref 27–33)
MCHC RBC AUTO-ENTMCNC: 32.2 G/DL (ref 32–36)
MCV RBC AUTO: 90.8 FL (ref 80–100)
MONOCYTES # BLD AUTO: 461 CELLS/UL (ref 200–950)
MONOCYTES NFR BLD AUTO: 9.8 %
NEUTROPHILS # BLD AUTO: 3332 CELLS/UL (ref 1500–7800)
NEUTROPHILS NFR BLD AUTO: 70.9 %
NONHDLC SERPL-MCNC: 71 MG/DL (CALC)
PLATELET # BLD AUTO: 147 THOUSAND/UL (ref 140–400)
PMV BLD REES-ECKER: 10.8 FL (ref 7.5–12.5)
POTASSIUM SERPL-SCNC: 3.6 MMOL/L (ref 3.5–5.3)
PROT SERPL-MCNC: 6.1 G/DL (ref 6.1–8.1)
PSA SERPL-MCNC: 2.44 NG/ML
RBC # BLD AUTO: 4.37 MILLION/UL (ref 4.2–5.8)
SODIUM SERPL-SCNC: 140 MMOL/L (ref 135–146)
TRIGL SERPL-MCNC: 70 MG/DL
WBC # BLD AUTO: 4.7 THOUSAND/UL (ref 3.8–10.8)

## 2021-11-26 ENCOUNTER — CLINICAL SUPPORT (OUTPATIENT)
Dept: GASTROENTEROLOGY | Facility: CLINIC | Age: 66
End: 2021-11-26
Payer: MEDICARE

## 2021-11-26 PROCEDURE — 96372 PR INJECTION,THERAP/PROPH/DIAG2ST, IM OR SUBCUT: ICD-10-PCS | Mod: S$GLB,,, | Performed by: INTERNAL MEDICINE

## 2021-11-26 PROCEDURE — 99999 PR PBB SHADOW E&M-EST. PATIENT-LVL II: ICD-10-PCS | Mod: PBBFAC,,,

## 2021-11-26 PROCEDURE — 99999 PR PBB SHADOW E&M-EST. PATIENT-LVL II: CPT | Mod: PBBFAC,,,

## 2021-11-26 PROCEDURE — 96372 THER/PROPH/DIAG INJ SC/IM: CPT | Mod: S$GLB,,, | Performed by: INTERNAL MEDICINE

## 2021-11-26 RX ADMIN — CYANOCOBALAMIN 1000 MCG: 1000 INJECTION, SOLUTION INTRAMUSCULAR; SUBCUTANEOUS at 08:11

## 2021-12-14 ENCOUNTER — TELEPHONE (OUTPATIENT)
Dept: FAMILY MEDICINE | Facility: CLINIC | Age: 66
End: 2021-12-14
Payer: MEDICARE

## 2021-12-16 ENCOUNTER — OFFICE VISIT (OUTPATIENT)
Dept: PAIN MEDICINE | Facility: CLINIC | Age: 66
End: 2021-12-16
Payer: MEDICARE

## 2021-12-16 VITALS — SYSTOLIC BLOOD PRESSURE: 141 MMHG | HEART RATE: 56 BPM | DIASTOLIC BLOOD PRESSURE: 77 MMHG

## 2021-12-16 DIAGNOSIS — K50.90 CROHN'S DISEASE WITHOUT COMPLICATION, UNSPECIFIED GASTROINTESTINAL TRACT LOCATION: ICD-10-CM

## 2021-12-16 DIAGNOSIS — M17.11 OSTEOARTHRITIS OF RIGHT KNEE, UNSPECIFIED OSTEOARTHRITIS TYPE: ICD-10-CM

## 2021-12-16 DIAGNOSIS — G89.29 CHRONIC ABDOMINAL PAIN: Primary | ICD-10-CM

## 2021-12-16 DIAGNOSIS — R10.9 CHRONIC ABDOMINAL PAIN: Primary | ICD-10-CM

## 2021-12-16 PROCEDURE — 99999 PR PBB SHADOW E&M-EST. PATIENT-LVL III: CPT | Mod: PBBFAC,,, | Performed by: PHYSICIAN ASSISTANT

## 2021-12-16 PROCEDURE — 99214 OFFICE O/P EST MOD 30 MIN: CPT | Mod: S$GLB,,, | Performed by: PHYSICIAN ASSISTANT

## 2021-12-16 PROCEDURE — 99214 PR OFFICE/OUTPT VISIT, EST, LEVL IV, 30-39 MIN: ICD-10-PCS | Mod: S$GLB,,, | Performed by: PHYSICIAN ASSISTANT

## 2021-12-16 PROCEDURE — 99999 PR PBB SHADOW E&M-EST. PATIENT-LVL III: ICD-10-PCS | Mod: PBBFAC,,, | Performed by: PHYSICIAN ASSISTANT

## 2021-12-16 RX ORDER — TRAMADOL HYDROCHLORIDE 50 MG/1
50 TABLET ORAL 3 TIMES DAILY
Qty: 90 TABLET | Refills: 2 | Status: SHIPPED | OUTPATIENT
Start: 2021-12-21 | End: 2022-03-09 | Stop reason: SDUPTHER

## 2022-01-03 ENCOUNTER — LAB VISIT (OUTPATIENT)
Dept: FAMILY MEDICINE | Facility: CLINIC | Age: 67
End: 2022-01-03
Payer: MEDICARE

## 2022-01-03 DIAGNOSIS — Z20.822 CLOSE EXPOSURE TO COVID-19 VIRUS: ICD-10-CM

## 2022-01-03 PROCEDURE — U0003 INFECTIOUS AGENT DETECTION BY NUCLEIC ACID (DNA OR RNA); SEVERE ACUTE RESPIRATORY SYNDROME CORONAVIRUS 2 (SARS-COV-2) (CORONAVIRUS DISEASE [COVID-19]), AMPLIFIED PROBE TECHNIQUE, MAKING USE OF HIGH THROUGHPUT TECHNOLOGIES AS DESCRIBED BY CMS-2020-01-R: HCPCS | Performed by: FAMILY MEDICINE

## 2022-01-03 NOTE — PROGRESS NOTES
Tristin Cerda presented to clinic for COVID-19 swab.   Tristin Cerda verified x2, name and .   Tristin Cerda instructed on what will be completed, asked if ever had COVID-19 swab.   Explained procedure to Tristin Cerda.   Specimen obtained.   No questions or concerns voiced further at this time.   Tristin Cerda left in satisfactory condition.

## 2022-01-06 LAB
SARS-COV-2 RNA RESP QL NAA+PROBE: NOT DETECTED
SARS-COV-2- CYCLE NUMBER: NORMAL

## 2022-01-07 ENCOUNTER — PATIENT MESSAGE (OUTPATIENT)
Dept: GASTROENTEROLOGY | Facility: CLINIC | Age: 67
End: 2022-01-07
Payer: MEDICARE

## 2022-01-25 ENCOUNTER — PATIENT OUTREACH (OUTPATIENT)
Dept: ADMINISTRATIVE | Facility: OTHER | Age: 67
End: 2022-01-25
Payer: MEDICARE

## 2022-01-28 ENCOUNTER — OFFICE VISIT (OUTPATIENT)
Dept: GASTROENTEROLOGY | Facility: CLINIC | Age: 67
End: 2022-01-28
Payer: MEDICARE

## 2022-01-28 VITALS
RESPIRATION RATE: 12 BRPM | OXYGEN SATURATION: 95 % | DIASTOLIC BLOOD PRESSURE: 77 MMHG | SYSTOLIC BLOOD PRESSURE: 130 MMHG | BODY MASS INDEX: 20.28 KG/M2 | HEIGHT: 73 IN | HEART RATE: 71 BPM | WEIGHT: 153 LBS

## 2022-01-28 DIAGNOSIS — Z90.49 HISTORY OF CHOLECYSTECTOMY: ICD-10-CM

## 2022-01-28 DIAGNOSIS — K40.90 UNILATERAL INGUINAL HERNIA WITHOUT OBSTRUCTION OR GANGRENE, RECURRENCE NOT SPECIFIED: ICD-10-CM

## 2022-01-28 DIAGNOSIS — K52.9 CHRONIC DIARRHEA: ICD-10-CM

## 2022-01-28 DIAGNOSIS — K90.0 CELIAC DISEASE: ICD-10-CM

## 2022-01-28 DIAGNOSIS — E53.8 B12 DEFICIENCY: Primary | ICD-10-CM

## 2022-01-28 DIAGNOSIS — K44.9 HIATAL HERNIA: ICD-10-CM

## 2022-01-28 DIAGNOSIS — K90.829 SHORT BOWEL SYNDROME: ICD-10-CM

## 2022-01-28 DIAGNOSIS — K50.919 CROHN'S DISEASE WITH COMPLICATION, UNSPECIFIED GASTROINTESTINAL TRACT LOCATION: ICD-10-CM

## 2022-01-28 DIAGNOSIS — G89.29 CHRONIC ABDOMINAL PAIN: ICD-10-CM

## 2022-01-28 DIAGNOSIS — R10.9 CHRONIC ABDOMINAL PAIN: ICD-10-CM

## 2022-01-28 PROBLEM — B18.1 CHRONIC VIRAL HEPATITIS B WITHOUT DELTA AGENT AND WITHOUT COMA: Status: RESOLVED | Noted: 2019-12-12 | Resolved: 2022-01-28

## 2022-01-28 PROCEDURE — 96372 PR INJECTION,THERAP/PROPH/DIAG2ST, IM OR SUBCUT: ICD-10-PCS | Mod: S$GLB,,, | Performed by: INTERNAL MEDICINE

## 2022-01-28 PROCEDURE — 3078F DIAST BP <80 MM HG: CPT | Mod: CPTII,S$GLB,, | Performed by: INTERNAL MEDICINE

## 2022-01-28 PROCEDURE — 1101F PR PT FALLS ASSESS DOC 0-1 FALLS W/OUT INJ PAST YR: ICD-10-PCS | Mod: CPTII,S$GLB,, | Performed by: INTERNAL MEDICINE

## 2022-01-28 PROCEDURE — 3288F FALL RISK ASSESSMENT DOCD: CPT | Mod: CPTII,S$GLB,, | Performed by: INTERNAL MEDICINE

## 2022-01-28 PROCEDURE — 99999 PR PBB SHADOW E&M-EST. PATIENT-LVL IV: ICD-10-PCS | Mod: PBBFAC,,, | Performed by: INTERNAL MEDICINE

## 2022-01-28 PROCEDURE — 1101F PT FALLS ASSESS-DOCD LE1/YR: CPT | Mod: CPTII,S$GLB,, | Performed by: INTERNAL MEDICINE

## 2022-01-28 PROCEDURE — 1159F PR MEDICATION LIST DOCUMENTED IN MEDICAL RECORD: ICD-10-PCS | Mod: CPTII,S$GLB,, | Performed by: INTERNAL MEDICINE

## 2022-01-28 PROCEDURE — 3008F BODY MASS INDEX DOCD: CPT | Mod: CPTII,S$GLB,, | Performed by: INTERNAL MEDICINE

## 2022-01-28 PROCEDURE — 1159F MED LIST DOCD IN RCRD: CPT | Mod: CPTII,S$GLB,, | Performed by: INTERNAL MEDICINE

## 2022-01-28 PROCEDURE — 99214 PR OFFICE/OUTPT VISIT, EST, LEVL IV, 30-39 MIN: ICD-10-PCS | Mod: 25,S$GLB,, | Performed by: INTERNAL MEDICINE

## 2022-01-28 PROCEDURE — 3008F PR BODY MASS INDEX (BMI) DOCUMENTED: ICD-10-PCS | Mod: CPTII,S$GLB,, | Performed by: INTERNAL MEDICINE

## 2022-01-28 PROCEDURE — 96372 THER/PROPH/DIAG INJ SC/IM: CPT | Mod: S$GLB,,, | Performed by: INTERNAL MEDICINE

## 2022-01-28 PROCEDURE — 1160F RVW MEDS BY RX/DR IN RCRD: CPT | Mod: CPTII,S$GLB,, | Performed by: INTERNAL MEDICINE

## 2022-01-28 PROCEDURE — 3075F PR MOST RECENT SYSTOLIC BLOOD PRESS GE 130-139MM HG: ICD-10-PCS | Mod: CPTII,S$GLB,, | Performed by: INTERNAL MEDICINE

## 2022-01-28 PROCEDURE — 99999 PR PBB SHADOW E&M-EST. PATIENT-LVL IV: CPT | Mod: PBBFAC,,, | Performed by: INTERNAL MEDICINE

## 2022-01-28 PROCEDURE — 1160F PR REVIEW ALL MEDS BY PRESCRIBER/CLIN PHARMACIST DOCUMENTED: ICD-10-PCS | Mod: CPTII,S$GLB,, | Performed by: INTERNAL MEDICINE

## 2022-01-28 PROCEDURE — 3078F PR MOST RECENT DIASTOLIC BLOOD PRESSURE < 80 MM HG: ICD-10-PCS | Mod: CPTII,S$GLB,, | Performed by: INTERNAL MEDICINE

## 2022-01-28 PROCEDURE — 3075F SYST BP GE 130 - 139MM HG: CPT | Mod: CPTII,S$GLB,, | Performed by: INTERNAL MEDICINE

## 2022-01-28 PROCEDURE — 3288F PR FALLS RISK ASSESSMENT DOCUMENTED: ICD-10-PCS | Mod: CPTII,S$GLB,, | Performed by: INTERNAL MEDICINE

## 2022-01-28 PROCEDURE — 99214 OFFICE O/P EST MOD 30 MIN: CPT | Mod: 25,S$GLB,, | Performed by: INTERNAL MEDICINE

## 2022-01-28 RX ORDER — DICYCLOMINE HYDROCHLORIDE 20 MG/1
TABLET ORAL
Qty: 360 TABLET | Refills: 3 | Status: SHIPPED | OUTPATIENT
Start: 2022-01-28 | End: 2022-03-25

## 2022-01-28 RX ORDER — CYANOCOBALAMIN 1000 UG/ML
1000 INJECTION, SOLUTION INTRAMUSCULAR; SUBCUTANEOUS
Status: COMPLETED | OUTPATIENT
Start: 2022-01-28 | End: 2022-01-28

## 2022-01-28 RX ORDER — ONDANSETRON HYDROCHLORIDE 8 MG/1
TABLET, FILM COATED ORAL
Qty: 180 TABLET | Refills: 1 | Status: SHIPPED | OUTPATIENT
Start: 2022-01-28 | End: 2022-06-30

## 2022-01-28 RX ADMIN — CYANOCOBALAMIN 1000 MCG: 1000 INJECTION, SOLUTION INTRAMUSCULAR; SUBCUTANEOUS at 10:01

## 2022-01-28 NOTE — PATIENT INSTRUCTIONS
He continues current medications nutritious diet and vitamins and minerals.  He receives his monthly B12 in the office.

## 2022-01-28 NOTE — PROGRESS NOTES
Subjective:       Patient ID: Tristin Cerda is a 67 y.o. male.    Chief Complaint: Follow-up    He has history of Crohn's disease.  He had multiple GI surgeries.  Years ago he needed TPN.  Currently he is able to maintain his nutritional status with his oral intake of liquids and food.  He made a food diary and he is avoiding the offending foods particularly the fried and the fatty foods.  He does not ingest milk or milk products.  He takes his vitamins and minerals and he receives his monthly B12 IM.  He states that his reflux regimen as controlled his symptoms with his current medications.  He has occasional nausea and uses the Zofran.  He cannot pinpoint a precipitating cause of the nausea.  He denies hematemesis hematochezia jaundice or bleeding.  He denies fever or chills.  He generally averages 5 bowel movements per day.  They are semi-solid.  He denies incontinence urgency but has a sensation of discomfort in the left lower quadrant which signals the bowel movements.  He does not want to change the bowel consistency or frequency.  He does not want to be constipated.  He believes that having the soft bowel movements 5 times per day without pain or discomfort is his choice.  He does try to ingest more fiber and vegetables.      Allergies:  Review of patient's allergies indicates:   Allergen Reactions    Ciprofloxacin     Codeine Itching    Compazine [prochlorperazine]     Erythromycin     Keflex [cephalexin]        Medications:    Current Outpatient Medications:     aspirin (ECOTRIN) 81 MG EC tablet, Take 81 mg by mouth once daily. , Disp: , Rfl:     aspirin-acetaminophen-caffeine 250-250-65 mg (EXCEDRIN MIGRAINE) 250-250-65 mg per tablet, Take 1 tablet by mouth every 8 (eight) hours as needed for Pain. , Disp: , Rfl:     cyanocobalamin 1,000 mcg/mL injection, Inject 1 mL (1,000 mcg total) into the muscle every 30 days., Disp: 10 mL, Rfl: 11    hydrocortisone 2.5 % cream, Apply topically 2  (two) times daily., Disp: 1 each, Rfl: 11    meclizine (ANTIVERT) 12.5 mg tablet, Take 1 tablet (12.5 mg total) by mouth 3 (three) times daily as needed for Dizziness., Disp: 90 tablet, Rfl: 3    multivitamin with minerals tablet, Take 1 tablet by mouth once daily., Disp: , Rfl:     colestipol (COLESTID) 1 gram Tab, Take 1 tablet (1 g total) by mouth 2 (two) times daily., Disp: 180 tablet, Rfl: 3    dicyclomine (BENTYL) 20 mg tablet, TAKE 1 TABLET FOUR TIMES DAILY, Disp: 360 tablet, Rfl: 3    ondansetron (ZOFRAN) 8 MG tablet, TAKE 1 TABLET EVERY 12 HOURS AS NEEDED FOR NAUSEA, Disp: 180 tablet, Rfl: 1    Current Facility-Administered Medications:     cyanocobalamin injection 1,000 mcg, 1,000 mcg, Intramuscular, Q30 Days, Andrea Shaver MD, 1,000 mcg at 11/26/21 0805    cyanocobalamin injection 1,000 mcg, 1,000 mcg, Intramuscular, 1 time in Clinic/HOD, Andrea Shaver MD    Facility-Administered Medications Ordered in Other Visits:     lactated ringers infusion, , Intravenous, Continuous, Alvin Curry MD    Past Medical History:   Diagnosis Date    Anxiety     Basal cell carcinoma 07/2017    R forehead and R temple    Crohn's disease     age 17    Encounter for blood transfusion     Short bowel syndrome     Vitamin B deficiency     Vitamin D deficiency        Past Surgical History:   Procedure Laterality Date    3 small bowel resections      APPENDECTOMY      CHOLECYSTECTOMY      COLONOSCOPY      COLONOSCOPY N/A 2/14/2017    Procedure: COLONOSCOPY;  Surgeon: Nela Sanchez MD;  Location: [address]);  Service: Endoscopy;  Laterality: N/A;    COLONOSCOPY N/A 3/14/2017    Procedure: COLONOSCOPY;  Surgeon: Nela Sanchez MD;  Location: [address]);  Service: Endoscopy;  Laterality: N/A;  2 days clear liquids before procedure    COLONOSCOPY N/A 7/29/2020    Procedure: COLONOSCOPY;  Surgeon: Andrea Shaver MD;  Location: [facility];  Service: Endoscopy;  Laterality: N/A;     ESOPHAGOGASTRODUODENOSCOPY N/A 11/19/2019    Procedure: EGD (ESOPHAGOGASTRODUODENOSCOPY);  Surgeon: Andrea Shaver MD;  [facility] ENDO;  Service: Endoscopy;  Laterality: N/A;    INJECTION OF ANESTHETIC AGENT AROUND NERVE Right 10/20/2020    Procedure: Block, Nerve genicular;  Surgeon: Alvin Curry MD;  [facility] OR;  Service: Pain Management;  Laterality: Right;  right knee    KNEE SURGERY      RADIOFREQUENCY ABLATION Right 1/8/2021    Procedure: Radiofrequency Ablation Right Knee Genicular RFA [place];  Surgeon: Alvin Curry MD;  Location: [facility] OR;  Service: Pain Management;  Laterality: Right;  Right knee     SMALL INTESTINE SURGERY      TUBE THORACOTOMY      UPPER GASTROINTESTINAL ENDOSCOPY           Review of Systems   Constitutional: Negative for appetite change, fever and unexpected weight change.   HENT: Negative for trouble swallowing.         No jaundice.   Respiratory: Negative for cough, shortness of breath and wheezing.         He is a former smoker.  He denies dysphagia aspiration hemoptysis chronic cough chronic sputum production or dyspnea on exertion but is not physically active.  He did consumes minimal alcohol.   Cardiovascular: Negative for chest pain.        He denies exertional chest pain or rhythm disturbance.   Gastrointestinal: Positive for diarrhea and nausea. Negative for abdominal distention, abdominal pain, anal bleeding, blood in stool, constipation and rectal pain.        He has a history of hiatal hernia, cholecystectomy.  He does not want further GI evaluation at this point.  He states over his life he has had some he surgeries and some in procedures he does not want any further procedures.  He does not want further therapy of the Crohn's disease.  He is happy to use the dicyclomine for the occasional cramping.  He uses the Zofran in the reflux regimen.  He has remote history of celiac disease.  He tries to avoid the fried food and fatty foods in the gluten containing  foods.  He does not want to use the Colestid or the cholestyramine.  He is afraid of constipation.  His bowel movements are semi-solid.  In the past was thought that he might have chronic hepatitis-B but the DNA levels were normal.  He denies jaundice.  He does not want further GI evaluation.  He does not want surgery for his hernias.   Musculoskeletal: Positive for arthralgias and back pain. Negative for neck pain.        He is followed in the Pain Clinic.  He denies painful swollen joints.   Skin: Negative for pallor and rash.   Neurological: Negative for dizziness, seizures, syncope, speech difficulty, weakness and numbness.   Hematological: Negative for adenopathy.   Psychiatric/Behavioral: Negative for confusion.       Objective:      Physical Exam  Vitals reviewed.   Constitutional:       Appearance: He is well-developed and well-nourished.      Comments: Well-nourished well-hydrated thin afebrile nonicteric white male.  He is sitting comfortably in the chair.  He is breathing normally.  He is normocephalic.  Pupils are normal.  He appears to be oriented x3 and can relate his history and answer questions appropriately.   HENT:      Head: Normocephalic.   Eyes:      Extraocular Movements: EOM normal.      Pupils: Pupils are equal, round, and reactive to light.   Neck:      Thyroid: No thyromegaly.      Trachea: No tracheal deviation.   Cardiovascular:      Rate and Rhythm: Normal rate and regular rhythm.      Heart sounds: Normal heart sounds.   Pulmonary:      Effort: Pulmonary effort is normal.      Breath sounds: Normal breath sounds.   Abdominal:      General: Bowel sounds are normal. There is no distension.      Palpations: Abdomen is soft. There is no mass.      Tenderness: There is no abdominal tenderness. There is no right CVA tenderness, left CVA tenderness, guarding or rebound.      Hernia: A hernia is present.      Comments: The abdomen is soft with surgical scars.  There is reducible incisional  hernia and right inguinal hernia.  He has normal bowel sounds.  Tenderness is absent.  Masses are absent.   Musculoskeletal:         General: Normal range of motion.      Cervical back: Normal range of motion and neck supple.      Comments: He can ambulate normally.  He can go from the sitting the standing position without difficulty.  He can get on the exam table without difficulty or assistance.   Lymphadenopathy:      Cervical: No cervical adenopathy.   Skin:     General: Skin is warm and dry.   Neurological:      Mental Status: He is alert and oriented to person, place, and time.      Cranial Nerves: No cranial nerve deficit.   Psychiatric:         Mood and Affect: Mood and affect normal.         Behavior: Behavior normal.           Plan:       B12 deficiency  -     cyanocobalamin injection 1,000 mcg    Chronic abdominal pain  -     ondansetron (ZOFRAN) 8 MG tablet; TAKE 1 TABLET EVERY 12 HOURS AS NEEDED FOR NAUSEA  Dispense: 180 tablet; Refill: 1  -     dicyclomine (BENTYL) 20 mg tablet; TAKE 1 TABLET FOUR TIMES DAILY  Dispense: 360 tablet; Refill: 3    Short bowel syndrome  -     ondansetron (ZOFRAN) 8 MG tablet; TAKE 1 TABLET EVERY 12 HOURS AS NEEDED FOR NAUSEA  Dispense: 180 tablet; Refill: 1    Crohn's disease with complication, unspecified gastrointestinal tract location    Unilateral inguinal hernia without obstruction or gangrene, recurrence not specified    Chronic diarrhea    Celiac disease    Hiatal hernia    History of cholecystectomy    He will continue his reflux regimen.  He continues his current medications vitamins and minerals.  He receives monthly B12.  He follows up with his other physicians.  He does not want further GI evaluation.  He does not want surgical evaluation.

## 2022-02-28 ENCOUNTER — CLINICAL SUPPORT (OUTPATIENT)
Dept: GASTROENTEROLOGY | Facility: CLINIC | Age: 67
End: 2022-02-28
Payer: MEDICARE

## 2022-02-28 PROCEDURE — 96372 THER/PROPH/DIAG INJ SC/IM: CPT | Mod: S$GLB,,, | Performed by: INTERNAL MEDICINE

## 2022-02-28 PROCEDURE — 96372 PR INJECTION,THERAP/PROPH/DIAG2ST, IM OR SUBCUT: ICD-10-PCS | Mod: S$GLB,,, | Performed by: INTERNAL MEDICINE

## 2022-02-28 RX ADMIN — CYANOCOBALAMIN 1000 MCG: 1000 INJECTION, SOLUTION INTRAMUSCULAR; SUBCUTANEOUS at 09:02

## 2022-02-28 NOTE — PROGRESS NOTES
Tristin Cerda arrive to clinic awake and alert.  Pt verified name and .   Allergies reviewed with patient.  Pt given B12 via Intramuscular Left arm per provider orders.    Well tolerated by patient.  denies further needs.    Trang Hooks

## 2022-03-06 ENCOUNTER — HOSPITAL ENCOUNTER (EMERGENCY)
Facility: HOSPITAL | Age: 67
Discharge: HOME | End: 2022-03-06
Attending: EMERGENCY MEDICINE
Payer: MEDICARE

## 2022-03-06 VITALS
DIASTOLIC BLOOD PRESSURE: 70 MMHG | RESPIRATION RATE: 16 BRPM | TEMPERATURE: 98 F | OXYGEN SATURATION: 97 % | HEART RATE: 71 BPM | SYSTOLIC BLOOD PRESSURE: 128 MMHG | HEIGHT: 73 IN | WEIGHT: 153 LBS | BODY MASS INDEX: 20.28 KG/M2

## 2022-03-06 DIAGNOSIS — M25.569 KNEE PAIN: ICD-10-CM

## 2022-03-06 DIAGNOSIS — M25.562 ACUTE PAIN OF LEFT KNEE: Primary | ICD-10-CM

## 2022-03-06 PROCEDURE — 99283 EMERGENCY DEPT VISIT LOW MDM: CPT | Mod: 25

## 2022-03-06 PROCEDURE — 25000003 PHARM REV CODE 250: Performed by: EMERGENCY MEDICINE

## 2022-03-06 RX ORDER — HYDROCODONE BITARTRATE AND ACETAMINOPHEN 5; 325 MG/1; MG/1
1 TABLET ORAL EVERY 6 HOURS PRN
Qty: 8 TABLET | Refills: 0 | Status: SHIPPED | OUTPATIENT
Start: 2022-03-06 | End: 2022-03-09 | Stop reason: SDUPTHER

## 2022-03-06 RX ORDER — HYDROCODONE BITARTRATE AND ACETAMINOPHEN 10; 325 MG/1; MG/1
1 TABLET ORAL
Status: COMPLETED | OUTPATIENT
Start: 2022-03-06 | End: 2022-03-06

## 2022-03-06 RX ADMIN — HYDROCODONE BITARTRATE AND ACETAMINOPHEN 1 TABLET: 10; 325 TABLET ORAL at 12:03

## 2022-03-06 NOTE — ED NOTES
Pt AAOx4, Abc's intact. NADN. No adverse reaction to medication given. Pt walked to Registration for Check-Out. D/C instructions and Rx in pt possession along with belongings. No other needs at this time. Pt instructed to follow up with orthopedics for further treatment. Pt verbalized understanding.

## 2022-03-06 NOTE — ED NOTES
Pt presents with Left knee pain that has been getting worse. There is a small protruding knot to the inner patella region that pt states is very painful and getting worse. Pt states he is under pain management care and take tramadol on a regular basis but he is not having relief. Pt is AAOx4, Abc's intact, NADN.

## 2022-03-06 NOTE — ED PROVIDER NOTES
"Encounter Date: 3/6/2022    SCRIBE #1 NOTE: I, Royer Saeed, am scribing for, and in the presence of, Uriel Marie MD.       History     Chief Complaint   Patient presents with    Knee Pain     Left knee / injury last week / now swelling      Time seen by provider: 12:29 PM on 03/06/2022    Tristin Cerda is a 67 y.o. male who presents to the ED with left knee pain. The Pt states that his knee "gave out" 8 days ago while moving furniture. He says that the pain was immediate but subsided during the week. He says that yesterday and today it started to swell and hurt more and says he cannot put too much weight on it. He states he is currently on pain management and has not taken any pain medicine for his knee. The patient denies any other symptoms at this time. PMHx of basal cell carcinoma. PSHx of knee surgery.      The history is provided by the patient.     Review of patient's allergies indicates:   Allergen Reactions    Ciprofloxacin     Codeine Itching    Compazine [prochlorperazine]     Erythromycin     Keflex [cephalexin]      Past Medical History:   Diagnosis Date    Anxiety     Basal cell carcinoma 07/2017    R forehead and R temple    Crohn's disease     age 17    Encounter for blood transfusion     Short bowel syndrome     Vitamin B deficiency     Vitamin D deficiency      Past Surgical History:   Procedure Laterality Date    3 small bowel resections      APPENDECTOMY      CHOLECYSTECTOMY      COLONOSCOPY      COLONOSCOPY N/A 2/14/2017    Procedure: COLONOSCOPY;  Surgeon: Nela Sanchez MD;  Location: [address]);  Service: Endoscopy;  Laterality: N/A;    COLONOSCOPY N/A 3/14/2017    Procedure: COLONOSCOPY;  Surgeon: Nela Sanchez MD;  Location: [address]);  Service: Endoscopy;  Laterality: N/A;  2 days clear liquids before procedure    COLONOSCOPY N/A 7/29/2020    Procedure: COLONOSCOPY;  Surgeon: Andrea Shaver MD;  Location: [facility];  Service: " Endoscopy;  Laterality: N/A;    ESOPHAGOGASTRODUODENOSCOPY N/A 2019    Procedure: EGD (ESOPHAGOGASTRODUODENOSCOPY);  Surgeon: Andrea Shaver MD;  Location: [facility] ENDO;  Service: Endoscopy;  Laterality: N/A;    INJECTION OF ANESTHETIC AGENT AROUND NERVE Right 10/20/2020    Procedure: Block, Nerve genicular;  Surgeon: Alvin Curry MD;  Location: [facility] OR;  Service: Pain Management;  Laterality: Right;  right knee    KNEE SURGERY      RADIOFREQUENCY ABLATION Right 2021    Procedure: Radiofrequency Ablation Right Knee Genicular RFA Coolief;  Surgeon: Alvin Curry MD;  Location: [facility] OR;  Service: Pain Management;  Laterality: Right;  Right knee     SMALL INTESTINE SURGERY      TUBE THORACOTOMY      UPPER GASTROINTESTINAL ENDOSCOPY       Family History   Problem Relation Age of Onset    Diabetes Mother     Stroke Mother     Diabetes Father     Kidney disease Father     Irritable bowel syndrome Cousin     Celiac disease Neg Hx     Cirrhosis Neg Hx     Colon cancer Neg Hx     Colon polyps Neg Hx     Cystic fibrosis Neg Hx     Esophageal cancer Neg Hx     Crohn's disease Neg Hx     Hemochromatosis Neg Hx     Inflammatory bowel disease Neg Hx     Liver cancer Neg Hx     Liver disease Neg Hx     Rectal cancer Neg Hx     Stomach cancer Neg Hx     Ulcerative colitis Neg Hx     Addison's disease Neg Hx     Melanoma Neg Hx     Psoriasis Neg Hx     Lupus Neg Hx     Eczema Neg Hx      Social History     Tobacco Use    Smoking status: Former Smoker     Packs/day: 1.00     Years: 15.00     Pack years: 15.00     Types: Cigarettes     Quit date: 3/10/1995     Years since quittin.0    Smokeless tobacco: Never Used   Substance Use Topics    Alcohol use: Never     Alcohol/week: 2.0 standard drinks     Types: 1 Cans of beer, 1 Shots of liquor per week    Drug use: No     Review of Systems   Constitutional: Negative for fever.   HENT: Negative for sore throat.    Respiratory: Negative for  shortness of breath.    Cardiovascular: Negative for chest pain.   Gastrointestinal: Negative for nausea.   Genitourinary: Negative for dysuria.   Musculoskeletal: Positive for arthralgias (Left knee.) and joint swelling (Left knee.). Negative for back pain.   Skin: Negative for rash.   Neurological: Negative for weakness.   Hematological: Does not bruise/bleed easily.       Physical Exam     Initial Vitals [03/06/22 1221]   BP Pulse Resp Temp SpO2   (!) 160/74 78 18 98 °F (36.7 °C) 95 %      MAP       --         Physical Exam    Nursing note and vitals reviewed.  Constitutional: He appears well-developed and well-nourished. He is not diaphoretic.  Non-toxic appearance. He does not have a sickly appearance. He does not appear ill. No distress.   HENT:   Head: Normocephalic and atraumatic.   Eyes: EOM are normal.   Neck: Neck supple.   Normal range of motion.  Cardiovascular: Normal rate, regular rhythm and normal heart sounds. Exam reveals no gallop and no friction rub.    No murmur heard.  Pulmonary/Chest: Breath sounds normal. No respiratory distress. He has no wheezes. He has no rhonchi. He has no rales.   Musculoskeletal:         General: Normal range of motion.      Cervical back: Normal range of motion and neck supple. No rigidity. Normal range of motion.      Comments: Tenderness to left MCL.Small joint effusion. Painful ROM.     Neurological: He is alert and oriented to person, place, and time.   Skin: Skin is warm and dry. No rash noted.   Psychiatric: He has a normal mood and affect. His behavior is normal. Judgment and thought content normal.         ED Course   Procedures  Labs Reviewed - No data to display       Imaging Results          X-Ray Knee 3 View Left (Final result)  Result time 03/06/22 12:45:16    Final result by Joel Obregon MD (03/06/22 12:45:16)                 Impression:      1. Mild tricompartmental degenerative osteoarthrosis  2. Chondrocalcinosis.  3. Small suprapatellar  "effusion.      Electronically signed by: Joel Obregon  Date:    03/06/2022  Time:    12:45             Narrative:    EXAMINATION:  XR KNEE 3 VIEW LEFT    CLINICAL HISTORY:  Pain in unspecified knee    TECHNIQUE:  AP, lateral, and Merchant views of the left knee were performed.    COMPARISON:  02/28/2013.    FINDINGS:  Mild tricompartmental degenerative osteoarthrosis with mild joint space narrowing and small osteophyte formation.  Subtle calcification highlighted cartilage consistent with chondrocalcinosis.    There is a small suprapatellar effusion.                                 Medications   HYDROcodone-acetaminophen  mg per tablet 1 tablet (1 tablet Oral Given 3/6/22 1244)     Medical Decision Making:   History:   Old Medical Records: I decided to obtain old medical records.  Clinical Tests:   Radiological Study: Ordered and Reviewed          Scribe Attestation:   Scribe #1: I performed the above scribed service and the documentation accurately describes the services I performed. I attest to the accuracy of the note.         I, Dr. Marie, personally performed the services described in this documentation. All medical record entries made by the scribe were at my direction and in my presence.  I have reviewed the chart and agree that the record reflects my personal performance and is accurate and complete.2:15 PM 03/06/2022      ED Course as of 03/06/22 1415   Sun Mar 06, 2022   1227 SpO2: 95 % [EF]   1227 Resp: 18 [EF]   1227 Pulse: 78 [EF]   1227 Temp src: Oral [EF]   1227 Temp: 98 °F (36.7 °C) [EF]   1227 BP(!): 160/74 [EF]   1254 X-Ray Knee 3 View Left [EF]   1356 67-year-old male presents to the ER with knee pain after his knee "gave out."X-rays demonstrate arthritis and a small joint effusion.  He is referred to Orthopedics.  Pain is much better after hydrocodone in the emergency room.  He will be discharged home.  Refuses crutches.  Patient is on chronic pain medication but is getting no relief.  " A small amount of narcotics will be added on. [EF]      ED Course User Index  [EF] Uriel Marie MD             Clinical Impression:   Final diagnoses:  [M25.569] Knee pain  [M25.562] Acute pain of left knee (Primary)          ED Disposition Condition    Discharge Stable        ED Prescriptions     Medication Sig Dispense Start Date End Date Auth. Provider    HYDROcodone-acetaminophen (NORCO) 5-325 mg per tablet Take 1 tablet by mouth every 6 (six) hours as needed for Pain. Do not take with any other sedating medications 8 tablet 3/6/2022 3/16/2022 Uriel Marie MD        Follow-up Information     Follow up With Specialties Details Why Contact Info    Felipe Villatoro II, MD Orthopedic Surgery Schedule an appointment as soon as possible for a visit   [address] [place] [address] DR Kevin ESPINOZA [id number] [phone]      [facility] Emergency Dept Emergency Medicine  As needed, If symptoms worsen [address] [place] [id number] [phone]           Uriel Marie MD  03/06/22 3186

## 2022-03-09 ENCOUNTER — OFFICE VISIT (OUTPATIENT)
Dept: PAIN MEDICINE | Facility: CLINIC | Age: 67
End: 2022-03-09
Payer: MEDICARE

## 2022-03-09 VITALS
SYSTOLIC BLOOD PRESSURE: 138 MMHG | WEIGHT: 153 LBS | HEIGHT: 73 IN | BODY MASS INDEX: 20.28 KG/M2 | HEART RATE: 67 BPM | DIASTOLIC BLOOD PRESSURE: 80 MMHG

## 2022-03-09 DIAGNOSIS — R10.9 CHRONIC ABDOMINAL PAIN: ICD-10-CM

## 2022-03-09 DIAGNOSIS — M25.562 ACUTE PAIN OF LEFT KNEE: Primary | ICD-10-CM

## 2022-03-09 DIAGNOSIS — M17.11 OSTEOARTHRITIS OF RIGHT KNEE, UNSPECIFIED OSTEOARTHRITIS TYPE: ICD-10-CM

## 2022-03-09 DIAGNOSIS — G89.29 CHRONIC ABDOMINAL PAIN: ICD-10-CM

## 2022-03-09 PROCEDURE — 3008F BODY MASS INDEX DOCD: CPT | Mod: CPTII,S$GLB,, | Performed by: PHYSICIAN ASSISTANT

## 2022-03-09 PROCEDURE — 3079F PR MOST RECENT DIASTOLIC BLOOD PRESSURE 80-89 MM HG: ICD-10-PCS | Mod: CPTII,S$GLB,, | Performed by: PHYSICIAN ASSISTANT

## 2022-03-09 PROCEDURE — 99999 PR PBB SHADOW E&M-EST. PATIENT-LVL III: ICD-10-PCS | Mod: PBBFAC,,, | Performed by: PHYSICIAN ASSISTANT

## 2022-03-09 PROCEDURE — 99214 OFFICE O/P EST MOD 30 MIN: CPT | Mod: S$GLB,,, | Performed by: PHYSICIAN ASSISTANT

## 2022-03-09 PROCEDURE — 3075F PR MOST RECENT SYSTOLIC BLOOD PRESS GE 130-139MM HG: ICD-10-PCS | Mod: CPTII,S$GLB,, | Performed by: PHYSICIAN ASSISTANT

## 2022-03-09 PROCEDURE — 1101F PR PT FALLS ASSESS DOC 0-1 FALLS W/OUT INJ PAST YR: ICD-10-PCS | Mod: CPTII,S$GLB,, | Performed by: PHYSICIAN ASSISTANT

## 2022-03-09 PROCEDURE — 3079F DIAST BP 80-89 MM HG: CPT | Mod: CPTII,S$GLB,, | Performed by: PHYSICIAN ASSISTANT

## 2022-03-09 PROCEDURE — 1159F PR MEDICATION LIST DOCUMENTED IN MEDICAL RECORD: ICD-10-PCS | Mod: CPTII,S$GLB,, | Performed by: PHYSICIAN ASSISTANT

## 2022-03-09 PROCEDURE — 3075F SYST BP GE 130 - 139MM HG: CPT | Mod: CPTII,S$GLB,, | Performed by: PHYSICIAN ASSISTANT

## 2022-03-09 PROCEDURE — 1101F PT FALLS ASSESS-DOCD LE1/YR: CPT | Mod: CPTII,S$GLB,, | Performed by: PHYSICIAN ASSISTANT

## 2022-03-09 PROCEDURE — 99999 PR PBB SHADOW E&M-EST. PATIENT-LVL III: CPT | Mod: PBBFAC,,, | Performed by: PHYSICIAN ASSISTANT

## 2022-03-09 PROCEDURE — 1159F MED LIST DOCD IN RCRD: CPT | Mod: CPTII,S$GLB,, | Performed by: PHYSICIAN ASSISTANT

## 2022-03-09 PROCEDURE — 99214 PR OFFICE/OUTPT VISIT, EST, LEVL IV, 30-39 MIN: ICD-10-PCS | Mod: S$GLB,,, | Performed by: PHYSICIAN ASSISTANT

## 2022-03-09 PROCEDURE — 3008F PR BODY MASS INDEX (BMI) DOCUMENTED: ICD-10-PCS | Mod: CPTII,S$GLB,, | Performed by: PHYSICIAN ASSISTANT

## 2022-03-09 PROCEDURE — 3288F FALL RISK ASSESSMENT DOCD: CPT | Mod: CPTII,S$GLB,, | Performed by: PHYSICIAN ASSISTANT

## 2022-03-09 PROCEDURE — 3288F PR FALLS RISK ASSESSMENT DOCUMENTED: ICD-10-PCS | Mod: CPTII,S$GLB,, | Performed by: PHYSICIAN ASSISTANT

## 2022-03-09 RX ORDER — TRAMADOL HYDROCHLORIDE 50 MG/1
50 TABLET ORAL 3 TIMES DAILY
Qty: 90 TABLET | Refills: 2 | Status: SHIPPED | OUTPATIENT
Start: 2022-03-16 | End: 2022-04-14

## 2022-03-09 RX ORDER — HYDROCODONE BITARTRATE AND ACETAMINOPHEN 5; 325 MG/1; MG/1
1 TABLET ORAL EVERY 6 HOURS PRN
Qty: 28 TABLET | Refills: 0 | Status: SHIPPED | OUTPATIENT
Start: 2022-03-09 | End: 2022-03-16

## 2022-03-09 NOTE — PROGRESS NOTES
PCP: Tyler Smith MD      CC: abdominal pain    Interval history: Mr. Cerda is a 67 y.o. male with an extensive history of crohn's disease who presents today for f/u s/p and medication refill. Reports 2 weeks of worsening left knee pain and swelling that began while moving furniture. pain was immediately and improved during the first week but has worsened in the last week. Pain worsens with weight bearing. He is not using crutches at this time. He has an appt with Orthopedics tomorrow. He was evaluated in the ED on 3/6. Tramadol was not helpful and they prescribed Hydrocodone x 3 days. He has a history of right knee pain. Right knee RFA that provided 75% relief. We discontinued Demerol 50mg q 8 hrs and started Tramadol 50 mg q 8 h. He reports this is beneficial for abdominal pain..  No side effects reported.   Abdominal pain remains stable.   He does report diarrhea at times but no blood stools. Reports anal pain and itching 2/2 chronic diarrhea.  He is currently being evaluated by gastroenterology.  OTC Lidocaine 2 % provides some minimal relief.  Compounding cream was too expensive.  He rates his pain 8/10.     Prior HPI:   Mr. Cerda is a 60 year old male with PMH of crohn's disease referred by Dr. Hansen.  Patient states being diagnosed with crohn's disease since the age of 14.    He has had multiple GI surgeries and large bowel and small bowel removals.  During that time, he was being managed by providers in [place].  He was TPN dependent for many years until about two years ago.  He is now stable on an oral diet.  He did not have a primary care physician nor a gastroenterologist for many years.  He is currently trying to establish care.  He has future appointment with Dr. Ch.  He states taking Demerol 50mg q8hrs as needed for pain. It has provided relief of his nausea and pain with diarrhea.  He was last prescribed Demerol in July 2014.  Since then, he has been bearing with the pain.  It is  a sharp shooting pain in his mid abdomen.      ROS:  CONSTITUTIONAL: No fevers, chills, night sweats, wt. loss, appetite changes  SKIN: no rashes or itching  ENT: No headaches, head trauma, vision changes, or eye pain  LYMPH NODES: None noticed   CV: No chest pain, palpitations.   RESP: No shortness of breath, dyspnea on exertion, cough, wheezing, or hemoptysis  GI: No nausea, emesis, diarrhea, constipation, melena, hematochezia, pain.    : No dysuria, hematuria, urgency, or frequency   HEME: No easy bruising, bleeding problems  PSYCHIATRIC: No depression, anxiety, psychosis, hallucinations.  NEURO: No seizures, memory loss, dizziness or difficulty sleeping  MSK: no joint pain      Past Medical History:   Diagnosis Date    Anxiety     Basal cell carcinoma 07/2017    R forehead and R temple    Crohn's disease     age 17    Encounter for blood transfusion     Short bowel syndrome     Vitamin B deficiency     Vitamin D deficiency      Past Surgical History:   Procedure Laterality Date    3 small bowel resections      APPENDECTOMY      CHOLECYSTECTOMY      COLONOSCOPY      COLONOSCOPY N/A 2/14/2017    Procedure: COLONOSCOPY;  Surgeon: Nela Sanchez MD;  Location: [address]);  Service: Endoscopy;  Laterality: N/A;    COLONOSCOPY N/A 3/14/2017    Procedure: COLONOSCOPY;  Surgeon: Nela Sanchez MD;  Location: [address]);  Service: Endoscopy;  Laterality: N/A;  2 days clear liquids before procedure    COLONOSCOPY N/A 7/29/2020    Procedure: COLONOSCOPY;  Surgeon: Andrea Shaver MD;  Location: [facility];  Service: Endoscopy;  Laterality: N/A;    ESOPHAGOGASTRODUODENOSCOPY N/A 11/19/2019    Procedure: EGD (ESOPHAGOGASTRODUODENOSCOPY);  Surgeon: Andrea Shaver MD;  Location: [facility];  Service: Endoscopy;  Laterality: N/A;    INJECTION OF ANESTHETIC AGENT AROUND NERVE Right 10/20/2020    Procedure: Block, Nerve genicular;  Surgeon: Alvin Curry MD;  Location: [facility] OR;  Service: Pain  "Management;  Laterality: Right;  right knee    KNEE SURGERY      RADIOFREQUENCY ABLATION Right 2021    Procedure: Radiofrequency Ablation Right Knee Genicular RFA Coolief;  Surgeon: Alvin Curry MD;  Location: [facility] OR;  Service: Pain Management;  Laterality: Right;  Right knee     SMALL INTESTINE SURGERY      TUBE THORACOTOMY      UPPER GASTROINTESTINAL ENDOSCOPY       Family History   Problem Relation Age of Onset    Diabetes Mother     Stroke Mother     Diabetes Father     Kidney disease Father     Irritable bowel syndrome Cousin     Celiac disease Neg Hx     Cirrhosis Neg Hx     Colon cancer Neg Hx     Colon polyps Neg Hx     Cystic fibrosis Neg Hx     Esophageal cancer Neg Hx     Crohn's disease Neg Hx     Hemochromatosis Neg Hx     Inflammatory bowel disease Neg Hx     Liver cancer Neg Hx     Liver disease Neg Hx     Rectal cancer Neg Hx     Stomach cancer Neg Hx     Ulcerative colitis Neg Hx     Addison's disease Neg Hx     Melanoma Neg Hx     Psoriasis Neg Hx     Lupus Neg Hx     Eczema Neg Hx      Social History     Socioeconomic History    Marital status:    Tobacco Use    Smoking status: Former Smoker     Packs/day: 1.00     Years: 15.00     Pack years: 15.00     Types: Cigarettes     Quit date: 3/10/1995     Years since quittin.0    Smokeless tobacco: Never Used   Substance and Sexual Activity    Alcohol use: Never     Alcohol/week: 2.0 standard drinks     Types: 1 Cans of beer, 1 Shots of liquor per week    Drug use: No    Sexual activity: Yes   Social History Narrative    Retired      Medications/Allergies: See med card    Vitals:    22 0842   BP: 138/80   Pulse: 67   Weight: 69.4 kg (153 lb)   Height: 6' 1" (1.854 m)   PainSc:   8   PainLoc: Abdomen     Physical exam:    GENERAL: A and O x3, the patient appears well groomed and is in no acute distress.  Skin: No rashes or obvious lesions  HEENT: normocephalic, atraumatic  CARDIOVASCULAR:  " RRR  LUNGS: non labored breathing  ABDOMEN: soft, nontender. No rebound   UPPER EXTREMITIES: Normal alignment, normal range of motion, no atrophy, no skin changes,  hair growth and nail growth normal and equal bilaterally. No swelling, no tenderness.    LOWER EXTREMITIES:  TTP left patella, swelling along medial and superior aspect. No redness or warmth.     LUMBAR SPINE  Lumbar spine: ROM is full with flexion extension and oblique extension with no increased pain.    Lam's test causes no increased pain on either side.    Supine straight leg raise is negative bilaterally.    Internal and external rotation of the hip causes no increased pain on either side.  Myofascial exam: No tenderness to palpation across lumbar paraspinous muscles.    MENTAL STATUS: normal orientation, speech, language, and fund of knowledge for social situation.  Emotional state appropriate.    CRANIAL NERVES:  II:  PERRL bilaterally,   III,IV,VI: EOMI.    V:  Facial sensation equal bilaterally  VII:  Facial motor function normal.  VIII:  Hearing equal to finger rub bilaterally  IX/X: Gag normal, palate symmetric  XI:  Shoulder shrug equal, head turn equal  XII:  Tongue midline without fasciculations    MOTOR: Tone and bulk: normal bilateral upper and lower Strength: normal   SENSATION: Light touch and pinprick intact bilaterally  REFLEXES: normal, symmetric, nonbrisk.  Toes down, no clonus. No hoffmans.  GAIT: normal rise, base, steps, and arm swing.      Imaging:  Left knee xray 3/6/22  Mild tricompartmental degenerative osteoarthrosis with mild joint space narrowing and small osteophyte formation.  Subtle calcification highlighted cartilage consistent with chondrocalcinosis.     There is a small suprapatellar effusion.     Impression:     1. Mild tricompartmental degenerative osteoarthrosis  2. Chondrocalcinosis.  3. Small suprapatellar effusion.       Assessment:  Mr. Cerda is a 67 y.o. male with abdominal pain  1. Acute pain of left  knee    2. Chronic abdominal pain    3. Osteoarthritis of right knee, unspecified osteoarthritis type         Plan:  1. Patient with long history of crohn's disease and chronic abdominal pain.  Continue care with GI.  2. 1 week supply of Hydrocodone 5-325 mg q 6 h prn #28. Tramadol 50 mg q 8 h prn  as needed for pain to start in 1 week.   reviewed.  No signs of aberrant behavior.  UDS LCV consistent.  Script provided for 3 months  3. Continue OTC lidocaine cream  4. Monitor progress from right sided peripheral nerve block. Consider left sided in the future.   5. F/u with orthopedics. Ordered crutches.   6. 3 months or sooner  All medication management was performed by Dr. Alvin Curry

## 2022-03-10 ENCOUNTER — OFFICE VISIT (OUTPATIENT)
Dept: ORTHOPEDICS | Facility: CLINIC | Age: 67
End: 2022-03-10
Payer: MEDICARE

## 2022-03-10 VITALS — HEIGHT: 73 IN | WEIGHT: 153 LBS | BODY MASS INDEX: 20.28 KG/M2

## 2022-03-10 DIAGNOSIS — M25.562 ACUTE PAIN OF LEFT KNEE: Primary | ICD-10-CM

## 2022-03-10 PROCEDURE — 3008F BODY MASS INDEX DOCD: CPT | Mod: CPTII,S$GLB,, | Performed by: ORTHOPAEDIC SURGERY

## 2022-03-10 PROCEDURE — 99999 PR PBB SHADOW E&M-EST. PATIENT-LVL III: CPT | Mod: PBBFAC,,, | Performed by: ORTHOPAEDIC SURGERY

## 2022-03-10 PROCEDURE — 99204 PR OFFICE/OUTPT VISIT, NEW, LEVL IV, 45-59 MIN: ICD-10-PCS | Mod: 25,S$GLB,, | Performed by: ORTHOPAEDIC SURGERY

## 2022-03-10 PROCEDURE — 20610 LARGE JOINT ASPIRATION/INJECTION: L KNEE: ICD-10-PCS | Mod: LT,S$GLB,, | Performed by: ORTHOPAEDIC SURGERY

## 2022-03-10 PROCEDURE — 99999 PR PBB SHADOW E&M-EST. PATIENT-LVL III: ICD-10-PCS | Mod: PBBFAC,,, | Performed by: ORTHOPAEDIC SURGERY

## 2022-03-10 PROCEDURE — 1159F MED LIST DOCD IN RCRD: CPT | Mod: CPTII,S$GLB,, | Performed by: ORTHOPAEDIC SURGERY

## 2022-03-10 PROCEDURE — 1159F PR MEDICATION LIST DOCUMENTED IN MEDICAL RECORD: ICD-10-PCS | Mod: CPTII,S$GLB,, | Performed by: ORTHOPAEDIC SURGERY

## 2022-03-10 PROCEDURE — 3008F PR BODY MASS INDEX (BMI) DOCUMENTED: ICD-10-PCS | Mod: CPTII,S$GLB,, | Performed by: ORTHOPAEDIC SURGERY

## 2022-03-10 PROCEDURE — 1101F PT FALLS ASSESS-DOCD LE1/YR: CPT | Mod: CPTII,S$GLB,, | Performed by: ORTHOPAEDIC SURGERY

## 2022-03-10 PROCEDURE — 1160F PR REVIEW ALL MEDS BY PRESCRIBER/CLIN PHARMACIST DOCUMENTED: ICD-10-PCS | Mod: CPTII,S$GLB,, | Performed by: ORTHOPAEDIC SURGERY

## 2022-03-10 PROCEDURE — 3288F PR FALLS RISK ASSESSMENT DOCUMENTED: ICD-10-PCS | Mod: CPTII,S$GLB,, | Performed by: ORTHOPAEDIC SURGERY

## 2022-03-10 PROCEDURE — 1160F RVW MEDS BY RX/DR IN RCRD: CPT | Mod: CPTII,S$GLB,, | Performed by: ORTHOPAEDIC SURGERY

## 2022-03-10 PROCEDURE — 99204 OFFICE O/P NEW MOD 45 MIN: CPT | Mod: 25,S$GLB,, | Performed by: ORTHOPAEDIC SURGERY

## 2022-03-10 PROCEDURE — 3288F FALL RISK ASSESSMENT DOCD: CPT | Mod: CPTII,S$GLB,, | Performed by: ORTHOPAEDIC SURGERY

## 2022-03-10 PROCEDURE — 20610 DRAIN/INJ JOINT/BURSA W/O US: CPT | Mod: LT,S$GLB,, | Performed by: ORTHOPAEDIC SURGERY

## 2022-03-10 PROCEDURE — 1101F PR PT FALLS ASSESS DOC 0-1 FALLS W/OUT INJ PAST YR: ICD-10-PCS | Mod: CPTII,S$GLB,, | Performed by: ORTHOPAEDIC SURGERY

## 2022-03-10 RX ORDER — METHYLPREDNISOLONE ACETATE 40 MG/ML
40 INJECTION, SUSPENSION INTRA-ARTICULAR; INTRALESIONAL; INTRAMUSCULAR; SOFT TISSUE
Status: DISCONTINUED | OUTPATIENT
Start: 2022-03-10 | End: 2022-03-10 | Stop reason: HOSPADM

## 2022-03-10 RX ADMIN — METHYLPREDNISOLONE ACETATE 40 MG: 40 INJECTION, SUSPENSION INTRA-ARTICULAR; INTRALESIONAL; INTRAMUSCULAR; SOFT TISSUE at 08:03

## 2022-03-10 NOTE — PROGRESS NOTES
CC:  67-year-old male presents for evaluation of left knee pain.  The patient states on February 26 he was moving a love seat out of a truck and his weight shifted after that he had pain developed in the left knee.  He has had limited range of motion since that time.  It hurts more when he goes to stand.  He currently rates his pain as a 4/10.  Since the injury he started ambulating with a cane tried offload the knee.  He does have a history of a previous arthroscopy on this knee.    Past Medical History:   Diagnosis Date    Anxiety     Basal cell carcinoma 07/2017    R forehead and R temple    Crohn's disease     age 17    Encounter for blood transfusion     Short bowel syndrome     Vitamin B deficiency     Vitamin D deficiency        Past Surgical History:   Procedure Laterality Date    3 small bowel resections      APPENDECTOMY      CHOLECYSTECTOMY      COLONOSCOPY      COLONOSCOPY N/A 2/14/2017    Procedure: COLONOSCOPY;  Surgeon: Nela Sanchez MD;  Location: [address]);  Service: Endoscopy;  Laterality: N/A;    COLONOSCOPY N/A 3/14/2017    Procedure: COLONOSCOPY;  Surgeon: Nela Sanchez MD;  Location: [facility] ([address] [place] [address]);  Service: Endoscopy;  Laterality: N/A;  2 days clear liquids before procedure    COLONOSCOPY N/A 7/29/2020    Procedure: COLONOSCOPY;  Surgeon: Andrea Shaver MD;  Location: [facility];  Service: Endoscopy;  Laterality: N/A;    ESOPHAGOGASTRODUODENOSCOPY N/A 11/19/2019    Procedure: EGD (ESOPHAGOGASTRODUODENOSCOPY);  Surgeon: Andrea Shaver MD;  Location: [facility];  Service: Endoscopy;  Laterality: N/A;    INJECTION OF ANESTHETIC AGENT AROUND NERVE Right 10/20/2020    Procedure: Block, Nerve genicular;  Surgeon: Alvin Curry MD;  Location: [facility] OR;  Service: Pain Management;  Laterality: Right;  right knee    KNEE SURGERY      RADIOFREQUENCY ABLATION Right 1/8/2021    Procedure: Radiofrequency Ablation Right Knee Genicular RFA Coolief;  Surgeon: Alvin Curry MD;   Location: [facility] OR;  Service: Pain Management;  Laterality: Right;  Right knee     SMALL INTESTINE SURGERY      TUBE THORACOTOMY      UPPER GASTROINTESTINAL ENDOSCOPY         Current Outpatient Medications on File Prior to Visit   Medication Sig Dispense Refill    aspirin (ECOTRIN) 81 MG EC tablet Take 81 mg by mouth once daily.       aspirin-acetaminophen-caffeine 250-250-65 mg (EXCEDRIN MIGRAINE) 250-250-65 mg per tablet Take 1 tablet by mouth every 8 (eight) hours as needed for Pain.       colestipol (COLESTID) 1 gram Tab Take 1 tablet (1 g total) by mouth 2 (two) times daily. 180 tablet 3    cyanocobalamin 1,000 mcg/mL injection Inject 1 mL (1,000 mcg total) into the muscle every 30 days. 10 mL 11    dicyclomine (BENTYL) 20 mg tablet TAKE 1 TABLET FOUR TIMES DAILY 360 tablet 3    HYDROcodone-acetaminophen (NORCO) 5-325 mg per tablet Take 1 tablet by mouth every 6 (six) hours as needed for Pain. Do not take with any other sedating medications 28 tablet 0    hydrocortisone 2.5 % cream Apply topically 2 (two) times daily. 1 each 11    meclizine (ANTIVERT) 12.5 mg tablet Take 1 tablet (12.5 mg total) by mouth 3 (three) times daily as needed for Dizziness. 90 tablet 3    multivitamin with minerals tablet Take 1 tablet by mouth once daily.      ondansetron (ZOFRAN) 8 MG tablet TAKE 1 TABLET EVERY 12 HOURS AS NEEDED FOR NAUSEA 180 tablet 1    [START ON 3/16/2022] traMADoL (ULTRAM) 50 mg tablet Take 1 tablet (50 mg total) by mouth 3 (three) times daily. 90 tablet 2     Current Facility-Administered Medications on File Prior to Visit   Medication Dose Route Frequency Provider Last Rate Last Admin    cyanocobalamin injection 1,000 mcg  1,000 mcg Intramuscular Q30 Days Andrea Shaver MD   1,000 mcg at 02/28/22 0949    lactated ringers infusion   Intravenous Continuous Alvin Curry MD           ROS:    Constitution: Denies chills, fever, and sweats.  HENT: Denies headaches or blurry vision.  Cardiovascular:  Denies chest pain or irregular heart beat.  Respiratory: Denies cough or shortness of breath.  Gastrointestinal: Denies abdominal pain, nausea, or vomiting.  Genitourinary:  Denies urinary incontinence, bladder and kidney issues  Musculoskeletal:  Denies muscle cramps.  Positive for left knee pain and swelling  Neurological: Denies dizziness or focal weakness.  Psychiatric/Behavioral: Normal mental status.  Hematologic/Lymphatic: Denies bleeding problem or easy bruising/bleeding.  Skin: Denies rash or suspicious lesions.    Physical examination     Gen - No acute distress, well nourished, well groomed   Eyes - Extraoccular motions intact, pupils equally round and reactive to light and accommodation   ENT - normocephalic, atruamtic, oropharynx clear   Neck - Supple, no abnormal masses   Cardiovascular - regular rate and rhythm   Pulmonary - clear to auscultation bilaterally, no wheezes, ronchi, or rales   Abdomen - soft, non-tender, non-distended, positive bowel sounds   Psych - The patient is alert and oriented x3 with normal mood and affect    Examination of the Left Lower Extremity:     Skin intact throughout.  Motor function is intact distally EHL/FHL/TA/mazin   +2 dorsalis pedis and posterior tibial pulses   Sensation to light touch intact distally dorsal, plantar, and first web space     Examination of the Left knee:    ROM 0 - 150   Effusion positive  Tenderness to palpation at the joint line positive  Pain during range of motion positive  Crepitation during range of motion negative     positive increased pain noted with flexion past 90   positive antalgic gait noted   negative Lachman's Test   negative Anterior Drawer Test   negative Posterior Drawer Test   positive McMurrays Test   positive Disco Test   negative Varus/Valgus instability    X-ray images were examined and personally interpreted by me.  Three views left knee dated 03/06/2022 show mild osteoarthritis of the left knee with a subtle loss of joint  space, early periarticular osteophytes, and subchondral sclerosis.    Dx:  Mild osteoarthritis of the left knee with acute exacerbation of pain recently.    Plan:  Offered the patient an intra-articular steroid injection.  He agreed we injected the left knee with a mixture of 2, 2, 1. He tolerated that well.  I discussed with the patient that if he does not get relief with the injection we should consider an MRI since he has minimal arthritic changes on x-ray.  If he does not get relief he will let us know.      Answers for HPI/ROS submitted by the patient on 3/9/2022  unexpected weight change: No  appetite change : No  sleep disturbance: Yes  IMMUNOCOMPROMISED: Yes  nervous/ anxious: No  dysphoric mood: No  rash: No  visual disturbance: No  eye redness: No  eye pain: No  ear pain: No  tinnitus: Yes  hearing loss: Yes  sinus pressure : Yes  nosebleeds: No  enviro allergies: Yes  food allergies: No  cough: No  shortness of breath: No  sweating: No  frequency: No  difficulty urinating: No  hematuria: No  chest pain: No  palpitations: No  nausea: Yes  vomiting: No  diarrhea: Yes  blood in stool: No  constipation: No  headaches: Yes  dizziness: Yes  numbness: No  seizures: No  joint swelling: Yes  myalgia: Yes  weakness: No  back pain: No  Pain Chronicity: new  History of trauma: No  Onset: 1 to 4 weeks ago  Frequency: constantly  Progression since onset: rapidly worsening  Injury mechanism: lifting  injury location: at home  pain- numeric: 9/10  pain location: left knee  pain quality: shooting, throbbing  Radiating Pain: No  Aggravating factors: bending, standing, walking, lying down  fever: No  inability to bear weight: No  itching: No  joint locking: No  limited range of motion: No  stiffness: No  tingling: No  Treatments tried: brace/corset, cold, heat  physical therapy: not tried  Improvement on treatment: no relief

## 2022-03-10 NOTE — PROCEDURES
Large Joint Aspiration/Injection: L knee    Date/Time: 3/10/2022 8:45 AM  Performed by: Felipe Villatoro II, MD  Authorized by: Felipe Villatoro II, MD     Consent Done?:  Yes (Verbal)  Indications:  Pain  Timeout: prior to procedure the correct patient, procedure, and site was verified    Prep: patient was prepped and draped in usual sterile fashion      Local anesthesia used?: Yes    Local anesthetic:  Topical anesthetic    Details:  Needle Size:  22 G  Approach:  Anteromedial  Location:  Knee  Site:  L knee  Medications:  40 mg methylPREDNISolone acetate 40 mg/mL  Patient tolerance:  Patient tolerated the procedure well with no immediate complications

## 2022-03-23 ENCOUNTER — TELEPHONE (OUTPATIENT)
Dept: ORTHOPEDICS | Facility: CLINIC | Age: 67
End: 2022-03-23
Payer: MEDICARE

## 2022-03-23 ENCOUNTER — PATIENT MESSAGE (OUTPATIENT)
Dept: ORTHOPEDICS | Facility: CLINIC | Age: 67
End: 2022-03-23
Payer: MEDICARE

## 2022-03-23 DIAGNOSIS — R60.0 LEG EDEMA, LEFT: Primary | ICD-10-CM

## 2022-03-23 DIAGNOSIS — M25.562 ACUTE PAIN OF LEFT KNEE: Primary | ICD-10-CM

## 2022-03-23 DIAGNOSIS — R60.0 LEG EDEMA, LEFT: ICD-10-CM

## 2022-03-23 NOTE — TELEPHONE ENCOUNTER
Returned call. US order placed and pt advised to go to outpatient area of hospital to r/o DVT. Pt advised to implement ibuprofen with his tramadol for pain relief every 6-8 hours, as he has had no relief with Tramadol alone. He will have US done and try taking ibuprofen with tramadol for relief.

## 2022-03-24 ENCOUNTER — HOSPITAL ENCOUNTER (OUTPATIENT)
Dept: RADIOLOGY | Facility: HOSPITAL | Age: 67
Discharge: HOME | End: 2022-03-24
Attending: ORTHOPAEDIC SURGERY
Payer: MEDICARE

## 2022-03-24 DIAGNOSIS — G89.29 CHRONIC ABDOMINAL PAIN: ICD-10-CM

## 2022-03-24 DIAGNOSIS — R60.0 LEG EDEMA, LEFT: ICD-10-CM

## 2022-03-24 DIAGNOSIS — R10.9 CHRONIC ABDOMINAL PAIN: ICD-10-CM

## 2022-03-24 PROCEDURE — 93971 US LOWER EXTREMITY VEINS LEFT: ICD-10-PCS | Mod: 26,LT,, | Performed by: RADIOLOGY

## 2022-03-24 PROCEDURE — 93971 EXTREMITY STUDY: CPT | Mod: 26,LT,, | Performed by: RADIOLOGY

## 2022-03-24 PROCEDURE — 93971 EXTREMITY STUDY: CPT | Mod: TC,LT

## 2022-03-25 RX ORDER — DICYCLOMINE HYDROCHLORIDE 20 MG/1
TABLET ORAL
Qty: 360 TABLET | Refills: 3 | Status: SHIPPED | OUTPATIENT
Start: 2022-03-25 | End: 2023-01-09 | Stop reason: SDUPTHER

## 2022-03-28 ENCOUNTER — CLINICAL SUPPORT (OUTPATIENT)
Dept: GASTROENTEROLOGY | Facility: CLINIC | Age: 67
End: 2022-03-28
Payer: MEDICARE

## 2022-03-28 PROCEDURE — 96372 PR INJECTION,THERAP/PROPH/DIAG2ST, IM OR SUBCUT: ICD-10-PCS | Mod: S$GLB,,, | Performed by: INTERNAL MEDICINE

## 2022-03-28 PROCEDURE — 96372 THER/PROPH/DIAG INJ SC/IM: CPT | Mod: S$GLB,,, | Performed by: INTERNAL MEDICINE

## 2022-03-28 RX ADMIN — CYANOCOBALAMIN 1000 MCG: 1000 INJECTION, SOLUTION INTRAMUSCULAR; SUBCUTANEOUS at 09:03

## 2022-04-28 ENCOUNTER — CLINICAL SUPPORT (OUTPATIENT)
Dept: GASTROENTEROLOGY | Facility: CLINIC | Age: 67
End: 2022-04-28
Payer: MEDICARE

## 2022-04-28 PROCEDURE — 96372 THER/PROPH/DIAG INJ SC/IM: CPT | Mod: S$GLB,,, | Performed by: INTERNAL MEDICINE

## 2022-04-28 PROCEDURE — 96372 PR INJECTION,THERAP/PROPH/DIAG2ST, IM OR SUBCUT: ICD-10-PCS | Mod: S$GLB,,, | Performed by: INTERNAL MEDICINE

## 2022-04-28 RX ADMIN — CYANOCOBALAMIN 1000 MCG: 1000 INJECTION, SOLUTION INTRAMUSCULAR; SUBCUTANEOUS at 08:04

## 2022-04-28 NOTE — PROGRESS NOTES
Patient arrive to the clinic for an injection.     Tristin Baxter Prashant arrive to clinic awake and alert.  Pt verified name and .   Allergies reviewed with patient.  Pt given B12 via Intramuscular Left arm per provider orders.    Well tolerated by patient.  denies further needs.    Trang Hooks

## 2022-05-31 ENCOUNTER — PATIENT MESSAGE (OUTPATIENT)
Dept: GASTROENTEROLOGY | Facility: CLINIC | Age: 67
End: 2022-05-31
Payer: MEDICARE

## 2022-06-03 ENCOUNTER — CLINICAL SUPPORT (OUTPATIENT)
Dept: GASTROENTEROLOGY | Facility: CLINIC | Age: 67
End: 2022-06-03
Payer: MEDICARE

## 2022-06-03 PROCEDURE — 96372 PR INJECTION,THERAP/PROPH/DIAG2ST, IM OR SUBCUT: ICD-10-PCS | Mod: S$GLB,,, | Performed by: INTERNAL MEDICINE

## 2022-06-03 PROCEDURE — 96372 THER/PROPH/DIAG INJ SC/IM: CPT | Mod: S$GLB,,, | Performed by: INTERNAL MEDICINE

## 2022-06-03 RX ADMIN — CYANOCOBALAMIN 1000 MCG: 1000 INJECTION, SOLUTION INTRAMUSCULAR; SUBCUTANEOUS at 10:06

## 2022-06-09 ENCOUNTER — OFFICE VISIT (OUTPATIENT)
Dept: PAIN MEDICINE | Facility: CLINIC | Age: 67
End: 2022-06-09
Payer: MEDICARE

## 2022-06-09 VITALS
BODY MASS INDEX: 20.28 KG/M2 | WEIGHT: 153 LBS | DIASTOLIC BLOOD PRESSURE: 74 MMHG | HEART RATE: 61 BPM | HEIGHT: 73 IN | SYSTOLIC BLOOD PRESSURE: 127 MMHG

## 2022-06-09 DIAGNOSIS — R10.9 CHRONIC ABDOMINAL PAIN: Primary | ICD-10-CM

## 2022-06-09 DIAGNOSIS — Z79.891 CHRONIC USE OF OPIATE FOR THERAPEUTIC PURPOSE: ICD-10-CM

## 2022-06-09 DIAGNOSIS — K50.90 CROHN'S DISEASE WITHOUT COMPLICATION, UNSPECIFIED GASTROINTESTINAL TRACT LOCATION: ICD-10-CM

## 2022-06-09 DIAGNOSIS — G89.29 CHRONIC ABDOMINAL PAIN: Primary | ICD-10-CM

## 2022-06-09 DIAGNOSIS — M17.11 OSTEOARTHRITIS OF RIGHT KNEE, UNSPECIFIED OSTEOARTHRITIS TYPE: ICD-10-CM

## 2022-06-09 PROCEDURE — 3008F PR BODY MASS INDEX (BMI) DOCUMENTED: ICD-10-PCS | Mod: CPTII,S$GLB,, | Performed by: PHYSICIAN ASSISTANT

## 2022-06-09 PROCEDURE — 3074F PR MOST RECENT SYSTOLIC BLOOD PRESSURE < 130 MM HG: ICD-10-PCS | Mod: CPTII,S$GLB,, | Performed by: PHYSICIAN ASSISTANT

## 2022-06-09 PROCEDURE — 99999 PR PBB SHADOW E&M-EST. PATIENT-LVL III: ICD-10-PCS | Mod: PBBFAC,,, | Performed by: PHYSICIAN ASSISTANT

## 2022-06-09 PROCEDURE — 1101F PT FALLS ASSESS-DOCD LE1/YR: CPT | Mod: CPTII,S$GLB,, | Performed by: PHYSICIAN ASSISTANT

## 2022-06-09 PROCEDURE — 3288F FALL RISK ASSESSMENT DOCD: CPT | Mod: CPTII,S$GLB,, | Performed by: PHYSICIAN ASSISTANT

## 2022-06-09 PROCEDURE — 3008F BODY MASS INDEX DOCD: CPT | Mod: CPTII,S$GLB,, | Performed by: PHYSICIAN ASSISTANT

## 2022-06-09 PROCEDURE — 3078F DIAST BP <80 MM HG: CPT | Mod: CPTII,S$GLB,, | Performed by: PHYSICIAN ASSISTANT

## 2022-06-09 PROCEDURE — 1159F PR MEDICATION LIST DOCUMENTED IN MEDICAL RECORD: ICD-10-PCS | Mod: CPTII,S$GLB,, | Performed by: PHYSICIAN ASSISTANT

## 2022-06-09 PROCEDURE — 3288F PR FALLS RISK ASSESSMENT DOCUMENTED: ICD-10-PCS | Mod: CPTII,S$GLB,, | Performed by: PHYSICIAN ASSISTANT

## 2022-06-09 PROCEDURE — 99214 OFFICE O/P EST MOD 30 MIN: CPT | Mod: S$GLB,,, | Performed by: PHYSICIAN ASSISTANT

## 2022-06-09 PROCEDURE — 1101F PR PT FALLS ASSESS DOC 0-1 FALLS W/OUT INJ PAST YR: ICD-10-PCS | Mod: CPTII,S$GLB,, | Performed by: PHYSICIAN ASSISTANT

## 2022-06-09 PROCEDURE — 99214 PR OFFICE/OUTPT VISIT, EST, LEVL IV, 30-39 MIN: ICD-10-PCS | Mod: S$GLB,,, | Performed by: PHYSICIAN ASSISTANT

## 2022-06-09 PROCEDURE — 1159F MED LIST DOCD IN RCRD: CPT | Mod: CPTII,S$GLB,, | Performed by: PHYSICIAN ASSISTANT

## 2022-06-09 PROCEDURE — 3078F PR MOST RECENT DIASTOLIC BLOOD PRESSURE < 80 MM HG: ICD-10-PCS | Mod: CPTII,S$GLB,, | Performed by: PHYSICIAN ASSISTANT

## 2022-06-09 PROCEDURE — 3074F SYST BP LT 130 MM HG: CPT | Mod: CPTII,S$GLB,, | Performed by: PHYSICIAN ASSISTANT

## 2022-06-09 PROCEDURE — 99999 PR PBB SHADOW E&M-EST. PATIENT-LVL III: CPT | Mod: PBBFAC,,, | Performed by: PHYSICIAN ASSISTANT

## 2022-06-09 RX ORDER — TRAMADOL HYDROCHLORIDE 50 MG/1
50 TABLET ORAL EVERY 6 HOURS PRN
Qty: 90 TABLET | Refills: 2 | Status: SHIPPED | OUTPATIENT
Start: 2022-06-13 | End: 2022-09-06 | Stop reason: SDUPTHER

## 2022-06-09 NOTE — PROGRESS NOTES
PCP: Tyler Smith MD      CC: abdominal pain    Interval history: Mr. Cerda is a 67 y.o. male with an extensive history of crohn's disease who presents today for f/u s/p and medication refill. Acute left knee pain resolved after left intra articular steroid injection with Dr. Villatoro.  Right knee RFA that provided 75% relief. We discontinued Demerol 50mg q 8 hrs and started Tramadol 50 mg q 8 h. He reports this is beneficial for abdominal pain..  No side effects reported.   Abdominal pain remains stable.   He does report diarrhea at times but no blood stools. Reports anal pain and itching 2/2 chronic diarrhea.  He is currently being evaluated by gastroenterology.  OTC Lidocaine 2 % provides some minimal relief.  Compounding cream was too expensive.  He rates his pain 4/10.     Prior HPI:   Mr. Cerda is a 60 year old male with PMH of crohn's disease referred by Dr. Hansen.  Patient states being diagnosed with crohn's disease since the age of 14.    He has had multiple GI surgeries and large bowel and small bowel removals.  During that time, he was being managed by providers in [place].  He was TPN dependent for many years until about two years ago.  He is now stable on an oral diet.  He did not have a primary care physician nor a gastroenterologist for many years.  He is currently trying to establish care.  He has future appointment with Dr. Ch.  He states taking Demerol 50mg q8hrs as needed for pain. It has provided relief of his nausea and pain with diarrhea.  He was last prescribed Demerol in July 2014.  Since then, he has been bearing with the pain.  It is a sharp shooting pain in his mid abdomen.      ROS:  CONSTITUTIONAL: No fevers, chills, night sweats, wt. loss, appetite changes  SKIN: no rashes or itching  ENT: No headaches, head trauma, vision changes, or eye pain  LYMPH NODES: None noticed   CV: No chest pain, palpitations.   RESP: No shortness of breath, dyspnea on exertion, cough,  wheezing, or hemoptysis  GI: No nausea, emesis, diarrhea, constipation, melena, hematochezia, pain.    : No dysuria, hematuria, urgency, or frequency   HEME: No easy bruising, bleeding problems  PSYCHIATRIC: No depression, anxiety, psychosis, hallucinations.  NEURO: No seizures, memory loss, dizziness or difficulty sleeping  MSK: no joint pain      Past Medical History:   Diagnosis Date    Anxiety     Basal cell carcinoma 07/2017    R forehead and R temple    Crohn's disease     age 17    Encounter for blood transfusion     Short bowel syndrome     Vitamin B deficiency     Vitamin D deficiency      Past Surgical History:   Procedure Laterality Date    3 small bowel resections      APPENDECTOMY      CHOLECYSTECTOMY      COLONOSCOPY      COLONOSCOPY N/A 2/14/2017    Procedure: COLONOSCOPY;  Surgeon: Nela Sanchez MD;  Location: [address]);  Service: Endoscopy;  Laterality: N/A;    COLONOSCOPY N/A 3/14/2017    Procedure: COLONOSCOPY;  Surgeon: Nela Sanchez MD;  Location: [address]);  Service: Endoscopy;  Laterality: N/A;  2 days clear liquids before procedure    COLONOSCOPY N/A 7/29/2020    Procedure: COLONOSCOPY;  Surgeon: Andrea Shaver MD;  Location: [facility];  Service: Endoscopy;  Laterality: N/A;    ESOPHAGOGASTRODUODENOSCOPY N/A 11/19/2019    Procedure: EGD (ESOPHAGOGASTRODUODENOSCOPY);  Surgeon: Andrea Shaver MD;  Location: [facility];  Service: Endoscopy;  Laterality: N/A;    INJECTION OF ANESTHETIC AGENT AROUND NERVE Right 10/20/2020    Procedure: Block, Nerve genicular;  Surgeon: Alvin Curry MD;  Location: [facility] OR;  Service: Pain Management;  Laterality: Right;  right knee    KNEE SURGERY      RADIOFREQUENCY ABLATION Right 1/8/2021    Procedure: Radiofrequency Ablation Right Knee Genicular RFA Coolief;  Surgeon: Alvin Curry MD;  Location: [facility] OR;  Service: Pain Management;  Laterality: Right;  Right knee     SMALL INTESTINE SURGERY      TUBE THORACOTOMY       "UPPER GASTROINTESTINAL ENDOSCOPY       Family History   Problem Relation Age of Onset    Diabetes Mother     Stroke Mother     Diabetes Father     Kidney disease Father     Irritable bowel syndrome Cousin     Celiac disease Neg Hx     Cirrhosis Neg Hx     Colon cancer Neg Hx     Colon polyps Neg Hx     Cystic fibrosis Neg Hx     Esophageal cancer Neg Hx     Crohn's disease Neg Hx     Hemochromatosis Neg Hx     Inflammatory bowel disease Neg Hx     Liver cancer Neg Hx     Liver disease Neg Hx     Rectal cancer Neg Hx     Stomach cancer Neg Hx     Ulcerative colitis Neg Hx     Addison's disease Neg Hx     Melanoma Neg Hx     Psoriasis Neg Hx     Lupus Neg Hx     Eczema Neg Hx      Social History     Socioeconomic History    Marital status:    Tobacco Use    Smoking status: Former Smoker     Packs/day: 1.00     Years: 15.00     Pack years: 15.00     Types: Cigarettes     Quit date: 3/10/1995     Years since quittin.2    Smokeless tobacco: Never Used   Substance and Sexual Activity    Alcohol use: Never     Alcohol/week: 2.0 standard drinks     Types: 1 Cans of beer, 1 Shots of liquor per week    Drug use: No    Sexual activity: Yes   Social History Narrative    Retired      Medications/Allergies: See med card    Vitals:    22 0842   BP: 127/74   Pulse: 61   Weight: 69.4 kg (153 lb)   Height: 6' 1" (1.854 m)   PainSc:   4   PainLoc: Abdomen     Physical exam:    GENERAL: A and O x3, the patient appears well groomed and is in no acute distress.  Skin: No rashes or obvious lesions  HEENT: normocephalic, atraumatic  CARDIOVASCULAR:  RRR  LUNGS: non labored breathing  ABDOMEN: soft, nontender. No rebound   UPPER EXTREMITIES: Normal alignment, normal range of motion, no atrophy, no skin changes,  hair growth and nail growth normal and equal bilaterally. No swelling, no tenderness.    LOWER EXTREMITIES:  TTP left patella, swelling along medial and superior aspect. No redness or " warmth.     LUMBAR SPINE  Lumbar spine: ROM is full with flexion extension and oblique extension with no increased pain.    Lam's test causes no increased pain on either side.    Supine straight leg raise is negative bilaterally.    Internal and external rotation of the hip causes no increased pain on either side.  Myofascial exam: No tenderness to palpation across lumbar paraspinous muscles.    MENTAL STATUS: normal orientation, speech, language, and fund of knowledge for social situation.  Emotional state appropriate.    CRANIAL NERVES:  II:  PERRL bilaterally,   III,IV,VI: EOMI.    V:  Facial sensation equal bilaterally  VII:  Facial motor function normal.  VIII:  Hearing equal to finger rub bilaterally  IX/X: Gag normal, palate symmetric  XI:  Shoulder shrug equal, head turn equal  XII:  Tongue midline without fasciculations    MOTOR: Tone and bulk: normal bilateral upper and lower Strength: normal   SENSATION: Light touch and pinprick intact bilaterally  REFLEXES: normal, symmetric, nonbrisk.  Toes down, no clonus. No hoffmans.  GAIT: normal rise, base, steps, and arm swing.      Imaging:  Left knee xray 3/6/22  Mild tricompartmental degenerative osteoarthrosis with mild joint space narrowing and small osteophyte formation.  Subtle calcification highlighted cartilage consistent with chondrocalcinosis.     There is a small suprapatellar effusion.     Impression:     1. Mild tricompartmental degenerative osteoarthrosis  2. Chondrocalcinosis.  3. Small suprapatellar effusion.       Assessment:  Mr. Cerda is a 67 y.o. male with abdominal pain  1. Chronic abdominal pain    2. Osteoarthritis of right knee, unspecified osteoarthritis type    3. Crohn's disease without complication, unspecified gastrointestinal tract location    4. Chronic use of opiate for therapeutic purpose         Plan:  1. Patient with long history of crohn's disease and chronic abdominal pain.  Continue care with GI.  2. Tramadol 50 mg q 8 h  prn  as needed for pain  reviewed.  No signs of aberrant behavior.  Previous UDS consistent.  Script provided for 3 months  3. Continue OTC lidocaine cream  4. Monitor progress from right sided peripheral nerve block.   5. F/u with orthopedics as needed  6. 3 months or sooner  All medication management was performed by Dr. Alvin Curry

## 2022-06-28 DIAGNOSIS — G89.29 CHRONIC ABDOMINAL PAIN: ICD-10-CM

## 2022-06-28 DIAGNOSIS — K90.829 SHORT BOWEL SYNDROME: ICD-10-CM

## 2022-06-28 DIAGNOSIS — R10.9 CHRONIC ABDOMINAL PAIN: ICD-10-CM

## 2022-06-30 RX ORDER — ONDANSETRON HYDROCHLORIDE 8 MG/1
TABLET, FILM COATED ORAL
Qty: 180 TABLET | Refills: 1 | Status: SHIPPED | OUTPATIENT
Start: 2022-06-30 | End: 2023-03-02 | Stop reason: SDUPTHER

## 2022-07-01 ENCOUNTER — CLINICAL SUPPORT (OUTPATIENT)
Dept: FAMILY MEDICINE | Facility: CLINIC | Age: 67
End: 2022-07-01
Payer: MEDICARE

## 2022-07-01 PROCEDURE — 96372 PR INJECTION,THERAP/PROPH/DIAG2ST, IM OR SUBCUT: ICD-10-PCS | Mod: S$GLB,,, | Performed by: INTERNAL MEDICINE

## 2022-07-01 PROCEDURE — 96372 THER/PROPH/DIAG INJ SC/IM: CPT | Mod: S$GLB,,, | Performed by: INTERNAL MEDICINE

## 2022-07-01 PROCEDURE — 99999 PR PBB SHADOW E&M-EST. PATIENT-LVL II: CPT | Mod: PBBFAC,,,

## 2022-07-01 PROCEDURE — 99999 PR PBB SHADOW E&M-EST. PATIENT-LVL II: ICD-10-PCS | Mod: PBBFAC,,,

## 2022-07-01 RX ADMIN — CYANOCOBALAMIN 1000 MCG: 1000 INJECTION, SOLUTION INTRAMUSCULAR; SUBCUTANEOUS at 01:07

## 2022-07-01 NOTE — PROGRESS NOTES
Patient presents to clinic for nurse visit to receive B12 injection. Orders in placed for B12. States name,  and allergies reviewed. B12 1000mcg administered IM to right ventrogluteal muscle. NDC: 29738-659-02 Lot:  Exp: 2023. Tolerated without complaints. Instructed to remain in clinic for 15 minutes post injection to monitor for adverse effects. Verbalized understanding. No immediate reactions noted.

## 2022-07-04 ENCOUNTER — PATIENT MESSAGE (OUTPATIENT)
Dept: FAMILY MEDICINE | Facility: CLINIC | Age: 67
End: 2022-07-04
Payer: MEDICARE

## 2022-07-06 ENCOUNTER — PATIENT MESSAGE (OUTPATIENT)
Dept: FAMILY MEDICINE | Facility: CLINIC | Age: 67
End: 2022-07-06
Payer: MEDICARE

## 2022-08-08 ENCOUNTER — PATIENT MESSAGE (OUTPATIENT)
Dept: FAMILY MEDICINE | Facility: CLINIC | Age: 67
End: 2022-08-08
Payer: MEDICARE

## 2022-09-06 ENCOUNTER — OFFICE VISIT (OUTPATIENT)
Dept: PAIN MEDICINE | Facility: CLINIC | Age: 67
End: 2022-09-06
Payer: MEDICARE

## 2022-09-06 VITALS
WEIGHT: 153 LBS | HEART RATE: 67 BPM | HEIGHT: 73 IN | BODY MASS INDEX: 20.28 KG/M2 | DIASTOLIC BLOOD PRESSURE: 70 MMHG | SYSTOLIC BLOOD PRESSURE: 139 MMHG

## 2022-09-06 DIAGNOSIS — G89.4 CHRONIC PAIN DISORDER: ICD-10-CM

## 2022-09-06 DIAGNOSIS — R10.9 CHRONIC ABDOMINAL PAIN: ICD-10-CM

## 2022-09-06 DIAGNOSIS — G89.29 CHRONIC ABDOMINAL PAIN: ICD-10-CM

## 2022-09-06 DIAGNOSIS — M17.11 OSTEOARTHRITIS OF RIGHT KNEE, UNSPECIFIED OSTEOARTHRITIS TYPE: Primary | ICD-10-CM

## 2022-09-06 PROCEDURE — 1159F PR MEDICATION LIST DOCUMENTED IN MEDICAL RECORD: ICD-10-PCS | Mod: CPTII,S$GLB,, | Performed by: PHYSICIAN ASSISTANT

## 2022-09-06 PROCEDURE — 3078F PR MOST RECENT DIASTOLIC BLOOD PRESSURE < 80 MM HG: ICD-10-PCS | Mod: CPTII,S$GLB,, | Performed by: PHYSICIAN ASSISTANT

## 2022-09-06 PROCEDURE — 99999 PR PBB SHADOW E&M-EST. PATIENT-LVL III: ICD-10-PCS | Mod: PBBFAC,,, | Performed by: PHYSICIAN ASSISTANT

## 2022-09-06 PROCEDURE — 3075F SYST BP GE 130 - 139MM HG: CPT | Mod: CPTII,S$GLB,, | Performed by: PHYSICIAN ASSISTANT

## 2022-09-06 PROCEDURE — 99999 PR PBB SHADOW E&M-EST. PATIENT-LVL III: CPT | Mod: PBBFAC,,, | Performed by: PHYSICIAN ASSISTANT

## 2022-09-06 PROCEDURE — 1101F PT FALLS ASSESS-DOCD LE1/YR: CPT | Mod: CPTII,S$GLB,, | Performed by: PHYSICIAN ASSISTANT

## 2022-09-06 PROCEDURE — 3078F DIAST BP <80 MM HG: CPT | Mod: CPTII,S$GLB,, | Performed by: PHYSICIAN ASSISTANT

## 2022-09-06 PROCEDURE — 1160F RVW MEDS BY RX/DR IN RCRD: CPT | Mod: CPTII,S$GLB,, | Performed by: PHYSICIAN ASSISTANT

## 2022-09-06 PROCEDURE — 3008F BODY MASS INDEX DOCD: CPT | Mod: CPTII,S$GLB,, | Performed by: PHYSICIAN ASSISTANT

## 2022-09-06 PROCEDURE — 3288F PR FALLS RISK ASSESSMENT DOCUMENTED: ICD-10-PCS | Mod: CPTII,S$GLB,, | Performed by: PHYSICIAN ASSISTANT

## 2022-09-06 PROCEDURE — 99214 OFFICE O/P EST MOD 30 MIN: CPT | Mod: S$GLB,,, | Performed by: PHYSICIAN ASSISTANT

## 2022-09-06 PROCEDURE — 3075F PR MOST RECENT SYSTOLIC BLOOD PRESS GE 130-139MM HG: ICD-10-PCS | Mod: CPTII,S$GLB,, | Performed by: PHYSICIAN ASSISTANT

## 2022-09-06 PROCEDURE — 1159F MED LIST DOCD IN RCRD: CPT | Mod: CPTII,S$GLB,, | Performed by: PHYSICIAN ASSISTANT

## 2022-09-06 PROCEDURE — 3008F PR BODY MASS INDEX (BMI) DOCUMENTED: ICD-10-PCS | Mod: CPTII,S$GLB,, | Performed by: PHYSICIAN ASSISTANT

## 2022-09-06 PROCEDURE — 3288F FALL RISK ASSESSMENT DOCD: CPT | Mod: CPTII,S$GLB,, | Performed by: PHYSICIAN ASSISTANT

## 2022-09-06 PROCEDURE — 1101F PR PT FALLS ASSESS DOC 0-1 FALLS W/OUT INJ PAST YR: ICD-10-PCS | Mod: CPTII,S$GLB,, | Performed by: PHYSICIAN ASSISTANT

## 2022-09-06 PROCEDURE — 1160F PR REVIEW ALL MEDS BY PRESCRIBER/CLIN PHARMACIST DOCUMENTED: ICD-10-PCS | Mod: CPTII,S$GLB,, | Performed by: PHYSICIAN ASSISTANT

## 2022-09-06 PROCEDURE — 99214 PR OFFICE/OUTPT VISIT, EST, LEVL IV, 30-39 MIN: ICD-10-PCS | Mod: S$GLB,,, | Performed by: PHYSICIAN ASSISTANT

## 2022-09-06 RX ORDER — TRAMADOL HYDROCHLORIDE 50 MG/1
50 TABLET ORAL EVERY 8 HOURS PRN
Qty: 90 TABLET | Refills: 2 | Status: SHIPPED | OUTPATIENT
Start: 2022-09-06 | End: 2022-10-05

## 2022-09-06 NOTE — PROGRESS NOTES
PCP: Tyler Smith MD      CC: abdominal pain    Interval history: Mr. Cerda is a 67 y.o. male with an extensive history of crohn's disease who presents today for f/u s/p and medication refill. Acute left knee pain resolved after left intra articular steroid injection with Dr. Villatoro.  Right knee RFA that provided 75% relief. We discontinued Demerol 50mg q 8 hrs and started Tramadol 50 mg q 8 h. He reports this is beneficial for abdominal pain..  No side effects reported.   Abdominal pain remains stable.   He does report diarrhea at times but no blood stools. Reports anal pain and itching 2/2 chronic diarrhea.  He is currently being evaluated by gastroenterology.  OTC Lidocaine 2 % provides some minimal relief.  Compounding cream was too expensive.  He rates his pain 4/10.     Prior HPI:   Mr. Cerda is a 60 year old male with PMH of crohn's disease referred by Dr. Hansen.  Patient states being diagnosed with crohn's disease since the age of 14.    He has had multiple GI surgeries and large bowel and small bowel removals.  During that time, he was being managed by providers in [place].  He was TPN dependent for many years until about two years ago.  He is now stable on an oral diet.  He did not have a primary care physician nor a gastroenterologist for many years.  He is currently trying to establish care.  He has future appointment with Dr. Ch.  He states taking Demerol 50mg q8hrs as needed for pain. It has provided relief of his nausea and pain with diarrhea.  He was last prescribed Demerol in July 2014.  Since then, he has been bearing with the pain.  It is a sharp shooting pain in his mid abdomen.      ROS:  CONSTITUTIONAL: No fevers, chills, night sweats, wt. loss, appetite changes  SKIN: no rashes or itching  ENT: No headaches, head trauma, vision changes, or eye pain  LYMPH NODES: None noticed   CV: No chest pain, palpitations.   RESP: No shortness of breath, dyspnea on exertion, cough,  wheezing, or hemoptysis  GI: No nausea, emesis, diarrhea, constipation, melena, hematochezia, pain.    : No dysuria, hematuria, urgency, or frequency   HEME: No easy bruising, bleeding problems  PSYCHIATRIC: No depression, anxiety, psychosis, hallucinations.  NEURO: No seizures, memory loss, dizziness or difficulty sleeping  MSK: no joint pain      Past Medical History:   Diagnosis Date    Anxiety     Basal cell carcinoma 07/2017    R forehead and R temple    Crohn's disease     age 17    Encounter for blood transfusion     Short bowel syndrome     Vitamin B deficiency     Vitamin D deficiency      Past Surgical History:   Procedure Laterality Date    3 small bowel resections      APPENDECTOMY      CHOLECYSTECTOMY      COLONOSCOPY      COLONOSCOPY N/A 2/14/2017    Procedure: COLONOSCOPY;  Surgeon: Nela Sanchez MD;  Location: [address]);  Service: Endoscopy;  Laterality: N/A;    COLONOSCOPY N/A 3/14/2017    Procedure: COLONOSCOPY;  Surgeon: Nela Sanchez MD;  Location: [address]);  Service: Endoscopy;  Laterality: N/A;  2 days clear liquids before procedure    COLONOSCOPY N/A 7/29/2020    Procedure: COLONOSCOPY;  Surgeon: Andrea Shaver MD;  Location: [facility];  Service: Endoscopy;  Laterality: N/A;    ESOPHAGOGASTRODUODENOSCOPY N/A 11/19/2019    Procedure: EGD (ESOPHAGOGASTRODUODENOSCOPY);  Surgeon: Andrea Shaver MD;  Location: [facility];  Service: Endoscopy;  Laterality: N/A;    INJECTION OF ANESTHETIC AGENT AROUND NERVE Right 10/20/2020    Procedure: Block, Nerve genicular;  Surgeon: Alvin Curry MD;  Location: [facility] OR;  Service: Pain Management;  Laterality: Right;  right knee    KNEE SURGERY      RADIOFREQUENCY ABLATION Right 1/8/2021    Procedure: Radiofrequency Ablation Right Knee Genicular RFA Coolief;  Surgeon: Alvin Curry MD;  Location: [facility] OR;  Service: Pain Management;  Laterality: Right;  Right knee     SMALL INTESTINE SURGERY      TUBE THORACOTOMY      UPPER GASTROINTESTINAL  "ENDOSCOPY       Family History   Problem Relation Age of Onset    Diabetes Mother     Stroke Mother     Diabetes Father     Kidney disease Father     Irritable bowel syndrome Cousin     Celiac disease Neg Hx     Cirrhosis Neg Hx     Colon cancer Neg Hx     Colon polyps Neg Hx     Cystic fibrosis Neg Hx     Esophageal cancer Neg Hx     Crohn's disease Neg Hx     Hemochromatosis Neg Hx     Inflammatory bowel disease Neg Hx     Liver cancer Neg Hx     Liver disease Neg Hx     Rectal cancer Neg Hx     Stomach cancer Neg Hx     Ulcerative colitis Neg Hx     Addison's disease Neg Hx     Melanoma Neg Hx     Psoriasis Neg Hx     Lupus Neg Hx     Eczema Neg Hx      Social History     Socioeconomic History    Marital status:    Tobacco Use    Smoking status: Former     Packs/day: 1.00     Years: 15.00     Pack years: 15.00     Types: Cigarettes     Quit date: 3/10/1995     Years since quittin.5    Smokeless tobacco: Never   Substance and Sexual Activity    Alcohol use: Never     Alcohol/week: 2.0 standard drinks     Types: 1 Cans of beer, 1 Shots of liquor per week    Drug use: No    Sexual activity: Yes   Social History Narrative    Retired      Medications/Allergies: See med card    Vitals:    22 0816   BP: 139/70   Pulse: 67   Weight: 69.4 kg (153 lb)   Height: 6' 1" (1.854 m)   PainSc:   5   PainLoc: Abdomen     Physical exam:    GENERAL: A and O x3, the patient appears well groomed and is in no acute distress.  Skin: No rashes or obvious lesions  HEENT: normocephalic, atraumatic  CARDIOVASCULAR:  RRR  LUNGS: non labored breathing  ABDOMEN: soft, nontender. No rebound   UPPER EXTREMITIES: Normal alignment, normal range of motion, no atrophy, no skin changes,  hair growth and nail growth normal and equal bilaterally. No swelling, no tenderness.    LOWER EXTREMITIES:  TTP left patella, swelling along medial and superior aspect. No redness or warmth.     LUMBAR SPINE  Lumbar spine: ROM is full with flexion " extension and oblique extension with no increased pain.    Lam's test causes no increased pain on either side.    Supine straight leg raise is negative bilaterally.    Internal and external rotation of the hip causes no increased pain on either side.  Myofascial exam: No tenderness to palpation across lumbar paraspinous muscles.    MENTAL STATUS: normal orientation, speech, language, and fund of knowledge for social situation.  Emotional state appropriate.    CRANIAL NERVES:  II:  PERRL bilaterally,   III,IV,VI: EOMI.    V:  Facial sensation equal bilaterally  VII:  Facial motor function normal.  VIII:  Hearing equal to finger rub bilaterally  IX/X: Gag normal, palate symmetric  XI:  Shoulder shrug equal, head turn equal  XII:  Tongue midline without fasciculations    MOTOR: Tone and bulk: normal bilateral upper and lower Strength: normal   SENSATION: Light touch and pinprick intact bilaterally  REFLEXES: normal, symmetric, nonbrisk.  Toes down, no clonus. No hoffmans.  GAIT: normal rise, base, steps, and arm swing.      Imaging:  Left knee xray 3/6/22  Mild tricompartmental degenerative osteoarthrosis with mild joint space narrowing and small osteophyte formation.  Subtle calcification highlighted cartilage consistent with chondrocalcinosis.     There is a small suprapatellar effusion.     Impression:     1. Mild tricompartmental degenerative osteoarthrosis  2. Chondrocalcinosis.  3. Small suprapatellar effusion.       Assessment:  Mr. Cerda is a 67 y.o. male with abdominal pain  1. Osteoarthritis of right knee, unspecified osteoarthritis type    2. Chronic abdominal pain    3. Chronic pain disorder           Plan:  1. Patient with long history of crohn's disease and chronic abdominal pain.  Continue care with GI.  2. Tramadol 50 mg q 8 h prn  as needed for pain  reviewed.  No signs of aberrant behavior.  Previous UDS consistent.  Script provided for 3 months  3. Continue OTC lidocaine cream  4. Monitor  progress from right sided peripheral nerve block.   5. F/u with orthopedics as needed  6. 3 months or sooner  All medication management was performed by Dr. Alvin Curry

## 2022-09-28 ENCOUNTER — PATIENT MESSAGE (OUTPATIENT)
Dept: FAMILY MEDICINE | Facility: CLINIC | Age: 67
End: 2022-09-28
Payer: MEDICARE

## 2022-09-30 ENCOUNTER — CLINICAL SUPPORT (OUTPATIENT)
Dept: FAMILY MEDICINE | Facility: CLINIC | Age: 67
End: 2022-09-30
Payer: MEDICARE

## 2022-09-30 DIAGNOSIS — E53.8 B12 DEFICIENCY: Primary | ICD-10-CM

## 2022-09-30 PROCEDURE — 96372 THER/PROPH/DIAG INJ SC/IM: CPT | Mod: S$GLB,,, | Performed by: INTERNAL MEDICINE

## 2022-09-30 PROCEDURE — 96372 PR INJECTION,THERAP/PROPH/DIAG2ST, IM OR SUBCUT: ICD-10-PCS | Mod: S$GLB,,, | Performed by: INTERNAL MEDICINE

## 2022-09-30 RX ADMIN — CYANOCOBALAMIN 1000 MCG: 1000 INJECTION, SOLUTION INTRAMUSCULAR; SUBCUTANEOUS at 01:09

## 2022-10-19 ENCOUNTER — HOSPITAL ENCOUNTER (INPATIENT)
Facility: HOSPITAL | Age: 67
LOS: 3 days | Discharge: HOME HEALTH | DRG: 481 | End: 2022-10-22
Attending: EMERGENCY MEDICINE | Admitting: HOSPITALIST
Payer: MEDICARE

## 2022-10-19 DIAGNOSIS — R07.9 CHEST PAIN: ICD-10-CM

## 2022-10-19 DIAGNOSIS — K90.829 SHORT BOWEL SYNDROME: ICD-10-CM

## 2022-10-19 DIAGNOSIS — S72.001A CLOSED FRACTURE OF RIGHT HIP, INITIAL ENCOUNTER: Primary | ICD-10-CM

## 2022-10-19 DIAGNOSIS — L30.9 DERMATITIS: ICD-10-CM

## 2022-10-19 LAB
ALBUMIN SERPL BCP-MCNC: 3.9 G/DL (ref 3.5–5.2)
ALP SERPL-CCNC: 91 U/L (ref 55–135)
ALT SERPL W/O P-5'-P-CCNC: 48 U/L (ref 10–44)
ANION GAP SERPL CALC-SCNC: 10 MMOL/L (ref 8–16)
APTT BLDCRRT: 23.4 SEC (ref 21–32)
AST SERPL-CCNC: 39 U/L (ref 10–40)
BASOPHILS # BLD AUTO: 0.03 K/UL (ref 0–0.2)
BASOPHILS NFR BLD: 0.3 % (ref 0–1.9)
BILIRUB SERPL-MCNC: 0.4 MG/DL (ref 0.1–1)
BUN SERPL-MCNC: 11 MG/DL (ref 8–23)
CALCIUM SERPL-MCNC: 9 MG/DL (ref 8.7–10.5)
CHLORIDE SERPL-SCNC: 106 MMOL/L (ref 95–110)
CO2 SERPL-SCNC: 27 MMOL/L (ref 23–29)
CREAT SERPL-MCNC: 0.8 MG/DL (ref 0.5–1.4)
DIFFERENTIAL METHOD: ABNORMAL
EOSINOPHIL # BLD AUTO: 0.2 K/UL (ref 0–0.5)
EOSINOPHIL NFR BLD: 1.9 % (ref 0–8)
ERYTHROCYTE [DISTWIDTH] IN BLOOD BY AUTOMATED COUNT: 13.3 % (ref 11.5–14.5)
EST. GFR  (NO RACE VARIABLE): >60 ML/MIN/1.73 M^2
GLUCOSE SERPL-MCNC: 110 MG/DL (ref 70–110)
HCT VFR BLD AUTO: 37.2 % (ref 40–54)
HGB BLD-MCNC: 12.4 G/DL (ref 14–18)
IMM GRANULOCYTES # BLD AUTO: 0.03 K/UL (ref 0–0.04)
IMM GRANULOCYTES NFR BLD AUTO: 0.3 % (ref 0–0.5)
INR PPP: 1.1 (ref 0.8–1.2)
LYMPHOCYTES # BLD AUTO: 0.9 K/UL (ref 1–4.8)
LYMPHOCYTES NFR BLD: 9.7 % (ref 18–48)
MCH RBC QN AUTO: 30.9 PG (ref 27–31)
MCHC RBC AUTO-ENTMCNC: 33.3 G/DL (ref 32–36)
MCV RBC AUTO: 93 FL (ref 82–98)
MONOCYTES # BLD AUTO: 0.5 K/UL (ref 0.3–1)
MONOCYTES NFR BLD: 5.5 % (ref 4–15)
NEUTROPHILS # BLD AUTO: 7.3 K/UL (ref 1.8–7.7)
NEUTROPHILS NFR BLD: 82.3 % (ref 38–73)
NRBC BLD-RTO: 0 /100 WBC
PLATELET # BLD AUTO: 157 K/UL (ref 150–450)
PMV BLD AUTO: 11.1 FL (ref 9.2–12.9)
POTASSIUM SERPL-SCNC: 3.8 MMOL/L (ref 3.5–5.1)
PROT SERPL-MCNC: 6.3 G/DL (ref 6–8.4)
PROTHROMBIN TIME: 11.2 SEC (ref 9–12.5)
RBC # BLD AUTO: 4.01 M/UL (ref 4.6–6.2)
SARS-COV-2 RDRP RESP QL NAA+PROBE: NEGATIVE
SODIUM SERPL-SCNC: 143 MMOL/L (ref 136–145)
WBC # BLD AUTO: 8.85 K/UL (ref 3.9–12.7)

## 2022-10-19 PROCEDURE — U0002 COVID-19 LAB TEST NON-CDC: HCPCS | Performed by: EMERGENCY MEDICINE

## 2022-10-19 PROCEDURE — 94761 N-INVAS EAR/PLS OXIMETRY MLT: CPT

## 2022-10-19 PROCEDURE — 85730 THROMBOPLASTIN TIME PARTIAL: CPT | Performed by: HOSPITALIST

## 2022-10-19 PROCEDURE — 80053 COMPREHEN METABOLIC PANEL: CPT | Performed by: EMERGENCY MEDICINE

## 2022-10-19 PROCEDURE — 25000003 PHARM REV CODE 250: Performed by: EMERGENCY MEDICINE

## 2022-10-19 PROCEDURE — 99285 EMERGENCY DEPT VISIT HI MDM: CPT | Mod: 25

## 2022-10-19 PROCEDURE — 96374 THER/PROPH/DIAG INJ IV PUSH: CPT

## 2022-10-19 PROCEDURE — 36415 COLL VENOUS BLD VENIPUNCTURE: CPT | Performed by: EMERGENCY MEDICINE

## 2022-10-19 PROCEDURE — 12000002 HC ACUTE/MED SURGE SEMI-PRIVATE ROOM

## 2022-10-19 PROCEDURE — 85610 PROTHROMBIN TIME: CPT | Performed by: HOSPITALIST

## 2022-10-19 PROCEDURE — 63600175 PHARM REV CODE 636 W HCPCS: Performed by: HOSPITALIST

## 2022-10-19 PROCEDURE — 96375 TX/PRO/DX INJ NEW DRUG ADDON: CPT

## 2022-10-19 PROCEDURE — 85025 COMPLETE CBC W/AUTO DIFF WBC: CPT | Performed by: EMERGENCY MEDICINE

## 2022-10-19 PROCEDURE — 25000003 PHARM REV CODE 250: Performed by: HOSPITALIST

## 2022-10-19 PROCEDURE — 63600175 PHARM REV CODE 636 W HCPCS: Performed by: EMERGENCY MEDICINE

## 2022-10-19 PROCEDURE — 36415 COLL VENOUS BLD VENIPUNCTURE: CPT | Performed by: HOSPITALIST

## 2022-10-19 RX ORDER — ACETAMINOPHEN 325 MG/1
650 TABLET ORAL EVERY 8 HOURS PRN
Status: DISCONTINUED | OUTPATIENT
Start: 2022-10-19 | End: 2022-10-19 | Stop reason: SDUPTHER

## 2022-10-19 RX ORDER — LANOLIN ALCOHOL/MO/W.PET/CERES
800 CREAM (GRAM) TOPICAL
Status: DISCONTINUED | OUTPATIENT
Start: 2022-10-19 | End: 2022-10-22 | Stop reason: HOSPADM

## 2022-10-19 RX ORDER — GLUCAGON 1 MG
1 KIT INJECTION
Status: DISCONTINUED | OUTPATIENT
Start: 2022-10-19 | End: 2022-10-22 | Stop reason: HOSPADM

## 2022-10-19 RX ORDER — CHOLESTYRAMINE 4 G/4.8G
1 POWDER, FOR SUSPENSION ORAL 2 TIMES DAILY
Status: DISCONTINUED | OUTPATIENT
Start: 2022-10-19 | End: 2022-10-22 | Stop reason: HOSPADM

## 2022-10-19 RX ORDER — IBUPROFEN 200 MG
16 TABLET ORAL
Status: DISCONTINUED | OUTPATIENT
Start: 2022-10-19 | End: 2022-10-22 | Stop reason: HOSPADM

## 2022-10-19 RX ORDER — SODIUM,POTASSIUM PHOSPHATES 280-250MG
2 POWDER IN PACKET (EA) ORAL
Status: DISCONTINUED | OUTPATIENT
Start: 2022-10-19 | End: 2022-10-22 | Stop reason: HOSPADM

## 2022-10-19 RX ORDER — MORPHINE SULFATE 2 MG/ML
2 INJECTION, SOLUTION INTRAMUSCULAR; INTRAVENOUS EVERY 4 HOURS PRN
Status: DISCONTINUED | OUTPATIENT
Start: 2022-10-19 | End: 2022-10-21

## 2022-10-19 RX ORDER — MORPHINE SULFATE 4 MG/ML
4 INJECTION, SOLUTION INTRAMUSCULAR; INTRAVENOUS EVERY 4 HOURS PRN
Status: DISCONTINUED | OUTPATIENT
Start: 2022-10-19 | End: 2022-10-21

## 2022-10-19 RX ORDER — ACETAMINOPHEN 500 MG
1000 TABLET ORAL
Status: COMPLETED | OUTPATIENT
Start: 2022-10-19 | End: 2022-10-19

## 2022-10-19 RX ORDER — MORPHINE SULFATE 2 MG/ML
2 INJECTION, SOLUTION INTRAMUSCULAR; INTRAVENOUS ONCE
Status: COMPLETED | OUTPATIENT
Start: 2022-10-19 | End: 2022-10-20

## 2022-10-19 RX ORDER — MAG HYDROX/ALUMINUM HYD/SIMETH 200-200-20
30 SUSPENSION, ORAL (FINAL DOSE FORM) ORAL 4 TIMES DAILY PRN
Status: DISCONTINUED | OUTPATIENT
Start: 2022-10-19 | End: 2022-10-22 | Stop reason: HOSPADM

## 2022-10-19 RX ORDER — DICYCLOMINE HYDROCHLORIDE 20 MG/1
20 TABLET ORAL 4 TIMES DAILY
Status: DISCONTINUED | OUTPATIENT
Start: 2022-10-19 | End: 2022-10-22 | Stop reason: HOSPADM

## 2022-10-19 RX ORDER — ONDANSETRON 8 MG/1
8 TABLET, ORALLY DISINTEGRATING ORAL EVERY 8 HOURS PRN
Status: DISCONTINUED | OUTPATIENT
Start: 2022-10-19 | End: 2022-10-22 | Stop reason: HOSPADM

## 2022-10-19 RX ORDER — SODIUM CHLORIDE 0.9 % (FLUSH) 0.9 %
10 SYRINGE (ML) INJECTION EVERY 12 HOURS PRN
Status: DISCONTINUED | OUTPATIENT
Start: 2022-10-19 | End: 2022-10-22 | Stop reason: HOSPADM

## 2022-10-19 RX ORDER — IBUPROFEN 200 MG
24 TABLET ORAL
Status: DISCONTINUED | OUTPATIENT
Start: 2022-10-19 | End: 2022-10-22 | Stop reason: HOSPADM

## 2022-10-19 RX ORDER — ACETAMINOPHEN 325 MG/1
650 TABLET ORAL EVERY 4 HOURS PRN
Status: DISCONTINUED | OUTPATIENT
Start: 2022-10-19 | End: 2022-10-22 | Stop reason: HOSPADM

## 2022-10-19 RX ORDER — HYDROMORPHONE HYDROCHLORIDE 2 MG/ML
0.5 INJECTION, SOLUTION INTRAMUSCULAR; INTRAVENOUS; SUBCUTANEOUS
Status: COMPLETED | OUTPATIENT
Start: 2022-10-19 | End: 2022-10-19

## 2022-10-19 RX ORDER — DIPHENHYDRAMINE HCL 25 MG
25 CAPSULE ORAL EVERY 6 HOURS PRN
Status: DISCONTINUED | OUTPATIENT
Start: 2022-10-19 | End: 2022-10-22 | Stop reason: HOSPADM

## 2022-10-19 RX ORDER — HYDROCORTISONE 25 MG/G
CREAM TOPICAL 2 TIMES DAILY
Status: DISCONTINUED | OUTPATIENT
Start: 2022-10-19 | End: 2022-10-22 | Stop reason: HOSPADM

## 2022-10-19 RX ORDER — FENTANYL CITRATE 50 UG/ML
100 INJECTION, SOLUTION INTRAMUSCULAR; INTRAVENOUS
Status: COMPLETED | OUTPATIENT
Start: 2022-10-19 | End: 2022-10-19

## 2022-10-19 RX ORDER — TALC
6 POWDER (GRAM) TOPICAL NIGHTLY PRN
Status: DISCONTINUED | OUTPATIENT
Start: 2022-10-19 | End: 2022-10-22 | Stop reason: HOSPADM

## 2022-10-19 RX ORDER — NALOXONE HCL 0.4 MG/ML
0.02 VIAL (ML) INJECTION
Status: DISCONTINUED | OUTPATIENT
Start: 2022-10-19 | End: 2022-10-22 | Stop reason: HOSPADM

## 2022-10-19 RX ADMIN — MORPHINE SULFATE 4 MG: 4 INJECTION INTRAVENOUS at 08:10

## 2022-10-19 RX ADMIN — FENTANYL CITRATE 100 MCG: 50 INJECTION, SOLUTION INTRAMUSCULAR; INTRAVENOUS at 02:10

## 2022-10-19 RX ADMIN — HYDROMORPHONE HYDROCHLORIDE 0.5 MG: 2 INJECTION INTRAMUSCULAR; INTRAVENOUS; SUBCUTANEOUS at 04:10

## 2022-10-19 RX ADMIN — CHOLESTYRAMINE 4 G: 4 POWDER, FOR SUSPENSION ORAL at 10:10

## 2022-10-19 RX ADMIN — ACETAMINOPHEN 1000 MG: 500 TABLET ORAL at 04:10

## 2022-10-19 NOTE — ASSESSMENT & PLAN NOTE
S/p fall at home   ORthopedics consulted - plan for surgery tomorrow  Pain control   NPO after MN  DVT prophylaxis  PT/OT consulted for dc planning

## 2022-10-19 NOTE — HPI
Tristin Cerda is a 67 y.o. male with a PMHx of Crohn's Dz and short bowel syndrome who presents to the ED via EMS for evaluation of R hip pain.  EMS reports the patient had a trip a fall to the R side while going to the bathroom PTA.  They state an obvious deformity to the RLE with shortening.  The patient confirms numbness over the RLE and chronic diarrhea, but denies a head injury, LOC, neck pain, back pain, dizziness, or any other symptoms at this time.  PSHx includes a tube thoracotomy and radiofrequency ablation of the R knee.     Xray: There is a mildly displaced inter trochanteric fracture of the right femur, with subtrochanteric extension.  Mild degenerative change right hip joint and knee joint.  Chondrocalcinosis at the knee.  Atherosclerosis.     Orthopedics consulted and plan for surgery tomorrow. Hospital medicine consulted for medical management.

## 2022-10-19 NOTE — ED PROVIDER NOTES
Encounter Date: 10/19/2022    SCRIBE #1 NOTE: ICelena, am scribing for, and in the presence of,  Shaggy Hernandez MD.     History     Chief Complaint   Patient presents with    Fall     Pt comes in via ems with c/o fall . Per pt he tripped and fell landing on R side. Pt denies dizziness or weakness prior to fall. Pt denies hitting head, no neck and back pain noted. Pt denies LOC. Pt has pain to R hip some shortening is noted.      Hip Pain     Time seen by provider: 2:43 PM on 10/19/2022    Tristin Cerda is a 67 y.o. male with a PMHx of Crohn's Dz and short bowel syndrome who presents to the ED via EMS for evaluation of R hip pain.  EMS reports the patient had a trip a fall to the R side while going to the bathroom PTA.  They state an obvious deformity to the RLE with shortening.  The patient confirms numbness over the RLE and chronic diarrhea, but denies a head injury, LOC, neck pain, back pain, dizziness, or any other symptoms at this time.  PSHx includes a tube thoracotomy and radiofrequency ablation of the R knee.     The history is provided by the patient and the EMS personnel.   Review of patient's allergies indicates:   Allergen Reactions    Ciprofloxacin     Codeine Itching    Compazine [prochlorperazine]     Erythromycin     Keflex [cephalexin]      Past Medical History:   Diagnosis Date    Anxiety     Basal cell carcinoma 07/2017    R forehead and R temple    Crohn's disease     age 17    Encounter for blood transfusion     Short bowel syndrome     Vitamin B deficiency     Vitamin D deficiency      Past Surgical History:   Procedure Laterality Date    3 small bowel resections      APPENDECTOMY      CHOLECYSTECTOMY      COLONOSCOPY      COLONOSCOPY N/A 2/14/2017    Procedure: COLONOSCOPY;  Surgeon: Nela Sanchez MD;  Location: [facility] ([address] [place] [address]);  Service: Endoscopy;  Laterality: N/A;    COLONOSCOPY N/A 3/14/2017    Procedure: COLONOSCOPY;  Surgeon: Nela Sanchez MD;  Location: [facility]  ENDO (4TH FLR);  Service: Endoscopy;  Laterality: N/A;  2 days clear liquids before procedure    COLONOSCOPY N/A 2020    Procedure: COLONOSCOPY;  Surgeon: Andrea Shaver MD;  Location: [facility] ENDO;  Service: Endoscopy;  Laterality: N/A;    ESOPHAGOGASTRODUODENOSCOPY N/A 2019    Procedure: EGD (ESOPHAGOGASTRODUODENOSCOPY);  Surgeon: Andrea Shaver MD;  Location: [facility] ENDO;  Service: Endoscopy;  Laterality: N/A;    INJECTION OF ANESTHETIC AGENT AROUND NERVE Right 10/20/2020    Procedure: Block, Nerve genicular;  Surgeon: Alvin Curry MD;  [facility];  Service: Pain Management;  Laterality: Right;  right knee    KNEE SURGERY      RADIOFREQUENCY ABLATION Right 2021    Procedure: Radiofrequency Ablation Right Knee Genicular RFA Coolief;  Surgeon: Alvin Curry MD;  Location: [facility] OR;  Service: Pain Management;  Laterality: Right;  Right knee     SMALL INTESTINE SURGERY      TUBE THORACOTOMY      UPPER GASTROINTESTINAL ENDOSCOPY       Family History   Problem Relation Age of Onset    Diabetes Mother     Stroke Mother     Diabetes Father     Kidney disease Father     Irritable bowel syndrome Cousin     Celiac disease Neg Hx     Cirrhosis Neg Hx     Colon cancer Neg Hx     Colon polyps Neg Hx     Cystic fibrosis Neg Hx     Esophageal cancer Neg Hx     Crohn's disease Neg Hx     Hemochromatosis Neg Hx     Inflammatory bowel disease Neg Hx     Liver cancer Neg Hx     Liver disease Neg Hx     Rectal cancer Neg Hx     Stomach cancer Neg Hx     Ulcerative colitis Neg Hx     Addison's disease Neg Hx     Melanoma Neg Hx     Psoriasis Neg Hx     Lupus Neg Hx     Eczema Neg Hx      Social History     Tobacco Use    Smoking status: Former     Packs/day: 1.00     Years: 15.00     Pack years: 15.00     Types: Cigarettes     Quit date: 3/10/1995     Years since quittin.6    Smokeless tobacco: Never   Substance Use Topics    Alcohol use: Never     Alcohol/week: 2.0 standard drinks     Types: 1 Cans of beer, 1 Shots  of liquor per week    Drug use: No     Review of Systems   Constitutional:  Negative for activity change, diaphoresis and fever.   HENT:  Negative for drooling, rhinorrhea, sore throat and trouble swallowing.    Eyes:  Negative for pain and visual disturbance.   Respiratory:  Negative for cough, shortness of breath and stridor.    Cardiovascular:  Negative for chest pain and leg swelling.   Gastrointestinal:  Positive for diarrhea (chronic). Negative for abdominal distention, abdominal pain, constipation and vomiting.   Genitourinary:  Negative for dysuria, hematuria and penile discharge.   Musculoskeletal:  Positive for arthralgias (RLE). Negative for back pain, gait problem and neck pain.   Skin:  Negative for rash.   Neurological:  Positive for numbness (RLE). Negative for dizziness, seizures, syncope, facial asymmetry and headaches.   Psychiatric/Behavioral:  Negative for hallucinations and suicidal ideas.      Physical Exam     Initial Vitals [10/19/22 1440]   BP Pulse Resp Temp SpO2   (!) 148/78 68 18 98.4 °F (36.9 °C) 96 %      MAP       --         Physical Exam    Nursing note and vitals reviewed.  Constitutional: He appears well-developed. No distress.   HENT:   Head: Normocephalic and atraumatic.   Nose: Nose normal.   Eyes: EOM are normal.   Neck: Neck supple. No tracheal deviation present. No JVD present.   Cardiovascular:  Normal rate, regular rhythm, normal heart sounds and intact distal pulses.     Exam reveals no gallop and no friction rub.       No murmur heard.  Pulses:       Posterior tibial pulses are 2+ on the right side.   Pulmonary/Chest: Breath sounds normal. No respiratory distress. He has no wheezes. He has no rhonchi. He has no rales.   Abdominal: Abdomen is soft. Bowel sounds are normal. He exhibits no distension. There is no abdominal tenderness. There is no rebound and no guarding.   Musculoskeletal:         General: Normal range of motion.      Cervical back: Neck supple.      Right  hip: Deformity and tenderness present.      Comments: Patient has a shortened and externally rotated R hip.       Neurological: He is alert and oriented to person, place, and time. He has normal strength. No cranial nerve deficit or sensory deficit.   Skin: Skin is warm and dry. Capillary refill takes less than 2 seconds. No rash noted.   Psychiatric: He has a normal mood and affect.       ED Course   Procedures  Labs Reviewed   CBC W/ AUTO DIFFERENTIAL - Abnormal; Notable for the following components:       Result Value    RBC 4.01 (*)     Hemoglobin 12.4 (*)     Hematocrit 37.2 (*)     Lymph # 0.9 (*)     Gran % 82.3 (*)     Lymph % 9.7 (*)     All other components within normal limits   COMPREHENSIVE METABOLIC PANEL - Abnormal; Notable for the following components:    ALT 48 (*)     All other components within normal limits   SARS-COV-2 RNA AMPLIFICATION, QUAL          Imaging Results              X-Ray Femur 2 View Right (Final result)  Result time 10/19/22 15:40:34      Final result by Gurvinder Boothe MD (10/19/22 15:40:34)                   Narrative:    EXAMINATION:  XR FEMUR 2 VIEW RIGHT    CLINICAL HISTORY:  right hip injury;    TECHNIQUE:  AP and lateral views of the right femur were performed.    COMPARISON:  None    FINDINGS:  There is a mildly displaced inter trochanteric fracture of the right femur, with subtrochanteric extension.  Mild degenerative change right hip joint and knee joint.  Chondrocalcinosis at the knee.  Atherosclerosis.      Electronically signed by: Gurvinder Boothe  Date:    10/19/2022  Time:    15:40                                     X-Ray Knee 2 View Right (Final result)  Result time 10/19/22 15:42:21      Final result by Gurvinder Boothe MD (10/19/22 15:42:21)                   Narrative:    EXAMINATION:  XR KNEE 1 OR 2 VIEW RIGHT    CLINICAL HISTORY:  right hip injury;    TECHNIQUE:  AP and lateral views of the right knee were  performed.    COMPARISON:  05/25/2010    FINDINGS:  No acute fracture or malalignment.  Minimal tricompartmental degenerative change with chondrocalcinosis.  Trace knee joint fluid.  Atherosclerosis.  Radiopaque retained foreign body in the infrapatellar region measuring 9 mm in length.      Electronically signed by: Gurvinder Boothe  Date:    10/19/2022  Time:    15:42                                     X-Ray Pelvis Routine AP (Final result)  Result time 10/19/22 15:43:02      Final result by Gurvinder Boothe MD (10/19/22 15:43:02)                   Narrative:    EXAMINATION:  XR PELVIS ROUTINE AP    CLINICAL HISTORY:  right hip injury;    TECHNIQUE:  AP view of the pelvis was performed.    COMPARISON:  None    FINDINGS:  Right proximal femoral fracture is centered in the inter trochanteric region with subtrochanteric extension.  Fragments are mildly distracted.  Mild degenerative change right hip joint.  Partially imaged degenerative change lumbosacral junction.  Moderate degree of stool in the colon as can be seen with constipation.      Electronically signed by: Gurvinder Boothe  Date:    10/19/2022  Time:    15:43                                     Medications   acetaminophen tablet 1,000 mg (1,000 mg Oral Given 10/19/22 1610)   fentaNYL 50 mcg/mL injection 100 mcg (100 mcg Intravenous Given 10/19/22 1454)   HYDROmorphone (PF) injection 0.5 mg (0.5 mg Intravenous Given 10/19/22 1610)     Medical Decision Making:   History:   Old Medical Records: I decided to obtain old medical records.  Clinical Tests:   Lab Tests: Ordered and Reviewed  Radiological Study: Ordered and Reviewed  ED Management:  Workup: XR hip  Findings: Closed Hip Fracture without dislocation  Consult: Orthopedic Surgery, Dr. Selby, who recommends admission for further workup and management.    Patient does not currently demonstrate complications of fracture such as compartment syndrome, arterial or nerve injury.    Interventions:  analgesia  Disposition: Admit                Scribe Attestation:   Scribe #1: I performed the above scribed service and the documentation accurately describes the services I performed. I attest to the accuracy of the note.      ED Course as of 10/19/22 1830   Wed Oct 19, 2022   1550 X-Ray Pelvis Routine AP  FINDINGS:  Right proximal femoral fracture is centered in the inter trochanteric region with subtrochanteric extension.  Fragments are mildly distracted.  Mild degenerative change right hip joint.  Partially imaged degenerative change lumbosacral junction.  Moderate degree of stool in the colon as can be seen with constipation.        Electronically signed by: Gurvinder Boothe  Date:                                            10/19/2022  Time:                                           15:43 [BD]   1550 X-Ray Knee 2 View Right [BD]   1550 FINDINGS:  No acute fracture or malalignment.  Minimal tricompartmental degenerative change with chondrocalcinosis.  Trace knee joint fluid.  Atherosclerosis.  Radiopaque retained foreign body in the infrapatellar region measuring 9 mm in length.        Electronically signed by: Gurvinder Boothe  Date:                                            10/19/2022  Time:                                           15:42 [BD]   1550 X-Ray Femur 2 View Right [BD]   1550 FINDINGS:  There is a mildly displaced inter trochanteric fracture of the right femur, with subtrochanteric extension.  Mild degenerative change right hip joint and knee joint.  Chondrocalcinosis at the knee.  Atherosclerosis.        Electronically signed by: Gurvinder Boothe  Date:                                            10/19/2022  Time:                                           15:40 [BD]   1644 Discussed case with on-call Orthopedic, Dr. Selby, who agrees with plan for admission and recommends NPO at midnight for likely surgery in the morning. [BD]      ED Course User Index  [BD] Shaggy Hernandez MD                  Attending Attestation:     Physician Attestation for Scribe:    I, Dr. Shaggy Hernandez, personally performed the services described in this documentation.   All medical record entries made by the scribe were at my direction and in my presence.   I have reviewed the chart and agree that the record is accurate and complete.   Shaggy Hernandez MD  6:30 PM 10/19/2022     DISCLAIMER: This note was prepared with [employer] voice recognition transcription software. Garbled syntax, mangled pronouns, and other bizarre constructions may be attributed to that software system.      Clinical Impression:   Final diagnoses:  [S72.001A] Closed fracture of right hip, initial encounter (Primary)      ED Disposition Condition    Admit Stable                Shaggy Hernandez MD  10/19/22 9339

## 2022-10-19 NOTE — H&P
[facility] Emergency [facility] Medicine  History & Physical    Patient Name: Tristin Cerda  MRN: [id number]  Patient Class: IP- Inpatient  Admission Date: 10/19/2022  Attending Physician: Toyin Hart MD   Primary Care Provider: Tyler Smith MD         Patient information was obtained from patient, past medical records and ER records.     Subjective:     Principal Problem:Closed right hip fracture    Chief Complaint:   Chief Complaint   Patient presents with    Fall     Pt comes in via ems with c/o fall . Per pt he tripped and fell landing on R side. Pt denies dizziness or weakness prior to fall. Pt denies hitting head, no neck and back pain noted. Pt denies LOC. Pt has pain to R hip some shortening is noted.      Hip Pain        HPI: Tristin Cerda is a 67 y.o. male with a PMHx of Crohn's Dz and short bowel syndrome who presents to the ED via EMS for evaluation of R hip pain.  EMS reports the patient had a trip a fall to the R side while going to the bathroom PTA.  They state an obvious deformity to the RLE with shortening.  The patient confirms numbness over the RLE and chronic diarrhea, but denies a head injury, LOC, neck pain, back pain, dizziness, or any other symptoms at this time.  PSHx includes a tube thoracotomy and radiofrequency ablation of the R knee.     Xray: There is a mildly displaced inter trochanteric fracture of the right femur, with subtrochanteric extension.  Mild degenerative change right hip joint and knee joint.  Chondrocalcinosis at the knee.  Atherosclerosis.     Orthopedics consulted and plan for surgery tomorrow. Hospital medicine consulted for medical management.       Past Medical History:   Diagnosis Date    Anxiety     Basal cell carcinoma 07/2017    R forehead and R temple    Crohn's disease     age 17    Encounter for blood transfusion     Short bowel syndrome     Vitamin B deficiency     Vitamin D deficiency        Past Surgical History:    Procedure Laterality Date    3 small bowel resections      APPENDECTOMY      CHOLECYSTECTOMY      COLONOSCOPY      COLONOSCOPY N/A 2/14/2017    Procedure: COLONOSCOPY;  Surgeon: Nela Sanchez MD;  Location: [facility] ENDO (4TH FLR);  Service: Endoscopy;  Laterality: N/A;    COLONOSCOPY N/A 3/14/2017    Procedure: COLONOSCOPY;  Surgeon: Nela Sanchez MD;  Location: [facility] ENDO (4TH FLR);  Service: Endoscopy;  Laterality: N/A;  2 days clear liquids before procedure    COLONOSCOPY N/A 7/29/2020    Procedure: COLONOSCOPY;  Surgeon: Andrea Shaver MD;  Location: [facility] ENDO;  Service: Endoscopy;  Laterality: N/A;    ESOPHAGOGASTRODUODENOSCOPY N/A 11/19/2019    Procedure: EGD (ESOPHAGOGASTRODUODENOSCOPY);  Surgeon: Andrea Shaver MD;  Location: [facility];  Service: Endoscopy;  Laterality: N/A;    INJECTION OF ANESTHETIC AGENT AROUND NERVE Right 10/20/2020    Procedure: Block, Nerve genicular;  Surgeon: Alvin Curry MD;  Location: [facility] OR;  Service: Pain Management;  Laterality: Right;  right knee    KNEE SURGERY      RADIOFREQUENCY ABLATION Right 1/8/2021    Procedure: Radiofrequency Ablation Right Knee Genicular RFA [place];  Surgeon: Alvin Curry MD;  Location: [facility] OR;  Service: Pain Management;  Laterality: Right;  Right knee     SMALL INTESTINE SURGERY      TUBE THORACOTOMY      UPPER GASTROINTESTINAL ENDOSCOPY         Review of patient's allergies indicates:   Allergen Reactions    Ciprofloxacin     Codeine Itching    Compazine [prochlorperazine]     Erythromycin     Keflex [cephalexin]        Current Facility-Administered Medications on File Prior to Encounter   Medication    cyanocobalamin injection 1,000 mcg    lactated ringers infusion     Current Outpatient Medications on File Prior to Encounter   Medication Sig    aspirin (ECOTRIN) 81 MG EC tablet Take 81 mg by mouth once daily.     aspirin-acetaminophen-caffeine 250-250-65 mg (EXCEDRIN MIGRAINE) 250-250-65 mg per tablet Take 1 tablet by  mouth every 8 (eight) hours as needed for Pain.     colestipol (COLESTID) 1 gram Tab Take 1 tablet (1 g total) by mouth 2 (two) times daily.    cyanocobalamin 1,000 mcg/mL injection Inject 1 mL (1,000 mcg total) into the muscle every 30 days.    dicyclomine (BENTYL) 20 mg tablet TAKE 1 TABLET BY MOUTH FOUR TIMES DAILY    hydrocortisone 2.5 % cream Apply topically 2 (two) times daily.    meclizine (ANTIVERT) 12.5 mg tablet Take 1 tablet (12.5 mg total) by mouth 3 (three) times daily as needed for Dizziness.    multivitamin with minerals tablet Take 1 tablet by mouth once daily.    ondansetron (ZOFRAN) 8 MG tablet TAKE 1 TABLET EVERY 12 HOURS AS NEEDED FOR NAUSEA     Family History       Problem Relation (Age of Onset)    Diabetes Mother, Father    Irritable bowel syndrome Cousin    Kidney disease Father    Stroke Mother          Tobacco Use    Smoking status: Former     Packs/day: 1.00     Years: 15.00     Pack years: 15.00     Types: Cigarettes     Quit date: 3/10/1995     Years since quittin.6    Smokeless tobacco: Never   Substance and Sexual Activity    Alcohol use: Never     Alcohol/week: 2.0 standard drinks     Types: 1 Cans of beer, 1 Shots of liquor per week    Drug use: No    Sexual activity: Yes     Review of Systems   Constitutional: Negative.    HENT: Negative.     Eyes: Negative.    Respiratory: Negative.     Cardiovascular: Negative.    Gastrointestinal:  Positive for diarrhea.   Endocrine: Negative.    Genitourinary: Negative.    Musculoskeletal:  Positive for arthralgias.   Skin: Negative.    Neurological: Negative.    Psychiatric/Behavioral: Negative.     Objective:     Vital Signs (Most Recent):  Temp: 98.4 °F (36.9 °C) (10/19/22 1440)  Pulse: 69 (10/19/22 1602)  Resp: 18 (10/19/22 1610)  BP: (!) 148/67 (10/19/22 1602)  SpO2: 97 % (10/19/22 1602)   Vital Signs (24h Range):  Temp:  [98.4 °F (36.9 °C)] 98.4 °F (36.9 °C)  Pulse:  [67-69] 69  Resp:  [18] 18  SpO2:  [96 %-99 %] 97 %  BP:  (148-149)/(67-78) 148/67     Weight: 70.3 kg (155 lb)  Body mass index is 21.02 kg/m².    Physical Exam  Constitutional:       General: He is not in acute distress.  HENT:      Head: Normocephalic and atraumatic.      Mouth/Throat:      Mouth: Mucous membranes are moist.      Pharynx: Oropharynx is clear.   Eyes:      Extraocular Movements: Extraocular movements intact.   Neck:      Vascular: No carotid bruit.   Cardiovascular:      Rate and Rhythm: Normal rate and regular rhythm.      Pulses: Normal pulses.   Pulmonary:      Effort: Pulmonary effort is normal. No respiratory distress.      Breath sounds: No rhonchi.   Abdominal:      General: Abdomen is flat. Bowel sounds are normal. There is no distension.      Palpations: Abdomen is soft.      Tenderness: There is no abdominal tenderness.   Musculoskeletal:         General: Deformity and signs of injury present.      Cervical back: Normal range of motion and neck supple.   Skin:     General: Skin is warm and dry.      Capillary Refill: Capillary refill takes less than 2 seconds.      Findings: Bruising present.   Neurological:      General: No focal deficit present.      Mental Status: He is alert and oriented to person, place, and time.   Psychiatric:         Mood and Affect: Mood normal.         Behavior: Behavior normal.           Significant Labs: All pertinent labs within the past 24 hours have been reviewed.  CBC:   Recent Labs   Lab 10/19/22  1453   WBC 8.85   HGB 12.4*   HCT 37.2*        CMP:   Recent Labs   Lab 10/19/22  1453      K 3.8      CO2 27      BUN 11   CREATININE 0.8   CALCIUM 9.0   PROT 6.3   ALBUMIN 3.9   BILITOT 0.4   ALKPHOS 91   AST 39   ALT 48*   ANIONGAP 10     Coagulation: No results for input(s): PT, INR, APTT in the last 48 hours.  Magnesium: No results for input(s): MG in the last 48 hours.  POCT Glucose: No results for input(s): POCTGLUCOSE in the last 48 hours.  Urine Studies: No results for input(s):  COLORU, APPEARANCEUA, PHUR, SPECGRAV, PROTEINUA, GLUCUA, KETONESU, BILIRUBINUA, OCCULTUA, NITRITE, UROBILINOGEN, LEUKOCYTESUR, RBCUA, WBCUA, BACTERIA, SQUAMEPITHEL, HYALINECASTS in the last 48 hours.    Invalid input(s): ANNA    Significant Imaging: I have reviewed all pertinent imaging results/findings within the past 24 hours.    Assessment/Plan:     * Closed right hip fracture  S/p fall at home   ORthopedics consulted - plan for surgery tomorrow  Pain control   NPO after MN  DVT prophylaxis  PT/OT consulted for dc planning       Hypothyroidism  Will check TSH and FT4 - no home medications listed       B12 deficiency    Secondary to short gut syndrome  Resume B12 injections Qmonth    Short bowel syndrome  Diagnosed with Crohn's disease at 14 years of age and was treated with steroids with eventual colon resection leaving small portion of gut remaining. He was on TPN for 20 years and currently takes in a cautious diet with stool bulking products.  Resume cholestyramine QID        VTE Risk Mitigation (From admission, onward)         Ordered     IP VTE LOW RISK PATIENT  Once         10/19/22 1832     Place sequential compression device  Until discontinued         10/19/22 1832                   Toyin Hart MD  Department of Hospital Medicine   [facility] - Emergency Dept

## 2022-10-19 NOTE — ASSESSMENT & PLAN NOTE
Diagnosed with Crohn's disease at 14 years of age and was treated with steroids with eventual colon resection leaving small portion of gut remaining. He was on TPN for 20 years and currently takes in a cautious diet with stool bulking products.  Resume cholestyramine QID

## 2022-10-20 ENCOUNTER — ANESTHESIA EVENT (OUTPATIENT)
Dept: SURGERY | Facility: HOSPITAL | Age: 67
DRG: 481 | End: 2022-10-20
Payer: MEDICARE

## 2022-10-20 ENCOUNTER — ANESTHESIA (OUTPATIENT)
Dept: SURGERY | Facility: HOSPITAL | Age: 67
DRG: 481 | End: 2022-10-20
Payer: MEDICARE

## 2022-10-20 LAB
ALBUMIN SERPL BCP-MCNC: 3.5 G/DL (ref 3.5–5.2)
ALP SERPL-CCNC: 80 U/L (ref 55–135)
ALT SERPL W/O P-5'-P-CCNC: 38 U/L (ref 10–44)
ANION GAP SERPL CALC-SCNC: 11 MMOL/L (ref 8–16)
AST SERPL-CCNC: 29 U/L (ref 10–40)
BASOPHILS # BLD AUTO: 0.02 K/UL (ref 0–0.2)
BASOPHILS NFR BLD: 0.3 % (ref 0–1.9)
BILIRUB SERPL-MCNC: 0.6 MG/DL (ref 0.1–1)
BUN SERPL-MCNC: 15 MG/DL (ref 8–23)
CALCIUM SERPL-MCNC: 8.1 MG/DL (ref 8.7–10.5)
CHLORIDE SERPL-SCNC: 105 MMOL/L (ref 95–110)
CO2 SERPL-SCNC: 26 MMOL/L (ref 23–29)
CREAT SERPL-MCNC: 0.7 MG/DL (ref 0.5–1.4)
DIFFERENTIAL METHOD: ABNORMAL
EOSINOPHIL # BLD AUTO: 0 K/UL (ref 0–0.5)
EOSINOPHIL NFR BLD: 0.5 % (ref 0–8)
ERYTHROCYTE [DISTWIDTH] IN BLOOD BY AUTOMATED COUNT: 13.3 % (ref 11.5–14.5)
EST. GFR  (NO RACE VARIABLE): >60 ML/MIN/1.73 M^2
GLUCOSE SERPL-MCNC: 108 MG/DL (ref 70–110)
HCT VFR BLD AUTO: 31.9 % (ref 40–54)
HGB BLD-MCNC: 10.6 G/DL (ref 14–18)
IMM GRANULOCYTES # BLD AUTO: 0.01 K/UL (ref 0–0.04)
IMM GRANULOCYTES NFR BLD AUTO: 0.2 % (ref 0–0.5)
LYMPHOCYTES # BLD AUTO: 0.7 K/UL (ref 1–4.8)
LYMPHOCYTES NFR BLD: 11.1 % (ref 18–48)
MAGNESIUM SERPL-MCNC: 1.3 MG/DL (ref 1.6–2.6)
MCH RBC QN AUTO: 30.4 PG (ref 27–31)
MCHC RBC AUTO-ENTMCNC: 33.2 G/DL (ref 32–36)
MCV RBC AUTO: 91 FL (ref 82–98)
MONOCYTES # BLD AUTO: 0.5 K/UL (ref 0.3–1)
MONOCYTES NFR BLD: 9 % (ref 4–15)
NEUTROPHILS # BLD AUTO: 4.7 K/UL (ref 1.8–7.7)
NEUTROPHILS NFR BLD: 78.9 % (ref 38–73)
NRBC BLD-RTO: 0 /100 WBC
PHOSPHATE SERPL-MCNC: 3.5 MG/DL (ref 2.7–4.5)
PLATELET # BLD AUTO: 107 K/UL (ref 150–450)
PMV BLD AUTO: 11.2 FL (ref 9.2–12.9)
POTASSIUM SERPL-SCNC: 3.6 MMOL/L (ref 3.5–5.1)
PROT SERPL-MCNC: 5.6 G/DL (ref 6–8.4)
RBC # BLD AUTO: 3.49 M/UL (ref 4.6–6.2)
SODIUM SERPL-SCNC: 142 MMOL/L (ref 136–145)
T4 FREE SERPL-MCNC: 0.8 NG/DL (ref 0.71–1.51)
TSH SERPL DL<=0.005 MIU/L-ACNC: 1.78 UIU/ML (ref 0.4–4)
WBC # BLD AUTO: 6.01 K/UL (ref 3.9–12.7)

## 2022-10-20 PROCEDURE — 25000003 PHARM REV CODE 250: Performed by: ORTHOPAEDIC SURGERY

## 2022-10-20 PROCEDURE — 85025 COMPLETE CBC W/AUTO DIFF WBC: CPT | Performed by: HOSPITALIST

## 2022-10-20 PROCEDURE — 76942 ECHO GUIDE FOR BIOPSY: CPT | Mod: 26,,, | Performed by: ANESTHESIOLOGY

## 2022-10-20 PROCEDURE — 64450 PR NERVE BLOCK INJ, ANES/STEROID, OTHER PERIPHERAL: ICD-10-PCS | Mod: 59,RT,, | Performed by: ANESTHESIOLOGY

## 2022-10-20 PROCEDURE — 25000003 PHARM REV CODE 250: Performed by: HOSPITALIST

## 2022-10-20 PROCEDURE — 71000039 HC RECOVERY, EACH ADD'L HOUR: Performed by: ORTHOPAEDIC SURGERY

## 2022-10-20 PROCEDURE — 83735 ASSAY OF MAGNESIUM: CPT | Performed by: HOSPITALIST

## 2022-10-20 PROCEDURE — 37000009 HC ANESTHESIA EA ADD 15 MINS: Performed by: ORTHOPAEDIC SURGERY

## 2022-10-20 PROCEDURE — 76942 ECHO GUIDE FOR BIOPSY: CPT | Performed by: ANESTHESIOLOGY

## 2022-10-20 PROCEDURE — 84100 ASSAY OF PHOSPHORUS: CPT | Performed by: HOSPITALIST

## 2022-10-20 PROCEDURE — 80053 COMPREHEN METABOLIC PANEL: CPT | Performed by: HOSPITALIST

## 2022-10-20 PROCEDURE — 63600175 PHARM REV CODE 636 W HCPCS: Performed by: ANESTHESIOLOGY

## 2022-10-20 PROCEDURE — 63600175 PHARM REV CODE 636 W HCPCS: Performed by: ORTHOPAEDIC SURGERY

## 2022-10-20 PROCEDURE — 63600175 PHARM REV CODE 636 W HCPCS: Performed by: NURSE PRACTITIONER

## 2022-10-20 PROCEDURE — 84439 ASSAY OF FREE THYROXINE: CPT | Performed by: HOSPITALIST

## 2022-10-20 PROCEDURE — 36000710: Performed by: ORTHOPAEDIC SURGERY

## 2022-10-20 PROCEDURE — C1769 GUIDE WIRE: HCPCS | Performed by: ORTHOPAEDIC SURGERY

## 2022-10-20 PROCEDURE — 63600175 PHARM REV CODE 636 W HCPCS

## 2022-10-20 PROCEDURE — 94799 UNLISTED PULMONARY SVC/PX: CPT

## 2022-10-20 PROCEDURE — 36415 COLL VENOUS BLD VENIPUNCTURE: CPT | Performed by: HOSPITALIST

## 2022-10-20 PROCEDURE — 36000711: Performed by: ORTHOPAEDIC SURGERY

## 2022-10-20 PROCEDURE — 71000033 HC RECOVERY, INTIAL HOUR: Performed by: ORTHOPAEDIC SURGERY

## 2022-10-20 PROCEDURE — 76942 PR U/S GUIDANCE FOR NEEDLE GUIDANCE: ICD-10-PCS | Mod: 26,,, | Performed by: ANESTHESIOLOGY

## 2022-10-20 PROCEDURE — 25000003 PHARM REV CODE 250: Performed by: ANESTHESIOLOGY

## 2022-10-20 PROCEDURE — D9220A PRA ANESTHESIA: Mod: CRNA,,, | Performed by: NURSE ANESTHETIST, CERTIFIED REGISTERED

## 2022-10-20 PROCEDURE — D9220A PRA ANESTHESIA: Mod: ANES,,, | Performed by: ANESTHESIOLOGY

## 2022-10-20 PROCEDURE — 27201423 OPTIME MED/SURG SUP & DEVICES STERILE SUPPLY: Performed by: ORTHOPAEDIC SURGERY

## 2022-10-20 PROCEDURE — 25000003 PHARM REV CODE 250: Performed by: NURSE ANESTHETIST, CERTIFIED REGISTERED

## 2022-10-20 PROCEDURE — 63600175 PHARM REV CODE 636 W HCPCS: Performed by: NURSE ANESTHETIST, CERTIFIED REGISTERED

## 2022-10-20 PROCEDURE — D9220A PRA ANESTHESIA: ICD-10-PCS | Mod: CRNA,,, | Performed by: NURSE ANESTHETIST, CERTIFIED REGISTERED

## 2022-10-20 PROCEDURE — 27245 TREAT THIGH FRACTURE: CPT | Mod: RT,,, | Performed by: ORTHOPAEDIC SURGERY

## 2022-10-20 PROCEDURE — C1713 ANCHOR/SCREW BN/BN,TIS/BN: HCPCS | Performed by: ORTHOPAEDIC SURGERY

## 2022-10-20 PROCEDURE — 64447 NJX AA&/STRD FEMORAL NRV IMG: CPT | Performed by: ANESTHESIOLOGY

## 2022-10-20 PROCEDURE — 63600175 PHARM REV CODE 636 W HCPCS: Performed by: HOSPITALIST

## 2022-10-20 PROCEDURE — 64450 NJX AA&/STRD OTHER PN/BRANCH: CPT | Mod: 59,RT,, | Performed by: ANESTHESIOLOGY

## 2022-10-20 PROCEDURE — 27245 PR OPEN FIX INTER/SUBTROCH FX,IMPLNT: ICD-10-PCS | Mod: RT,,, | Performed by: ORTHOPAEDIC SURGERY

## 2022-10-20 PROCEDURE — 27200750 HC INSULATED NEEDLE/ STIMUPLEX: Performed by: ANESTHESIOLOGY

## 2022-10-20 PROCEDURE — 94761 N-INVAS EAR/PLS OXIMETRY MLT: CPT

## 2022-10-20 PROCEDURE — 84443 ASSAY THYROID STIM HORMONE: CPT | Performed by: HOSPITALIST

## 2022-10-20 PROCEDURE — D9220A PRA ANESTHESIA: ICD-10-PCS | Mod: ANES,,, | Performed by: ANESTHESIOLOGY

## 2022-10-20 PROCEDURE — 37000008 HC ANESTHESIA 1ST 15 MINUTES: Performed by: ORTHOPAEDIC SURGERY

## 2022-10-20 PROCEDURE — 12000002 HC ACUTE/MED SURGE SEMI-PRIVATE ROOM

## 2022-10-20 DEVICE — NAIL IM CANN 130 DEG 11X420 R: Type: IMPLANTABLE DEVICE | Site: FEMUR | Status: FUNCTIONAL

## 2022-10-20 DEVICE — SCREW BONE LOCK T25 5X48MM: Type: IMPLANTABLE DEVICE | Site: FEMUR | Status: FUNCTIONAL

## 2022-10-20 DEVICE — SCREW FEM NECK PERF GOLD 110MM: Type: IMPLANTABLE DEVICE | Site: FEMUR | Status: FUNCTIONAL

## 2022-10-20 RX ORDER — FENTANYL CITRATE 50 UG/ML
INJECTION, SOLUTION INTRAMUSCULAR; INTRAVENOUS
Status: DISCONTINUED | OUTPATIENT
Start: 2022-10-20 | End: 2022-10-20

## 2022-10-20 RX ORDER — MIDAZOLAM HYDROCHLORIDE 1 MG/ML
INJECTION INTRAMUSCULAR; INTRAVENOUS
Status: DISCONTINUED | OUTPATIENT
Start: 2022-10-20 | End: 2022-10-20

## 2022-10-20 RX ORDER — OXYCODONE HYDROCHLORIDE 5 MG/1
5 TABLET ORAL ONCE AS NEEDED
Status: COMPLETED | OUTPATIENT
Start: 2022-10-20 | End: 2022-10-20

## 2022-10-20 RX ORDER — SODIUM CHLORIDE 0.9 % (FLUSH) 0.9 %
10 SYRINGE (ML) INJECTION
Status: DISCONTINUED | OUTPATIENT
Start: 2022-10-20 | End: 2022-10-22 | Stop reason: HOSPADM

## 2022-10-20 RX ORDER — ROCURONIUM BROMIDE 10 MG/ML
INJECTION, SOLUTION INTRAVENOUS
Status: DISCONTINUED | OUTPATIENT
Start: 2022-10-20 | End: 2022-10-20

## 2022-10-20 RX ORDER — DEXAMETHASONE SODIUM PHOSPHATE 4 MG/ML
INJECTION, SOLUTION INTRA-ARTICULAR; INTRALESIONAL; INTRAMUSCULAR; INTRAVENOUS; SOFT TISSUE
Status: DISCONTINUED | OUTPATIENT
Start: 2022-10-20 | End: 2022-10-20

## 2022-10-20 RX ORDER — CEFAZOLIN SODIUM 2 G/50ML
2 SOLUTION INTRAVENOUS
Status: COMPLETED | OUTPATIENT
Start: 2022-10-20 | End: 2022-10-21

## 2022-10-20 RX ORDER — KETOROLAC TROMETHAMINE 30 MG/ML
INJECTION, SOLUTION INTRAMUSCULAR; INTRAVENOUS
Status: DISCONTINUED | OUTPATIENT
Start: 2022-10-20 | End: 2022-10-20

## 2022-10-20 RX ORDER — CLINDAMYCIN PHOSPHATE 900 MG/50ML
900 INJECTION, SOLUTION INTRAVENOUS
Status: COMPLETED | OUTPATIENT
Start: 2022-10-20 | End: 2022-10-20

## 2022-10-20 RX ORDER — PROPOFOL 10 MG/ML
VIAL (ML) INTRAVENOUS
Status: DISCONTINUED | OUTPATIENT
Start: 2022-10-20 | End: 2022-10-20

## 2022-10-20 RX ORDER — MAGNESIUM SULFATE HEPTAHYDRATE 40 MG/ML
2 INJECTION, SOLUTION INTRAVENOUS ONCE
Status: COMPLETED | OUTPATIENT
Start: 2022-10-20 | End: 2022-10-20

## 2022-10-20 RX ORDER — LIDOCAINE HCL/PF 100 MG/5ML
SYRINGE (ML) INTRAVENOUS
Status: DISCONTINUED | OUTPATIENT
Start: 2022-10-20 | End: 2022-10-20

## 2022-10-20 RX ORDER — TRAMADOL HYDROCHLORIDE 50 MG/1
50 TABLET ORAL EVERY 6 HOURS PRN
COMMUNITY
End: 2022-12-29 | Stop reason: SDUPTHER

## 2022-10-20 RX ORDER — ONDANSETRON 2 MG/ML
4 INJECTION INTRAMUSCULAR; INTRAVENOUS ONCE AS NEEDED
Status: COMPLETED | OUTPATIENT
Start: 2022-10-20 | End: 2022-10-20

## 2022-10-20 RX ORDER — BUPIVACAINE HYDROCHLORIDE 2.5 MG/ML
INJECTION, SOLUTION EPIDURAL; INFILTRATION; INTRACAUDAL
Status: DISCONTINUED | OUTPATIENT
Start: 2022-10-20 | End: 2022-10-20

## 2022-10-20 RX ORDER — ONDANSETRON HYDROCHLORIDE 2 MG/ML
INJECTION, SOLUTION INTRAMUSCULAR; INTRAVENOUS
Status: DISCONTINUED | OUTPATIENT
Start: 2022-10-20 | End: 2022-10-20

## 2022-10-20 RX ORDER — FENTANYL CITRATE 50 UG/ML
25 INJECTION, SOLUTION INTRAMUSCULAR; INTRAVENOUS EVERY 5 MIN PRN
Status: DISCONTINUED | OUTPATIENT
Start: 2022-10-20 | End: 2022-10-20 | Stop reason: HOSPADM

## 2022-10-20 RX ORDER — MAGNESIUM SULFATE 1 G/100ML
1 INJECTION INTRAVENOUS ONCE
Status: COMPLETED | OUTPATIENT
Start: 2022-10-20 | End: 2022-10-20

## 2022-10-20 RX ORDER — SUCCINYLCHOLINE CHLORIDE 20 MG/ML
INJECTION INTRAMUSCULAR; INTRAVENOUS
Status: DISCONTINUED | OUTPATIENT
Start: 2022-10-20 | End: 2022-10-20

## 2022-10-20 RX ORDER — ACETAMINOPHEN 10 MG/ML
INJECTION, SOLUTION INTRAVENOUS
Status: DISCONTINUED | OUTPATIENT
Start: 2022-10-20 | End: 2022-10-20

## 2022-10-20 RX ADMIN — MORPHINE SULFATE 2 MG: 2 INJECTION, SOLUTION INTRAMUSCULAR; INTRAVENOUS at 12:10

## 2022-10-20 RX ADMIN — GLYCOPYRROLATE 0.1 MG: 0.2 INJECTION, SOLUTION INTRAMUSCULAR; INTRAVITREAL at 10:10

## 2022-10-20 RX ADMIN — ACETAMINOPHEN 1000 MG: 10 INJECTION, SOLUTION INTRAVENOUS at 11:10

## 2022-10-20 RX ADMIN — DICYCLOMINE HYDROCHLORIDE 20 MG: 20 TABLET ORAL at 08:10

## 2022-10-20 RX ADMIN — ROCURONIUM BROMIDE 10 MG: 10 INJECTION, SOLUTION INTRAVENOUS at 11:10

## 2022-10-20 RX ADMIN — LIDOCAINE HYDROCHLORIDE 75 MG: 20 INJECTION INTRAVENOUS at 11:10

## 2022-10-20 RX ADMIN — MAGNESIUM SULFATE 1 G: 1 INJECTION INTRAVENOUS at 04:10

## 2022-10-20 RX ADMIN — CLINDAMYCIN PHOSPHATE 900 MG: 18 INJECTION, SOLUTION INTRAVENOUS at 11:10

## 2022-10-20 RX ADMIN — MORPHINE SULFATE 4 MG: 4 INJECTION INTRAVENOUS at 03:10

## 2022-10-20 RX ADMIN — FENTANYL CITRATE 50 MCG: 50 INJECTION, SOLUTION INTRAMUSCULAR; INTRAVENOUS at 11:10

## 2022-10-20 RX ADMIN — ONDANSETRON 4 MG: 2 INJECTION, SOLUTION INTRAMUSCULAR; INTRAVENOUS at 10:10

## 2022-10-20 RX ADMIN — PROPOFOL 100 MG: 10 INJECTION, EMULSION INTRAVENOUS at 11:10

## 2022-10-20 RX ADMIN — SODIUM CHLORIDE, SODIUM GLUCONATE, SODIUM ACETATE, POTASSIUM CHLORIDE, MAGNESIUM CHLORIDE, SODIUM PHOSPHATE, DIBASIC, AND POTASSIUM PHOSPHATE: .53; .5; .37; .037; .03; .012; .00082 INJECTION, SOLUTION INTRAVENOUS at 10:10

## 2022-10-20 RX ADMIN — CEFAZOLIN SODIUM 2 G: 2 SOLUTION INTRAVENOUS at 08:10

## 2022-10-20 RX ADMIN — CHOLESTYRAMINE 4 G: 4 POWDER, FOR SUSPENSION ORAL at 08:10

## 2022-10-20 RX ADMIN — FENTANYL CITRATE 50 MCG: 50 INJECTION, SOLUTION INTRAMUSCULAR; INTRAVENOUS at 12:10

## 2022-10-20 RX ADMIN — ONDANSETRON 8 MG: 4 TABLET, ORALLY DISINTEGRATING ORAL at 07:10

## 2022-10-20 RX ADMIN — KETOROLAC TROMETHAMINE 30 MG: 30 INJECTION, SOLUTION INTRAMUSCULAR; INTRAVENOUS at 12:10

## 2022-10-20 RX ADMIN — ONDANSETRON 4 MG: 2 INJECTION INTRAMUSCULAR; INTRAVENOUS at 01:10

## 2022-10-20 RX ADMIN — OXYCODONE 5 MG: 5 TABLET ORAL at 01:10

## 2022-10-20 RX ADMIN — MAGNESIUM SULFATE 2 G: 2 INJECTION INTRAVENOUS at 05:10

## 2022-10-20 RX ADMIN — ONDANSETRON 4 MG: 2 INJECTION, SOLUTION INTRAMUSCULAR; INTRAVENOUS at 12:10

## 2022-10-20 RX ADMIN — FENTANYL CITRATE 25 MCG: 50 INJECTION, SOLUTION INTRAMUSCULAR; INTRAVENOUS at 01:10

## 2022-10-20 RX ADMIN — DICYCLOMINE HYDROCHLORIDE 20 MG: 20 TABLET ORAL at 04:10

## 2022-10-20 RX ADMIN — BUPIVACAINE HYDROCHLORIDE 30 ML: 2.5 INJECTION, SOLUTION EPIDURAL; INFILTRATION; INTRACAUDAL; PERINEURAL at 10:10

## 2022-10-20 RX ADMIN — MIDAZOLAM HYDROCHLORIDE 1 MG: 1 INJECTION, SOLUTION INTRAMUSCULAR; INTRAVENOUS at 10:10

## 2022-10-20 RX ADMIN — SUCCINYLCHOLINE CHLORIDE 120 MG: 20 INJECTION, SOLUTION INTRAMUSCULAR; INTRAVENOUS; PARENTERAL at 11:10

## 2022-10-20 RX ADMIN — DEXAMETHASONE SODIUM PHOSPHATE 8 MG: 4 INJECTION, SOLUTION INTRA-ARTICULAR; INTRALESIONAL; INTRAMUSCULAR; INTRAVENOUS; SOFT TISSUE at 11:10

## 2022-10-20 RX ADMIN — FENTANYL CITRATE 50 MCG: 50 INJECTION, SOLUTION INTRAMUSCULAR; INTRAVENOUS at 10:10

## 2022-10-20 RX ADMIN — APIXABAN 2.5 MG: 2.5 TABLET, FILM COATED ORAL at 08:10

## 2022-10-20 RX ADMIN — DICYCLOMINE HYDROCHLORIDE 20 MG: 20 TABLET ORAL at 12:10

## 2022-10-20 NOTE — ANESTHESIA PREPROCEDURE EVALUATION
10/20/2022  Tristin Cerda is a 67 y.o., male.      Pre-op Assessment    I have reviewed the Patient Summary Reports.     I have reviewed the Nursing Notes. I have reviewed the NPO Status.   I have reviewed the Medications.     Review of Systems  Anesthesia Hx:  No problems with previous Anesthesia    Social:  Former Smoker    Hepatic/GI:   Hiatal Hernia, Liver Disease,    Musculoskeletal:   Right hip fracture    Endocrine:   Hypothyroidism    Psych:   anxiety          Physical Exam  General: Well nourished, Cooperative, Alert and Oriented    Airway:  Mallampati: II   Mouth Opening: Normal  TM Distance: Normal  Neck ROM: Normal ROM    Dental:  Edentulous        Anesthesia Plan  Type of Anesthesia, risks & benefits discussed:    Anesthesia Type: Gen ETT  Intra-op Monitoring Plan: Standard ASA Monitors  Post Op Pain Control Plan: multimodal analgesia, IV/PO Opioids PRN and peripheral nerve block  Induction:  IV  Airway Plan: Direct, Post-Induction  Informed Consent: Informed consent signed with the Patient and all parties understand the risks and agree with anesthesia plan.  All questions answered.   ASA Score: 2  Day of Surgery Review of History & Physical: H&P Update referred to the surgeon/provider.    Ready For Surgery From Anesthesia Perspective.     .

## 2022-10-20 NOTE — HOSPITAL COURSE
Patient admitted with right hip fracture s/p fall. Underwent surgery with intramedullary nailing 10/20/22. Post-operative did well. PT/OT eval recommended home with home health with PT/OT. Pain well-controlled. Patient has short gut syndrome with chronic loose stools, now currently on pain meds and counseled him that we have to be careful with constipation. Ortho recommended anticoagulation for 1 month. By time of discharge the patient was tolerating a regular diet with improving admission symptoms.    Physical Exam on day of discharge:  General - Patient alert and oriented x3 in NAD  CV - Regular rate and rhythm, No Murmur/monika/rubs  Resp - Lungs CTA Bilaterally, No increased WOB  GI - BS normoactive, soft, non-tender/non-distended, no HSM  Extrem-  Right hip with dressing c/d/i. No cyanosis, clubbing, edema.   Skin -  No masses, rashes or lesions noted on cursory skin exam.

## 2022-10-20 NOTE — OP NOTE
OPERATIVE NOTE    Date of Surgery:  10/20/2022     Pre-operative Diagnosis: Right  intertrochanteric femur fracture    Post-operative Diagnosis: Right  intertrochanteric femur fracture    Procedure:   Intramedullary nailing Right hip     Surgeon:   Gómez Selby M.D.    Asst:    Emeka Suarez, Certified First Assist    He was present and scrubbed for the entirety of the procedure. They were instrumental in patient positioning, insertion of implants and retractions. Without him/her I would have been unable to safely perform the case.     Anesthesia: General    EBL:  125 cc    Specimens: None    Findings: IT fx with subtroch extension     Dispo:  To PACU extubated/stable    Implants:    Implant Name Type Inv. Item Serial No.  Lot No. LRB No. Used Action   SRINIVASA REAM W/BL TP 2.5MM D 950 - [id number]  SRINIVASA REAM W/BL TP 2.5MM D 950  [employer] [id number] Right 1 Implanted and Explanted   NAIL IM JARRELL 130 DEG [id number] R - [id number]  NAIL IM JARRELL 130 DEG [id number] R  [id number] [id number] Right 1 Implanted   SCREW FEM NECK PERF GOLD 110MM - [id number]  SCREW FEM NECK PERF GOLD 110MM  [employer] [id number] Right 1 Implanted           Indications for Procedure:    The patient is a 67 Male who sustained a ground level fall resulting in a right intertrochanteric femur fracture.  We are proceeding to the operating room for cephalomedullary nail fixation.  The risks, benefits and alternatives to surgery were discussed with the patient and family at length prior to going to the operating room.  Informed consent was obtained for fixation.  The patient was optimized by Internal Medicine prior to going to the operating room.    Procedure in Detail:    The patient was identified in the preoperative holding area and the site was marked.   Patient was wheeled into the operating room and general endotracheal anesthesia was induced on the patient's hospital bed.  Preoperative antibiotics were administered.  The patient was placed onto the  [place] fracture table with all bony prominences well padded and both lower extremities in traction boots.  A time-out was undertaken to confirm patient, side, site, surgery, surgeon and the administration of preoperative antibiotics.  All agreed and we proceeded.    Closed reduction maneuvers were performed on the table gaining good alignment.  The right  hip and lower extremity were prepped and draped in sterile fashion.  A starting incision was made proximal to the greater trochanter and the guidewire for the entry reamer was placed in the appropriate position.  Entry reamer was then used.  A guide joselito was passed distally and measurement undertaken.  The canal was then reamed with a 12.5 mm reamer through the isthmus.  A size 11 x 420 Synthes TFN nail was placed over the guidewire and seated down into the canal. X-ray was checked of the lateral distal femur to make sure we were not too anterior.     Attention was then turned proximally to the hip and the nail seated at its final position.  A guidewire for the hip screw was then placed in a center center position in the femoral head under fluoroscopic guidance.  This was then reamed and measured and a 110 mm hip screw was placed gaining good compression through the insertion handle.  Final position was confirmed under fluoroscopy and insertion handle was removed.     Attention was then turned distally and a single distal interlocking screw was placed without difficulty.  The wounds were then copiously irrigated with normal saline solution and the proximal wound closed with 0 Vicryl suture the fascia, and all wounds closed with 2-0 Vicryl suture in the subcutaneous tissue, followed by staples in the skin.  Sterile dressings were applied.    All instrument and sponge counts were reported correct at the end of the case.  There were no complications.  The patient was returned to the hospital bed, extubated, awakened and taken to the recovery room in stable  condition.    Disposition:     Immediate physical and occupational therapy (weight bearing status: WBAT)   Multimodal pain management limiting narcotics.  Anticoagulation x1 month.  Follow up in 2-3 weeks with X-rays of the hip and wound check.

## 2022-10-20 NOTE — PLAN OF CARE
Patient transferred to room from PACU. Vitals stable. Dressing to RLE clean dry and intact. Plan of care reviewed with patient, verbalized understanding. Bed in lowest position and call light within reach. Family at bedside.

## 2022-10-20 NOTE — PT/OT/SLP PROGRESS
Occupational Therapy      Patient Name:  Tristin Cerda   MRN:  [id number]    Patient not seen today secondary to Other (Comment) (Patient s/p fall sustaining R femur fracture; patient to have surgery today. Will need new OT orders following surgery.)    10/20/2022

## 2022-10-20 NOTE — PLAN OF CARE
POC discussed with patient, verbalized understanding. NPO for surgery today.medicated X 2 with IV Morphine for complaints of pain to R hip. NV wnl. Voids per urinal. Patient without complaints. No request voiced.

## 2022-10-20 NOTE — BRIEF OP NOTE
[facility]  Brief Operative Note    SUMMARY     Surgery Date: 10/20/2022     Surgeon(s) and Role:     * Gómez Selby MD - Primary    Assisting Surgeon: None    Pre-op Diagnosis:  Closed fracture of right hip, initial encounter [S72.001A]    Post-op Diagnosis:  Post-Op Diagnosis Codes:     * Closed fracture of right hip, initial encounter [S72.001A]    Procedure(s) (LRB):  INSERTION, INTRAMEDULLARY SRINIVASA, FEMUR (Right)    Anesthesia: General    Operative Findings: intertroch with subtroch extension     Estimated Blood Loss: 125cc          Specimens:   Specimen (24h ago, onward)      None            [id number]

## 2022-10-20 NOTE — ANESTHESIA POSTPROCEDURE EVALUATION
Anesthesia Post Evaluation    Patient: Tristin Cerda    Procedure(s) Performed: Procedure(s) (LRB):  INSERTION, INTRAMEDULLARY SRINIVASA, FEMUR (Right)    Final Anesthesia Type: general      Patient location during evaluation: PACU  Patient participation: Yes- Able to Participate  Level of consciousness: awake and alert  Post-procedure vital signs: reviewed and stable  Pain management: adequate  Airway patency: patent    PONV status at discharge: No PONV  Anesthetic complications: no      Cardiovascular status: hemodynamically stable  Respiratory status: unassisted and room air  Hydration status: euvolemic  Follow-up not needed.          Vitals Value Taken Time   /69 10/20/22 1356   Temp 36.7 °C (98 °F) 10/20/22 1356   Pulse 72 10/20/22 1356   Resp 17 10/20/22 1340   SpO2 95 % 10/20/22 1356   Vitals shown include unvalidated device data.      Event Time   Out of Recovery 10/20/2022 13:55:00         Pain/Selena Score: Pain Rating Prior to Med Admin: 3 (10/20/2022  1:35 PM)  Pain Rating Post Med Admin: 4 (10/20/2022  1:25 PM)  Selena Score: 10 (10/20/2022  1:35 PM)

## 2022-10-20 NOTE — ANESTHESIA PROCEDURE NOTES
Peripheral Block    Patient location during procedure: pre-op   Block not for primary anesthetic.  Reason for block: at surgeon's request and post-op pain management   Post-op Pain Location: right hip   Start time: 10/20/2022 10:50 AM  Timeout: 10/20/2022 10:50 AM   End time: 10/20/2022 10:55 AM    Staffing  Authorizing Provider: Diogo Ribeiro MD  Performing Provider: Diogo Ribeiro MD    Preanesthetic Checklist  Completed: patient identified, IV checked, site marked, risks and benefits discussed, surgical consent, monitors and equipment checked, pre-op evaluation and timeout performed  Peripheral Block  Patient position: supine  Prep: ChloraPrep  Patient monitoring: heart rate, cardiac monitor, continuous pulse ox, continuous capnometry and frequent blood pressure checks  Block type: fascia iliaca  Laterality: right  Injection technique: single shot  Needle  Needle type: Stimuplex   Needle gauge: 22 G  Needle length: 4 in  Needle localization: ultrasound guidance and anatomical landmarks   -ultrasound image captured on disc.  Assessment  Injection assessment: negative aspiration, negative parasthesia and local visualized surrounding nerve  Paresthesia pain: none  Heart rate change: no  Slow fractionated injection: yes  Pain Tolerance: comfortable throughout block and no complaints  Medications:    Medications: bupivacaine (pf) (MARCAINE) injection 0.25% - Perineural   30 mL - 10/20/2022 10:55:00 AM    Additional Notes  VSS.  [facility] RN monitoring vitals throughout procedure.  Patient tolerated procedure well.

## 2022-10-20 NOTE — PLAN OF CARE
[facility]  Initial Discharge Assessment       Primary Care Provider: Tyler Smith MD    Admission Diagnosis: Chest pain [R07.9]  Closed fracture of right hip, initial encounter [S72.001A]    Admission Date: 10/19/2022  Expected Discharge Date: 10/21/2022    Pt and wife answered discharge assessment questions, verified PCP, pharmacy and information on facesheet.  No HH, DME.  Spouse will drive pt home.  PT/OT to eval, pending discharge recommendations.    Discharge Barriers Identified: None    Payor: [employer] MANAGED MEDICARE / Plan: [employer] MEDICARE HMO / Product Type: Capitation /     Extended Emergency Contact Information  Primary Emergency Contact: Kandice Cerda  Address: [address] [place] [address] [facility]  Home Phone: [phone]  Mobile Phone: [phone]  Relation: Spouse  Preferred language: English   needed? No  Secondary Emergency Contact: ODETTE HILLS  Mobile Phone: [phone]  Relation: Daughter  Preferred language: English   needed? No    Discharge Plan A: Home Health  Discharge Plan B: Skilled Nursing Facility      [id number] - [place] [address]  [address]  [facility] [address]  Phone: [phone] Fax: [phone]    [facility] - [facility] [address]  [address] [facility] [address]  Phone: [phone] Fax: [phone]      Initial Assessment (most recent)       Adult Discharge Assessment - 10/20/22 1635          Discharge Assessment    Assessment Type Discharge Planning Assessment     Confirmed/corrected address, phone number and insurance Yes     Confirmed Demographics Correct on Facesheet     Source of Information patient     Communicated ROBERT with patient/caregiver No     Lives With child(martin), adult;spouse     Do you expect to return to your current living situation? Yes     Prior to hospitilization  cognitive status: Alert/Oriented     Current cognitive status: Alert/Oriented     Walking or Climbing Stairs Difficulty none     Dressing/Bathing Difficulty none     Home Accessibility not wheelchair accessible     Home Layout Able to live on 1st floor     Equipment Currently Used at Home none     Readmission within 30 days? No     Patient currently being followed by outpatient case management? No     Do you currently have service(s) that help you manage your care at home? No     Do you take prescription medications? Yes     Do you have prescription coverage? Yes     Do you have any problems affording any of your prescribed medications? No     Is the patient taking medications as prescribed? yes     Who is going to help you get home at discharge? spouse     How do you get to doctors appointments? car, drives self     Are you on dialysis? No     Do you take coumadin? No     Discharge Plan A Home Health     Discharge Plan B Skilled Nursing Facility     DME Needed Upon Discharge  bedside commode;walker, rolling;shower chair;bath bench     Discharge Plan discussed with: Patient;Spouse/sig other     Discharge Barriers Identified None        Physical Activity    On average, how many days per week do you engage in moderate to strenuous exercise (like a brisk walk)? 0 days     On average, how many minutes do you engage in exercise at this level? 0 min        Financial Resource Strain    How hard is it for you to pay for the very basics like food, housing, medical care, and heating? Not hard at all        Housing Stability    In the last 12 months, was there a time when you were not able to pay the mortgage or rent on time? No     In the last 12 months, was there a time when you did not have a steady place to sleep or slept in a shelter (including now)? No        Transportation Needs    In the past 12 months, has lack of transportation kept you from medical appointments or from getting medications? No     In the past 12  months, has lack of transportation kept you from meetings, work, or from getting things needed for daily living? No        Food Insecurity    Within the past 12 months, you worried that your food would run out before you got the money to buy more. Never true     Within the past 12 months, the food you bought just didn't last and you didn't have money to get more. Never true        Social Connections    In a typical week, how many times do you talk on the phone with family, friends, or neighbors? More than three times a week     How often do you attend Sabianist or Restoration services? Never     Do you belong to any clubs or organizations such as Sabianist groups, unions, fraternal or athletic groups, or school groups? No     How often do you attend meetings of the clubs or organizations you belong to? Never     Are you , , , , never , or living with a partner?         Alcohol Use    Q1: How often do you have a drink containing alcohol? Never

## 2022-10-20 NOTE — PLAN OF CARE
Plan of care discussed with pt. Patient verbalizes understanding. Voiding yellow urine into urinal. Dressings CDI. No complaints of pain, nausea or vomiting. Tolerating diet well. Bed in lowest position, wheels locked. Call light within reach.

## 2022-10-20 NOTE — PLAN OF CARE
Report to Janee. Patient denies pain,  no nausea, dressing to right femur dry intact no drainage, vs stable resting comfortably wife in room

## 2022-10-20 NOTE — SUBJECTIVE & OBJECTIVE
Interval History: post surgery no complaints     Review of Systems   Constitutional: Negative.    HENT: Negative.     Respiratory: Negative.     Cardiovascular: Negative.    Gastrointestinal: Negative.    Genitourinary: Negative.    Neurological: Negative.    Psychiatric/Behavioral: Negative.     Objective:     Vital Signs (Most Recent):  Temp: 98 °F (36.7 °C) (10/20/22 1356)  Pulse: 72 (10/20/22 1356)  Resp: (!) 29 (10/20/22 1340)  BP: 119/69 (10/20/22 1356)  SpO2: 97 % (10/20/22 1555)   Vital Signs (24h Range):  Temp:  [97.7 °F (36.5 °C)-99.2 °F (37.3 °C)] 98 °F (36.7 °C)  Pulse:  [62-81] 72  Resp:  [10-29] 29  SpO2:  [92 %-99 %] 97 %  BP: (100-157)/(52-77) 119/69     Weight: 70.3 kg (155 lb)  Body mass index is 21.02 kg/m².    Intake/Output Summary (Last 24 hours) at 10/20/2022 1857  Last data filed at 10/20/2022 1818  Gross per 24 hour   Intake 1012.54 ml   Output 500 ml   Net 512.54 ml      Physical Exam  Constitutional:       General: He is not in acute distress.  Cardiovascular:      Rate and Rhythm: Normal rate and regular rhythm.   Pulmonary:      Effort: Pulmonary effort is normal.      Breath sounds: No rhonchi.   Abdominal:      General: Bowel sounds are normal. There is no distension.   Musculoskeletal:         General: Tenderness present.      Cervical back: Normal range of motion and neck supple.      Right lower leg: Edema present.   Skin:     General: Skin is warm and dry.      Capillary Refill: Capillary refill takes less than 2 seconds.   Neurological:      General: No focal deficit present.      Mental Status: He is alert and oriented to person, place, and time.   Psychiatric:         Mood and Affect: Mood normal.         Behavior: Behavior normal.       Significant Labs: All pertinent labs within the past 24 hours have been reviewed.  CBC:   Recent Labs   Lab 10/19/22  1453 10/20/22  0349   WBC 8.85 6.01   HGB 12.4* 10.6*   HCT 37.2* 31.9*    107*     CMP:   Recent Labs   Lab  10/19/22  1453 10/20/22  0349    142   K 3.8 3.6    105   CO2 27 26    108   BUN 11 15   CREATININE 0.8 0.7   CALCIUM 9.0 8.1*   PROT 6.3 5.6*   ALBUMIN 3.9 3.5   BILITOT 0.4 0.6   ALKPHOS 91 80   AST 39 29   ALT 48* 38   ANIONGAP 10 11       Significant Imaging: I have reviewed all pertinent imaging results/findings within the past 24 hours.

## 2022-10-20 NOTE — PT/OT/SLP PROGRESS
Physical Therapy      Patient Name:  Tristin Cerda   MRN:  [id number]    PT orders acknowledged- pt to surgery today for R femoral fx. Awaiting post op orders.

## 2022-10-20 NOTE — PROGRESS NOTES
[facility] Hospital Medicine  Progress Note    Patient Name: Tristin Cerda  MRN: [id number]  Patient Class: IP- Inpatient   Admission Date: 10/19/2022  Length of Stay: 1 days  Attending Physician: Toyin Hart MD  Primary Care Provider: Tyler Smith MD        Subjective:     Principal Problem:Closed right hip fracture        HPI:  Tristin Cerda is a 67 y.o. male with a PMHx of Crohn's Dz and short bowel syndrome who presents to the ED via EMS for evaluation of R hip pain.  EMS reports the patient had a trip a fall to the R side while going to the bathroom PTA.  They state an obvious deformity to the RLE with shortening.  The patient confirms numbness over the RLE and chronic diarrhea, but denies a head injury, LOC, neck pain, back pain, dizziness, or any other symptoms at this time.  PSHx includes a tube thoracotomy and radiofrequency ablation of the R knee.     Xray: There is a mildly displaced inter trochanteric fracture of the right femur, with subtrochanteric extension.  Mild degenerative change right hip joint and knee joint.  Chondrocalcinosis at the knee.  Atherosclerosis.     Orthopedics consulted and plan for surgery tomorrow. Hospital medicine consulted for medical management.       Overview/Hospital Course:  Patient admitted with right hip fracture s/p fall. Underwent surgery today. Post-operative doing well.      Interval History: post surgery no complaints     Review of Systems   Constitutional: Negative.    HENT: Negative.     Respiratory: Negative.     Cardiovascular: Negative.    Gastrointestinal: Negative.    Genitourinary: Negative.    Neurological: Negative.    Psychiatric/Behavioral: Negative.     Objective:     Vital Signs (Most Recent):  Temp: 98 °F (36.7 °C) (10/20/22 1356)  Pulse: 72 (10/20/22 1356)  Resp: (!) 29 (10/20/22 1340)  BP: 119/69 (10/20/22 1356)  SpO2: 97 % (10/20/22 1555)   Vital Signs (24h Range):  Temp:  [97.7 °F (36.5 °C)-99.2 °F (37.3  °C)] 98 °F (36.7 °C)  Pulse:  [62-81] 72  Resp:  [10-29] 29  SpO2:  [92 %-99 %] 97 %  BP: (100-157)/(52-77) 119/69     Weight: 70.3 kg (155 lb)  Body mass index is 21.02 kg/m².    Intake/Output Summary (Last 24 hours) at 10/20/2022 1857  Last data filed at 10/20/2022 1818  Gross per 24 hour   Intake 1012.54 ml   Output 500 ml   Net 512.54 ml      Physical Exam  Constitutional:       General: He is not in acute distress.  Cardiovascular:      Rate and Rhythm: Normal rate and regular rhythm.   Pulmonary:      Effort: Pulmonary effort is normal.      Breath sounds: No rhonchi.   Abdominal:      General: Bowel sounds are normal. There is no distension.   Musculoskeletal:         General: Tenderness present.      Cervical back: Normal range of motion and neck supple.      Right lower leg: Edema present.   Skin:     General: Skin is warm and dry.      Capillary Refill: Capillary refill takes less than 2 seconds.   Neurological:      General: No focal deficit present.      Mental Status: He is alert and oriented to person, place, and time.   Psychiatric:         Mood and Affect: Mood normal.         Behavior: Behavior normal.       Significant Labs: All pertinent labs within the past 24 hours have been reviewed.  CBC:   Recent Labs   Lab 10/19/22  1453 10/20/22  0349   WBC 8.85 6.01   HGB 12.4* 10.6*   HCT 37.2* 31.9*    107*     CMP:   Recent Labs   Lab 10/19/22  1453 10/20/22  0349    142   K 3.8 3.6    105   CO2 27 26    108   BUN 11 15   CREATININE 0.8 0.7   CALCIUM 9.0 8.1*   PROT 6.3 5.6*   ALBUMIN 3.9 3.5   BILITOT 0.4 0.6   ALKPHOS 91 80   AST 39 29   ALT 48* 38   ANIONGAP 10 11       Significant Imaging: I have reviewed all pertinent imaging results/findings within the past 24 hours.      Assessment/Plan:      * Closed right hip fracture  S/p fall at home   ORthopedics consulted   Surgery 10/20/22  Pain control     DVT prophylaxis  PT/OT consulted for dc planning        Hypothyroidism  Will check TSH and FT4 - no home medications listed       B12 deficiency    Secondary to short gut syndrome  Resume B12 injections Qmonth    Short bowel syndrome  Diagnosed with Crohn's disease at 14 years of age and was treated with steroids with eventual colon resection leaving small portion of gut remaining. He was on TPN for 20 years and currently takes in a cautious diet with stool bulking products.  Resume cholestyramine QID        VTE Risk Mitigation (From admission, onward)         Ordered     apixaban tablet 2.5 mg  2 times daily         10/20/22 1346     IP VTE LOW RISK PATIENT  Once         10/19/22 1832     Place sequential compression device  Until discontinued         10/19/22 1832                Discharge Planning   ROBERT: 10/21/2022     Code Status: Full Code   Is the patient medically ready for discharge?:     Reason for patient still in hospital (select all that apply): Patient trending condition  Discharge Plan A: Home Health                  Toyin Hart MD  Department of Hospital Medicine   [facility]

## 2022-10-20 NOTE — CONSULTS
Patient ID: Tristin Cerda is a 67 y.o. male    Chief Complaint: right hip pain     History of Present Illness:    Tristin Cerda is a pleasant 67 y.o. male with hx of chrons who presented to the ED with a chief complaint of right hip pain. This occurred after a fall from standing height. Patient was taken to the ED and X-rays revealed a right intertroch fx with subtroch extension. He was then admitted to the medicine service for optimization with orthopedic consultation.     Social Status: lives with his wife   Ambulatory Status: walks without a walker  Pertinent Surgical/Medical Hx: Crohn's      PAST MEDICAL HISTORY:   Past Medical History:   Diagnosis Date    Anxiety     Basal cell carcinoma 07/2017    R forehead and R temple    Crohn's disease     age 17    Encounter for blood transfusion     Short bowel syndrome     Vitamin B deficiency     Vitamin D deficiency      PAST SURGICAL HISTORY:   Past Surgical History:   Procedure Laterality Date    3 small bowel resections      APPENDECTOMY      CHOLECYSTECTOMY      COLONOSCOPY      COLONOSCOPY N/A 2/14/2017    Procedure: COLONOSCOPY;  Surgeon: Nela Sanchez MD;  Location: [address]);  Service: Endoscopy;  Laterality: N/A;    COLONOSCOPY N/A 3/14/2017    Procedure: COLONOSCOPY;  Surgeon: Nela Sanchez MD;  Location: [facility] ([address] [place] [address]);  Service: Endoscopy;  Laterality: N/A;  2 days clear liquids before procedure    COLONOSCOPY N/A 7/29/2020    Procedure: COLONOSCOPY;  Surgeon: Andrea hSaver MD;  Location: [facility];  Service: Endoscopy;  Laterality: N/A;    ESOPHAGOGASTRODUODENOSCOPY N/A 11/19/2019    Procedure: EGD (ESOPHAGOGASTRODUODENOSCOPY);  Surgeon: Andrea Shaver MD;  Location: [facility];  Service: Endoscopy;  Laterality: N/A;    INJECTION OF ANESTHETIC AGENT AROUND NERVE Right 10/20/2020    Procedure: Block, Nerve genicular;  Surgeon: Alvin Curry MD;  Location: [facility];  Service: Pain Management;  Laterality: Right;  right knee     KNEE SURGERY      RADIOFREQUENCY ABLATION Right 2021    Procedure: Radiofrequency Ablation Right Knee Genicular RFA [place];  Surgeon: Alvin Curry MD;  Location: [facility];  Service: Pain Management;  Laterality: Right;  Right knee     SMALL INTESTINE SURGERY      TUBE THORACOTOMY      UPPER GASTROINTESTINAL ENDOSCOPY       FAMILY HISTORY:   Family History   Problem Relation Age of Onset    Diabetes Mother     Stroke Mother     Diabetes Father     Kidney disease Father     Irritable bowel syndrome Cousin     Celiac disease Neg Hx     Cirrhosis Neg Hx     Colon cancer Neg Hx     Colon polyps Neg Hx     Cystic fibrosis Neg Hx     Esophageal cancer Neg Hx     Crohn's disease Neg Hx     Hemochromatosis Neg Hx     Inflammatory bowel disease Neg Hx     Liver cancer Neg Hx     Liver disease Neg Hx     Rectal cancer Neg Hx     Stomach cancer Neg Hx     Ulcerative colitis Neg Hx     Addison's disease Neg Hx     Melanoma Neg Hx     Psoriasis Neg Hx     Lupus Neg Hx     Eczema Neg Hx      SOCIAL HISTORY:   Social History     Occupational History    Not on file   Tobacco Use    Smoking status: Former     Packs/day: 1.00     Years: 15.00     Pack years: 15.00     Types: Cigarettes     Quit date: 3/10/1995     Years since quittin.6    Smokeless tobacco: Never   Substance and Sexual Activity    Alcohol use: Never     Alcohol/week: 2.0 standard drinks     Types: 1 Cans of beer, 1 Shots of liquor per week    Drug use: No    Sexual activity: Yes        MEDICATIONS: Reviewed per Epic   ALLERGIES:   Review of patient's allergies indicates:   Allergen Reactions    Ciprofloxacin     Codeine Itching    Compazine [prochlorperazine]     Erythromycin     Keflex [cephalexin]        Review of Systems:  Constitutional: no fever or chills  Eyes: no visual changes  ENT: no nasal congestion or sore throat  Respiratory: no cough or shortness of breath  Cardiovascular: no chest pain  Gastrointestinal: no nausea or vomiting, tolerating  diet  Musculoskeletal: pain and decreased ROM  All others negative        Physical Exam     Vitals:    10/20/22 0330   BP: 128/63   Pulse: 72   Resp: 18   Temp: 97.7 °F (36.5 °C)     Alert and oriented to person, place and time. No acute distress. Well-groomed, not ill appearing. Pupils round and reactive, normal respiratory effort, no audible wheezing.     Hip Exam    Global tenderness to palpation of the right hip  + Logroll  Leg held in shortened/externally rotated position  No evidence of open fracture, no masses/lesions/surgical scars  Neurovascularly intact: intact peroneal, tibial, sciatic nerve function   Palpable distal pulses  Compartments soft and compressible  Warm well perfused extremity, brisk cap refill to toes       Imagin view right Hip X-rays ordered/reviewed by me showing displaced intertrochanteric femur fracture with subtrochanteric extension.        Assessment    67 y.o. male with   Right Intertrochanteric femur fx    Plan    --Admit to Internal Medicine service for pre-operative clearance and medical evaluation.   --To OR today for operative fixation of right hip with cephalomedullary nail  --Written and verbal consent obtained from patient  --Pre-operative labs and imaging reviewed by me   --Bed rest, victor, NPO     Risks and complications were discussed including but not limited to the risks of anesthetic complications, infection, wound healing complications, non-union, mal-union, hardware failure, pain, stiffness, DVT, pulmonary embolism, perioperative medical risks (cardiac, pulmonary, renal, neurologic), and death among others were discussed. No guarantees were made and the patient and family elect to proceed. They fully understand the reported 30% mortality risk within the first year of surgery.

## 2022-10-20 NOTE — CARE UPDATE
10/20/22 4562   Patient Assessment/Suction   Level of Consciousness (AVPU) alert   PRE-TX-O2   O2 Device (Oxygen Therapy) room air   SpO2 97 %   Pulse Oximetry Type Intermittent   $ Pulse Oximetry - Multiple Charge Pulse Oximetry - Multiple   Incentive Spirometer   $ Incentive Spirometer Charges postop instruction   Incentive Spirometer Predicted Level (mL) 2000   Administration (IS) instruction provided, follow-up   Number of Repetitions (IS) 7   Level Incentive Spirometer (mL) 2250   Patient Tolerance (IS) good;no adverse signs/symptoms present

## 2022-10-20 NOTE — ASSESSMENT & PLAN NOTE
S/p fall at home   ORthopedics consulted   Surgery 10/20/22  Pain control     DVT prophylaxis  PT/OT consulted for dc planning

## 2022-10-20 NOTE — TRANSFER OF CARE
Anesthesia Transfer of Care Note    Patient: Tristin Cerda    Procedure(s) Performed: Procedure(s) (LRB):  INSERTION, INTRAMEDULLARY SRINIVASA, FEMUR (Right)    Patient location: PACU    Anesthesia Type: general    Transport from OR: Transported from OR on 2-3 L/min O2 by NC with adequate spontaneous ventilation    Post pain: adequate analgesia    Post assessment: no apparent anesthetic complications and tolerated procedure well    Post vital signs: stable    Level of consciousness: sedated    Nausea/Vomiting: no nausea/vomiting    Complications: none    Transfer of care protocol was followed      Last vitals:   Visit Vitals  BP (!) 144/71   Pulse 72   Temp 36.6 °C (97.8 °F) (Skin)   Resp 16   Ht 6' (1.829 m)   Wt 70.3 kg (155 lb)   SpO2 (!) 94%   BMI 21.02 kg/m²

## 2022-10-20 NOTE — ANESTHESIA PROCEDURE NOTES
Intubation    Date/Time: 10/20/2022 11:07 AM  Performed by: Alvin Mixon CRNA  Authorized by: Alvin Mixon CRNA     Intubation:     Attempted By:  MORENITA    Blade:  Madsen 3    Laryngeal View Grade: Grade I - full view of cords      Difficult Airway Encountered?: No      Complications:  None    Airway Device:  Oral endotracheal tube    Airway Device Size:  7.5    Style/Cuff Inflation:  Cuffed    Inflation Amount (mL):  5    Tube secured:  22    Placement Verified By:  Capnometry    Complicating Factors:  None    Findings Post-Intubation:  BS equal bilateral

## 2022-10-21 LAB
ALBUMIN SERPL BCP-MCNC: 3.2 G/DL (ref 3.5–5.2)
ALP SERPL-CCNC: 79 U/L (ref 55–135)
ALT SERPL W/O P-5'-P-CCNC: 31 U/L (ref 10–44)
ANION GAP SERPL CALC-SCNC: 9 MMOL/L (ref 8–16)
AST SERPL-CCNC: 22 U/L (ref 10–40)
BASOPHILS # BLD AUTO: 0.01 K/UL (ref 0–0.2)
BASOPHILS NFR BLD: 0.1 % (ref 0–1.9)
BILIRUB SERPL-MCNC: 0.5 MG/DL (ref 0.1–1)
BUN SERPL-MCNC: 14 MG/DL (ref 8–23)
CALCIUM SERPL-MCNC: 7.8 MG/DL (ref 8.7–10.5)
CHLORIDE SERPL-SCNC: 104 MMOL/L (ref 95–110)
CO2 SERPL-SCNC: 26 MMOL/L (ref 23–29)
CREAT SERPL-MCNC: 0.7 MG/DL (ref 0.5–1.4)
DIFFERENTIAL METHOD: ABNORMAL
EOSINOPHIL # BLD AUTO: 0 K/UL (ref 0–0.5)
EOSINOPHIL NFR BLD: 0.2 % (ref 0–8)
ERYTHROCYTE [DISTWIDTH] IN BLOOD BY AUTOMATED COUNT: 13 % (ref 11.5–14.5)
EST. GFR  (NO RACE VARIABLE): >60 ML/MIN/1.73 M^2
GLUCOSE SERPL-MCNC: 103 MG/DL (ref 70–110)
HCT VFR BLD AUTO: 31.1 % (ref 40–54)
HGB BLD-MCNC: 10.4 G/DL (ref 14–18)
IMM GRANULOCYTES # BLD AUTO: 0.04 K/UL (ref 0–0.04)
IMM GRANULOCYTES NFR BLD AUTO: 0.5 % (ref 0–0.5)
LYMPHOCYTES # BLD AUTO: 0.7 K/UL (ref 1–4.8)
LYMPHOCYTES NFR BLD: 8 % (ref 18–48)
MAGNESIUM SERPL-MCNC: 2.2 MG/DL (ref 1.6–2.6)
MCH RBC QN AUTO: 30.3 PG (ref 27–31)
MCHC RBC AUTO-ENTMCNC: 33.4 G/DL (ref 32–36)
MCV RBC AUTO: 91 FL (ref 82–98)
MONOCYTES # BLD AUTO: 0.8 K/UL (ref 0.3–1)
MONOCYTES NFR BLD: 9.7 % (ref 4–15)
NEUTROPHILS # BLD AUTO: 7 K/UL (ref 1.8–7.7)
NEUTROPHILS NFR BLD: 81.5 % (ref 38–73)
NRBC BLD-RTO: 0 /100 WBC
PHOSPHATE SERPL-MCNC: 3 MG/DL (ref 2.7–4.5)
PLATELET # BLD AUTO: 117 K/UL (ref 150–450)
PMV BLD AUTO: 10.5 FL (ref 9.2–12.9)
POTASSIUM SERPL-SCNC: 4.2 MMOL/L (ref 3.5–5.1)
PROT SERPL-MCNC: 5.6 G/DL (ref 6–8.4)
RBC # BLD AUTO: 3.43 M/UL (ref 4.6–6.2)
SODIUM SERPL-SCNC: 139 MMOL/L (ref 136–145)
WBC # BLD AUTO: 8.64 K/UL (ref 3.9–12.7)

## 2022-10-21 PROCEDURE — 97165 OT EVAL LOW COMPLEX 30 MIN: CPT

## 2022-10-21 PROCEDURE — 85025 COMPLETE CBC W/AUTO DIFF WBC: CPT | Performed by: ORTHOPAEDIC SURGERY

## 2022-10-21 PROCEDURE — 97116 GAIT TRAINING THERAPY: CPT

## 2022-10-21 PROCEDURE — 36415 COLL VENOUS BLD VENIPUNCTURE: CPT | Performed by: ORTHOPAEDIC SURGERY

## 2022-10-21 PROCEDURE — 97530 THERAPEUTIC ACTIVITIES: CPT

## 2022-10-21 PROCEDURE — 83735 ASSAY OF MAGNESIUM: CPT | Performed by: ORTHOPAEDIC SURGERY

## 2022-10-21 PROCEDURE — 25000003 PHARM REV CODE 250: Performed by: ORTHOPAEDIC SURGERY

## 2022-10-21 PROCEDURE — 25000003 PHARM REV CODE 250: Performed by: HOSPITALIST

## 2022-10-21 PROCEDURE — 94799 UNLISTED PULMONARY SVC/PX: CPT

## 2022-10-21 PROCEDURE — 94761 N-INVAS EAR/PLS OXIMETRY MLT: CPT

## 2022-10-21 PROCEDURE — 80053 COMPREHEN METABOLIC PANEL: CPT | Performed by: ORTHOPAEDIC SURGERY

## 2022-10-21 PROCEDURE — 12000002 HC ACUTE/MED SURGE SEMI-PRIVATE ROOM

## 2022-10-21 PROCEDURE — 63600175 PHARM REV CODE 636 W HCPCS: Performed by: ORTHOPAEDIC SURGERY

## 2022-10-21 PROCEDURE — 84100 ASSAY OF PHOSPHORUS: CPT | Performed by: ORTHOPAEDIC SURGERY

## 2022-10-21 PROCEDURE — 97161 PT EVAL LOW COMPLEX 20 MIN: CPT

## 2022-10-21 RX ORDER — OXYCODONE AND ACETAMINOPHEN 10; 325 MG/1; MG/1
1 TABLET ORAL EVERY 6 HOURS PRN
Status: DISCONTINUED | OUTPATIENT
Start: 2022-10-21 | End: 2022-10-22 | Stop reason: HOSPADM

## 2022-10-21 RX ORDER — OXYCODONE AND ACETAMINOPHEN 10; 325 MG/1; MG/1
1 TABLET ORAL EVERY 8 HOURS PRN
Status: DISCONTINUED | OUTPATIENT
Start: 2022-10-21 | End: 2022-10-21

## 2022-10-21 RX ADMIN — Medication 6 MG: at 08:10

## 2022-10-21 RX ADMIN — MORPHINE SULFATE 4 MG: 4 INJECTION INTRAVENOUS at 12:10

## 2022-10-21 RX ADMIN — DICYCLOMINE HYDROCHLORIDE 20 MG: 20 TABLET ORAL at 04:10

## 2022-10-21 RX ADMIN — MORPHINE SULFATE 2 MG: 2 INJECTION, SOLUTION INTRAMUSCULAR; INTRAVENOUS at 05:10

## 2022-10-21 RX ADMIN — CEFAZOLIN SODIUM 2 G: 2 SOLUTION INTRAVENOUS at 03:10

## 2022-10-21 RX ADMIN — OXYCODONE AND ACETAMINOPHEN 1 TABLET: 10; 325 TABLET ORAL at 10:10

## 2022-10-21 RX ADMIN — OXYCODONE AND ACETAMINOPHEN 1 TABLET: 10; 325 TABLET ORAL at 04:10

## 2022-10-21 RX ADMIN — THERA TABS 1 TABLET: TAB at 10:10

## 2022-10-21 RX ADMIN — APIXABAN 2.5 MG: 2.5 TABLET, FILM COATED ORAL at 08:10

## 2022-10-21 RX ADMIN — APIXABAN 2.5 MG: 2.5 TABLET, FILM COATED ORAL at 10:10

## 2022-10-21 RX ADMIN — DICYCLOMINE HYDROCHLORIDE 20 MG: 20 TABLET ORAL at 08:10

## 2022-10-21 RX ADMIN — DICYCLOMINE HYDROCHLORIDE 20 MG: 20 TABLET ORAL at 10:10

## 2022-10-21 NOTE — PT/OT/SLP PROGRESS
Physical Therapy Treatment    Patient Name:  Tristin Cerda   MRN:  [id number]    Recommendations:     Discharge Recommendations:  home, home with home health, home health PT   Discharge Equipment Recommendations: walker, rolling   Barriers to discharge: None    Assessment:     Tristin Cerda is a 67 y.o. male admitted with a medical diagnosis of Closed right hip fracture.  He presents with the following impairments/functional limitations:  weakness, impaired endurance, impaired balance, gait instability, impaired functional mobility, decreased lower extremity function, orthopedic precautions  .    Rehab Prognosis: Good; patient would benefit from acute skilled PT services to address these deficits and reach maximum level of function.    Recent Surgery: Procedure(s) (LRB):  INSERTION, INTRAMEDULLARY SRINIVASA, FEMUR (Right) 1 Day Post-Op    Plan:     During this hospitalization, patient to be seen daily to address the identified rehab impairments via gait training, therapeutic activities, therapeutic exercises and progress toward the following goals:    Plan of Care Expires:  10/31/22    Subjective     Patient/Family Comments/goals: agrees to walk in [place] then sit in chair  Pain/Comfort:  Pain Rating 1: 4/10  Location - Side 1: Right  Location 1: hip  Pain Addressed 1: Reposition, Cessation of Activity, Nurse notified  Pain Rating Post-Intervention 1: 4/10      Objective:     Communicated with RN (Susanne) prior to session.  Patient found HOB elevated with bed alarm upon PT entry to room.     General Precautions: Standard, fall   Orthopedic Precautions:RLE weight bearing as tolerated   Braces: N/A  Respiratory Status: Room air     Functional Mobility training:  Bed Mobility:     Rolling Right: stand by assistance  Supine to Sit: stand by assistance  Transfers:     Sit to Stand:  contact guard assistance with rolling walker and x 2 reps  Gait: 25' x 2 with RW with CG/SBA      AM-PAC 6 CLICK MOBILITY  Turning over  in bed (including adjusting bedclothes, sheets and blankets)?: 3  Sitting down on and standing up from a chair with arms (e.g., wheelchair, bedside commode, etc.): 3  Moving from lying on back to sitting on the side of the bed?: 3  Moving to and from a bed to a chair (including a wheelchair)?: 3  Need to walk in hospital room?: 3  Climbing 3-5 steps with a railing?: 3 (est.)  Basic Mobility Total Score: 18       Treatment & Education:  Mobility training as above with cues for technique, WBAT RLE  SUPINE: SLR (R with assist x 5 reps), ankle DF/PF, x 10 reps each (reminded to perform AP frequently)    Patient left up in chair with call button in reach, chair alarm on, and RN (Susanne) notified.    GOALS:   Multidisciplinary Problems       Physical Therapy Goals          Problem: Physical Therapy    Goal Priority Disciplines Outcome Goal Variances Interventions   Physical Therapy Goal     PT, PT/OT Ongoing, Progressing     Description: Goals to be met by: 10/31/2022     Patient will increase functional independence with mobility by performin). Supine to sit with Modified [place]  2). Sit to supine with Modified [place]  3). Sit to stand transfer with Modified [place]  4). Gait  x > 50 feet with Modified [place] using Rolling Walker.                          Time Tracking:     PT Received On: 10/21/22  PT Start Time: 1223     PT Stop Time: 1238  PT Total Time (min): 15 min     Billable Minutes: Gait Training 15    Treatment Type: Treatment  PT/PTA: PT     PTA Visit Number: 0     10/21/2022

## 2022-10-21 NOTE — PROGRESS NOTES
[facility] Hospital Medicine  Progress Note    Patient Name: Tristin Cerda  MRN: [id number]  Patient Class: IP- Inpatient   Admission Date: 10/19/2022  Length of Stay: 2 days  Attending Physician: Toyin Hart MD  Primary Care Provider: Tyler Smith MD        Subjective:     Principal Problem:Closed right hip fracture        HPI:  Tristin Cerda is a 67 y.o. male with a PMHx of Crohn's Dz and short bowel syndrome who presents to the ED via EMS for evaluation of R hip pain.  EMS reports the patient had a trip a fall to the R side while going to the bathroom PTA.  They state an obvious deformity to the RLE with shortening.  The patient confirms numbness over the RLE and chronic diarrhea, but denies a head injury, LOC, neck pain, back pain, dizziness, or any other symptoms at this time.  PSHx includes a tube thoracotomy and radiofrequency ablation of the R knee.     Xray: There is a mildly displaced inter trochanteric fracture of the right femur, with subtrochanteric extension.  Mild degenerative change right hip joint and knee joint.  Chondrocalcinosis at the knee.  Atherosclerosis.     Orthopedics consulted and plan for surgery tomorrow. Hospital medicine consulted for medical management.       Overview/Hospital Course:  Patient admitted with right hip fracture s/p fall. Underwent surgery 10/20/22. Post-operative doing well. PT/OT evaluations pending. Dc IV pain meds and start oral.  Patient has short gut syndrome with loose stools, now currently on pain meds and counseled him that we have to be careful with constipation. Dispo pending PT/OT eval.      Interval History: pain controlled with meds, tolerating diet, urinating well, await PT/OT recs    Review of Systems   Constitutional: Negative.    HENT: Negative.     Respiratory: Negative.     Cardiovascular: Negative.    Gastrointestinal: Negative.    Genitourinary: Negative.    Neurological: Negative.     Psychiatric/Behavioral: Negative.     Objective:     Vital Signs (Most Recent):  Temp: 98.1 °F (36.7 °C) (10/21/22 0745)  Pulse: 67 (10/21/22 0745)  Resp: 18 (10/21/22 1040)  BP: 130/73 (10/21/22 0745)  SpO2: 95 % (10/21/22 0745)   Vital Signs (24h Range):  Temp:  [96.1 °F (35.6 °C)-98.4 °F (36.9 °C)] 98.1 °F (36.7 °C)  Pulse:  [62-72] 67  Resp:  [10-29] 18  SpO2:  [93 %-99 %] 95 %  BP: (100-135)/(52-73) 130/73     Weight: 70.3 kg (155 lb)  Body mass index is 21.02 kg/m².    Intake/Output Summary (Last 24 hours) at 10/21/2022 1141  Last data filed at 10/21/2022 0611  Gross per 24 hour   Intake 1298.46 ml   Output 900 ml   Net 398.46 ml        Physical Exam  Constitutional:       General: He is not in acute distress.  Cardiovascular:      Rate and Rhythm: Normal rate and regular rhythm.   Pulmonary:      Effort: Pulmonary effort is normal.      Breath sounds: No rhonchi.   Abdominal:      General: Bowel sounds are normal. There is no distension.   Musculoskeletal:         General: Tenderness present.      Cervical back: Normal range of motion and neck supple.      Right lower leg: Edema present.   Skin:     General: Skin is warm and dry.      Capillary Refill: Capillary refill takes less than 2 seconds.   Neurological:      General: No focal deficit present.      Mental Status: He is alert and oriented to person, place, and time.   Psychiatric:         Mood and Affect: Mood normal.         Behavior: Behavior normal.       Significant Labs: All pertinent labs within the past 24 hours have been reviewed.  CBC:   Recent Labs   Lab 10/19/22  1453 10/20/22  0349 10/21/22  0513   WBC 8.85 6.01 8.64   HGB 12.4* 10.6* 10.4*   HCT 37.2* 31.9* 31.1*    107* 117*       CMP:   Recent Labs   Lab 10/19/22  1453 10/20/22  0349 10/21/22  0513    142 139   K 3.8 3.6 4.2    105 104   CO2 27 26 26    108 103   BUN 11 15 14   CREATININE 0.8 0.7 0.7   CALCIUM 9.0 8.1* 7.8*   PROT 6.3 5.6* 5.6*   ALBUMIN 3.9  3.5 3.2*   BILITOT 0.4 0.6 0.5   ALKPHOS 91 80 79   AST 39 29 22   ALT 48* 38 31   ANIONGAP 10 11 9         Significant Imaging: I have reviewed all pertinent imaging results/findings within the past 24 hours.      Assessment/Plan:      * Closed right hip fracture  S/p fall at home   ORthopedics consulted   Surgery 10/20/22  Pain control with oral meds     DVT prophylaxis  PT/OT consulted for dc planning       Hypothyroidism  Will check TSH and FT4 - no home medications listed       B12 deficiency    Secondary to short gut syndrome  Resume B12 injections Qmonth    Short bowel syndrome  Diagnosed with Crohn's disease at 14 years of age and was treated with steroids with eventual colon resection leaving small portion of gut remaining. He was on TPN for 20 years and currently takes in a cautious diet with stool bulking products.  Resume cholestyramine QID        VTE Risk Mitigation (From admission, onward)         Ordered     apixaban tablet 2.5 mg  2 times daily         10/20/22 1346     IP VTE LOW RISK PATIENT  Once         10/19/22 1832     Place sequential compression device  Until discontinued         10/19/22 1832                Discharge Planning   ROBERT: 10/21/2022     Code Status: Full Code   Is the patient medically ready for discharge?:     Reason for patient still in hospital (select all that apply): Patient trending condition  Discharge Plan A: Home Health                  Toyin Hart MD  Department of Hospital Medicine   [facility]

## 2022-10-21 NOTE — PLAN OF CARE
Goals to be met by: 11/11/2022     Patient will increase functional independence with ADLs by performing:    LE Dressing with Supervision with AD/AE as needed.  Grooming while standing with Supervision.  Toileting from toilet with Supervision for hygiene and clothing management.   Toilet transfer to toilet with Supervision with AD as needed.    OT POC initiated and established.

## 2022-10-21 NOTE — ASSESSMENT & PLAN NOTE
S/p fall at home   ORthopedics consulted   Surgery 10/20/22  Pain control with oral meds     DVT prophylaxis  PT/OT consulted for dc planning

## 2022-10-21 NOTE — PLAN OF CARE
Plan of care reviewed with patient. Patient verbalized complete understanding. Pt awake, alert, and oriented. Pt complained of pain once. Pt incision clean , dry, and intact. Antibiotics administered as ordered.   All fall precautions maintained, bed in lowest position, locked, call light within reach. Side rails up times 2. Slip resistant socks maintained.

## 2022-10-21 NOTE — SUBJECTIVE & OBJECTIVE
Interval History: pain controlled with meds, tolerating diet, urinating well, await PT/OT recs    Review of Systems   Constitutional: Negative.    HENT: Negative.     Respiratory: Negative.     Cardiovascular: Negative.    Gastrointestinal: Negative.    Genitourinary: Negative.    Neurological: Negative.    Psychiatric/Behavioral: Negative.     Objective:     Vital Signs (Most Recent):  Temp: 98.1 °F (36.7 °C) (10/21/22 0745)  Pulse: 67 (10/21/22 0745)  Resp: 18 (10/21/22 1040)  BP: 130/73 (10/21/22 0745)  SpO2: 95 % (10/21/22 0745)   Vital Signs (24h Range):  Temp:  [96.1 °F (35.6 °C)-98.4 °F (36.9 °C)] 98.1 °F (36.7 °C)  Pulse:  [62-72] 67  Resp:  [10-29] 18  SpO2:  [93 %-99 %] 95 %  BP: (100-135)/(52-73) 130/73     Weight: 70.3 kg (155 lb)  Body mass index is 21.02 kg/m².    Intake/Output Summary (Last 24 hours) at 10/21/2022 1141  Last data filed at 10/21/2022 0611  Gross per 24 hour   Intake 1298.46 ml   Output 900 ml   Net 398.46 ml        Physical Exam  Constitutional:       General: He is not in acute distress.  Cardiovascular:      Rate and Rhythm: Normal rate and regular rhythm.   Pulmonary:      Effort: Pulmonary effort is normal.      Breath sounds: No rhonchi.   Abdominal:      General: Bowel sounds are normal. There is no distension.   Musculoskeletal:         General: Tenderness present.      Cervical back: Normal range of motion and neck supple.      Right lower leg: Edema present.   Skin:     General: Skin is warm and dry.      Capillary Refill: Capillary refill takes less than 2 seconds.   Neurological:      General: No focal deficit present.      Mental Status: He is alert and oriented to person, place, and time.   Psychiatric:         Mood and Affect: Mood normal.         Behavior: Behavior normal.       Significant Labs: All pertinent labs within the past 24 hours have been reviewed.  CBC:   Recent Labs   Lab 10/19/22  1453 10/20/22  0349 10/21/22  0513   WBC 8.85 6.01 8.64   HGB 12.4* 10.6*  10.4*   HCT 37.2* 31.9* 31.1*    107* 117*       CMP:   Recent Labs   Lab 10/19/22  1453 10/20/22  0349 10/21/22  0513    142 139   K 3.8 3.6 4.2    105 104   CO2 27 26 26    108 103   BUN 11 15 14   CREATININE 0.8 0.7 0.7   CALCIUM 9.0 8.1* 7.8*   PROT 6.3 5.6* 5.6*   ALBUMIN 3.9 3.5 3.2*   BILITOT 0.4 0.6 0.5   ALKPHOS 91 80 79   AST 39 29 22   ALT 48* 38 31   ANIONGAP 10 11 9         Significant Imaging: I have reviewed all pertinent imaging results/findings within the past 24 hours.

## 2022-10-21 NOTE — PLAN OF CARE
Problem: Physical Therapy  Goal: Physical Therapy Goal  Description: Goals to be met by: 10/31/2022     Patient will increase functional independence with mobility by performin). Supine to sit with Modified [place]  2). Sit to supine with Modified [place]  3). Sit to stand transfer with Modified [place]  4). Gait  x > 50 feet with Modified [place] using Rolling Walker.     Outcome: Ongoing, Progressing

## 2022-10-21 NOTE — CARE UPDATE
10/21/22 0735   Patient Assessment/Suction   Level of Consciousness (AVPU) alert   PRE-TX-O2   O2 Device (Oxygen Therapy) room air   SpO2 97 %   Pulse Oximetry Type Intermittent   $ Pulse Oximetry - Multiple Charge Pulse Oximetry - Multiple   Incentive Spirometer   $ Incentive Spirometer Charges done with encouragement   Administration (IS) mouthpiece utilized   Number of Repetitions (IS) 10   Level Incentive Spirometer (mL) 3000   Patient Tolerance (IS) good

## 2022-10-21 NOTE — PLAN OF CARE
Plan of care reviewed with patient, verbalized understanding. Ambulated with PT. Dressing to RLE clean dry and intact. Neurovascular checks +2 pedal pulse. Pain controlled with prn oral medication. Bed in lowest position and call light within reach.

## 2022-10-21 NOTE — PT/OT/SLP EVAL
Occupational Therapy   Evaluation    Name: Tristin Cerda  MRN: [id number]  Admitting Diagnosis:  Closed right hip fracture  Recent Surgery: Procedure(s) (LRB):  INSERTION, INTRAMEDULLARY SRINIVASA, FEMUR (Right) 1 Day Post-Op    Recommendations:     Discharge Recommendations: home with home health, other (see comments) (and 24/7 Supervision/assistance as needed.)  Discharge Equipment Recommendations:  walker, rolling, bedside commode  Barriers to discharge:  None    Assessment:     Tristin Cerda is a 67 y.o. male with a medical diagnosis of Closed right hip fracture. Patient found up in bedside chair. Patient agreeable to participate in OT evaluation/treatment session; patient requesting to return to bed at end of session secondary to pain of R hip.     Patient provided with and instructed on use of adaptive equipment to increase independence and safety with LB dressing/ADL. Patient able to demonstrate with overall Mod (A). Patient using reacher to thread LB clothing; however, patient requires assistance to completely thread R LE secondary to pain at hip upon attempting to lift LE to get clothing over heel. Patient performing sit<>stand with Min (A) using RW - verbal instruction for correct transfer technique. Chair>bed with Min (A) using RW. He presents with the following performance deficits affecting function: weakness, impaired endurance, impaired self care skills, impaired functional mobility, gait instability, impaired balance, decreased lower extremity function, decreased safety awareness, pain, decreased ROM, orthopedic precautions.      Patient participatory in therapy and recommend home with 24/7 Supervision/assistance from spouse with home health therapy services. Recommend RW and BSC.     Rehab Prognosis: Good; patient would benefit from acute skilled OT services to address these deficits and reach maximum level of function.       Plan:     Patient to be seen 3 x/week to address the above listed  problems via self-care/home management, therapeutic activities, therapeutic exercises  Plan of Care Expires: 11/11/22  Plan of Care Reviewed with: patient    Subjective     Chief Complaint: R hip pain  Patient/Family Comments/goals: Would like to return to bed    Occupational Profile:  Living Environment: Lives with spouse in single story home with no steps to enter  Previous level of function: Independent   Roles and Routines: , father; retired  Equipment Used at Home:  none  Assistance upon Discharge: Spouse and home health therapy services    Pain/Comfort:  Pain Rating 1: other (see comments) (not rated)  Location - Side 1: Right  Location - Orientation 1: generalized  Location 1: hip  Pain Addressed 1: Reposition, Distraction, Cessation of Activity, Nurse notified (Ice pack applied with barrier between skin)  Pain Rating Post-Intervention 1:  (not rated)    Patients cultural, spiritual, Yazidi conflicts given the current situation: no    Objective:     Communicated with: Susanne Eason prior to session.  Patient found up in chair with chair check upon OT entry to room.    General Precautions: Standard, fall   Orthopedic Precautions:RLE weight bearing as tolerated   Braces: N/A  Respiratory Status: Room air    Occupational Performance:    Bed Mobility:    Patient completed Sit to Supine with minimum assistance    Functional Mobility/Transfers:  Patient completed Sit <> Stand Transfer with minimum assistance  with  rolling walker and verbal cues for technique and proper hand placement    Patient completed Bed <> Chair Transfer using Stand Pivot technique with minimum assistance with rolling walker and instruction for correct transfer sequence/technique as well as gait/walker sequence    Activities of Daily Living:  Feeding:  Independent after Set-up of meal tray    Grooming: supervision after Set-up from seated position  Upper Body Dressing: Set-up / Supervision from seated position  Lower Body Dressing:  moderate assistance - OTR providing and instructing on use of reacher to increase independence with LB dressing tasks - patient using reacher to thread maternity underwear with Mod (A); patient requires assistance to thread R LE through clothing completely secondary to pain upon attempting to lift LE to move clothing over heel  Toileting: moderate assistance      Cognitive/Visual Perceptual:  Oriented x 3; pleasant/cooperative; safety awareness/insight impaired     Physical Exam:  Upper Extremity Strength:    -       Right Upper Extremity: WNL  -       Left Upper Extremity: WNL    AMPAC 6 Click ADL:  AMPAC Total Score: 15    Treatment & Education:  - OTR providing education/instruction regarding OT role/POC, safety awareness/fall prevention including use of RW with ALL mobility until cleared by PT, correct transfer sequence/techniques, adaptive dressing techniques including providing/instructing on use of reacher to increase independence/safety with LB dressing tasks, and initiating discharge planning - recommend home with home health therapy, RW and BSC. Patient verbalizes/demonstrates understanding and in agreement when appropriate.     Patient left HOB elevated with all lines intact, call button in reach, bed alarm on, and NurseSusanne notified    GOALS:   Multidisciplinary Problems       Occupational Therapy Goals          Problem: Occupational Therapy    Goal Priority Disciplines Outcome Interventions   Occupational Therapy Goal     OT, PT/OT     Description: Goals to be met by: 11/11/2022     Patient will increase functional independence with ADLs by performing:    LE Dressing with Supervision with AD/AE as needed.  Grooming while standing with Supervision.  Toileting from toilet with Supervision for hygiene and clothing management.   Toilet transfer to toilet with Supervision with AD as needed.                         History:     Past Medical History:   Diagnosis Date    Anxiety     Basal cell carcinoma  07/2017    R forehead and R temple    Crohn's disease     age 17    Encounter for blood transfusion     Short bowel syndrome     Vitamin B deficiency     Vitamin D deficiency          Past Surgical History:   Procedure Laterality Date    3 small bowel resections      APPENDECTOMY      CHOLECYSTECTOMY      COLONOSCOPY      COLONOSCOPY N/A 2/14/2017    Procedure: COLONOSCOPY;  Surgeon: Nela Sanchez MD;  [facility]  Service: Endoscopy;  Laterality: N/A;    COLONOSCOPY N/A 3/14/2017    Procedure: COLONOSCOPY;  Surgeon: Nela Sanchez MD;  [facility]  Service: Endoscopy;  Laterality: N/A;  2 days clear liquids before procedure    COLONOSCOPY N/A 7/29/2020    Procedure: COLONOSCOPY;  Surgeon: Andrea Shaver MD;  Location: [facility];  Service: Endoscopy;  Laterality: N/A;    ESOPHAGOGASTRODUODENOSCOPY N/A 11/19/2019    Procedure: EGD (ESOPHAGOGASTRODUODENOSCOPY);  Surgeon: Andrea Shaver MD;  Location: [facility];  Service: Endoscopy;  Laterality: N/A;    INJECTION OF ANESTHETIC AGENT AROUND NERVE Right 10/20/2020    Procedure: Block, Nerve genicular;  Surgeon: Alvin Curry MD;  [facility]  Service: Pain Management;  Laterality: Right;  right knee    KNEE SURGERY      RADIOFREQUENCY ABLATION Right 1/8/2021    Procedure: Radiofrequency Ablation Right Knee Genicular RFA [place];  Surgeon: Alivn uCrry MD;  Location: [facility] OR;  Service: Pain Management;  Laterality: Right;  Right knee     SMALL INTESTINE SURGERY      TUBE THORACOTOMY      UPPER GASTROINTESTINAL ENDOSCOPY         Time Tracking:     OT Date of Treatment: 10/21/22  OT Start Time: 1310  OT Stop Time: 1330  OT Total Time (min): 20 min    Billable Minutes:Evaluation 10  Therapeutic Activity 10    10/21/2022

## 2022-10-21 NOTE — PT/OT/SLP EVAL
Physical Therapy Evaluation    Patient Name:  Tristin Cerda   MRN:  [id number]    Recommendations:     Discharge Recommendations:  home, home with home health, home health PT   Discharge Equipment Recommendations: walker, rolling   Barriers to discharge:  limited mobility    Assessment:     Tristin Cerda is a 67 y.o. male admitted with a medical diagnosis of Closed right hip fracture.  He presents with the following impairments/functional limitations:  impaired endurance, impaired balance, gait instability, impaired functional mobility, decreased lower extremity function, pain, orthopedic precautions, weakness  .    Rehab Prognosis: Good; patient would benefit from acute skilled PT services to address these deficits and reach maximum level of function.    Recent Surgery: Procedure(s) (LRB):  INSERTION, INTRAMEDULLARY SRINIVASA, FEMUR (Right) 1 Day Post-Op    Plan:     During this hospitalization, patient to be seen BID to address the identified rehab impairments via gait training, therapeutic activities, therapeutic exercises and progress toward the following goals:    Plan of Care Expires:  10/31/22    Subjective     Chief Complaint: pain  Patient/Family Comments/goals: agrees to work with PT, declines to sit in chair  Pain/Comfort:  Pain Rating 1: 4/10  Location - Side 1: Right  Location 1: hip  Pain Addressed 1: Reposition, Cessation of Activity, Nurse notified  Pain Rating Post-Intervention 1: 4/10    Living Environment:  With family in house, 0 steps to enter  Prior to admission, patients level of function was independent without AD.  Equipment used at home: none.  DME owned (not currently used): none.  Upon discharge, patient will have assistance from family.    Objective:     Communicated with RN (Susanne Gilliland) prior to session.  Patient found supine with bed alarm  upon PT entry to room.    General Precautions: Standard, fall   Orthopedic Precautions:RLE weight bearing as tolerated   Braces:  N/A  Respiratory Status: Room air    Exams:  RLE ROM: WFL except hip N/T due to pain  RLE Strength: HIP: 2-/5, KNEE exten 2/5  LLE ROM: grossly WFL  LLE Strength: grossly WFL    Functional Mobility training:  Bed Mobility:     Rolling Right: minimum assistance  Supine to Sit: minimum assistance  Sit to Supine: minimum assistance and moderate assistance  Transfers:     Sit to Stand:  minimum assistance with rolling walker and x 2 reps  Gait: 12' x 2 with RW with CGA and cues      AM-PAC 6 CLICK MOBILITY  Total Score:16       Treatment & Education:  Mobility training as above with cues for technique  Ins tructed WBAT RLE  SUPINE: SLR (R with assist x 5 reps), ankle DF/PF, x 10 reps each (rec'd to perform AP frequently to prevent DVT)  SEATED: LAQ x 2 reps      Patient left HOB elevated with all lines intact, call button in reach, and bed alarm on.    GOALS:   Multidisciplinary Problems       Physical Therapy Goals          Problem: Physical Therapy    Goal Priority Disciplines Outcome Goal Variances Interventions   Physical Therapy Goal     PT, PT/OT Ongoing, Progressing     Description: Goals to be met by: 10/31/2022     Patient will increase functional independence with mobility by performin). Supine to sit with Modified [place]  2). Sit to supine with Modified [place]  3). Sit to stand transfer with Modified [place]  4). Gait  x > 50 feet with Modified [place] using Rolling Walker.                          History:     Past Medical History:   Diagnosis Date    Anxiety     Basal cell carcinoma 2017    R forehead and R temple    Crohn's disease     age 17    Encounter for blood transfusion     Short bowel syndrome     Vitamin B deficiency     Vitamin D deficiency        Past Surgical History:   Procedure Laterality Date    3 small bowel resections      APPENDECTOMY      CHOLECYSTECTOMY      COLONOSCOPY      COLONOSCOPY N/A 2017    Procedure: COLONOSCOPY;  Surgeon: Nela Sanchez,  MD;  [facility]  Service: Endoscopy;  Laterality: N/A;    COLONOSCOPY N/A 3/14/2017    Procedure: COLONOSCOPY;  Surgeon: Nela Sanchez MD;  [facility]  Service: Endoscopy;  Laterality: N/A;  2 days clear liquids before procedure    COLONOSCOPY N/A 7/29/2020    Procedure: COLONOSCOPY;  Surgeon: Andrea Shaver MD;  Location: [facility] END[facility];  Service: Endoscopy;  Laterality: N/A;    ESOPHAGOGASTRODUODENOSCOPY N/A 11/19/2019    Procedure: EGD (ESOPHAGOGASTRODUODENOSCOPY);  Surgeon: Andrea Shaver MD;  Location: [facility];  Service: Endoscopy;  Laterality: N/A;    INJECTION OF ANESTHETIC AGENT AROUND NERVE Right 10/20/2020    Procedure: Block, Nerve genicular;  Surgeon: Alvin Curry MD;  Location: [facility] OR;  Service: Pain Management;  Laterality: Right;  right knee    KNEE SURGERY      RADIOFREQUENCY ABLATION Right 1/8/2021    Procedure: Radiofrequency Ablation Right Knee Genicular RFA [place];  Surgeon: Alvin Curry MD;  Location: [facility] OR;  Service: Pain Management;  Laterality: Right;  Right knee     SMALL INTESTINE SURGERY      TUBE THORACOTOMY      UPPER GASTROINTESTINAL ENDOSCOPY         Time Tracking:     PT Received On: 10/21/22  PT Start Time: 0927     PT Stop Time: 0953  PT Total Time (min): 26 min     Billable Minutes: Evaluation 10 and Gait Training 10      10/21/2022

## 2022-10-22 VITALS
WEIGHT: 155 LBS | BODY MASS INDEX: 20.99 KG/M2 | DIASTOLIC BLOOD PRESSURE: 67 MMHG | HEIGHT: 72 IN | HEART RATE: 68 BPM | OXYGEN SATURATION: 94 % | SYSTOLIC BLOOD PRESSURE: 142 MMHG | RESPIRATION RATE: 18 BRPM | TEMPERATURE: 98 F

## 2022-10-22 LAB
ALBUMIN SERPL BCP-MCNC: 3.1 G/DL (ref 3.5–5.2)
ALP SERPL-CCNC: 80 U/L (ref 55–135)
ALT SERPL W/O P-5'-P-CCNC: 30 U/L (ref 10–44)
ANION GAP SERPL CALC-SCNC: 8 MMOL/L (ref 8–16)
AST SERPL-CCNC: 28 U/L (ref 10–40)
BASOPHILS # BLD AUTO: 0.01 K/UL (ref 0–0.2)
BASOPHILS NFR BLD: 0.2 % (ref 0–1.9)
BILIRUB SERPL-MCNC: 0.4 MG/DL (ref 0.1–1)
BUN SERPL-MCNC: 13 MG/DL (ref 8–23)
CALCIUM SERPL-MCNC: 8 MG/DL (ref 8.7–10.5)
CHLORIDE SERPL-SCNC: 106 MMOL/L (ref 95–110)
CO2 SERPL-SCNC: 25 MMOL/L (ref 23–29)
CREAT SERPL-MCNC: 0.7 MG/DL (ref 0.5–1.4)
DIFFERENTIAL METHOD: ABNORMAL
EOSINOPHIL # BLD AUTO: 0.1 K/UL (ref 0–0.5)
EOSINOPHIL NFR BLD: 1.6 % (ref 0–8)
ERYTHROCYTE [DISTWIDTH] IN BLOOD BY AUTOMATED COUNT: 13.2 % (ref 11.5–14.5)
EST. GFR  (NO RACE VARIABLE): >60 ML/MIN/1.73 M^2
GLUCOSE SERPL-MCNC: 102 MG/DL (ref 70–110)
HCT VFR BLD AUTO: 29.1 % (ref 40–54)
HGB BLD-MCNC: 9.7 G/DL (ref 14–18)
IMM GRANULOCYTES # BLD AUTO: 0.01 K/UL (ref 0–0.04)
IMM GRANULOCYTES NFR BLD AUTO: 0.2 % (ref 0–0.5)
LYMPHOCYTES # BLD AUTO: 0.5 K/UL (ref 1–4.8)
LYMPHOCYTES NFR BLD: 10.6 % (ref 18–48)
MAGNESIUM SERPL-MCNC: 1.8 MG/DL (ref 1.6–2.6)
MCH RBC QN AUTO: 30.6 PG (ref 27–31)
MCHC RBC AUTO-ENTMCNC: 33.3 G/DL (ref 32–36)
MCV RBC AUTO: 92 FL (ref 82–98)
MONOCYTES # BLD AUTO: 0.7 K/UL (ref 0.3–1)
MONOCYTES NFR BLD: 13.3 % (ref 4–15)
NEUTROPHILS # BLD AUTO: 3.8 K/UL (ref 1.8–7.7)
NEUTROPHILS NFR BLD: 74.1 % (ref 38–73)
NRBC BLD-RTO: 0 /100 WBC
PHOSPHATE SERPL-MCNC: 2.8 MG/DL (ref 2.7–4.5)
PLATELET # BLD AUTO: 85 K/UL (ref 150–450)
PMV BLD AUTO: 11.1 FL (ref 9.2–12.9)
POTASSIUM SERPL-SCNC: 4.4 MMOL/L (ref 3.5–5.1)
PROT SERPL-MCNC: 5.5 G/DL (ref 6–8.4)
RBC # BLD AUTO: 3.17 M/UL (ref 4.6–6.2)
SODIUM SERPL-SCNC: 139 MMOL/L (ref 136–145)
WBC # BLD AUTO: 5.11 K/UL (ref 3.9–12.7)

## 2022-10-22 PROCEDURE — 94761 N-INVAS EAR/PLS OXIMETRY MLT: CPT

## 2022-10-22 PROCEDURE — 25000003 PHARM REV CODE 250: Performed by: ORTHOPAEDIC SURGERY

## 2022-10-22 PROCEDURE — 97530 THERAPEUTIC ACTIVITIES: CPT

## 2022-10-22 PROCEDURE — 94799 UNLISTED PULMONARY SVC/PX: CPT

## 2022-10-22 PROCEDURE — 36415 COLL VENOUS BLD VENIPUNCTURE: CPT | Performed by: ORTHOPAEDIC SURGERY

## 2022-10-22 PROCEDURE — 97116 GAIT TRAINING THERAPY: CPT

## 2022-10-22 PROCEDURE — 99900035 HC TECH TIME PER 15 MIN (STAT)

## 2022-10-22 PROCEDURE — 85025 COMPLETE CBC W/AUTO DIFF WBC: CPT | Performed by: ORTHOPAEDIC SURGERY

## 2022-10-22 PROCEDURE — 83735 ASSAY OF MAGNESIUM: CPT | Performed by: ORTHOPAEDIC SURGERY

## 2022-10-22 PROCEDURE — 84100 ASSAY OF PHOSPHORUS: CPT | Performed by: ORTHOPAEDIC SURGERY

## 2022-10-22 PROCEDURE — 25000003 PHARM REV CODE 250: Performed by: HOSPITALIST

## 2022-10-22 PROCEDURE — 80053 COMPREHEN METABOLIC PANEL: CPT | Performed by: ORTHOPAEDIC SURGERY

## 2022-10-22 RX ORDER — OXYCODONE AND ACETAMINOPHEN 5; 325 MG/1; MG/1
1 TABLET ORAL EVERY 6 HOURS PRN
Qty: 40 EACH | Refills: 0 | Status: SHIPPED | OUTPATIENT
Start: 2022-10-22 | End: 2022-10-27 | Stop reason: SDUPTHER

## 2022-10-22 RX ORDER — CHOLESTYRAMINE 4 G/4.8G
1 POWDER, FOR SUSPENSION ORAL 2 TIMES DAILY
Qty: 90 PACKET | Refills: 0 | Status: SHIPPED | OUTPATIENT
Start: 2022-10-22 | End: 2022-11-21

## 2022-10-22 RX ORDER — HYDROCORTISONE 25 MG/G
CREAM TOPICAL 2 TIMES DAILY PRN
Start: 2022-10-22 | End: 2023-10-27

## 2022-10-22 RX ADMIN — THERA TABS 1 TABLET: TAB at 09:10

## 2022-10-22 RX ADMIN — APIXABAN 2.5 MG: 2.5 TABLET, FILM COATED ORAL at 09:10

## 2022-10-22 RX ADMIN — OXYCODONE AND ACETAMINOPHEN 1 TABLET: 10; 325 TABLET ORAL at 05:10

## 2022-10-22 RX ADMIN — HYDROCORTISONE: 25 CREAM TOPICAL at 12:10

## 2022-10-22 RX ADMIN — OXYCODONE AND ACETAMINOPHEN 1 TABLET: 10; 325 TABLET ORAL at 12:10

## 2022-10-22 RX ADMIN — DICYCLOMINE HYDROCHLORIDE 20 MG: 20 TABLET ORAL at 09:10

## 2022-10-22 NOTE — DISCHARGE SUMMARY
[facility] Hospital Medicine  Discharge Summary      Patient Name: Tristin Cerda  MRN: [id number]  Patient Class: IP- Inpatient  Admission Date: 10/19/2022  Hospital Length of Stay: 3 days  Discharge Date and Time:  10/22/2022 11:29 AM  Attending Physician: Deacon Andrews MD   Discharging Provider: Deacon Andrews MD  Primary Care Provider: Tyler Smith MD      HPI:   Tristin Cerda is a 67 y.o. male with a PMHx of Crohn's Dz and short bowel syndrome who presents to the ED via EMS for evaluation of R hip pain.  EMS reports the patient had a trip a fall to the R side while going to the bathroom PTA.  They state an obvious deformity to the RLE with shortening.  The patient confirms numbness over the RLE and chronic diarrhea, but denies a head injury, LOC, neck pain, back pain, dizziness, or any other symptoms at this time.  PSHx includes a tube thoracotomy and radiofrequency ablation of the R knee.     Xray: There is a mildly displaced inter trochanteric fracture of the right femur, with subtrochanteric extension.  Mild degenerative change right hip joint and knee joint.  Chondrocalcinosis at the knee.  Atherosclerosis.     Orthopedics consulted and plan for surgery tomorrow. Hospital medicine consulted for medical management.       Procedure(s) (LRB):  INSERTION, INTRAMEDULLARY SRINIVASA, FEMUR (Right)      Hospital Course:   Patient admitted with right hip fracture s/p fall. Underwent surgery with intramedullary nailing 10/20/22. Post-operative did well. PT/OT eval recommended home with home health with PT/OT. Pain well-controlled. Patient has short gut syndrome with chronic loose stools, now currently on pain meds and counseled him that we have to be careful with constipation. Ortho recommended anticoagulation for 1 month. By time of discharge the patient was tolerating a regular diet with improving admission symptoms.    Physical Exam on day of discharge:  General - Patient alert and  "oriented x3 in NAD  CV - Regular rate and rhythm, No Murmur/monika/rubs  Resp - Lungs CTA Bilaterally, No increased WOB  GI - BS normoactive, soft, non-tender/non-distended, no HSM  Extrem-  Right hip with dressing c/d/i. No cyanosis, clubbing, edema.   Skin -  No masses, rashes or lesions noted on cursory skin exam.       Goals of Care Treatment Preferences:  Code Status: Full Code      Consults:   Consults (From admission, onward)        Status Ordering Provider     Inpatient consult to Orthopedics  Once        Provider:  Gómez Selby MD    Completed JENNIE TRIMBLE     Case Management/  Once        Provider:  (Not yet assigned)    Completed JENNIE TRIMBLE          No new Assessment & Plan notes have been filed under this hospital service since the last note was generated.  Service: Hospital Medicine    Final Active Diagnoses:    Diagnosis Date Noted POA    PRINCIPAL PROBLEM:  Closed right hip fracture [S72.001A] 10/19/2022 Yes    Hypothyroidism [E03.9] 04/01/2021 Yes    B12 deficiency [E53.8] 01/04/2017 Yes    Short bowel syndrome [K91.2] 03/10/2015 Yes      Problems Resolved During this Admission:       Discharged Condition: good    Disposition: Home-Health Care [facility]    Follow Up:   Follow-up Information     [facility] Follow up.    Why: Home Health  Contact information:  [address] [place] [id number]  [phone]           Gómez Selby MD. Schedule an appointment as soon as possible for a visit in 2 week(s).    Specialty: Orthopedic Surgery  Contact information:  [address] [place] [address] Dr Kevin ESPINOZA [phone]                       Patient Instructions:      WALKER FOR HOME USE     Order Specific Question Answer Comments   Type of Walker: Adult (5'4"-6'6")    With wheels? Yes    Height: 6' (1.829 m)    Weight: 70.3 kg (155 lb)    Length of need (1-99 months): 99    Does patient have medical equipment at home? none    Please check " all that apply: Patient's condition impairs ambulation.    Please check all that apply: Patient is unable to safely ambulate without equipment.    Please check all that apply: Patient needs help to get in and out of chair.    Please check all that apply: Walker will be used for gait training.      COMMODE FOR HOME USE     Order Specific Question Answer Comments   Type: Standard    Height: 6' (1.829 m)    Weight: 70.3 kg (155 lb)    Does patient have medical equipment at home? none    Length of need (1-99 months): 99      Ambulatory referral/consult to Home Health   Standing Status: Future   Referral Priority: Routine Referral Type: Home Health   Referral Reason: Specialty Services Required   Requested Specialty: Home Health Services   Number of Visits Requested: 1     Diet Adult Regular     Other restrictions (specify):   Order Comments: As recommended per ortho and physical therapy     Notify your health care provider if you experience any of the following:  temperature >100.4     Notify your health care provider if you experience any of the following:  persistent nausea and vomiting or diarrhea     Notify your health care provider if you experience any of the following:  severe uncontrolled pain     Notify your health care provider if you experience any of the following:  redness, tenderness, or signs of infection (pain, swelling, redness, odor or green/yellow discharge around incision site)     Notify your health care provider if you experience any of the following:  difficulty breathing or increased cough     Notify your health care provider if you experience any of the following:  severe persistent headache     Notify your health care provider if you experience any of the following:  worsening rash     Notify your health care provider if you experience any of the following:  persistent dizziness, light-headedness, or visual disturbances     Notify your health care provider if you experience any of the  following:  increased confusion or weakness       Significant Diagnostic Studies:     Admission on 10/19/2022   Component Date Value Ref Range Status    WBC 10/19/2022 8.85  3.90 - 12.70 K/uL Final    RBC 10/19/2022 4.01 (L)  4.60 - 6.20 M/uL Final    Hemoglobin 10/19/2022 12.4 (L)  14.0 - 18.0 g/dL Final    Hematocrit 10/19/2022 37.2 (L)  40.0 - 54.0 % Final    MCV 10/19/2022 93  82 - 98 fL Final    MCH 10/19/2022 30.9  27.0 - 31.0 pg Final    MCHC 10/19/2022 33.3  32.0 - 36.0 g/dL Final    RDW 10/19/2022 13.3  11.5 - 14.5 % Final    Platelets 10/19/2022 157  150 - 450 K/uL Final    MPV 10/19/2022 11.1  9.2 - 12.9 fL Final    Immature Granulocytes 10/19/2022 0.3  0.0 - 0.5 % Final    Gran # (ANC) 10/19/2022 7.3  1.8 - 7.7 K/uL Final    Immature Grans (Abs) 10/19/2022 0.03  0.00 - 0.04 K/uL Final    Comment: Mild elevation in immature granulocytes is non specific and   can be seen in a variety of conditions including stress response,   acute inflammation, trauma and pregnancy. Correlation with other   laboratory and clinical findings is essential.      Lymph # 10/19/2022 0.9 (L)  1.0 - 4.8 K/uL Final    Mono # 10/19/2022 0.5  0.3 - 1.0 K/uL Final    Eos # 10/19/2022 0.2  0.0 - 0.5 K/uL Final    Baso # 10/19/2022 0.03  0.00 - 0.20 K/uL Final    nRBC 10/19/2022 0  0 /100 WBC Final    Gran % 10/19/2022 82.3 (H)  38.0 - 73.0 % Final    Lymph % 10/19/2022 9.7 (L)  18.0 - 48.0 % Final    Mono % 10/19/2022 5.5  4.0 - 15.0 % Final    Eosinophil % 10/19/2022 1.9  0.0 - 8.0 % Final    Basophil % 10/19/2022 0.3  0.0 - 1.9 % Final    Differential Method 10/19/2022 Automated   Final    Sodium 10/19/2022 143  136 - 145 mmol/L Final    Potassium 10/19/2022 3.8  3.5 - 5.1 mmol/L Final    Chloride 10/19/2022 106  95 - 110 mmol/L Final    CO2 10/19/2022 27  23 - 29 mmol/L Final    Glucose 10/19/2022 110  70 - 110 mg/dL Final    BUN 10/19/2022 11  8 - 23 mg/dL Final    Creatinine 10/19/2022 0.8  0.5 -  1.4 mg/dL Final    Calcium 10/19/2022 9.0  8.7 - 10.5 mg/dL Final    Total Protein 10/19/2022 6.3  6.0 - 8.4 g/dL Final    Albumin 10/19/2022 3.9  3.5 - 5.2 g/dL Final    Total Bilirubin 10/19/2022 0.4  0.1 - 1.0 mg/dL Final    Comment: For infants and newborns, interpretation of results should be based  on gestational age, weight and in agreement with clinical  observations.    Premature Infant recommended reference ranges:  Up to 24 hours.............<8.0 mg/dL  Up to 48 hours............<12.0 mg/dL  3-5 days..................<15.0 mg/dL  6-29 days.................<15.0 mg/dL      Alkaline Phosphatase 10/19/2022 91  55 - 135 U/L Final    AST 10/19/2022 39  10 - 40 U/L Final    ALT 10/19/2022 48 (H)  10 - 44 U/L Final    Anion Gap 10/19/2022 10  8 - 16 mmol/L Final    eGFR 10/19/2022 >60  >60 mL/min/1.73 m^2 Final    SARS-CoV-2 RNA, Amplification, Qual 10/19/2022 Negative  Negative Final    Comment: This test utilizes isothermal nucleic acid amplification technology   to   detect the SARS-CoV-2 RdRp nucleic acid segment. The analytical   sensitivity   (limit of detection) is 500 copies/swab.     A POSITIVE result is indicative of the presence of SARS-CoV-2 RNA;   clinical   correlation with patient history and other diagnostic information is   necessary to determine patient infection status.    A NEGATIVE result means that SARS-CoV-2 nucleic acids are not present   above   the limit of detection. A NEGATIVE result should be treated as   presumptive.   It does not rule out the possibility of COVID-19 and should not be   the sole   basis for treatment decisions. If COVID-19 is strongly suspected   based on   clinical and exposure history, re-testing using an alternate   molecular assay   should be considered.     This test is only for use under the Food and Drug Administration s   Emergency   Use Authorization (EUA).     Commercial kits are provided by [employer]. Performanc                            e   characteristics of the EUA have been independently verified by   [facility] Department of Pathology and Laboratory Medicine.   _________________________________________________________________   The authorized Fact Sheet for Healthcare Providers and the authorized   Fact   Sheet for Patients of the ID NOW COVID-19 are available on the FDA   website:   https://www.fda.gov/media/148910/download   https://www.fda.gov/media/363397/download      Prothrombin Time 10/19/2022 11.2  9.0 - 12.5 sec Final    INR 10/19/2022 1.1  0.8 - 1.2 Final    Comment: Coumadin Therapy:  2.0 - 3.0 for INR for all indicators except mechanical heart valves  and antiphospholipid syndromes which should use 2.5 - 3.5.      aPTT 10/19/2022 23.4  21.0 - 32.0 sec Final    aPTT therapeutic range = 39-69 seconds    Sodium 10/20/2022 142  136 - 145 mmol/L Final    Potassium 10/20/2022 3.6  3.5 - 5.1 mmol/L Final    Chloride 10/20/2022 105  95 - 110 mmol/L Final    CO2 10/20/2022 26  23 - 29 mmol/L Final    Glucose 10/20/2022 108  70 - 110 mg/dL Final    BUN 10/20/2022 15  8 - 23 mg/dL Final    Creatinine 10/20/2022 0.7  0.5 - 1.4 mg/dL Final    Calcium 10/20/2022 8.1 (L)  8.7 - 10.5 mg/dL Final    Total Protein 10/20/2022 5.6 (L)  6.0 - 8.4 g/dL Final    Albumin 10/20/2022 3.5  3.5 - 5.2 g/dL Final    Total Bilirubin 10/20/2022 0.6  0.1 - 1.0 mg/dL Final    Comment: For infants and newborns, interpretation of results should be based  on gestational age, weight and in agreement with clinical  observations.    Premature Infant recommended reference ranges:  Up to 24 hours.............<8.0 mg/dL  Up to 48 hours............<12.0 mg/dL  3-5 days..................<15.0 mg/dL  6-29 days.................<15.0 mg/dL      Alkaline Phosphatase 10/20/2022 80  55 - 135 U/L Final    AST 10/20/2022 29  10 - 40 U/L Final    ALT 10/20/2022 38  10 - 44 U/L Final    Anion Gap 10/20/2022 11  8 - 16 mmol/L Final    eGFR 10/20/2022 >60   >60 mL/min/1.73 m^2 Final    Magnesium 10/20/2022 1.3 (L)  1.6 - 2.6 mg/dL Final    Phosphorus 10/20/2022 3.5  2.7 - 4.5 mg/dL Final    WBC 10/20/2022 6.01  3.90 - 12.70 K/uL Final    RBC 10/20/2022 3.49 (L)  4.60 - 6.20 M/uL Final    Hemoglobin 10/20/2022 10.6 (L)  14.0 - 18.0 g/dL Final    Hematocrit 10/20/2022 31.9 (L)  40.0 - 54.0 % Final    MCV 10/20/2022 91  82 - 98 fL Final    MCH 10/20/2022 30.4  27.0 - 31.0 pg Final    MCHC 10/20/2022 33.2  32.0 - 36.0 g/dL Final    RDW 10/20/2022 13.3  11.5 - 14.5 % Final    Platelets 10/20/2022 107 (L)  150 - 450 K/uL Final    MPV 10/20/2022 11.2  9.2 - 12.9 fL Final    Immature Granulocytes 10/20/2022 0.2  0.0 - 0.5 % Final    Gran # (ANC) 10/20/2022 4.7  1.8 - 7.7 K/uL Final    Immature Grans (Abs) 10/20/2022 0.01  0.00 - 0.04 K/uL Final    Comment: Mild elevation in immature granulocytes is non specific and   can be seen in a variety of conditions including stress response,   acute inflammation, trauma and pregnancy. Correlation with other   laboratory and clinical findings is essential.      Lymph # 10/20/2022 0.7 (L)  1.0 - 4.8 K/uL Final    Mono # 10/20/2022 0.5  0.3 - 1.0 K/uL Final    Eos # 10/20/2022 0.0  0.0 - 0.5 K/uL Final    Baso # 10/20/2022 0.02  0.00 - 0.20 K/uL Final    nRBC 10/20/2022 0  0 /100 WBC Final    Gran % 10/20/2022 78.9 (H)  38.0 - 73.0 % Final    Lymph % 10/20/2022 11.1 (L)  18.0 - 48.0 % Final    Mono % 10/20/2022 9.0  4.0 - 15.0 % Final    Eosinophil % 10/20/2022 0.5  0.0 - 8.0 % Final    Basophil % 10/20/2022 0.3  0.0 - 1.9 % Final    Differential Method 10/20/2022 Automated   Final    TSH 10/20/2022 1.784  0.400 - 4.000 uIU/mL Final    Free T4 10/20/2022 0.80  0.71 - 1.51 ng/dL Final    Sodium 10/21/2022 139  136 - 145 mmol/L Final    Potassium 10/21/2022 4.2  3.5 - 5.1 mmol/L Final    Chloride 10/21/2022 104  95 - 110 mmol/L Final    CO2 10/21/2022 26  23 - 29 mmol/L Final    Glucose 10/21/2022 103  70 -  110 mg/dL Final    BUN 10/21/2022 14  8 - 23 mg/dL Final    Creatinine 10/21/2022 0.7  0.5 - 1.4 mg/dL Final    Calcium 10/21/2022 7.8 (L)  8.7 - 10.5 mg/dL Final    Total Protein 10/21/2022 5.6 (L)  6.0 - 8.4 g/dL Final    Albumin 10/21/2022 3.2 (L)  3.5 - 5.2 g/dL Final    Total Bilirubin 10/21/2022 0.5  0.1 - 1.0 mg/dL Final    Comment: For infants and newborns, interpretation of results should be based  on gestational age, weight and in agreement with clinical  observations.    Premature Infant recommended reference ranges:  Up to 24 hours.............<8.0 mg/dL  Up to 48 hours............<12.0 mg/dL  3-5 days..................<15.0 mg/dL  6-29 days.................<15.0 mg/dL      Alkaline Phosphatase 10/21/2022 79  55 - 135 U/L Final    AST 10/21/2022 22  10 - 40 U/L Final    ALT 10/21/2022 31  10 - 44 U/L Final    Anion Gap 10/21/2022 9  8 - 16 mmol/L Final    eGFR 10/21/2022 >60  >60 mL/min/1.73 m^2 Final    Magnesium 10/21/2022 2.2  1.6 - 2.6 mg/dL Final    Phosphorus 10/21/2022 3.0  2.7 - 4.5 mg/dL Final    WBC 10/21/2022 8.64  3.90 - 12.70 K/uL Final    RBC 10/21/2022 3.43 (L)  4.60 - 6.20 M/uL Final    Hemoglobin 10/21/2022 10.4 (L)  14.0 - 18.0 g/dL Final    Hematocrit 10/21/2022 31.1 (L)  40.0 - 54.0 % Final    MCV 10/21/2022 91  82 - 98 fL Final    MCH 10/21/2022 30.3  27.0 - 31.0 pg Final    MCHC 10/21/2022 33.4  32.0 - 36.0 g/dL Final    RDW 10/21/2022 13.0  11.5 - 14.5 % Final    Platelets 10/21/2022 117 (L)  150 - 450 K/uL Final    MPV 10/21/2022 10.5  9.2 - 12.9 fL Final    Immature Granulocytes 10/21/2022 0.5  0.0 - 0.5 % Final    Gran # (ANC) 10/21/2022 7.0  1.8 - 7.7 K/uL Final    Immature Grans (Abs) 10/21/2022 0.04  0.00 - 0.04 K/uL Final    Comment: Mild elevation in immature granulocytes is non specific and   can be seen in a variety of conditions including stress response,   acute inflammation, trauma and pregnancy. Correlation with other   laboratory and  clinical findings is essential.      Lymph # 10/21/2022 0.7 (L)  1.0 - 4.8 K/uL Final    Mono # 10/21/2022 0.8  0.3 - 1.0 K/uL Final    Eos # 10/21/2022 0.0  0.0 - 0.5 K/uL Final    Baso # 10/21/2022 0.01  0.00 - 0.20 K/uL Final    nRBC 10/21/2022 0  0 /100 WBC Final    Gran % 10/21/2022 81.5 (H)  38.0 - 73.0 % Final    Lymph % 10/21/2022 8.0 (L)  18.0 - 48.0 % Final    Mono % 10/21/2022 9.7  4.0 - 15.0 % Final    Eosinophil % 10/21/2022 0.2  0.0 - 8.0 % Final    Basophil % 10/21/2022 0.1  0.0 - 1.9 % Final    Differential Method 10/21/2022 Automated   Final    Sodium 10/22/2022 139  136 - 145 mmol/L Final    Potassium 10/22/2022 4.4  3.5 - 5.1 mmol/L Final    Chloride 10/22/2022 106  95 - 110 mmol/L Final    CO2 10/22/2022 25  23 - 29 mmol/L Final    Glucose 10/22/2022 102  70 - 110 mg/dL Final    BUN 10/22/2022 13  8 - 23 mg/dL Final    Creatinine 10/22/2022 0.7  0.5 - 1.4 mg/dL Final    Calcium 10/22/2022 8.0 (L)  8.7 - 10.5 mg/dL Final    Total Protein 10/22/2022 5.5 (L)  6.0 - 8.4 g/dL Final    Albumin 10/22/2022 3.1 (L)  3.5 - 5.2 g/dL Final    Total Bilirubin 10/22/2022 0.4  0.1 - 1.0 mg/dL Final    Comment: For infants and newborns, interpretation of results should be based  on gestational age, weight and in agreement with clinical  observations.    Premature Infant recommended reference ranges:  Up to 24 hours.............<8.0 mg/dL  Up to 48 hours............<12.0 mg/dL  3-5 days..................<15.0 mg/dL  6-29 days.................<15.0 mg/dL      Alkaline Phosphatase 10/22/2022 80  55 - 135 U/L Final    AST 10/22/2022 28  10 - 40 U/L Final    ALT 10/22/2022 30  10 - 44 U/L Final    Anion Gap 10/22/2022 8  8 - 16 mmol/L Final    eGFR 10/22/2022 >60  >60 mL/min/1.73 m^2 Final    Magnesium 10/22/2022 1.8  1.6 - 2.6 mg/dL Final    Phosphorus 10/22/2022 2.8  2.7 - 4.5 mg/dL Final    WBC 10/22/2022 5.11  3.90 - 12.70 K/uL Final    RBC 10/22/2022 3.17 (L)  4.60 - 6.20 M/uL  Final    Hemoglobin 10/22/2022 9.7 (L)  14.0 - 18.0 g/dL Final    Hematocrit 10/22/2022 29.1 (L)  40.0 - 54.0 % Final    MCV 10/22/2022 92  82 - 98 fL Final    MCH 10/22/2022 30.6  27.0 - 31.0 pg Final    MCHC 10/22/2022 33.3  32.0 - 36.0 g/dL Final    RDW 10/22/2022 13.2  11.5 - 14.5 % Final    Platelets 10/22/2022 85 (L)  150 - 450 K/uL Final    MPV 10/22/2022 11.1  9.2 - 12.9 fL Final    Immature Granulocytes 10/22/2022 0.2  0.0 - 0.5 % Final    Gran # (ANC) 10/22/2022 3.8  1.8 - 7.7 K/uL Final    Immature Grans (Abs) 10/22/2022 0.01  0.00 - 0.04 K/uL Final    Comment: Mild elevation in immature granulocytes is non specific and   can be seen in a variety of conditions including stress response,   acute inflammation, trauma and pregnancy. Correlation with other   laboratory and clinical findings is essential.      Lymph # 10/22/2022 0.5 (L)  1.0 - 4.8 K/uL Final    Mono # 10/22/2022 0.7  0.3 - 1.0 K/uL Final    Eos # 10/22/2022 0.1  0.0 - 0.5 K/uL Final    Baso # 10/22/2022 0.01  0.00 - 0.20 K/uL Final    nRBC 10/22/2022 0  0 /100 WBC Final    Gran % 10/22/2022 74.1 (H)  38.0 - 73.0 % Final    Lymph % 10/22/2022 10.6 (L)  18.0 - 48.0 % Final    Mono % 10/22/2022 13.3  4.0 - 15.0 % Final    Eosinophil % 10/22/2022 1.6  0.0 - 8.0 % Final    Basophil % 10/22/2022 0.2  0.0 - 1.9 % Final    Differential Method 10/22/2022 Automated   Final       XR FEMUR 2 VIEW RIGHT 10/20/22     CLINICAL HISTORY:  post op TFN;Fracture of unspecified part of neck of right femur, initial encounter for closed fracture     TECHNIQUE:  AP and lateral views of the right femur were performed.     COMPARISON:  10/19/2022     FINDINGS:  Interval internal fixation of the intertrochanteric right femoral fracture.  Long intramedullary joselito extends from the greater trochanter to the distal femur where it is fixed distally by horizontal screw.  Intramedullary screw extends through the proximal to the joselito into the femoral head.   Position and alignment appear good.  Soft tissue air can be attributed to a postoperative basis.  Vascular calcifications noted.     Impression:     Internal fixation of the intertrochanteric right femoral fracture.  The orthopedic hardware appears in place and intact.    Pending Diagnostic Studies:     None         Medications:  Reconciled Home Medications:      Medication List      START taking these medications    apixaban 2.5 mg Tab  Commonly known as: ELIQUIS  Take 1 tablet (2.5 mg total) by mouth 2 (two) times daily.     cholestyramine-aspartame 4 gram Pwpk  Commonly known as: QUESTRAN LIGHT  Take 1 packet (4 g total) by mouth 2 (two) times daily.  Replaces: colestipoL 1 gram Tab     oxyCODONE-acetaminophen 5-325 mg per tablet  Commonly known as: PERCOCET  Take 1 tablet by mouth every 6 (six) hours as needed for Pain.        CONTINUE taking these medications    aspirin 81 MG EC tablet  Commonly known as: ECOTRIN  Take 81 mg by mouth once daily.     aspirin-acetaminophen-caffeine 250-250-65 mg 250-250-65 mg per tablet  Commonly known as: EXCEDRIN MIGRAINE  Take 1 tablet by mouth every 8 (eight) hours as needed for Pain.     cyanocobalamin 1,000 mcg/mL injection  Inject 1 mL (1,000 mcg total) into the muscle every 30 days.     dicyclomine 20 mg tablet  Commonly known as: BENTYL  TAKE 1 TABLET BY MOUTH FOUR TIMES DAILY     hydrocortisone 2.5 % cream  Apply topically 2 (two) times daily as needed.     meclizine 12.5 mg tablet  Commonly known as: ANTIVERT  Take 1 tablet (12.5 mg total) by mouth 3 (three) times daily as needed for Dizziness.     multivitamin with minerals tablet  Take 1 tablet by mouth once daily.     ondansetron 8 MG tablet  Commonly known as: ZOFRAN  TAKE 1 TABLET EVERY 12 HOURS AS NEEDED FOR NAUSEA     traMADoL 50 mg tablet  Commonly known as: ULTRAM  Take 50 mg by mouth every 6 (six) hours as needed for Pain.        STOP taking these medications    colestipoL 1 gram Tab  Commonly known as:  COLESTID  Replaced by: cholestyramine-aspartame 4 gram Pwpk            Indwelling Lines/Drains at time of discharge:   Lines/Drains/Airways     None                 Time spent on the discharge of patient: 35 minutes         Deacon Andrews MD  Department of Hospital Medicine  [facility]

## 2022-10-22 NOTE — CARE UPDATE
10/22/22 0705   Patient Assessment/Suction   Level of Consciousness (AVPU) alert   Respiratory Effort Unlabored   PRE-TX-O2   O2 Device (Oxygen Therapy) room air   SpO2 98 %   Pulse Oximetry Type Intermittent   $ Pulse Oximetry - Multiple Charge Pulse Oximetry - Multiple   Incentive Spirometer   $ Incentive Spirometer Charges done with encouragement   Administration (IS) mouthpiece utilized;proper technique demonstrated;self-administered   Number of Repetitions (IS) 10   Level Incentive Spirometer (mL) 2750   Patient Tolerance (IS) good;no adverse signs/symptoms present

## 2022-10-22 NOTE — PT/OT/SLP PROGRESS
Physical Therapy Treatment    Patient Name:  Tristin Cerda   MRN:  [id number]    Recommendations:     Discharge Recommendations:  home, home with home health, home health PT   Discharge Equipment Recommendations: walker, rolling   Barriers to discharge: None    Assessment:     Tristin Cerda is a 67 y.o. male admitted with a medical diagnosis of Closed right hip fracture.  He presents with the following impairments/functional limitations:  weakness, impaired endurance, orthopedic precautions, pain, impaired balance, gait instability, impaired functional mobility, decreased lower extremity function  .    Rehab Prognosis: Good; patient would benefit from acute skilled PT services to address these deficits and reach maximum level of function.    Recent Surgery: Procedure(s) (LRB):  INSERTION, INTRAMEDULLARY SRINIVASA, FEMUR (Right) 2 Days Post-Op    Plan:     During this hospitalization, patient to be seen  (1-2x/day) to address the identified rehab impairments via gait training, therapeutic activities, therapeutic exercises and progress toward the following goals:    Plan of Care Expires:  10/31/22    Subjective     Chief Complaint: pain  Patient/Family Comments/goals: agrees to work with PT to walk then sit in chair  Pain/Comfort:  Pain Rating 1: 3/10  Location - Side 1: Right  Location 1: hip  Pain Addressed 1: Cessation of Activity, Reposition  Pain Rating Post-Intervention 1: 3/10      Objective:     Patient found supine with bed alarm upon PT entry to room.     General Precautions: Standard, fall   Orthopedic Precautions:RLE weight bearing as tolerated   Braces: N/A  Respiratory Status: Room air     Functional Mobility training:  Bed Mobility:     Rolling Right: stand by assistance  Scooting: stand by assistance and to scoot forward/back in sitting  Supine to Sit: stand by assistance  Transfers:     Sit to Stand:  stand by assistance with rolling walker and x  2 reps  Gait: 15' x 2 with RW with CG/SBA  WBAT  RLE      AM-PAC 6 CLICK MOBILITY  Turning over in bed (including adjusting bedclothes, sheets and blankets)?: 4  Sitting down on and standing up from a chair with arms (e.g., wheelchair, bedside commode, etc.): 3  Moving from lying on back to sitting on the side of the bed?: 4  Moving to and from a bed to a chair (including a wheelchair)?: 3  Need to walk in hospital room?: 3  Climbing 3-5 steps with a railing?: 1 (est.)  Basic Mobility Total Score: 18       Treatment & Education:  Mobility training as above with cues for technique  SUPINE: SLR (R with assist x 5 reps), ankle DF/PF, x 10 reps  SEATED: LAQ (R with assist) x 5 reps      Patient left up in chair with all lines intact and call button in reach.    GOALS:   Multidisciplinary Problems       Physical Therapy Goals          Problem: Physical Therapy    Goal Priority Disciplines Outcome Goal Variances Interventions   Physical Therapy Goal     PT, PT/OT Ongoing, Progressing     Description: Goals to be met by: 10/31/2022     Patient will increase functional independence with mobility by performin). Supine to sit with Modified [place]  2). Sit to supine with Modified [place]  3). Sit to stand transfer with Modified [place]  4). Gait  x > 50 feet with Modified [place] using Rolling Walker.                          Time Tracking:     PT Received On: 10/22/22  PT Start Time: 838     PT Stop Time: 906  PT Total Time (min): 28 min     Billable Minutes: Gait Training 15 and Therapeutic Activity 13    Treatment Type: Treatment  PT/PTA: PT     PTA Visit Number: 0     10/22/2022

## 2022-10-22 NOTE — PLAN OF CARE
9;29am - RW and BSC ordered, pending [facility] approval.    9:33am - SW met with pt and gave list of HH agencies and pt selected [facility] and signed choice form.  Sent hh order and clinicals to [facility] via [facility] and called [phone], spoke to yazan Lopez/ the answering service, will have nurse call SW back.    9:42am - Vito from [facility]  called and will accept pt, admit on tomorrow.     10/22/22 3095   Post-Acute Status   Post-Acute Authorization Home Health;HME   HME Status   (Pending review)   Home Health Status Referrals Sent

## 2022-10-22 NOTE — CARE UPDATE
10/21/22 2009   Patient Assessment/Suction   Level of Consciousness (AVPU) alert   Respiratory Effort Normal;Unlabored   Expansion/Accessory Muscles/Retractions no use of accessory muscles;no retractions;expansion symmetric   Rhythm/Pattern, Respiratory unlabored;depth regular;pattern regular   PRE-TX-O2   O2 Device (Oxygen Therapy) room air   SpO2 95 %   Pulse Oximetry Type Intermittent   $ Pulse Oximetry - Multiple Charge Pulse Oximetry - Multiple   Pulse 86   Resp 18   Incentive Spirometer   $ Incentive Spirometer Charges done with encouragement   Incentive Spirometer Predicted Level (mL) 2000   Administration (IS) mouthpiece utilized   Number of Repetitions (IS) 10   Level Incentive Spirometer (mL) 2500   Patient Tolerance (IS) good;no adverse signs/symptoms present

## 2022-10-22 NOTE — PLAN OF CARE
Pt cleared for discharge from case management    Robin approved RW and BSC; SW delivered both and pt signed for delivery.    [facility] accepted pt and will admit tomorrow     10/22/22 1017   Final Note   Assessment Type Final Discharge Note   Anticipated Discharge Disposition Home-Health   What phone number can be called within the next 1-3 days to see how you are doing after discharge?   ([phone])   Post-Acute Status   Post-Acute Authorization Home Health;HME   HME Status Set-up Complete/Auth obtained   Home Health Status Set-up Complete/Auth obtained

## 2022-10-23 PROCEDURE — G0180 MD CERTIFICATION HHA PATIENT: HCPCS | Mod: ,,, | Performed by: FAMILY MEDICINE

## 2022-10-23 PROCEDURE — G0180 PR HOME HEALTH MD CERTIFICATION: ICD-10-PCS | Mod: ,,, | Performed by: FAMILY MEDICINE

## 2022-10-24 ENCOUNTER — PATIENT OUTREACH (OUTPATIENT)
Dept: ADMINISTRATIVE | Facility: CLINIC | Age: 67
End: 2022-10-24
Payer: MEDICARE

## 2022-10-24 ENCOUNTER — TELEPHONE (OUTPATIENT)
Dept: MEDSURG UNIT | Facility: HOSPITAL | Age: 67
End: 2022-10-24
Payer: MEDICARE

## 2022-10-24 NOTE — PROGRESS NOTES
C3 nurse spoke with Tristin Cerda for a TCC post hospital discharge follow up call. The patient does not have a scheduled HOSFU appointment with Tyler Smith MD within 5-7 days post hospital discharge date 10/22/22. C3 nurse was unable to schedule HOSFU appointment in [facility].    Message sent to PCP staff requesting they contact patient and schedule follow up appointment.    Pt declined this routed message, states will schedule through pt portal.

## 2022-10-27 ENCOUNTER — PATIENT MESSAGE (OUTPATIENT)
Dept: FAMILY MEDICINE | Facility: CLINIC | Age: 67
End: 2022-10-27
Payer: MEDICARE

## 2022-10-27 NOTE — TELEPHONE ENCOUNTER
Pt states that he thought that he was supposed to be taking 10mg not the 5 mg, so he was doubling up his medication and is about to be out prior to his 7 days. Please advise.

## 2022-10-27 NOTE — TELEPHONE ENCOUNTER
----- Message from Scott Beck sent at 10/27/2022  9:19 AM CDT -----  Type: Needs Medical Advice  Who Called:  Patient    Pharmacy name and phone #:    [id number] - [place], [place] - [address]  [address] [facility] [address]  [facility] [address]  Phone: [phone] Fax: [phone]        Best Call Back Number: [phone]  Additional Information: Patient states that he would like a callback regarding questions about his medications:  oxyCODONE-acetaminophen (PERCOCET) 5-325 mg per tablet

## 2022-10-28 ENCOUNTER — PATIENT MESSAGE (OUTPATIENT)
Dept: PAIN MEDICINE | Facility: CLINIC | Age: 67
End: 2022-10-28
Payer: MEDICARE

## 2022-10-28 RX ORDER — OXYCODONE AND ACETAMINOPHEN 5; 325 MG/1; MG/1
1 TABLET ORAL EVERY 6 HOURS PRN
Qty: 40 EACH | Refills: 0 | Status: SHIPPED | OUTPATIENT
Start: 2022-10-28 | End: 2022-11-07 | Stop reason: SDUPTHER

## 2022-10-28 NOTE — TELEPHONE ENCOUNTER
----- Message from Zee Weems MA sent at 10/28/2022  9:43 AM CDT -----  Contact: pt  Questions post op   In pain   Call back

## 2022-10-28 NOTE — TELEPHONE ENCOUNTER
Pt states that he fell one week ago fractured pelvis, he states he had surgery. Advised pt that his surgeon is in charge of post op medication until post op is complete pt verbalizes understanding.

## 2022-10-31 ENCOUNTER — PATIENT MESSAGE (OUTPATIENT)
Dept: ORTHOPEDICS | Facility: CLINIC | Age: 67
End: 2022-10-31
Payer: MEDICARE

## 2022-10-31 ENCOUNTER — TELEPHONE (OUTPATIENT)
Dept: FAMILY MEDICINE | Facility: CLINIC | Age: 67
End: 2022-10-31
Payer: MEDICARE

## 2022-10-31 ENCOUNTER — TELEPHONE (OUTPATIENT)
Dept: ORTHOPEDICS | Facility: CLINIC | Age: 67
End: 2022-10-31
Payer: MEDICARE

## 2022-10-31 ENCOUNTER — HOSPITAL ENCOUNTER (OUTPATIENT)
Dept: RADIOLOGY | Facility: HOSPITAL | Age: 67
Discharge: HOME | End: 2022-10-31
Attending: ORTHOPAEDIC SURGERY
Payer: MEDICARE

## 2022-10-31 ENCOUNTER — TELEPHONE (OUTPATIENT)
Dept: ADMINISTRATIVE | Facility: CLINIC | Age: 67
End: 2022-10-31
Payer: MEDICARE

## 2022-10-31 ENCOUNTER — PATIENT OUTREACH (OUTPATIENT)
Dept: ADMINISTRATIVE | Facility: OTHER | Age: 67
End: 2022-10-31
Payer: MEDICARE

## 2022-10-31 ENCOUNTER — DOCUMENTATION ONLY (OUTPATIENT)
Dept: FAMILY MEDICINE | Facility: CLINIC | Age: 67
End: 2022-10-31
Payer: MEDICARE

## 2022-10-31 DIAGNOSIS — Z87.81 S/P LEFT HIP FRACTURE: Primary | ICD-10-CM

## 2022-10-31 DIAGNOSIS — M79.661 PAIN IN RIGHT LOWER LEG: Primary | ICD-10-CM

## 2022-10-31 DIAGNOSIS — M79.661 PAIN IN RIGHT LOWER LEG: ICD-10-CM

## 2022-10-31 DIAGNOSIS — Z87.81 S/P RIGHT HIP FRACTURE: ICD-10-CM

## 2022-10-31 DIAGNOSIS — I82.4Z1 ACUTE DEEP VEIN THROMBOSIS (DVT) OF DISTAL VEIN OF RIGHT LOWER EXTREMITY: ICD-10-CM

## 2022-10-31 PROCEDURE — 93971 EXTREMITY STUDY: CPT | Mod: TC,RT

## 2022-10-31 PROCEDURE — 93971 EXTREMITY STUDY: CPT | Mod: 26,RT,, | Performed by: RADIOLOGY

## 2022-10-31 PROCEDURE — 93971 US LOWER EXTREMITY VEINS RIGHT: ICD-10-PCS | Mod: 26,RT,, | Performed by: RADIOLOGY

## 2022-10-31 NOTE — TELEPHONE ENCOUNTER
Called pt and discussed symptoms. Placed STAT orders for possible DVT. Pt going to [facility] for US.

## 2022-10-31 NOTE — PROGRESS NOTES
Discussed with Dr Selby and RT staff at [facility] via secure chat that with US of lower extremity indicating DVT recommend increase dose Eliquis to 5mg BID and follow up outpatient if patient is not having any emergent signs or symptoms of SOB/CP or emergent vital sign abnormalities.

## 2022-10-31 NOTE — PROGRESS NOTES
Patient declined any services at this time. He was more interested in how we helped, than needing help. Gave patient my name and number. Told patient he may call me directly if he needs and assistance.

## 2022-10-31 NOTE — TELEPHONE ENCOUNTER
Pt notified of results and appt with PCP scheduled. Pt confirmed date/time of appt and verbalized understanding of results. Pt advised to go directly to nearest ED if sxs worsen or change.

## 2022-10-31 NOTE — TELEPHONE ENCOUNTER
----- Message from Bertin Gonzalez MA sent at 10/31/2022  1:48 PM CDT -----  Contact: Johny/[facility]  Johny called in and stated she needs to speak to office about patients results of his ultrasound that was done just now.    Johny's call back number is [phone]

## 2022-10-31 NOTE — PROGRESS NOTES
CHW - Initial Contact    This Community Health Worker completed OR updated the Social Determinant of Health questionnaire with patient via telephone today.    Pt identified barriers of most importance are: No physical activity.   Referrals to community agencies completed with patient/caregiver consent outside of [facility] include: No  Referrals were put through [facility] - no  Support and Services: None  Other information discussed the patient needs / wants help with: He has a broken leg and ha received his discharge meals.   Follow up required: no  No future outreach task assigned

## 2022-10-31 NOTE — TELEPHONE ENCOUNTER
Please contact patient that with recent blood clot on US he needs to increase the dose of his eliquis to 5mg BID (new script called in) and also needs an appointment to see hematology (referral placed). Be sure to keep appointment with me in November      Spoke to patient, advised of new medication called in. Also of all recommendations. Patient verbalized understanding to all. Patient scheduled for hematology and has follow up with PCP in November.

## 2022-10-31 NOTE — PROGRESS NOTES
Please contact patient that with recent blood clot on US he needs to increase the dose of his eliquis to 5mg BID (new script called in) and also needs an appointment to see hematology (referral placed). Be sure to keep appointment with me in November

## 2022-10-31 NOTE — PROGRESS NOTES
CHW - Case Closure    This Community Health Worker spoke to patient via telephone today.   Pt/Caregiver reported: Patient declined any services at this time. He was more interested in how we helped than needing help.  Pt/Caregiver denied any additional needs at this time and agrees with episode closure at this time.  Provided patient with Community Health Worker's contact information and encouraged him/her to contact this Community Health Worker if additional needs arise.

## 2022-11-03 ENCOUNTER — TELEPHONE (OUTPATIENT)
Dept: FAMILY MEDICINE | Facility: CLINIC | Age: 67
End: 2022-11-03
Payer: MEDICARE

## 2022-11-03 ENCOUNTER — TELEPHONE (OUTPATIENT)
Dept: ORTHOPEDICS | Facility: CLINIC | Age: 67
End: 2022-11-03
Payer: MEDICARE

## 2022-11-03 ENCOUNTER — PATIENT MESSAGE (OUTPATIENT)
Dept: HEMATOLOGY/ONCOLOGY | Facility: CLINIC | Age: 67
End: 2022-11-03
Payer: MEDICARE

## 2022-11-03 ENCOUNTER — TELEPHONE (OUTPATIENT)
Dept: HEMATOLOGY/ONCOLOGY | Facility: CLINIC | Age: 67
End: 2022-11-03
Payer: MEDICARE

## 2022-11-03 ENCOUNTER — PATIENT MESSAGE (OUTPATIENT)
Dept: FAMILY MEDICINE | Facility: CLINIC | Age: 67
End: 2022-11-03
Payer: MEDICARE

## 2022-11-03 NOTE — TELEPHONE ENCOUNTER
Patient is currently in PT, they called asking for orders to hold PT due to current DVT. Spoke to Dr Smith, advised patient should stop PT until he can be seen by hematology. She verbalized understanding and will note in his chart to hold PT.

## 2022-11-03 NOTE — TELEPHONE ENCOUNTER
Outgoing call to patient regarding in basket message about swollen leg, per dr khoobehi the patient will need to follow up with his primary care provider because dr khoobehi has not seen the patient before. Patient verbalized understanding.

## 2022-11-04 ENCOUNTER — PATIENT MESSAGE (OUTPATIENT)
Dept: ORTHOPEDICS | Facility: CLINIC | Age: 67
End: 2022-11-04

## 2022-11-04 ENCOUNTER — HOSPITAL ENCOUNTER (OUTPATIENT)
Dept: RADIOLOGY | Facility: HOSPITAL | Age: 67
Discharge: HOME | End: 2022-11-04
Attending: ORTHOPAEDIC SURGERY
Payer: MEDICARE

## 2022-11-04 ENCOUNTER — OFFICE VISIT (OUTPATIENT)
Dept: ORTHOPEDICS | Facility: CLINIC | Age: 67
End: 2022-11-04
Payer: MEDICARE

## 2022-11-04 ENCOUNTER — OFFICE VISIT (OUTPATIENT)
Dept: HEMATOLOGY/ONCOLOGY | Facility: CLINIC | Age: 67
End: 2022-11-04
Payer: MEDICARE

## 2022-11-04 VITALS — WEIGHT: 155 LBS | HEIGHT: 72 IN | BODY MASS INDEX: 20.99 KG/M2 | RESPIRATION RATE: 18 BRPM

## 2022-11-04 VITALS
RESPIRATION RATE: 14 BRPM | OXYGEN SATURATION: 96 % | DIASTOLIC BLOOD PRESSURE: 77 MMHG | HEART RATE: 78 BPM | HEIGHT: 72 IN | SYSTOLIC BLOOD PRESSURE: 134 MMHG | TEMPERATURE: 97 F | WEIGHT: 155 LBS | BODY MASS INDEX: 20.99 KG/M2

## 2022-11-04 DIAGNOSIS — Z87.81 S/P RIGHT HIP FRACTURE: Primary | ICD-10-CM

## 2022-11-04 DIAGNOSIS — I82.4Z1 LOWER LEG DVT (DEEP VENOUS THROMBOEMBOLISM), ACUTE, RIGHT: Primary | ICD-10-CM

## 2022-11-04 DIAGNOSIS — S72.141A CLOSED FRACTURE OF FEMUR, INTERTROCHANTERIC, RIGHT, INITIAL ENCOUNTER: ICD-10-CM

## 2022-11-04 DIAGNOSIS — I82.4Z1 ACUTE DEEP VEIN THROMBOSIS (DVT) OF DISTAL VEIN OF RIGHT LOWER EXTREMITY: ICD-10-CM

## 2022-11-04 DIAGNOSIS — Z87.81 S/P RIGHT HIP FRACTURE: ICD-10-CM

## 2022-11-04 PROCEDURE — 1160F PR REVIEW ALL MEDS BY PRESCRIBER/CLIN PHARMACIST DOCUMENTED: ICD-10-PCS | Mod: CPTII,S$GLB,, | Performed by: INTERNAL MEDICINE

## 2022-11-04 PROCEDURE — 1159F MED LIST DOCD IN RCRD: CPT | Mod: CPTII,S$GLB,, | Performed by: INTERNAL MEDICINE

## 2022-11-04 PROCEDURE — 99999 PR PBB SHADOW E&M-EST. PATIENT-LVL IV: ICD-10-PCS | Mod: PBBFAC,,, | Performed by: INTERNAL MEDICINE

## 2022-11-04 PROCEDURE — 3288F FALL RISK ASSESSMENT DOCD: CPT | Mod: CPTII,S$GLB,, | Performed by: INTERNAL MEDICINE

## 2022-11-04 PROCEDURE — 99024 POSTOP FOLLOW-UP VISIT: CPT | Mod: S$GLB,,, | Performed by: ORTHOPAEDIC SURGERY

## 2022-11-04 PROCEDURE — 3288F PR FALLS RISK ASSESSMENT DOCUMENTED: ICD-10-PCS | Mod: CPTII,S$GLB,, | Performed by: ORTHOPAEDIC SURGERY

## 2022-11-04 PROCEDURE — 3288F FALL RISK ASSESSMENT DOCD: CPT | Mod: CPTII,S$GLB,, | Performed by: ORTHOPAEDIC SURGERY

## 2022-11-04 PROCEDURE — 1100F PTFALLS ASSESS-DOCD GE2>/YR: CPT | Mod: CPTII,S$GLB,, | Performed by: INTERNAL MEDICINE

## 2022-11-04 PROCEDURE — 3078F PR MOST RECENT DIASTOLIC BLOOD PRESSURE < 80 MM HG: ICD-10-PCS | Mod: CPTII,S$GLB,, | Performed by: INTERNAL MEDICINE

## 2022-11-04 PROCEDURE — 3008F PR BODY MASS INDEX (BMI) DOCUMENTED: ICD-10-PCS | Mod: CPTII,S$GLB,, | Performed by: INTERNAL MEDICINE

## 2022-11-04 PROCEDURE — 3008F BODY MASS INDEX DOCD: CPT | Mod: CPTII,S$GLB,, | Performed by: INTERNAL MEDICINE

## 2022-11-04 PROCEDURE — 73552 X-RAY EXAM OF FEMUR 2/>: CPT | Mod: TC,PN,RT

## 2022-11-04 PROCEDURE — 99204 OFFICE O/P NEW MOD 45 MIN: CPT | Mod: S$GLB,,, | Performed by: INTERNAL MEDICINE

## 2022-11-04 PROCEDURE — 1159F PR MEDICATION LIST DOCUMENTED IN MEDICAL RECORD: ICD-10-PCS | Mod: CPTII,S$GLB,, | Performed by: INTERNAL MEDICINE

## 2022-11-04 PROCEDURE — 99499 UNLISTED E&M SERVICE: CPT | Mod: HCNC,S$GLB,, | Performed by: ORTHOPAEDIC SURGERY

## 2022-11-04 PROCEDURE — 73552 X-RAY EXAM OF FEMUR 2/>: CPT | Mod: 26,RT,, | Performed by: RADIOLOGY

## 2022-11-04 PROCEDURE — 1100F PR PT FALLS ASSESS DOC 2+ FALLS/FALL W/INJURY/YR: ICD-10-PCS | Mod: CPTII,S$GLB,, | Performed by: INTERNAL MEDICINE

## 2022-11-04 PROCEDURE — 3075F SYST BP GE 130 - 139MM HG: CPT | Mod: CPTII,S$GLB,, | Performed by: INTERNAL MEDICINE

## 2022-11-04 PROCEDURE — 99999 PR PBB SHADOW E&M-EST. PATIENT-LVL III: ICD-10-PCS | Mod: PBBFAC,,, | Performed by: ORTHOPAEDIC SURGERY

## 2022-11-04 PROCEDURE — 1111F PR DISCHARGE MEDS RECONCILED W/ CURRENT OUTPATIENT MED LIST: ICD-10-PCS | Mod: CPTII,S$GLB,, | Performed by: INTERNAL MEDICINE

## 2022-11-04 PROCEDURE — 3078F DIAST BP <80 MM HG: CPT | Mod: CPTII,S$GLB,, | Performed by: INTERNAL MEDICINE

## 2022-11-04 PROCEDURE — 99024 PR POST-OP FOLLOW-UP VISIT: ICD-10-PCS | Mod: S$GLB,,, | Performed by: ORTHOPAEDIC SURGERY

## 2022-11-04 PROCEDURE — 1160F RVW MEDS BY RX/DR IN RCRD: CPT | Mod: CPTII,S$GLB,, | Performed by: INTERNAL MEDICINE

## 2022-11-04 PROCEDURE — 99499 RISK ADDL DX/OHS AUDIT: ICD-10-PCS | Mod: HCNC,S$GLB,, | Performed by: ORTHOPAEDIC SURGERY

## 2022-11-04 PROCEDURE — 1159F MED LIST DOCD IN RCRD: CPT | Mod: CPTII,S$GLB,, | Performed by: ORTHOPAEDIC SURGERY

## 2022-11-04 PROCEDURE — 73552 XR FEMUR 2 VIEW RIGHT: ICD-10-PCS | Mod: 26,RT,, | Performed by: RADIOLOGY

## 2022-11-04 PROCEDURE — 1101F PR PT FALLS ASSESS DOC 0-1 FALLS W/OUT INJ PAST YR: ICD-10-PCS | Mod: CPTII,S$GLB,, | Performed by: ORTHOPAEDIC SURGERY

## 2022-11-04 PROCEDURE — 3008F BODY MASS INDEX DOCD: CPT | Mod: CPTII,S$GLB,, | Performed by: ORTHOPAEDIC SURGERY

## 2022-11-04 PROCEDURE — 1159F PR MEDICATION LIST DOCUMENTED IN MEDICAL RECORD: ICD-10-PCS | Mod: CPTII,S$GLB,, | Performed by: ORTHOPAEDIC SURGERY

## 2022-11-04 PROCEDURE — 3288F PR FALLS RISK ASSESSMENT DOCUMENTED: ICD-10-PCS | Mod: CPTII,S$GLB,, | Performed by: INTERNAL MEDICINE

## 2022-11-04 PROCEDURE — 99999 PR PBB SHADOW E&M-EST. PATIENT-LVL III: CPT | Mod: PBBFAC,,, | Performed by: ORTHOPAEDIC SURGERY

## 2022-11-04 PROCEDURE — 3075F PR MOST RECENT SYSTOLIC BLOOD PRESS GE 130-139MM HG: ICD-10-PCS | Mod: CPTII,S$GLB,, | Performed by: INTERNAL MEDICINE

## 2022-11-04 PROCEDURE — 99204 PR OFFICE/OUTPT VISIT, NEW, LEVL IV, 45-59 MIN: ICD-10-PCS | Mod: S$GLB,,, | Performed by: INTERNAL MEDICINE

## 2022-11-04 PROCEDURE — 1111F DSCHRG MED/CURRENT MED MERGE: CPT | Mod: CPTII,S$GLB,, | Performed by: INTERNAL MEDICINE

## 2022-11-04 PROCEDURE — 99999 PR PBB SHADOW E&M-EST. PATIENT-LVL IV: CPT | Mod: PBBFAC,,, | Performed by: INTERNAL MEDICINE

## 2022-11-04 PROCEDURE — 3008F PR BODY MASS INDEX (BMI) DOCUMENTED: ICD-10-PCS | Mod: CPTII,S$GLB,, | Performed by: ORTHOPAEDIC SURGERY

## 2022-11-04 PROCEDURE — 1101F PT FALLS ASSESS-DOCD LE1/YR: CPT | Mod: CPTII,S$GLB,, | Performed by: ORTHOPAEDIC SURGERY

## 2022-11-04 NOTE — PROGRESS NOTES
Service Date:  11/4/22    Chief Complaint: Deep Vein Thrombosis    Tristin Cerda is a 67 y.o. male referred to me for right femoral vein nonocclusive acute DVT.  Patient states he had a fall and broke his right hip and had surgery about 2 weeks ago.  Since then he has had pain in his right thigh with swelling.  An ultrasound done revealed a DVT.  His pain is now improved while he has been on Eliquis, along with the swelling.  He has no complaints me.  He is here for further treatment recommendations.  He states that he had a clot in his left lower extremity about 15 years ago and was placed on anticoagulation for about 2 weeks.  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  He does not follow with vascular surgery.    Review of Systems   Constitutional: Negative.    HENT: Negative.     Eyes: Negative.    Respiratory: Negative.     Cardiovascular: Negative.    Gastrointestinal: Negative.    Endocrine: Negative.    Genitourinary: Negative.    Musculoskeletal: Negative.    Integumentary:  Negative.   Neurological: Negative.    Hematological: Negative.    Psychiatric/Behavioral: Negative.        Current Outpatient Medications   Medication Instructions    apixaban (ELIQUIS) 5 mg, Oral, 2 times daily    aspirin (ECOTRIN) 81 mg, Oral, Daily    aspirin-acetaminophen-caffeine 250-250-65 mg (EXCEDRIN MIGRAINE) 250-250-65 mg per tablet 1 tablet, Oral, Every 8 hours PRN    cholestyramine-aspartame (QUESTRAN LIGHT) 4 gram PwPk 4 g, Oral, 2 times daily    cyanocobalamin 1,000 mcg, Intramuscular, Every 30 days    dicyclomine (BENTYL) 20 mg tablet TAKE 1 TABLET BY MOUTH FOUR TIMES DAILY    hydrocortisone 2.5 % cream Topical (Top), 2 times daily PRN    meclizine (ANTIVERT) 12.5 mg, Oral, 3 times daily PRN    multivitamin with minerals tablet 1 tablet, Oral, Daily    ondansetron (ZOFRAN) 8 MG tablet TAKE 1 TABLET EVERY 12 HOURS AS NEEDED FOR NAUSEA    oxyCODONE-acetaminophen (PERCOCET)  5-325 mg per tablet 1 tablet, Oral, Every 6 hours PRN    traMADoL (ULTRAM) 50 mg, Oral, Every 6 hours PRN        Past Medical History:   Diagnosis Date    Anxiety     Basal cell carcinoma 07/2017    R forehead and R temple    Crohn's disease     age 17    Encounter for blood transfusion     Short bowel syndrome     Vitamin B deficiency     Vitamin D deficiency         Past Surgical History:   Procedure Laterality Date    3 small bowel resections      APPENDECTOMY      CHOLECYSTECTOMY      COLONOSCOPY      COLONOSCOPY N/A 2/14/2017    Procedure: COLONOSCOPY;  Surgeon: Nela Sanchez MD;  Location: [address]);  Service: Endoscopy;  Laterality: N/A;    COLONOSCOPY N/A 3/14/2017    Procedure: COLONOSCOPY;  Surgeon: Nela Sanchez MD;  Location: [facility] ([address] [place] [address]);  Service: Endoscopy;  Laterality: N/A;  2 days clear liquids before procedure    COLONOSCOPY N/A 7/29/2020    Procedure: COLONOSCOPY;  Surgeon: Andrea Shaver MD;  Location: [facility];  Service: Endoscopy;  Laterality: N/A;    ESOPHAGOGASTRODUODENOSCOPY N/A 11/19/2019    Procedure: EGD (ESOPHAGOGASTRODUODENOSCOPY);  Surgeon: Andrea Shaver MD;  Location: [facility];  Service: Endoscopy;  Laterality: N/A;    INJECTION OF ANESTHETIC AGENT AROUND NERVE Right 10/20/2020    Procedure: Block, Nerve genicular;  Surgeon: Alvin Curry MD;  Location: [facility] OR;  Service: Pain Management;  Laterality: Right;  right knee    INTRAMEDULLARY RODDING OF FEMUR Right 10/20/2022    Procedure: INSERTION, INTRAMEDULLARY SRINIVASA, FEMUR;  Surgeon: Gómez Selby MD;  Location: [facility] OR;  Service: Orthopedics;  Laterality: Right;    KNEE SURGERY      RADIOFREQUENCY ABLATION Right 1/8/2021    Procedure: Radiofrequency Ablation Right Knee Genicular RFA [place];  Surgeon: Alvin Curry MD;  Location: [facility] OR;  Service: Pain Management;  Laterality: Right;  Right knee     SMALL INTESTINE SURGERY      TUBE THORACOTOMY      UPPER GASTROINTESTINAL ENDOSCOPY          Family History    Problem Relation Age of Onset    Diabetes Mother     Stroke Mother     Diabetes Father     Kidney disease Father     Irritable bowel syndrome Cousin     Celiac disease Neg Hx     Cirrhosis Neg Hx     Colon cancer Neg Hx     Colon polyps Neg Hx     Cystic fibrosis Neg Hx     Esophageal cancer Neg Hx     Crohn's disease Neg Hx     Hemochromatosis Neg Hx     Inflammatory bowel disease Neg Hx     Liver cancer Neg Hx     Liver disease Neg Hx     Rectal cancer Neg Hx     Stomach cancer Neg Hx     Ulcerative colitis Neg Hx     Addison's disease Neg Hx     Melanoma Neg Hx     Psoriasis Neg Hx     Lupus Neg Hx     Eczema Neg Hx        Social History     Tobacco Use    Smoking status: Former     Packs/day: 1.00     Years: 15.00     Pack years: 15.00     Types: Cigarettes     Quit date: 3/10/1995     Years since quittin.6    Smokeless tobacco: Never   Substance Use Topics    Alcohol use: Never     Alcohol/week: 2.0 standard drinks     Types: 1 Cans of beer, 1 Shots of liquor per week    Drug use: No         Vitals:    22 0833   BP: 134/77   Pulse: 78   Resp: 14   Temp: 97.4 °F (36.3 °C)        Physical Exam:  /77 (BP Location: Right arm, Patient Position: Sitting, BP Method: Medium (Automatic))   Pulse 78   Temp 97.4 °F (36.3 °C) (Temporal)   Resp 14   Ht 6' (1.829 m)   Wt 70.3 kg (155 lb)   SpO2 96%   BMI 21.02 kg/m²     Physical Exam  Vitals and nursing note reviewed.   Constitutional:       Appearance: Normal appearance.   HENT:      Head: Normocephalic and atraumatic.      Nose: Nose normal.      Mouth/Throat:      Mouth: Mucous membranes are moist.      Pharynx: Oropharynx is clear.   Eyes:      Extraocular Movements: Extraocular movements intact.      Conjunctiva/sclera: Conjunctivae normal.   Cardiovascular:      Rate and Rhythm: Normal rate and regular rhythm.      Heart sounds: Normal heart sounds.   Pulmonary:      Effort: Pulmonary effort is normal.      Breath sounds: Normal breath sounds.    Abdominal:      General: Abdomen is flat. Bowel sounds are normal.      Palpations: Abdomen is soft.   Musculoskeletal:         General: Normal range of motion.      Cervical back: Normal range of motion and neck supple.   Skin:     General: Skin is warm and dry.   Neurological:      General: No focal deficit present.      Mental Status: He is alert and oriented to person, place, and time. Mental status is at baseline.   Psychiatric:         Mood and Affect: Mood normal.        Labs:  Lab Results   Component Value Date    WBC 5.11 10/22/2022    RBC 3.17 (L) 10/22/2022    HGB 9.7 (L) 10/22/2022    HCT 29.1 (L) 10/22/2022    MCV 92 10/22/2022    MCH 30.6 10/22/2022    MCHC 33.3 10/22/2022    RDW 13.2 10/22/2022    PLT 85 (L) 10/22/2022    MPV 11.1 10/22/2022    GRAN 3.8 10/22/2022    GRAN 74.1 (H) 10/22/2022    LYMPH 0.5 (L) 10/22/2022    LYMPH 10.6 (L) 10/22/2022    MONO 0.7 10/22/2022    MONO 13.3 10/22/2022    EOS 0.1 10/22/2022    BASO 0.01 10/22/2022    EOSINOPHIL 1.6 10/22/2022    BASOPHIL 0.2 10/22/2022     Sodium   Date Value Ref Range Status   10/22/2022 139 136 - 145 mmol/L Final     Potassium   Date Value Ref Range Status   10/22/2022 4.4 3.5 - 5.1 mmol/L Final     Chloride   Date Value Ref Range Status   10/22/2022 106 95 - 110 mmol/L Final     CO2   Date Value Ref Range Status   10/22/2022 25 23 - 29 mmol/L Final     Glucose   Date Value Ref Range Status   10/22/2022 102 70 - 110 mg/dL Final     BUN   Date Value Ref Range Status   10/22/2022 13 8 - 23 mg/dL Final     Creatinine   Date Value Ref Range Status   10/22/2022 0.7 0.5 - 1.4 mg/dL Final     Calcium   Date Value Ref Range Status   10/22/2022 8.0 (L) 8.7 - 10.5 mg/dL Final     Total Protein   Date Value Ref Range Status   10/22/2022 5.5 (L) 6.0 - 8.4 g/dL Final     Albumin   Date Value Ref Range Status   10/22/2022 3.1 (L) 3.5 - 5.2 g/dL Final     Total Bilirubin   Date Value Ref Range Status   10/22/2022 0.4 0.1 - 1.0 mg/dL Final     Comment:      For infants and newborns, interpretation of results should be based  on gestational age, weight and in agreement with clinical  observations.    Premature Infant recommended reference ranges:  Up to 24 hours.............<8.0 mg/dL  Up to 48 hours............<12.0 mg/dL  3-5 days..................<15.0 mg/dL  6-29 days.................<15.0 mg/dL       Alkaline Phosphatase   Date Value Ref Range Status   10/22/2022 80 55 - 135 U/L Final     AST   Date Value Ref Range Status   10/22/2022 28 10 - 40 U/L Final     ALT   Date Value Ref Range Status   10/22/2022 30 10 - 44 U/L Final     Anion Gap   Date Value Ref Range Status   10/22/2022 8 8 - 16 mmol/L Final     eGFR if    Date Value Ref Range Status   11/22/2021 112 > OR = 60 mL/min/1.73m2 Final     eGFR if non    Date Value Ref Range Status   11/22/2021 97 > OR = 60 mL/min/1.73m2 Final       A/P:    Right lower extremity DVT  -patient has had a left lower extremity DVT about 15 years ago  -this 1 is provoked in nature  -given that it is a major transient factor as the cause of his DVT, even though it is a 2nd clot, I do not believe he needs lifelong anticoagulation.  I would recommend 3 months of anticoagulation at least.  If the patient wants to take it longer than that, it is perfectly reasonable given his history.  Patient states he wants follow-up with his PCP.  No further workup needed.  I was able to answer all of his and his daughter's questions.      Aurash Khoobehi, MD  Hematology and Oncology

## 2022-11-04 NOTE — PROGRESS NOTES
Post-op Note    HPI    Tristin Cerda is here 2 weeks s/p the following procedure:     10/20/2022  Intramedullary nailing Right hip     Overall doing well. Pain controlled on current regimen. He was having home health come, but they stopped due to DVT as they need physician clearance to continue. He is WBAT. Denies any chest pain or shortness of breathe. Denies any drainage from the incision. Denies any fevers, chills or paresthesias. Denies any CP or SOB. His main concern today is knee swelling and pressure.     He was experiencing some knee swelling and calf pain, LE US ordered revealing Nonocclusive intraluminal thrombus of the common femoral vein. Per PCP, he was instructed to increase eliquis to 5mg BID and has made this change.     DVT Prophylaxis: eliquis 5 mg BID      Physical Exam:     Patient is alert and oriented no acute distress.   Assistive Device: walker    Right hip: Incision(s) are well healed.  There is no evidence of dehiscence.  There is no induration erythema or signs of infection of the lateral incisions.  Appropriate soft tissue swelling.  Compartments are soft and compressible.  Warm well-perfused extremity.  Right knee swelling and pain with ROM past 90.  Positive Homans    Two-view right femur x-rays ordered and reviewed by me showing intramedullary nail fixation of a right subtrochanteric femur fracture.  Good alignment.  No evidence of complication.    Assessment    Tristin Cerda is 2 weeks Post-op     Plan:    Overall doing as expected.  Diagnosed with postoperative DVT.  We will continue his Eliquis to 5 mg b.i.d..  His PCP is aware.  From my standpoint no restrictions.  Weightbearing as tolerated to the right lower extremity and will need formal physical therapy or home health physical therapy to improve mobility and function.

## 2022-11-11 ENCOUNTER — DOCUMENT SCAN (OUTPATIENT)
Dept: HOME HEALTH SERVICES | Facility: HOSPITAL | Age: 67
End: 2022-11-11
Payer: MEDICARE

## 2022-11-14 ENCOUNTER — EXTERNAL HOME HEALTH (OUTPATIENT)
Dept: HOME HEALTH SERVICES | Facility: HOSPITAL | Age: 67
End: 2022-11-14
Payer: MEDICARE

## 2022-11-21 ENCOUNTER — PATIENT MESSAGE (OUTPATIENT)
Dept: PAIN MEDICINE | Facility: CLINIC | Age: 67
End: 2022-11-21
Payer: MEDICARE

## 2022-11-21 ENCOUNTER — OFFICE VISIT (OUTPATIENT)
Dept: FAMILY MEDICINE | Facility: CLINIC | Age: 67
End: 2022-11-21
Payer: MEDICARE

## 2022-11-21 VITALS
HEIGHT: 72 IN | RESPIRATION RATE: 17 BRPM | WEIGHT: 145.5 LBS | DIASTOLIC BLOOD PRESSURE: 64 MMHG | OXYGEN SATURATION: 98 % | SYSTOLIC BLOOD PRESSURE: 128 MMHG | TEMPERATURE: 98 F | BODY MASS INDEX: 19.71 KG/M2 | HEART RATE: 69 BPM

## 2022-11-21 DIAGNOSIS — D69.6 THROMBOCYTOPENIC: ICD-10-CM

## 2022-11-21 DIAGNOSIS — E53.8 B12 DEFICIENCY: Primary | ICD-10-CM

## 2022-11-21 DIAGNOSIS — K50.018 CROHN'S DISEASE OF SMALL INTESTINE WITH OTHER COMPLICATION: ICD-10-CM

## 2022-11-21 DIAGNOSIS — I82.4Z1 ACUTE DEEP VEIN THROMBOSIS (DVT) OF DISTAL VEIN OF RIGHT LOWER EXTREMITY: ICD-10-CM

## 2022-11-21 PROCEDURE — 1111F DSCHRG MED/CURRENT MED MERGE: CPT | Mod: CPTII,S$GLB,, | Performed by: FAMILY MEDICINE

## 2022-11-21 PROCEDURE — 3008F BODY MASS INDEX DOCD: CPT | Mod: CPTII,S$GLB,, | Performed by: FAMILY MEDICINE

## 2022-11-21 PROCEDURE — 99999 PR PBB SHADOW E&M-EST. PATIENT-LVL V: CPT | Mod: PBBFAC,,, | Performed by: FAMILY MEDICINE

## 2022-11-21 PROCEDURE — 1100F PR PT FALLS ASSESS DOC 2+ FALLS/FALL W/INJURY/YR: ICD-10-PCS | Mod: CPTII,S$GLB,, | Performed by: FAMILY MEDICINE

## 2022-11-21 PROCEDURE — 1100F PTFALLS ASSESS-DOCD GE2>/YR: CPT | Mod: CPTII,S$GLB,, | Performed by: FAMILY MEDICINE

## 2022-11-21 PROCEDURE — 96372 THER/PROPH/DIAG INJ SC/IM: CPT | Mod: S$GLB,,, | Performed by: FAMILY MEDICINE

## 2022-11-21 PROCEDURE — 3008F PR BODY MASS INDEX (BMI) DOCUMENTED: ICD-10-PCS | Mod: CPTII,S$GLB,, | Performed by: FAMILY MEDICINE

## 2022-11-21 PROCEDURE — 3074F SYST BP LT 130 MM HG: CPT | Mod: CPTII,S$GLB,, | Performed by: FAMILY MEDICINE

## 2022-11-21 PROCEDURE — 90694 FLU VACCINE - QUADRIVALENT - ADJUVANTED: ICD-10-PCS | Mod: S$GLB,,, | Performed by: FAMILY MEDICINE

## 2022-11-21 PROCEDURE — 99999 PR PBB SHADOW E&M-EST. PATIENT-LVL V: ICD-10-PCS | Mod: PBBFAC,,, | Performed by: FAMILY MEDICINE

## 2022-11-21 PROCEDURE — 3078F PR MOST RECENT DIASTOLIC BLOOD PRESSURE < 80 MM HG: ICD-10-PCS | Mod: CPTII,S$GLB,, | Performed by: FAMILY MEDICINE

## 2022-11-21 PROCEDURE — G0008 ADMIN INFLUENZA VIRUS VAC: HCPCS | Mod: 59,S$GLB,, | Performed by: FAMILY MEDICINE

## 2022-11-21 PROCEDURE — 1111F PR DISCHARGE MEDS RECONCILED W/ CURRENT OUTPATIENT MED LIST: ICD-10-PCS | Mod: CPTII,S$GLB,, | Performed by: FAMILY MEDICINE

## 2022-11-21 PROCEDURE — 99214 OFFICE O/P EST MOD 30 MIN: CPT | Mod: 25,S$GLB,, | Performed by: FAMILY MEDICINE

## 2022-11-21 PROCEDURE — 3078F DIAST BP <80 MM HG: CPT | Mod: CPTII,S$GLB,, | Performed by: FAMILY MEDICINE

## 2022-11-21 PROCEDURE — 99214 PR OFFICE/OUTPT VISIT, EST, LEVL IV, 30-39 MIN: ICD-10-PCS | Mod: 25,S$GLB,, | Performed by: FAMILY MEDICINE

## 2022-11-21 PROCEDURE — 90694 VACC AIIV4 NO PRSRV 0.5ML IM: CPT | Mod: S$GLB,,, | Performed by: FAMILY MEDICINE

## 2022-11-21 PROCEDURE — 3074F PR MOST RECENT SYSTOLIC BLOOD PRESSURE < 130 MM HG: ICD-10-PCS | Mod: CPTII,S$GLB,, | Performed by: FAMILY MEDICINE

## 2022-11-21 PROCEDURE — 99499 UNLISTED E&M SERVICE: CPT | Mod: HCNC,S$GLB,, | Performed by: FAMILY MEDICINE

## 2022-11-21 PROCEDURE — 3288F PR FALLS RISK ASSESSMENT DOCUMENTED: ICD-10-PCS | Mod: CPTII,S$GLB,, | Performed by: FAMILY MEDICINE

## 2022-11-21 PROCEDURE — 99499 RISK ADDL DX/OHS AUDIT: ICD-10-PCS | Mod: HCNC,S$GLB,, | Performed by: FAMILY MEDICINE

## 2022-11-21 PROCEDURE — 1159F MED LIST DOCD IN RCRD: CPT | Mod: CPTII,S$GLB,, | Performed by: FAMILY MEDICINE

## 2022-11-21 PROCEDURE — 3288F FALL RISK ASSESSMENT DOCD: CPT | Mod: CPTII,S$GLB,, | Performed by: FAMILY MEDICINE

## 2022-11-21 PROCEDURE — G0008 FLU VACCINE - QUADRIVALENT - ADJUVANTED: ICD-10-PCS | Mod: 59,S$GLB,, | Performed by: FAMILY MEDICINE

## 2022-11-21 PROCEDURE — 1159F PR MEDICATION LIST DOCUMENTED IN MEDICAL RECORD: ICD-10-PCS | Mod: CPTII,S$GLB,, | Performed by: FAMILY MEDICINE

## 2022-11-21 PROCEDURE — 96372 PR INJECTION,THERAP/PROPH/DIAG2ST, IM OR SUBCUT: ICD-10-PCS | Mod: S$GLB,,, | Performed by: FAMILY MEDICINE

## 2022-11-21 RX ORDER — CYANOCOBALAMIN 1000 UG/ML
1000 INJECTION, SOLUTION INTRAMUSCULAR; SUBCUTANEOUS
Status: COMPLETED | OUTPATIENT
Start: 2022-11-21 | End: 2022-11-21

## 2022-11-21 RX ADMIN — CYANOCOBALAMIN 1000 MCG: 1000 INJECTION, SOLUTION INTRAMUSCULAR; SUBCUTANEOUS at 09:11

## 2022-11-21 NOTE — PROGRESS NOTES
Identified name and . Administered 1000 mcg B12, IM. Patient tolerated well, aseptic technique maintained. Pain scale 0/10 with injection. No residual bleeding at insertion site noted.    Identified pts name and , High Dose Flu vaccine administered IM, pt tolerated well. Aseptic technique maintained. Pain scale 0 out of 10 on pain scale. Pt instructed to wait in clinic for 15 minutes after injection was given.

## 2022-11-21 NOTE — PATIENT INSTRUCTIONS
Deonte Crow,     If you are due for any health screening(s) below please notify me so we can arrange them to be ordered and scheduled to maintain your health. Most healthy patients complete it. Don't lose out on improving your health.     Tests to Keep You Healthy    Colon Cancer Screening: DUE         Colon Cancer Screening    Of cancers affecting both men and women, colorectal cancer is the third leading cancer killer in the [place]. But it doesnt have to be. Screening can prevent colorectal cancer or find it at an early stage when treatment often leads to a cure.    A colonoscopy is the preferred test for detecting colon cancer. It is needed only once every 10 years if results are negative. While sedated, a flexible, lighted tube with a tiny camera is inserted into the rectum and advanced through the colon to look for cancers. An alternative screening test that is used at home and returned to the lab may also be used. It detects hidden blood in bowel movements which could indicate cancer in the colon. If results are positive, you will need a colonoscopy to determine if the blood is a sign of cancer. This type of follow up (diagnostic) colonoscopy usually requires additional copays as required by your insurance provider. Please contact your PCP if you have any questions.    Although you are still overdue for this important screening, due to the COVID-19 pandemic, we recommend scheduling it in the near future.

## 2022-11-21 NOTE — PROGRESS NOTES
Subjective:   Patient ID: Tristin Cerda is a 67 y.o. male     Chief Complaint:Annual Exam      HPI  Review of Systems   Respiratory:  Negative for shortness of breath.    Cardiovascular:  Negative for chest pain.   Gastrointestinal:  Negative for abdominal pain.   Genitourinary:  Negative for dysuria.   Past Medical History:   Diagnosis Date    Anxiety     Basal cell carcinoma 07/2017    R forehead and R temple    Crohn's disease     age 17    Encounter for blood transfusion     Short bowel syndrome     Vitamin B deficiency     Vitamin D deficiency      Past Surgical History:   Procedure Laterality Date    3 small bowel resections      APPENDECTOMY      CHOLECYSTECTOMY      COLONOSCOPY      COLONOSCOPY N/A 2/14/2017    Procedure: COLONOSCOPY;  Surgeon: Nela Sanchez MD;  Location: [address]);  Service: Endoscopy;  Laterality: N/A;    COLONOSCOPY N/A 3/14/2017    Procedure: COLONOSCOPY;  Surgeon: Nela Sanchez MD;  Location: [address]);  Service: Endoscopy;  Laterality: N/A;  2 days clear liquids before procedure    COLONOSCOPY N/A 7/29/2020    Procedure: COLONOSCOPY;  Surgeon: Andrea Sahver MD;  Location: [facility];  Service: Endoscopy;  Laterality: N/A;    ESOPHAGOGASTRODUODENOSCOPY N/A 11/19/2019    Procedure: EGD (ESOPHAGOGASTRODUODENOSCOPY);  Surgeon: Andrea Shaver MD;  Location: [facility];  Service: Endoscopy;  Laterality: N/A;    INJECTION OF ANESTHETIC AGENT AROUND NERVE Right 10/20/2020    Procedure: Block, Nerve genicular;  Surgeon: Alvin Curry MD;  Location: [facility] OR;  Service: Pain Management;  Laterality: Right;  right knee    INTRAMEDULLARY RODDING OF FEMUR Right 10/20/2022    Procedure: INSERTION, INTRAMEDULLARY SRINIVASA, FEMUR;  Surgeon: Gómez Selby MD;  Location: [facility] OR;  Service: Orthopedics;  Laterality: Right;    KNEE SURGERY      RADIOFREQUENCY ABLATION Right 1/8/2021    Procedure: Radiofrequency Ablation Right Knee Genicular RFA Coolief;   Surgeon: Alvin Curry MD;  Location: [facility];  Service: Pain Management;  Laterality: Right;  Right knee     SMALL INTESTINE SURGERY      TUBE THORACOTOMY      UPPER GASTROINTESTINAL ENDOSCOPY       Objective:     Vitals:    11/21/22 0847   BP: 128/64   Pulse: 69   Resp: 17   Temp: 97.6 °F (36.4 °C)     Body mass index is 19.73 kg/m².  Physical Exam  Vitals and nursing note reviewed.   Constitutional:       Appearance: He is well-developed.   HENT:      Head: Normocephalic and atraumatic.   Eyes:      General: No scleral icterus.     Conjunctiva/sclera: Conjunctivae normal.   Cardiovascular:      Heart sounds: No murmur heard.  Pulmonary:      Effort: Pulmonary effort is normal. No respiratory distress.   Musculoskeletal:         General: No deformity. Normal range of motion.      Cervical back: Normal range of motion and neck supple.   Skin:     Coloration: Skin is not pale.      Findings: No rash.   Neurological:      Mental Status: He is alert and oriented to person, place, and time.   Psychiatric:         Behavior: Behavior normal.         Thought Content: Thought content normal.         Judgment: Judgment normal.   Assessment:     1. B12 deficiency    2. Thrombocytopenic    3. Crohn's disease of small intestine with other complication    4. Acute deep vein thrombosis (DVT) of distal vein of right lower extremity      Plan:   B12 deficiency  -     cyanocobalamin injection 1,000 mcg    Thrombocytopenic  Review of bloodwork rom 10/2022 shows stable platelets  Crohn's disease of small intestine with other complication  -     cyanocobalamin injection 1,000 mcg    Acute deep vein thrombosis (DVT) of distal vein of right lower extremity  On eliquis. Seen Hematology and advised could d/c after 3 months but patient states unsure if want to d/c due to risks of recurrent DVT. Will f/u closely  Other orders  -     Influenza - High Dose (65+) (PF) (IM)          Total time spent of Less than 30 minutes minutes on the day  of the visit.This includes face to face time and preparing to see the patient, obtaining and reviewing separately obtained history, documenting clinical information in the electronic or other health record, independently interpreting results and communicating results to the patient/family/caregiver, or care coordinator.    Established patient with me has been instructed that must see me at least 1 time yearly (every 365 days) for refills of medications. Seeing other providers in this clinic is fine but expectation is to see me yearly.    Tyler Smith MD  11/21/2022    Portions of this note have been dictated with KAT Zapata

## 2022-11-28 ENCOUNTER — PATIENT MESSAGE (OUTPATIENT)
Dept: GASTROENTEROLOGY | Facility: CLINIC | Age: 67
End: 2022-11-28
Payer: MEDICARE

## 2022-12-02 ENCOUNTER — OFFICE VISIT (OUTPATIENT)
Dept: ORTHOPEDICS | Facility: CLINIC | Age: 67
End: 2022-12-02
Payer: MEDICARE

## 2022-12-02 ENCOUNTER — OFFICE VISIT (OUTPATIENT)
Dept: PAIN MEDICINE | Facility: CLINIC | Age: 67
End: 2022-12-02
Payer: MEDICARE

## 2022-12-02 VITALS — WEIGHT: 145 LBS | RESPIRATION RATE: 18 BRPM | BODY MASS INDEX: 19.64 KG/M2 | HEIGHT: 72 IN

## 2022-12-02 VITALS
DIASTOLIC BLOOD PRESSURE: 85 MMHG | SYSTOLIC BLOOD PRESSURE: 179 MMHG | HEART RATE: 67 BPM | HEIGHT: 73 IN | WEIGHT: 145 LBS | BODY MASS INDEX: 19.22 KG/M2

## 2022-12-02 DIAGNOSIS — G89.4 CHRONIC PAIN DISORDER: ICD-10-CM

## 2022-12-02 DIAGNOSIS — R10.9 CHRONIC ABDOMINAL PAIN: ICD-10-CM

## 2022-12-02 DIAGNOSIS — M17.11 OSTEOARTHRITIS OF RIGHT KNEE, UNSPECIFIED OSTEOARTHRITIS TYPE: ICD-10-CM

## 2022-12-02 DIAGNOSIS — Z87.81 S/P RIGHT HIP FRACTURE: Primary | ICD-10-CM

## 2022-12-02 DIAGNOSIS — F11.90 CHRONIC, CONTINUOUS USE OF OPIOIDS: Primary | ICD-10-CM

## 2022-12-02 DIAGNOSIS — G89.29 CHRONIC ABDOMINAL PAIN: ICD-10-CM

## 2022-12-02 PROCEDURE — 1101F PT FALLS ASSESS-DOCD LE1/YR: CPT | Mod: CPTII,S$GLB,, | Performed by: PHYSICIAN ASSISTANT

## 2022-12-02 PROCEDURE — 3008F BODY MASS INDEX DOCD: CPT | Mod: CPTII,S$GLB,, | Performed by: PHYSICIAN ASSISTANT

## 2022-12-02 PROCEDURE — 99024 PR POST-OP FOLLOW-UP VISIT: ICD-10-PCS | Mod: S$GLB,,, | Performed by: ORTHOPAEDIC SURGERY

## 2022-12-02 PROCEDURE — 3008F PR BODY MASS INDEX (BMI) DOCUMENTED: ICD-10-PCS | Mod: CPTII,S$GLB,, | Performed by: ORTHOPAEDIC SURGERY

## 2022-12-02 PROCEDURE — 3008F BODY MASS INDEX DOCD: CPT | Mod: CPTII,S$GLB,, | Performed by: ORTHOPAEDIC SURGERY

## 2022-12-02 PROCEDURE — 1101F PR PT FALLS ASSESS DOC 0-1 FALLS W/OUT INJ PAST YR: ICD-10-PCS | Mod: CPTII,S$GLB,, | Performed by: PHYSICIAN ASSISTANT

## 2022-12-02 PROCEDURE — 1159F MED LIST DOCD IN RCRD: CPT | Mod: CPTII,S$GLB,, | Performed by: PHYSICIAN ASSISTANT

## 2022-12-02 PROCEDURE — 3079F DIAST BP 80-89 MM HG: CPT | Mod: CPTII,S$GLB,, | Performed by: PHYSICIAN ASSISTANT

## 2022-12-02 PROCEDURE — 99999 PR PBB SHADOW E&M-EST. PATIENT-LVL III: ICD-10-PCS | Mod: PBBFAC,,, | Performed by: PHYSICIAN ASSISTANT

## 2022-12-02 PROCEDURE — 1159F PR MEDICATION LIST DOCUMENTED IN MEDICAL RECORD: ICD-10-PCS | Mod: CPTII,S$GLB,, | Performed by: PHYSICIAN ASSISTANT

## 2022-12-02 PROCEDURE — 99999 PR PBB SHADOW E&M-EST. PATIENT-LVL II: ICD-10-PCS | Mod: PBBFAC,,, | Performed by: ORTHOPAEDIC SURGERY

## 2022-12-02 PROCEDURE — 3077F PR MOST RECENT SYSTOLIC BLOOD PRESSURE >= 140 MM HG: ICD-10-PCS | Mod: CPTII,S$GLB,, | Performed by: PHYSICIAN ASSISTANT

## 2022-12-02 PROCEDURE — 99214 OFFICE O/P EST MOD 30 MIN: CPT | Mod: S$GLB,,, | Performed by: PHYSICIAN ASSISTANT

## 2022-12-02 PROCEDURE — 99999 PR PBB SHADOW E&M-EST. PATIENT-LVL III: CPT | Mod: PBBFAC,,, | Performed by: PHYSICIAN ASSISTANT

## 2022-12-02 PROCEDURE — 3077F SYST BP >= 140 MM HG: CPT | Mod: CPTII,S$GLB,, | Performed by: PHYSICIAN ASSISTANT

## 2022-12-02 PROCEDURE — 99214 PR OFFICE/OUTPT VISIT, EST, LEVL IV, 30-39 MIN: ICD-10-PCS | Mod: S$GLB,,, | Performed by: PHYSICIAN ASSISTANT

## 2022-12-02 PROCEDURE — 3079F PR MOST RECENT DIASTOLIC BLOOD PRESSURE 80-89 MM HG: ICD-10-PCS | Mod: CPTII,S$GLB,, | Performed by: PHYSICIAN ASSISTANT

## 2022-12-02 PROCEDURE — 80326 AMPHETAMINES 5 OR MORE: CPT | Performed by: PHYSICIAN ASSISTANT

## 2022-12-02 PROCEDURE — 3008F PR BODY MASS INDEX (BMI) DOCUMENTED: ICD-10-PCS | Mod: CPTII,S$GLB,, | Performed by: PHYSICIAN ASSISTANT

## 2022-12-02 PROCEDURE — 99999 PR PBB SHADOW E&M-EST. PATIENT-LVL II: CPT | Mod: PBBFAC,,, | Performed by: ORTHOPAEDIC SURGERY

## 2022-12-02 PROCEDURE — 3288F PR FALLS RISK ASSESSMENT DOCUMENTED: ICD-10-PCS | Mod: CPTII,S$GLB,, | Performed by: PHYSICIAN ASSISTANT

## 2022-12-02 PROCEDURE — 3288F FALL RISK ASSESSMENT DOCD: CPT | Mod: CPTII,S$GLB,, | Performed by: PHYSICIAN ASSISTANT

## 2022-12-02 PROCEDURE — 99024 POSTOP FOLLOW-UP VISIT: CPT | Mod: S$GLB,,, | Performed by: ORTHOPAEDIC SURGERY

## 2022-12-02 RX ORDER — OXYCODONE AND ACETAMINOPHEN 5; 325 MG/1; MG/1
1 TABLET ORAL EVERY 8 HOURS PRN
Qty: 90 EACH | Refills: 0 | Status: SHIPPED | OUTPATIENT
Start: 2022-12-02 | End: 2022-12-31

## 2022-12-02 NOTE — PROGRESS NOTES
PCP: Tyler Smith MD      CC: abdominal pain    Interval history: Mr. Cerda is a 67 y.o. male with an extensive history of crohn's disease who presents today for f/u s/p and medication refill. He is healing from right hip fracture 1 month ago.  He has been taking Percocet 5-325 mg q 6 h for post op pain. He tried tramadol last week which was not affective for current pain. Acute left knee pain resolved after left intra articular steroid injection with Dr. Villatoro.  Right knee RFA  provided 75% relief.    Abdominal pain remains stable.   He does report diarrhea at times but no blood stools. Reports anal pain and itching 2/2 chronic diarrhea.  He is currently being evaluated by gastroenterology.  OTC Lidocaine 2 % provides some minimal relief.  Compounding cream was too expensive.  He rates his pain 610.     Prior HPI:   Mr. Cerda is a 60 year old male with PMH of crohn's disease referred by Dr. Hansen.  Patient states being diagnosed with crohn's disease since the age of 14.    He has had multiple GI surgeries and large bowel and small bowel removals.  During that time, he was being managed by providers in [place].  He was TPN dependent for many years until about two years ago.  He is now stable on an oral diet.  He did not have a primary care physician nor a gastroenterologist for many years.  He is currently trying to establish care.  He has future appointment with Dr. Ch.  He states taking Demerol 50mg q8hrs as needed for pain. It has provided relief of his nausea and pain with diarrhea.  He was last prescribed Demerol in July 2014.  Since then, he has been bearing with the pain.  It is a sharp shooting pain in his mid abdomen.      ROS:  CONSTITUTIONAL: No fevers, chills, night sweats, wt. loss, appetite changes  SKIN: no rashes or itching  ENT: No headaches, head trauma, vision changes, or eye pain  LYMPH NODES: None noticed   CV: No chest pain, palpitations.   RESP: No shortness of breath,  dyspnea on exertion, cough, wheezing, or hemoptysis  GI: No nausea, emesis, diarrhea, constipation, melena, hematochezia, pain.    : No dysuria, hematuria, urgency, or frequency   HEME: No easy bruising, bleeding problems  PSYCHIATRIC: No depression, anxiety, psychosis, hallucinations.  NEURO: No seizures, memory loss, dizziness or difficulty sleeping  MSK: no joint pain      Past Medical History:   Diagnosis Date    Anxiety     Basal cell carcinoma 07/2017    R forehead and R temple    Crohn's disease     age 17    Encounter for blood transfusion     Short bowel syndrome     Vitamin B deficiency     Vitamin D deficiency      Past Surgical History:   Procedure Laterality Date    3 small bowel resections      APPENDECTOMY      CHOLECYSTECTOMY      COLONOSCOPY      COLONOSCOPY N/A 2/14/2017    Procedure: COLONOSCOPY;  Surgeon: Nela Sanchez MD;  Location: [address]);  Service: Endoscopy;  Laterality: N/A;    COLONOSCOPY N/A 3/14/2017    Procedure: COLONOSCOPY;  Surgeon: Nela Sanchez MD;  Location: [address]);  Service: Endoscopy;  Laterality: N/A;  2 days clear liquids before procedure    COLONOSCOPY N/A 7/29/2020    Procedure: COLONOSCOPY;  Surgeon: Andrea Shaver MD;  Location: [facility];  Service: Endoscopy;  Laterality: N/A;    ESOPHAGOGASTRODUODENOSCOPY N/A 11/19/2019    Procedure: EGD (ESOPHAGOGASTRODUODENOSCOPY);  Surgeon: Andrea Shaver MD;  Location: [facility];  Service: Endoscopy;  Laterality: N/A;    INJECTION OF ANESTHETIC AGENT AROUND NERVE Right 10/20/2020    Procedure: Block, Nerve genicular;  Surgeon: Alvin Curry MD;  [facility]: [facility] OR;  Service: Pain Management;  Laterality: Right;  right knee    INTRAMEDULLARY RODDING OF FEMUR Right 10/20/2022    Procedure: INSERTION, INTRAMEDULLARY SRINIVASA, FEMUR;  Surgeon: Gómez Selby MD;  Location: [facility] OR;  Service: Orthopedics;  Laterality: Right;    KNEE SURGERY      RADIOFREQUENCY ABLATION Right 1/8/2021    Procedure:  Radiofrequency Ablation Right Knee Genicular RFA Coolief;  Surgeon: Alvin Curry MD;  Location: [facility];  Service: Pain Management;  Laterality: Right;  Right knee     SMALL INTESTINE SURGERY      TUBE THORACOTOMY      UPPER GASTROINTESTINAL ENDOSCOPY       Family History   Problem Relation Age of Onset    Diabetes Mother     Stroke Mother     Diabetes Father     Kidney disease Father     Irritable bowel syndrome Cousin     Celiac disease Neg Hx     Cirrhosis Neg Hx     Colon cancer Neg Hx     Colon polyps Neg Hx     Cystic fibrosis Neg Hx     Esophageal cancer Neg Hx     Crohn's disease Neg Hx     Hemochromatosis Neg Hx     Inflammatory bowel disease Neg Hx     Liver cancer Neg Hx     Liver disease Neg Hx     Rectal cancer Neg Hx     Stomach cancer Neg Hx     Ulcerative colitis Neg Hx     Addison's disease Neg Hx     Melanoma Neg Hx     Psoriasis Neg Hx     Lupus Neg Hx     Eczema Neg Hx      Social History     Socioeconomic History    Marital status:    Tobacco Use    Smoking status: Former     Packs/day: 1.00     Years: 15.00     Pack years: 15.00     Types: Cigarettes     Quit date: 3/10/1995     Years since quittin.7    Smokeless tobacco: Never   Substance and Sexual Activity    Alcohol use: Never     Alcohol/week: 2.0 standard drinks     Types: 1 Cans of beer, 1 Shots of liquor per week    Drug use: No    Sexual activity: Yes     Partners: Female   Social History Narrative    Retired      Social Determinants of Health     Financial Resource Strain: Medium Risk    Difficulty of Paying Living Expenses: Somewhat hard   Food Insecurity: No Food Insecurity    Worried About Running Out of Food in the Last Year: Never true    Ran Out of Food in the Last Year: Never true   Transportation Needs: Unmet Transportation Needs    Lack of Transportation (Medical): Yes    Lack of Transportation (Non-Medical): No   Physical Activity: Unknown    Days of Exercise per Week: Patient refused    Minutes of Exercise per  "Session: 0 min   Stress: No Stress Concern Present    Feeling of Stress : Not at all   Social Connections: Moderately Isolated    Frequency of Communication with Friends and Family: Three times a week    Frequency of Social Gatherings with Friends and Family: Patient refused    Attends Spiritism Services: Never    Active Member of Clubs or Organizations: No    Attends Club or Organization Meetings: Patient refused    Marital Status:    Housing Stability: Low Risk     Unable to Pay for Housing in the Last Year: No    Number of Places Lived in the Last Year: 1    Unstable Housing in the Last Year: No     Medications/Allergies: See med card    Vitals:    12/02/22 0920   BP: (!) 179/85   Pulse: 67   Weight: 65.8 kg (145 lb)   Height: 6' 1" (1.854 m)   PainSc:   6   PainLoc: Abdomen     Physical exam:    GENERAL: A and O x3, the patient appears well groomed and is in no acute distress.  Skin: No rashes or obvious lesions  HEENT: normocephalic, atraumatic  CARDIOVASCULAR:  RRR  LUNGS: non labored breathing  ABDOMEN: soft, nontender. No rebound   UPPER EXTREMITIES: Normal alignment, normal range of motion, no atrophy, no skin changes,  hair growth and nail growth normal and equal bilaterally. No swelling, no tenderness.    LOWER EXTREMITIES:  TTP left patella, swelling along medial and superior aspect. No redness or warmth.     LUMBAR SPINE  Lumbar spine: ROM is full with flexion extension and oblique extension with no increased pain.    Lam's test causes no increased pain on either side.    Supine straight leg raise is negative bilaterally.    Internal and external rotation of the hip causes no increased pain on either side.  Myofascial exam: No tenderness to palpation across lumbar paraspinous muscles.    MENTAL STATUS: normal orientation, speech, language, and fund of knowledge for social situation.  Emotional state appropriate.    CRANIAL NERVES:  II:  PERRL bilaterally,   III,IV,VI: EOMI.    V:  Facial " sensation equal bilaterally  VII:  Facial motor function normal.  VIII:  Hearing equal to finger rub bilaterally  IX/X: Gag normal, palate symmetric  XI:  Shoulder shrug equal, head turn equal  XII:  Tongue midline without fasciculations    MOTOR: Tone and bulk: normal bilateral upper and lower Strength: normal   SENSATION: Light touch and pinprick intact bilaterally  REFLEXES: normal, symmetric, nonbrisk.  Toes down, no clonus. No hoffmans.  GAIT: normal rise, base, steps, and arm swing.      Imaging:  Left knee xray 3/6/22  Mild tricompartmental degenerative osteoarthrosis with mild joint space narrowing and small osteophyte formation.  Subtle calcification highlighted cartilage consistent with chondrocalcinosis.     There is a small suprapatellar effusion.     Impression:     1. Mild tricompartmental degenerative osteoarthrosis  2. Chondrocalcinosis.  3. Small suprapatellar effusion.       Assessment:  Mr. Cerda is a 67 y.o. male with abdominal pain  1. Chronic, continuous use of opioids    2. Osteoarthritis of right knee, unspecified osteoarthritis type    3. Chronic abdominal pain    4. Chronic pain disorder           Plan:  1. Patient with long history of crohn's disease and chronic abdominal pain.  Continue care with GI.  2. Discontinue Tramadol 50 mg q 8 h prn x 1 month. Oxycodone 5-325 mg q 8 h x 1 month.    reviewed.  No signs of aberrant behavior.  Previous UDS consistent.  Script provided for 3 months  3. Continue OTC lidocaine cream  4. Monitor progress from right sided peripheral nerve block.   5. F/u with orthopedics as needed  6. 3 months or sooner  All medication management was performed by Dr. Alvin Curry

## 2022-12-02 NOTE — PROGRESS NOTES
Post-op Note    HPI    Tristin Cerda is here 6 weeks s/p the following procedure:     10/20/2022  Intramedullary nailing Right hip     Overall doing much better.  Swelling in the knee has improved.  He has finished home health PT. He is still in the walker.  No significant calf pain or swelling      Physical Exam:     Patient is alert and oriented no acute distress.   Assistive Device: walker    Right hip: Incision(s) are well healed.  There is no evidence of dehiscence.  There is no induration erythema or signs of infection of the lateral incisions.  Appropriate soft tissue swelling.  Compartments are soft and compressible.  Warm well-perfused extremity.  Right knee .  Improved swelling right knee.  Range of motion .  Stable to varus valgus stress.  [facility]    Two-view right femur x-rays ordered and reviewed by me showing intramedullary nail fixation of a right subtrochanteric femur fracture.  Good alignment.  No evidence of complication.  There is healing at the fracture site.    Assessment    Tristin Cerda is 6 weeks Post-op     Plan:      He continues to do well.  We discussed continuation of home exercise program.  He wants to start outpatient therapy once he is able to dry.  We discussed weaning out of the walker whenever he is comfortable.  His x-rays show good healing and good alignment.  Patient has an appointment with his pain management doctor today.  We will see if they want to take over chronic pain medication.  I can do 1 more prescription if necessary.

## 2022-12-07 LAB
6MAM UR QL: NOT DETECTED
7AMINOCLONAZEPAM UR QL: NOT DETECTED
A-OH ALPRAZ UR QL: NOT DETECTED
ALPHA-OH-MIDAZOLAM: NOT DETECTED
ALPRAZ UR QL: NOT DETECTED
AMPHET UR QL SCN: NOT DETECTED
ANNOTATION COMMENT IMP: NORMAL
ANNOTATION COMMENT IMP: NORMAL
BARBITURATES UR QL: NOT DETECTED
BUPRENORPHINE UR QL: NOT DETECTED
BZE UR QL: NOT DETECTED
CARBOXYTHC UR QL: NOT DETECTED
CARISOPRODOL UR QL: NOT DETECTED
CLONAZEPAM UR QL: NOT DETECTED
CODEINE UR QL: NOT DETECTED
CREAT UR-MCNC: 51.1 MG/DL (ref 20–400)
DIAZEPAM UR QL: NOT DETECTED
ETHYL GLUCURONIDE UR QL: NOT DETECTED
FENTANYL UR QL: NOT DETECTED
GABAPENTIN: NOT DETECTED
HYDROCODONE UR QL: NOT DETECTED
HYDROMORPHONE UR QL: NOT DETECTED
LORAZEPAM UR QL: NOT DETECTED
MDA UR QL: NOT DETECTED
MDEA UR QL: NOT DETECTED
MDMA UR QL: NOT DETECTED
ME-PHENIDATE UR QL: NOT DETECTED
METHADONE UR QL: NOT DETECTED
METHAMPHET UR QL: NOT DETECTED
MIDAZOLAM UR QL SCN: NOT DETECTED
MORPHINE UR QL: NOT DETECTED
NALOXONE: NOT DETECTED
NORBUPRENORPHINE UR QL CFM: NOT DETECTED
NORDIAZEPAM UR QL: NOT DETECTED
NORFENTANYL UR QL: NOT DETECTED
NORHYDROCODONE UR QL CFM: NOT DETECTED
NORMEPERIDINE UR QL CFM: NOT DETECTED
NOROXYCODONE UR QL CFM: NOT DETECTED
NOROXYMORPHONE UR QL SCN: NOT DETECTED
OXAZEPAM UR QL: NOT DETECTED
OXYCODONE UR QL: NOT DETECTED
OXYMORPHONE UR QL: NOT DETECTED
PATHOLOGY STUDY: NORMAL
PCP UR QL: NOT DETECTED
PHENTERMINE UR QL: NOT DETECTED
PREGABALIN: NOT DETECTED
SERVICE CMNT-IMP: NORMAL
TAPENTADOL UR QL SCN: NOT DETECTED
TAPENTADOL UR QL SCN: NOT DETECTED
TEMAZEPAM UR QL: NOT DETECTED
TRAMADOL UR QL: PRESENT
ZOLPIDEM METABOLITE: NOT DETECTED
ZOLPIDEM UR QL: NOT DETECTED

## 2022-12-20 ENCOUNTER — OFFICE VISIT (OUTPATIENT)
Dept: ORTHOPEDICS | Facility: CLINIC | Age: 67
End: 2022-12-20
Payer: MEDICARE

## 2022-12-20 ENCOUNTER — HOSPITAL ENCOUNTER (OUTPATIENT)
Dept: RADIOLOGY | Facility: HOSPITAL | Age: 67
Discharge: HOME | End: 2022-12-20
Attending: ORTHOPAEDIC SURGERY
Payer: MEDICARE

## 2022-12-20 VITALS — RESPIRATION RATE: 16 BRPM | BODY MASS INDEX: 19.23 KG/M2 | HEIGHT: 73 IN | WEIGHT: 145.06 LBS

## 2022-12-20 DIAGNOSIS — M25.561 ACUTE PAIN OF RIGHT KNEE: ICD-10-CM

## 2022-12-20 DIAGNOSIS — M25.561 ACUTE PAIN OF RIGHT KNEE: Primary | ICD-10-CM

## 2022-12-20 DIAGNOSIS — M17.11 ARTHRITIS OF RIGHT KNEE: ICD-10-CM

## 2022-12-20 PROCEDURE — 1159F MED LIST DOCD IN RCRD: CPT | Mod: CPTII,S$GLB,, | Performed by: ORTHOPAEDIC SURGERY

## 2022-12-20 PROCEDURE — 73564 X-RAY EXAM KNEE 4 OR MORE: CPT | Mod: 26,RT,, | Performed by: RADIOLOGY

## 2022-12-20 PROCEDURE — 73564 XR KNEE ORTHO RIGHT WITH FLEXION: ICD-10-PCS | Mod: 26,RT,, | Performed by: RADIOLOGY

## 2022-12-20 PROCEDURE — 99999 PR PBB SHADOW E&M-EST. PATIENT-LVL III: ICD-10-PCS | Mod: PBBFAC,,, | Performed by: ORTHOPAEDIC SURGERY

## 2022-12-20 PROCEDURE — 20610 DRAIN/INJ JOINT/BURSA W/O US: CPT | Mod: 79,RT,S$GLB, | Performed by: ORTHOPAEDIC SURGERY

## 2022-12-20 PROCEDURE — 99999 PR PBB SHADOW E&M-EST. PATIENT-LVL III: CPT | Mod: PBBFAC,,, | Performed by: ORTHOPAEDIC SURGERY

## 2022-12-20 PROCEDURE — 3008F BODY MASS INDEX DOCD: CPT | Mod: CPTII,S$GLB,, | Performed by: ORTHOPAEDIC SURGERY

## 2022-12-20 PROCEDURE — 3008F PR BODY MASS INDEX (BMI) DOCUMENTED: ICD-10-PCS | Mod: CPTII,S$GLB,, | Performed by: ORTHOPAEDIC SURGERY

## 2022-12-20 PROCEDURE — 73562 XR KNEE ORTHO RIGHT WITH FLEXION: ICD-10-PCS | Mod: 26,LT,, | Performed by: RADIOLOGY

## 2022-12-20 PROCEDURE — 73562 X-RAY EXAM OF KNEE 3: CPT | Mod: 26,LT,, | Performed by: RADIOLOGY

## 2022-12-20 PROCEDURE — 1100F PTFALLS ASSESS-DOCD GE2>/YR: CPT | Mod: CPTII,S$GLB,, | Performed by: ORTHOPAEDIC SURGERY

## 2022-12-20 PROCEDURE — 3288F PR FALLS RISK ASSESSMENT DOCUMENTED: ICD-10-PCS | Mod: CPTII,S$GLB,, | Performed by: ORTHOPAEDIC SURGERY

## 2022-12-20 PROCEDURE — 20610 PR DRAIN/INJECT LARGE JOINT/BURSA: ICD-10-PCS | Mod: 79,RT,S$GLB, | Performed by: ORTHOPAEDIC SURGERY

## 2022-12-20 PROCEDURE — 1159F PR MEDICATION LIST DOCUMENTED IN MEDICAL RECORD: ICD-10-PCS | Mod: CPTII,S$GLB,, | Performed by: ORTHOPAEDIC SURGERY

## 2022-12-20 PROCEDURE — 99213 PR OFFICE/OUTPT VISIT, EST, LEVL III, 20-29 MIN: ICD-10-PCS | Mod: 24,25,S$GLB, | Performed by: ORTHOPAEDIC SURGERY

## 2022-12-20 PROCEDURE — 3288F FALL RISK ASSESSMENT DOCD: CPT | Mod: CPTII,S$GLB,, | Performed by: ORTHOPAEDIC SURGERY

## 2022-12-20 PROCEDURE — 99213 OFFICE O/P EST LOW 20 MIN: CPT | Mod: 24,25,S$GLB, | Performed by: ORTHOPAEDIC SURGERY

## 2022-12-20 PROCEDURE — 99024 POSTOP FOLLOW-UP VISIT: CPT | Mod: S$GLB,,, | Performed by: ORTHOPAEDIC SURGERY

## 2022-12-20 PROCEDURE — 1100F PR PT FALLS ASSESS DOC 2+ FALLS/FALL W/INJURY/YR: ICD-10-PCS | Mod: CPTII,S$GLB,, | Performed by: ORTHOPAEDIC SURGERY

## 2022-12-20 PROCEDURE — 73564 X-RAY EXAM KNEE 4 OR MORE: CPT | Mod: TC,PN,RT

## 2022-12-20 PROCEDURE — 99024 PR POST-OP FOLLOW-UP VISIT: ICD-10-PCS | Mod: S$GLB,,, | Performed by: ORTHOPAEDIC SURGERY

## 2022-12-20 RX ORDER — TRIAMCINOLONE ACETONIDE 40 MG/ML
40 INJECTION, SUSPENSION INTRA-ARTICULAR; INTRAMUSCULAR
Status: DISCONTINUED | OUTPATIENT
Start: 2022-12-20 | End: 2022-12-20 | Stop reason: HOSPADM

## 2022-12-20 RX ADMIN — TRIAMCINOLONE ACETONIDE 40 MG: 40 INJECTION, SUSPENSION INTRA-ARTICULAR; INTRAMUSCULAR at 02:12

## 2022-12-20 NOTE — PROGRESS NOTES
Office : [phone]     Fax :[phone]         Renal Progress Note  Subjective:   Admit Date: 4/23/2021     HPI      Interval History:     Good UO   No Diarrhea   Na stable       DIET Dietary Nutrition Supplements: Low Calorie High Protein Supplement  DIET GENERAL; No Added Salt (3-4 GM); Daily Fluid Restriction: 1500 ml  Medications:   Scheduled Meds:   atorvastatin  80 mg Oral Nightly    acetaminophen  1,000 mg Oral 4 times per day    bisacodyl  5 mg Oral Daily    amLODIPine  5 mg Oral Daily    aspirin  81 mg Oral Daily    famotidine  20 mg Oral BID    FLUoxetine  20 mg Oral Daily    heparin (porcine)  5,000 Units Subcutaneous 3 times per day    levETIRAcetam  500 mg Oral BID    multivitamin  1 tablet Oral Daily    sennosides-docusate sodium  2 tablet Oral BID    sodium chloride  1 g Oral TID WC    thiamine mononitrate  100 mg Oral Daily     Continuous Infusions:    Labs:  CBC:   Recent Labs     04/28/21  0704   WBC 14.5*   HGB 12.6   *     BMP:    Recent Labs     04/27/21  0604 04/28/21  0704    137  137   K 4.3 3.8  3.8    102  102   CO2 27 23  23   BUN 11 11  11   CREATININE 0.6 0.6  0.6   GLUCOSE 97 95  93     Ca/Mg/Phos:   Recent Labs     04/27/21  0604 04/28/21  0704   CALCIUM 9.8 9.6  9.5   PHOS 4.6 4.2     Hepatic: No results for input(s): AST, ALT, ALB, BILITOT, ALKPHOS in the last 72 hours. Troponin: No results for input(s): TROPONINI in the last 72 hours. BNP: No results for input(s): BNP in the last 72 hours. Lipids: No results for input(s): CHOL, TRIG, HDL, LDLCALC, LABVLDL in the last 72 hours.   ABGs: No Post-op Note    HPI    Tristin Cerda is here 10 weeks s/p the following procedure:     10/20/2022  Intramedullary nailing Right hip     Overall doing much better in regards to his hip.  Still complaining of right knee pain.  It is slowly getting better but still bothers him at the end of the day specifically when doing a lot of walking.  He is on a walker today.  Denies any hip pain.      Physical Exam:     Patient is alert and oriented no acute distress.   Assistive Device: walker    Right hip: Incision(s) are well healed.  There is no evidence of dehiscence.  There is no induration erythema or signs of infection of the lateral incisions.  Appropriate soft tissue swelling.  Compartments are soft and compressible.  Warm well-perfused extremity.  Right knee .  Improved swelling right knee.  Range of motion .  Stable to varus valgus stress.  Negative Homans    Four view right knee series ordered and reviewed by me showing arthritis of the right knee with chondrocalcinosis consistent with pseudogout.    Assessment    Tristin Cerda is 10 weeks Post-op     Plan:      He continues to do well in regards to his right hip.  He is ambulating with a walker.  No pain in the right hip or thigh.  Pain continues to be in the right knee.  We discussed options for this including an attempt of a steroid injection to see if this helps.  He would like to try a injection today.  I will see him back in 4-6 weeks with x-rays of the right femur.           results for input(s): PHART, PO2ART, TOK2RNW in the last 72 hours. INR: No results for input(s): INR in the last 72 hours. UA:  No results for input(s): Lewie Ramming, GLUCOSEU, BILIRUBINUR, KETUA, SPECGRAV, BLOODU, PHUR, PROTEINU, UROBILINOGEN, NITRU, LEUKOCYTESUR, Farhan Ever in the last 72 hours. Urine Microscopic: No results for input(s): LABCAST, BACTERIA, COMU, HYALCAST, WBCUA, RBCUA, EPIU in the last 72 hours. Urine Culture: No results for input(s): LABURIN in the last 72 hours. Urine Chemistry: No results for input(s): Hermelinda Naas, PROTEINUR, NAUR in the last 72 hours.     Objective:   Vitals: /74   Pulse 72   Temp 98.4 °F (36.9 °C) (Oral)   Resp 18   Ht 5' 6\" (1.676 m)   Wt 238 lb 8.6 oz (108.2 kg)   SpO2 96%   BMI 38.50 kg/m²    Wt Readings from Last 3 Encounters:   04/23/21 238 lb 8.6 oz (108.2 kg)   04/15/21 238 lb 8.6 oz (108.2 kg)   04/13/21 249 lb 1.9 oz (113 kg)      24HR INTAKE/OUTPUT:      Intake/Output Summary (Last 24 hours) at 4/29/2021 2224  Last data filed at 4/29/2021 1752  Gross per 24 hour   Intake 480 ml   Output 200 ml   Net 280 ml     Constitutional:  Alert, awake, no apparent distress  NECK: supple, no JVD  Cardiovascular:  S1, S2 without m/r/g  Respiratory:  CTA B without w/r/r  Abdomen: +bs, soft, nt  Ext: +,LE edema    Assessment and Plan:       IMAGING:  No orders to display       Assessment/Plan     SIADH   CVA   Electrolyte imbalance   HTN     Plan   - check NA in am   - ur studies   - Fluid restriction   - labs in am   - PT/OT          Steve Lopez MD  [facility] [address] [place], [place] [phone]  [phone] Xeljanz Counseling: I discussed with the patient the risks of Xeljanz therapy including increased risk of infection, liver issues, headache, diarrhea, or cold symptoms. Live vaccines should be avoided. They were instructed to call if they have any problems.

## 2022-12-20 NOTE — PROCEDURES
Large Joint Aspiration/Injection: R knee joint    Date/Time: 12/20/2022 2:15 PM  Performed by: Gómez Selby MD  Authorized by: Gómez Selby MD     Consent Done?:  Yes (Verbal)  Indications:  Pain and arthritis  Site marked: the procedure site was marked    Timeout: prior to procedure the correct patient, procedure, and site was verified      Details:  Needle Size:  22 G  Approach:  Anterolateral  Location:  Knee  Site:  R knee joint  Medications:  40 mg triamcinolone acetonide 40 mg/mL  Patient tolerance:  Patient tolerated the procedure well with no immediate complications

## 2022-12-29 ENCOUNTER — OFFICE VISIT (OUTPATIENT)
Dept: PAIN MEDICINE | Facility: CLINIC | Age: 67
End: 2022-12-29
Payer: MEDICARE

## 2022-12-29 VITALS
SYSTOLIC BLOOD PRESSURE: 150 MMHG | WEIGHT: 145 LBS | BODY MASS INDEX: 19.22 KG/M2 | HEIGHT: 73 IN | HEART RATE: 63 BPM | DIASTOLIC BLOOD PRESSURE: 86 MMHG

## 2022-12-29 DIAGNOSIS — G89.29 CHRONIC ABDOMINAL PAIN: Primary | ICD-10-CM

## 2022-12-29 DIAGNOSIS — M17.11 OSTEOARTHRITIS OF RIGHT KNEE, UNSPECIFIED OSTEOARTHRITIS TYPE: Primary | ICD-10-CM

## 2022-12-29 DIAGNOSIS — M25.562 ACUTE PAIN OF LEFT KNEE: ICD-10-CM

## 2022-12-29 DIAGNOSIS — G89.29 CHRONIC ABDOMINAL PAIN: ICD-10-CM

## 2022-12-29 DIAGNOSIS — R10.9 CHRONIC ABDOMINAL PAIN: ICD-10-CM

## 2022-12-29 DIAGNOSIS — R10.9 CHRONIC ABDOMINAL PAIN: Primary | ICD-10-CM

## 2022-12-29 DIAGNOSIS — K50.90 CROHN'S DISEASE WITHOUT COMPLICATION, UNSPECIFIED GASTROINTESTINAL TRACT LOCATION: ICD-10-CM

## 2022-12-29 DIAGNOSIS — F11.90 CHRONIC, CONTINUOUS USE OF OPIOIDS: ICD-10-CM

## 2022-12-29 PROCEDURE — 1101F PR PT FALLS ASSESS DOC 0-1 FALLS W/OUT INJ PAST YR: ICD-10-PCS | Mod: CPTII,S$GLB,, | Performed by: PHYSICIAN ASSISTANT

## 2022-12-29 PROCEDURE — 3077F PR MOST RECENT SYSTOLIC BLOOD PRESSURE >= 140 MM HG: ICD-10-PCS | Mod: CPTII,S$GLB,, | Performed by: PHYSICIAN ASSISTANT

## 2022-12-29 PROCEDURE — 99214 PR OFFICE/OUTPT VISIT, EST, LEVL IV, 30-39 MIN: ICD-10-PCS | Mod: S$GLB,,, | Performed by: PHYSICIAN ASSISTANT

## 2022-12-29 PROCEDURE — 3008F BODY MASS INDEX DOCD: CPT | Mod: CPTII,S$GLB,, | Performed by: PHYSICIAN ASSISTANT

## 2022-12-29 PROCEDURE — 99999 PR PBB SHADOW E&M-EST. PATIENT-LVL III: ICD-10-PCS | Mod: PBBFAC,,, | Performed by: PHYSICIAN ASSISTANT

## 2022-12-29 PROCEDURE — 1159F MED LIST DOCD IN RCRD: CPT | Mod: CPTII,S$GLB,, | Performed by: PHYSICIAN ASSISTANT

## 2022-12-29 PROCEDURE — 3079F PR MOST RECENT DIASTOLIC BLOOD PRESSURE 80-89 MM HG: ICD-10-PCS | Mod: CPTII,S$GLB,, | Performed by: PHYSICIAN ASSISTANT

## 2022-12-29 PROCEDURE — 3288F PR FALLS RISK ASSESSMENT DOCUMENTED: ICD-10-PCS | Mod: CPTII,S$GLB,, | Performed by: PHYSICIAN ASSISTANT

## 2022-12-29 PROCEDURE — 1159F PR MEDICATION LIST DOCUMENTED IN MEDICAL RECORD: ICD-10-PCS | Mod: CPTII,S$GLB,, | Performed by: PHYSICIAN ASSISTANT

## 2022-12-29 PROCEDURE — 99999 PR PBB SHADOW E&M-EST. PATIENT-LVL III: CPT | Mod: PBBFAC,,, | Performed by: PHYSICIAN ASSISTANT

## 2022-12-29 PROCEDURE — 99214 OFFICE O/P EST MOD 30 MIN: CPT | Mod: S$GLB,,, | Performed by: PHYSICIAN ASSISTANT

## 2022-12-29 PROCEDURE — 3079F DIAST BP 80-89 MM HG: CPT | Mod: CPTII,S$GLB,, | Performed by: PHYSICIAN ASSISTANT

## 2022-12-29 PROCEDURE — 3008F PR BODY MASS INDEX (BMI) DOCUMENTED: ICD-10-PCS | Mod: CPTII,S$GLB,, | Performed by: PHYSICIAN ASSISTANT

## 2022-12-29 PROCEDURE — 3288F FALL RISK ASSESSMENT DOCD: CPT | Mod: CPTII,S$GLB,, | Performed by: PHYSICIAN ASSISTANT

## 2022-12-29 PROCEDURE — 1101F PT FALLS ASSESS-DOCD LE1/YR: CPT | Mod: CPTII,S$GLB,, | Performed by: PHYSICIAN ASSISTANT

## 2022-12-29 PROCEDURE — 3077F SYST BP >= 140 MM HG: CPT | Mod: CPTII,S$GLB,, | Performed by: PHYSICIAN ASSISTANT

## 2022-12-29 RX ORDER — TRAMADOL HYDROCHLORIDE 50 MG/1
50 TABLET ORAL EVERY 8 HOURS PRN
Qty: 90 TABLET | Refills: 2 | Status: SHIPPED | OUTPATIENT
Start: 2022-12-29 | End: 2023-01-27

## 2022-12-29 NOTE — PROGRESS NOTES
PCP: Tyler Smith MD      CC: abdominal pain    Interval history: Mr. Cerda is a 67 y.o. male with an extensive history of crohn's disease who presents today for f/u s/p and medication refill. He is healing from right hip fracture 2 months ago.  He was taking Percocet 5-325 mg q 6 h for post op pain. Acute left knee pain resolved after left intra articular steroid injection with Dr. Villatoro.  Right knee RFA  provided 75% relief.    Abdominal pain remains stable.   He does report diarrhea at times but no blood stools. Reports anal pain and itching 3/2 chronic diarrhea.  He is currently being evaluated by gastroenterology.  OTC Lidocaine 2 % provides some minimal relief.  Compounding cream was too expensive.  He rates his pain 6/10.     Prior HPI:   Mr. Cerda is a 60 year old male with PMH of crohn's disease referred by Dr. Hansen.  Patient states being diagnosed with crohn's disease since the age of 14.    He has had multiple GI surgeries and large bowel and small bowel removals.  During that time, he was being managed by providers in [place].  He was TPN dependent for many years until about two years ago.  He is now stable on an oral diet.  He did not have a primary care physician nor a gastroenterologist for many years.  He is currently trying to establish care.  He has future appointment with Dr. Ch.  He states taking Demerol 50mg q8hrs as needed for pain. It has provided relief of his nausea and pain with diarrhea.  He was last prescribed Demerol in July 2014.  Since then, he has been bearing with the pain.  It is a sharp shooting pain in his mid abdomen.      ROS:  CONSTITUTIONAL: No fevers, chills, night sweats, wt. loss, appetite changes  SKIN: no rashes or itching  ENT: No headaches, head trauma, vision changes, or eye pain  LYMPH NODES: None noticed   CV: No chest pain, palpitations.   RESP: No shortness of breath, dyspnea on exertion, cough, wheezing, or hemoptysis  GI: No nausea,  emesis, diarrhea, constipation, melena, hematochezia, pain.    : No dysuria, hematuria, urgency, or frequency   HEME: No easy bruising, bleeding problems  PSYCHIATRIC: No depression, anxiety, psychosis, hallucinations.  NEURO: No seizures, memory loss, dizziness or difficulty sleeping  MSK: no joint pain      Past Medical History:   Diagnosis Date    Anxiety     Basal cell carcinoma 07/2017    R forehead and R temple    Crohn's disease     age 17    Encounter for blood transfusion     Short bowel syndrome     Vitamin B deficiency     Vitamin D deficiency      Past Surgical History:   Procedure Laterality Date    3 small bowel resections      APPENDECTOMY      CHOLECYSTECTOMY      COLONOSCOPY      COLONOSCOPY N/A 2/14/2017    Procedure: COLONOSCOPY;  Surgeon: Nela Sanchez MD;  [facility]  Service: Endoscopy;  Laterality: N/A;    COLONOSCOPY N/A 3/14/2017    Procedure: COLONOSCOPY;  Surgeon: Nela Sanchez MD;  Location: [facility] ([address] [place] [address]);  Service: Endoscopy;  Laterality: N/A;  2 days clear liquids before procedure    COLONOSCOPY N/A 7/29/2020    Procedure: COLONOSCOPY;  Surgeon: Andrea Shaver MD;  Location: [facility];  Service: Endoscopy;  Laterality: N/A;    ESOPHAGOGASTRODUODENOSCOPY N/A 11/19/2019    Procedure: EGD (ESOPHAGOGASTRODUODENOSCOPY);  Surgeon: Andrea Shaver MD;  Location: [facility];  Service: Endoscopy;  Laterality: N/A;    INJECTION OF ANESTHETIC AGENT AROUND NERVE Right 10/20/2020    Procedure: Block, Nerve genicular;  Surgeon: Alvin Curry MD;  Location: [facility] OR;  Service: Pain Management;  Laterality: Right;  right knee    INTRAMEDULLARY RODDING OF FEMUR Right 10/20/2022    Procedure: INSERTION, INTRAMEDULLARY SRINIVASA, FEMUR;  Surgeon: Gómez Selby MD;  Location: [facility] OR;  Service: Orthopedics;  Laterality: Right;    KNEE SURGERY      RADIOFREQUENCY ABLATION Right 1/8/2021    Procedure: Radiofrequency Ablation Right Knee Genicular RFA [place];  Surgeon: Alvin Curry  MD;  Location: [facility];  Service: Pain Management;  Laterality: Right;  Right knee     SMALL INTESTINE SURGERY      TUBE THORACOTOMY      UPPER GASTROINTESTINAL ENDOSCOPY       Family History   Problem Relation Age of Onset    Diabetes Mother     Stroke Mother     Diabetes Father     Kidney disease Father     Irritable bowel syndrome Cousin     Celiac disease Neg Hx     Cirrhosis Neg Hx     Colon cancer Neg Hx     Colon polyps Neg Hx     Cystic fibrosis Neg Hx     Esophageal cancer Neg Hx     Crohn's disease Neg Hx     Hemochromatosis Neg Hx     Inflammatory bowel disease Neg Hx     Liver cancer Neg Hx     Liver disease Neg Hx     Rectal cancer Neg Hx     Stomach cancer Neg Hx     Ulcerative colitis Neg Hx     Addison's disease Neg Hx     Melanoma Neg Hx     Psoriasis Neg Hx     Lupus Neg Hx     Eczema Neg Hx      Social History     Socioeconomic History    Marital status: Other   Tobacco Use    Smoking status: Former     Packs/day: 1.00     Years: 15.00     Pack years: 15.00     Types: Cigarettes     Quit date: 3/10/1995     Years since quittin.8    Smokeless tobacco: Never   Substance and Sexual Activity    Alcohol use: Never     Alcohol/week: 2.0 standard drinks     Types: 1 Cans of beer, 1 Shots of liquor per week    Drug use: No    Sexual activity: Yes     Partners: Female   Social History Narrative    ** Merged History Encounter **         ** Data from: 22 Enc Dept: [facility] FAMILY MEDICINE    Retired          ** Data from: 22 Enc Dept: [facility] PHARMACY-OUTPATIENT    ** Merged History Encounter **         ** Merged History Encounter **          Social Determinants of Health     Financial Resource Strain: Medium Risk    Difficulty of Paying Living Expenses: Somewhat hard   Food Insecurity: No Food Insecurity    Worried About Running Out of Food in the Last Year: Never true    Ran Out of Food in the Last Year: Never true   Transportation Needs: Unmet Transportation Needs    Lack of Transportation  "(Medical): Yes    Lack of Transportation (Non-Medical): No   Physical Activity: Unknown    Days of Exercise per Week: Patient refused    Minutes of Exercise per Session: 0 min   Stress: No Stress Concern Present    Feeling of Stress : Not at all   Social Connections: Moderately Isolated    Frequency of Communication with Friends and Family: Three times a week    Frequency of Social Gatherings with Friends and Family: Patient refused    Attends [employer] Services: Never    Active Member of Clubs or Organizations: No    Attends Club or Organization Meetings: Patient refused    Marital Status:    Housing Stability: Low Risk     Unable to Pay for Housing in the Last Year: No    Number of Places Lived in the Last Year: 1    Unstable Housing in the Last Year: No     Medications/Allergies: See med card    Vitals:    12/29/22 0830   BP: (!) 150/86   Pulse: 63   Weight: 65.8 kg (145 lb)   Height: 6' 1" (1.854 m)   PainSc:   3   PainLoc: Abdomen     Physical exam:    GENERAL: A and O x3, the patient appears well groomed and is in no acute distress.  Skin: No rashes or obvious lesions  HEENT: normocephalic, atraumatic  CARDIOVASCULAR:  RRR  LUNGS: non labored breathing  ABDOMEN: soft, nontender. No rebound   UPPER EXTREMITIES: Normal alignment, normal range of motion, no atrophy, no skin changes,  hair growth and nail growth normal and equal bilaterally. No swelling, no tenderness.    LOWER EXTREMITIES:  TTP left patella, swelling along medial and superior aspect. No redness or warmth.     LUMBAR SPINE  Lumbar spine: ROM is full with flexion extension and oblique extension with no increased pain.    Lam's test causes no increased pain on either side.    Supine straight leg raise is negative bilaterally.    Internal and external rotation of the hip causes no increased pain on either side.  Myofascial exam: No tenderness to palpation across lumbar paraspinous muscles.    MENTAL STATUS: normal orientation, speech, " language, and fund of knowledge for social situation.  Emotional state appropriate.    CRANIAL NERVES:  II:  PERRL bilaterally,   III,IV,VI: EOMI.    V:  Facial sensation equal bilaterally  VII:  Facial motor function normal.  VIII:  Hearing equal to finger rub bilaterally  IX/X: Gag normal, palate symmetric  XI:  Shoulder shrug equal, head turn equal  XII:  Tongue midline without fasciculations    MOTOR: Tone and bulk: normal bilateral upper and lower Strength: normal   SENSATION: Light touch and pinprick intact bilaterally  REFLEXES: normal, symmetric, nonbrisk.  Toes down, no clonus. No hoffmans.  GAIT: normal rise, base, steps, and arm swing.      Imaging:  Left knee xray 3/6/22  Mild tricompartmental degenerative osteoarthrosis with mild joint space narrowing and small osteophyte formation.  Subtle calcification highlighted cartilage consistent with chondrocalcinosis.     There is a small suprapatellar effusion.     Impression:     1. Mild tricompartmental degenerative osteoarthrosis  2. Chondrocalcinosis.  3. Small suprapatellar effusion.       Assessment:  Mr. Cerda is a 67 y.o. male with abdominal pain  1. Osteoarthritis of right knee, unspecified osteoarthritis type    2. Chronic abdominal pain    3. Acute pain of left knee    4. Chronic, continuous use of opioids    5. Crohn's disease without complication, unspecified gastrointestinal tract location          Plan:  1. Patient with long history of crohn's disease and chronic abdominal pain.  Continue care with GI.  2. Tramadol 50 mg q 8 h prn.     reviewed.  No signs of aberrant behavior.  Previous UDS consistent.  Script provided for 3 months  3. Continue OTC lidocaine cream  4. Monitor progress from right sided peripheral nerve block.   5. F/u with orthopedics as needed  6. 3 months or sooner  All medication management was performed by Dr. Alvin Curry

## 2023-01-09 ENCOUNTER — OFFICE VISIT (OUTPATIENT)
Dept: FAMILY MEDICINE | Facility: CLINIC | Age: 68
End: 2023-01-09
Payer: MEDICARE

## 2023-01-09 VITALS
HEIGHT: 73 IN | WEIGHT: 148.38 LBS | BODY MASS INDEX: 19.66 KG/M2 | SYSTOLIC BLOOD PRESSURE: 136 MMHG | DIASTOLIC BLOOD PRESSURE: 84 MMHG | OXYGEN SATURATION: 98 % | TEMPERATURE: 98 F | HEART RATE: 73 BPM

## 2023-01-09 DIAGNOSIS — K50.018 CROHN'S DISEASE OF SMALL INTESTINE WITH OTHER COMPLICATION: ICD-10-CM

## 2023-01-09 DIAGNOSIS — G89.29 CHRONIC ABDOMINAL PAIN: ICD-10-CM

## 2023-01-09 DIAGNOSIS — R42 VERTIGO: ICD-10-CM

## 2023-01-09 DIAGNOSIS — E53.8 B12 DEFICIENCY: Primary | ICD-10-CM

## 2023-01-09 DIAGNOSIS — R10.9 CHRONIC ABDOMINAL PAIN: ICD-10-CM

## 2023-01-09 PROCEDURE — 96372 PR INJECTION,THERAP/PROPH/DIAG2ST, IM OR SUBCUT: ICD-10-PCS | Mod: HCNC,S$GLB,, | Performed by: PHYSICIAN ASSISTANT

## 2023-01-09 PROCEDURE — 96372 THER/PROPH/DIAG INJ SC/IM: CPT | Mod: HCNC,S$GLB,, | Performed by: PHYSICIAN ASSISTANT

## 2023-01-09 PROCEDURE — 3288F PR FALLS RISK ASSESSMENT DOCUMENTED: ICD-10-PCS | Mod: HCNC,CPTII,S$GLB, | Performed by: PHYSICIAN ASSISTANT

## 2023-01-09 PROCEDURE — 1159F PR MEDICATION LIST DOCUMENTED IN MEDICAL RECORD: ICD-10-PCS | Mod: HCNC,CPTII,S$GLB, | Performed by: PHYSICIAN ASSISTANT

## 2023-01-09 PROCEDURE — 99213 OFFICE O/P EST LOW 20 MIN: CPT | Mod: HCNC,25,S$GLB, | Performed by: PHYSICIAN ASSISTANT

## 2023-01-09 PROCEDURE — 1159F MED LIST DOCD IN RCRD: CPT | Mod: HCNC,CPTII,S$GLB, | Performed by: PHYSICIAN ASSISTANT

## 2023-01-09 PROCEDURE — 3008F BODY MASS INDEX DOCD: CPT | Mod: HCNC,CPTII,S$GLB, | Performed by: PHYSICIAN ASSISTANT

## 2023-01-09 PROCEDURE — 1125F AMNT PAIN NOTED PAIN PRSNT: CPT | Mod: HCNC,CPTII,S$GLB, | Performed by: PHYSICIAN ASSISTANT

## 2023-01-09 PROCEDURE — 3075F SYST BP GE 130 - 139MM HG: CPT | Mod: HCNC,CPTII,S$GLB, | Performed by: PHYSICIAN ASSISTANT

## 2023-01-09 PROCEDURE — 3075F PR MOST RECENT SYSTOLIC BLOOD PRESS GE 130-139MM HG: ICD-10-PCS | Mod: HCNC,CPTII,S$GLB, | Performed by: PHYSICIAN ASSISTANT

## 2023-01-09 PROCEDURE — 3008F PR BODY MASS INDEX (BMI) DOCUMENTED: ICD-10-PCS | Mod: HCNC,CPTII,S$GLB, | Performed by: PHYSICIAN ASSISTANT

## 2023-01-09 PROCEDURE — 3288F FALL RISK ASSESSMENT DOCD: CPT | Mod: HCNC,CPTII,S$GLB, | Performed by: PHYSICIAN ASSISTANT

## 2023-01-09 PROCEDURE — 1125F PR PAIN SEVERITY QUANTIFIED, PAIN PRESENT: ICD-10-PCS | Mod: HCNC,CPTII,S$GLB, | Performed by: PHYSICIAN ASSISTANT

## 2023-01-09 PROCEDURE — 99999 PR PBB SHADOW E&M-EST. PATIENT-LVL III: ICD-10-PCS | Mod: PBBFAC,HCNC,, | Performed by: PHYSICIAN ASSISTANT

## 2023-01-09 PROCEDURE — 99213 PR OFFICE/OUTPT VISIT, EST, LEVL III, 20-29 MIN: ICD-10-PCS | Mod: HCNC,25,S$GLB, | Performed by: PHYSICIAN ASSISTANT

## 2023-01-09 PROCEDURE — 1101F PT FALLS ASSESS-DOCD LE1/YR: CPT | Mod: HCNC,CPTII,S$GLB, | Performed by: PHYSICIAN ASSISTANT

## 2023-01-09 PROCEDURE — 99999 PR PBB SHADOW E&M-EST. PATIENT-LVL III: CPT | Mod: PBBFAC,HCNC,, | Performed by: PHYSICIAN ASSISTANT

## 2023-01-09 PROCEDURE — 1101F PR PT FALLS ASSESS DOC 0-1 FALLS W/OUT INJ PAST YR: ICD-10-PCS | Mod: HCNC,CPTII,S$GLB, | Performed by: PHYSICIAN ASSISTANT

## 2023-01-09 PROCEDURE — 3079F DIAST BP 80-89 MM HG: CPT | Mod: HCNC,CPTII,S$GLB, | Performed by: PHYSICIAN ASSISTANT

## 2023-01-09 PROCEDURE — 3079F PR MOST RECENT DIASTOLIC BLOOD PRESSURE 80-89 MM HG: ICD-10-PCS | Mod: HCNC,CPTII,S$GLB, | Performed by: PHYSICIAN ASSISTANT

## 2023-01-09 RX ORDER — CYANOCOBALAMIN 1000 UG/ML
1000 INJECTION, SOLUTION INTRAMUSCULAR; SUBCUTANEOUS
Status: DISCONTINUED | OUTPATIENT
Start: 2023-01-09 | End: 2023-10-27

## 2023-01-09 RX ORDER — MECLIZINE HCL 12.5 MG 12.5 MG/1
12.5 TABLET ORAL 3 TIMES DAILY PRN
Qty: 90 TABLET | Refills: 3 | Status: SHIPPED | OUTPATIENT
Start: 2023-01-09 | End: 2023-09-27 | Stop reason: SDUPTHER

## 2023-01-09 RX ORDER — DICYCLOMINE HYDROCHLORIDE 20 MG/1
TABLET ORAL
Qty: 360 TABLET | Refills: 3 | Status: SHIPPED | OUTPATIENT
Start: 2023-01-09 | End: 2023-10-27 | Stop reason: SDUPTHER

## 2023-01-09 RX ADMIN — CYANOCOBALAMIN 1000 MCG: 1000 INJECTION, SOLUTION INTRAMUSCULAR; SUBCUTANEOUS at 10:01

## 2023-01-09 NOTE — PROGRESS NOTES
Subjective:       Patient ID: Tristin Cerda is a 68 y.o. male.    Chief Complaint: Follow-up    Patient with Crohn's disease status post small bowel resection over 15 years ago presents for routine follow-up.  Patient has not had a Crohn's flare in many years.  Symptoms are controlled at the present time.  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    He has does fairly well off of TPN but continues to have issues with vitamin and mineral insufficiencies.   Patients patient medical/surgical, social and family histories have been reviewed           Follow-up  Associated symptoms include arthralgias and headaches. Pertinent negatives include no chest pain, joint swelling, neck pain, vomiting or weakness.   Review of Systems   Constitutional:  Negative for activity change and unexpected weight change.   HENT:  Positive for hearing loss. Negative for rhinorrhea and trouble swallowing.    Eyes:  Negative for discharge and visual disturbance.   Respiratory:  Negative for chest tightness and wheezing.    Cardiovascular:  Negative for chest pain and palpitations.   Gastrointestinal:  Positive for diarrhea. Negative for blood in stool, constipation and vomiting.   Endocrine: Negative for polydipsia and polyuria.   Genitourinary:  Positive for urgency. Negative for difficulty urinating and hematuria.   Musculoskeletal:  Positive for arthralgias. Negative for joint swelling and neck pain.   Neurological:  Positive for headaches. Negative for weakness.   Psychiatric/Behavioral:  Negative for confusion and dysphoric mood.      Objective:      Physical Exam  Constitutional:       General: He is not in acute distress.     Appearance: He is well-developed and underweight.   HENT:      Head: Normocephalic and atraumatic.   Cardiovascular:      Rate and Rhythm: Normal rate and regular rhythm.      Heart sounds: Normal heart sounds.   Pulmonary:      Effort:  "Pulmonary effort is normal.      Breath sounds: Normal breath sounds.   Musculoskeletal:      Right lower leg: No edema.      Left lower leg: No edema.   Skin:     General: Skin is warm and dry.      Capillary Refill: Capillary refill takes less than 2 seconds.   Neurological:      Mental Status: He is alert.   Psychiatric:         Behavior: Behavior is cooperative.       Assessment:       1. B12 deficiency    2. Chronic abdominal pain    3. Vertigo    4. Crohn's disease of small intestine with other complication          Plan:       Tristin was seen today for follow-up.    Diagnoses and all orders for this visit:    B12 deficiency  -     cyanocobalamin injection 1,000 mcg  -     Prior authorization Order    Chronic abdominal pain  -     dicyclomine (BENTYL) 20 mg tablet; TAKE 1 TABLET BY MOUTH FOUR TIMES DAILY    Vertigo  -     meclizine (ANTIVERT) 12.5 mg tablet; Take 1 tablet (12.5 mg total) by mouth 3 (three) times daily as needed for Dizziness.    Crohn's disease of small intestine with other complication  -     Ambulatory referral/consult to Gastroenterology; Future           Follow up for nurse visit monthly for b12 , GI .           Documentation entered by me for this encounter may have been done in part using speech-recognition technology. Although I have made an effort to ensure accuracy, "sound like" errors may exist and should be interpreted in context.     "

## 2023-01-27 DIAGNOSIS — Z87.81 S/P RIGHT HIP FRACTURE: Primary | ICD-10-CM

## 2023-01-31 ENCOUNTER — OFFICE VISIT (OUTPATIENT)
Dept: ORTHOPEDICS | Facility: CLINIC | Age: 68
End: 2023-01-31
Payer: MEDICARE

## 2023-01-31 ENCOUNTER — HOSPITAL ENCOUNTER (OUTPATIENT)
Dept: RADIOLOGY | Facility: HOSPITAL | Age: 68
Discharge: HOME | End: 2023-01-31
Attending: ORTHOPAEDIC SURGERY
Payer: MEDICARE

## 2023-01-31 VITALS — WEIGHT: 148 LBS | BODY MASS INDEX: 19.61 KG/M2 | HEIGHT: 73 IN | RESPIRATION RATE: 18 BRPM

## 2023-01-31 DIAGNOSIS — Z87.81 S/P RIGHT HIP FRACTURE: ICD-10-CM

## 2023-01-31 DIAGNOSIS — Z87.81 S/P RIGHT HIP FRACTURE: Primary | ICD-10-CM

## 2023-01-31 PROCEDURE — 1125F PR PAIN SEVERITY QUANTIFIED, PAIN PRESENT: ICD-10-PCS | Mod: HCNC,CPTII,S$GLB, | Performed by: ORTHOPAEDIC SURGERY

## 2023-01-31 PROCEDURE — 73552 X-RAY EXAM OF FEMUR 2/>: CPT | Mod: 26,HCNC,RT, | Performed by: RADIOLOGY

## 2023-01-31 PROCEDURE — 99213 OFFICE O/P EST LOW 20 MIN: CPT | Mod: HCNC,S$GLB,, | Performed by: ORTHOPAEDIC SURGERY

## 2023-01-31 PROCEDURE — 3008F PR BODY MASS INDEX (BMI) DOCUMENTED: ICD-10-PCS | Mod: HCNC,CPTII,S$GLB, | Performed by: ORTHOPAEDIC SURGERY

## 2023-01-31 PROCEDURE — 1125F AMNT PAIN NOTED PAIN PRSNT: CPT | Mod: HCNC,CPTII,S$GLB, | Performed by: ORTHOPAEDIC SURGERY

## 2023-01-31 PROCEDURE — 99999 PR PBB SHADOW E&M-EST. PATIENT-LVL II: CPT | Mod: PBBFAC,HCNC,, | Performed by: ORTHOPAEDIC SURGERY

## 2023-01-31 PROCEDURE — 99999 PR PBB SHADOW E&M-EST. PATIENT-LVL II: ICD-10-PCS | Mod: PBBFAC,HCNC,, | Performed by: ORTHOPAEDIC SURGERY

## 2023-01-31 PROCEDURE — 99213 PR OFFICE/OUTPT VISIT, EST, LEVL III, 20-29 MIN: ICD-10-PCS | Mod: HCNC,S$GLB,, | Performed by: ORTHOPAEDIC SURGERY

## 2023-01-31 PROCEDURE — 3008F BODY MASS INDEX DOCD: CPT | Mod: HCNC,CPTII,S$GLB, | Performed by: ORTHOPAEDIC SURGERY

## 2023-01-31 PROCEDURE — 73552 X-RAY EXAM OF FEMUR 2/>: CPT | Mod: TC,HCNC,PN,RT

## 2023-01-31 PROCEDURE — 73552 XR FEMUR 2 VIEW RIGHT: ICD-10-PCS | Mod: 26,HCNC,RT, | Performed by: RADIOLOGY

## 2023-01-31 NOTE — PROGRESS NOTES
Post-op Note    HPI    Tristin Cerda is here 3 months s/p the following procedure:     10/20/2022  Intramedullary nailing Right hip     Overall doing very well.  Denies much pain.  His right knee pain is much better.  No longer using assistive device.      Physical Exam:     Patient is alert and oriented no acute distress.   Assistive Device:  None    Right hip: Incision(s) are well healed.  There is no evidence of dehiscence.  There is no induration erythema or signs of infection of the lateral incisions.  Appropriate soft tissue swelling.  Compartments are soft and compressible.  Warm well-perfused extremity.  No pain with passive range of motion of the right hip.  No pain with resisted hip flexion.  Right knee .  No swelling right knee.  Range of motion .  Stable to varus valgus stress.  Negative Homans      Two-view right hip x-rays ordered and reviewed by me showing stable right hip with healed intertrochanteric femur fracture and good alignment.    Assessment    Tristin Cerda is 3 months Post-op     Plan:      He continues to do well in regards to his right hip.  The pain is also much better.  His x-rays are healed.  He has very little pain.  He is ambulating without assistive device.  He is back to normal activities of daily living.  He would like to follow up as needed..

## 2023-02-07 DIAGNOSIS — Z00.00 ENCOUNTER FOR MEDICARE ANNUAL WELLNESS EXAM: ICD-10-CM

## 2023-02-09 DIAGNOSIS — Z00.00 ENCOUNTER FOR MEDICARE ANNUAL WELLNESS EXAM: ICD-10-CM

## 2023-02-13 ENCOUNTER — PATIENT MESSAGE (OUTPATIENT)
Dept: FAMILY MEDICINE | Facility: CLINIC | Age: 68
End: 2023-02-13
Payer: MEDICARE

## 2023-02-14 ENCOUNTER — PATIENT MESSAGE (OUTPATIENT)
Dept: FAMILY MEDICINE | Facility: CLINIC | Age: 68
End: 2023-02-14
Payer: MEDICARE

## 2023-02-17 ENCOUNTER — CLINICAL SUPPORT (OUTPATIENT)
Dept: INTERNAL MEDICINE | Facility: CLINIC | Age: 68
End: 2023-02-17
Payer: MEDICARE

## 2023-02-17 VITALS — SYSTOLIC BLOOD PRESSURE: 136 MMHG | DIASTOLIC BLOOD PRESSURE: 84 MMHG

## 2023-02-17 PROCEDURE — 96372 PR INJECTION,THERAP/PROPH/DIAG2ST, IM OR SUBCUT: ICD-10-PCS | Mod: HCNC,S$GLB,, | Performed by: PHYSICIAN ASSISTANT

## 2023-02-17 PROCEDURE — 99999 PR PBB SHADOW E&M-EST. PATIENT-LVL I: CPT | Mod: PBBFAC,HCNC,,

## 2023-02-17 PROCEDURE — 99999 PR PBB SHADOW E&M-EST. PATIENT-LVL I: ICD-10-PCS | Mod: PBBFAC,HCNC,,

## 2023-02-17 PROCEDURE — 96372 THER/PROPH/DIAG INJ SC/IM: CPT | Mod: HCNC,S$GLB,, | Performed by: PHYSICIAN ASSISTANT

## 2023-02-17 RX ADMIN — CYANOCOBALAMIN 1000 MCG: 1000 INJECTION, SOLUTION INTRAMUSCULAR; SUBCUTANEOUS at 09:02

## 2023-02-17 NOTE — PROGRESS NOTES
Two person indentifier, name, d.o.b with verbal feedback.  B12 injection given per MAR L GLUT IM,  using aseptic techniqe.  0/10 pain scale. No bleeding at site. Tolerated well, waited 15 min.   No adverse reaction noted.

## 2023-02-28 ENCOUNTER — PATIENT MESSAGE (OUTPATIENT)
Dept: FAMILY MEDICINE | Facility: CLINIC | Age: 68
End: 2023-02-28
Payer: MEDICARE

## 2023-03-02 ENCOUNTER — PATIENT MESSAGE (OUTPATIENT)
Dept: FAMILY MEDICINE | Facility: CLINIC | Age: 68
End: 2023-03-02

## 2023-03-02 ENCOUNTER — OFFICE VISIT (OUTPATIENT)
Dept: FAMILY MEDICINE | Facility: CLINIC | Age: 68
End: 2023-03-02
Payer: MEDICARE

## 2023-03-02 VITALS
DIASTOLIC BLOOD PRESSURE: 80 MMHG | SYSTOLIC BLOOD PRESSURE: 170 MMHG | BODY MASS INDEX: 19.57 KG/M2 | HEART RATE: 64 BPM | HEIGHT: 73 IN | OXYGEN SATURATION: 98 % | TEMPERATURE: 98 F | WEIGHT: 147.69 LBS | RESPIRATION RATE: 16 BRPM

## 2023-03-02 DIAGNOSIS — S16.1XXA STRAIN OF NECK MUSCLE, INITIAL ENCOUNTER: Primary | ICD-10-CM

## 2023-03-02 PROCEDURE — 1101F PR PT FALLS ASSESS DOC 0-1 FALLS W/OUT INJ PAST YR: ICD-10-PCS | Mod: HCNC,CPTII,S$GLB, | Performed by: FAMILY MEDICINE

## 2023-03-02 PROCEDURE — 3008F BODY MASS INDEX DOCD: CPT | Mod: HCNC,CPTII,S$GLB, | Performed by: FAMILY MEDICINE

## 2023-03-02 PROCEDURE — 3079F DIAST BP 80-89 MM HG: CPT | Mod: HCNC,CPTII,S$GLB, | Performed by: FAMILY MEDICINE

## 2023-03-02 PROCEDURE — 1125F AMNT PAIN NOTED PAIN PRSNT: CPT | Mod: HCNC,CPTII,S$GLB, | Performed by: FAMILY MEDICINE

## 2023-03-02 PROCEDURE — 1159F PR MEDICATION LIST DOCUMENTED IN MEDICAL RECORD: ICD-10-PCS | Mod: HCNC,CPTII,S$GLB, | Performed by: FAMILY MEDICINE

## 2023-03-02 PROCEDURE — 1125F PR PAIN SEVERITY QUANTIFIED, PAIN PRESENT: ICD-10-PCS | Mod: HCNC,CPTII,S$GLB, | Performed by: FAMILY MEDICINE

## 2023-03-02 PROCEDURE — 99213 OFFICE O/P EST LOW 20 MIN: CPT | Mod: HCNC,S$GLB,, | Performed by: FAMILY MEDICINE

## 2023-03-02 PROCEDURE — 3288F FALL RISK ASSESSMENT DOCD: CPT | Mod: HCNC,CPTII,S$GLB, | Performed by: FAMILY MEDICINE

## 2023-03-02 PROCEDURE — 99999 PR PBB SHADOW E&M-EST. PATIENT-LVL III: ICD-10-PCS | Mod: PBBFAC,HCNC,, | Performed by: FAMILY MEDICINE

## 2023-03-02 PROCEDURE — 1159F MED LIST DOCD IN RCRD: CPT | Mod: HCNC,CPTII,S$GLB, | Performed by: FAMILY MEDICINE

## 2023-03-02 PROCEDURE — 99999 PR PBB SHADOW E&M-EST. PATIENT-LVL III: CPT | Mod: PBBFAC,HCNC,, | Performed by: FAMILY MEDICINE

## 2023-03-02 PROCEDURE — 3288F PR FALLS RISK ASSESSMENT DOCUMENTED: ICD-10-PCS | Mod: HCNC,CPTII,S$GLB, | Performed by: FAMILY MEDICINE

## 2023-03-02 PROCEDURE — 1101F PT FALLS ASSESS-DOCD LE1/YR: CPT | Mod: HCNC,CPTII,S$GLB, | Performed by: FAMILY MEDICINE

## 2023-03-02 PROCEDURE — 3008F PR BODY MASS INDEX (BMI) DOCUMENTED: ICD-10-PCS | Mod: HCNC,CPTII,S$GLB, | Performed by: FAMILY MEDICINE

## 2023-03-02 PROCEDURE — 3077F SYST BP >= 140 MM HG: CPT | Mod: HCNC,CPTII,S$GLB, | Performed by: FAMILY MEDICINE

## 2023-03-02 PROCEDURE — 1160F PR REVIEW ALL MEDS BY PRESCRIBER/CLIN PHARMACIST DOCUMENTED: ICD-10-PCS | Mod: HCNC,CPTII,S$GLB, | Performed by: FAMILY MEDICINE

## 2023-03-02 PROCEDURE — 3079F PR MOST RECENT DIASTOLIC BLOOD PRESSURE 80-89 MM HG: ICD-10-PCS | Mod: HCNC,CPTII,S$GLB, | Performed by: FAMILY MEDICINE

## 2023-03-02 PROCEDURE — 1160F RVW MEDS BY RX/DR IN RCRD: CPT | Mod: HCNC,CPTII,S$GLB, | Performed by: FAMILY MEDICINE

## 2023-03-02 PROCEDURE — 99213 PR OFFICE/OUTPT VISIT, EST, LEVL III, 20-29 MIN: ICD-10-PCS | Mod: HCNC,S$GLB,, | Performed by: FAMILY MEDICINE

## 2023-03-02 PROCEDURE — 3077F PR MOST RECENT SYSTOLIC BLOOD PRESSURE >= 140 MM HG: ICD-10-PCS | Mod: HCNC,CPTII,S$GLB, | Performed by: FAMILY MEDICINE

## 2023-03-02 RX ORDER — METHOCARBAMOL 500 MG/1
500 TABLET, FILM COATED ORAL 4 TIMES DAILY
Qty: 40 TABLET | Refills: 0 | Status: SHIPPED | OUTPATIENT
Start: 2023-03-02 | End: 2023-06-06 | Stop reason: SDUPTHER

## 2023-03-02 RX ORDER — OXYCODONE AND ACETAMINOPHEN 7.5; 325 MG/1; MG/1
1 TABLET ORAL EVERY 4 HOURS PRN
Qty: 12 TABLET | Refills: 0 | Status: SHIPPED | OUTPATIENT
Start: 2023-03-02 | End: 2023-10-27

## 2023-03-02 RX ORDER — TRAMADOL HYDROCHLORIDE 50 MG/1
TABLET ORAL
COMMUNITY
Start: 2023-02-25 | End: 2023-03-22 | Stop reason: SDUPTHER

## 2023-03-02 RX ORDER — METHYLPREDNISOLONE 4 MG/1
TABLET ORAL
Qty: 1 EACH | Refills: 0 | Status: SHIPPED | OUTPATIENT
Start: 2023-03-02 | End: 2023-04-28 | Stop reason: ALTCHOICE

## 2023-03-02 NOTE — PROGRESS NOTES
Subjective:       Patient ID: Tristin Cerda is a 68 y.o. male.    Chief Complaint: No chief complaint on file.    New to me patient here for UC visit.  Onset 10 days ago of neck pain after using iPhone for 15-20 with head bent down; since has had severe, varibale pain in post neck that increases with any ROM and some mm spasm.  No radicular pain to arm/finger or weakness.  Heat and Tramadol provide no relief.    Hx of Chrohns; Short Bowel Syndrome; on Eliquis - s/p blot clot after a femur fracture    Review of Systems   Constitutional:  Negative for fever.   Respiratory:  Negative for shortness of breath.    Cardiovascular:  Negative for chest pain.   Gastrointestinal:  Negative for abdominal pain and nausea.   Musculoskeletal:  Positive for arthralgias and neck pain.   Skin:  Negative for rash.   Neurological:  Negative for weakness and numbness.   All other systems reviewed and are negative.    Objective:      Physical Exam  Vitals reviewed.   Constitutional:       General: He is not in acute distress.     Appearance: He is well-developed.      Comments: Head held stiffly   Neck:      Comments: Pain noted to increase with any ROM  Cardiovascular:      Rate and Rhythm: Normal rate and regular rhythm.      Heart sounds: No murmur heard.  Pulmonary:      Effort: Pulmonary effort is normal.      Breath sounds: Normal breath sounds.   Musculoskeletal:      Cervical back: No tenderness.   Lymphadenopathy:      Cervical: No cervical adenopathy.   Neurological:      Mental Status: He is alert.       Assessment:       1. Strain of neck muscle, initial encounter          Plan:       Strain of neck muscle, initial encounter  -     methylPREDNISolone (MEDROL DOSEPACK) 4 mg tablet; use as directed  Dispense: 1 each; Refill: 0  -     methocarbamoL (ROBAXIN) 500 MG Tab; Take 1 tablet (500 mg total) by mouth 4 (four) times daily.  Dispense: 40 tablet; Refill: 0  -     oxyCODONE-acetaminophen (PERCOCET) 7.5-325 mg per  tablet; Take 1 tablet by mouth every 4 (four) hours as needed for Pain.  Dispense: 12 tablet; Refill: 0      Patient Instructions   Heat to neck 3-4 times a day,      While taking the Percocet, don't take the Tramadol.

## 2023-03-03 ENCOUNTER — PATIENT MESSAGE (OUTPATIENT)
Dept: FAMILY MEDICINE | Facility: CLINIC | Age: 68
End: 2023-03-03
Payer: MEDICARE

## 2023-03-17 ENCOUNTER — CLINICAL SUPPORT (OUTPATIENT)
Dept: INTERNAL MEDICINE | Facility: CLINIC | Age: 68
End: 2023-03-17
Payer: MEDICARE

## 2023-03-17 VITALS — SYSTOLIC BLOOD PRESSURE: 136 MMHG | DIASTOLIC BLOOD PRESSURE: 84 MMHG

## 2023-03-17 PROCEDURE — 96372 THER/PROPH/DIAG INJ SC/IM: CPT | Mod: S$GLB,,, | Performed by: PHYSICIAN ASSISTANT

## 2023-03-17 PROCEDURE — 96372 PR INJECTION,THERAP/PROPH/DIAG2ST, IM OR SUBCUT: ICD-10-PCS | Mod: S$GLB,,, | Performed by: PHYSICIAN ASSISTANT

## 2023-03-17 PROCEDURE — 99999 PR PBB SHADOW E&M-EST. PATIENT-LVL I: CPT | Mod: PBBFAC,,,

## 2023-03-17 PROCEDURE — 99999 PR PBB SHADOW E&M-EST. PATIENT-LVL I: ICD-10-PCS | Mod: PBBFAC,,,

## 2023-03-17 RX ADMIN — CYANOCOBALAMIN 1000 MCG: 1000 INJECTION, SOLUTION INTRAMUSCULAR; SUBCUTANEOUS at 08:03

## 2023-03-17 NOTE — PROGRESS NOTES
B12 injection given per MAR R GLUT IM,  using aseptic techniqe.  0/10 pain scale. No bleeding at site. Tolerated well, waited 15 min.   No adverse reaction noted.

## 2023-03-22 ENCOUNTER — OFFICE VISIT (OUTPATIENT)
Dept: PAIN MEDICINE | Facility: CLINIC | Age: 68
End: 2023-03-22
Payer: MEDICARE

## 2023-03-22 VITALS
HEIGHT: 73 IN | SYSTOLIC BLOOD PRESSURE: 152 MMHG | HEART RATE: 80 BPM | WEIGHT: 147 LBS | DIASTOLIC BLOOD PRESSURE: 80 MMHG | BODY MASS INDEX: 19.48 KG/M2

## 2023-03-22 DIAGNOSIS — G89.29 CHRONIC ABDOMINAL PAIN: ICD-10-CM

## 2023-03-22 DIAGNOSIS — R10.9 CHRONIC ABDOMINAL PAIN: ICD-10-CM

## 2023-03-22 DIAGNOSIS — M79.18 MYOFASCIAL PAIN: Primary | ICD-10-CM

## 2023-03-22 DIAGNOSIS — M25.562 ACUTE PAIN OF LEFT KNEE: ICD-10-CM

## 2023-03-22 DIAGNOSIS — M17.11 OSTEOARTHRITIS OF RIGHT KNEE, UNSPECIFIED OSTEOARTHRITIS TYPE: ICD-10-CM

## 2023-03-22 PROCEDURE — 99214 PR OFFICE/OUTPT VISIT, EST, LEVL IV, 30-39 MIN: ICD-10-PCS | Mod: S$GLB,,, | Performed by: PHYSICIAN ASSISTANT

## 2023-03-22 PROCEDURE — 99999 PR PBB SHADOW E&M-EST. PATIENT-LVL IV: ICD-10-PCS | Mod: PBBFAC,,, | Performed by: PHYSICIAN ASSISTANT

## 2023-03-22 PROCEDURE — 1101F PT FALLS ASSESS-DOCD LE1/YR: CPT | Mod: CPTII,S$GLB,, | Performed by: PHYSICIAN ASSISTANT

## 2023-03-22 PROCEDURE — 1125F AMNT PAIN NOTED PAIN PRSNT: CPT | Mod: CPTII,S$GLB,, | Performed by: PHYSICIAN ASSISTANT

## 2023-03-22 PROCEDURE — 3077F PR MOST RECENT SYSTOLIC BLOOD PRESSURE >= 140 MM HG: ICD-10-PCS | Mod: CPTII,S$GLB,, | Performed by: PHYSICIAN ASSISTANT

## 2023-03-22 PROCEDURE — 1125F PR PAIN SEVERITY QUANTIFIED, PAIN PRESENT: ICD-10-PCS | Mod: CPTII,S$GLB,, | Performed by: PHYSICIAN ASSISTANT

## 2023-03-22 PROCEDURE — 3288F FALL RISK ASSESSMENT DOCD: CPT | Mod: CPTII,S$GLB,, | Performed by: PHYSICIAN ASSISTANT

## 2023-03-22 PROCEDURE — 3079F DIAST BP 80-89 MM HG: CPT | Mod: CPTII,S$GLB,, | Performed by: PHYSICIAN ASSISTANT

## 2023-03-22 PROCEDURE — 3077F SYST BP >= 140 MM HG: CPT | Mod: CPTII,S$GLB,, | Performed by: PHYSICIAN ASSISTANT

## 2023-03-22 PROCEDURE — 3079F PR MOST RECENT DIASTOLIC BLOOD PRESSURE 80-89 MM HG: ICD-10-PCS | Mod: CPTII,S$GLB,, | Performed by: PHYSICIAN ASSISTANT

## 2023-03-22 PROCEDURE — 3008F PR BODY MASS INDEX (BMI) DOCUMENTED: ICD-10-PCS | Mod: CPTII,S$GLB,, | Performed by: PHYSICIAN ASSISTANT

## 2023-03-22 PROCEDURE — 1159F PR MEDICATION LIST DOCUMENTED IN MEDICAL RECORD: ICD-10-PCS | Mod: CPTII,S$GLB,, | Performed by: PHYSICIAN ASSISTANT

## 2023-03-22 PROCEDURE — 3288F PR FALLS RISK ASSESSMENT DOCUMENTED: ICD-10-PCS | Mod: CPTII,S$GLB,, | Performed by: PHYSICIAN ASSISTANT

## 2023-03-22 PROCEDURE — 1101F PR PT FALLS ASSESS DOC 0-1 FALLS W/OUT INJ PAST YR: ICD-10-PCS | Mod: CPTII,S$GLB,, | Performed by: PHYSICIAN ASSISTANT

## 2023-03-22 PROCEDURE — 99214 OFFICE O/P EST MOD 30 MIN: CPT | Mod: S$GLB,,, | Performed by: PHYSICIAN ASSISTANT

## 2023-03-22 PROCEDURE — 3008F BODY MASS INDEX DOCD: CPT | Mod: CPTII,S$GLB,, | Performed by: PHYSICIAN ASSISTANT

## 2023-03-22 PROCEDURE — 1159F MED LIST DOCD IN RCRD: CPT | Mod: CPTII,S$GLB,, | Performed by: PHYSICIAN ASSISTANT

## 2023-03-22 PROCEDURE — 99999 PR PBB SHADOW E&M-EST. PATIENT-LVL IV: CPT | Mod: PBBFAC,,, | Performed by: PHYSICIAN ASSISTANT

## 2023-03-22 RX ORDER — METHOCARBAMOL 500 MG/1
500 TABLET, FILM COATED ORAL 4 TIMES DAILY
Qty: 40 TABLET | Refills: 0 | Status: SHIPPED | OUTPATIENT
Start: 2023-03-22 | End: 2023-04-03 | Stop reason: SDUPTHER

## 2023-03-22 RX ORDER — TRAMADOL HYDROCHLORIDE 50 MG/1
50 TABLET ORAL EVERY 8 HOURS PRN
Qty: 90 TABLET | Refills: 2 | Status: SHIPPED | OUTPATIENT
Start: 2023-03-26 | End: 2023-04-24

## 2023-03-22 NOTE — PROGRESS NOTES
PCP: Tyler Smith MD      CC: abdominal pain    Interval history: Mr. Cerda is a 68 y.o. male with an extensive history of crohn's disease who presents today for f/u s/p and medication refill. CC today is neck pain x 3 weeks. Pain started while looking down at his phone. Pain improved with medrol dose pack and Robaxin.  Pain has returned. He has not had any cervical imaging. He has healed from right hip fracture 5 months ago.  He was taking Percocet 5-325 mg q 6 h for post op pain. Acute left knee pain resolved after left intra articular steroid injection with Dr. Villatoro.  Right knee RFA  provided 75% relief.    Abdominal pain remains stable.   He does report diarrhea at times but no blood stools. Reports anal pain and itching 3/2 chronic diarrhea.  He is currently being evaluated by gastroenterology.  OTC Lidocaine 2 % provides some minimal relief.  Compounding cream was too expensive.  He rates his pain 8/10.     Prior HPI:   Mr. Cerda is a 60 year old male with PMH of crohn's disease referred by Dr. Hansen.  Patient states being diagnosed with crohn's disease since the age of 14.    He has had multiple GI surgeries and large bowel and small bowel removals.  During that time, he was being managed by providers in [place].  He was TPN dependent for many years until about two years ago.  He is now stable on an oral diet.  He did not have a primary care physician nor a gastroenterologist for many years.  He is currently trying to establish care.  He has future appointment with Dr. Ch.  He states taking Demerol 50mg q8hrs as needed for pain. It has provided relief of his nausea and pain with diarrhea.  He was last prescribed Demerol in July 2014.  Since then, he has been bearing with the pain.  It is a sharp shooting pain in his mid abdomen.      ROS:  CONSTITUTIONAL: No fevers, chills, night sweats, wt. loss, appetite changes  SKIN: no rashes or itching  ENT: No headaches, head trauma, vision  changes, or eye pain  LYMPH NODES: None noticed   CV: No chest pain, palpitations.   RESP: No shortness of breath, dyspnea on exertion, cough, wheezing, or hemoptysis  GI: No nausea, emesis, diarrhea, constipation, melena, hematochezia, pain.    : No dysuria, hematuria, urgency, or frequency   HEME: No easy bruising, bleeding problems  PSYCHIATRIC: No depression, anxiety, psychosis, hallucinations.  NEURO: No seizures, memory loss, dizziness or difficulty sleeping  MSK: no joint pain      Past Medical History:   Diagnosis Date    Anxiety     Basal cell carcinoma 07/2017    R forehead and R temple    Crohn's disease     age 17    Encounter for blood transfusion     Short bowel syndrome     Vitamin B deficiency     Vitamin D deficiency      Past Surgical History:   Procedure Laterality Date    3 small bowel resections      APPENDECTOMY      CHOLECYSTECTOMY      COLONOSCOPY      COLONOSCOPY N/A 2/14/2017    Procedure: COLONOSCOPY;  Surgeon: Nela Sanchez MD;  Location: [address]);  Service: Endoscopy;  Laterality: N/A;    COLONOSCOPY N/A 3/14/2017    Procedure: COLONOSCOPY;  Surgeon: Nela Sanchez MD;  Location: [facility] ([address] [place] [address]);  Service: Endoscopy;  Laterality: N/A;  2 days clear liquids before procedure    COLONOSCOPY N/A 7/29/2020    Procedure: COLONOSCOPY;  Surgeon: Andrea Shaver MD;  Location: [facility];  Service: Endoscopy;  Laterality: N/A;    ESOPHAGOGASTRODUODENOSCOPY N/A 11/19/2019    Procedure: EGD (ESOPHAGOGASTRODUODENOSCOPY);  Surgeon: Andrea Shaver MD;  Location: [facility];  Service: Endoscopy;  Laterality: N/A;    INJECTION OF ANESTHETIC AGENT AROUND NERVE Right 10/20/2020    Procedure: Block, Nerve genicular;  Surgeon: Alvin Curry MD;  Location: [facility] OR;  Service: Pain Management;  Laterality: Right;  right knee    INTRAMEDULLARY RODDING OF FEMUR Right 10/20/2022    Procedure: INSERTION, INTRAMEDULLARY SRINIVASA, FEMUR;  Surgeon: Gómez Selby MD;  Location: [facility] OR;  Service:  Orthopedics;  Laterality: Right;    KNEE SURGERY      RADIOFREQUENCY ABLATION Right 2021    Procedure: Radiofrequency Ablation Right Knee Genicular RFA Coolief;  Surgeon: Alvin Curry MD;  Location: [facility];  Service: Pain Management;  Laterality: Right;  Right knee     SMALL INTESTINE SURGERY      TUBE THORACOTOMY      UPPER GASTROINTESTINAL ENDOSCOPY       Family History   Problem Relation Age of Onset    Diabetes Mother     Stroke Mother     Diabetes Father     Kidney disease Father     Irritable bowel syndrome Cousin     Celiac disease Neg Hx     Cirrhosis Neg Hx     Colon cancer Neg Hx     Colon polyps Neg Hx     Cystic fibrosis Neg Hx     Esophageal cancer Neg Hx     Crohn's disease Neg Hx     Hemochromatosis Neg Hx     Inflammatory bowel disease Neg Hx     Liver cancer Neg Hx     Liver disease Neg Hx     Rectal cancer Neg Hx     Stomach cancer Neg Hx     Ulcerative colitis Neg Hx     Addison's disease Neg Hx     Melanoma Neg Hx     Psoriasis Neg Hx     Lupus Neg Hx     Eczema Neg Hx      Social History     Socioeconomic History    Marital status: Other   Tobacco Use    Smoking status: Former     Packs/day: 1.00     Years: 15.00     Pack years: 15.00     Types: Cigarettes     Quit date: 3/10/1995     Years since quittin.0    Smokeless tobacco: Never   Substance and Sexual Activity    Alcohol use: Never     Alcohol/week: 2.0 standard drinks     Types: 1 Cans of beer, 1 Shots of liquor per week    Drug use: No    Sexual activity: Yes     Partners: Female   Social History Narrative    ** Merged History Encounter **         ** Data from: 22 Enc Dept: [facility]    Retired          ** Data from: 22 Enc Dept: [facility]    ** Merged History Encounter **         ** Merged History Encounter **          Social Determinants of Health     Financial Resource Strain: Medium Risk    Difficulty of Paying Living Expenses: Somewhat hard   Food Insecurity: No Food Insecurity    Worried  "About Running Out of Food in the Last Year: Never true    Ran Out of Food in the Last Year: Never true   Transportation Needs: No Transportation Needs    Lack of Transportation (Medical): No    Lack of Transportation (Non-Medical): No   Physical Activity: Inactive    Days of Exercise per Week: 0 days    Minutes of Exercise per Session: 0 min   Stress: No Stress Concern Present    Feeling of Stress : Only a little   Social Connections: Moderately Isolated    Frequency of Communication with Friends and Family: More than three times a week    Frequency of Social Gatherings with Friends and Family: Twice a week    Attends Pentec[employer]al Services: Never    Active Member of Clubs or Organizations: No    Attends Club or Organization Meetings: Never    Marital Status:    Housing Stability: Unknown    Unable to Pay for Housing in the Last Year: Patient refused    Number of Places Lived in the Last Year: 1    Unstable Housing in the Last Year: Patient refused     Medications/Allergies: See med card    Vitals:    03/22/23 1027   BP: (!) 152/80   Pulse: 80   Weight: 66.7 kg (147 lb)   Height: 6' 1" (1.854 m)   PainSc:   8   PainLoc: Abdomen     Physical exam:    GENERAL: A and O x3, the patient appears well groomed and is in no acute distress.  Skin: No rashes or obvious lesions  HEENT: normocephalic, atraumatic  CARDIOVASCULAR:  RRR  LUNGS: non labored breathing  ABDOMEN: soft, nontender. No rebound   UPPER EXTREMITIES: Normal alignment, normal range of motion, no atrophy, no skin changes,  hair growth and nail growth normal and equal bilaterally. No swelling, no tenderness.    LOWER EXTREMITIES:  TTP left patella, swelling along medial and superior aspect. No redness or warmth.     Severe TTP SCM on right. Pain with right lateral rotation. No midline cervical tenderness. Negative spurlings.   LUMBAR SPINE  Lumbar spine: ROM is full with flexion extension and oblique extension with no increased pain.    Lam's test causes " no increased pain on either side.    Supine straight leg raise is negative bilaterally.    Internal and external rotation of the hip causes no increased pain on either side.  Myofascial exam: No tenderness to palpation across lumbar paraspinous muscles.    MENTAL STATUS: normal orientation, speech, language, and fund of knowledge for social situation.  Emotional state appropriate.    CRANIAL NERVES:  II:  PERRL bilaterally,   III,IV,VI: EOMI.    V:  Facial sensation equal bilaterally  VII:  Facial motor function normal.  VIII:  Hearing equal to finger rub bilaterally  IX/X: Gag normal, palate symmetric  XI:  Shoulder shrug equal, head turn equal  XII:  Tongue midline without fasciculations    MOTOR: Tone and bulk: normal bilateral upper and lower Strength: normal   SENSATION: Light touch and pinprick intact bilaterally  REFLEXES: normal, symmetric, nonbrisk.  Toes down, no clonus. No hoffmans.  GAIT: normal rise, base, steps, and arm swing.      Imaging:  Left knee xray 3/6/22  Mild tricompartmental degenerative osteoarthrosis with mild joint space narrowing and small osteophyte formation.  Subtle calcification highlighted cartilage consistent with chondrocalcinosis.     There is a small suprapatellar effusion.     Impression:     1. Mild tricompartmental degenerative osteoarthrosis  2. Chondrocalcinosis.  3. Small suprapatellar effusion.       Assessment:  Mr. Cerda is a 68 y.o. male with abdominal pain  1. Myofascial pain    2. Osteoarthritis of right knee, unspecified osteoarthritis type    3. Chronic abdominal pain    4. Acute pain of left knee          Plan:  1. Patient with long history of crohn's disease and chronic abdominal pain.  Continue care with GI.  2. Tramadol 50 mg q 8 h prn.     reviewed.  No signs of aberrant behavior.  Previous UDS consistent.  Script provided for 3 months  3. Continue OTC lidocaine cream  4. Monitor progress from right sided peripheral nerve block.   5. F/u with orthopedics  as needed  6. Ordered cervical xray.   7. Robaxin 500 mg TID prn   8. F/u 3  months or sooner  All medication management was performed by Dr. Alvin Curry

## 2023-03-23 ENCOUNTER — PATIENT MESSAGE (OUTPATIENT)
Dept: PAIN MEDICINE | Facility: CLINIC | Age: 68
End: 2023-03-23
Payer: MEDICARE

## 2023-03-23 NOTE — TELEPHONE ENCOUNTER
He just completed medrol dose pack 2 weeks ago. Recommend eval in ED to get his pain controlled followed by starting Physical therapy so they can work out that muscle

## 2023-04-13 ENCOUNTER — PATIENT MESSAGE (OUTPATIENT)
Dept: PAIN MEDICINE | Facility: CLINIC | Age: 68
End: 2023-04-13
Payer: MEDICARE

## 2023-04-13 NOTE — TELEPHONE ENCOUNTER
Called [facility] to have them fax pts x ray to us, will have it given to Yvonne KHAN and then go from there.      Yvonne please look out for this x rays I left a message with [facility] to fax.

## 2023-04-17 ENCOUNTER — CLINICAL SUPPORT (OUTPATIENT)
Dept: INTERNAL MEDICINE | Facility: CLINIC | Age: 68
End: 2023-04-17
Payer: MEDICARE

## 2023-04-17 VITALS — SYSTOLIC BLOOD PRESSURE: 124 MMHG | DIASTOLIC BLOOD PRESSURE: 60 MMHG

## 2023-04-17 PROCEDURE — 96372 PR INJECTION,THERAP/PROPH/DIAG2ST, IM OR SUBCUT: ICD-10-PCS | Mod: S$GLB,,, | Performed by: PHYSICIAN ASSISTANT

## 2023-04-17 PROCEDURE — 99999 PR PBB SHADOW E&M-EST. PATIENT-LVL I: ICD-10-PCS | Mod: PBBFAC,,,

## 2023-04-17 PROCEDURE — 99999 PR PBB SHADOW E&M-EST. PATIENT-LVL I: CPT | Mod: PBBFAC,,,

## 2023-04-17 PROCEDURE — 96372 THER/PROPH/DIAG INJ SC/IM: CPT | Mod: S$GLB,,, | Performed by: PHYSICIAN ASSISTANT

## 2023-04-17 RX ADMIN — CYANOCOBALAMIN 1000 MCG: 1000 INJECTION, SOLUTION INTRAMUSCULAR; SUBCUTANEOUS at 08:04

## 2023-04-17 NOTE — PROGRESS NOTES
B12 injection given per [facility],  using aseptic techniqe.  0/10 pain scale. No bleeding at site. Tolerated well, waited 15 min.   No adverse reaction noted.   Next injection, he would like to get at the same time, he sees Dr Smith. 5/24/mp

## 2023-04-20 ENCOUNTER — PATIENT MESSAGE (OUTPATIENT)
Dept: PAIN MEDICINE | Facility: CLINIC | Age: 68
End: 2023-04-20
Payer: MEDICARE

## 2023-04-24 ENCOUNTER — OFFICE VISIT (OUTPATIENT)
Dept: OPTOMETRY | Facility: CLINIC | Age: 68
End: 2023-04-24
Payer: MEDICARE

## 2023-04-24 DIAGNOSIS — H50.012 ESOTROPIA, LEFT EYE: ICD-10-CM

## 2023-04-24 DIAGNOSIS — H25.813 COMBINED FORMS OF AGE-RELATED CATARACT, BILATERAL: Primary | ICD-10-CM

## 2023-04-24 DIAGNOSIS — H52.203 MYOPIA WITH ASTIGMATISM AND PRESBYOPIA, BILATERAL: ICD-10-CM

## 2023-04-24 DIAGNOSIS — H52.13 MYOPIA WITH ASTIGMATISM AND PRESBYOPIA, BILATERAL: ICD-10-CM

## 2023-04-24 DIAGNOSIS — H40.013 OPEN ANGLE WITH BORDERLINE FINDINGS AND LOW GLAUCOMA RISK IN BOTH EYES: ICD-10-CM

## 2023-04-24 DIAGNOSIS — H52.4 MYOPIA WITH ASTIGMATISM AND PRESBYOPIA, BILATERAL: ICD-10-CM

## 2023-04-24 PROCEDURE — 92133 PR COMPUTERIZED OPHTHALMIC IMAGING OPTIC NERVE: ICD-10-PCS | Mod: S$GLB,,,

## 2023-04-24 PROCEDURE — 1159F PR MEDICATION LIST DOCUMENTED IN MEDICAL RECORD: ICD-10-PCS | Mod: CPTII,S$GLB,,

## 2023-04-24 PROCEDURE — 92133 CPTRZD OPH DX IMG PST SGM ON: CPT | Mod: S$GLB,,,

## 2023-04-24 PROCEDURE — 1126F PR PAIN SEVERITY QUANTIFIED, NO PAIN PRESENT: ICD-10-PCS | Mod: CPTII,S$GLB,,

## 2023-04-24 PROCEDURE — 92014 PR EYE EXAM, EST PATIENT,COMPREHESV: ICD-10-PCS | Mod: S$GLB,,,

## 2023-04-24 PROCEDURE — 1100F PR PT FALLS ASSESS DOC 2+ FALLS/FALL W/INJURY/YR: ICD-10-PCS | Mod: CPTII,S$GLB,,

## 2023-04-24 PROCEDURE — 3288F PR FALLS RISK ASSESSMENT DOCUMENTED: ICD-10-PCS | Mod: CPTII,S$GLB,,

## 2023-04-24 PROCEDURE — 1159F MED LIST DOCD IN RCRD: CPT | Mod: CPTII,S$GLB,,

## 2023-04-24 PROCEDURE — 1100F PTFALLS ASSESS-DOCD GE2>/YR: CPT | Mod: CPTII,S$GLB,,

## 2023-04-24 PROCEDURE — 92014 COMPRE OPH EXAM EST PT 1/>: CPT | Mod: S$GLB,,,

## 2023-04-24 PROCEDURE — 99999 PR PBB SHADOW E&M-EST. PATIENT-LVL III: ICD-10-PCS | Mod: PBBFAC,,,

## 2023-04-24 PROCEDURE — 3288F FALL RISK ASSESSMENT DOCD: CPT | Mod: CPTII,S$GLB,,

## 2023-04-24 PROCEDURE — 1126F AMNT PAIN NOTED NONE PRSNT: CPT | Mod: CPTII,S$GLB,,

## 2023-04-24 PROCEDURE — 99999 PR PBB SHADOW E&M-EST. PATIENT-LVL III: CPT | Mod: PBBFAC,,,

## 2023-04-24 NOTE — PROGRESS NOTES
HPI    New pt ref from [facility] for cataracts. Last exam- 2021    Pt sts he just got new glasses x 2 months. Pet went to [facility] and   was told his cataracts are significant. Pt has occasionally dipopia   lasting a few seconds, used to be way worse. Pt sts he will close a eye   for a few moments and it goes away. Pt denies flashes/floaters/pain. Pt   denies use of gtt.   Last edited by Rashida Toribio, OD on 4/24/2023  8:31 AM.            Assessment /Plan     For exam results, see Encounter Report.    Combined forms of age-related cataract, bilateral    Open angle with borderline findings and low glaucoma risk in both eyes  -     Posterior Segment OCT Optic Nerve- Both eyes    Esotropia, left eye    Myopia with astigmatism and presbyopia, bilateral      Pt referred from [facility] to have cataracts evaluated further. Combined cataracts OU with both nuclear sclerosis and vacuoles. Visual significance OD>OS. Pt feels vision is doing ok, minimal complaints regarding blur and glare sensitivity. Surgical consult deferred at this time. Continue to monitor yearly for changes, sooner if pt becomes more symptomatic.  Moderate CD ratio, low IOP. Thin pachs OD>OD. No known family history or ocular trauma trauma. Repeat RNFL OCT today shows progressive thinning of NFL OS>>OD. Ed pt on findings and nature of glaucoma. Pt to return for 24-2 HVF, gonio, and IOP check for further evaluation.   Likely decompensated over the years, pt has been noticing intermittent diplopia for the last 9 or so years, especially while driving. Pt states it doesn't happen as often as it used to. When diplopia episodes occur, pt closes an eye to compensate. Ed pt that prism would be a good option, pt defers at this time - states he's ok with closing an eye. Ed pt to RTC if episodes of diplopia increase in frequency - can always trial Fresnel first.  Pt just updated specs ~2 months ago at [facility], is ok with his vision. No new  refraction or spec rx given today.    RTC: ~1 month for 24-2 HVF and office visit for gonio, IOP check, results review.

## 2023-04-25 DIAGNOSIS — M50.30 DDD (DEGENERATIVE DISC DISEASE), CERVICAL: Primary | ICD-10-CM

## 2023-04-27 ENCOUNTER — PATIENT MESSAGE (OUTPATIENT)
Dept: FAMILY MEDICINE | Facility: CLINIC | Age: 68
End: 2023-04-27
Payer: MEDICARE

## 2023-04-28 ENCOUNTER — OFFICE VISIT (OUTPATIENT)
Dept: PRIMARY CARE CLINIC | Facility: CLINIC | Age: 68
End: 2023-04-28
Payer: MEDICARE

## 2023-04-28 DIAGNOSIS — M54.2 CERVICAL PAIN (NECK): Primary | ICD-10-CM

## 2023-04-28 PROCEDURE — 99212 PR OFFICE/OUTPT VISIT, EST, LEVL II, 10-19 MIN: ICD-10-PCS | Mod: 95,,, | Performed by: FAMILY MEDICINE

## 2023-04-28 PROCEDURE — 99212 OFFICE O/P EST SF 10 MIN: CPT | Mod: 95,,, | Performed by: FAMILY MEDICINE

## 2023-04-28 RX ORDER — METHYLPREDNISOLONE 4 MG/1
TABLET ORAL
Qty: 21 EACH | Refills: 0 | Status: SHIPPED | OUTPATIENT
Start: 2023-04-28 | End: 2023-05-19

## 2023-04-28 NOTE — PROGRESS NOTES
The patient location is:  [facility]  The chief complaint leading to consultation is:  Cervical neck pain    Visit type: audiovisual    Face to Face time with patient: 15  20 minutes of total time spent on the encounter, which includes face to face time and non-face to face time preparing to see the patient (eg, review of tests), Obtaining and/or reviewing separately obtained history, Documenting clinical information in the electronic or other health record, Independently interpreting results (not separately reported) and communicating results to the patient/family/caregiver, or Care coordination (not separately reported).         Each patient to whom he or she provides medical services by telemedicine is:  (1) informed of the relationship between the physician and patient and the respective role of any other health care provider with respect to management of the patient; and (2) notified that he or she may decline to receive medical services by telemedicine and may withdraw from such care at any time.    Notes:    Subjective:       Patient ID: Tristin Cerda is a 68 y.o. male.    Chief Complaint: No chief complaint on file.    68-year-old male presents via telehealth appointment.  Patient states he is having cervical neck pain it has been present for approximally 2 months.  Patient states yesterday the pain was extremely severe.  Today the pain is 5/10.  States it only hurts when he turns his head and it radiates up his neck.  Patient states it is better with heat.  Denies any headache, nausea, vomiting, dizziness.  Patient was previously referred to physical therapy but he declined because he could not get there.  Patient has a primary care provider that he sees regularly in [place], encouraged him to follow up with him states he has an appointment scheduled in May with him.  Patient is also under pain contract with pain management they have ordered x-ray imaging of cervical spine, this is  results have not been uploaded to the system.  They have also ordered an MRI of his cervical spine, patient states he is scheduled for the MRI next week.  No further complaints noted today.        Current Outpatient Medications:     aspirin-acetaminophen-caffeine 250-250-65 mg (EXCEDRIN MIGRAINE) 250-250-65 mg per tablet, Take 1 tablet by mouth every 8 (eight) hours as needed for Pain. , Disp: , Rfl:     cyanocobalamin 1,000 mcg/mL injection, Inject 1 mL (1,000 mcg total) into the muscle every 30 days., Disp: 10 mL, Rfl: 11    dicyclomine (BENTYL) 20 mg tablet, TAKE 1 TABLET BY MOUTH FOUR TIMES DAILY, Disp: 360 tablet, Rfl: 3    hydrocortisone 2.5 % cream, Apply topically 2 (two) times daily as needed., Disp: , Rfl:     meclizine (ANTIVERT) 12.5 mg tablet, Take 1 tablet (12.5 mg total) by mouth 3 (three) times daily as needed for Dizziness., Disp: 90 tablet, Rfl: 3    methocarbamoL (ROBAXIN) 500 MG Tab, Take 1 tablet (500 mg total) by mouth 4 (four) times daily., Disp: 40 tablet, Rfl: 0    methocarbamoL (ROBAXIN) 500 MG Tab, Take 1 tablet (500 mg total) by mouth 3 (three) times daily as needed (muscle pain)., Disp: 45 tablet, Rfl: 1    methylPREDNISolone (MEDROL DOSEPACK) 4 mg tablet, use as directed, Disp: 21 each, Rfl: 0    ondansetron (ZOFRAN) 8 MG tablet, TAKE 1 TABLET EVERY 12 HOURS AS NEEDED FOR NAUSEA, Disp: 180 tablet, Rfl: 1    oxyCODONE-acetaminophen (PERCOCET) 7.5-325 mg per tablet, Take 1 tablet by mouth every 4 (four) hours as needed for Pain., Disp: 12 tablet, Rfl: 0    Current Facility-Administered Medications:     cyanocobalamin injection 1,000 mcg, 1,000 mcg, Intramuscular, Q30 Days, Andrea Shaver MD, 1,000 mcg at 09/30/22 1302    cyanocobalamin injection 1,000 mcg, 1,000 mcg, Intramuscular, Q30 Days, ERIN Canales, 1,000 mcg at 04/17/23 0841    Facility-Administered Medications Ordered in Other Visits:     lactated ringers infusion, , Intravenous, Continuous, Alvin Curry MD    Review of  patient's allergies indicates:   Allergen Reactions    Ciprofloxacin     Codeine Itching    Compazine [prochlorperazine]     Erythromycin     Keflex [cephalexin]        Past Medical History:   Diagnosis Date    Anxiety     Basal cell carcinoma 07/2017    R forehead and R temple    Crohn's disease     age 17    Encounter for blood transfusion     Hypertension     Short bowel syndrome     Vitamin B deficiency     Vitamin D deficiency        Past Surgical History:   Procedure Laterality Date    3 small bowel resections      APPENDECTOMY      CHOLECYSTECTOMY      COLONOSCOPY      COLONOSCOPY N/A 02/14/2017    Procedure: COLONOSCOPY;  Surgeon: Nela Sanchez MD;  [facility]  Service: Endoscopy;  Laterality: N/A;    COLONOSCOPY N/A 03/14/2017    Procedure: COLONOSCOPY;  Surgeon: Nela Sanchez MD;  [facility]  Service: Endoscopy;  Laterality: N/A;  2 days clear liquids before procedure    COLONOSCOPY N/A 07/29/2020    Procedure: COLONOSCOPY;  Surgeon: Andrea Shaver MD;  Location: [facility];  Service: Endoscopy;  Laterality: N/A;    ESOPHAGOGASTRODUODENOSCOPY N/A 11/19/2019    Procedure: EGD (ESOPHAGOGASTRODUODENOSCOPY);  Surgeon: Andrea Shaver MD;  Location: [facility];  Service: Endoscopy;  Laterality: N/A;    INJECTION OF ANESTHETIC AGENT AROUND NERVE Right 10/20/2020    Procedure: Block, Nerve genicular;  Surgeon: Alvin Curry MD;  [facility]: [facility] OR;  Service: Pain Management;  Laterality: Right;  right knee    INTRAMEDULLARY RODDING OF FEMUR Right 10/20/2022    Procedure: INSERTION, INTRAMEDULLARY SRINIVASA, FEMUR;  Surgeon: Gómez Selby MD;  Location: [facility] OR;  Service: Orthopedics;  Laterality: Right;    KNEE SURGERY      RADIOFREQUENCY ABLATION Right 01/08/2021    Procedure: Radiofrequency Ablation Right Knee Genicular RFA Coolief;  Surgeon: Alvin Curry MD;  Location: [facility] OR;  Service: Pain Management;  Laterality: Right;  Right knee     SMALL INTESTINE SURGERY      TUBE  THORACOTOMY      UPPER GASTROINTESTINAL ENDOSCOPY         Family History   Problem Relation Age of Onset    Diabetes Mother     Stroke Mother     Diabetes Father     Kidney disease Father     Irritable bowel syndrome Cousin     Celiac disease Neg Hx     Cirrhosis Neg Hx     Colon cancer Neg Hx     Colon polyps Neg Hx     Cystic fibrosis Neg Hx     Esophageal cancer Neg Hx     Crohn's disease Neg Hx     Hemochromatosis Neg Hx     Inflammatory bowel disease Neg Hx     Liver cancer Neg Hx     Liver disease Neg Hx     Rectal cancer Neg Hx     Stomach cancer Neg Hx     Ulcerative colitis Neg Hx     Addison's disease Neg Hx     Melanoma Neg Hx     Psoriasis Neg Hx     Lupus Neg Hx     Eczema Neg Hx     Glaucoma Neg Hx     Macular degeneration Neg Hx        Social History     Tobacco Use    Smoking status: Former     Packs/day: 1.00     Years: 15.00     Pack years: 15.00     Types: Cigarettes     Quit date: 3/10/1995     Years since quittin.1    Smokeless tobacco: Never   Substance Use Topics    Alcohol use: Never     Alcohol/week: 2.0 standard drinks     Types: 1 Cans of beer, 1 Shots of liquor per week    Drug use: No       Review of Systems   Constitutional:  Negative for activity change, appetite change, fatigue, fever and unexpected weight change.   HENT:  Negative for ear discharge, ear pain, hearing loss and tinnitus.    Eyes:  Negative for photophobia, pain and visual disturbance.   Respiratory:  Negative for cough, shortness of breath and wheezing.    Cardiovascular:  Negative for chest pain and palpitations.   Gastrointestinal:  Negative for abdominal pain, blood in stool, constipation, diarrhea, nausea and vomiting.   Genitourinary:  Negative for dysuria and hematuria.   Musculoskeletal:  Positive for neck pain.   Neurological:  Negative for weakness and headaches.       Current Medications:   Home Psychiatric Meds:   Psychotherapeutics (From admission, onward)      None                Objective:      There  were no vitals filed for this visit.  Physical Exam  Vitals reviewed: Physical examination not performed due to nature of telehealth visit.         Lab Results   Component Value Date    WBC 5.11 10/22/2022    HGB 9.7 (L) 10/22/2022    HCT 29.1 (L) 10/22/2022    PLT 85 (L) 10/22/2022    CHOL 111 11/22/2021    TRIG 70 11/22/2021    HDL 40 11/22/2021    ALT 30 10/22/2022    AST 28 10/22/2022     10/22/2022    K 4.4 10/22/2022     10/22/2022    CREATININE 0.7 10/22/2022    BUN 13 10/22/2022    CO2 25 10/22/2022    TSH 1.784 10/20/2022    PSA 1.6 03/10/2015    INR 1.1 10/19/2022    HGBA1C 5.1 11/22/2021      Assessment:       1. Cervical pain (neck)        Plan:         Problem List Items Addressed This Visit          Orthopedic    Cervical pain (neck) - Primary     - encouraged patient to follow up with his primary care physican as well as pain management given that they have ordered imaging  - highly encourage patient to attend physical therapy sessions  - will send Medrol Dosepak to pharmacy, discussed with patient maintaining his pain contract and chronic use of steroids adverse effects             Relevant Medications    methylPREDNISolone (MEDROL DOSEPACK) 4 mg tablet         Follow up if symptoms worsen or fail to improve. F/U with PCP in [place].         Approximately 20 minutes were spent on this encounter. This includes face to face time and non-face to face time preparing to see the patient (eg, review of tests), obtaining and/or reviewing separately obtained history, documenting clinical information in the electronic or other health record, independently interpreting results and communicating results to the patient/family/caregiver, or care coordinator.    Instructed patient that if symptoms fail to improve or worsen patient should seek immediate medical attention or report to the nearest emergency department. Patient expressed verbal agreement and understanding to this plan of care.     This note was  created using [employer] voice recognition software that occasionally misinterpreted phrases or words.     NICOLETTE Bowie MD

## 2023-04-28 NOTE — ASSESSMENT & PLAN NOTE
- encouraged patient to follow up with his primary care physican as well as pain management given that they have ordered imaging  - highly encourage patient to attend physical therapy sessions  - will send Medrol Dosepak to pharmacy, discussed with patient maintaining his pain contract and chronic use of steroids adverse effects

## 2023-05-05 ENCOUNTER — HOSPITAL ENCOUNTER (OUTPATIENT)
Dept: RADIOLOGY | Facility: HOSPITAL | Age: 68
Discharge: HOME | End: 2023-05-05
Attending: PHYSICIAN ASSISTANT
Payer: MEDICARE

## 2023-05-05 DIAGNOSIS — M50.30 DDD (DEGENERATIVE DISC DISEASE), CERVICAL: ICD-10-CM

## 2023-05-05 PROCEDURE — 72141 MRI NECK SPINE W/O DYE: CPT | Mod: 26,,, | Performed by: RADIOLOGY

## 2023-05-05 PROCEDURE — 72141 MRI NECK SPINE W/O DYE: CPT | Mod: TC

## 2023-05-05 PROCEDURE — 72141 MRI CERVICAL SPINE WITHOUT CONTRAST: ICD-10-PCS | Mod: 26,,, | Performed by: RADIOLOGY

## 2023-05-09 ENCOUNTER — PATIENT MESSAGE (OUTPATIENT)
Dept: PAIN MEDICINE | Facility: CLINIC | Age: 68
End: 2023-05-09
Payer: MEDICARE

## 2023-05-09 NOTE — TELEPHONE ENCOUNTER
Please advise on pts 05/05 imaging results . Thank you   Impression:     Multi level facet degenerative changes and disc osteophyte complexes producing mild spinal stenosis at C4/5 and multilevel neural foraminal narrowing.        Electronically signed by: Amber Gonzalez  Date:                                            05/06/2023

## 2023-05-22 RX ORDER — METHOCARBAMOL 500 MG/1
500 TABLET, FILM COATED ORAL 3 TIMES DAILY PRN
Qty: 45 TABLET | Refills: 1 | Status: SHIPPED | OUTPATIENT
Start: 2023-05-22 | End: 2023-06-19

## 2023-06-06 ENCOUNTER — TELEPHONE (OUTPATIENT)
Dept: GASTROENTEROLOGY | Facility: CLINIC | Age: 68
End: 2023-06-06
Payer: MEDICARE

## 2023-06-06 ENCOUNTER — OFFICE VISIT (OUTPATIENT)
Dept: FAMILY MEDICINE | Facility: CLINIC | Age: 68
End: 2023-06-06
Payer: MEDICARE

## 2023-06-06 VITALS
BODY MASS INDEX: 19.32 KG/M2 | OXYGEN SATURATION: 99 % | TEMPERATURE: 98 F | WEIGHT: 145.75 LBS | HEIGHT: 73 IN | SYSTOLIC BLOOD PRESSURE: 136 MMHG | HEART RATE: 64 BPM | DIASTOLIC BLOOD PRESSURE: 64 MMHG | RESPIRATION RATE: 17 BRPM

## 2023-06-06 DIAGNOSIS — I82.5Z1 CHRONIC DEEP VEIN THROMBOSIS (DVT) OF DISTAL VEIN OF RIGHT LOWER EXTREMITY: ICD-10-CM

## 2023-06-06 DIAGNOSIS — Z86.2 HISTORY OF ANEMIA: ICD-10-CM

## 2023-06-06 DIAGNOSIS — R91.1 ABNORMAL X-RAY OF LUNGS WITH SINGLE PULMONARY NODULE: ICD-10-CM

## 2023-06-06 DIAGNOSIS — E53.8 B12 DEFICIENCY: ICD-10-CM

## 2023-06-06 DIAGNOSIS — Z12.5 SCREENING FOR PROSTATE CANCER: ICD-10-CM

## 2023-06-06 DIAGNOSIS — E03.9 HYPOTHYROIDISM, UNSPECIFIED TYPE: ICD-10-CM

## 2023-06-06 DIAGNOSIS — D69.6 THROMBOCYTOPENIC: ICD-10-CM

## 2023-06-06 DIAGNOSIS — R91.1 SOLITARY PULMONARY NODULE: Primary | ICD-10-CM

## 2023-06-06 DIAGNOSIS — R79.9 ABNORMAL BLOOD FINDING: ICD-10-CM

## 2023-06-06 PROCEDURE — 3008F BODY MASS INDEX DOCD: CPT | Mod: CPTII,S$GLB,, | Performed by: FAMILY MEDICINE

## 2023-06-06 PROCEDURE — 3288F PR FALLS RISK ASSESSMENT DOCUMENTED: ICD-10-PCS | Mod: CPTII,S$GLB,, | Performed by: FAMILY MEDICINE

## 2023-06-06 PROCEDURE — 3008F PR BODY MASS INDEX (BMI) DOCUMENTED: ICD-10-PCS | Mod: CPTII,S$GLB,, | Performed by: FAMILY MEDICINE

## 2023-06-06 PROCEDURE — 99999 PR PBB SHADOW E&M-EST. PATIENT-LVL IV: ICD-10-PCS | Mod: PBBFAC,,, | Performed by: FAMILY MEDICINE

## 2023-06-06 PROCEDURE — 3075F PR MOST RECENT SYSTOLIC BLOOD PRESS GE 130-139MM HG: ICD-10-PCS | Mod: CPTII,S$GLB,, | Performed by: FAMILY MEDICINE

## 2023-06-06 PROCEDURE — 3078F PR MOST RECENT DIASTOLIC BLOOD PRESSURE < 80 MM HG: ICD-10-PCS | Mod: CPTII,S$GLB,, | Performed by: FAMILY MEDICINE

## 2023-06-06 PROCEDURE — 3288F FALL RISK ASSESSMENT DOCD: CPT | Mod: CPTII,S$GLB,, | Performed by: FAMILY MEDICINE

## 2023-06-06 PROCEDURE — 99999 PR PBB SHADOW E&M-EST. PATIENT-LVL IV: CPT | Mod: PBBFAC,,, | Performed by: FAMILY MEDICINE

## 2023-06-06 PROCEDURE — 99214 OFFICE O/P EST MOD 30 MIN: CPT | Mod: S$GLB,,, | Performed by: FAMILY MEDICINE

## 2023-06-06 PROCEDURE — 1125F PR PAIN SEVERITY QUANTIFIED, PAIN PRESENT: ICD-10-PCS | Mod: CPTII,S$GLB,, | Performed by: FAMILY MEDICINE

## 2023-06-06 PROCEDURE — 3075F SYST BP GE 130 - 139MM HG: CPT | Mod: CPTII,S$GLB,, | Performed by: FAMILY MEDICINE

## 2023-06-06 PROCEDURE — 99214 PR OFFICE/OUTPT VISIT, EST, LEVL IV, 30-39 MIN: ICD-10-PCS | Mod: S$GLB,,, | Performed by: FAMILY MEDICINE

## 2023-06-06 PROCEDURE — 1101F PT FALLS ASSESS-DOCD LE1/YR: CPT | Mod: CPTII,S$GLB,, | Performed by: FAMILY MEDICINE

## 2023-06-06 PROCEDURE — 1125F AMNT PAIN NOTED PAIN PRSNT: CPT | Mod: CPTII,S$GLB,, | Performed by: FAMILY MEDICINE

## 2023-06-06 PROCEDURE — 1101F PR PT FALLS ASSESS DOC 0-1 FALLS W/OUT INJ PAST YR: ICD-10-PCS | Mod: CPTII,S$GLB,, | Performed by: FAMILY MEDICINE

## 2023-06-06 PROCEDURE — 3078F DIAST BP <80 MM HG: CPT | Mod: CPTII,S$GLB,, | Performed by: FAMILY MEDICINE

## 2023-06-06 PROCEDURE — 1159F PR MEDICATION LIST DOCUMENTED IN MEDICAL RECORD: ICD-10-PCS | Mod: CPTII,S$GLB,, | Performed by: FAMILY MEDICINE

## 2023-06-06 PROCEDURE — 1159F MED LIST DOCD IN RCRD: CPT | Mod: CPTII,S$GLB,, | Performed by: FAMILY MEDICINE

## 2023-06-06 RX ORDER — APIXABAN 5 MG/1
TABLET, FILM COATED ORAL
COMMUNITY
Start: 2023-04-30 | End: 2024-02-25

## 2023-06-06 RX ORDER — ONDANSETRON HYDROCHLORIDE 8 MG/1
8 TABLET, FILM COATED ORAL EVERY 12 HOURS PRN
Qty: 180 TABLET | Refills: 1 | Status: SHIPPED | OUTPATIENT
Start: 2023-06-06

## 2023-06-06 RX ORDER — TRAMADOL HYDROCHLORIDE 50 MG/1
50 TABLET ORAL
COMMUNITY
Start: 2023-05-23 | End: 2023-06-09 | Stop reason: SDUPTHER

## 2023-06-06 NOTE — TELEPHONE ENCOUNTER
Talked to patient about upcoming appointment, New patient , Chron's, Ulcerative Colitis, IBD, let know that New patients with these symptoms, need to be scheduled with Specialist, in order to evaluate treatment plan, number provided.  no further issues noted. Patient verbally understood.

## 2023-06-06 NOTE — PROGRESS NOTES
Subjective:   Patient ID: Tristin Cerda is a 68 y.o. male     Chief Complaint:Follow-up (6 month)      Here for checkup    Follow-up  Pertinent negatives include no abdominal pain or chest pain.   Review of Systems   Respiratory:  Negative for shortness of breath.    Cardiovascular:  Negative for chest pain.   Gastrointestinal:  Negative for abdominal pain.   Genitourinary:  Negative for dysuria.   Past Medical History:   Diagnosis Date    Anxiety     Basal cell carcinoma 07/2017    R forehead and R temple    Crohn's disease     age 17    Encounter for blood transfusion     Hypertension     Short bowel syndrome     Vitamin B deficiency     Vitamin D deficiency      Past Surgical History:   Procedure Laterality Date    3 small bowel resections      APPENDECTOMY      CHOLECYSTECTOMY      COLONOSCOPY      COLONOSCOPY N/A 02/14/2017    Procedure: COLONOSCOPY;  Surgeon: Nela Sanchez MD;  Location: [address]);  Service: Endoscopy;  Laterality: N/A;    COLONOSCOPY N/A 03/14/2017    Procedure: COLONOSCOPY;  Surgeon: Nela Sanchez MD;  Location: [address]);  Service: Endoscopy;  Laterality: N/A;  2 days clear liquids before procedure    COLONOSCOPY N/A 07/29/2020    Procedure: COLONOSCOPY;  Surgeon: Andrea Shaver MD;  Location: [facility];  Service: Endoscopy;  Laterality: N/A;    ESOPHAGOGASTRODUODENOSCOPY N/A 11/19/2019    Procedure: EGD (ESOPHAGOGASTRODUODENOSCOPY);  Surgeon: Andrea Shaver MD;  Location: [facility];  Service: Endoscopy;  Laterality: N/A;    INJECTION OF ANESTHETIC AGENT AROUND NERVE Right 10/20/2020    Procedure: Block, Nerve genicular;  Surgeon: Alvin Curry MD;  Location: [facility];  Service: Pain Management;  Laterality: Right;  right knee    INTRAMEDULLARY RODDING OF FEMUR Right 10/20/2022    Procedure: INSERTION, INTRAMEDULLARY SRINIVASA, FEMUR;  Surgeon: Gómez Selby MD;  Location: [facility] OR;  Service: Orthopedics;  Laterality: Right;    KNEE SURGERY      RADIOFREQUENCY  ABLATION Right 01/08/2021    Procedure: Radiofrequency Ablation Right Knee Genicular RFA [place];  Surgeon: Alvin Curry MD;  Location: [facility];  Service: Pain Management;  Laterality: Right;  Right knee     SMALL INTESTINE SURGERY      TUBE THORACOTOMY      UPPER GASTROINTESTINAL ENDOSCOPY       Objective:     Vitals:    06/06/23 1127   BP: 136/64   Pulse:    Resp:    Temp:      Body mass index is 19.23 kg/m².  Physical Exam  Vitals and nursing note reviewed.   Constitutional:       Appearance: He is well-developed.   HENT:      Head: Normocephalic and atraumatic.   Eyes:      General: No scleral icterus.     Conjunctiva/sclera: Conjunctivae normal.   Cardiovascular:      Heart sounds: No murmur heard.  Pulmonary:      Effort: Pulmonary effort is normal. No respiratory distress.   Musculoskeletal:         General: No deformity. Normal range of motion.      Cervical back: Normal range of motion and neck supple.   Skin:     Coloration: Skin is not pale.      Findings: No rash.   Neurological:      Mental Status: He is alert and oriented to person, place, and time.   Psychiatric:         Behavior: Behavior normal.         Thought Content: Thought content normal.         Judgment: Judgment normal.     Assessment:     1. Solitary pulmonary nodule    2. Abnormal x-ray of lungs with single pulmonary nodule    3. Hypothyroidism, unspecified type    4. History of anemia    5. Abnormal blood finding    6. B12 deficiency    7. Screening for prostate cancer    8. Thrombocytopenic    9. Chronic deep vein thrombosis (DVT) of distal vein of right lower extremity      Plan:   Solitary pulmonary nodule  -     CT Chest Without Contrast; Future; Expected date: 06/06/2023  -     Cancel: VITAMIN B12; Future; Expected date: 06/06/2023  -     Cancel: FOLATE; Future; Expected date: 06/06/2023  -     FOLATE; Future; Expected date: 06/06/2023  -     VITAMIN B12; Future; Expected date: 06/06/2023    Abnormal x-ray of lungs with single  pulmonary nodule  -     CT Chest Without Contrast; Future; Expected date: 06/06/2023    Hypothyroidism, unspecified type  -     Cancel: CBC Auto Differential; Future; Expected date: 06/06/2023  -     Cancel: VITAMIN B12; Future; Expected date: 06/06/2023  -     Cancel: Comprehensive Metabolic Panel; Future; Expected date: 06/06/2023  -     Cancel: TSH; Future; Expected date: 06/06/2023  -     Cancel: FOLATE; Future; Expected date: 06/06/2023  -     Cancel: FERRITIN; Future; Expected date: 06/06/2023  -     FERRITIN; Future; Expected date: 06/06/2023  -     TSH; Future; Expected date: 06/06/2023  -     FOLATE; Future; Expected date: 06/06/2023  -     Comprehensive Metabolic Panel; Future; Expected date: 06/06/2023  -     VITAMIN B12; Future; Expected date: 06/06/2023  -     CBC Auto Differential; Future; Expected date: 06/06/2023    History of anemia  -     Cancel: CBC Auto Differential; Future; Expected date: 06/06/2023  -     Cancel: VITAMIN B12; Future; Expected date: 06/06/2023  -     Cancel: Comprehensive Metabolic Panel; Future; Expected date: 06/06/2023  -     Cancel: TSH; Future; Expected date: 06/06/2023  -     Cancel: FOLATE; Future; Expected date: 06/06/2023  -     Cancel: FERRITIN; Future; Expected date: 06/06/2023  -     FERRITIN; Future; Expected date: 06/06/2023  -     TSH; Future; Expected date: 06/06/2023  -     FOLATE; Future; Expected date: 06/06/2023  -     Comprehensive Metabolic Panel; Future; Expected date: 06/06/2023  -     VITAMIN B12; Future; Expected date: 06/06/2023  -     CBC Auto Differential; Future; Expected date: 06/06/2023    Abnormal blood finding    B12 deficiency  -     Cancel: CBC Auto Differential; Future; Expected date: 06/06/2023  -     Cancel: VITAMIN B12; Future; Expected date: 06/06/2023  -     Cancel: Comprehensive Metabolic Panel; Future; Expected date: 06/06/2023  -     Cancel: TSH; Future; Expected date: 06/06/2023  -     Cancel: FOLATE; Future; Expected date:  06/06/2023  -     Cancel: FERRITIN; Future; Expected date: 06/06/2023  -     FERRITIN; Future; Expected date: 06/06/2023  -     TSH; Future; Expected date: 06/06/2023  -     FOLATE; Future; Expected date: 06/06/2023  -     Comprehensive Metabolic Panel; Future; Expected date: 06/06/2023  -     VITAMIN B12; Future; Expected date: 06/06/2023  -     CBC Auto Differential; Future; Expected date: 06/06/2023    Screening for prostate cancer  -     Cancel: PSA, SCREENING; Future; Expected date: 06/06/2023  -     PSA, SCREENING; Future; Expected date: 06/06/2023    Thrombocytopenic  Stable follows with hematology  Chronic deep vein thrombosis (DVT) of distal vein of right lower extremity  stable  Other orders  -     ondansetron (ZOFRAN) 8 MG tablet; Take 1 tablet (8 mg total) by mouth every 12 (twelve) hours as needed for Nausea.  Dispense: 180 tablet; Refill: 1            Total time spent of Greater than 30 minutes minutes on the day of the visit.This includes face to face time and preparing to see the patient, obtaining and reviewing separately obtained history, documenting clinical information in the electronic or other health record, independently interpreting results and communicating results to the patient/family/caregiver, or care coordinator.    Established patient with me has been instructed that must see me at least 1 time yearly (every 365 days) for refills of medications. Seeing other providers in this clinic is fine but expectation is to see me yearly.    Tyler Smith MD  06/06/2023    Portions of this note have been dictated with KAT Zapata

## 2023-06-06 NOTE — PATIENT INSTRUCTIONS
"Deonte Crow,     If you are due for any health screening(s) below please notify me so we can arrange them to be ordered and scheduled to maintain your health. Most healthy patients complete it. Don't lose out on improving your health.     Tests to Keep You Healthy    Colon Cancer Screening: DUE         Colon Cancer Screening    Of cancers affecting both men and women, colorectal cancer is the third leading cancer killer in the [place]. But it doesnt have to be. Screening can prevent colorectal cancer or find it at an early stage when treatment often leads to a cure.    A colonoscopy is the preferred test for detecting colon cancer. It is needed only once every 10 years if results are negative. While sedated, a flexible, lighted tube with a tiny camera is inserted into the rectum and advanced through the colon to look for cancers. An alternative screening test that is used at home and returned to the lab may also be used. It detects hidden blood in bowel movements which could indicate cancer in the colon. If results are positive, you will need a colonoscopy to determine if the blood is a sign of cancer. This type of follow up (diagnostic) colonoscopy usually requires additional copays as required by your insurance provider. Please contact your PCP if you have any questions.                  Patient Education       Checking Your Blood Pressure at Home   The Basics   Written by the doctors and editors at [facility]   How is blood pressure measured? -- Blood pressure is usually measured with a device that goes around your upper arm. This is often done in a doctor's office. But some people also check their blood pressure themselves, at home or at work.  Blood pressure is explained with 2 numbers. For instance, your blood pressure might be "140 over 90." The first (top) number is the pressure inside your arteries when your heart is lisa. The second (bottom) number is the pressure inside your arteries when your " "heart is relaxed. The table shows how doctors and nurses define high and normal blood pressure (table 1).  If your blood pressure gets too high, it puts you at risk for heart attack, stroke, and kidney disease. High blood pressure does not usually cause symptoms. But it can be serious.  What is a home blood pressure meter? -- A home blood pressure meter (or "monitor") is a device you can use to check your blood pressure yourself. It has a cuff that goes around your upper arm (figure 1). Some devices have a cuff that goes around your wrist instead. But doctors aren't sure if these work as well. The meter also has a small screen, or dial, that shows your blood pressure numbers.  There are also special meters you can wear for a day or 2. These are different because they automatically check your blood pressure throughout the day and night, even while you are sleeping. If your doctor thinks you should use one of these devices, they will talk to you about how to wear it.  Why do I need to check my blood pressure at home? -- If your doctor knows or suspects that you have high blood pressure, they might want you to check it at home. There are a few reasons for this. Your doctor might want to look at:  Whether your blood pressure measures the same at home as it did in the doctor's office  How well your blood pressure medicines are working  Changes in your blood pressure, for example, if it goes up and down  People who check their own blood pressure at home usually do better at keeping it low.  How do I choose a home blood pressure meter? -- When choosing a home blood pressure meter, you will probably want to think about:  Cost - Some devices cost more than others. You should also check to see if your insurance will help pay for your device.  Size - It's important to make sure the cuff fits your arm comfortably. Your doctor or nurse can help you with this.  How easy it is to use - You should make sure you understand how to " use the device. You also need to be able to read the numbers on the screen.  You do not need a prescription to buy a home blood pressure meter. You can buy them at most pharmacies or over the internet. Your doctor or nurse can help you choose the right device for you.  How do I check my blood pressure at home? -- Once you have a home blood pressure meter, your doctor or nurse should check it to make sure it fits you and works correctly.  When it's time to check your blood pressure:  Go to the bathroom and empty your bladder first. Having a full bladder can temporarily increase your blood pressure, making the results inaccurate.  Sit in a chair with your feet flat on the ground.  Try to breathe normally and stay calm.  Attach the cuff to your arm. Place the cuff directly on your skin, not over your clothing. The cuff should be tight enough to not slip down, but not uncomfortably tight.  Sit and relax for about 3 to 5 minutes with the cuff on.  Follow the directions that came with your device to start measuring your blood pressure. This might involve squeezing the bulb at the end of the tube to inflate the cuff (fill it with air). With some monitors, you just need to press a button to inflate the cuff. When the cuff fills with air, it feels like someone is squeezing your arm, but it should not hurt. Then you will slowly deflate the cuff (let the air out of it), or it will deflate by itself. The screen or dial will show your blood pressure numbers.  Stay seated and relax for 1 minute, then measure your blood pressure again.  How often should I check my blood pressure? -- It depends. Different people need to follow different schedules. Your doctor or nurse will tell you how often to check your blood pressure, and when. Some people need to check their blood pressure twice a day, in the morning and evening.  Your doctor or nurse will probably tell you to keep track of your blood pressure for at least a few days (table 2).  "Then they will look at the numbers. The reason for this is that it's normal for your blood pressure to change a bit from day to day. For example, the numbers might change depending on whether you recently had caffeine, just exercised, or feel stressed. Checking your blood pressure over several days - or longer - will give your doctor or nurse a better idea of what is average for you.  How should I keep track of my blood pressure? -- Some blood pressure meters will record your numbers for you, or send them to your computer or smartphone. If yours does not do this, you will need to write them down. Your doctor or nurse can help you figure out the best way to keep track of the numbers.  What if my blood pressure is high? -- Your doctor or nurse will tell you what to do if your blood pressure is high when you check it at home. If you get a number that is higher than normal, measure it again to see if it is still high. If it is very high (above a certain number, which your doctor or nurse will tell you to watch out for), you should call your doctor right away.  If your blood pressure is only a little high, your doctor or nurse might tell you to keep checking it for a few more days or weeks, and then call if it does not go back down. Then they can help you decide what to do next.  All topics are updated as new evidence becomes available and our peer review process is complete.  This topic retrieved from [employer] on: Sep 21, 2021.  Topic 237160 Version 4.0  Release: 29.4.2 - C29.263  © 2021 Up[employer], Inc. and/or its affiliates. All rights reserved.  table 1: Definition of normal and high blood pressure  Level  Top number  Bottom number    High 130 or above 80 or above   Elevated 120 to 129 79 or below   Normal 119 or below 79 or below   These definitions are from the American College of Cardiology/American Heart Association. Other expert groups might use slightly different definitions.  "Elevated blood pressure" is a term " doctor or nurses use as a warning. It means you do not yet have high blood pressure, but your blood pressure is not as low as it should be for good health.  Graphic 16406 Version 6.0  figure 1: Using a home blood pressure meter     This is an example of a person using a home blood pressure meter.  Graphic 243996 Version 1.0    table 2: 7-day diary for checking blood pressure at home  Day 1  Day 2  Day 3  Day 4  Day 5  Day 6  Day 7    Morning  1st read Morning  1st read Morning  1st read Morning  1st read Morning  1st read Morning  1st read Morning  1st read   Systolic: __________ Systolic: __________ Systolic: __________ Systolic: __________ Systolic: __________ Systolic: __________ Systolic: __________   Diastolic: __________ Diastolic: __________ Diastolic: __________ Diastolic: __________ Diastolic: __________ Diastolic: __________ Diastolic: __________   Pulse: __________ Pulse: __________ Pulse: __________ Pulse: __________ Pulse: __________ Pulse: __________ Pulse: __________   Morning  2nd read Morning  2nd read Morning  2nd read Morning  2nd read Morning  2nd read Morning  2nd read Morning  2nd read   Systolic: __________ Systolic: __________ Systolic: __________ Systolic: __________ Systolic: __________ Systolic: __________ Systolic: __________   Diastolic: __________ Diastolic: __________ Diastolic: __________ Diastolic: __________ Diastolic: __________ Diastolic: __________ Diastolic: __________   Pulse: __________ Pulse: __________ Pulse: __________ Pulse: __________ Pulse: __________ Pulse: __________ Pulse: __________   Evening  1st read Evening  1st read Evening  1st read Evening  1st read Evening  1st read Evening  1st read Evening  1st read   Systolic: __________ Systolic: __________ Systolic: __________ Systolic: __________ Systolic: __________ Systolic: __________ Systolic: __________   Diastolic: __________ Diastolic: __________ Diastolic: __________ Diastolic: __________ Diastolic: __________  Diastolic: __________ Diastolic: __________   Pulse: __________ Pulse: __________ Pulse: __________ Pulse: __________ Pulse: __________ Pulse: __________ Pulse: __________   Evening  2nd read Evening  2nd read Evening  2nd read Evening  2nd read Evening  2nd read Evening  2nd read Evening  2nd read   Systolic: __________ Systolic: __________ Systolic: __________ Systolic: __________ Systolic: __________ Systolic: __________ Systolic: __________   Diastolic: __________ Diastolic: __________ Diastolic: __________ Diastolic: __________ Diastolic: __________ Diastolic: __________ Diastolic: __________   Pulse: __________ Pulse: __________ Pulse: __________ Pulse: __________ Pulse: __________ Pulse: __________ Pulse: __________   Notes    Notes    Notes    Notes    Notes    Notes    Notes      ____________________ ____________________ ____________________ ____________________ ____________________ ____________________ ____________________   ____________________ ____________________ ____________________ ____________________ ____________________ ____________________ ____________________   ____________________ ____________________ ____________________ ____________________ ____________________ ____________________ ____________________   Patient name: ______________________________     Patient ID: ________________________________    Primary care provider: _______________________    Average BP: _______________________________    Graphic 224171 Version 1.0  Consumer Information Use and Disclaimer   This information is not specific medical advice and does not replace information you receive from your health care provider. This is only a brief summary of general information. It does NOT include all information about conditions, illnesses, injuries, tests, procedures, treatments, therapies, discharge instructions or life-style choices that may apply to you. You must talk with your health care provider for complete information about  your health and treatment options. This information should not be used to decide whether or not to accept your health care provider's advice, instructions or recommendations. Only your health care provider has the knowledge and training to provide advice that is right for you. The use of this information is governed by the [employer] End User License Agreement, available at https://www.FileThis/en/solutions/[employer]/about/cirilo.The use of [employer] content is governed by the [employer] Terms of Use. ©2021 [employer] All rights reserved.  Copyright   © 2021 [employer] and/or its affiliates. All rights reserved.

## 2023-06-07 ENCOUNTER — PATIENT MESSAGE (OUTPATIENT)
Dept: FAMILY MEDICINE | Facility: CLINIC | Age: 68
End: 2023-06-07
Payer: MEDICARE

## 2023-06-07 LAB
ALBUMIN SERPL-MCNC: 4.1 G/DL (ref 3.6–5.1)
ALBUMIN/GLOB SERPL: 1.7 (CALC) (ref 1–2.5)
ALP SERPL-CCNC: 181 U/L (ref 35–144)
ALT SERPL-CCNC: 37 U/L (ref 9–46)
AST SERPL-CCNC: 31 U/L (ref 10–35)
BASOPHILS # BLD AUTO: 32 CELLS/UL (ref 0–200)
BASOPHILS NFR BLD AUTO: 0.8 %
BILIRUB SERPL-MCNC: 0.4 MG/DL (ref 0.2–1.2)
BUN SERPL-MCNC: 18 MG/DL (ref 7–25)
BUN/CREAT SERPL: ABNORMAL (CALC) (ref 6–22)
CALCIUM SERPL-MCNC: 9 MG/DL (ref 8.6–10.3)
CHLORIDE SERPL-SCNC: 108 MMOL/L (ref 98–110)
CO2 SERPL-SCNC: 25 MMOL/L (ref 20–32)
CREAT SERPL-MCNC: 0.74 MG/DL (ref 0.7–1.35)
EGFR: 100 ML/MIN/1.73M2
EOSINOPHIL # BLD AUTO: 100 CELLS/UL (ref 15–500)
EOSINOPHIL NFR BLD AUTO: 2.5 %
ERYTHROCYTE [DISTWIDTH] IN BLOOD BY AUTOMATED COUNT: 13.6 % (ref 11–15)
FERRITIN SERPL-MCNC: 5 NG/ML (ref 24–380)
FOLATE SERPL-MCNC: 10.1 NG/ML
GLOBULIN SER CALC-MCNC: 2.4 G/DL (CALC) (ref 1.9–3.7)
GLUCOSE SERPL-MCNC: 90 MG/DL (ref 65–139)
HCT VFR BLD AUTO: 35.8 % (ref 38.5–50)
HGB BLD-MCNC: 11.5 G/DL (ref 13.2–17.1)
LYMPHOCYTES # BLD AUTO: 756 CELLS/UL (ref 850–3900)
LYMPHOCYTES NFR BLD AUTO: 18.9 %
MCH RBC QN AUTO: 27.8 PG (ref 27–33)
MCHC RBC AUTO-ENTMCNC: 32.1 G/DL (ref 32–36)
MCV RBC AUTO: 86.5 FL (ref 80–100)
MONOCYTES # BLD AUTO: 416 CELLS/UL (ref 200–950)
MONOCYTES NFR BLD AUTO: 10.4 %
NEUTROPHILS # BLD AUTO: 2696 CELLS/UL (ref 1500–7800)
NEUTROPHILS NFR BLD AUTO: 67.4 %
PLATELET # BLD AUTO: 161 THOUSAND/UL (ref 140–400)
PMV BLD REES-ECKER: 11.2 FL (ref 7.5–12.5)
POTASSIUM SERPL-SCNC: 3.9 MMOL/L (ref 3.5–5.3)
PROT SERPL-MCNC: 6.5 G/DL (ref 6.1–8.1)
PSA SERPL-MCNC: 2.55 NG/ML
RBC # BLD AUTO: 4.14 MILLION/UL (ref 4.2–5.8)
SODIUM SERPL-SCNC: 142 MMOL/L (ref 135–146)
TSH SERPL-ACNC: 4.05 MIU/L (ref 0.4–4.5)
VIT B12 SERPL-MCNC: 314 PG/ML (ref 200–1100)
WBC # BLD AUTO: 4 THOUSAND/UL (ref 3.8–10.8)

## 2023-06-08 ENCOUNTER — PATIENT MESSAGE (OUTPATIENT)
Dept: FAMILY MEDICINE | Facility: CLINIC | Age: 68
End: 2023-06-08
Payer: MEDICARE

## 2023-06-09 RX ORDER — TRAMADOL HYDROCHLORIDE 50 MG/1
50 TABLET ORAL EVERY 8 HOURS PRN
Qty: 90 TABLET | Refills: 2 | Status: SHIPPED | OUTPATIENT
Start: 2023-06-21 | End: 2023-09-13 | Stop reason: SDUPTHER

## 2023-06-13 ENCOUNTER — PES CALL (OUTPATIENT)
Dept: ADMINISTRATIVE | Facility: CLINIC | Age: 68
End: 2023-06-13
Payer: MEDICARE

## 2023-06-19 ENCOUNTER — OFFICE VISIT (OUTPATIENT)
Dept: PAIN MEDICINE | Facility: CLINIC | Age: 68
End: 2023-06-19
Payer: MEDICARE

## 2023-06-19 VITALS
HEART RATE: 66 BPM | DIASTOLIC BLOOD PRESSURE: 66 MMHG | BODY MASS INDEX: 19.22 KG/M2 | HEIGHT: 73 IN | SYSTOLIC BLOOD PRESSURE: 124 MMHG | WEIGHT: 145 LBS

## 2023-06-19 DIAGNOSIS — M17.11 OSTEOARTHRITIS OF RIGHT KNEE, UNSPECIFIED OSTEOARTHRITIS TYPE: ICD-10-CM

## 2023-06-19 DIAGNOSIS — M79.18 MYOFASCIAL PAIN: ICD-10-CM

## 2023-06-19 DIAGNOSIS — M25.562 ACUTE PAIN OF LEFT KNEE: ICD-10-CM

## 2023-06-19 DIAGNOSIS — R10.9 CHRONIC ABDOMINAL PAIN: ICD-10-CM

## 2023-06-19 DIAGNOSIS — G89.29 CHRONIC ABDOMINAL PAIN: ICD-10-CM

## 2023-06-19 DIAGNOSIS — M50.30 DDD (DEGENERATIVE DISC DISEASE), CERVICAL: Primary | ICD-10-CM

## 2023-06-19 PROCEDURE — 3074F SYST BP LT 130 MM HG: CPT | Mod: CPTII,S$GLB,, | Performed by: PHYSICIAN ASSISTANT

## 2023-06-19 PROCEDURE — 3008F BODY MASS INDEX DOCD: CPT | Mod: CPTII,S$GLB,, | Performed by: PHYSICIAN ASSISTANT

## 2023-06-19 PROCEDURE — 1125F PR PAIN SEVERITY QUANTIFIED, PAIN PRESENT: ICD-10-PCS | Mod: CPTII,S$GLB,, | Performed by: PHYSICIAN ASSISTANT

## 2023-06-19 PROCEDURE — 99999 PR PBB SHADOW E&M-EST. PATIENT-LVL III: ICD-10-PCS | Mod: PBBFAC,,, | Performed by: PHYSICIAN ASSISTANT

## 2023-06-19 PROCEDURE — 3288F FALL RISK ASSESSMENT DOCD: CPT | Mod: CPTII,S$GLB,, | Performed by: PHYSICIAN ASSISTANT

## 2023-06-19 PROCEDURE — 99999 PR PBB SHADOW E&M-EST. PATIENT-LVL III: CPT | Mod: PBBFAC,,, | Performed by: PHYSICIAN ASSISTANT

## 2023-06-19 PROCEDURE — 1159F PR MEDICATION LIST DOCUMENTED IN MEDICAL RECORD: ICD-10-PCS | Mod: CPTII,S$GLB,, | Performed by: PHYSICIAN ASSISTANT

## 2023-06-19 PROCEDURE — 3078F DIAST BP <80 MM HG: CPT | Mod: CPTII,S$GLB,, | Performed by: PHYSICIAN ASSISTANT

## 2023-06-19 PROCEDURE — 3008F PR BODY MASS INDEX (BMI) DOCUMENTED: ICD-10-PCS | Mod: CPTII,S$GLB,, | Performed by: PHYSICIAN ASSISTANT

## 2023-06-19 PROCEDURE — 1159F MED LIST DOCD IN RCRD: CPT | Mod: CPTII,S$GLB,, | Performed by: PHYSICIAN ASSISTANT

## 2023-06-19 PROCEDURE — 3074F PR MOST RECENT SYSTOLIC BLOOD PRESSURE < 130 MM HG: ICD-10-PCS | Mod: CPTII,S$GLB,, | Performed by: PHYSICIAN ASSISTANT

## 2023-06-19 PROCEDURE — 99214 PR OFFICE/OUTPT VISIT, EST, LEVL IV, 30-39 MIN: ICD-10-PCS | Mod: S$GLB,,, | Performed by: PHYSICIAN ASSISTANT

## 2023-06-19 PROCEDURE — 1101F PT FALLS ASSESS-DOCD LE1/YR: CPT | Mod: CPTII,S$GLB,, | Performed by: PHYSICIAN ASSISTANT

## 2023-06-19 PROCEDURE — 3078F PR MOST RECENT DIASTOLIC BLOOD PRESSURE < 80 MM HG: ICD-10-PCS | Mod: CPTII,S$GLB,, | Performed by: PHYSICIAN ASSISTANT

## 2023-06-19 PROCEDURE — 3288F PR FALLS RISK ASSESSMENT DOCUMENTED: ICD-10-PCS | Mod: CPTII,S$GLB,, | Performed by: PHYSICIAN ASSISTANT

## 2023-06-19 PROCEDURE — 1101F PR PT FALLS ASSESS DOC 0-1 FALLS W/OUT INJ PAST YR: ICD-10-PCS | Mod: CPTII,S$GLB,, | Performed by: PHYSICIAN ASSISTANT

## 2023-06-19 PROCEDURE — 1125F AMNT PAIN NOTED PAIN PRSNT: CPT | Mod: CPTII,S$GLB,, | Performed by: PHYSICIAN ASSISTANT

## 2023-06-19 PROCEDURE — 99214 OFFICE O/P EST MOD 30 MIN: CPT | Mod: S$GLB,,, | Performed by: PHYSICIAN ASSISTANT

## 2023-06-19 RX ORDER — METHOCARBAMOL 500 MG/1
500 TABLET, FILM COATED ORAL 3 TIMES DAILY PRN
Qty: 90 TABLET | Refills: 2 | Status: SHIPPED | OUTPATIENT
Start: 2023-06-19 | End: 2023-10-18 | Stop reason: SDUPTHER

## 2023-06-19 NOTE — PROGRESS NOTES
PCP: Tyler Smith MD      CC: abdominal pain    Interval history: Mr. Cerda is a 68 y.o. male with an extensive history of crohn's disease who presents today for f/u s/p and medication refill. Continues to have neck pain worse with lateral rotation. He had updated imaging. Denies radicular pain. Robaxin is helpful. He has healed from right hip fracture 8 months ago.  He was taking Percocet 5-325 mg q 6 h for post op pain. Acute left knee pain resolved after left intra articular steroid injection with Dr. Villatoro.  Right knee RFA  provided 75% relief.    Abdominal pain remains stable.   He does report diarrhea at times but no blood stools. Reports anal pain and itching 3/2 chronic diarrhea.  He is currently being evaluated by gastroenterology.  OTC Lidocaine 2 % provides some minimal relief.  Compounding cream was too expensive.  He rates his pain 3/10.     Prior HPI:   Mr. Cerda is a 60 year old male with PMH of crohn's disease referred by Dr. Hansen.  Patient states being diagnosed with crohn's disease since the age of 14.    He has had multiple GI surgeries and large bowel and small bowel removals.  During that time, he was being managed by providers in [place].  He was TPN dependent for many years until about two years ago.  He is now stable on an oral diet.  He did not have a primary care physician nor a gastroenterologist for many years.  He is currently trying to establish care.  He has future appointment with Dr. Ch.  He states taking Demerol 50mg q8hrs as needed for pain. It has provided relief of his nausea and pain with diarrhea.  He was last prescribed Demerol in July 2014.  Since then, he has been bearing with the pain.  It is a sharp shooting pain in his mid abdomen.      ROS:  CONSTITUTIONAL: No fevers, chills, night sweats, wt. loss, appetite changes  SKIN: no rashes or itching  ENT: No headaches, head trauma, vision changes, or eye pain  LYMPH NODES: None noticed   CV: No chest  pain, palpitations.   RESP: No shortness of breath, dyspnea on exertion, cough, wheezing, or hemoptysis  GI: No nausea, emesis, diarrhea, constipation, melena, hematochezia, pain.    : No dysuria, hematuria, urgency, or frequency   HEME: No easy bruising, bleeding problems  PSYCHIATRIC: No depression, anxiety, psychosis, hallucinations.  NEURO: No seizures, memory loss, dizziness or difficulty sleeping  MSK: no joint pain      Past Medical History:   Diagnosis Date    Anxiety     Basal cell carcinoma 07/2017    R forehead and R temple    Crohn's disease     age 17    Encounter for blood transfusion     Hypertension     Short bowel syndrome     Vitamin B deficiency     Vitamin D deficiency      Past Surgical History:   Procedure Laterality Date    3 small bowel resections      APPENDECTOMY      CHOLECYSTECTOMY      COLONOSCOPY      COLONOSCOPY N/A 02/14/2017    Procedure: COLONOSCOPY;  Surgeon: Nela Sanchez MD;  Location: [address]);  Service: Endoscopy;  Laterality: N/A;    COLONOSCOPY N/A 03/14/2017    Procedure: COLONOSCOPY;  Surgeon: Nela Sanchez MD;  Location: [address]);  Service: Endoscopy;  Laterality: N/A;  2 days clear liquids before procedure    COLONOSCOPY N/A 07/29/2020    Procedure: COLONOSCOPY;  Surgeon: Andrea Shaver MD;  Location: [facility];  Service: Endoscopy;  Laterality: N/A;    ESOPHAGOGASTRODUODENOSCOPY N/A 11/19/2019    Procedure: EGD (ESOPHAGOGASTRODUODENOSCOPY);  Surgeon: Andrea Shaver MD;  Location: [facility];  Service: Endoscopy;  Laterality: N/A;    INJECTION OF ANESTHETIC AGENT AROUND NERVE Right 10/20/2020    Procedure: Block, Nerve genicular;  Surgeon: Alvin Curry MD;  [facility];  Service: Pain Management;  Laterality: Right;  right knee    INTRAMEDULLARY RODDING OF FEMUR Right 10/20/2022    Procedure: INSERTION, INTRAMEDULLARY SRINIVASA, FEMUR;  Surgeon: Gómez Selby MD;  Location: [facility] OR;  Service: Orthopedics;  Laterality: Right;    KNEE  SURGERY      RADIOFREQUENCY ABLATION Right 2021    Procedure: Radiofrequency Ablation Right Knee Genicular RFA [place];  Surgeon: Alvin Curry MD;  Location: [facility];  Service: Pain Management;  Laterality: Right;  Right knee     SMALL INTESTINE SURGERY      TUBE THORACOTOMY      UPPER GASTROINTESTINAL ENDOSCOPY       Family History   Problem Relation Age of Onset    Diabetes Mother     Stroke Mother     Diabetes Father     Kidney disease Father     Irritable bowel syndrome Cousin     Celiac disease Neg Hx     Cirrhosis Neg Hx     Colon cancer Neg Hx     Colon polyps Neg Hx     Cystic fibrosis Neg Hx     Esophageal cancer Neg Hx     Crohn's disease Neg Hx     Hemochromatosis Neg Hx     Inflammatory bowel disease Neg Hx     Liver cancer Neg Hx     Liver disease Neg Hx     Rectal cancer Neg Hx     Stomach cancer Neg Hx     Ulcerative colitis Neg Hx     Addison's disease Neg Hx     Melanoma Neg Hx     Psoriasis Neg Hx     Lupus Neg Hx     Eczema Neg Hx     Glaucoma Neg Hx     Macular degeneration Neg Hx      Social History     Socioeconomic History    Marital status: Other   Tobacco Use    Smoking status: Former     Packs/day: 1.00     Years: 15.00     Pack years: 15.00     Types: Cigarettes     Quit date: 3/10/1995     Years since quittin.2    Smokeless tobacco: Never   Substance and Sexual Activity    Alcohol use: Never     Alcohol/week: 2.0 standard drinks     Types: 1 Cans of beer, 1 Shots of liquor per week    Drug use: No    Sexual activity: Yes     Partners: Female   Social History Narrative    ** Merged History Encounter **         ** Data from: [address] Dept: [facility] FAM[facility] MEDICINE    Retired          ** Data from: 22 [address] Dept: [facility] PHARM[facility]-OUTPATIENT    ** Merged History Encounter **         ** Merged History Encounter **          Social Determinants of Health     Financial Resource Strain: Low Risk     Difficulty of Paying Living Expenses: Not very hard   Food Insecurity: No Food Insecurity  "   Worried About Running Out of Food in the Last Year: Never true    Ran Out of Food in the Last Year: Never true   Transportation Needs: No Transportation Needs    Lack of Transportation (Medical): No    Lack of Transportation (Non-Medical): No   Physical Activity: Insufficiently Active    Days of Exercise per Week: 2 days    Minutes of Exercise per Session: 10 min   Stress: No Stress Concern Present    Feeling of Stress : Only a little   Social Connections: Moderately Isolated    Frequency of Communication with Friends and Family: Three times a week    Frequency of Social Gatherings with Friends and Family: Patient refused    Attends Congregation Services: Never    Active Member of Clubs or Organizations: No    Attends Club or Organization Meetings: Patient refused    Marital Status:    Housing Stability: Low Risk     Unable to Pay for Housing in the Last Year: No    Number of Places Lived in the Last Year: 1    Unstable Housing in the Last Year: No     Medications/Allergies: See med card    Vitals:    06/19/23 1015   BP: 124/66   Pulse: 66   Weight: 65.8 kg (145 lb)   Height: 6' 1" (1.854 m)   PainSc:   3   PainLoc: Abdomen     Physical exam:    GENERAL: A and O x3, the patient appears well groomed and is in no acute distress.  Skin: No rashes or obvious lesions  HEENT: normocephalic, atraumatic  CARDIOVASCULAR:  RRR  LUNGS: non labored breathing  ABDOMEN: soft, nontender. No rebound   UPPER EXTREMITIES: Normal alignment, normal range of motion, no atrophy, no skin changes,  hair growth and nail growth normal and equal bilaterally. No swelling, no tenderness.    LOWER EXTREMITIES:  TTP left patella, swelling along medial and superior aspect. No redness or warmth.     Severe TTP SCM on right. Pain with right lateral rotation. No midline cervical tenderness. Negative spurlings.   LUMBAR SPINE  Lumbar spine: ROM is full with flexion extension and oblique extension with no increased pain.    Lam's test causes " no increased pain on either side.    Supine straight leg raise is negative bilaterally.    Internal and external rotation of the hip causes no increased pain on either side.  Myofascial exam: No tenderness to palpation across lumbar paraspinous muscles.    MENTAL STATUS: normal orientation, speech, language, and fund of knowledge for social situation.  Emotional state appropriate.    CRANIAL NERVES:  II:  PERRL bilaterally,   III,IV,VI: EOMI.    V:  Facial sensation equal bilaterally  VII:  Facial motor function normal.  VIII:  Hearing equal to finger rub bilaterally  IX/X: Gag normal, palate symmetric  XI:  Shoulder shrug equal, head turn equal  XII:  Tongue midline without fasciculations    MOTOR: Tone and bulk: normal bilateral upper and lower Strength: normal   SENSATION: Light touch and pinprick intact bilaterally  REFLEXES: normal, symmetric, nonbrisk.  Toes down, no clonus. No hoffmans.  GAIT: normal rise, base, steps, and arm swing.      Imaging:  Left knee xray 3/6/22  Mild tricompartmental degenerative osteoarthrosis with mild joint space narrowing and small osteophyte formation.  Subtle calcification highlighted cartilage consistent with chondrocalcinosis.     There is a small suprapatellar effusion.     Impression:     1. Mild tricompartmental degenerative osteoarthrosis  2. Chondrocalcinosis.  3. Small suprapatellar effusion.       Assessment:  Mr. Cerda is a 68 y.o. male with abdominal pain  1. DDD (degenerative disc disease), cervical    2. Myofascial pain    3. Osteoarthritis of right knee, unspecified osteoarthritis type    4. Chronic abdominal pain    5. Acute pain of left knee        Plan:  1. Patient with long history of crohn's disease and chronic abdominal pain.  Continue care with GI.  2. Tramadol 50 mg q 8 h prn.     reviewed.  No signs of aberrant behavior.  Previous UDS consistent.  Script provided for 3 months  3. Continue OTC lidocaine cream  4. Monitor progress from right sided  peripheral nerve block.   5. F/u with orthopedics as needed  6. Reviewed cervical xray results . Discussed lumbar MBB vs ALLISON. He will consider this.   7. Robaxin 500 mg TID prn   8. F/u 3  months or sooner  All medication management was performed by Dr. Alvin Curry

## 2023-07-10 ENCOUNTER — PES CALL (OUTPATIENT)
Dept: ADMINISTRATIVE | Facility: CLINIC | Age: 68
End: 2023-07-10
Payer: MEDICARE

## 2023-07-26 ENCOUNTER — OFFICE VISIT (OUTPATIENT)
Dept: DERMATOLOGY | Facility: CLINIC | Age: 68
End: 2023-07-26
Payer: MEDICARE

## 2023-07-26 DIAGNOSIS — L82.1 SEBORRHEIC KERATOSES: ICD-10-CM

## 2023-07-26 DIAGNOSIS — D22.9 MULTIPLE BENIGN NEVI: ICD-10-CM

## 2023-07-26 DIAGNOSIS — L57.0 ACTINIC KERATOSIS: ICD-10-CM

## 2023-07-26 DIAGNOSIS — L90.5 SCAR: ICD-10-CM

## 2023-07-26 DIAGNOSIS — Z85.828 HISTORY OF NONMELANOMA SKIN CANCER: ICD-10-CM

## 2023-07-26 DIAGNOSIS — D48.5 NEOPLASM OF UNCERTAIN BEHAVIOR OF SKIN: Primary | ICD-10-CM

## 2023-07-26 DIAGNOSIS — D18.01 CHERRY ANGIOMA: ICD-10-CM

## 2023-07-26 DIAGNOSIS — L21.9 SEBORRHEIC DERMATITIS: ICD-10-CM

## 2023-07-26 PROCEDURE — 99214 PR OFFICE/OUTPT VISIT, EST, LEVL IV, 30-39 MIN: ICD-10-PCS | Mod: 25,HCNC,S$GLB, | Performed by: STUDENT IN AN ORGANIZED HEALTH CARE EDUCATION/TRAINING PROGRAM

## 2023-07-26 PROCEDURE — 17003 DESTRUCT PREMALG LES 2-14: CPT | Mod: HCNC,S$GLB,, | Performed by: STUDENT IN AN ORGANIZED HEALTH CARE EDUCATION/TRAINING PROGRAM

## 2023-07-26 PROCEDURE — 99214 OFFICE O/P EST MOD 30 MIN: CPT | Mod: 25,HCNC,S$GLB, | Performed by: STUDENT IN AN ORGANIZED HEALTH CARE EDUCATION/TRAINING PROGRAM

## 2023-07-26 PROCEDURE — 88305 TISSUE EXAM BY PATHOLOGIST: CPT | Mod: 26,HCNC,, | Performed by: PATHOLOGY

## 2023-07-26 PROCEDURE — 1159F MED LIST DOCD IN RCRD: CPT | Mod: HCNC,CPTII,S$GLB, | Performed by: STUDENT IN AN ORGANIZED HEALTH CARE EDUCATION/TRAINING PROGRAM

## 2023-07-26 PROCEDURE — 1126F AMNT PAIN NOTED NONE PRSNT: CPT | Mod: HCNC,CPTII,S$GLB, | Performed by: STUDENT IN AN ORGANIZED HEALTH CARE EDUCATION/TRAINING PROGRAM

## 2023-07-26 PROCEDURE — 1126F PR PAIN SEVERITY QUANTIFIED, NO PAIN PRESENT: ICD-10-PCS | Mod: HCNC,CPTII,S$GLB, | Performed by: STUDENT IN AN ORGANIZED HEALTH CARE EDUCATION/TRAINING PROGRAM

## 2023-07-26 PROCEDURE — 11103 PR TANGENTIAL BIOPSY, SKIN, EA ADDTL LESION: ICD-10-PCS | Mod: HCNC,S$GLB,, | Performed by: STUDENT IN AN ORGANIZED HEALTH CARE EDUCATION/TRAINING PROGRAM

## 2023-07-26 PROCEDURE — 1160F RVW MEDS BY RX/DR IN RCRD: CPT | Mod: HCNC,CPTII,S$GLB, | Performed by: STUDENT IN AN ORGANIZED HEALTH CARE EDUCATION/TRAINING PROGRAM

## 2023-07-26 PROCEDURE — 1160F PR REVIEW ALL MEDS BY PRESCRIBER/CLIN PHARMACIST DOCUMENTED: ICD-10-PCS | Mod: HCNC,CPTII,S$GLB, | Performed by: STUDENT IN AN ORGANIZED HEALTH CARE EDUCATION/TRAINING PROGRAM

## 2023-07-26 PROCEDURE — 11102 PR TANGENTIAL BIOPSY, SKIN, SINGLE LESION: ICD-10-PCS | Mod: HCNC,S$GLB,, | Performed by: STUDENT IN AN ORGANIZED HEALTH CARE EDUCATION/TRAINING PROGRAM

## 2023-07-26 PROCEDURE — 3288F PR FALLS RISK ASSESSMENT DOCUMENTED: ICD-10-PCS | Mod: HCNC,CPTII,S$GLB, | Performed by: STUDENT IN AN ORGANIZED HEALTH CARE EDUCATION/TRAINING PROGRAM

## 2023-07-26 PROCEDURE — 3288F FALL RISK ASSESSMENT DOCD: CPT | Mod: HCNC,CPTII,S$GLB, | Performed by: STUDENT IN AN ORGANIZED HEALTH CARE EDUCATION/TRAINING PROGRAM

## 2023-07-26 PROCEDURE — 88305 TISSUE EXAM BY PATHOLOGIST: CPT | Mod: 59,HCNC | Performed by: PATHOLOGY

## 2023-07-26 PROCEDURE — 11103 TANGNTL BX SKIN EA SEP/ADDL: CPT | Mod: HCNC,S$GLB,, | Performed by: STUDENT IN AN ORGANIZED HEALTH CARE EDUCATION/TRAINING PROGRAM

## 2023-07-26 PROCEDURE — 88305 TISSUE EXAM BY PATHOLOGIST: ICD-10-PCS | Mod: 26,HCNC,, | Performed by: PATHOLOGY

## 2023-07-26 PROCEDURE — 1101F PT FALLS ASSESS-DOCD LE1/YR: CPT | Mod: HCNC,CPTII,S$GLB, | Performed by: STUDENT IN AN ORGANIZED HEALTH CARE EDUCATION/TRAINING PROGRAM

## 2023-07-26 PROCEDURE — 17003 DESTRUCTION, PREMALIGNANT LESIONS; SECOND THROUGH 14 LESIONS: ICD-10-PCS | Mod: HCNC,S$GLB,, | Performed by: STUDENT IN AN ORGANIZED HEALTH CARE EDUCATION/TRAINING PROGRAM

## 2023-07-26 PROCEDURE — 1159F PR MEDICATION LIST DOCUMENTED IN MEDICAL RECORD: ICD-10-PCS | Mod: HCNC,CPTII,S$GLB, | Performed by: STUDENT IN AN ORGANIZED HEALTH CARE EDUCATION/TRAINING PROGRAM

## 2023-07-26 PROCEDURE — 11102 TANGNTL BX SKIN SINGLE LES: CPT | Mod: HCNC,S$GLB,, | Performed by: STUDENT IN AN ORGANIZED HEALTH CARE EDUCATION/TRAINING PROGRAM

## 2023-07-26 PROCEDURE — 1101F PR PT FALLS ASSESS DOC 0-1 FALLS W/OUT INJ PAST YR: ICD-10-PCS | Mod: HCNC,CPTII,S$GLB, | Performed by: STUDENT IN AN ORGANIZED HEALTH CARE EDUCATION/TRAINING PROGRAM

## 2023-07-26 PROCEDURE — 17000 PR DESTRUCTION(LASER SURGERY,CRYOSURGERY,CHEMOSURGERY),PREMALIGNANT LESIONS,FIRST LESION: ICD-10-PCS | Mod: HCNC,XS,S$GLB, | Performed by: STUDENT IN AN ORGANIZED HEALTH CARE EDUCATION/TRAINING PROGRAM

## 2023-07-26 PROCEDURE — 17000 DESTRUCT PREMALG LESION: CPT | Mod: HCNC,XS,S$GLB, | Performed by: STUDENT IN AN ORGANIZED HEALTH CARE EDUCATION/TRAINING PROGRAM

## 2023-07-26 RX ORDER — HYDROCORTISONE 25 MG/ML
LOTION TOPICAL 2 TIMES DAILY
Qty: 59 ML | Refills: 1 | Status: SHIPPED | OUTPATIENT
Start: 2023-07-26

## 2023-07-26 RX ORDER — KETOCONAZOLE 20 MG/ML
SHAMPOO, SUSPENSION TOPICAL
Qty: 120 ML | Refills: 2 | Status: SHIPPED | OUTPATIENT
Start: 2023-07-27

## 2023-07-26 NOTE — PROGRESS NOTES
Subjective:      Patient ID:  Tristin Cerda is a 68 y.o. male who presents for   Chief Complaint   Patient presents with    Spot     Shoulders, post neck     LOV [facility]/20 [facility]     Patient here today for skin check UBSE  Patient states he has a spot on posterior neck, fairly new. Itches and is bothersome.  Also complains of spots on shoulders. States they are bothersome and he is picking the scab off.    Derm HX:  SCC, left forearm, Waqas  BCC, right neck, Waqas   BCC - R upper back, R temple, R forehead x 2, R nasal tip, L infra-auricular neck, L postauricular Neck, L shoulder  SCC in Situ  - R forearm          Review of Systems   Constitutional:  Negative for fever, chills and fatigue.   Respiratory:  Negative for cough and shortness of breath.    Gastrointestinal:  Negative for nausea and vomiting.   Skin:  Positive for activity-related sunscreen use and wears hat. Negative for daily sunscreen use.   Hematologic/Lymphatic: Bruises/bleeds easily (eliquis).     Objective:   Physical Exam   Constitutional: He appears well-developed and well-nourished. No distress.   Neurological: He is alert and oriented to person, place, and time. He is not disoriented.   Psychiatric: He has a normal mood and affect.   Skin:   Areas Examined (abnormalities noted in diagram):   Scalp / Hair Palpated and Inspected  Head / Face Inspection Performed  Neck Inspection Performed  Chest / Axilla Inspection Performed  Abdomen Inspection Performed  Back Inspection Performed  RUE Inspected  LUE Inspection Performed  Nails and Digits Inspection Performed                   Diagram Legend     Erythematous scaling macule/papule c/w actinic keratosis       Vascular papule c/w angioma      Pigmented verrucoid papule/plaque c/w seborrheic keratosis      Yellow umbilicated papule c/w sebaceous hyperplasia      Irregularly shaped tan macule c/w lentigo     1-2 mm smooth white papules consistent with Milia      Movable subcutaneous  cyst with punctum c/w epidermal inclusion cyst      Subcutaneous movable cyst c/w pilar cyst      Firm pink to brown papule c/w dermatofibroma      Pedunculated fleshy papule(s) c/w skin tag(s)      Evenly pigmented macule c/w junctional nevus     Mildly variegated pigmented, slightly irregular-bordered macule c/w mildly atypical nevus      Flesh colored to evenly pigmented papule c/w intradermal nevus       Pink pearly papule/plaque c/w basal cell carcinoma      Erythematous hyperkeratotic cursted plaque c/w SCC      Surgical scar with no sign of skin cancer recurrence      Open and closed comedones      Inflammatory papules and pustules      Verrucoid papule consistent consistent with wart     Erythematous eczematous patches and plaques     Dystrophic onycholytic nail with subungual debris c/w onychomycosis     Umbilicated papule    Erythematous-base heme-crusted tan verrucoid plaque consistent with inflamed seborrheic keratosis     Erythematous Silvery Scaling Plaque c/w Psoriasis     See annotation                    Assessment / Plan:      Pathology Orders:       Normal Orders This Visit    Specimen to Pathology, Dermatology     Comments:    Number of Specimens:->5  ------------------------->-------------------------  Spec 1 Procedure:->Biopsy  Spec 1 Clinical Impression:->r/o bcc  Spec 1 Source:->left nasal dorsum  ------------------------->-------------------------  Spec 2 Procedure:->Biopsy  Spec 2 Clinical Impression:->r/o bcc vs pn  Spec 2 Source:->left shoulder inferior  ------------------------->-------------------------  Spec 3 Procedure:->Biopsy  Spec 3 Clinical Impression:->r/o bcc vs. PN  Spec 3 Source:->left shoulder superior  ------------------------->-------------------------  Spec 4 Procedure:->Biopsy  Spec 4 Clinical Impression:->r/o bcc vs PN  Spec 4 Source:->left upper back lateral  ------------------------->-------------------------  Spec 5 Procedure:->Biopsy  Spec 5 Clinical Impression:->r/o  BCC vs. PN  Spec 5 Source:->left upper back medial    Questions:    Procedure Type: Dermatology and skin neoplasms    Number of Specimens: 5    ------------------------: -------------------------    Spec 1 Procedure: Biopsy    Spec 1 Clinical Impression: r/o bcc    Spec 1 Source: left nasal dorsum    ------------------------: -------------------------    Spec 2 Procedure: Biopsy    Spec 2 Clinical Impression: r/o bcc vs pn    Spec 2 Source: left shoulder inferior    ------------------------: -------------------------    Spec 3 Procedure: Biopsy    Spec 3 Clinical Impression: r/o bcc vs. PN    Spec 3 Source: left shoulder superior    ------------------------: -------------------------    Spec 4 Procedure: Biopsy    Spec 4 Clinical Impression: r/o bcc vs PN    Spec 4 Source: left upper back lateral    ------------------------: -------------------------    Spec 5 Procedure: Biopsy    Spec 5 Clinical Impression: r/o BCC vs. PN    Spec 5 Source: left upper back medial    Release to patient:           Neoplasm of uncertain behavior of skin x5  -     Specimen to Pathology, Dermatology  Shave biopsy procedure note:    Shave biopsy performed after verbal consent including risk of infection, scar, recurrence, need for additional treatment of site. Area prepped with alcohol, anesthetized with approximately 1.0cc of 2% lidocaine with epinephrine. Lesional tissue shaved with razor blade. Hemostasis achieved with application of aluminum chloride followed by hyfrecation. No complications. Dressing applied. Wound care explained.    History of nonmelanoma skin cancer  Scar  Area(s) of previous NMSC evaluated with no signs of recurrence.    Upper body skin examination performed today including at least 6 points as noted in physical examination. Suspicious lesions noted.  Patient instructed in importance in daily broad spectrum sun protection of at least spf 30. Mineral sunscreen ingredients preferred (Zinc +/- Titanium) and can be  found OTC.   Recommend Elta MD for daily use on face and neck.  Patient encouraged to wear hat for all outdoor exposure.   Also discussed sun avoidance and use of protective clothing.    Actinic keratosis  Cryosurgery Procedure Note    Verbal consent from the patient is obtained and the patient is aware of the precancerous quality and need for treatment of these lesions. Liquid nitrogen cryosurgery is applied to the 6 actinic keratoses, as detailed in the physical exam, to produce a freeze injury. The patient is aware that blisters may form and is instructed on wound care with gentle cleansing and use of vaseline ointment to keep moist until healed. The patient is supplied a handout on cryosurgery and is instructed to call if lesions do not completely resolve.    Multiple benign nevi  Careful dermoscopy evaluation of nevi performed   Monitor for new mole or moles that are becoming bigger, darker, irritated, or developing irregular borders.     Seborrheic keratoses  These are benign inherited growths without a malignant potential. Reassurance given to patient. No treatment is necessary.     Cherry angioma  This is a benign vascular lesion. Reassurance given. No treatment required.     Seborrheic dermatitis- lower face, beard  -     hydrocortisone 2.5 % lotion; Apply topically 2 (two) times daily.  Dispense: 59 mL; Refill: 1  -     ketoconazole (NIZORAL) 2 % shampoo; Apply topically twice a week.  Dispense: 120 mL; Refill: 2           4 months  No follow-ups on file.

## 2023-07-26 NOTE — PATIENT INSTRUCTIONS
CRYOSURGERY      Your doctor has used a method called cryosurgery to treat your skin condition. Cryosurgery refers to the use of very cold substances to treat a variety of skin conditions such as warts, pre-skin cancers, molluscum contagiosum, sun spots, and several benign growths. The substance we use in cryosurgery is liquid nitrogen and is so cold (-195 degrees Celsius) that is burns when administered.     Following treatment in the office, the skin may immediately burn and become red. You may find the area around the lesion is affected as well. It is sometimes necessary to treat not only the lesion, but a small area of the surrounding normal skin to achieve a good response.     A blister, and even a blood filled blister, may form after treatment.   This is a normal response. If the blister is painful, it is acceptable to sterilize a needle and with rubbing alcohol and gently pop the blister. It is important that you gently wash the area with soap and warm water as the blister fluid may contain wart virus if a wart was treated. Do no remove the roof of the blister.     The area treated can take anywhere from 1-3 weeks to heal. Healing time depends on the kind skin lesion treated, the location, and how aggressively the lesion was treated. It is recommended that the areas treated are covered with Vaseline or bacitracin ointment and a band-aid. If a band-aid is not practical, just ointment applied several times per day will do. Keeping these areas moist will speed the healing time.    Treatment with liquid nitrogen can leave a scar. In dark skin, it may be a light or dark scar, in light skin it may be a white or pink scar. These will generally fade with time, but may never go away completely.     If you have any concerns after your treatment, please feel free to call the office.       [address] [place] [address]/ [phone] FAX/ [email or web address]  Shave Biopsy Wound Care    Your  doctor has performed a shave biopsy today.  A band aid and vaseline ointment has been placed over the site.  This should remain in place for 24 hours.  It is recommended that you keep the area dry for the first 24 hours.  After 24 hours, you may remove the band aid and wash the area with warm soap and water and apply Vaseline jelly.  Many patients prefer to use Neosporin or Bacitracin ointment.  This is acceptable; however, know that you can develop an allergy to this medication even if you have used it safely for years.  It is important to keep the area moist.  Letting it dry out and get air slows healing time, and will worsen the scar.  Band aid is optional after first 24 hours.      If you notice increasing redness, tenderness, pain, or yellow drainage at the biopsy site, please notify your doctor.  These are signs of an infection.    If your biopsy site is bleeding, apply firm pressure for 15 minutes straight.  Repeat for another 15 minutes, if it is still bleeding.   If the surgical site continues to bleed, then please contact your doctor.      [address] [place] [address]/ [phone] FAX/ [email or web address]

## 2023-08-01 LAB
FINAL PATHOLOGIC DIAGNOSIS: NORMAL
Lab: NORMAL

## 2023-08-02 ENCOUNTER — TELEPHONE (OUTPATIENT)
Dept: DERMATOLOGY | Facility: CLINIC | Age: 68
End: 2023-08-02
Payer: MEDICARE

## 2023-08-02 ENCOUNTER — PATIENT MESSAGE (OUTPATIENT)
Dept: DERMATOLOGY | Facility: CLINIC | Age: 68
End: 2023-08-02
Payer: MEDICARE

## 2023-08-02 DIAGNOSIS — C44.311 BASAL CELL CARCINOMA (BCC) OF DORSUM OF NOSE: Primary | ICD-10-CM

## 2023-08-03 ENCOUNTER — TELEPHONE (OUTPATIENT)
Dept: DERMATOLOGY | Facility: CLINIC | Age: 68
End: 2023-08-03
Payer: MEDICARE

## 2023-08-15 ENCOUNTER — PATIENT MESSAGE (OUTPATIENT)
Dept: DERMATOLOGY | Facility: CLINIC | Age: 68
End: 2023-08-15
Payer: MEDICARE

## 2023-08-24 ENCOUNTER — PROCEDURE VISIT (OUTPATIENT)
Dept: DERMATOLOGY | Facility: CLINIC | Age: 68
End: 2023-08-24
Payer: MEDICARE

## 2023-08-24 VITALS
HEIGHT: 72 IN | SYSTOLIC BLOOD PRESSURE: 132 MMHG | BODY MASS INDEX: 19.64 KG/M2 | HEART RATE: 58 BPM | WEIGHT: 145 LBS | DIASTOLIC BLOOD PRESSURE: 79 MMHG

## 2023-08-24 DIAGNOSIS — C44.311 BASAL CELL CARCINOMA (BCC) OF DORSUM OF NOSE: ICD-10-CM

## 2023-08-24 PROCEDURE — 17312 MOHS ADDL STAGE: CPT | Mod: HCNC,S$GLB,, | Performed by: DERMATOLOGY

## 2023-08-24 PROCEDURE — 14060 TIS TRNFR E/N/E/L 10 SQ CM/<: CPT | Mod: HCNC,51,S$GLB, | Performed by: DERMATOLOGY

## 2023-08-24 PROCEDURE — 14060 PR ADJ TISS XFER LID,NOS,EAR <10 SQCM: ICD-10-PCS | Mod: HCNC,51,S$GLB, | Performed by: DERMATOLOGY

## 2023-08-24 PROCEDURE — 17311: ICD-10-PCS | Mod: HCNC,S$GLB,, | Performed by: DERMATOLOGY

## 2023-08-24 PROCEDURE — 17311 MOHS 1 STAGE H/N/HF/G: CPT | Mod: HCNC,S$GLB,, | Performed by: DERMATOLOGY

## 2023-08-24 PROCEDURE — 17312: ICD-10-PCS | Mod: HCNC,S$GLB,, | Performed by: DERMATOLOGY

## 2023-08-28 NOTE — PROGRESS NOTES
MOHS MICROGRAPHIC SURGERY OPERATIVE NOTE  Name:  Tristin Cerda  Date: 2023  Patient : 1955  Attending Surgeon: Francy Kilgore MD  Assistants: Josy Mcmanus - Surgical Technician, Magdaleno Thomas - Histology Technician, and Trang De Leon - Histology Technician  Anesthetic Agent: 1% lidocaine with 1:100,000 epinephrine  Clinical Diagnosis: basal cell carcinoma - nodular  Operation: Mohs Micrographic Surgery  Location: left nasal dorsum  Indications: Location in mask areas of face including central face, nose, eyelids, eyebrows, lips, chin, preauricular, temple, and ear.  Surgical Preparation: povidone-iodine     Description of Operation:  The nature and purpose of the procedure, associated risks and alternative treatments were explained to the patient in detail. All patient questions were answered completely. An informed operative consent and photography permit were obtained. The tumor location was then identified and marked with agreement by the patient of the correct location. The patient was positioned, prepped, and draped in the usual sterile manner. Local anesthesia was obtained with 1 cc(s) of 1% lidocaine with 1:100,000 epinephrine. The lesion pre-operatively measures 0.9 by 0.4 cm.     1ST STAGE:  A 2+ mm rim of normal appearing skin was marked circumferentially around the lesion after scraping with a curette to define the margin. The area thus outlined was excised at a 45 degree angle. Hemostasis was obtained with electrodesiccation. The specimen was oriented, mapped, and subdivided into at least two sections. Sections were then chromacoded and submitted for horizontal frozen sections. The patient tolerated the procedure well and there were no complications. Upon microscopic examination of the processed horizontal frozen sections of this stage:  Tumor was present at the margin of the specimen; these areas of residual tumor were marked on the reference map with red pencil,  pinpointing the location in which further tissue excision was necessary.  Tumor type noted on stage 1: Nodular basal cell carcinoma: Nodular tumor in dermis composed of basaloid cells exhibiting peripheral palisading and retraction artifact.     SUBSEQUENT STAGES:  The patient returned to the operating room for additional stages of tumor removal, and prepped in the manner described above. Surgery was directed to the areas having residual tumor, with thin layers of tissue being excised from these regions. A new reference map was prepared during the surgery to maintain precise orientation as described above. Hemostasis was achieved and the excised tissue was processed for microscopic analysis.  These sections were then examined by the Mohs surgeon, and areas of persistent tumor were indicated on the reference map. This process was repeated until the frozen horizontal sections of STAGE 2 revealed no further tumor cells and tumor eradication was considered to be  complete.  Tumor type noted on subsequent stages: No tumor seen.     SUMMARY:  The tumor was extirpated in 2 Mohs Stages resulting in a final defect measuring 1.1 by 0.7 cm².   The final defect extended deep to subcutaneous fat  The patient tolerated the procedure well and no complications were noted.     PHOTOS:      Francy Kilgore MD    FLAP CLOSURE OF MOHS DEFECT OPERATIVE REPORT  Patient Name: Tristin Cerda  Patient YOB: 1955  Date of procedure: 8/28/2023  Attending Surgeon: Francy Kilgore MD FAAD  Anesthetic Agent: 1% lidocaine with 1:100,000 epinephrine   Assistant: Josy Mcmanus - Surgical Technician  Clinical Diagnosis: A 1.1 x 0.7 cm² defect after Mohs Micrographic Surgery  Location: left nasal dorsum   Operation:  rotation Flap  Surgical Preparation: povidone-iodine    Description of Operation:  The nature and purpose of the procedure, associated risks and alternative treatments were explained to the patient in  detail. All patient questions were answered completely. In order to minimize tension on the closure and avoid compromising the anatomic contour of the anatomic region and maximize functional capacity, the above noted adjacent tissue transfer was performed.  An informed operative consent and photography permit were obtained. The patient was positioned, prepped, and draped in the usual sterile manner. Local anesthesia was obtained with 3 cc(s) of 1% lidocaine with 1:100,000 epinephrine The defect edges were debeveled with a #15 scalpel blade. Using gentian violet, the flap was designed adjacent to the defect including all anticipated dog ears. The area thus outlined was incised deep to adipose tissue with a #15 scalpel blade. This resulted in a final defect measuring 2.9 x 1.8 cm².   The flap and skin margins were then undermined 50 to 75% of the defects diameter in all directions utilizing supercut iris scissors. Hemostasis was achieved with electrodessication. The secondary defect was closed first utilizing 4-0 Vicryl interrupted buried subcutaneous sutures. The adjacent tissue flap was then mobilized into place and anchored with additional 4-0 Vicryl interrupted buried subcutaneous sutures. The flap and recipient sites were sculpted in the adipose and dermal planes for a good fit. The skin edges were then reapposed with 5-0 Nylon. Redundant tissue was noted at the inferior and superior aspect of the adjacent tissue transfer. Burows triangles were removed utilizing a #15 blade and the epidermal edges reapposed with 5-0 Nylon. This repair resulted in excellent contour with normal symmetry. The patient tolerated the procedure well and there were no complications.   Polysporin, Telfa and a pressure dressing were applied to sutures after gentle cleansing with saline.    Post op meds: None    Photos:        Francy Kilgore MD

## 2023-09-12 ENCOUNTER — PROCEDURE VISIT (OUTPATIENT)
Dept: DERMATOLOGY | Facility: CLINIC | Age: 68
End: 2023-09-12
Payer: MEDICARE

## 2023-09-12 DIAGNOSIS — C44.91 SUPERFICIAL BASAL CELL CARCINOMA: ICD-10-CM

## 2023-09-12 DIAGNOSIS — C44.619 BASAL CELL CARCINOMA (BCC) OF LEFT SHOULDER: Primary | ICD-10-CM

## 2023-09-12 DIAGNOSIS — D48.5 NEOPLASM OF UNCERTAIN BEHAVIOR OF SKIN: ICD-10-CM

## 2023-09-12 PROCEDURE — 17262 PR DESTR MALIG TRUNK,EXTREM 1.1-2 CM: ICD-10-PCS | Mod: 79,S$GLB,, | Performed by: STUDENT IN AN ORGANIZED HEALTH CARE EDUCATION/TRAINING PROGRAM

## 2023-09-12 PROCEDURE — 11102 PR TANGENTIAL BIOPSY, SKIN, SINGLE LESION: ICD-10-PCS | Mod: XS,79,S$GLB, | Performed by: STUDENT IN AN ORGANIZED HEALTH CARE EDUCATION/TRAINING PROGRAM

## 2023-09-12 PROCEDURE — 11602 EXC TR-EXT MAL+MARG 1.1-2 CM: CPT | Mod: XS,79,S$GLB, | Performed by: STUDENT IN AN ORGANIZED HEALTH CARE EDUCATION/TRAINING PROGRAM

## 2023-09-12 PROCEDURE — 11602 PR EXC SKIN MALIG 1.1-2 CM TRUNK,ARM,LEG: ICD-10-PCS | Mod: XS,79,S$GLB, | Performed by: STUDENT IN AN ORGANIZED HEALTH CARE EDUCATION/TRAINING PROGRAM

## 2023-09-12 PROCEDURE — 99499 UNLISTED E&M SERVICE: CPT | Mod: S$GLB,,, | Performed by: STUDENT IN AN ORGANIZED HEALTH CARE EDUCATION/TRAINING PROGRAM

## 2023-09-12 PROCEDURE — 11102 TANGNTL BX SKIN SINGLE LES: CPT | Mod: XS,79,S$GLB, | Performed by: STUDENT IN AN ORGANIZED HEALTH CARE EDUCATION/TRAINING PROGRAM

## 2023-09-12 PROCEDURE — 12032 PR LAYR CLOS WND TRUNK,ARM,LEG 2.6-7.5 CM: ICD-10-PCS | Mod: XS,51,79,S$GLB | Performed by: STUDENT IN AN ORGANIZED HEALTH CARE EDUCATION/TRAINING PROGRAM

## 2023-09-12 PROCEDURE — 99499 NO LOS: ICD-10-PCS | Mod: S$GLB,,, | Performed by: STUDENT IN AN ORGANIZED HEALTH CARE EDUCATION/TRAINING PROGRAM

## 2023-09-12 PROCEDURE — 88305 TISSUE EXAM BY PATHOLOGIST: CPT | Mod: 59,HCNC | Performed by: PATHOLOGY

## 2023-09-12 PROCEDURE — 17262 DSTRJ MAL LES T/A/L 1.1-2.0: CPT | Mod: 79,S$GLB,, | Performed by: STUDENT IN AN ORGANIZED HEALTH CARE EDUCATION/TRAINING PROGRAM

## 2023-09-12 PROCEDURE — 88305 TISSUE EXAM BY PATHOLOGIST: ICD-10-PCS | Mod: 26,,, | Performed by: PATHOLOGY

## 2023-09-12 PROCEDURE — 11103 TANGNTL BX SKIN EA SEP/ADDL: CPT | Mod: S$GLB,,, | Performed by: STUDENT IN AN ORGANIZED HEALTH CARE EDUCATION/TRAINING PROGRAM

## 2023-09-12 PROCEDURE — 12032 INTMD RPR S/A/T/EXT 2.6-7.5: CPT | Mod: XS,51,79,S$GLB | Performed by: STUDENT IN AN ORGANIZED HEALTH CARE EDUCATION/TRAINING PROGRAM

## 2023-09-12 PROCEDURE — 88305 TISSUE EXAM BY PATHOLOGIST: CPT | Mod: 26,,, | Performed by: PATHOLOGY

## 2023-09-12 PROCEDURE — 11103 PR TANGENTIAL BIOPSY, SKIN, EA ADDTL LESION: ICD-10-PCS | Mod: S$GLB,,, | Performed by: STUDENT IN AN ORGANIZED HEALTH CARE EDUCATION/TRAINING PROGRAM

## 2023-09-12 NOTE — PROGRESS NOTES
Subjective:      Patient ID:  Tristin Cerda is a 68 y.o. male who presents for   Chief Complaint   Patient presents with    Skin Cancer     E&S     Patient here today for E&S of BCC on left inferior shoulder and left superior shoulder.   Denies pacemaker.   He is on eliquis.     . SKIN, LEFT INFERIOR SHOULDER LESION, SHAVE BIOPSY:   - Basal cell carcinoma, superficial and nodular-type, transected.     C. SKIN, LEFT SUPERIOR SHOULDER LESION, SHAVE BIOPSY:   - Basal cell carcinoma, superficial-type, transected.     D. SKIN, LEFT UPPER LATERAL BACK LESION, SHAVE BIOPSY:   - Basal cell carcinoma, superficial-type, transected.     E. SKIN, LEFT UPPER MEDIAL BACK LESION, SHAVE BIOPSY:   - Basal cell carcinoma, superficial and nodular-type, transected.     Abimbola Whittaker M.D.         Review of Systems   Constitutional:  Negative for fever, chills and fatigue.   Respiratory:  Negative for cough and shortness of breath.    Gastrointestinal:  Negative for nausea and vomiting.   Skin:  Positive for activity-related sunscreen use and wears hat. Negative for daily sunscreen use.   Hematologic/Lymphatic: Bruises/bleeds easily (eliquis).       Objective:   Physical Exam   Constitutional: He appears well-developed and well-nourished.   Neurological: He is alert and oriented to person, place, and time.   Psychiatric: He has a normal mood and affect.   Skin:   Areas Examined (abnormalities noted in diagram):   Neck Inspection Performed  Back Inspection Performed            Diagram Legend     Erythematous scaling macule/papule c/w actinic keratosis       Vascular papule c/w angioma      Pigmented verrucoid papule/plaque c/w seborrheic keratosis      Yellow umbilicated papule c/w sebaceous hyperplasia      Irregularly shaped tan macule c/w lentigo     1-2 mm smooth white papules consistent with Milia      Movable subcutaneous cyst with punctum c/w epidermal inclusion cyst      Subcutaneous movable cyst c/w pilar cyst       Firm pink to brown papule c/w dermatofibroma      Pedunculated fleshy papule(s) c/w skin tag(s)      Evenly pigmented macule c/w junctional nevus     Mildly variegated pigmented, slightly irregular-bordered macule c/w mildly atypical nevus      Flesh colored to evenly pigmented papule c/w intradermal nevus       Pink pearly papule/plaque c/w basal cell carcinoma      Erythematous hyperkeratotic cursted plaque c/w SCC      Surgical scar with no sign of skin cancer recurrence      Open and closed comedones      Inflammatory papules and pustules      Verrucoid papule consistent consistent with wart     Erythematous eczematous patches and plaques     Dystrophic onycholytic nail with subungual debris c/w onychomycosis     Umbilicated papule    Erythematous-base heme-crusted tan verrucoid plaque consistent with inflamed seborrheic keratosis     Erythematous Silvery Scaling Plaque c/w Psoriasis     See annotation                    Assessment / Plan:      Pathology Orders:       Normal Orders This Visit    Specimen to Pathology, Dermatology     Questions:    Procedure Type: Dermatology and skin neoplasms    Number of Specimens: 3    ------------------------: -------------------------    Spec 1 Procedure: Excision >2cm    Spec 1 Clinical Impression: biopsy proven BCC, please check margins    Spec 1 Source: left inferior shoulder    ------------------------: -------------------------    Spec 2 Procedure: Biopsy    Spec 2 Clinical Impression: bcc    Spec 2 Source: left neck    ------------------------: -------------------------    Spec 3 Procedure: Biopsy    Spec 3 Clinical Impression: bcc    Spec 3 Source: left scapula    Release to patient:           Basal cell carcinoma (BCC) of left shoulder  -     Specimen to Pathology, Dermatology  PROCEDURE: Elliptical excision with intermediate layered repair in order to decrease dead space, decrease tension, and close large gap.    ANESTHETIC: 6 cc 2% lidocaine with Epinephrine  1:100,000, buffered    SURGEON: Alva Silva MD  ASSISTANTS: Amber Gagnon MA    PREOPERATIVE DIAGNOSIS:  Biopsy-proven Basal Cell Carcinoma    POSTOPERATIVE DIAGNOSIS:  Same as preoperative diagnosis    PATHOLOGIC DIAGNOSIS: Pending    LOCATION: left inferior shoulder    INITIAL LESION SIZE: 0.9 cm    EXCISED DIAMETER: 1.7 cm    PREPARATION: The diagnosis, procedure, alternatives, benefits and risks, including but not limited to: infection, bleeding/bruising, drug reactions, pain, scar or cosmetic defect, local sensation disturbances, wound dehiscence (separation of wound edges after sutures removed) and/or recurrence of present condition were explained to the patient. The patient elected to proceed.  Patient's identity was verified using 2 patient identifiers and the side and site was verified.  Time out period with surgeon, assistant and patient in surgical suite was taken.    PROCEDURE: The location noted above was prepped and draped in the usual sterile fashion. The area was anesthetized with intradermal buffered xylocaine. Lesional tissue was carefully marked with at least 4 mm margins of clinically normal skin in all directions. A fusiform elliptical excision was done with #15 blade carried down completely through the dermis into the subcutaneous tissues to the level of the subcutaneous fat, and dissection was carried out in that plane.  Electrocoagulation was used to obtain hemostasis. Blood loss was minimal. The wound was then approximated in a layered fashion with subcutaneous and intradermal sutures of 3.0 Monocryl, approximately 6 in number, and the wound was then superficially closed with running sutures of 3.0 Prolene.    The patient tolerated the procedure well.    The area was cleaned and dressed appropriately and the patient was given wound care instructions, as well as an appointment for follow-up evaluation.    LENGTH OF REPAIR: 6.0 cm      Superficial basal cell carcinoma  -     Specimen to  Pathology, Dermatology  Here for electrodesiccation and curettage of Superficial BCC on the left superior shoulder. :      Electrodessication and Curettage Procedure note:    Verbal consent obtained. Lesional tissue marked and prepped with alcohol. Lesion anesthetized with 1% lidocaine with epinephrine. Curettage and Desiccation x 3 cycles to base. Aluminum chloride for hemostasis. Lesion size after primary curettage: 1.1 cm    Area bandaged and wound care explained.    Neoplasm of uncertain behavior of skin x2  -     Specimen to Pathology, Dermatology  Shave biopsy procedure note:    Shave biopsy performed after verbal consent including risk of infection, scar, recurrence, need for additional treatment of site. Area prepped with alcohol, anesthetized with approximately 1.0cc of 2% lidocaine with epinephrine. Lesional tissue shaved with razor blade. Hemostasis achieved with application of aluminum chloride followed by hyfrecation. No complications. Dressing applied. Wound care explained.             No follow-ups on file.

## 2023-09-12 NOTE — PATIENT INSTRUCTIONS

## 2023-09-13 ENCOUNTER — OFFICE VISIT (OUTPATIENT)
Dept: PAIN MEDICINE | Facility: CLINIC | Age: 68
End: 2023-09-13
Payer: MEDICARE

## 2023-09-13 VITALS
HEART RATE: 69 BPM | HEIGHT: 73 IN | SYSTOLIC BLOOD PRESSURE: 131 MMHG | DIASTOLIC BLOOD PRESSURE: 78 MMHG | WEIGHT: 145 LBS | BODY MASS INDEX: 19.22 KG/M2

## 2023-09-13 DIAGNOSIS — M50.30 DDD (DEGENERATIVE DISC DISEASE), CERVICAL: Primary | ICD-10-CM

## 2023-09-13 DIAGNOSIS — S16.1XXA STRAIN OF NECK MUSCLE, INITIAL ENCOUNTER: ICD-10-CM

## 2023-09-13 DIAGNOSIS — M17.11 OSTEOARTHRITIS OF RIGHT KNEE, UNSPECIFIED OSTEOARTHRITIS TYPE: ICD-10-CM

## 2023-09-13 DIAGNOSIS — G89.29 CHRONIC ABDOMINAL PAIN: ICD-10-CM

## 2023-09-13 DIAGNOSIS — M79.18 MYOFASCIAL PAIN: ICD-10-CM

## 2023-09-13 DIAGNOSIS — R10.9 CHRONIC ABDOMINAL PAIN: ICD-10-CM

## 2023-09-13 PROCEDURE — 3078F PR MOST RECENT DIASTOLIC BLOOD PRESSURE < 80 MM HG: ICD-10-PCS | Mod: HCNC,CPTII,S$GLB, | Performed by: PHYSICIAN ASSISTANT

## 2023-09-13 PROCEDURE — 1159F PR MEDICATION LIST DOCUMENTED IN MEDICAL RECORD: ICD-10-PCS | Mod: HCNC,CPTII,S$GLB, | Performed by: PHYSICIAN ASSISTANT

## 2023-09-13 PROCEDURE — 3078F DIAST BP <80 MM HG: CPT | Mod: HCNC,CPTII,S$GLB, | Performed by: PHYSICIAN ASSISTANT

## 2023-09-13 PROCEDURE — 3288F PR FALLS RISK ASSESSMENT DOCUMENTED: ICD-10-PCS | Mod: HCNC,CPTII,S$GLB, | Performed by: PHYSICIAN ASSISTANT

## 2023-09-13 PROCEDURE — 1101F PT FALLS ASSESS-DOCD LE1/YR: CPT | Mod: HCNC,CPTII,S$GLB, | Performed by: PHYSICIAN ASSISTANT

## 2023-09-13 PROCEDURE — 3288F FALL RISK ASSESSMENT DOCD: CPT | Mod: HCNC,CPTII,S$GLB, | Performed by: PHYSICIAN ASSISTANT

## 2023-09-13 PROCEDURE — 3008F BODY MASS INDEX DOCD: CPT | Mod: HCNC,CPTII,S$GLB, | Performed by: PHYSICIAN ASSISTANT

## 2023-09-13 PROCEDURE — 99999 PR PBB SHADOW E&M-EST. PATIENT-LVL III: ICD-10-PCS | Mod: PBBFAC,HCNC,, | Performed by: PHYSICIAN ASSISTANT

## 2023-09-13 PROCEDURE — 1101F PR PT FALLS ASSESS DOC 0-1 FALLS W/OUT INJ PAST YR: ICD-10-PCS | Mod: HCNC,CPTII,S$GLB, | Performed by: PHYSICIAN ASSISTANT

## 2023-09-13 PROCEDURE — 1125F PR PAIN SEVERITY QUANTIFIED, PAIN PRESENT: ICD-10-PCS | Mod: HCNC,CPTII,S$GLB, | Performed by: PHYSICIAN ASSISTANT

## 2023-09-13 PROCEDURE — 3075F SYST BP GE 130 - 139MM HG: CPT | Mod: HCNC,CPTII,S$GLB, | Performed by: PHYSICIAN ASSISTANT

## 2023-09-13 PROCEDURE — 3075F PR MOST RECENT SYSTOLIC BLOOD PRESS GE 130-139MM HG: ICD-10-PCS | Mod: HCNC,CPTII,S$GLB, | Performed by: PHYSICIAN ASSISTANT

## 2023-09-13 PROCEDURE — 1125F AMNT PAIN NOTED PAIN PRSNT: CPT | Mod: HCNC,CPTII,S$GLB, | Performed by: PHYSICIAN ASSISTANT

## 2023-09-13 PROCEDURE — 99214 PR OFFICE/OUTPT VISIT, EST, LEVL IV, 30-39 MIN: ICD-10-PCS | Mod: HCNC,S$GLB,, | Performed by: PHYSICIAN ASSISTANT

## 2023-09-13 PROCEDURE — 99999 PR PBB SHADOW E&M-EST. PATIENT-LVL III: CPT | Mod: PBBFAC,HCNC,, | Performed by: PHYSICIAN ASSISTANT

## 2023-09-13 PROCEDURE — 3008F PR BODY MASS INDEX (BMI) DOCUMENTED: ICD-10-PCS | Mod: HCNC,CPTII,S$GLB, | Performed by: PHYSICIAN ASSISTANT

## 2023-09-13 PROCEDURE — 1159F MED LIST DOCD IN RCRD: CPT | Mod: HCNC,CPTII,S$GLB, | Performed by: PHYSICIAN ASSISTANT

## 2023-09-13 PROCEDURE — 99214 OFFICE O/P EST MOD 30 MIN: CPT | Mod: HCNC,S$GLB,, | Performed by: PHYSICIAN ASSISTANT

## 2023-09-13 RX ORDER — TRAMADOL HYDROCHLORIDE 50 MG/1
50 TABLET ORAL EVERY 8 HOURS PRN
Qty: 90 TABLET | Refills: 2 | Status: SHIPPED | OUTPATIENT
Start: 2023-09-16 | End: 2023-12-06 | Stop reason: SDUPTHER

## 2023-09-13 NOTE — PROGRESS NOTES
PCP: Tyler Smith MD      CC: abdominal pain    Interval history: Mr. Cerda is a 68 y.o. male with an extensive history of crohn's disease who presents today for f/u s/p and medication refill. Continues to have neck pain worse with lateral rotation. He had updated imaging. Denies radicular pain. Robaxin is helpful. He has healed from right hip fracture 11 months ago.  He was taking Percocet 5-325 mg q 6 h for post op pain. Acute left knee pain resolved after left intra articular steroid injection with Dr. Villatoro.  Right knee RFA  provided 75% relief.    Abdominal pain remains stable.   He does report diarrhea at times but no blood stools. Reports anal pain and itching 3/2 chronic diarrhea.  He is currently being evaluated by gastroenterology.  OTC Lidocaine 2 % provides some minimal relief.  Compounding cream was too expensive.  He rates his pain 6/10.     Prior HPI:   Mr. Cerda is a 60 year old male with PMH of crohn's disease referred by Dr. Hansen.  Patient states being diagnosed with crohn's disease since the age of 14.    He has had multiple GI surgeries and large bowel and small bowel removals.  During that time, he was being managed by providers in [place].  He was TPN dependent for many years until about two years ago.  He is now stable on an oral diet.  He did not have a primary care physician nor a gastroenterologist for many years.  He is currently trying to establish care.  He has future appointment with Dr. Ch.  He states taking Demerol 50mg q8hrs as needed for pain. It has provided relief of his nausea and pain with diarrhea.  He was last prescribed Demerol in July 2014.  Since then, he has been bearing with the pain.  It is a sharp shooting pain in his mid abdomen.      ROS:  CONSTITUTIONAL: No fevers, chills, night sweats, wt. loss, appetite changes  SKIN: no rashes or itching  ENT: No headaches, head trauma, vision changes, or eye pain  LYMPH NODES: None noticed   CV: No  chest pain, palpitations.   RESP: No shortness of breath, dyspnea on exertion, cough, wheezing, or hemoptysis  GI: No nausea, emesis, diarrhea, constipation, melena, hematochezia, pain.    : No dysuria, hematuria, urgency, or frequency   HEME: No easy bruising, bleeding problems  PSYCHIATRIC: No depression, anxiety, psychosis, hallucinations.  NEURO: No seizures, memory loss, dizziness or difficulty sleeping  MSK: no joint pain      Past Medical History:   Diagnosis Date    Anxiety     Basal cell carcinoma 07/2017    R forehead and R temple    Crohn's disease     age 17    Encounter for blood transfusion     Hypertension     Short bowel syndrome     Vitamin B deficiency     Vitamin D deficiency      Past Surgical History:   Procedure Laterality Date    3 small bowel resections      APPENDECTOMY      CHOLECYSTECTOMY      COLONOSCOPY      COLONOSCOPY N/A 02/14/2017    Procedure: COLONOSCOPY;  Surgeon: Nela Sanchez MD;  Location: [address]);  Service: Endoscopy;  Laterality: N/A;    COLONOSCOPY N/A 03/14/2017    Procedure: COLONOSCOPY;  Surgeon: Nela Sanchez MD;  Location: [address]);  Service: Endoscopy;  Laterality: N/A;  2 days clear liquids before procedure    COLONOSCOPY N/A 07/29/2020    Procedure: COLONOSCOPY;  Surgeon: Andrea Shaver MD;  Location: [facility];  Service: Endoscopy;  Laterality: N/A;    ESOPHAGOGASTRODUODENOSCOPY N/A 11/19/2019    Procedure: EGD (ESOPHAGOGASTRODUODENOSCOPY);  Surgeon: Andrea Shaver MD;  Location: [facility];  Service: Endoscopy;  Laterality: N/A;    INJECTION OF ANESTHETIC AGENT AROUND NERVE Right 10/20/2020    Procedure: Block, Nerve genicular;  Surgeon: Alvin Curry MD;  Location: [facility] OR;  Service: Pain Management;  Laterality: Right;  right knee    INTRAMEDULLARY RODDING OF FEMUR Right 10/20/2022    Procedure: INSERTION, INTRAMEDULLARY SRINIVASA, FEMUR;  Surgeon: Gómez Selby MD;  Location: [facility] OR;  Service: Orthopedics;  Laterality: Right;    KNEE  SURGERY      RADIOFREQUENCY ABLATION Right 2021    Procedure: Radiofrequency Ablation Right Knee Genicular RFA [place];  Surgeon: Alvin Curry MD;  Location: [facility];  Service: Pain Management;  Laterality: Right;  Right knee     SMALL INTESTINE SURGERY      TUBE THORACOTOMY      UPPER GASTROINTESTINAL ENDOSCOPY       Family History   Problem Relation Age of Onset    Diabetes Mother     Stroke Mother     Diabetes Father     Kidney disease Father     Irritable bowel syndrome Cousin     Celiac disease Neg Hx     Cirrhosis Neg Hx     Colon cancer Neg Hx     Colon polyps Neg Hx     Cystic fibrosis Neg Hx     Esophageal cancer Neg Hx     Crohn's disease Neg Hx     Hemochromatosis Neg Hx     Inflammatory bowel disease Neg Hx     Liver cancer Neg Hx     Liver disease Neg Hx     Rectal cancer Neg Hx     Stomach cancer Neg Hx     Ulcerative colitis Neg Hx     Addison's disease Neg Hx     Melanoma Neg Hx     Psoriasis Neg Hx     Lupus Neg Hx     Eczema Neg Hx     Glaucoma Neg Hx     Macular degeneration Neg Hx      Social History     Socioeconomic History    Marital status: Other   Tobacco Use    Smoking status: Former     Current packs/day: 0.00     Average packs/day: 1 pack/day for 15.0 years (15.0 ttl pk-yrs)     Types: Cigarettes     Start date: 3/10/1980     Quit date: 3/10/1995     Years since quittin.5    Smokeless tobacco: Never   Substance and Sexual Activity    Alcohol use: Never     Alcohol/week: 2.0 standard drinks of alcohol     Types: 1 Cans of beer, 1 Shots of liquor per week    Drug use: No    Sexual activity: Yes     Partners: Female   Social History Narrative    ** Merged History Encounter **         ** Data from: [address] Dept: [facility]    Retired          ** Data from: [address] Dept: [facility]OUTPAT[facility]    ** Merged History Encounter **         ** Merged History Encounter **          Social Determinants of Health     Financial Resource Strain: Unknown (2023)     Overall Financial Resource Strain (CARDIA)     Difficulty of Paying Living Expenses: Patient refused   Food Insecurity: Unknown (7/24/2023)    Hunger Vital Sign     Worried About Running Out of Food in the Last Year: Patient refused     Ran Out of Food in the Last Year: Patient refused   Transportation Needs: Unknown (7/24/2023)    PRAPARE - Transportation     Lack of Transportation (Medical): Patient refused     Lack of Transportation (Non-Medical): Patient refused   Physical Activity: Unknown (7/24/2023)    Exercise Vital Sign     Days of Exercise per Week: Patient refused     Minutes of Exercise per Session: Patient refused   Recent Concern: Physical Activity - Insufficiently Active (5/19/2023)    Exercise Vital Sign     Days of Exercise per Week: 2 days     Minutes of Exercise per Session: 10 min   Stress: Unknown (7/24/2023)    [employer] - Occupational Stress Questionnaire     Feeling of Stress : Patient refused   Social Connections: Unknown (7/24/2023)    Social Connection and Isolation Panel [NHANES]     Frequency of Communication with Friends and Family: Patient refused     Frequency of Social Gatherings with Friends and Family: Patient refused     Attends Yazdanism Services: Never     Active Member of Clubs or Organizations: Patient refused     Attends Club or Organization Meetings: Patient refused     Marital Status: Patient refused   Recent Concern: Social Connections - Moderately Isolated (5/19/2023)    Social Connection and Isolation Panel [NHANES]     Frequency of Communication with Friends and Family: Three times a week     Frequency of Social Gatherings with Friends and Family: Patient refused     Attends Yazdanism Services: Never     Active Member of Clubs or Organizations: No     Attends Club or Organization Meetings: Patient refused     Marital Status:    Housing Stability: Unknown (7/24/2023)    Housing Stability Vital Sign     Unable to Pay for Housing in  "the Last Year: Patient refused     Number of Places Lived in the Last Year: 1     Unstable Housing in the Last Year: Patient refused     Medications/Allergies: See med card    Vitals:    09/13/23 0944   BP: 131/78   Pulse: 69   Weight: 65.8 kg (145 lb)   Height: 6' 1" (1.854 m)   PainSc:   6   PainLoc: Abdomen     Physical exam:    GENERAL: A and O x3, the patient appears well groomed and is in no acute distress.  Skin: healing lesion on nose  HEENT: normocephalic, atraumatic  CARDIOVASCULAR:  RRR  LUNGS: non labored breathing  ABDOMEN: soft, nontender. No rebound   UPPER EXTREMITIES: Normal alignment, normal range of motion, no atrophy, no skin changes,  hair growth and nail growth normal and equal bilaterally. No swelling, no tenderness.    LOWER EXTREMITIES:  TTP left patella, swelling along medial and superior aspect. No redness or warmth.     Severe TTP SCM on right. Pain with right lateral rotation. No midline cervical tenderness. Negative spurlings.   LUMBAR SPINE  Lumbar spine: ROM is full with flexion extension and oblique extension with no increased pain.    Lam's test causes no increased pain on either side.    Supine straight leg raise is negative bilaterally.    Internal and external rotation of the hip causes no increased pain on either side.  Myofascial exam: No tenderness to palpation across lumbar paraspinous muscles.    MENTAL STATUS: normal orientation, speech, language, and fund of knowledge for social situation.  Emotional state appropriate.    CRANIAL NERVES:  II:  PERRL bilaterally,   III,IV,VI: EOMI.    V:  Facial sensation equal bilaterally  VII:  Facial motor function normal.  VIII:  Hearing equal to finger rub bilaterally  IX/X: Gag normal, palate symmetric  XI:  Shoulder shrug equal, head turn equal  XII:  Tongue midline without fasciculations    MOTOR: Tone and bulk: normal bilateral upper and lower Strength: normal   SENSATION: Light touch and pinprick intact bilaterally  REFLEXES: " normal, symmetric, nonbrisk.  Toes down, no clonus. No hoffmans.  GAIT: normal rise, base, steps, and arm swing.      Imaging:  Left knee xray 3/6/22  Mild tricompartmental degenerative osteoarthrosis with mild joint space narrowing and small osteophyte formation.  Subtle calcification highlighted cartilage consistent with chondrocalcinosis.     There is a small suprapatellar effusion.     Impression:     1. Mild tricompartmental degenerative osteoarthrosis  2. Chondrocalcinosis.  3. Small suprapatellar effusion.       Assessment:  Mr. Cerda is a 68 y.o. male with abdominal pain  1. DDD (degenerative disc disease), cervical    2. Myofascial pain    3. Osteoarthritis of right knee, unspecified osteoarthritis type    4. Chronic abdominal pain          Plan:  1. Patient with long history of crohn's disease and chronic abdominal pain.  Continue care with GI.  2. Tramadol 50 mg q 8 h prn.     reviewed.  No signs of aberrant behavior.  Previous UDS consistent.  Script provided for 3 months  3. Continue OTC lidocaine cream  4. Monitor progress from right sided peripheral nerve block.   5. F/u with orthopedics as needed  6. Reviewed cervical xray results . Discussed lumbar MBB vs ALLISON. He will consider this.   7. Robaxin 500 mg TID prn   8. F/u 3  months or sooner  All medication management was performed by Dr. Alvin Curry

## 2023-09-19 LAB
FINAL PATHOLOGIC DIAGNOSIS: NORMAL
Lab: NORMAL

## 2023-09-22 ENCOUNTER — TELEPHONE (OUTPATIENT)
Dept: DERMATOLOGY | Facility: CLINIC | Age: 68
End: 2023-09-22
Payer: MEDICARE

## 2023-09-22 DIAGNOSIS — C44.41 BASAL CELL CARCINOMA (BCC) OF LEFT SIDE OF NECK: Primary | ICD-10-CM

## 2023-09-26 ENCOUNTER — PROCEDURE VISIT (OUTPATIENT)
Dept: DERMATOLOGY | Facility: CLINIC | Age: 68
End: 2023-09-26
Payer: MEDICARE

## 2023-09-26 DIAGNOSIS — C44.91 SUPERFICIAL BASAL CELL CARCINOMA: ICD-10-CM

## 2023-09-26 DIAGNOSIS — C44.519 BASAL CELL CARCINOMA (BCC) OF BACK: Primary | ICD-10-CM

## 2023-09-26 PROCEDURE — 12032 PR LAYR CLOS WND TRUNK,ARM,LEG 2.6-7.5 CM: ICD-10-PCS | Mod: XS,79,51,S$GLB | Performed by: STUDENT IN AN ORGANIZED HEALTH CARE EDUCATION/TRAINING PROGRAM

## 2023-09-26 PROCEDURE — 99499 UNLISTED E&M SERVICE: CPT | Mod: S$GLB,,, | Performed by: STUDENT IN AN ORGANIZED HEALTH CARE EDUCATION/TRAINING PROGRAM

## 2023-09-26 PROCEDURE — 99499 NO LOS: ICD-10-PCS | Mod: S$GLB,,, | Performed by: STUDENT IN AN ORGANIZED HEALTH CARE EDUCATION/TRAINING PROGRAM

## 2023-09-26 PROCEDURE — 17262 PR DESTR MALIG TRUNK,EXTREM 1.1-2 CM: ICD-10-PCS | Mod: 79,S$GLB,, | Performed by: STUDENT IN AN ORGANIZED HEALTH CARE EDUCATION/TRAINING PROGRAM

## 2023-09-26 PROCEDURE — 17262 DSTRJ MAL LES T/A/L 1.1-2.0: CPT | Mod: 79,S$GLB,, | Performed by: STUDENT IN AN ORGANIZED HEALTH CARE EDUCATION/TRAINING PROGRAM

## 2023-09-26 PROCEDURE — 11602 PR EXC SKIN MALIG 1.1-2 CM TRUNK,ARM,LEG: ICD-10-PCS | Mod: XS,79,S$GLB, | Performed by: STUDENT IN AN ORGANIZED HEALTH CARE EDUCATION/TRAINING PROGRAM

## 2023-09-26 PROCEDURE — 88305 TISSUE EXAM BY PATHOLOGIST: CPT | Mod: HCNC | Performed by: PATHOLOGY

## 2023-09-26 PROCEDURE — 88305 TISSUE EXAM BY PATHOLOGIST: CPT | Mod: 26,,, | Performed by: PATHOLOGY

## 2023-09-26 PROCEDURE — 88305 TISSUE EXAM BY PATHOLOGIST: ICD-10-PCS | Mod: 26,,, | Performed by: PATHOLOGY

## 2023-09-26 PROCEDURE — 11602 EXC TR-EXT MAL+MARG 1.1-2 CM: CPT | Mod: XS,79,S$GLB, | Performed by: STUDENT IN AN ORGANIZED HEALTH CARE EDUCATION/TRAINING PROGRAM

## 2023-09-26 PROCEDURE — 12032 INTMD RPR S/A/T/EXT 2.6-7.5: CPT | Mod: XS,79,51,S$GLB | Performed by: STUDENT IN AN ORGANIZED HEALTH CARE EDUCATION/TRAINING PROGRAM

## 2023-09-26 NOTE — PATIENT INSTRUCTIONS

## 2023-09-26 NOTE — PROGRESS NOTES
Subjective:      Patient ID:  Tristin Cerda is a 68 y.o. male who presents for   Chief Complaint   Patient presents with    Skin Cancer     E&S, ED&C     Patient here today for E&S of BCC on left upper lateral back and left upper medial back. ED&C of BCC on left scapula.  Denies pacemaker.  He is on eliquis.      B. SKIN, LEFT INFERIOR SHOULDER LESION, SHAVE BIOPSY:--> E&S   - Basal cell carcinoma, superficial and nodular-type, transected.     C. SKIN, LEFT SUPERIOR SHOULDER LESION, SHAVE BIOPSY: --> ED&C  - Basal cell carcinoma, superficial-type, transected.     D. SKIN, LEFT UPPER LATERAL BACK LESION, SHAVE BIOPSY:   - Basal cell carcinoma, superficial-type, transected.     E. SKIN, LEFT UPPER MEDIAL BACK LESION, SHAVE BIOPSY:   - Basal cell carcinoma, superficial and nodular-type, transected.     Abimbola Whittaker M.D.         Review of Systems   Constitutional:  Negative for fever, chills and fatigue.   Respiratory:  Negative for cough and shortness of breath.    Gastrointestinal:  Negative for nausea and vomiting.   Skin:  Positive for activity-related sunscreen use and wears hat. Negative for daily sunscreen use.   Hematologic/Lymphatic: Bruises/bleeds easily (eliquis).       Objective:   Physical Exam   Constitutional: He appears well-developed and well-nourished.   Neurological: He is alert and oriented to person, place, and time.   Psychiatric: He has a normal mood and affect.        Diagram Legend     Erythematous scaling macule/papule c/w actinic keratosis       Vascular papule c/w angioma      Pigmented verrucoid papule/plaque c/w seborrheic keratosis      Yellow umbilicated papule c/w sebaceous hyperplasia      Irregularly shaped tan macule c/w lentigo     1-2 mm smooth white papules consistent with Milia      Movable subcutaneous cyst with punctum c/w epidermal inclusion cyst      Subcutaneous movable cyst c/w pilar cyst      Firm pink to brown papule c/w dermatofibroma      Pedunculated  fleshy papule(s) c/w skin tag(s)      Evenly pigmented macule c/w junctional nevus     Mildly variegated pigmented, slightly irregular-bordered macule c/w mildly atypical nevus      Flesh colored to evenly pigmented papule c/w intradermal nevus       Pink pearly papule/plaque c/w basal cell carcinoma      Erythematous hyperkeratotic cursted plaque c/w SCC      Surgical scar with no sign of skin cancer recurrence      Open and closed comedones      Inflammatory papules and pustules      Verrucoid papule consistent consistent with wart     Erythematous eczematous patches and plaques     Dystrophic onycholytic nail with subungual debris c/w onychomycosis     Umbilicated papule    Erythematous-base heme-crusted tan verrucoid plaque consistent with inflamed seborrheic keratosis     Erythematous Silvery Scaling Plaque c/w Psoriasis     See annotation                          Assessment / Plan:        Basal cell carcinoma (BCC) of back  PROCEDURE: Elliptical excision with intermediate layered repair in order to decrease dead space, decrease tension, and close large gap.    ANESTHETIC: 6 cc 2 % lidocaine with Epinephrine 1:100,000, buffered    SURGEON: Alva Silva MD  ASSISTANTS: Amber Gagnon, Gladys Shaver MA    PREOPERATIVE DIAGNOSIS:  Biopsy-proven Basal Cell Carcinoma    POSTOPERATIVE DIAGNOSIS:  Same as preoperative diagnosis    PATHOLOGIC DIAGNOSIS: Pending    LOCATION: left upper medial back    INITIAL LESION SIZE: 1.2 cm    EXCISED DIAMETER: 1.6 cm    PREPARATION: The diagnosis, procedure, alternatives, benefits and risks, including but not limited to: infection, bleeding/bruising, drug reactions, pain, scar or cosmetic defect, local sensation disturbances, wound dehiscence (separation of wound edges after sutures removed) and/or recurrence of present condition were explained to the patient. The patient elected to proceed.  Patient's identity was verified using 2 patient identifiers and the side and site was  verified.  Time out period with surgeon, assistant and patient in surgical suite was taken.    PROCEDURE: The location noted above was prepped and draped in the usual sterile fashion. The area was anesthetized with intradermal buffered xylocaine. Lesional tissue was carefully marked with at least 4 mm margins of clinically normal skin in all directions. A fusiform elliptical excision was done with #15 blade carried down completely through the dermis into the subcutaneous tissues to the level of the subcutaneous fat, and dissection was carried out in that plane.  Electrocoagulation was used to obtain hemostasis. Blood loss was minimal. The wound was then approximated in a layered fashion with subcutaneous and intradermal sutures of 3.0 Monocryl, approximately 5 in number, and the wound was then superficially closed with running sutures of 3.0 Prolene.    The patient tolerated the procedure well.    The area was cleaned and dressed appropriately and the patient was given wound care instructions, as well as an appointment for follow-up evaluation.    LENGTH OF REPAIR: 4.5 cm    Superficial basal cell carcinoma  Here for electrodesiccation and curettage of Superficial BCC on the left upper lateral back. :      Electrodessication and Curettage Procedure note:    Verbal consent obtained. Lesional tissue marked and prepped with alcohol. Lesion anesthetized with 2% lidocaine with epinephrine. Curettage and Desiccation x 3 cycles to base. Aluminum chloride for hemostasis. Lesion size after primary curettage: 1.5 cm    Area bandaged and wound care explained.             No follow-ups on file.

## 2023-09-27 DIAGNOSIS — R42 VERTIGO: ICD-10-CM

## 2023-09-27 RX ORDER — MECLIZINE HCL 12.5 MG 12.5 MG/1
12.5 TABLET ORAL 3 TIMES DAILY PRN
Qty: 90 TABLET | Refills: 3 | Status: SHIPPED | OUTPATIENT
Start: 2023-09-27

## 2023-09-27 NOTE — TELEPHONE ENCOUNTER
No care due was identified.  [facility] Embedded Care Due Messages. Reference number: [id number].   9/27/2023 8:43:09 AM CDT

## 2023-10-02 ENCOUNTER — TELEPHONE (OUTPATIENT)
Dept: DERMATOLOGY | Facility: CLINIC | Age: 68
End: 2023-10-02
Payer: MEDICARE

## 2023-10-02 LAB
FINAL PATHOLOGIC DIAGNOSIS: NORMAL
GROSS: NORMAL
Lab: NORMAL
MICROSCOPIC EXAM: NORMAL

## 2023-10-02 NOTE — TELEPHONE ENCOUNTER
----- Message from Ekaterina Ross sent at 10/2/2023  1:32 PM CDT -----  Type:  Patient Returning Call    Who Called:  pt  Who Left Message for Patient:  n/a  Does the patient know what this is regarding?:  yes  Best Call Back Number:  [phone]    Additional Information:  Thanks

## 2023-10-11 ENCOUNTER — OFFICE VISIT (OUTPATIENT)
Dept: DERMATOLOGY | Facility: CLINIC | Age: 68
End: 2023-10-11
Payer: MEDICARE

## 2023-10-11 ENCOUNTER — TELEPHONE (OUTPATIENT)
Dept: DERMATOLOGY | Facility: CLINIC | Age: 68
End: 2023-10-11
Payer: MEDICARE

## 2023-10-11 DIAGNOSIS — C44.519 BASAL CELL CARCINOMA (BCC) OF BACK: Primary | ICD-10-CM

## 2023-10-11 PROCEDURE — 17262 PR DESTR MALIG TRUNK,EXTREM 1.1-2 CM: ICD-10-PCS | Mod: 79,S$GLB,, | Performed by: STUDENT IN AN ORGANIZED HEALTH CARE EDUCATION/TRAINING PROGRAM

## 2023-10-11 PROCEDURE — 99499 NO LOS: ICD-10-PCS | Mod: S$GLB,,, | Performed by: STUDENT IN AN ORGANIZED HEALTH CARE EDUCATION/TRAINING PROGRAM

## 2023-10-11 PROCEDURE — 17262 DSTRJ MAL LES T/A/L 1.1-2.0: CPT | Mod: 79,S$GLB,, | Performed by: STUDENT IN AN ORGANIZED HEALTH CARE EDUCATION/TRAINING PROGRAM

## 2023-10-11 PROCEDURE — 99499 UNLISTED E&M SERVICE: CPT | Mod: S$GLB,,, | Performed by: STUDENT IN AN ORGANIZED HEALTH CARE EDUCATION/TRAINING PROGRAM

## 2023-10-11 NOTE — PROGRESS NOTES
After Visit Summary   10/20/2017    Jess Pritchett    MRN: [id number]           Patient Information     Date Of Birth          1987        Visit Information        Provider Department      10/20/2017 3:30 PM Edith Nguyen MD [facility]        Today's Diagnoses     PCOS (polycystic ovarian syndrome)    -  1      Care Instructions    Cycle instructions:    The first day of bleeding/period is called Day 1.    Letrozole is taken Days 3-7 of cycle, about same time each day.  Take the medication even if you are still bleeding.    Start testing for ovulation using the store bought ovulation predictor kits on Day 11 of cycle.  When you get a positive surge, you will most likely be ovulating within the next 24 hours.       Test the urine about the same time each day.  (should try to test between 2-4 pm, empty bladder about noon)         The test is positive when you see 2 dark solid lines, or a smiley face.  (one faint line and one dark line is NOT positive)         Once the test is positive, you can stop testing.  Have sex/intercourse the day the test is positive.  The next day, have sex again.    You can also do an E Visit if you are a my chart patient.   Start the visit by telling me your LMP (last menstrual period), any side effects of Letrozole and cycle day of your LH surge.    If you don't have a period by 15 days after LH kit is positive (surge) then do urine pregnancy test and call clinic if positive.  If it is negative, you can start the second cycle of Letrozole at the 5 mg/day dose.     Make appointment to come back to clinic for infertility follow-up visit about Day 24-26 of the second cycle.     Basic info:  The ovulation predictor kits are found in the condom/tampon section of the store.  Clear blue easy brand is simple to read.  Most women will get a positive LH surge before day 20 of the cycle.  On clomid, some people may notice nausea, dizziness, bloating,    Subjective:      Patient ID:  Tristin Cerda is a 68 y.o. male who presents for   Chief Complaint   Patient presents with    Skin Cancer     ED&C     LOV 7/26/23    Patient here today for ED&C of BCC on left scapula.    . SKIN, LEFT SCAPULA, SHAVE BIOPSY:   - Basal cell carcinoma, superficial and nodular-type, transected.     B.   SKIN, LEFT NECK, SHAVE BIOPSY: -- mohs tomorrow 10/12 with Dr. REIS  - Basal cell carcinoma, superficial and nodular-type with a focal micronodular and infiltrative pattern, transected.         - left nasal dorsum- BCC- mohs  - left inferior shoulder- bcc- E&S  - left superiror shoulder- sup bcc- ED&C  - left upper lateral back- BCC-ED&C  - left upper medial back- BCC_ E&S                   Review of Systems   Constitutional:  Negative for fever, chills and fatigue.   Respiratory:  Negative for cough and shortness of breath.    Gastrointestinal:  Negative for nausea and vomiting.   Skin:  Positive for activity-related sunscreen use and wears hat. Negative for daily sunscreen use.   Hematologic/Lymphatic: Bruises/bleeds easily (eliquis).       Objective:   Physical Exam   Constitutional: He appears well-developed and well-nourished.   Neurological: He is alert and oriented to person, place, and time.   Psychiatric: He has a normal mood and affect.   Skin:   Areas Examined (abnormalities noted in diagram):   Back Inspection Performed            Diagram Legend     Erythematous scaling macule/papule c/w actinic keratosis       Vascular papule c/w angioma      Pigmented verrucoid papule/plaque c/w seborrheic keratosis      Yellow umbilicated papule c/w sebaceous hyperplasia      Irregularly shaped tan macule c/w lentigo     1-2 mm smooth white papules consistent with Milia      Movable subcutaneous cyst with punctum c/w epidermal inclusion cyst      Subcutaneous movable cyst c/w pilar cyst      Firm pink to brown papule c/w dermatofibroma      Pedunculated fleshy papule(s) c/w skin tag(s)       Evenly pigmented macule c/w junctional nevus     Mildly variegated pigmented, slightly irregular-bordered macule c/w mildly atypical nevus      Flesh colored to evenly pigmented papule c/w intradermal nevus       Pink pearly papule/plaque c/w basal cell carcinoma      Erythematous hyperkeratotic cursted plaque c/w SCC      Surgical scar with no sign of skin cancer recurrence      Open and closed comedones      Inflammatory papules and pustules      Verrucoid papule consistent consistent with wart     Erythematous eczematous patches and plaques     Dystrophic onycholytic nail with subungual debris c/w onychomycosis     Umbilicated papule    Erythematous-base heme-crusted tan verrucoid plaque consistent with inflamed seborrheic keratosis     Erythematous Silvery Scaling Plaque c/w Psoriasis     See annotation        Assessment / Plan:        Basal cell carcinoma (BCC) of back  Here for electrodesiccation and curettage of BCC on the left scapula.      Electrodessication and Curettage Procedure note:    Verbal consent obtained. Lesional tissue marked and prepped with alcohol. Lesion anesthetized with 2% lidocaine with epinephrine. Curettage and Desiccation x 3 cycles to base. Aluminum chloride for hemostasis. Lesion size after primary curettage: 1.4 cm    Area bandaged and wound care explained.             No follow-ups on file.   "headaches and hot flashes. Most people take the medication at night to minimize side effects.   There is a chance of twins when taking clomid as well as making too many cysts on the ovaries. Do not use lubricants with intercourse when trying to get pregnant. (Pre-Seed is okay)           Follow-ups after your visit        Follow-up notes from your care team     Return in about 3 months (around 1/20/2018).      Who to contact     Please call your clinic at [phone] to:    Ask questions about your health    Make or cancel appointments    Discuss your medicines    Learn about your test results    Speak to your doctor   If you have compliments or concerns about an experience at your clinic, or if you wish to file a complaint, please contact [facility] Patient Relations at [phone] or email us at [email or web address]         Additional Information About Your Visit        [employer] Information     [employer] gives you secure access to your electronic health record. If you see a primary care provider, you can also send messages to your care team and make appointments. If you have questions, please call your primary care clinic.  If you do not have a primary care provider, please call [phone] and they will assist you.      aitainment is an electronic gateway that provides easy, online access to your medical records. With a[employer]ainment, you can request a clinic appointment, read your test results, renew a prescription or communicate with your care team.     To access your existing account, please contact your [facility] or call [phone] for assistance.        Care EveryWhere ID     This is your Care EveryWhere ID. This could be used by other organizations to access your [place] medical records  [id number]        Your Vitals Were     Pulse Height Last Period Breastfeeding? BMI (Body Mass Index)       82 1.626 m (5' 4\") 10/02/2017 No 25.4 kg/m2        " Blood Pressure from Last 3 Encounters:   10/20/17 138/82   04/25/13 104/78   04/08/13 120/78    Weight from Last 3 Encounters:   10/20/17 67.1 kg (148 lb)   04/25/13 66.4 kg (146 lb 6.4 oz)   04/08/13 65.9 kg (145 lb 3.2 oz)              Today, you had the following     No orders found for display         Today's Medication Changes          These changes are accurate as of: 10/20/17  4:20 PM.  If you have any questions, ask your nurse or doctor.               Start taking these medicines.        Dose/Directions    letrozole 2.5 MG tablet   Commonly known as:  FEMARA   Used for:  PCOS (polycystic ovarian syndrome)   Started by:  Edith Nguyen MD        Dose:  2.5 mg   Take 1 tablet (2.5 mg) by mouth daily Take 1 tablet by mouth daily for 5 days.  If you do not ovulation, take 2 tablets by mouth daily for 5 days.   Quantity:  15 tablet   Refills:  0         These medicines have changed or have updated prescriptions.        Dose/Directions    fluticasone 110 MCG/ACT Inhaler   Commonly known as:  FLOVENT HFA   This may have changed:  Another medication with the same name was removed. Continue taking this medication, and follow the directions you see here.   Changed by:  Edith Nguyen MD        Dose:  1 puff   Inhale 1 puff into the lungs 2 times daily   Refills:  0         Stop taking these medicines if you haven't already. Please contact your care team if you have questions.     amoxicillin 875 MG tablet   Commonly known as:  AMOXIL   Stopped by:  Edith Nguyen MD           fluticasone 50 MCG/ACT spray   Commonly known as:  FLONASE   Stopped by:  Edith Nguyen MD           ORTHO TRI-CYCLEN LO PO   Stopped by:  Edith Nguyen MD                Where to get your medicines      Some of these will need a paper prescription and others can be bought over the counter.  Ask your nurse if you have questions.     Bring a paper prescription for each of these medications     letrozole 2.5 MG tablet                Primary  Care Provider    Physician No Ref-Primary       NO REF-PRIMARY PHYSICIAN        Equal Access to Services     BORIS WOLF : Hadii aad ku hadnicolerocco Iqraavery, wakevinda tessiegaviha, qaronyta donteabrillaurie parra. So [facility] [phone].    ATENCIÓN: Si habla español, tiene a zapata disposición servicios gratuitos de asistencia lingüística. Llame al [phone].    We comply with applicable federal civil rights laws and [place] laws. We do not discriminate on the basis of race, color, national origin, age, disability, sex, sexual orientation, or gender identity.            Thank you!     Thank you for choosing [facility]  for your care. Our goal is always to provide you with excellent care. Hearing back from our patients is one way we can continue to improve our services. Please take a few minutes to complete the written survey that you may receive in the mail after your visit with us. Thank you!             Your Updated Medication List - Protect others around you: Learn how to safely use, store and throw away your medicines at www.disposemymeds.org.          This list is accurate as of: 10/20/17  4:20 PM.  Always use your most recent med list.                   Brand Name Dispense Instructions for use Diagnosis    fluticasone 110 MCG/ACT Inhaler    FLOVENT HFA     Inhale 1 puff into the lungs 2 times daily        letrozole 2.5 MG tablet    FEMARA    15 tablet    Take 1 tablet (2.5 mg) by mouth daily Take 1 tablet by mouth daily for 5 days.  If you do not ovulation, take 2 tablets by mouth daily for 5 days.    PCOS (polycystic ovarian syndrome)       METFORMIN HCL PO      Take 500 mg by mouth 2 times daily (with meals)        PROAIR  (90 BASE) MCG/ACT Inhaler   Generic drug:  albuterol     1 Inhaler    INHALE TWO PUFFS BY MOUTH EVERY FOUR HOURS AS NEEDED    Mild persistent asthma with exacerbation

## 2023-10-12 ENCOUNTER — PROCEDURE VISIT (OUTPATIENT)
Dept: DERMATOLOGY | Facility: CLINIC | Age: 68
End: 2023-10-12
Payer: MEDICARE

## 2023-10-12 VITALS
SYSTOLIC BLOOD PRESSURE: 142 MMHG | HEART RATE: 61 BPM | HEIGHT: 73 IN | WEIGHT: 145.06 LBS | DIASTOLIC BLOOD PRESSURE: 78 MMHG | BODY MASS INDEX: 19.23 KG/M2

## 2023-10-12 DIAGNOSIS — C44.41 BASAL CELL CARCINOMA (BCC) OF LEFT SIDE OF NECK: ICD-10-CM

## 2023-10-12 PROCEDURE — 13132 PR RECMPL WND HEAD,FAC,HAND 2.6-7.5 CM: ICD-10-PCS | Mod: HCNC,79,51,S$GLB | Performed by: DERMATOLOGY

## 2023-10-12 PROCEDURE — 13132 CMPLX RPR F/C/C/M/N/AX/G/H/F: CPT | Mod: HCNC,79,51,S$GLB | Performed by: DERMATOLOGY

## 2023-10-12 PROCEDURE — 17311 MOHS 1 STAGE H/N/HF/G: CPT | Mod: HCNC,79,S$GLB, | Performed by: DERMATOLOGY

## 2023-10-12 PROCEDURE — 17311: ICD-10-PCS | Mod: HCNC,79,S$GLB, | Performed by: DERMATOLOGY

## 2023-10-12 NOTE — PROGRESS NOTES
MOHS MICROGRAPHIC SURGERY OPERATIVE NOTE  Name:  Tristin Cerda  Date: 10/12/2023  Patient : 1955  Attending Surgeon: Francy Kilgore MD  Assistants: Darshana Arreola - DUGLAS, Josy Mcmanus - Surgical Technician, Magdaleno Thomas - Histology Technician, and Trang De Leon - Histology Technician  Anesthetic Agent: 1% Lidocaine with 1:200,000 epinephrine  Clinical Diagnosis: basal cell carcinoma nodular and superficial  Operation: Mohs Micrographic Surgery  Location: left neck  Indications: Biopsy-proven skin cancer of cosmetically and functionally important areas, including head, neck, genital, hand, foot, or areas known for having difficulty in healing, such as the lower anterior legs.  Surgical Preparation: povidone-iodine     Description of Operation:  The nature and purpose of the procedure, associated risks and alternative treatments were explained to the patient in detail. All patient questions were answered completely. An informed operative consent and photography permit were obtained. The tumor location was then identified and marked with agreement by the patient of the correct location. The patient was positioned, prepped, and draped in the usual sterile manner. Local anesthesia was obtained with 2 cc(s) of 1% Lidocaine with 1:200,000 epinephrine. The lesion pre-operatively measures 0.7 by 0.5 cm.     1ST STAGE:  A 2+ mm rim of normal appearing skin was marked circumferentially around the lesion after scraping with a curette to define the margin. The area thus outlined was excised at a 45 degree angle. Hemostasis was obtained with electrodesiccation. The specimen was oriented, mapped, and subdivided into at least two sections. Sections were then chromacoded and submitted for horizontal frozen sections. The patient tolerated the procedure well and there were no complications. Upon microscopic examination of the processed horizontal frozen sections of this stage:  No residual tumor was  noted.  Tumor type noted on stage 1: No tumor seen.     SUMMARY:  The tumor was extirpated in 1 Mohs Stages resulting in a final defect measuring 2.4 by 1.6 cm².   The final defect extended deep to subcutaneous fat  The patient tolerated the procedure well and no complications were noted.     PHOTOS:      Francy Kilgore MD    Complex Linear Closure Operative Note  Name: Tristin Cerda  YOB: 1955  Date: 10/12/2023  Attending Surgeon: Francy Kilgore MD FAAD  Assistants:  Darshana Hurt RN  Clinical Diagnosis: A 2.4 by 1.6 cm defect secondary to Mohs Micrographic Surgery  Location: left neck  Operation: Complex linear closure  Surgical Preparation: broad scrub with povidone-iodine    Description of Operation:  The nature and purpose of the procedure, associated risks and alternative treatments were explained to the patient in detail. All patient questions were answered completely. In order to minimize tension on the closure and avoid compromising the anatomic contour of the cosmetic region and maximize functional capacity, a complex linear closure was performed. An informed operative consent and photography permit were obtained. The patient was positioned, prepped, and draped in the usual sterile manner. Local anesthesia was obtained with 6 cc(s) of 1% Lidocaine with 1:200,000 epinephrine.    The defect edges were debeveled with a scalpel blade. The circular defect was widely undermined in the deep subcutaneous plane at least 100% of the defect diameter to allow for maximum tissue movement. Hemostasis was achieved with spot electrodessication. The defect was closed first utilizing multiple 4-0 Vicryl interrupted buried subcutaneous sutures. This resulted in excellent apposition of the dermis and epidermis, however two redundant areas of tissue were created on opposite sides of the defect. Utilizing a #15 scalpel blade, two Burows triangles were excised to ensure a flat scar.  Hemostasis was obtained with spot electrodessication. The skin edges throughout further fastened superficially with 5-0 Nylon. The final length of the repair was 5.3 cm. The patient tolerated the procedure well and there were no complications.    Polysporin, non-adherent gauze and a pressure dressing were applied to wound after gentle cleansing with saline.    Patient instructed in and provided with instructions for post-op care, will RTC in 10 days for suture removal    Post op meds: None    Photos:        Francy Kilgore MD

## 2023-10-16 ENCOUNTER — PATIENT MESSAGE (OUTPATIENT)
Dept: PAIN MEDICINE | Facility: CLINIC | Age: 68
End: 2023-10-16
Payer: MEDICARE

## 2023-10-18 RX ORDER — METHOCARBAMOL 500 MG/1
500 TABLET, FILM COATED ORAL 3 TIMES DAILY PRN
Qty: 90 TABLET | Refills: 2 | Status: SHIPPED | OUTPATIENT
Start: 2023-10-18 | End: 2024-01-16

## 2023-10-23 ENCOUNTER — CLINICAL SUPPORT (OUTPATIENT)
Dept: DERMATOLOGY | Facility: CLINIC | Age: 68
End: 2023-10-23
Payer: MEDICARE

## 2023-10-23 DIAGNOSIS — Z48.02 VISIT FOR SUTURE REMOVAL: Primary | ICD-10-CM

## 2023-10-23 PROCEDURE — 99999 PR PBB SHADOW E&M-EST. PATIENT-LVL I: ICD-10-PCS | Mod: PBBFAC,HCNC,,

## 2023-10-23 PROCEDURE — 99024 PR POST-OP FOLLOW-UP VISIT: ICD-10-PCS | Mod: HCNC,S$GLB,, | Performed by: DERMATOLOGY

## 2023-10-23 PROCEDURE — 99999 PR PBB SHADOW E&M-EST. PATIENT-LVL I: CPT | Mod: PBBFAC,HCNC,,

## 2023-10-23 PROCEDURE — 99024 POSTOP FOLLOW-UP VISIT: CPT | Mod: HCNC,S$GLB,, | Performed by: DERMATOLOGY

## 2023-10-23 NOTE — PATIENT INSTRUCTIONS
Please phone patient. I would indeed strongly encourage her to be tested for COVID-19.  Please advise that one of our schedulers should be reaching out to her either within the next hour or tomorrow morning to get her scheduled for testing.  Results typically will return within 24 hours and would be sent to her [email or web address] account.      She should isolate from community and from other household members if possible at least until results return.  If she does test positive for COVID-19, she would need to isolate until the following criteria are met:     1.  symptoms are improving.   2.  No fevers x 24 hours (without use of Ibuprofen or acetaminophen  3.  it has been at least 10 days since symptom onset.    Order for COVID-19 testing placed.     Bulmaro Damon MD     Silicone Scar Treatment    Silicone gel sheeting is a simple and inexpensive treatment that has been clinically proven to aid in superficial wound healing following surgical procedures,         and can even improve the appearance of older scars.   The consistent use of silicone gel sheets helps to provide the best possible cosmetic outcome post-operatively.     How to use Silicone Gel Sheets:  Any brand is fine, generic drugstore brands should be equally effective. They are also available on [employer].com  Cut the Silicone gel sheet to a size slightly larger than the scar and apply overnight to the affected area.   The sheets can also be worn during the daytime, if you are amenable to doing so.  Remove sheet in the AM and rinse it off in the sink, some sheets can be re-used for up to 3 weeks.  Paper tape can be used to keep sheets in place while sleeping, if necessary  Sheeting should be used for at least 8 weeks for maximum scar improvement.  Over the counter brands include ScarAway, [facility] brand, [facility] brand, etc.    Silicone Gels:  Silicone gels provide the benefits of silicone without the need for a visible bandage during the day  They dry quickly (within 5-10 minutes) and provide an invisible barrier that can last all day  Over the counter brands include Biocorneum, ScarAway, etc.

## 2023-10-25 NOTE — PROGRESS NOTES
Mohs Surgery Suture Removal Note   Patient Name: Tristin Cerda  Patient : 1955  Date of Service: 10/25/2023    CC: Pt. Presents for removal of sutures on the 10/23/2023 today  Subjective: patient reports wound healing as expected     Objective: Wound well healed, sutures clean/dry/intact. No evidence of any complications noted.     Visit For Suture Removal:  - Suture removal today  - Steri strips/mastisol were not applied  - Patient counseled on silicone gel/silicone sheeting and their use in the management of post-operative scars  - Handout on silicone gel/silicone gel sheeting was provided  - Patient to follow up with their referring provider in 3 months for further skin evaluation    Lubna Martinez

## 2023-10-27 ENCOUNTER — OFFICE VISIT (OUTPATIENT)
Dept: FAMILY MEDICINE | Facility: CLINIC | Age: 68
End: 2023-10-27
Payer: MEDICARE

## 2023-10-27 ENCOUNTER — LAB VISIT (OUTPATIENT)
Dept: LAB | Facility: HOSPITAL | Age: 68
End: 2023-10-27
Attending: NURSE PRACTITIONER
Payer: MEDICARE

## 2023-10-27 VITALS
BODY MASS INDEX: 18.95 KG/M2 | RESPIRATION RATE: 18 BRPM | HEART RATE: 64 BPM | HEIGHT: 73 IN | WEIGHT: 143 LBS | DIASTOLIC BLOOD PRESSURE: 78 MMHG | OXYGEN SATURATION: 96 % | SYSTOLIC BLOOD PRESSURE: 136 MMHG

## 2023-10-27 DIAGNOSIS — E53.8 B12 DEFICIENCY: ICD-10-CM

## 2023-10-27 DIAGNOSIS — Z13.6 ENCOUNTER FOR LIPID SCREENING FOR CARDIOVASCULAR DISEASE: ICD-10-CM

## 2023-10-27 DIAGNOSIS — Z13.220 ENCOUNTER FOR LIPID SCREENING FOR CARDIOVASCULAR DISEASE: ICD-10-CM

## 2023-10-27 DIAGNOSIS — K90.821 SHORT BOWEL SYNDROME WITH COLON IN CONTINUITY: Primary | ICD-10-CM

## 2023-10-27 DIAGNOSIS — R79.0 LOW FERRITIN LEVEL: ICD-10-CM

## 2023-10-27 DIAGNOSIS — I82.5Z1 CHRONIC DEEP VEIN THROMBOSIS (DVT) OF DISTAL VEIN OF RIGHT LOWER EXTREMITY: ICD-10-CM

## 2023-10-27 DIAGNOSIS — G89.29 CHRONIC ABDOMINAL PAIN: ICD-10-CM

## 2023-10-27 DIAGNOSIS — Z23 NEED FOR VACCINATION FOR H FLU TYPE B: ICD-10-CM

## 2023-10-27 DIAGNOSIS — K50.018 CROHN'S DISEASE OF SMALL INTESTINE WITH OTHER COMPLICATION: ICD-10-CM

## 2023-10-27 DIAGNOSIS — R10.9 CHRONIC ABDOMINAL PAIN: ICD-10-CM

## 2023-10-27 PROBLEM — E03.9 HYPOTHYROIDISM: Status: RESOLVED | Noted: 2021-04-01 | Resolved: 2023-10-27

## 2023-10-27 PROBLEM — Z01.818 PRE-OP EXAM: Status: RESOLVED | Noted: 2019-10-30 | Resolved: 2023-10-27

## 2023-10-27 PROBLEM — Z01.818 PREOP TESTING: Status: RESOLVED | Noted: 2020-06-12 | Resolved: 2023-10-27

## 2023-10-27 LAB
ALBUMIN SERPL BCP-MCNC: 3.7 G/DL (ref 3.5–5.2)
ALP SERPL-CCNC: 163 U/L (ref 55–135)
ALT SERPL W/O P-5'-P-CCNC: 34 U/L (ref 10–44)
ANION GAP SERPL CALC-SCNC: 10 MMOL/L (ref 8–16)
AST SERPL-CCNC: 30 U/L (ref 10–40)
BASOPHILS # BLD AUTO: 0.02 K/UL (ref 0–0.2)
BASOPHILS NFR BLD: 0.5 % (ref 0–1.9)
BILIRUB SERPL-MCNC: 0.5 MG/DL (ref 0.1–1)
BUN SERPL-MCNC: 13 MG/DL (ref 8–23)
CALCIUM SERPL-MCNC: 8.3 MG/DL (ref 8.7–10.5)
CHLORIDE SERPL-SCNC: 109 MMOL/L (ref 95–110)
CHOLEST SERPL-MCNC: 99 MG/DL (ref 120–199)
CHOLEST/HDLC SERPL: 2.6 {RATIO} (ref 2–5)
CO2 SERPL-SCNC: 24 MMOL/L (ref 23–29)
CREAT SERPL-MCNC: 0.8 MG/DL (ref 0.5–1.4)
DIFFERENTIAL METHOD: ABNORMAL
EOSINOPHIL # BLD AUTO: 0.1 K/UL (ref 0–0.5)
EOSINOPHIL NFR BLD: 2.9 % (ref 0–8)
ERYTHROCYTE [DISTWIDTH] IN BLOOD BY AUTOMATED COUNT: 13.8 % (ref 11.5–14.5)
EST. GFR  (NO RACE VARIABLE): >60 ML/MIN/1.73 M^2
FERRITIN SERPL-MCNC: 4 NG/ML (ref 20–300)
FOLATE SERPL-MCNC: 9.1 NG/ML (ref 4–24)
GLUCOSE SERPL-MCNC: 88 MG/DL (ref 70–110)
HCT VFR BLD AUTO: 34.3 % (ref 40–54)
HDLC SERPL-MCNC: 38 MG/DL (ref 40–75)
HDLC SERPL: 38.4 % (ref 20–50)
HGB BLD-MCNC: 11 G/DL (ref 14–18)
IMM GRANULOCYTES # BLD AUTO: 0.01 K/UL (ref 0–0.04)
IMM GRANULOCYTES NFR BLD AUTO: 0.3 % (ref 0–0.5)
IRON SERPL-MCNC: 55 UG/DL (ref 45–160)
LDLC SERPL CALC-MCNC: 51.6 MG/DL (ref 63–159)
LYMPHOCYTES # BLD AUTO: 0.6 K/UL (ref 1–4.8)
LYMPHOCYTES NFR BLD: 16.3 % (ref 18–48)
MCH RBC QN AUTO: 27.6 PG (ref 27–31)
MCHC RBC AUTO-ENTMCNC: 32.1 G/DL (ref 32–36)
MCV RBC AUTO: 86 FL (ref 82–98)
MONOCYTES # BLD AUTO: 0.3 K/UL (ref 0.3–1)
MONOCYTES NFR BLD: 8.8 % (ref 4–15)
NEUTROPHILS # BLD AUTO: 2.7 K/UL (ref 1.8–7.7)
NEUTROPHILS NFR BLD: 71.2 % (ref 38–73)
NONHDLC SERPL-MCNC: 61 MG/DL
NRBC BLD-RTO: 0 /100 WBC
PLATELET # BLD AUTO: 127 K/UL (ref 150–450)
PMV BLD AUTO: 10.6 FL (ref 9.2–12.9)
POTASSIUM SERPL-SCNC: 3.3 MMOL/L (ref 3.5–5.1)
PROT SERPL-MCNC: 6.6 G/DL (ref 6–8.4)
RBC # BLD AUTO: 3.99 M/UL (ref 4.6–6.2)
SATURATED IRON: 10 % (ref 20–50)
SODIUM SERPL-SCNC: 143 MMOL/L (ref 136–145)
TOTAL IRON BINDING CAPACITY: 542 UG/DL (ref 250–450)
TRANSFERRIN SERPL-MCNC: 366 MG/DL (ref 200–375)
TRIGL SERPL-MCNC: 47 MG/DL (ref 30–150)
VIT B12 SERPL-MCNC: 623 PG/ML (ref 210–950)
WBC # BLD AUTO: 3.75 K/UL (ref 3.9–12.7)

## 2023-10-27 PROCEDURE — 99214 PR OFFICE/OUTPT VISIT, EST, LEVL IV, 30-39 MIN: ICD-10-PCS | Mod: HCWC,S$GLB,, | Performed by: NURSE PRACTITIONER

## 2023-10-27 PROCEDURE — 3008F PR BODY MASS INDEX (BMI) DOCUMENTED: ICD-10-PCS | Mod: HCWC,CPTII,S$GLB, | Performed by: NURSE PRACTITIONER

## 2023-10-27 PROCEDURE — G0008 FLU VACCINE - QUADRIVALENT - ADJUVANTED: ICD-10-PCS | Mod: HCWC,S$GLB,, | Performed by: NURSE PRACTITIONER

## 2023-10-27 PROCEDURE — 82728 ASSAY OF FERRITIN: CPT | Mod: HCWC | Performed by: NURSE PRACTITIONER

## 2023-10-27 PROCEDURE — 99214 OFFICE O/P EST MOD 30 MIN: CPT | Mod: HCWC,S$GLB,, | Performed by: NURSE PRACTITIONER

## 2023-10-27 PROCEDURE — 1160F PR REVIEW ALL MEDS BY PRESCRIBER/CLIN PHARMACIST DOCUMENTED: ICD-10-PCS | Mod: HCWC,CPTII,S$GLB, | Performed by: NURSE PRACTITIONER

## 2023-10-27 PROCEDURE — 1125F PR PAIN SEVERITY QUANTIFIED, PAIN PRESENT: ICD-10-PCS | Mod: HCWC,CPTII,S$GLB, | Performed by: NURSE PRACTITIONER

## 2023-10-27 PROCEDURE — 1160F RVW MEDS BY RX/DR IN RCRD: CPT | Mod: HCWC,CPTII,S$GLB, | Performed by: NURSE PRACTITIONER

## 2023-10-27 PROCEDURE — 3075F SYST BP GE 130 - 139MM HG: CPT | Mod: HCWC,CPTII,S$GLB, | Performed by: NURSE PRACTITIONER

## 2023-10-27 PROCEDURE — 3288F PR FALLS RISK ASSESSMENT DOCUMENTED: ICD-10-PCS | Mod: HCWC,CPTII,S$GLB, | Performed by: NURSE PRACTITIONER

## 2023-10-27 PROCEDURE — 3008F BODY MASS INDEX DOCD: CPT | Mod: HCWC,CPTII,S$GLB, | Performed by: NURSE PRACTITIONER

## 2023-10-27 PROCEDURE — 80053 COMPREHEN METABOLIC PANEL: CPT | Mod: HCWC | Performed by: NURSE PRACTITIONER

## 2023-10-27 PROCEDURE — 99499 UNLISTED E&M SERVICE: CPT | Mod: HCWC,S$GLB,, | Performed by: NURSE PRACTITIONER

## 2023-10-27 PROCEDURE — 84466 ASSAY OF TRANSFERRIN: CPT | Mod: HCWC | Performed by: NURSE PRACTITIONER

## 2023-10-27 PROCEDURE — 99999 PR PBB SHADOW E&M-EST. PATIENT-LVL V: CPT | Mod: PBBFAC,HCWC,, | Performed by: NURSE PRACTITIONER

## 2023-10-27 PROCEDURE — G0008 ADMIN INFLUENZA VIRUS VAC: HCPCS | Mod: HCWC,S$GLB,, | Performed by: NURSE PRACTITIONER

## 2023-10-27 PROCEDURE — 90694 FLU VACCINE - QUADRIVALENT - ADJUVANTED: ICD-10-PCS | Mod: HCWC,S$GLB,, | Performed by: NURSE PRACTITIONER

## 2023-10-27 PROCEDURE — 1101F PT FALLS ASSESS-DOCD LE1/YR: CPT | Mod: HCWC,CPTII,S$GLB, | Performed by: NURSE PRACTITIONER

## 2023-10-27 PROCEDURE — 3288F FALL RISK ASSESSMENT DOCD: CPT | Mod: HCWC,CPTII,S$GLB, | Performed by: NURSE PRACTITIONER

## 2023-10-27 PROCEDURE — 1159F PR MEDICATION LIST DOCUMENTED IN MEDICAL RECORD: ICD-10-PCS | Mod: HCWC,CPTII,S$GLB, | Performed by: NURSE PRACTITIONER

## 2023-10-27 PROCEDURE — 99999 PR PBB SHADOW E&M-EST. PATIENT-LVL V: ICD-10-PCS | Mod: PBBFAC,HCWC,, | Performed by: NURSE PRACTITIONER

## 2023-10-27 PROCEDURE — 1159F MED LIST DOCD IN RCRD: CPT | Mod: HCWC,CPTII,S$GLB, | Performed by: NURSE PRACTITIONER

## 2023-10-27 PROCEDURE — 82746 ASSAY OF FOLIC ACID SERUM: CPT | Mod: HCWC | Performed by: NURSE PRACTITIONER

## 2023-10-27 PROCEDURE — 1125F AMNT PAIN NOTED PAIN PRSNT: CPT | Mod: HCWC,CPTII,S$GLB, | Performed by: NURSE PRACTITIONER

## 2023-10-27 PROCEDURE — 1101F PR PT FALLS ASSESS DOC 0-1 FALLS W/OUT INJ PAST YR: ICD-10-PCS | Mod: HCWC,CPTII,S$GLB, | Performed by: NURSE PRACTITIONER

## 2023-10-27 PROCEDURE — 36415 COLL VENOUS BLD VENIPUNCTURE: CPT | Mod: HCWC | Performed by: NURSE PRACTITIONER

## 2023-10-27 PROCEDURE — 90694 VACC AIIV4 NO PRSRV 0.5ML IM: CPT | Mod: HCWC,S$GLB,, | Performed by: NURSE PRACTITIONER

## 2023-10-27 PROCEDURE — 3078F DIAST BP <80 MM HG: CPT | Mod: HCWC,CPTII,S$GLB, | Performed by: NURSE PRACTITIONER

## 2023-10-27 PROCEDURE — 85025 COMPLETE CBC W/AUTO DIFF WBC: CPT | Mod: HCWC | Performed by: NURSE PRACTITIONER

## 2023-10-27 PROCEDURE — 3075F PR MOST RECENT SYSTOLIC BLOOD PRESS GE 130-139MM HG: ICD-10-PCS | Mod: HCWC,CPTII,S$GLB, | Performed by: NURSE PRACTITIONER

## 2023-10-27 PROCEDURE — 83540 ASSAY OF IRON: CPT | Mod: HCWC | Performed by: NURSE PRACTITIONER

## 2023-10-27 PROCEDURE — 82607 VITAMIN B-12: CPT | Mod: HCWC | Performed by: NURSE PRACTITIONER

## 2023-10-27 PROCEDURE — 80061 LIPID PANEL: CPT | Mod: HCWC | Performed by: NURSE PRACTITIONER

## 2023-10-27 PROCEDURE — 3078F PR MOST RECENT DIASTOLIC BLOOD PRESSURE < 80 MM HG: ICD-10-PCS | Mod: HCWC,CPTII,S$GLB, | Performed by: NURSE PRACTITIONER

## 2023-10-27 RX ORDER — DICYCLOMINE HYDROCHLORIDE 20 MG/1
TABLET ORAL
Qty: 360 TABLET | Refills: 3 | Status: SHIPPED | OUTPATIENT
Start: 2023-10-27

## 2023-10-27 RX ORDER — CYANOCOBALAMIN 1000 UG/ML
1000 INJECTION, SOLUTION INTRAMUSCULAR; SUBCUTANEOUS
Qty: 3 ML | Refills: 3 | Status: SHIPPED | OUTPATIENT
Start: 2023-10-27

## 2023-10-27 NOTE — PROGRESS NOTES
Subjective:       Patient ID: Tristin Cerda is a 68 y.o. male.    Chief Complaint: Establish Care (Prior Tyler Smith patient in [place]), Referral (Wants a Gastro MD that is local ), Hypothyroidism, and Skin Cancer  -  The pt is a 69 y/o male that presents for transfer of care from [place] (Tyler Smith MD) due to location.    H/o crohn's with short gut syndrome with h/o TPN for years yet not longer using. Also h/o B12 def with ins not to pay yet goodrx $5.00 thus will self pay. Level 4 mo on therapy 314 yet was told to stop yet will recheck today. Long GI hx due for EGD and colonoscopy thus needs GI MD.    Is under the care of pain mgt in [place] taking Tramadol. Chart viewed with h/o lymphocytes 800 reports seeing hem/one with anemia d/o.  -    Past Medical History:   Diagnosis Date    Anxiety     Basal cell carcinoma 07/2017    R forehead and R temple    Crohn's disease     age 17    Encounter for blood transfusion     Hypertension     Short bowel syndrome     Vitamin B deficiency     Vitamin D deficiency        Past Surgical History:   Procedure Laterality Date    3 small bowel resections      APPENDECTOMY      CHOLECYSTECTOMY      COLONOSCOPY      COLONOSCOPY N/A 02/14/2017    Procedure: COLONOSCOPY;  Surgeon: Nela Sanchez MD;  Location: [facility] ([address] [place] [address]);  Service: Endoscopy;  Laterality: N/A;    COLONOSCOPY N/A 03/14/2017    Procedure: COLONOSCOPY;  Surgeon: Nela Sanchez MD;  Location: [facility] ([address] [place] [address]);  Service: Endoscopy;  Laterality: N/A;  2 days clear liquids before procedure    COLONOSCOPY N/A 07/29/2020    Procedure: COLONOSCOPY;  Surgeon: Andrea Shaver MD;  Location: [facility];  Service: Endoscopy;  Laterality: N/A;    ESOPHAGOGASTRODUODENOSCOPY N/A 11/19/2019    Procedure: EGD (ESOPHAGOGASTRODUODENOSCOPY);  Surgeon: Andrea Shaver MD;  Location: [facility];  Service: Endoscopy;  Laterality: N/A;    INJECTION OF ANESTHETIC AGENT AROUND NERVE Right 10/20/2020    Procedure:  Block, Nerve genicular;  Surgeon: Alvin Curry MD;  Location: [facility];  Service: Pain Management;  Laterality: Right;  right knee    INTRAMEDULLARY RODDING OF FEMUR Right 10/20/2022    Procedure: INSERTION, INTRAMEDULLARY SRINIVASA, FEMUR;  Surgeon: Gómez Selby MD;  Location: [facility] OR;  Service: Orthopedics;  Laterality: Right;    KNEE SURGERY      RADIOFREQUENCY ABLATION Right 2021    Procedure: Radiofrequency Ablation Right Knee Genicular RFA Coolief;  Surgeon: Alvin Curry MD;  Location: [facility] OR;  Service: Pain Management;  Laterality: Right;  Right knee     SMALL INTESTINE SURGERY      TUBE THORACOTOMY      UPPER GASTROINTESTINAL ENDOSCOPY          Social History     Socioeconomic History    Marital status: Other   Tobacco Use    Smoking status: Former     Current packs/day: 0.00     Average packs/day: 1 pack/day for 15.0 years (15.0 ttl pk-yrs)     Types: Cigarettes     Start date: 3/10/1980     Quit date: 3/10/1995     Years since quittin.6    Smokeless tobacco: Never   Substance and Sexual Activity    Alcohol use: Never     Alcohol/week: 2.0 standard drinks of alcohol     Types: 1 Cans of beer, 1 Shots of liquor per week    Drug use: Never    Sexual activity: Yes     Partners: Female     Birth control/protection: None   Social History Narrative    ** Merged History Encounter **         ** Data from: 22 Enc Dept: [facility] FAMILY MEDICINE    Retired          ** Data from: 22 Enc Dept: [facility] PHARMACY-OUTPATIENT    ** Merged History Encounter **         ** Merged History Encounter **          Social Determinants of Health     Financial Resource Strain: Unknown (10/24/2023)    Overall Financial Resource Strain (CARDIA)     Difficulty of Paying Living Expenses: Patient refused   Food Insecurity: Unknown (10/24/2023)    Hunger Vital Sign     Worried About Running Out of Food in the Last Year: Patient refused     Ran Out of Food in the Last Year: Patient refused   Transportation Needs: Unknown  (10/24/2023)    PRAPARE - Transportation     Lack of Transportation (Medical): Patient refused     Lack of Transportation (Non-Medical): Patient refused   Physical Activity: Unknown (10/24/2023)    Exercise Vital Sign     Days of Exercise per Week: Patient refused     Minutes of Exercise per Session: Patient refused   Stress: Unknown (7/24/2023)    [employer] - Occupational Stress Questionnaire     Feeling of Stress : Patient refused   Social Connections: Unknown (10/24/2023)    Social Connection and Isolation Panel [NHANES]     Frequency of Communication with Friends and Family: Patient refused     Frequency of Social Gatherings with Friends and Family: Patient refused     Active Member of Clubs or Organizations: Patient refused     Attends Club or Organization Meetings: Patient refused     Marital Status: Patient refused   Housing Stability: Unknown (10/24/2023)    Housing Stability Vital Sign     Unable to Pay for Housing in the Last Year: Patient refused     Number of Places Lived in the Last Year: 1     Unstable Housing in the Last Year: Patient refused       Family History   Problem Relation Age of Onset    Diabetes Mother     Stroke Mother     Diabetes Father     Kidney disease Father     Irritable bowel syndrome Cousin     Celiac disease Neg Hx     Cirrhosis Neg Hx     Colon cancer Neg Hx     Colon polyps Neg Hx     Cystic fibrosis Neg Hx     Esophageal cancer Neg Hx     Crohn's disease Neg Hx     Hemochromatosis Neg Hx     Inflammatory bowel disease Neg Hx     Liver cancer Neg Hx     Liver disease Neg Hx     Rectal cancer Neg Hx     Stomach cancer Neg Hx     Ulcerative colitis Neg Hx     Addison's disease Neg Hx     Melanoma Neg Hx     Psoriasis Neg Hx     Lupus Neg Hx     Eczema Neg Hx     Glaucoma Neg Hx     Macular degeneration Neg Hx        Review of patient's allergies indicates:   Allergen Reactions    Ciprofloxacin     Codeine Itching    Compazine [prochlorperazine]      "Erythromycin     Keflex [cephalexin]           Current Outpatient Medications:     aspirin-acetaminophen-caffeine 250-250-65 mg (EXCEDRIN MIGRAINE) 250-250-65 mg per tablet, Take 1 tablet by mouth every 8 (eight) hours as needed for Pain. , Disp: , Rfl:     ELIQUIS 5 mg Tab, , Disp: , Rfl:     hydrocortisone 2.5 % lotion, Apply topically 2 (two) times daily., Disp: 59 mL, Rfl: 1    ketoconazole (NIZORAL) 2 % shampoo, Apply topically twice a week., Disp: 120 mL, Rfl: 2    meclizine (ANTIVERT) 12.5 mg tablet, Take 1 tablet (12.5 mg total) by mouth 3 (three) times daily as needed for Dizziness., Disp: 90 tablet, Rfl: 3    methocarbamoL (ROBAXIN) 500 MG Tab, Take 1 tablet (500 mg total) by mouth 3 (three) times daily as needed (muscle spasms)., Disp: 90 tablet, Rfl: 2    ondansetron (ZOFRAN) 8 MG tablet, Take 1 tablet (8 mg total) by mouth every 12 (twelve) hours as needed for Nausea., Disp: 180 tablet, Rfl: 1    traMADoL (ULTRAM) 50 mg tablet, Take 1 tablet (50 mg total) by mouth every 8 (eight) hours as needed for Pain., Disp: 90 tablet, Rfl: 2    cyanocobalamin 1,000 mcg/mL injection, Inject 1 mL (1,000 mcg total) into the skin every 30 days., Disp: 3 mL, Rfl: 3    dicyclomine (BENTYL) 20 mg tablet, TAKE 1 TABLET BY MOUTH FOUR TIMES DAILY, Disp: 360 tablet, Rfl: 3    syringe with needle 1 mL 28 gauge x 1/2" Syrg, 1 Device by Misc.(Non-Drug; Combo Route) route every 30 days., Disp: 100 each, Rfl: 11  No current facility-administered medications for this visit.    Facility-Administered Medications Ordered in Other Visits:     lactated ringers infusion, , Intravenous, Continuous, VuAlvin MD    HPI  Review of Systems   Constitutional: Negative.    HENT: Negative.     Eyes: Negative.    Respiratory: Negative.     Cardiovascular: Negative.    Gastrointestinal:  Positive for abdominal pain and diarrhea.   Endocrine: Negative.    Genitourinary: Negative.    Musculoskeletal: Negative.    Skin: Negative.  "   Allergic/Immunologic: Negative.    Neurological: Negative.    Hematological: Negative.    Psychiatric/Behavioral: Negative.         Objective:      Physical Exam  Vitals and nursing note reviewed.   Constitutional:       General: He is not in acute distress.     Appearance: Normal appearance. He is well-developed and normal weight. He is not ill-appearing.   HENT:      Head: Normocephalic.   Eyes:      Conjunctiva/sclera: Conjunctivae normal.   Neck:      Thyroid: No thyromegaly.   Cardiovascular:      Rate and Rhythm: Normal rate and regular rhythm.      Heart sounds: Normal heart sounds. No murmur heard.  Pulmonary:      Effort: Pulmonary effort is normal.      Breath sounds: Normal breath sounds. No wheezing or rales.   Musculoskeletal:         General: Normal range of motion.      Cervical back: Normal range of motion.      Right lower leg: No edema.      Left lower leg: No edema.   Skin:     General: Skin is warm and dry.   Neurological:      Mental Status: He is alert and oriented to person, place, and time. Mental status is at baseline.   Psychiatric:         Mood and Affect: Mood normal.         Behavior: Behavior normal.         Thought Content: Thought content normal.         Judgment: Judgment normal.         Assessment:       1. Short bowel syndrome with colon in continuity    2. Crohn's disease of small intestine with other complication    3. B12 deficiency    4. Need for vaccination for H flu type B    5. Chronic abdominal pain    6. Chronic deep vein thrombosis (DVT) of distal vein of right lower extremity    7. Low ferritin level    8. Encounter for lipid screening for cardiovascular disease        Plan:     1- resume B12 inj but at home to self adm  2- flu vac today  3- refer to [facility]  4- NF labs today  5- RTC 6 mo  -    Short bowel syndrome with colon in continuity  -     Ambulatory referral/consult to Gastroenterology; Future; Expected date: 11/03/2023  -     cyanocobalamin 1,000 mcg/mL  "injection; Inject 1 mL (1,000 mcg total) into the skin every 30 days.  Dispense: 3 mL; Refill: 3    Crohn's disease of small intestine with other complication  -     Ambulatory referral/consult to Gastroenterology; Future; Expected date: 11/03/2023  -     cyanocobalamin 1,000 mcg/mL injection; Inject 1 mL (1,000 mcg total) into the skin every 30 days.  Dispense: 3 mL; Refill: 3  -     Comprehensive Metabolic Panel; Future; Expected date: 10/27/2023  -     CBC Auto Differential; Future; Expected date: 10/27/2023    B12 deficiency  -     Ambulatory referral/consult to Gastroenterology; Future; Expected date: 11/03/2023  -     cyanocobalamin 1,000 mcg/mL injection; Inject 1 mL (1,000 mcg total) into the skin every 30 days.  Dispense: 3 mL; Refill: 3  -     Comprehensive Metabolic Panel; Future; Expected date: 10/27/2023  -     CBC Auto Differential; Future; Expected date: 10/27/2023  -     Vitamin B12; Future; Expected date: 10/27/2023  -     Folate; Future; Expected date: 10/27/2023    Need for vaccination for H flu type B  -     Influenza (FLUAD) - Quadrivalent (Adjuvanted) *Preferred* (65+) (PF)    Chronic abdominal pain  -     dicyclomine (BENTYL) 20 mg tablet; TAKE 1 TABLET BY MOUTH FOUR TIMES DAILY  Dispense: 360 tablet; Refill: 3    Chronic deep vein thrombosis (DVT) of distal vein of right lower extremity    Low ferritin level  -     Ferritin; Future; Expected date: 10/28/2023  -     Iron and TIBC; Future; Expected date: 10/27/2023    Encounter for lipid screening for cardiovascular disease  -     Lipid Panel; Future; Expected date: 10/27/2023    Other orders  -     syringe with needle 1 mL 28 gauge x 1/2" Syrg; 1 Device by Misc.(Non-Drug; Combo Route) route every 30 days.  Dispense: 100 each; Refill: 11        Risks, benefits, and side effects were discussed with the patient. All questions were answered to the fullest satisfaction of the patient, and pt verbalized understanding and agreement to treatment plan. " Pt was to call with any new or worsening symptoms, or present to the ER.

## 2023-11-01 ENCOUNTER — PATIENT MESSAGE (OUTPATIENT)
Dept: FAMILY MEDICINE | Facility: CLINIC | Age: 68
End: 2023-11-01
Payer: MEDICARE

## 2023-11-13 ENCOUNTER — TELEPHONE (OUTPATIENT)
Dept: GASTROENTEROLOGY | Facility: CLINIC | Age: 68
End: 2023-11-13
Payer: MEDICARE

## 2023-11-16 ENCOUNTER — TELEPHONE (OUTPATIENT)
Dept: GASTROENTEROLOGY | Facility: CLINIC | Age: 68
End: 2023-11-16
Payer: MEDICARE

## 2023-11-16 NOTE — TELEPHONE ENCOUNTER
Meng Jacome Np with [facility], contacted patient about upcoming appointment with us, unable to treat new Chron's patient, refer to [facility] Dr Sanchez,number provided, no further issues noted.

## 2023-12-06 ENCOUNTER — OFFICE VISIT (OUTPATIENT)
Dept: PAIN MEDICINE | Facility: CLINIC | Age: 68
End: 2023-12-06
Payer: MEDICARE

## 2023-12-06 VITALS
HEART RATE: 74 BPM | HEIGHT: 73 IN | BODY MASS INDEX: 18.95 KG/M2 | SYSTOLIC BLOOD PRESSURE: 141 MMHG | DIASTOLIC BLOOD PRESSURE: 80 MMHG | WEIGHT: 143 LBS

## 2023-12-06 DIAGNOSIS — M17.11 OSTEOARTHRITIS OF RIGHT KNEE, UNSPECIFIED OSTEOARTHRITIS TYPE: ICD-10-CM

## 2023-12-06 DIAGNOSIS — G89.4 CHRONIC PAIN DISORDER: Primary | ICD-10-CM

## 2023-12-06 DIAGNOSIS — G89.29 CHRONIC ABDOMINAL PAIN: ICD-10-CM

## 2023-12-06 DIAGNOSIS — R10.9 CHRONIC ABDOMINAL PAIN: ICD-10-CM

## 2023-12-06 DIAGNOSIS — M79.18 MYOFASCIAL PAIN: ICD-10-CM

## 2023-12-06 DIAGNOSIS — M50.30 DDD (DEGENERATIVE DISC DISEASE), CERVICAL: Primary | ICD-10-CM

## 2023-12-06 PROCEDURE — 3077F PR MOST RECENT SYSTOLIC BLOOD PRESSURE >= 140 MM HG: ICD-10-PCS | Mod: HCNC,CPTII,S$GLB, | Performed by: PHYSICIAN ASSISTANT

## 2023-12-06 PROCEDURE — 3079F PR MOST RECENT DIASTOLIC BLOOD PRESSURE 80-89 MM HG: ICD-10-PCS | Mod: HCNC,CPTII,S$GLB, | Performed by: PHYSICIAN ASSISTANT

## 2023-12-06 PROCEDURE — 1159F PR MEDICATION LIST DOCUMENTED IN MEDICAL RECORD: ICD-10-PCS | Mod: HCNC,CPTII,S$GLB, | Performed by: PHYSICIAN ASSISTANT

## 2023-12-06 PROCEDURE — 3079F DIAST BP 80-89 MM HG: CPT | Mod: HCNC,CPTII,S$GLB, | Performed by: PHYSICIAN ASSISTANT

## 2023-12-06 PROCEDURE — 99214 OFFICE O/P EST MOD 30 MIN: CPT | Mod: HCNC,S$GLB,, | Performed by: PHYSICIAN ASSISTANT

## 2023-12-06 PROCEDURE — 1125F PR PAIN SEVERITY QUANTIFIED, PAIN PRESENT: ICD-10-PCS | Mod: HCNC,CPTII,S$GLB, | Performed by: PHYSICIAN ASSISTANT

## 2023-12-06 PROCEDURE — 1160F PR REVIEW ALL MEDS BY PRESCRIBER/CLIN PHARMACIST DOCUMENTED: ICD-10-PCS | Mod: HCNC,CPTII,S$GLB, | Performed by: PHYSICIAN ASSISTANT

## 2023-12-06 PROCEDURE — 3008F PR BODY MASS INDEX (BMI) DOCUMENTED: ICD-10-PCS | Mod: HCNC,CPTII,S$GLB, | Performed by: PHYSICIAN ASSISTANT

## 2023-12-06 PROCEDURE — 1159F MED LIST DOCD IN RCRD: CPT | Mod: HCNC,CPTII,S$GLB, | Performed by: PHYSICIAN ASSISTANT

## 2023-12-06 PROCEDURE — 3288F PR FALLS RISK ASSESSMENT DOCUMENTED: ICD-10-PCS | Mod: HCNC,CPTII,S$GLB, | Performed by: PHYSICIAN ASSISTANT

## 2023-12-06 PROCEDURE — 3077F SYST BP >= 140 MM HG: CPT | Mod: HCNC,CPTII,S$GLB, | Performed by: PHYSICIAN ASSISTANT

## 2023-12-06 PROCEDURE — 99999 PR PBB SHADOW E&M-EST. PATIENT-LVL III: CPT | Mod: PBBFAC,HCNC,, | Performed by: PHYSICIAN ASSISTANT

## 2023-12-06 PROCEDURE — 99214 PR OFFICE/OUTPT VISIT, EST, LEVL IV, 30-39 MIN: ICD-10-PCS | Mod: HCNC,S$GLB,, | Performed by: PHYSICIAN ASSISTANT

## 2023-12-06 PROCEDURE — 3008F BODY MASS INDEX DOCD: CPT | Mod: HCNC,CPTII,S$GLB, | Performed by: PHYSICIAN ASSISTANT

## 2023-12-06 PROCEDURE — 1160F RVW MEDS BY RX/DR IN RCRD: CPT | Mod: HCNC,CPTII,S$GLB, | Performed by: PHYSICIAN ASSISTANT

## 2023-12-06 PROCEDURE — 1125F AMNT PAIN NOTED PAIN PRSNT: CPT | Mod: HCNC,CPTII,S$GLB, | Performed by: PHYSICIAN ASSISTANT

## 2023-12-06 PROCEDURE — 3288F FALL RISK ASSESSMENT DOCD: CPT | Mod: HCNC,CPTII,S$GLB, | Performed by: PHYSICIAN ASSISTANT

## 2023-12-06 PROCEDURE — 1101F PT FALLS ASSESS-DOCD LE1/YR: CPT | Mod: HCNC,CPTII,S$GLB, | Performed by: PHYSICIAN ASSISTANT

## 2023-12-06 PROCEDURE — 1101F PR PT FALLS ASSESS DOC 0-1 FALLS W/OUT INJ PAST YR: ICD-10-PCS | Mod: HCNC,CPTII,S$GLB, | Performed by: PHYSICIAN ASSISTANT

## 2023-12-06 PROCEDURE — 99999 PR PBB SHADOW E&M-EST. PATIENT-LVL III: ICD-10-PCS | Mod: PBBFAC,HCNC,, | Performed by: PHYSICIAN ASSISTANT

## 2023-12-06 RX ORDER — TRAMADOL HYDROCHLORIDE 50 MG/1
50 TABLET ORAL EVERY 8 HOURS PRN
Qty: 90 TABLET | Refills: 2 | Status: SHIPPED | OUTPATIENT
Start: 2023-12-11 | End: 2024-02-29 | Stop reason: SDUPTHER

## 2023-12-06 NOTE — PROGRESS NOTES
PCP: Corinna Atkinson NP      CC: abdominal pain    Interval history: Mr. Cerda is a 68 y.o. male with an extensive history of crohn's disease who presents today for f/u s/p and medication refill. Continues to have neck pain worse with lateral rotation. He had updated imaging. Denies radicular pain. Robaxin is helpful. He has healed from right hip fracture last year.  He was taking Percocet 5-325 mg q 6 h for post op pain. Acute left knee pain resolved after left intra articular steroid injection with Dr. Villatoro.  Right knee RFA  provided 75% relief.    Abdominal pain remains stable.   He does report diarrhea at times but no blood stools. Reports anal pain and itching 3/2 chronic diarrhea.  He is currently being evaluated by gastroenterology.  OTC Lidocaine 2 % provides some minimal relief.  Compounding cream was too expensive.  He rates his pain 6/10.     Prior HPI:   Mr. Cerda is a 60 year old male with PMH of crohn's disease referred by Dr. Hansen.  Patient states being diagnosed with crohn's disease since the age of 14.    He has had multiple GI surgeries and large bowel and small bowel removals.  During that time, he was being managed by providers in [place].  He was TPN dependent for many years until about two years ago.  He is now stable on an oral diet.  He did not have a primary care physician nor a gastroenterologist for many years.  He is currently trying to establish care.  He has future appointment with Dr. Ch.  He states taking Demerol 50mg q8hrs as needed for pain. It has provided relief of his nausea and pain with diarrhea.  He was last prescribed Demerol in July 2014.  Since then, he has been bearing with the pain.  It is a sharp shooting pain in his mid abdomen.      ROS:  CONSTITUTIONAL: No fevers, chills, night sweats, wt. loss, appetite changes  SKIN: no rashes or itching  ENT: No headaches, head trauma, vision changes, or eye pain  LYMPH NODES: None noticed   CV:  No chest pain, palpitations.   RESP: No shortness of breath, dyspnea on exertion, cough, wheezing, or hemoptysis  GI: No nausea, emesis, diarrhea, constipation, melena, hematochezia, pain.    : No dysuria, hematuria, urgency, or frequency   HEME: No easy bruising, bleeding problems  PSYCHIATRIC: No depression, anxiety, psychosis, hallucinations.  NEURO: No seizures, memory loss, dizziness or difficulty sleeping  MSK: no joint pain      Past Medical History:   Diagnosis Date    Anxiety     Basal cell carcinoma 07/2017    R forehead and R temple    Crohn's disease     age 17    Encounter for blood transfusion     Hypertension     Short bowel syndrome     Vitamin B deficiency     Vitamin D deficiency      Past Surgical History:   Procedure Laterality Date    3 small bowel resections      APPENDECTOMY      CHOLECYSTECTOMY      COLONOSCOPY      COLONOSCOPY N/A 02/14/2017    Procedure: COLONOSCOPY;  Surgeon: Nela Sanchez MD;  Location: [address]);  Service: Endoscopy;  Laterality: N/A;    COLONOSCOPY N/A 03/14/2017    Procedure: COLONOSCOPY;  Surgeon: Nela Sanchez MD;  Location: [address]);  Service: Endoscopy;  Laterality: N/A;  2 days clear liquids before procedure    COLONOSCOPY N/A 07/29/2020    Procedure: COLONOSCOPY;  Surgeon: Andrea Shaver MD;  Location: [facility];  Service: Endoscopy;  Laterality: N/A;    ESOPHAGOGASTRODUODENOSCOPY N/A 11/19/2019    Procedure: EGD (ESOPHAGOGASTRODUODENOSCOPY);  Surgeon: Andrea Shaver MD;  Location: [facility];  Service: Endoscopy;  Laterality: N/A;    INJECTION OF ANESTHETIC AGENT AROUND NERVE Right 10/20/2020    Procedure: Block, Nerve genicular;  Surgeon: Alvin Curry MD;  Location: [facility] OR;  Service: Pain Management;  Laterality: Right;  right knee    INTRAMEDULLARY RODDING OF FEMUR Right 10/20/2022    Procedure: INSERTION, INTRAMEDULLARY SRINIVASA, FEMUR;  Surgeon: Gómez Selby MD;  Location: [facility] OR;  Service: Orthopedics;  Laterality: Right;     KNEE SURGERY      RADIOFREQUENCY ABLATION Right 2021    Procedure: Radiofrequency Ablation Right Knee Genicular RFA Coolief;  Surgeon: Alvin Curry MD;  Location: [facility];  Service: Pain Management;  Laterality: Right;  Right knee     SMALL INTESTINE SURGERY      TUBE THORACOTOMY      UPPER GASTROINTESTINAL ENDOSCOPY       Family History   Problem Relation Age of Onset    Diabetes Mother     Stroke Mother     Diabetes Father     Kidney disease Father     Irritable bowel syndrome Cousin     Celiac disease Neg Hx     Cirrhosis Neg Hx     Colon cancer Neg Hx     Colon polyps Neg Hx     Cystic fibrosis Neg Hx     Esophageal cancer Neg Hx     Crohn's disease Neg Hx     Hemochromatosis Neg Hx     Inflammatory bowel disease Neg Hx     Liver cancer Neg Hx     Liver disease Neg Hx     Rectal cancer Neg Hx     Stomach cancer Neg Hx     Ulcerative colitis Neg Hx     Addison's disease Neg Hx     Melanoma Neg Hx     Psoriasis Neg Hx     Lupus Neg Hx     Eczema Neg Hx     Glaucoma Neg Hx     Macular degeneration Neg Hx      Social History     Socioeconomic History    Marital status: Other   Tobacco Use    Smoking status: Former     Current packs/day: 0.00     Average packs/day: 1 pack/day for 15.0 years (15.0 ttl pk-yrs)     Types: Cigarettes     Start date: 3/10/1980     Quit date: 3/10/1995     Years since quittin.7    Smokeless tobacco: Never   Substance and Sexual Activity    Alcohol use: Never     Alcohol/week: 2.0 standard drinks of alcohol     Types: 1 Cans of beer, 1 Shots of liquor per week    Drug use: Never    Sexual activity: Yes     Partners: Female     Birth control/protection: None   Social History Narrative    ** Merged History Encounter **         ** Data from: 22 Enc Dept: [facility] FAMILY MEDICINE    Retired          ** Data from: 22 Enc Dept: [facility] PHARMACY-OUTPATIENT    ** Merged History Encounter **         ** Merged History Encounter **          Social Determinants of Health     Financial  "Resource Strain: Unknown (10/24/2023)    Overall Financial Resource Strain (CARDIA)     Difficulty of Paying Living Expenses: Patient refused   Food Insecurity: Unknown (10/24/2023)    Hunger Vital Sign     Worried About Running Out of Food in the Last Year: Patient refused     Ran Out of Food in the Last Year: Patient refused   Transportation Needs: Unknown (10/24/2023)    PRAPARE - Transportation     Lack of Transportation (Medical): Patient refused     Lack of Transportation (Non-Medical): Patient refused   Physical Activity: Unknown (10/24/2023)    Exercise Vital Sign     Days of Exercise per Week: Patient refused     Minutes of Exercise per Session: Patient refused   Stress: Unknown (7/24/2023)    [employer] - Occupational Stress Questionnaire     Feeling of Stress : Patient refused   Social Connections: Unknown (10/24/2023)    Social Connection and Isolation Panel [NHANES]     Frequency of Communication with Friends and Family: Patient refused     Frequency of Social Gatherings with Friends and Family: Patient refused     Active Member of Clubs or Organizations: Patient refused     Attends Club or Organization Meetings: Patient refused     Marital Status: Patient refused   Housing Stability: Unknown (10/24/2023)    Housing Stability Vital Sign     Unable to Pay for Housing in the Last Year: Patient refused     Number of Places Lived in the Last Year: 1     Unstable Housing in the Last Year: Patient refused     Medications/Allergies: See med card    Vitals:    12/06/23 1136   BP: (!) 141/80   Pulse: 74   Weight: 64.9 kg (143 lb)   Height: 6' 1" (1.854 m)   PainSc:   3   PainLoc: Neck     Physical exam:    GENERAL: A and O x3, the patient appears well groomed and is in no acute distress.  Skin: healing lesion on nose  HEENT: normocephalic, atraumatic  CARDIOVASCULAR:  RRR  LUNGS: non labored breathing  ABDOMEN: soft, nontender. No rebound   UPPER EXTREMITIES: Normal alignment, " normal range of motion, no atrophy, no skin changes,  hair growth and nail growth normal and equal bilaterally. No swelling, no tenderness.    LOWER EXTREMITIES:  TTP left patella, swelling along medial and superior aspect. No redness or warmth.     moderate TTP SCM on right. Pain with right lateral rotation. No midline cervical tenderness. Negative spurlings.   LUMBAR SPINE  Lumbar spine: ROM is full with flexion extension and oblique extension with no increased pain.    Lam's test causes no increased pain on either side.    Supine straight leg raise is negative bilaterally.    Internal and external rotation of the hip causes no increased pain on either side.  Myofascial exam: No tenderness to palpation across lumbar paraspinous muscles.    MENTAL STATUS: normal orientation, speech, language, and fund of knowledge for social situation.  Emotional state appropriate.    CRANIAL NERVES:  II:  PERRL bilaterally,   III,IV,VI: EOMI.    V:  Facial sensation equal bilaterally  VII:  Facial motor function normal.  VIII:  Hearing equal to finger rub bilaterally  IX/X: Gag normal, palate symmetric  XI:  Shoulder shrug equal, head turn equal  XII:  Tongue midline without fasciculations    MOTOR: Tone and bulk: normal bilateral upper and lower Strength: normal   SENSATION: Light touch and pinprick intact bilaterally  REFLEXES: normal, symmetric, nonbrisk.  Toes down, no clonus. No hoffmans.  GAIT: normal rise, base, steps, and arm swing.      Imaging:  Left knee xray 3/6/22  Mild tricompartmental degenerative osteoarthrosis with mild joint space narrowing and small osteophyte formation.  Subtle calcification highlighted cartilage consistent with chondrocalcinosis.     There is a small suprapatellar effusion.     Impression:     1. Mild tricompartmental degenerative osteoarthrosis  2. Chondrocalcinosis.  3. Small suprapatellar effusion.       Assessment:  Mr. Cerda is a 68 y.o. male with abdominal pain  1. DDD (degenerative  disc disease), cervical    2. Myofascial pain    3. Osteoarthritis of right knee, unspecified osteoarthritis type    4. Chronic abdominal pain        Plan:  1. Patient with long history of crohn's disease and chronic abdominal pain.  Continue care with GI.  2. Tramadol 50 mg q 8 h prn.     reviewed.  No signs of aberrant behavior.  Previous UDS consistent.  Script provided for 3 months  3. Continue OTC lidocaine cream  4. Monitor progress from right sided peripheral nerve block.   5. F/u with orthopedics as needed  6. Reviewed cervical xray results . Discussed lumbar MBB vs ALLISON as well as PT. He will consider this.   7. Robaxin 500 mg TID prn   8. F/u 3  months or sooner  All medication management was performed by Dr. Alvin Curry

## 2023-12-29 DIAGNOSIS — M25.562 ACUTE PAIN OF LEFT KNEE: Primary | ICD-10-CM

## 2024-01-14 ENCOUNTER — HOSPITAL ENCOUNTER (EMERGENCY)
Facility: HOSPITAL | Age: 69
Discharge: HOME | End: 2024-01-14
Attending: STUDENT IN AN ORGANIZED HEALTH CARE EDUCATION/TRAINING PROGRAM
Payer: MEDICARE

## 2024-01-14 VITALS
HEART RATE: 60 BPM | RESPIRATION RATE: 18 BRPM | HEIGHT: 72 IN | WEIGHT: 160 LBS | DIASTOLIC BLOOD PRESSURE: 80 MMHG | TEMPERATURE: 98 F | SYSTOLIC BLOOD PRESSURE: 160 MMHG | OXYGEN SATURATION: 98 % | BODY MASS INDEX: 21.67 KG/M2

## 2024-01-14 DIAGNOSIS — H10.32 ACUTE BACTERIAL CONJUNCTIVITIS OF LEFT EYE: Primary | ICD-10-CM

## 2024-01-14 LAB
HCV AB SERPL QL IA: NORMAL
HIV 1+2 AB+HIV1 P24 AG SERPL QL IA: NORMAL

## 2024-01-14 PROCEDURE — 87389 HIV-1 AG W/HIV-1&-2 AB AG IA: CPT | Mod: HCWC | Performed by: STUDENT IN AN ORGANIZED HEALTH CARE EDUCATION/TRAINING PROGRAM

## 2024-01-14 PROCEDURE — 86803 HEPATITIS C AB TEST: CPT | Mod: HCWC | Performed by: STUDENT IN AN ORGANIZED HEALTH CARE EDUCATION/TRAINING PROGRAM

## 2024-01-14 PROCEDURE — 99283 EMERGENCY DEPT VISIT LOW MDM: CPT | Mod: HCWC

## 2024-01-14 PROCEDURE — 25000003 PHARM REV CODE 250: Mod: HCWC

## 2024-01-14 RX ORDER — TETRACAINE HYDROCHLORIDE 5 MG/ML
2 SOLUTION OPHTHALMIC
Status: COMPLETED | OUTPATIENT
Start: 2024-01-14 | End: 2024-01-14

## 2024-01-14 RX ORDER — NEOMYCIN AND POLYMYXIN B SULFATES AND BACITRACIN ZINC 400; 3.5; 1 [USP'U]/G; MG/G; [USP'U]/G
OINTMENT OPHTHALMIC 4 TIMES DAILY
Qty: 3.5 G | Refills: 0 | Status: SHIPPED | OUTPATIENT
Start: 2024-01-14 | End: 2024-01-25

## 2024-01-14 RX ORDER — TETRACAINE HYDROCHLORIDE 5 MG/ML
SOLUTION OPHTHALMIC
Status: COMPLETED
Start: 2024-01-14 | End: 2024-01-14

## 2024-01-14 RX ADMIN — FLUORESCEIN SODIUM 1 EACH: 1 STRIP OPHTHALMIC at 09:01

## 2024-01-14 RX ADMIN — TETRACAINE HYDROCHLORIDE: 5 SOLUTION OPHTHALMIC at 09:01

## 2024-01-14 RX ADMIN — TETRACAINE HYDROCHLORIDE: 5 SOLUTION/ DROPS OPHTHALMIC at 09:01

## 2024-01-14 NOTE — DISCHARGE INSTRUCTIONS
Apply eyedrops, antibiotic as prescribed.  Follow up with Ophthalmology.  May return to the ED at any time with any worsening or new symptoms.

## 2024-01-14 NOTE — ED PROVIDER NOTES
Encounter Date: 1/14/2024       History     Chief Complaint   Patient presents with    Eye Pain     Patient reports that he had shampoo get in his Left eye 3 days ago. It has continued to be painful.      69-year-old male with a history of anxiety, hypertension.  He presents to ED with complaints of left eye irritation.  Patient reports that 5 days ago he had some soap into his eye wound taking a shower, he washed his eye very well with water but since then he has had some irritation.  He reports associated crusty discharge upon waking up in the morning.  He denies associated vision changes.    The history is provided by the patient. No  was used.     Review of patient's allergies indicates:   Allergen Reactions    Ciprofloxacin     Codeine Itching    Compazine [prochlorperazine]     Erythromycin     Keflex [cephalexin]      Past Medical History:   Diagnosis Date    Anxiety     Basal cell carcinoma 07/2017    R forehead and R temple    Crohn's disease     age 17    Encounter for blood transfusion     Hypertension     Short bowel syndrome     Vitamin B deficiency     Vitamin D deficiency      Past Surgical History:   Procedure Laterality Date    3 small bowel resections      APPENDECTOMY      CHOLECYSTECTOMY      COLONOSCOPY      COLONOSCOPY N/A 02/14/2017    Procedure: COLONOSCOPY;  Surgeon: Nela Sanchez MD;  Location: [facility] ([address] [place] [address]);  Service: Endoscopy;  Laterality: N/A;    COLONOSCOPY N/A 03/14/2017    Procedure: COLONOSCOPY;  Surgeon: Nela Sanchez MD;  Location: [facility] ([address] [place] [address]);  Service: Endoscopy;  Laterality: N/A;  2 days clear liquids before procedure    COLONOSCOPY N/A 07/29/2020    Procedure: COLONOSCOPY;  Surgeon: Andrea Shaver MD;  Location: [facility];  Service: Endoscopy;  Laterality: N/A;    ESOPHAGOGASTRODUODENOSCOPY N/A 11/19/2019    Procedure: EGD (ESOPHAGOGASTRODUODENOSCOPY);  Surgeon: Andrea Shaver MD;  Location: [facility];  Service: Endoscopy;  Laterality:  N/A;    INJECTION OF ANESTHETIC AGENT AROUND NERVE Right 10/20/2020    Procedure: Block, Nerve genicular;  Surgeon: Alvin Curry MD;  Location: [facility] OR;  Service: Pain Management;  Laterality: Right;  right knee    INTRAMEDULLARY RODDING OF FEMUR Right 10/20/2022    Procedure: INSERTION, INTRAMEDULLARY SRINIVASA, FEMUR;  Surgeon: Gómez Selby MD;  Location: [facility] OR;  Service: Orthopedics;  Laterality: Right;    KNEE SURGERY      RADIOFREQUENCY ABLATION Right 2021    Procedure: Radiofrequency Ablation Right Knee Genicular RFA Coolief;  Surgeon: Alvin Curry MD;  Location: [facility] OR;  Service: Pain Management;  Laterality: Right;  Right knee     SMALL INTESTINE SURGERY      TUBE THORACOTOMY      UPPER GASTROINTESTINAL ENDOSCOPY       Family History   Problem Relation Age of Onset    Diabetes Mother     Stroke Mother     Diabetes Father     Kidney disease Father     Irritable bowel syndrome Cousin     Celiac disease Neg Hx     Cirrhosis Neg Hx     Colon cancer Neg Hx     Colon polyps Neg Hx     Cystic fibrosis Neg Hx     Esophageal cancer Neg Hx     Crohn's disease Neg Hx     Hemochromatosis Neg Hx     Inflammatory bowel disease Neg Hx     Liver cancer Neg Hx     Liver disease Neg Hx     Rectal cancer Neg Hx     Stomach cancer Neg Hx     Ulcerative colitis Neg Hx     Addison's disease Neg Hx     Melanoma Neg Hx     Psoriasis Neg Hx     Lupus Neg Hx     Eczema Neg Hx     Glaucoma Neg Hx     Macular degeneration Neg Hx      Social History     Tobacco Use    Smoking status: Former     Current packs/day: 0.00     Average packs/day: 1 pack/day for 15.0 years (15.0 ttl pk-yrs)     Types: Cigarettes     Start date: 3/10/1980     Quit date: 3/10/1995     Years since quittin.8    Smokeless tobacco: Never   Substance Use Topics    Alcohol use: Never     Alcohol/week: 2.0 standard drinks of alcohol     Types: 1 Cans of beer, 1 Shots of liquor per week    Drug use: Never     Review of Systems   Constitutional: Negative.     HENT: Negative.     Eyes:  Positive for pain, discharge and redness.   Respiratory: Negative.     Cardiovascular: Negative.    Gastrointestinal: Negative.    Genitourinary: Negative.    Musculoskeletal: Negative.    Skin: Negative.    Neurological: Negative.    Hematological: Negative.    Psychiatric/Behavioral: Negative.     All other systems reviewed and are negative.      Physical Exam     Initial Vitals [01/14/24 0846]   BP Pulse Resp Temp SpO2   (!) 160/80 60 18 98 °F (36.7 °C) 98 %      MAP       --         Physical Exam    Nursing note and vitals reviewed.  Constitutional: He appears well-developed.   Eyes:   Left eye ecchymosis, irritation.  Wood's lamp fluorescein stain reveals no corneal abrasion or ulceration.   Neck:   Normal range of motion.  Cardiovascular:  Normal rate.           Pulmonary/Chest: Breath sounds normal.   Abdominal: Abdomen is soft. Bowel sounds are normal.   Musculoskeletal:      Cervical back: Normal range of motion.     Neurological: He is alert and oriented to person, place, and time. GCS score is 15. GCS eye subscore is 4. GCS verbal subscore is 5. GCS motor subscore is 6.   Skin: Skin is warm. Capillary refill takes less than 2 seconds.         ED Course   Procedures  Labs Reviewed   HIV 1 / 2 ANTIBODY   HEPATITIS C ANTIBODY          Imaging Results    None          Medications   fluorescein (FLUOR-I-STRIPS A.T.) 1 mg ophthalmic strip (has no administration in time range)   TETRAcaine HCL 0.5% (PONTocaine) 0.5 % ophthalmic solution (has no administration in time range)     Medical Decision Making  69-year-old male with eye irritation.  Differential includes bacterial conjunctivitis, ulcer, abrasion.  On exam there is Left eye chemosis, irritation.  Wood's lamp fluorescein stain reveals no corneal abrasion or ulceration.  Rx polymyxin B ophthalmic drops  Patient was seen and reevaluated. Patient's symptoms seem to be stable. I discussed the patient's diagnosis, treatment plan,  and plan for discharge with the patient. Patient was referral and instructed to follow up with Ophthalmology and was given strict return precautions to the ED. The patient voiced understanding and agreed with the plan      Amount and/or Complexity of Data Reviewed  Labs: ordered.                                      Clinical Impression:  Final diagnoses:  [H10.32] Acute bacterial conjunctivitis of left eye (Primary)          ED Disposition Condition    Discharge Stable          ED Prescriptions       Medication Sig Dispense Start Date End Date Auth. Provider    neomycin-bacitracin-polymyxin (POLYSPORIN) ophthalmic ointment Place into the left eye 4 (four) times daily. for 10 days 3.5 g 1/14/2024 1/24/2024 José Manuel Mcelroy MD          Follow-up Information       Follow up With Specialties Details Why Contact Info    Corinna Atkinson NP Family Medicine   [address] [place] [address]  2ND FLOOR  [facility] [place] [address] [phone]               José Manuel Mcelroy MD  01/14/24 0946

## 2024-01-22 ENCOUNTER — TELEPHONE (OUTPATIENT)
Dept: OPHTHALMOLOGY | Facility: CLINIC | Age: 69
End: 2024-01-22
Payer: MEDICARE

## 2024-01-22 NOTE — TELEPHONE ENCOUNTER
Spoke to pt and offered sooner availability for urgent ED f/u. Pt declined. Scheduled with Dr Hawkins for 1/25      ----- Message from Celine Bowie sent at 1/22/2024 10:14 AM CST -----  Contact: Patient  Type:  Patient Returning Call    Who Called:Patient     Who Left Message for Patient:Darcie     Does the patient know what this is regarding?:no    Would the patient rather a call back or a response via [facility]? Call    Best Call Back Number:[phone] (home)     Additional Information: Please call to advise

## 2024-01-22 NOTE — TELEPHONE ENCOUNTER
----- Message from Annabelle Eid sent at 1/22/2024  8:08 AM CST -----  Regarding: infection on left eye  Contact: pt  Type:  Sooner Apoointment Request    Caller is requesting a sooner appointment.  Caller declined first available appointment listed below.  Caller will not accept being placed on the waitlist and is requesting a message be sent to doctor.  Name of Caller:pt  When is the first available appointment?5/24/24    Would the patient rather a call back or a response via [facility]? Call back  Best Call Back Number:[phone]    Additional Information: sts he would like to get a sooner appt

## 2024-01-25 ENCOUNTER — OFFICE VISIT (OUTPATIENT)
Dept: OPTOMETRY | Facility: CLINIC | Age: 69
End: 2024-01-25
Payer: MEDICARE

## 2024-01-25 DIAGNOSIS — H16.142 SPK (SUPERFICIAL PUNCTATE KERATITIS), LEFT: ICD-10-CM

## 2024-01-25 DIAGNOSIS — T26.92XA CHEMICAL BURN OF LEFT EYE: Primary | ICD-10-CM

## 2024-01-25 DIAGNOSIS — H25.813 COMBINED FORMS OF AGE-RELATED CATARACT, BILATERAL: ICD-10-CM

## 2024-01-25 PROCEDURE — 1101F PT FALLS ASSESS-DOCD LE1/YR: CPT | Mod: HCNC,CPTII,S$GLB, | Performed by: OPTOMETRIST

## 2024-01-25 PROCEDURE — 1126F AMNT PAIN NOTED NONE PRSNT: CPT | Mod: HCNC,CPTII,S$GLB, | Performed by: OPTOMETRIST

## 2024-01-25 PROCEDURE — 3288F FALL RISK ASSESSMENT DOCD: CPT | Mod: HCNC,CPTII,S$GLB, | Performed by: OPTOMETRIST

## 2024-01-25 PROCEDURE — 1159F MED LIST DOCD IN RCRD: CPT | Mod: HCNC,CPTII,S$GLB, | Performed by: OPTOMETRIST

## 2024-01-25 PROCEDURE — 99999 PR PBB SHADOW E&M-EST. PATIENT-LVL III: CPT | Mod: PBBFAC,HCNC,, | Performed by: OPTOMETRIST

## 2024-01-25 PROCEDURE — 1160F RVW MEDS BY RX/DR IN RCRD: CPT | Mod: HCNC,CPTII,S$GLB, | Performed by: OPTOMETRIST

## 2024-01-25 PROCEDURE — 99214 OFFICE O/P EST MOD 30 MIN: CPT | Mod: HCNC,S$GLB,, | Performed by: OPTOMETRIST

## 2024-01-25 RX ORDER — PREDNISOLONE ACETATE 10 MG/ML
1 SUSPENSION/ DROPS OPHTHALMIC 4 TIMES DAILY
Qty: 5 ML | Refills: 1 | Status: SHIPPED | OUTPATIENT
Start: 2024-01-25 | End: 2024-02-08

## 2024-01-25 RX ORDER — BACITRACIN ZINC AND POLYMYXIN B SULFATE 500; 10000 [USP'U]/G; [USP'U]/G
OINTMENT OPHTHALMIC 4 TIMES DAILY
COMMUNITY
Start: 2024-01-14

## 2024-01-25 NOTE — PROGRESS NOTES
HPI    68 YO male presents today for an ED f/u. Patient states that he is   noticing improvement since ED visit. Notes that around 01/09 he got soap   in OS and notes that symptoms began a few days after. Notes that he was   having redness, FB sensation, and burning. Patient notes that symptoms   have gotten better but still having occasional burning and FB sensation.   Notes he was given numbing drops in the ED so been using PRN.     Polysporin ointment OS QID      Last edited by Herve Hawkins, OD on 1/25/2024  1:30 PM.            Assessment /Plan     For exam results, see Encounter Report.    Chemical burn of left eye    SPK (superficial punctate keratitis), left  -     prednisoLONE acetate (PRED FORTE) 1 % DrpS; Place 1 drop into the left eye 4 (four) times daily. for 14 days  Dispense: 5 mL; Refill: 1    Combined forms of age-related cataract, bilateral      1. Chemical burn of left eye  x 2+ weeks ago from soap  pH 7 in office    2. SPK (superficial punctate keratitis), left  D/C unknown numbing gtts -- states got from ER doc  D/C polysporin dave    Start PF 1%: 1 gt qid OS, shake well  Start otc Refresh Celluvisc: 1 gt qid OS, wait 5-10 minutes after steroid use    Return in 1 week for f/u, sooner if any worsening    - prednisoLONE acetate (PRED FORTE) 1 % DrpS; Place 1 drop into the left eye 4 (four) times daily. for 14 days  Dispense: 5 mL; Refill: 1    3. Combined forms of age-related cataract, bilateral  Mild to moderate, not VS  Continue to observe

## 2024-02-02 ENCOUNTER — PATIENT MESSAGE (OUTPATIENT)
Dept: PAIN MEDICINE | Facility: CLINIC | Age: 69
End: 2024-02-02
Payer: MEDICARE

## 2024-02-02 DIAGNOSIS — M50.30 DDD (DEGENERATIVE DISC DISEASE), CERVICAL: ICD-10-CM

## 2024-02-02 DIAGNOSIS — M79.18 MYOFASCIAL PAIN: Primary | ICD-10-CM

## 2024-02-02 RX ORDER — METHOCARBAMOL 500 MG/1
500 TABLET, FILM COATED ORAL 3 TIMES DAILY
Qty: 90 TABLET | Refills: 2 | Status: SHIPPED | OUTPATIENT
Start: 2024-02-02 | End: 2024-05-21 | Stop reason: SDUPTHER

## 2024-02-02 NOTE — TELEPHONE ENCOUNTER
Pt last seen  last fill was in January his rx   will you order if appropriate not on active med list since Yvonne is out

## 2024-02-25 RX ORDER — APIXABAN 5 MG/1
5 TABLET, FILM COATED ORAL 2 TIMES DAILY
Qty: 180 TABLET | Refills: 3 | Status: SHIPPED | OUTPATIENT
Start: 2024-02-25

## 2024-02-27 ENCOUNTER — PATIENT MESSAGE (OUTPATIENT)
Dept: PAIN MEDICINE | Facility: CLINIC | Age: 69
End: 2024-02-27
Payer: MEDICARE

## 2024-02-29 ENCOUNTER — OFFICE VISIT (OUTPATIENT)
Dept: PAIN MEDICINE | Facility: CLINIC | Age: 69
End: 2024-02-29
Payer: MEDICARE

## 2024-02-29 ENCOUNTER — LAB VISIT (OUTPATIENT)
Dept: LAB | Facility: HOSPITAL | Age: 69
End: 2024-02-29
Attending: PHYSICIAN ASSISTANT
Payer: MEDICARE

## 2024-02-29 VITALS
HEIGHT: 73 IN | HEART RATE: 63 BPM | BODY MASS INDEX: 21.2 KG/M2 | DIASTOLIC BLOOD PRESSURE: 81 MMHG | WEIGHT: 160 LBS | SYSTOLIC BLOOD PRESSURE: 141 MMHG

## 2024-02-29 DIAGNOSIS — G89.29 CHRONIC ABDOMINAL PAIN: ICD-10-CM

## 2024-02-29 DIAGNOSIS — G89.4 CHRONIC PAIN DISORDER: ICD-10-CM

## 2024-02-29 DIAGNOSIS — R10.9 CHRONIC ABDOMINAL PAIN: ICD-10-CM

## 2024-02-29 DIAGNOSIS — M17.11 OSTEOARTHRITIS OF RIGHT KNEE, UNSPECIFIED OSTEOARTHRITIS TYPE: ICD-10-CM

## 2024-02-29 DIAGNOSIS — M50.30 DDD (DEGENERATIVE DISC DISEASE), CERVICAL: ICD-10-CM

## 2024-02-29 DIAGNOSIS — M79.18 MYOFASCIAL PAIN: ICD-10-CM

## 2024-02-29 DIAGNOSIS — Z79.891 CHRONIC USE OF OPIATE FOR THERAPEUTIC PURPOSE: Primary | ICD-10-CM

## 2024-02-29 DIAGNOSIS — Z79.891 CHRONIC USE OF OPIATE FOR THERAPEUTIC PURPOSE: ICD-10-CM

## 2024-02-29 PROCEDURE — 80307 DRUG TEST PRSMV CHEM ANLYZR: CPT | Performed by: PHYSICIAN ASSISTANT

## 2024-02-29 PROCEDURE — 99999 PR PBB SHADOW E&M-EST. PATIENT-LVL III: CPT | Mod: PBBFAC,HCNC,, | Performed by: PHYSICIAN ASSISTANT

## 2024-02-29 PROCEDURE — 3079F DIAST BP 80-89 MM HG: CPT | Mod: HCNC,CPTII,S$GLB, | Performed by: PHYSICIAN ASSISTANT

## 2024-02-29 PROCEDURE — 1159F MED LIST DOCD IN RCRD: CPT | Mod: HCNC,CPTII,S$GLB, | Performed by: PHYSICIAN ASSISTANT

## 2024-02-29 PROCEDURE — 1101F PT FALLS ASSESS-DOCD LE1/YR: CPT | Mod: HCNC,CPTII,S$GLB, | Performed by: PHYSICIAN ASSISTANT

## 2024-02-29 PROCEDURE — 1125F AMNT PAIN NOTED PAIN PRSNT: CPT | Mod: HCNC,CPTII,S$GLB, | Performed by: PHYSICIAN ASSISTANT

## 2024-02-29 PROCEDURE — 3008F BODY MASS INDEX DOCD: CPT | Mod: HCNC,CPTII,S$GLB, | Performed by: PHYSICIAN ASSISTANT

## 2024-02-29 PROCEDURE — 99214 OFFICE O/P EST MOD 30 MIN: CPT | Mod: HCNC,S$GLB,, | Performed by: PHYSICIAN ASSISTANT

## 2024-02-29 PROCEDURE — 36415 COLL VENOUS BLD VENIPUNCTURE: CPT | Performed by: PHYSICIAN ASSISTANT

## 2024-02-29 PROCEDURE — 3288F FALL RISK ASSESSMENT DOCD: CPT | Mod: HCNC,CPTII,S$GLB, | Performed by: PHYSICIAN ASSISTANT

## 2024-02-29 PROCEDURE — 3077F SYST BP >= 140 MM HG: CPT | Mod: HCNC,CPTII,S$GLB, | Performed by: PHYSICIAN ASSISTANT

## 2024-02-29 RX ORDER — TRAMADOL HYDROCHLORIDE 50 MG/1
50 TABLET ORAL EVERY 8 HOURS PRN
Qty: 90 TABLET | Refills: 2 | Status: SHIPPED | OUTPATIENT
Start: 2024-03-08 | End: 2024-05-21 | Stop reason: SDUPTHER

## 2024-02-29 NOTE — PROGRESS NOTES
PCP: Corinna Atkinson NP      CC: abdominal pain    Interval history: Mr. Cerda is a 69 y.o. male with an extensive history of crohn's disease who presents today for f/u s/p and medication refill. Continues to have neck pain worse with lateral rotation. He had updated imaging. Denies radicular pain. Robaxin is helpful. He has healed from right hip fracture last year.  He was taking Percocet 5-325 mg q 6 h for post op pain. Acute left knee pain resolved after left intra articular steroid injection with Dr. Villatoro.  Right knee RFA  provided 75% relief.    Abdominal pain remains stable.   He does report diarrhea at times but no blood stools. Reports anal pain and itching 3/2 chronic diarrhea.  He is currently being evaluated by gastroenterology.  OTC Lidocaine 2 % provides some minimal relief.  Compounding cream was too expensive.  He rates his pain 2/10.     Prior HPI:   Mr. Cerda is a 60 year old male with PMH of crohn's disease referred by Dr. Hansen.  Patient states being diagnosed with crohn's disease since the age of 14.    He has had multiple GI surgeries and large bowel and small bowel removals.  During that time, he was being managed by providers in [place].  He was TPN dependent for many years until about two years ago.  He is now stable on an oral diet.  He did not have a primary care physician nor a gastroenterologist for many years.  He is currently trying to establish care.  He has future appointment with Dr. Ch.  He states taking Demerol 50mg q8hrs as needed for pain. It has provided relief of his nausea and pain with diarrhea.  He was last prescribed Demerol in July 2014.  Since then, he has been bearing with the pain.  It is a sharp shooting pain in his mid abdomen.      ROS:  CONSTITUTIONAL: No fevers, chills, night sweats, wt. loss, appetite changes  SKIN: no rashes or itching  ENT: No headaches, head trauma, vision changes, or eye pain  LYMPH NODES: None noticed   CV:  No chest pain, palpitations.   RESP: No shortness of breath, dyspnea on exertion, cough, wheezing, or hemoptysis  GI: No nausea, emesis, diarrhea, constipation, melena, hematochezia, pain.    : No dysuria, hematuria, urgency, or frequency   HEME: No easy bruising, bleeding problems  PSYCHIATRIC: No depression, anxiety, psychosis, hallucinations.  NEURO: No seizures, memory loss, dizziness or difficulty sleeping  MSK: no joint pain      Past Medical History:   Diagnosis Date    Anxiety     Basal cell carcinoma 07/2017    R forehead and R temple    Crohn's disease     age 17    Encounter for blood transfusion     Hypertension     Short bowel syndrome     Vitamin B deficiency     Vitamin D deficiency      Past Surgical History:   Procedure Laterality Date    3 small bowel resections      APPENDECTOMY      CHOLECYSTECTOMY      COLONOSCOPY      COLONOSCOPY N/A 02/14/2017    Procedure: COLONOSCOPY;  Surgeon: Nela Sanchez MD;  Location: [address]);  Service: Endoscopy;  Laterality: N/A;    COLONOSCOPY N/A 03/14/2017    Procedure: COLONOSCOPY;  Surgeon: Nela Sanchez MD;  Location: [address]);  Service: Endoscopy;  Laterality: N/A;  2 days clear liquids before procedure    COLONOSCOPY N/A 07/29/2020    Procedure: COLONOSCOPY;  Surgeon: Andrea Shaver MD;  Location: [facility];  Service: Endoscopy;  Laterality: N/A;    ESOPHAGOGASTRODUODENOSCOPY N/A 11/19/2019    Procedure: EGD (ESOPHAGOGASTRODUODENOSCOPY);  Surgeon: Andrea Shaver MD;  Location: [facility];  Service: Endoscopy;  Laterality: N/A;    INJECTION OF ANESTHETIC AGENT AROUND NERVE Right 10/20/2020    Procedure: Block, Nerve genicular;  Surgeon: Alvin Curry MD;  Location: [facility] OR;  Service: Pain Management;  Laterality: Right;  right knee    INTRAMEDULLARY RODDING OF FEMUR Right 10/20/2022    Procedure: INSERTION, INTRAMEDULLARY SRINIVASA, FEMUR;  Surgeon: Gómez Selby MD;  Location: [facility] OR;  Service: Orthopedics;  Laterality: Right;     KNEE SURGERY      RADIOFREQUENCY ABLATION Right 2021    Procedure: Radiofrequency Ablation Right Knee Genicular RFA Coolief;  Surgeon: Alvin Curry MD;  Location: [facility];  Service: Pain Management;  Laterality: Right;  Right knee     SMALL INTESTINE SURGERY      TUBE THORACOTOMY      UPPER GASTROINTESTINAL ENDOSCOPY       Family History   Problem Relation Age of Onset    Diabetes Mother     Stroke Mother     Diabetes Father     Kidney disease Father     Irritable bowel syndrome Cousin     Celiac disease Neg Hx     Cirrhosis Neg Hx     Colon cancer Neg Hx     Colon polyps Neg Hx     Cystic fibrosis Neg Hx     Esophageal cancer Neg Hx     Crohn's disease Neg Hx     Hemochromatosis Neg Hx     Inflammatory bowel disease Neg Hx     Liver cancer Neg Hx     Liver disease Neg Hx     Rectal cancer Neg Hx     Stomach cancer Neg Hx     Ulcerative colitis Neg Hx     Addison's disease Neg Hx     Melanoma Neg Hx     Psoriasis Neg Hx     Lupus Neg Hx     Eczema Neg Hx     Glaucoma Neg Hx     Macular degeneration Neg Hx      Social History     Socioeconomic History    Marital status: Other   Tobacco Use    Smoking status: Former     Current packs/day: 0.00     Average packs/day: 1 pack/day for 15.0 years (15.0 ttl pk-yrs)     Types: Cigarettes     Start date: 3/10/1980     Quit date: 3/10/1995     Years since quittin.9    Smokeless tobacco: Never   Substance and Sexual Activity    Alcohol use: Never     Alcohol/week: 2.0 standard drinks of alcohol     Types: 1 Cans of beer, 1 Shots of liquor per week    Drug use: Never    Sexual activity: Yes     Partners: Female     Birth control/protection: None   Social History Narrative    ** Merged History Encounter **         ** Data from: 22 Enc Dept: [facility] FAMILY MEDICINE    Retired          ** Data from: 22 Enc Dept: [facility] PH[facility]-OUTPATIENT    ** Merged History Encounter **         ** Merged History Encounter **          Social Determinants of Health     Financial  "Resource Strain: Patient Declined (10/24/2023)    Overall Financial Resource Strain (CARDIA)     Difficulty of Paying Living Expenses: Patient declined   Food Insecurity: Patient Declined (10/24/2023)    Hunger Vital Sign     Worried About Running Out of Food in the Last Year: Patient declined     Ran Out of Food in the Last Year: Patient declined   Transportation Needs: Patient Declined (10/24/2023)    PRAPARE - Transportation     Lack of Transportation (Medical): Patient declined     Lack of Transportation (Non-Medical): Patient declined   Physical Activity: Patient Declined (10/24/2023)    Exercise Vital Sign     Days of Exercise per Week: Patient declined     Minutes of Exercise per Session: Patient declined   Stress: Patient Declined (7/24/2023)    [employer] - Occupational Stress Questionnaire     Feeling of Stress : Patient declined   Social Connections: Unknown (10/24/2023)    Social Connection and Isolation Panel [NHANES]     Frequency of Communication with Friends and Family: Patient declined     Frequency of Social Gatherings with Friends and Family: Patient declined     Active Member of Clubs or Organizations: Patient declined     Attends Club or Organization Meetings: Patient declined     Marital Status: Patient declined   Housing Stability: Unknown (10/24/2023)    Housing Stability Vital Sign     Unable to Pay for Housing in the Last Year: Patient refused     Number of Places Lived in the Last Year: 1     Unstable Housing in the Last Year: Patient refused     Medications/Allergies: See med card    Vitals:    02/29/24 0941   BP: (!) 141/81   Pulse: 63   Weight: 72.6 kg (160 lb)   Height: 6' 1" (1.854 m)   PainSc:   2   PainLoc: Abdomen     Physical exam:    GENERAL: A and O x3, the patient appears well groomed and is in no acute distress.  Skin: healing lesion on nose  HEENT: normocephalic, atraumatic  CARDIOVASCULAR:  RRR  LUNGS: non labored breathing  ABDOMEN: soft, " nontender. No rebound   UPPER EXTREMITIES: Normal alignment, normal range of motion, no atrophy, no skin changes,  hair growth and nail growth normal and equal bilaterally. No swelling, no tenderness.    LOWER EXTREMITIES:  TTP left patella, swelling along medial and superior aspect. No redness or warmth.     moderate TTP SCM on right. Pain with right lateral rotation. No midline cervical tenderness. Negative spurlings.   LUMBAR SPINE  Lumbar spine: ROM is full with flexion extension and oblique extension with no increased pain.    Lam's test causes no increased pain on either side.    Supine straight leg raise is negative bilaterally.    Internal and external rotation of the hip causes no increased pain on either side.  Myofascial exam: No tenderness to palpation across lumbar paraspinous muscles.    MENTAL STATUS: normal orientation, speech, language, and fund of knowledge for social situation.  Emotional state appropriate.    CRANIAL NERVES:  II:  PERRL bilaterally,   III,IV,VI: EOMI.    V:  Facial sensation equal bilaterally  VII:  Facial motor function normal.  VIII:  Hearing equal to finger rub bilaterally  IX/X: Gag normal, palate symmetric  XI:  Shoulder shrug equal, head turn equal  XII:  Tongue midline without fasciculations    MOTOR: Tone and bulk: normal bilateral upper and lower Strength: normal   SENSATION: Light touch and pinprick intact bilaterally  REFLEXES: normal, symmetric, nonbrisk.  Toes down, no clonus. No hoffmans.  GAIT: normal rise, base, steps, and arm swing.      Imaging:  Left knee xray 3/6/22  Mild tricompartmental degenerative osteoarthrosis with mild joint space narrowing and small osteophyte formation.  Subtle calcification highlighted cartilage consistent with chondrocalcinosis.     There is a small suprapatellar effusion.     Impression:     1. Mild tricompartmental degenerative osteoarthrosis  2. Chondrocalcinosis.  3. Small suprapatellar effusion.       Assessment:    Prashant is a 69 y.o. male with abdominal pain  1. Chronic use of opiate for therapeutic purpose    2. Myofascial pain    3. DDD (degenerative disc disease), cervical    4. Osteoarthritis of right knee, unspecified osteoarthritis type    5. Chronic abdominal pain        Plan:  1. Patient with long history of crohn's disease and chronic abdominal pain.  Continue care with GI.  2. Tramadol 50 mg q 8 h prn.     reviewed.  No signs of aberrant behavior.  Previous UDS consistent.  Script provided for 3 months  3. Continue OTC lidocaine cream  4. Monitor progress from right sided peripheral nerve block.   5. F/u with orthopedics as needed  6. Reviewed cervical xray results . Discussed lumbar MBB vs ALLISON as well as PT. He will consider this.   7. Robaxin 500 mg TID prn   8. F/u 3  months or sooner  All medication management was performed by Dr. Alvin Curry

## 2024-03-04 LAB
AMPHETAMINES SERPL QL: NEGATIVE
BARBITURATES SERPL QL SCN: NEGATIVE
BENZODIAZ SERPL QL SCN: NEGATIVE
BZE SERPL QL: NEGATIVE
CARBOXYTHC SERPL QL SCN: NEGATIVE
ETHANOL SERPL QL SCN: NEGATIVE
METHADONE SERPL QL SCN: NEGATIVE
OPIATES SERPL QL SCN: NEGATIVE
PCP SERPL QL SCN: NEGATIVE
PROPOXYPH SERPL QL: NEGATIVE

## 2024-05-21 ENCOUNTER — OFFICE VISIT (OUTPATIENT)
Dept: PAIN MEDICINE | Facility: CLINIC | Age: 69
End: 2024-05-21
Payer: MEDICARE

## 2024-05-21 VITALS
HEART RATE: 68 BPM | HEIGHT: 73 IN | WEIGHT: 160.06 LBS | DIASTOLIC BLOOD PRESSURE: 78 MMHG | SYSTOLIC BLOOD PRESSURE: 138 MMHG | BODY MASS INDEX: 21.21 KG/M2

## 2024-05-21 DIAGNOSIS — M17.11 OSTEOARTHRITIS OF RIGHT KNEE, UNSPECIFIED OSTEOARTHRITIS TYPE: ICD-10-CM

## 2024-05-21 DIAGNOSIS — G89.4 CHRONIC PAIN DISORDER: ICD-10-CM

## 2024-05-21 DIAGNOSIS — R10.9 CHRONIC ABDOMINAL PAIN: ICD-10-CM

## 2024-05-21 DIAGNOSIS — G89.29 CHRONIC ABDOMINAL PAIN: ICD-10-CM

## 2024-05-21 DIAGNOSIS — M79.18 MYOFASCIAL PAIN: Primary | ICD-10-CM

## 2024-05-21 DIAGNOSIS — M50.30 DDD (DEGENERATIVE DISC DISEASE), CERVICAL: ICD-10-CM

## 2024-05-21 PROCEDURE — 3075F SYST BP GE 130 - 139MM HG: CPT | Mod: HCNC,CPTII,S$GLB, | Performed by: PHYSICIAN ASSISTANT

## 2024-05-21 PROCEDURE — 1159F MED LIST DOCD IN RCRD: CPT | Mod: HCNC,CPTII,S$GLB, | Performed by: PHYSICIAN ASSISTANT

## 2024-05-21 PROCEDURE — 99214 OFFICE O/P EST MOD 30 MIN: CPT | Mod: HCNC,S$GLB,, | Performed by: PHYSICIAN ASSISTANT

## 2024-05-21 PROCEDURE — 1125F AMNT PAIN NOTED PAIN PRSNT: CPT | Mod: HCNC,CPTII,S$GLB, | Performed by: PHYSICIAN ASSISTANT

## 2024-05-21 PROCEDURE — 3008F BODY MASS INDEX DOCD: CPT | Mod: HCNC,CPTII,S$GLB, | Performed by: PHYSICIAN ASSISTANT

## 2024-05-21 PROCEDURE — 3078F DIAST BP <80 MM HG: CPT | Mod: HCNC,CPTII,S$GLB, | Performed by: PHYSICIAN ASSISTANT

## 2024-05-21 PROCEDURE — 1160F RVW MEDS BY RX/DR IN RCRD: CPT | Mod: HCNC,CPTII,S$GLB, | Performed by: PHYSICIAN ASSISTANT

## 2024-05-21 PROCEDURE — 3288F FALL RISK ASSESSMENT DOCD: CPT | Mod: HCNC,CPTII,S$GLB, | Performed by: PHYSICIAN ASSISTANT

## 2024-05-21 PROCEDURE — 99999 PR PBB SHADOW E&M-EST. PATIENT-LVL III: CPT | Mod: PBBFAC,HCNC,, | Performed by: PHYSICIAN ASSISTANT

## 2024-05-21 PROCEDURE — 1101F PT FALLS ASSESS-DOCD LE1/YR: CPT | Mod: HCNC,CPTII,S$GLB, | Performed by: PHYSICIAN ASSISTANT

## 2024-05-21 RX ORDER — METHOCARBAMOL 500 MG/1
500 TABLET, FILM COATED ORAL 3 TIMES DAILY
Qty: 90 TABLET | Refills: 2 | Status: SHIPPED | OUTPATIENT
Start: 2024-05-21 | End: 2024-08-19

## 2024-05-21 RX ORDER — TRAMADOL HYDROCHLORIDE 50 MG/1
50 TABLET ORAL EVERY 8 HOURS PRN
Qty: 90 TABLET | Refills: 2 | Status: SHIPPED | OUTPATIENT
Start: 2024-06-03 | End: 2024-09-01

## 2024-05-21 NOTE — PROGRESS NOTES
PCP: Corinna Atkinson NP      CC: abdominal pain    Interval history: Mr. Cerda is a 69 y.o. male with an extensive history of crohn's disease who presents today for f/u s/p and medication refill. Continues to have neck pain worse with lateral rotation. He had updated imaging. Denies radicular pain. Robaxin is helpful. He has healed from right hip fracture last year.  He was taking Percocet 5-325 mg q 6 h for post op pain. Acute left knee pain resolved after left intra articular steroid injection with Dr. Villatoro.  Right knee RFA  provided 75% relief.    Abdominal pain remains stable.   He does report diarrhea at times but no blood stools. Reports anal pain and itching 3/2 chronic diarrhea.  He is currently being evaluated by gastroenterology.  OTC Lidocaine 2 % provides some minimal relief.  Compounding cream was too expensive.  He rates his pain 3/10.     Prior HPI:   Mr. Cerda is a 60 year old male with PMH of crohn's disease referred by Dr. Hansen.  Patient states being diagnosed with crohn's disease since the age of 14.    He has had multiple GI surgeries and large bowel and small bowel removals.  During that time, he was being managed by providers in [place].  He was TPN dependent for many years until about two years ago.  He is now stable on an oral diet.  He did not have a primary care physician nor a gastroenterologist for many years.  He is currently trying to establish care.  He has future appointment with Dr. Ch.  He states taking Demerol 50mg q8hrs as needed for pain. It has provided relief of his nausea and pain with diarrhea.  He was last prescribed Demerol in July 2014.  Since then, he has been bearing with the pain.  It is a sharp shooting pain in his mid abdomen.      ROS:  CONSTITUTIONAL: No fevers, chills, night sweats, wt. loss, appetite changes  SKIN: no rashes or itching  ENT: No head trauma, vision changes, or eye pain +Headaches  LYMPH NODES: None noticed   CV:  No chest pain, palpitations.   RESP: No shortness of breath, dyspnea on exertion, cough, wheezing, or hemoptysis  GI: No nausea, emesis, diarrhea, constipation, melena, hematochezia, pain.    : No dysuria, hematuria, urgency, or frequency   HEME: No easy bruising, bleeding problems  PSYCHIATRIC: No depression, anxiety, psychosis, hallucinations.  NEURO: No seizures, memory loss, dizziness or difficulty sleeping  MSK: no joint pain      Past Medical History:   Diagnosis Date    Anxiety     Basal cell carcinoma 07/2017    R forehead and R temple    Crohn's disease     age 17    Encounter for blood transfusion     Hypertension     Short bowel syndrome     Vitamin B deficiency     Vitamin D deficiency      Past Surgical History:   Procedure Laterality Date    3 small bowel resections      APPENDECTOMY      CHOLECYSTECTOMY      COLONOSCOPY      COLONOSCOPY N/A 02/14/2017    Procedure: COLONOSCOPY;  Surgeon: Nela Sanchez MD;  Location: [address]);  Service: Endoscopy;  Laterality: N/A;    COLONOSCOPY N/A 03/14/2017    Procedure: COLONOSCOPY;  Surgeon: Nela Sanchez MD;  Location: [address]);  Service: Endoscopy;  Laterality: N/A;  2 days clear liquids before procedure    COLONOSCOPY N/A 07/29/2020    Procedure: COLONOSCOPY;  Surgeon: Andrea Shaver MD;  Location: [facility];  Service: Endoscopy;  Laterality: N/A;    ESOPHAGOGASTRODUODENOSCOPY N/A 11/19/2019    Procedure: EGD (ESOPHAGOGASTRODUODENOSCOPY);  Surgeon: Andrea Shaver MD;  Location: [facility];  Service: Endoscopy;  Laterality: N/A;    INJECTION OF ANESTHETIC AGENT AROUND NERVE Right 10/20/2020    Procedure: Block, Nerve genicular;  Surgeon: Alvin Curry MD;  Location: [facility] OR;  Service: Pain Management;  Laterality: Right;  right knee    INTRAMEDULLARY RODDING OF FEMUR Right 10/20/2022    Procedure: INSERTION, INTRAMEDULLARY SRINIVASA, FEMUR;  Surgeon: Gómez Selby MD;  Location: [facility] OR;  Service: Orthopedics;  Laterality: Right;     KNEE SURGERY      RADIOFREQUENCY ABLATION Right 2021    Procedure: Radiofrequency Ablation Right Knee Genicular RFA Coolief;  Surgeon: Alvin Curry MD;  Location: [facility] OR;  Service: Pain Management;  Laterality: Right;  Right knee     SMALL INTESTINE SURGERY      TUBE THORACOTOMY      UPPER GASTROINTESTINAL ENDOSCOPY       Family History   Problem Relation Name Age of Onset    Diabetes Mother jahaira     Stroke Mother jahaira     Diabetes Father amber     Kidney disease Father amber     Irritable bowel syndrome Cousin      Celiac disease Neg Hx      Cirrhosis Neg Hx      Colon cancer Neg Hx      Colon polyps Neg Hx      Cystic fibrosis Neg Hx      Esophageal cancer Neg Hx      Crohn's disease Neg Hx      Hemochromatosis Neg Hx      Inflammatory bowel disease Neg Hx      Liver cancer Neg Hx      Liver disease Neg Hx      Rectal cancer Neg Hx      Stomach cancer Neg Hx      Ulcerative colitis Neg Hx      Addison's disease Neg Hx      Melanoma Neg Hx      Psoriasis Neg Hx      Lupus Neg Hx      Eczema Neg Hx      Glaucoma Neg Hx      Macular degeneration Neg Hx       Social History     Socioeconomic History    Marital status: Other   Tobacco Use    Smoking status: Former     Current packs/day: 0.00     Average packs/day: 1 pack/day for 15.0 years (15.0 ttl pk-yrs)     Types: Cigarettes     Start date: 3/10/1980     Quit date: 3/10/1995     Years since quittin.2    Smokeless tobacco: Never   Substance and Sexual Activity    Alcohol use: Never     Alcohol/week: 2.0 standard drinks of alcohol     Types: 1 Cans of beer, 1 Shots of liquor per week    Drug use: Never    Sexual activity: Yes     Partners: Female     Birth control/protection: None   Social History Narrative    ** Merged History Encounter **         ** Data from: 22 Enc Dept: [facility] MEDIC[facility]    Retired          ** Data from: 22 Enc Dept: [facility] PHARMAC[facility]-OUTPATIENT    ** Merged History Encounter **         ** Merged History Encounter **       "    Social Determinants of Health     Financial Resource Strain: Patient Declined (10/24/2023)    Overall Financial Resource Strain (CARDIA)     Difficulty of Paying Living Expenses: Patient declined   Food Insecurity: Patient Declined (10/24/2023)    Hunger Vital Sign     Worried About Running Out of Food in the Last Year: Patient declined     Ran Out of Food in the Last Year: Patient declined   Transportation Needs: Patient Declined (10/24/2023)    PRAPARE - Transportation     Lack of Transportation (Medical): Patient declined     Lack of Transportation (Non-Medical): Patient declined   Physical Activity: Patient Declined (10/24/2023)    Exercise Vital Sign     Days of Exercise per Week: Patient declined     Minutes of Exercise per Session: Patient declined   Stress: Patient Declined (7/24/2023)    [employer] [place] [employer] - Occupational Stress Questionnaire     Feeling of Stress : Patient declined   Housing Stability: Unknown (10/24/2023)    Housing Stability Vital Sign     Unable to Pay for Housing in the Last Year: Patient refused     Number of Places Lived in the Last Year: 1     Unstable Housing in the Last Year: Patient refused     Medications/Allergies: See med card    Vitals:    05/21/24 1137   BP: 138/78   Pulse: 68   Weight: 72.6 kg (160 lb 0.9 oz)   Height: 6' 1" (1.854 m)   PainSc:   3   PainLoc: Abdomen     Physical exam:    GENERAL: A and O x3, the patient appears well groomed and is in no acute distress.  Skin: healing lesion on nose  HEENT: normocephalic, atraumatic  CARDIOVASCULAR:  RRR  LUNGS: non labored breathing  ABDOMEN: soft, nontender. No rebound   UPPER EXTREMITIES: Normal alignment, normal range of motion, no atrophy, no skin changes,  hair growth and nail growth normal and equal bilaterally. No swelling, no tenderness.    LOWER EXTREMITIES:  TTP left patella, swelling along medial and superior aspect. No redness or warmth.     moderate TTP SCM on right. Pain with right " lateral rotation. No midline cervical tenderness. Negative spurlings.   LUMBAR SPINE  Lumbar spine: ROM is full with flexion extension and oblique extension with no increased pain.    Lam's test causes no increased pain on either side.    Supine straight leg raise is negative bilaterally.    Internal and external rotation of the hip causes no increased pain on either side.  Myofascial exam: No tenderness to palpation across lumbar paraspinous muscles.    MENTAL STATUS: normal orientation, speech, language, and fund of knowledge for social situation.  Emotional state appropriate.    CRANIAL NERVES:  II:  PERRL bilaterally,   III,IV,VI: EOMI.    V:  Facial sensation equal bilaterally  VII:  Facial motor function normal.  VIII:  Hearing equal to finger rub bilaterally  IX/X: Gag normal, palate symmetric  XI:  Shoulder shrug equal, head turn equal  XII:  Tongue midline without fasciculations    MOTOR: Tone and bulk: normal bilateral upper and lower Strength: normal   SENSATION: Light touch and pinprick intact bilaterally  REFLEXES: normal, symmetric, nonbrisk.  Toes down, no clonus. No hoffmans.  GAIT: normal rise, base, steps, and arm swing.      Imaging:  Left knee xray 3/6/22  Mild tricompartmental degenerative osteoarthrosis with mild joint space narrowing and small osteophyte formation.  Subtle calcification highlighted cartilage consistent with chondrocalcinosis.     There is a small suprapatellar effusion.     Impression:     1. Mild tricompartmental degenerative osteoarthrosis  2. Chondrocalcinosis.  3. Small suprapatellar effusion.       Assessment:  Mr. Cerda is a 69 y.o. male with abdominal pain  1. Myofascial pain    2. DDD (degenerative disc disease), cervical    3. Osteoarthritis of right knee, unspecified osteoarthritis type    4. Chronic abdominal pain          Plan:  1. Patient with long history of crohn's disease and chronic abdominal pain.  Continue care with GI.  2. Tramadol 50 mg q 8 h prn.      reviewed.  No signs of aberrant behavior.  Previous UDS consistent.  Script provided for 3 months  3. Continue OTC lidocaine cream  4. Monitor progress from right sided peripheral nerve block.   5. F/u with orthopedics as needed  6. Reviewed cervical xray results . Discussed lumbar MBB vs ALLISON as well as PT. He will consider this.   7. Robaxin 500 mg TID prn   8. F/u 3  months or sooner  All medication management was performed by Dr. Alvin Curry

## 2024-06-03 ENCOUNTER — OFFICE VISIT (OUTPATIENT)
Dept: FAMILY MEDICINE | Facility: CLINIC | Age: 69
End: 2024-06-03
Payer: MEDICARE

## 2024-06-03 VITALS
RESPIRATION RATE: 18 BRPM | OXYGEN SATURATION: 96 % | WEIGHT: 145 LBS | DIASTOLIC BLOOD PRESSURE: 84 MMHG | SYSTOLIC BLOOD PRESSURE: 136 MMHG | HEART RATE: 71 BPM | BODY MASS INDEX: 19.22 KG/M2 | HEIGHT: 73 IN

## 2024-06-03 DIAGNOSIS — R10.13 EPIGASTRIC PAIN: ICD-10-CM

## 2024-06-03 DIAGNOSIS — Z12.5 PROSTATE CANCER SCREENING: ICD-10-CM

## 2024-06-03 DIAGNOSIS — D69.6 THROMBOCYTOPENIC: ICD-10-CM

## 2024-06-03 DIAGNOSIS — D72.810 LYMPHOPENIA: ICD-10-CM

## 2024-06-03 DIAGNOSIS — R04.2 COUGH WITH HEMOPTYSIS: Primary | ICD-10-CM

## 2024-06-03 DIAGNOSIS — Z86.2 HISTORY OF ANEMIA: ICD-10-CM

## 2024-06-03 DIAGNOSIS — Z79.899 HIGH RISK MEDICATION USE: ICD-10-CM

## 2024-06-03 DIAGNOSIS — E55.9 VITAMIN D DEFICIENCY: ICD-10-CM

## 2024-06-03 DIAGNOSIS — R11.0 NAUSEA: ICD-10-CM

## 2024-06-03 DIAGNOSIS — K50.00 CROHN'S DISEASE OF SMALL INTESTINE WITHOUT COMPLICATION: ICD-10-CM

## 2024-06-03 DIAGNOSIS — E53.8 B12 DEFICIENCY: ICD-10-CM

## 2024-06-03 PROCEDURE — 3288F FALL RISK ASSESSMENT DOCD: CPT | Mod: HCWC,CPTII,S$GLB, | Performed by: NURSE PRACTITIONER

## 2024-06-03 PROCEDURE — 3075F SYST BP GE 130 - 139MM HG: CPT | Mod: HCWC,CPTII,S$GLB, | Performed by: NURSE PRACTITIONER

## 2024-06-03 PROCEDURE — 3079F DIAST BP 80-89 MM HG: CPT | Mod: HCWC,CPTII,S$GLB, | Performed by: NURSE PRACTITIONER

## 2024-06-03 PROCEDURE — 1159F MED LIST DOCD IN RCRD: CPT | Mod: HCWC,CPTII,S$GLB, | Performed by: NURSE PRACTITIONER

## 2024-06-03 PROCEDURE — 1160F RVW MEDS BY RX/DR IN RCRD: CPT | Mod: HCWC,CPTII,S$GLB, | Performed by: NURSE PRACTITIONER

## 2024-06-03 PROCEDURE — 99999 PR PBB SHADOW E&M-EST. PATIENT-LVL IV: CPT | Mod: PBBFAC,HCWC,, | Performed by: NURSE PRACTITIONER

## 2024-06-03 PROCEDURE — 99214 OFFICE O/P EST MOD 30 MIN: CPT | Mod: HCWC,S$GLB,, | Performed by: NURSE PRACTITIONER

## 2024-06-03 PROCEDURE — 3008F BODY MASS INDEX DOCD: CPT | Mod: HCWC,CPTII,S$GLB, | Performed by: NURSE PRACTITIONER

## 2024-06-03 PROCEDURE — 1101F PT FALLS ASSESS-DOCD LE1/YR: CPT | Mod: HCWC,CPTII,S$GLB, | Performed by: NURSE PRACTITIONER

## 2024-06-03 PROCEDURE — 1126F AMNT PAIN NOTED NONE PRSNT: CPT | Mod: HCWC,CPTII,S$GLB, | Performed by: NURSE PRACTITIONER

## 2024-06-03 RX ORDER — PANTOPRAZOLE SODIUM 40 MG/1
40 TABLET, DELAYED RELEASE ORAL DAILY
Qty: 30 TABLET | Refills: 1 | Status: SHIPPED | OUTPATIENT
Start: 2024-06-03

## 2024-06-03 RX ORDER — ONDANSETRON HYDROCHLORIDE 8 MG/1
8 TABLET, FILM COATED ORAL EVERY 12 HOURS PRN
Qty: 60 TABLET | Refills: 5 | Status: SHIPPED | OUTPATIENT
Start: 2024-06-03

## 2024-06-03 RX ORDER — SUCRALFATE 1 G/1
1 TABLET ORAL
Qty: 120 TABLET | Refills: 1 | Status: SHIPPED | OUTPATIENT
Start: 2024-06-03

## 2024-06-03 NOTE — PROGRESS NOTES
"Subjective:       Patient ID: Tristin Cerda is a 69 y.o. male.    Chief Complaint: Chest Pain (X past few months) and Hemoptysis (X couple of months/Started off dark and now is bright red)  -  The pt is a 70 y/o male that presents for follow up. Reports epigastric discomfort when he takes a deep breath and coughed up dark blood about 4-5xs over the past 6 mo but last week coughed up bright red blood. No weight loss noted and denies fever.     Long standing h/o crohn's and short bowel syndrome needs to be est with GI MD. Recent bout of hemoptysis yet not sure if coming from lungs or upper GI. Chart viewed with [facility] NP to document "unable to treat new Chron's patient, refer to [facility] GI Dr Sanchez". Pt has a h/o being treated locally thus does not understand why he can not be treated in [place]. Overdue for colonoscopy since 2021.     C/o arthralgia with bilateral thumbs hurting the worse. Did discuss ortho eval for inj.  -      Past Medical History:   Diagnosis Date    Anxiety     Basal cell carcinoma 07/2017    R forehead and R temple    Crohn's disease 1972    Hypertension     Short bowel syndrome 1994    TB (pulmonary tuberculosis) 1999    Vitamin B deficiency     Vitamin D deficiency        Past Surgical History:   Procedure Laterality Date    3 small bowel resections      1973, 1988, 1994    APPENDECTOMY      CHOLECYSTECTOMY      COLONOSCOPY      COLONOSCOPY N/A 02/14/2017    Procedure: COLONOSCOPY;  Surgeon: Nela Sanchez MD;  Location: [address]);  Service: Endoscopy;  Laterality: N/A;    COLONOSCOPY N/A 03/14/2017    Procedure: COLONOSCOPY;  Surgeon: Nela Sanchez MD;  Location: [address]);  Service: Endoscopy;  Laterality: N/A;  2 days clear liquids before procedure    COLONOSCOPY N/A 07/29/2020    Procedure: COLONOSCOPY;  Surgeon: Andrea Shaver MD;  Location: [facility];  Service: Endoscopy;  Laterality: N/A;    ESOPHAGOGASTRODUODENOSCOPY N/A 11/19/2019    Procedure: " EGD (ESOPHAGOGASTRODUODENOSCOPY);  Surgeon: Andrea Shaver MD;  Location: [facility] ENDO;  Service: Endoscopy;  Laterality: N/A;    INJECTION OF ANESTHETIC AGENT AROUND NERVE Right 10/20/2020    Procedure: Block, Nerve genicular;  Surgeon: Alvin Curry MD;  [facility];  Service: Pain Management;  Laterality: Right;  right knee    INTRAMEDULLARY RODDING OF FEMUR Right 10/20/2022    Procedure: INSERTION, INTRAMEDULLARY SRINIVASA, FEMUR;  Surgeon: Gómez Selby MD;  Location: [facility] OR;  Service: Orthopedics;  Laterality: Right;    KNEE SURGERY Right     possible osteomylitis    RADIOFREQUENCY ABLATION Right 2021    Procedure: Radiofrequency Ablation Right Knee Genicular RFA Coolief;  Surgeon: Alvin Curry MD;  Location: [facility] OR;  Service: Pain Management;  Laterality: Right;  Right knee     SMALL INTESTINE SURGERY      TUBE THORACOTOMY      scrapping of lung lining    UPPER GASTROINTESTINAL ENDOSCOPY          Social History     Socioeconomic History    Marital status: Other   Tobacco Use    Smoking status: Former     Current packs/day: 0.00     Average packs/day: 1 pack/day for 15.0 years (15.0 ttl pk-yrs)     Types: Cigarettes     Start date: 3/10/1980     Quit date: 3/10/1995     Years since quittin.2    Smokeless tobacco: Never   Substance and Sexual Activity    Alcohol use: Not Currently     Alcohol/week: 2.0 standard drinks of alcohol     Types: 1 Cans of beer, 1 Shots of liquor per week    Drug use: Never    Sexual activity: Yes     Partners: Female     Birth control/protection: None   Social History Narrative    ** Merged History Encounter **         ** Data from: 22 Enc Dept: [facility]    Retired          ** Data from: 22 Enc Dept: [facility] PHARMACY-OUTPATIENT    ** Merged History Encounter **         ** Merged History Encounter **          Social Determinants of Health     Financial Resource Strain: Patient Declined (10/24/2023)    Overall Financial Resource Strain (CARDIA)      Difficulty of Paying Living Expenses: Patient declined   Food Insecurity: Patient Declined (10/24/2023)    Hunger Vital Sign     Worried About Running Out of Food in the Last Year: Patient declined     Ran Out of Food in the Last Year: Patient declined   Transportation Needs: Patient Declined (10/24/2023)    PRAPARE - Transportation     Lack of Transportation (Medical): Patient declined     Lack of Transportation (Non-Medical): Patient declined   Physical Activity: Patient Declined (10/24/2023)    Exercise Vital Sign     Days of Exercise per Week: Patient declined     Minutes of Exercise per Session: Patient declined   Stress: Patient Declined (7/24/2023)    [employer] [place] [employer] - Occupational Stress Questionnaire     Feeling of Stress : Patient declined   Housing Stability: Unknown (10/24/2023)    Housing Stability Vital Sign     Unable to Pay for Housing in the Last Year: Patient refused     Number of Places Lived in the Last Year: 1     Unstable Housing in the Last Year: Patient refused       Family History   Problem Relation Name Age of Onset    Diabetes Mother jahaira     Stroke Mother jahaira     Diabetes Father amber     Kidney disease Father amber     Irritable bowel syndrome Cousin      Celiac disease Neg Hx      Cirrhosis Neg Hx      Colon cancer Neg Hx      Colon polyps Neg Hx      Cystic fibrosis Neg Hx      Esophageal cancer Neg Hx      Crohn's disease Neg Hx      Hemochromatosis Neg Hx      Inflammatory bowel disease Neg Hx      Liver cancer Neg Hx      Liver disease Neg Hx      Rectal cancer Neg Hx      Stomach cancer Neg Hx      Ulcerative colitis Neg Hx      Addison's disease Neg Hx      Melanoma Neg Hx      Psoriasis Neg Hx      Lupus Neg Hx      Eczema Neg Hx      Glaucoma Neg Hx      Macular degeneration Neg Hx         Review of patient's allergies indicates:   Allergen Reactions    Ciprofloxacin      Thinks joint pain    Codeine Itching    Compazine [prochlorperazine]      Tight  "muscles- body became tight    Keflex [cephalexin]      Can not remeber    Erythromycin Nausea Only          Current Outpatient Medications:     aspirin-acetaminophen-caffeine 250-250-65 mg (EXCEDRIN MIGRAINE) 250-250-65 mg per tablet, Take 1 tablet by mouth every 8 (eight) hours as needed for Pain. , Disp: , Rfl:     bacitracin-polymyxin b (POLYSPORIN) ophthalmic ointment, Place into the left eye 4 (four) times daily., Disp: , Rfl:     cyanocobalamin 1,000 mcg/mL injection, Inject 1 mL (1,000 mcg total) into the skin every 30 days., Disp: 3 mL, Rfl: 3    dicyclomine (BENTYL) 20 mg tablet, TAKE 1 TABLET BY MOUTH FOUR TIMES DAILY, Disp: 360 tablet, Rfl: 3    ELIQUIS 5 mg Tab, TAKE 1 TABLET TWICE DAILY, Disp: 180 tablet, Rfl: 3    hydrocortisone 2.5 % lotion, Apply topically 2 (two) times daily., Disp: 59 mL, Rfl: 1    ketoconazole (NIZORAL) 2 % shampoo, Apply topically twice a week., Disp: 120 mL, Rfl: 2    meclizine (ANTIVERT) 12.5 mg tablet, Take 1 tablet (12.5 mg total) by mouth 3 (three) times daily as needed for Dizziness., Disp: 90 tablet, Rfl: 3    methocarbamoL (ROBAXIN) 500 MG Tab, Take 1 tablet (500 mg total) by mouth 3 (three) times daily., Disp: 90 tablet, Rfl: 2    syringe with needle 1 mL 28 gauge x 1/2" Syrg, 1 Device by Misc.(Non-Drug; Combo Route) route every 30 days., Disp: 100 each, Rfl: 11    traMADoL (ULTRAM) 50 mg tablet, Take 1 tablet (50 mg total) by mouth every 8 (eight) hours as needed for Pain., Disp: 90 tablet, Rfl: 2    ondansetron (ZOFRAN) 8 MG tablet, Take 1 tablet (8 mg total) by mouth every 12 (twelve) hours as needed for Nausea., Disp: 60 tablet, Rfl: 5    pantoprazole (PROTONIX) 40 MG tablet, Take 1 tablet (40 mg total) by mouth once daily., Disp: 30 tablet, Rfl: 1    sucralfate (CARAFATE) 1 gram tablet, Take 1 tablet (1 g total) by mouth 4 (four) times daily before meals and nightly., Disp: 120 tablet, Rfl: 1  No current facility-administered medications for this " visit.    Facility-Administered Medications Ordered in Other Visits:     lactated ringers infusion, , Intravenous, Continuous, VuAlvin MD    HPI  Review of Systems   Constitutional: Negative.    HENT: Negative.     Eyes: Negative.    Respiratory:          Coughed up blood   Cardiovascular: Negative.    Gastrointestinal:         Epigastric discomfort   Endocrine: Negative.    Genitourinary: Negative.    Musculoskeletal:  Positive for arthralgias.   Skin: Negative.    Allergic/Immunologic: Negative.    Neurological: Negative.    Hematological: Negative.    Psychiatric/Behavioral: Negative.         Objective:      Physical Exam  Vitals and nursing note reviewed.   Constitutional:       General: He is not in acute distress.     Appearance: Normal appearance. He is well-developed and normal weight. He is not ill-appearing.   HENT:      Head: Normocephalic.   Eyes:      Conjunctiva/sclera: Conjunctivae normal.   Neck:      Thyroid: No thyromegaly.   Cardiovascular:      Rate and Rhythm: Normal rate and regular rhythm.      Heart sounds: Normal heart sounds. No murmur heard.  Pulmonary:      Effort: Pulmonary effort is normal.      Breath sounds: Normal breath sounds. No wheezing or rales.   Musculoskeletal:         General: Normal range of motion.      Cervical back: Normal range of motion.      Right lower leg: No edema.      Left lower leg: No edema.      Comments: Wearing right hand brace   Skin:     General: Skin is warm and dry.   Neurological:      Mental Status: He is alert and oriented to person, place, and time. Mental status is at baseline.   Psychiatric:         Mood and Affect: Mood normal.         Behavior: Behavior normal.         Thought Content: Thought content normal.         Judgment: Judgment normal.         Assessment:       1. Cough with hemoptysis    2. Nausea    3. Thrombocytopenic    4. Lymphopenia    5. History of anemia    6. B12 deficiency    7. Vitamin D deficiency    8. Crohn's disease of  small intestine without complication    9. Prostate cancer screening    10. High risk medication use    11. Epigastric pain        Plan:     1- Refer to GI again  2- NF labs on 6/7/24 to do [facility]  3- Chest CT w/ contrast after 6/7  4- RTC 4 weeks  5- protonix and carafate x 1 mo  6- will discuss ortho at f/u  ---     Cough with hemoptysis  -     CT Chest With Contrast; Future; Expected date: 06/03/2024  -     CBC Auto Differential; Future; Expected date: 06/07/2024  -     Comprehensive Metabolic Panel; Future; Expected date: 06/07/2024    Nausea  -     ondansetron (ZOFRAN) 8 MG tablet; Take 1 tablet (8 mg total) by mouth every 12 (twelve) hours as needed for Nausea.  Dispense: 60 tablet; Refill: 5    Thrombocytopenic  -     Ambulatory referral/consult to Gastroenterology; Future; Expected date: 06/10/2024  -     CBC Auto Differential; Future; Expected date: 06/07/2024  -     Comprehensive Metabolic Panel; Future; Expected date: 06/07/2024    Lymphopenia  -     CBC Auto Differential; Future; Expected date: 06/07/2024  -     Comprehensive Metabolic Panel; Future; Expected date: 06/07/2024    History of anemia  -     Ferritin; Future; Expected date: 06/07/2024  -     Iron and TIBC; Future; Expected date: 06/07/2024    B12 deficiency  -     Vitamin B12; Future; Expected date: 06/07/2024  -     Folate; Future; Expected date: 06/07/2024    Vitamin D deficiency  -     Vitamin D; Future; Expected date: 06/07/2024    Crohn's disease of small intestine without complication  -     Ambulatory referral/consult to Gastroenterology; Future; Expected date: 06/10/2024  -     CBC Auto Differential; Future; Expected date: 06/07/2024  -     Comprehensive Metabolic Panel; Future; Expected date: 06/07/2024  -     TSH; Future; Expected date: 06/07/2024    Prostate cancer screening  -     PSA, Screening; Future; Expected date: 06/07/2024    High risk medication use  -     TSH; Future; Expected date: 06/07/2024    Epigastric pain  -      pantoprazole (PROTONIX) 40 MG tablet; Take 1 tablet (40 mg total) by mouth once daily.  Dispense: 30 tablet; Refill: 1  -     sucralfate (CARAFATE) 1 gram tablet; Take 1 tablet (1 g total) by mouth 4 (four) times daily before meals and nightly.  Dispense: 120 tablet; Refill: 1        Risks, benefits, and side effects were discussed with the patient. All questions were answered to the fullest satisfaction of the patient, and pt verbalized understanding and agreement to treatment plan. Pt was to call with any new or worsening symptoms, or present to the ER.

## 2024-06-14 LAB
25(OH)D3+25(OH)D2 SERPL-MCNC: 19 NG/ML (ref 30–100)
ALBUMIN SERPL-MCNC: 4.2 G/DL (ref 3.6–5.1)
ALBUMIN/GLOB SERPL: 1.9 (CALC) (ref 1–2.5)
ALP SERPL-CCNC: 167 U/L (ref 35–144)
ALT SERPL-CCNC: 26 U/L (ref 9–46)
AST SERPL-CCNC: 22 U/L (ref 10–35)
BASOPHILS # BLD AUTO: 40 CELLS/UL (ref 0–200)
BASOPHILS NFR BLD AUTO: 0.6 %
BILIRUB SERPL-MCNC: 0.5 MG/DL (ref 0.2–1.2)
BUN SERPL-MCNC: 15 MG/DL (ref 7–25)
BUN/CREAT SERPL: ABNORMAL (CALC) (ref 6–22)
CALCIUM SERPL-MCNC: 9.3 MG/DL (ref 8.6–10.3)
CHLORIDE SERPL-SCNC: 104 MMOL/L (ref 98–110)
CO2 SERPL-SCNC: 25 MMOL/L (ref 20–32)
CREAT SERPL-MCNC: 0.76 MG/DL (ref 0.7–1.35)
EGFR: 99 ML/MIN/1.73M2
EOSINOPHIL # BLD AUTO: 127 CELLS/UL (ref 15–500)
EOSINOPHIL NFR BLD AUTO: 1.9 %
ERYTHROCYTE [DISTWIDTH] IN BLOOD BY AUTOMATED COUNT: 13.1 % (ref 11–15)
FERRITIN SERPL-MCNC: 27 NG/ML (ref 24–380)
FOLATE SERPL-MCNC: 10.1 NG/ML
GLOBULIN SER CALC-MCNC: 2.2 G/DL (CALC) (ref 1.9–3.7)
GLUCOSE SERPL-MCNC: 95 MG/DL (ref 65–139)
HCT VFR BLD AUTO: 39.3 % (ref 38.5–50)
HGB BLD-MCNC: 13 G/DL (ref 13.2–17.1)
IRON SATN MFR SERPL: 14 % (CALC) (ref 20–48)
IRON SERPL-MCNC: 54 MCG/DL (ref 50–180)
LYMPHOCYTES # BLD AUTO: 677 CELLS/UL (ref 850–3900)
LYMPHOCYTES NFR BLD AUTO: 10.1 %
Lab: NORMAL NG/ML
MCH RBC QN AUTO: 29.8 PG (ref 27–33)
MCHC RBC AUTO-ENTMCNC: 33.1 G/DL (ref 32–36)
MCV RBC AUTO: 90.1 FL (ref 80–100)
MONOCYTES # BLD AUTO: 677 CELLS/UL (ref 200–950)
MONOCYTES NFR BLD AUTO: 10.1 %
NEUTROPHILS # BLD AUTO: 5179 CELLS/UL (ref 1500–7800)
NEUTROPHILS NFR BLD AUTO: 77.3 %
PLATELET # BLD AUTO: 184 THOUSAND/UL (ref 140–400)
PMV BLD REES-ECKER: 10.9 FL (ref 7.5–12.5)
POTASSIUM SERPL-SCNC: 4 MMOL/L (ref 3.5–5.3)
PROT SERPL-MCNC: 6.4 G/DL (ref 6.1–8.1)
RBC # BLD AUTO: 4.36 MILLION/UL (ref 4.2–5.8)
SODIUM SERPL-SCNC: 140 MMOL/L (ref 135–146)
TIBC SERPL-MCNC: 396 MCG/DL (CALC) (ref 250–425)
TSH SERPL-ACNC: 2.66 MIU/L (ref 0.4–4.5)
VIT B12 SERPL-MCNC: 502 PG/ML (ref 200–1100)
WBC # BLD AUTO: 6.7 THOUSAND/UL (ref 3.8–10.8)

## 2024-06-22 ENCOUNTER — PATIENT MESSAGE (OUTPATIENT)
Dept: FAMILY MEDICINE | Facility: CLINIC | Age: 69
End: 2024-06-22
Payer: MEDICARE

## 2024-07-30 DIAGNOSIS — R10.13 EPIGASTRIC PAIN: ICD-10-CM

## 2024-07-30 RX ORDER — PANTOPRAZOLE SODIUM 40 MG/1
40 TABLET, DELAYED RELEASE ORAL
Qty: 30 TABLET | Refills: 1 | OUTPATIENT
Start: 2024-07-30

## 2024-07-30 NOTE — TELEPHONE ENCOUNTER
Refill Routing Note   Medication(s) are not appropriate for processing by [facility] for the following reason(s):        Non-participating provider: provider not contracted with [facility]     [facility] action(s):  Route               Appointments  past 12m or future 3m with PCP    Date Provider   Last Visit   6/3/2024 Corinna Atkinson NP   Next Visit   Visit date not found Corinna Atkinson NP   ED visits in past 90 days: 0        Note composed:3:42 PM 07/30/2024

## 2024-08-02 RX ORDER — PANTOPRAZOLE SODIUM 40 MG/1
40 TABLET, DELAYED RELEASE ORAL DAILY
Qty: 30 TABLET | Refills: 11 | Status: SHIPPED | OUTPATIENT
Start: 2024-08-02 | End: 2024-08-02

## 2024-08-02 RX ORDER — PANTOPRAZOLE SODIUM 40 MG/1
40 TABLET, DELAYED RELEASE ORAL DAILY
Qty: 30 TABLET | Refills: 3 | Status: SHIPPED | OUTPATIENT
Start: 2024-08-02

## 2024-08-14 ENCOUNTER — OFFICE VISIT (OUTPATIENT)
Dept: PAIN MEDICINE | Facility: CLINIC | Age: 69
End: 2024-08-14
Payer: MEDICARE

## 2024-08-14 VITALS
HEART RATE: 65 BPM | DIASTOLIC BLOOD PRESSURE: 74 MMHG | WEIGHT: 145.06 LBS | BODY MASS INDEX: 19.23 KG/M2 | SYSTOLIC BLOOD PRESSURE: 127 MMHG | HEIGHT: 73 IN

## 2024-08-14 DIAGNOSIS — M79.18 MYOFASCIAL PAIN: ICD-10-CM

## 2024-08-14 DIAGNOSIS — Z13.31 POSITIVE DEPRESSION SCREENING: Primary | ICD-10-CM

## 2024-08-14 DIAGNOSIS — G89.4 CHRONIC PAIN DISORDER: ICD-10-CM

## 2024-08-14 DIAGNOSIS — G89.29 CHRONIC ABDOMINAL PAIN: ICD-10-CM

## 2024-08-14 DIAGNOSIS — M50.30 DDD (DEGENERATIVE DISC DISEASE), CERVICAL: ICD-10-CM

## 2024-08-14 DIAGNOSIS — R10.9 CHRONIC ABDOMINAL PAIN: ICD-10-CM

## 2024-08-14 DIAGNOSIS — M17.11 OSTEOARTHRITIS OF RIGHT KNEE, UNSPECIFIED OSTEOARTHRITIS TYPE: ICD-10-CM

## 2024-08-14 PROCEDURE — 3078F DIAST BP <80 MM HG: CPT | Mod: HCNC,CPTII,S$GLB, | Performed by: PHYSICIAN ASSISTANT

## 2024-08-14 PROCEDURE — 1125F AMNT PAIN NOTED PAIN PRSNT: CPT | Mod: HCNC,CPTII,S$GLB, | Performed by: PHYSICIAN ASSISTANT

## 2024-08-14 PROCEDURE — 99214 OFFICE O/P EST MOD 30 MIN: CPT | Mod: HCNC,S$GLB,, | Performed by: PHYSICIAN ASSISTANT

## 2024-08-14 PROCEDURE — 99999 PR PBB SHADOW E&M-EST. PATIENT-LVL III: CPT | Mod: PBBFAC,HCNC,, | Performed by: PHYSICIAN ASSISTANT

## 2024-08-14 PROCEDURE — 3008F BODY MASS INDEX DOCD: CPT | Mod: HCNC,CPTII,S$GLB, | Performed by: PHYSICIAN ASSISTANT

## 2024-08-14 PROCEDURE — 3288F FALL RISK ASSESSMENT DOCD: CPT | Mod: HCNC,CPTII,S$GLB, | Performed by: PHYSICIAN ASSISTANT

## 2024-08-14 PROCEDURE — 1159F MED LIST DOCD IN RCRD: CPT | Mod: HCNC,CPTII,S$GLB, | Performed by: PHYSICIAN ASSISTANT

## 2024-08-14 PROCEDURE — 1101F PT FALLS ASSESS-DOCD LE1/YR: CPT | Mod: HCNC,CPTII,S$GLB, | Performed by: PHYSICIAN ASSISTANT

## 2024-08-14 PROCEDURE — 3074F SYST BP LT 130 MM HG: CPT | Mod: HCNC,CPTII,S$GLB, | Performed by: PHYSICIAN ASSISTANT

## 2024-08-14 RX ORDER — TRAMADOL HYDROCHLORIDE 50 MG/1
50 TABLET ORAL EVERY 8 HOURS PRN
Qty: 90 TABLET | Refills: 2 | Status: SHIPPED | OUTPATIENT
Start: 2024-08-29 | End: 2024-11-27

## 2024-08-14 RX ORDER — TRAMADOL HYDROCHLORIDE 50 MG/1
50 TABLET ORAL EVERY 8 HOURS PRN
Qty: 90 TABLET | Refills: 2 | Status: CANCELLED | OUTPATIENT
Start: 2024-08-29 | End: 2024-11-27

## 2024-08-14 RX ORDER — METHOCARBAMOL 500 MG/1
500 TABLET, FILM COATED ORAL 3 TIMES DAILY
Qty: 90 TABLET | Refills: 2 | Status: SHIPPED | OUTPATIENT
Start: 2024-08-14 | End: 2024-08-14

## 2024-08-14 NOTE — PROGRESS NOTES
PCP: Corinna Atkinson NP      CC: abdominal pain    Interval history: Mr. Cerda is a 69 y.o. male with an extensive history of crohn's disease who presents today for f/u s/p and medication refill. Continues to have neck pain worse with lateral rotation. He had updated imaging. Denies radicular pain. Robaxin is helpful. He has healed from right hip fracture last year.  He was taking Percocet 5-325 mg q 6 h for post op pain. Acute left knee pain resolved after left intra articular steroid injection with Dr. Villatoro.  Right knee RFA  provided 75% relief.    Abdominal pain remains stable.   He does report diarrhea at times but no blood stools. Reports anal pain and itching 3/2 chronic diarrhea.  He is currently being evaluated by gastroenterology.  OTC Lidocaine 2 % provides some minimal relief.  Compounding cream was too expensive.  He rates his pain 3/10.     Prior HPI:   Mr. Cerda is a 60 year old male with PMH of crohn's disease referred by Dr. Hansen.  Patient states being diagnosed with crohn's disease since the age of 14.    He has had multiple GI surgeries and large bowel and small bowel removals.  During that time, he was being managed by providers in [place].  He was TPN dependent for many years until about two years ago.  He is now stable on an oral diet.  He did not have a primary care physician nor a gastroenterologist for many years.  He is currently trying to establish care.  He has future appointment with Dr. Ch.  He states taking Demerol 50mg q8hrs as needed for pain. It has provided relief of his nausea and pain with diarrhea.  He was last prescribed Demerol in July 2014.  Since then, he has been bearing with the pain.  It is a sharp shooting pain in his mid abdomen.      ROS:  CONSTITUTIONAL: No fevers, chills, night sweats, wt. loss, appetite changes  SKIN: no rashes or itching  ENT: No head trauma, vision changes, or eye pain +Headaches  LYMPH NODES: None noticed   CV:  No chest pain, palpitations.   RESP: No shortness of breath, dyspnea on exertion, cough, wheezing, or hemoptysis  GI: No nausea, emesis, diarrhea, constipation, melena, hematochezia, pain.    : No dysuria, hematuria, urgency, or frequency   HEME: No easy bruising, bleeding problems  PSYCHIATRIC: No depression, anxiety, psychosis, hallucinations.  NEURO: No seizures, memory loss, dizziness or difficulty sleeping  MSK: no joint pain      Past Medical History:   Diagnosis Date    Anxiety     Basal cell carcinoma 07/2017    R forehead and R temple    Crohn's disease 1972    Hypertension     Short bowel syndrome 1994    TB (pulmonary tuberculosis) 1999    Vitamin B deficiency     Vitamin D deficiency      Past Surgical History:   Procedure Laterality Date    3 small bowel resections      1973, 1988, 1994    APPENDECTOMY      CHOLECYSTECTOMY      COLONOSCOPY      COLONOSCOPY N/A 02/14/2017    Procedure: COLONOSCOPY;  Surgeon: Nela Sanchez MD;  Location: [address]);  Service: Endoscopy;  Laterality: N/A;    COLONOSCOPY N/A 03/14/2017    Procedure: COLONOSCOPY;  Surgeon: Nela Sanchez MD;  Location: [address]);  Service: Endoscopy;  Laterality: N/A;  2 days clear liquids before procedure    COLONOSCOPY N/A 07/29/2020    Procedure: COLONOSCOPY;  Surgeon: Andrea Shaver MD;  Location: [facility];  Service: Endoscopy;  Laterality: N/A;    ESOPHAGOGASTRODUODENOSCOPY N/A 11/19/2019    Procedure: EGD (ESOPHAGOGASTRODUODENOSCOPY);  Surgeon: Andrea Shaver MD;  Location: [facility];  Service: Endoscopy;  Laterality: N/A;    INJECTION OF ANESTHETIC AGENT AROUND NERVE Right 10/20/2020    Procedure: Block, Nerve genicular;  Surgeon: Alvin Curry MD;  Location: [facility] OR;  Service: Pain Management;  Laterality: Right;  right knee    INTRAMEDULLARY RODDING OF FEMUR Right 10/20/2022    Procedure: INSERTION, INTRAMEDULLARY SRINIVASA, FEMUR;  Surgeon: Gómez Selby MD;  Location: [facility] OR;  Service: Orthopedics;   Laterality: Right;    KNEE SURGERY Right     possible osteomylitis    RADIOFREQUENCY ABLATION Right 2021    Procedure: Radiofrequency Ablation Right Knee Genicular RFA [place];  Surgeon: Alvin Curry MD;  Location: [facility] OR;  Service: Pain Management;  Laterality: Right;  Right knee     SMALL INTESTINE SURGERY      TUBE THORACOTOMY      scrapping of lung lining    UPPER GASTROINTESTINAL ENDOSCOPY       Family History   Problem Relation Name Age of Onset    Diabetes Mother jahaira     Stroke Mother jahaira     Diabetes Father amber     Kidney disease Father amber     Irritable bowel syndrome Cousin      Celiac disease Neg Hx      Cirrhosis Neg Hx      Colon cancer Neg Hx      Colon polyps Neg Hx      Cystic fibrosis Neg Hx      Esophageal cancer Neg Hx      Crohn's disease Neg Hx      Hemochromatosis Neg Hx      Inflammatory bowel disease Neg Hx      Liver cancer Neg Hx      Liver disease Neg Hx      Rectal cancer Neg Hx      Stomach cancer Neg Hx      Ulcerative colitis Neg Hx      Addison's disease Neg Hx      Melanoma Neg Hx      Psoriasis Neg Hx      Lupus Neg Hx      Eczema Neg Hx      Glaucoma Neg Hx      Macular degeneration Neg Hx       Social History     Socioeconomic History    Marital status: Other   Tobacco Use    Smoking status: Former     Current packs/day: 0.00     Average packs/day: 1 pack/day for 15.0 years (15.0 ttl pk-yrs)     Types: Cigarettes     Start date: 3/10/1980     Quit date: 3/10/1995     Years since quittin.4    Smokeless tobacco: Never   Substance and Sexual Activity    Alcohol use: Not Currently     Alcohol/week: 2.0 standard drinks of alcohol     Types: 1 Cans of beer, 1 Shots of liquor per week    Drug use: Never    Sexual activity: Yes     Partners: Female     Birth control/protection: None   Social History Narrative    ** Merged History Encounter **         ** Data from: 22 Enc Dept: [facility]    Retired          ** Data from: 22 Enc Dept: [facility]  "PHARMACY-OUTPATIENT    ** Merged History Encounter **         ** Merged History Encounter **          Social Determinants of Health     Financial Resource Strain: Patient Declined (10/24/2023)    Overall Financial Resource Strain (CARDIA)     Difficulty of Paying Living Expenses: Patient declined   Food Insecurity: Patient Declined (10/24/2023)    Hunger Vital Sign     Worried About Running Out of Food in the Last Year: Patient declined     Ran Out of Food in the Last Year: Patient declined   Transportation Needs: Patient Declined (10/24/2023)    PRAPARE - Transportation     Lack of Transportation (Medical): Patient declined     Lack of Transportation (Non-Medical): Patient declined   Physical Activity: Unknown (10/24/2023)    Exercise Vital Sign     Days of Exercise per Week: Patient declined   Stress: Patient Declined (7/24/2023)    [employer] [place] [employer] [employer] - Occupational Stress Questionnaire     Feeling of Stress : Patient declined   Housing Stability: Unknown (10/24/2023)    Housing Stability Vital Sign     Unable to Pay for Housing in the Last Year: Patient refused     Number of Places Lived in the Last Year: 1     Unstable Housing in the Last Year: Patient refused     Medications/Allergies: See med card    Vitals:    08/14/24 1008   BP: 127/74   Pulse: 65   Weight: 65.8 kg (145 lb 1 oz)   Height: 6' 1" (1.854 m)   PainSc:   3   PainLoc: Abdomen     Physical exam:    GENERAL: A and O x3, the patient appears well groomed and is in no acute distress.  Skin: healing lesion on nose  HEENT: normocephalic, atraumatic  CARDIOVASCULAR:  RRR  LUNGS: non labored breathing  ABDOMEN: soft, nontender. No rebound   UPPER EXTREMITIES: Normal alignment, normal range of motion, no atrophy, no skin changes,  hair growth and nail growth normal and equal bilaterally. No swelling, no tenderness.    LOWER EXTREMITIES:  TTP left patella, swelling along medial and superior aspect. No redness or warmth.     moderate TTP " SCM on right. Pain with right lateral rotation. No midline cervical tenderness. Negative spurlings.   LUMBAR SPINE  Lumbar spine: ROM is full with flexion extension and oblique extension with no increased pain.    Lam's test causes no increased pain on either side.    Supine straight leg raise is negative bilaterally.    Internal and external rotation of the hip causes no increased pain on either side.  Myofascial exam: No tenderness to palpation across lumbar paraspinous muscles.    MENTAL STATUS: normal orientation, speech, language, and fund of knowledge for social situation.  Emotional state appropriate.    CRANIAL NERVES:  II:  PERRL bilaterally,   III,IV,VI: EOMI.    V:  Facial sensation equal bilaterally  VII:  Facial motor function normal.  VIII:  Hearing equal to finger rub bilaterally  IX/X: Gag normal, palate symmetric  XI:  Shoulder shrug equal, head turn equal  XII:  Tongue midline without fasciculations    MOTOR: Tone and bulk: normal bilateral upper and lower Strength: normal   SENSATION: Light touch and pinprick intact bilaterally  REFLEXES: normal, symmetric, nonbrisk.  Toes down, no clonus. No hoffmans.  GAIT: normal rise, base, steps, and arm swing.      Imaging:  Left knee xray 3/6/22  Mild tricompartmental degenerative osteoarthrosis with mild joint space narrowing and small osteophyte formation.  Subtle calcification highlighted cartilage consistent with chondrocalcinosis.     There is a small suprapatellar effusion.     Impression:     1. Mild tricompartmental degenerative osteoarthrosis  2. Chondrocalcinosis.  3. Small suprapatellar effusion.       Assessment:  Mr. Cerda is a 69 y.o. male with abdominal pain  1. Positive depression screening    2. Chronic pain disorder    3. Myofascial pain    4. DDD (degenerative disc disease), cervical    5. Osteoarthritis of right knee, unspecified osteoarthritis type            Plan:  1. Patient with long history of crohn's disease and chronic  abdominal pain.  Continue care with GI.  2. Tramadol 50 mg q 8 h prn.     reviewed.  No signs of aberrant behavior.  Previous UDS consistent.  Script provided for 3 months  3. Continue OTC lidocaine cream  4. Monitor progress from right sided peripheral nerve block.   5. F/u with orthopedics as needed  6. Reviewed cervical xray results . Discussed lumbar MBB vs ALLISON as well as PT. He will consider this.   7. I have used clinical judgement based on duration and functional status to consider definite necessity for treatment. Discuss with PCP  8. Robaxin 500 mg TID prn   9. F/u 3  months or sooner  All medication management was performed by Dr. Alvin Curry

## 2024-09-06 ENCOUNTER — TELEPHONE (OUTPATIENT)
Dept: GASTROENTEROLOGY | Facility: CLINIC | Age: 69
End: 2024-09-06
Payer: MEDICARE

## 2024-09-06 NOTE — TELEPHONE ENCOUNTER
Spoke to pt, advised pt GI clinic is not seeing any new IBD patients, advised pt he will need to be seen at main campus as previously recommended. Pt verbalized understanding.

## 2024-09-10 DIAGNOSIS — R10.13 EPIGASTRIC PAIN: ICD-10-CM

## 2024-09-12 RX ORDER — SUCRALFATE 1 G/1
1 TABLET ORAL
Qty: 120 TABLET | Refills: 1 | Status: SHIPPED | OUTPATIENT
Start: 2024-09-12

## 2024-09-27 ENCOUNTER — OFFICE VISIT (OUTPATIENT)
Dept: FAMILY MEDICINE | Facility: CLINIC | Age: 69
End: 2024-09-27
Payer: MEDICARE

## 2024-09-27 VITALS
OXYGEN SATURATION: 97 % | SYSTOLIC BLOOD PRESSURE: 130 MMHG | DIASTOLIC BLOOD PRESSURE: 70 MMHG | HEART RATE: 57 BPM | HEIGHT: 73 IN | BODY MASS INDEX: 19.79 KG/M2 | RESPIRATION RATE: 16 BRPM | WEIGHT: 149.31 LBS

## 2024-09-27 DIAGNOSIS — Z00.00 ENCOUNTER FOR PREVENTIVE HEALTH EXAMINATION: ICD-10-CM

## 2024-09-27 DIAGNOSIS — K50.00 CROHN'S DISEASE OF SMALL INTESTINE WITHOUT COMPLICATION: ICD-10-CM

## 2024-09-27 DIAGNOSIS — I82.5Z1 CHRONIC DEEP VEIN THROMBOSIS (DVT) OF DISTAL VEIN OF RIGHT LOWER EXTREMITY: ICD-10-CM

## 2024-09-27 DIAGNOSIS — Z00.00 ENCOUNTER FOR MEDICARE ANNUAL WELLNESS EXAM: Primary | ICD-10-CM

## 2024-09-27 DIAGNOSIS — Z79.01 CURRENT USE OF ANTICOAGULANT THERAPY: ICD-10-CM

## 2024-09-27 DIAGNOSIS — D69.2 SENILE PURPURA: ICD-10-CM

## 2024-09-27 DIAGNOSIS — Z12.11 COLON CANCER SCREENING: ICD-10-CM

## 2024-09-27 PROCEDURE — 99999 PR PBB SHADOW E&M-EST. PATIENT-LVL IV: CPT | Mod: PBBFAC,HCWC,, | Performed by: NURSE PRACTITIONER

## 2024-09-27 RX ORDER — METHOCARBAMOL 500 MG/1
500 TABLET, FILM COATED ORAL 3 TIMES DAILY
COMMUNITY
Start: 2024-09-12

## 2024-09-27 NOTE — PROGRESS NOTES
"  Tristin Cerda presented for a follow-up Medicare AWV today. The following components were reviewed and updated:    Medical history  Family History  Social history  Allergies and Current Medications  Health Risk Assessment  Health Maintenance  Care Team    **See Completed Assessments for Annual Wellness visit with in the encounter summary    The following assessments were completed:  Depression Screening  Cognitive function Screening  Timed Get Up Test  Whisper Test      Opioid documentation:      Patient does not have a current opioid prescription.          Vitals:    09/27/24 0921   BP: 130/70   BP Location: Left arm   Patient Position: Sitting   BP Method: Medium (Manual)   Pulse: (!) 57   Resp: 16   SpO2: 97%   Weight: 67.7 kg (149 lb 4.8 oz)   Height: 6' 1" (1.854 m)     Body mass index is 19.7 kg/m².       Physical Exam  Constitutional:       General: He is not in acute distress.     Appearance: Normal appearance.   Eyes:      Conjunctiva/sclera: Conjunctivae normal.      Comments: Corrective lenses   Cardiovascular:      Rate and Rhythm: Normal rate.   Pulmonary:      Effort: Pulmonary effort is normal. No respiratory distress.   Skin:     General: Skin is warm and dry.      Coloration: Skin is not jaundiced.      Findings: Bruising present.   Neurological:      Mental Status: He is alert and oriented to person, place, and time.   Psychiatric:         Mood and Affect: Mood normal.         Diagnoses and health risks identified today and associated recommendations/orders:  1. Encounter for Medicare annual wellness exam    2. Encounter for preventive health examination  Screenings performed, as noted above. Personal preventative testing needs reviewed.   3. Colon cancer screening  - Ambulatory referral/consult to Gastroenterology; Future    4. Crohn's disease of small intestine without complication  - Ambulatory referral/consult to Gastroenterology; Future    5. Chronic deep vein thrombosis (DVT) of distal " vein of right lower extremity    6. Current use of anticoagulant therapy  Monitor for s/sx of increased bruising/bleeding.   7. Senile purpura  Sunscreen/sun protective clothing, good skin moisturizer        Provided Tristin with a 5-10 year written screening schedule and personal prevention plan. Recommendations were developed using the USPSTF age appropriate recommendations. Education, counseling, and referrals were provided as needed.  After Visit Summary printed and given to patient which includes a list of additional screenings\tests needed.    Follow up for your next annual wellness visit.      Gladys Vega, ANISHA

## 2024-09-27 NOTE — PATIENT INSTRUCTIONS
Counseling and Referral of Other Preventative  (Italic type indicates deductible and co-insurance are waived)    Patient Name: Tristin Cerda  Today's Date: 9/27/2024    Arnaldo Erickson MD  [id number] [facility] [place] [address]  Phone: [phone]  Fax: [phone]  Health Maintenance         Date Due Completion Date    COVID-19 Vaccine (1) Never done ---    Shingles Vaccine (1 of 2) Never done ---    RSV Vaccine (Age 60+ and Pregnant patients) (1 - 1-dose 60+ series) Never done ---    Colorectal Cancer Screening 07/29/2021 7/29/2020    Override on 8/24/2012: Done ([facility]  (prep was poor, Incomplete)  report sent to scanning   kb)    Pneumococcal Vaccines (Age 65+) (3 of 3 - PPSV23 or PCV20) 12/26/2023 12/26/2018    PROSTATE-SPECIFIC ANTIGEN 06/06/2024 6/6/2023    Influenza Vaccine (1) 09/01/2024 10/27/2023    Lipid Panel 10/27/2028 10/27/2023    TETANUS VACCINE 09/29/2030 9/29/2020          Orders Placed This Encounter   Procedures    Ambulatory referral/consult to Gastroenterology       The following information is provided to all patients.  This information is to help you find resources for any of the problems found today that may be affecting your health:                  Living healthy guide: [place].gov    Understanding Diabetes: www.diabetes.org      Eating healthy: www.cdc.gov/healthyweight      CDC home safety checklist: www.cdc.gov/steadi/patient.html      Agency on Aging: [place].gov    Alcoholics anonymous (AA): www.aa.org      Physical Activity: www.gaby.nih.gov/lt9aaar      Tobacco use: [place].gov

## 2024-10-09 DIAGNOSIS — G89.29 CHRONIC ABDOMINAL PAIN: ICD-10-CM

## 2024-10-09 DIAGNOSIS — E53.8 B12 DEFICIENCY: ICD-10-CM

## 2024-10-09 DIAGNOSIS — K50.018 CROHN'S DISEASE OF SMALL INTESTINE WITH OTHER COMPLICATION: ICD-10-CM

## 2024-10-09 DIAGNOSIS — R10.9 CHRONIC ABDOMINAL PAIN: ICD-10-CM

## 2024-10-09 DIAGNOSIS — K90.821 SHORT BOWEL SYNDROME WITH COLON IN CONTINUITY: ICD-10-CM

## 2024-10-09 RX ORDER — CYANOCOBALAMIN 1000 UG/ML
1000 INJECTION, SOLUTION INTRAMUSCULAR; SUBCUTANEOUS
Qty: 3 ML | Refills: 3 | Status: SHIPPED | OUTPATIENT
Start: 2024-10-09

## 2024-10-09 RX ORDER — DICYCLOMINE HYDROCHLORIDE 20 MG/1
TABLET ORAL
Qty: 360 TABLET | Refills: 3 | Status: SHIPPED | OUTPATIENT
Start: 2024-10-09

## 2024-10-09 NOTE — TELEPHONE ENCOUNTER
Refill Routing Note   Medication(s) are not appropriate for processing by [facility] for the following reason(s):        Outside of protocol  Non-participating provider    [facility] action(s):  Route               Appointments  past 12m or future 3m with PCP    Date Provider   Last Visit   6/3/2024 Corinna Atkinson NP   Next Visit   10/9/2024 Corinna Atkinson NP   ED visits in past 90 days: 0        Note composed:4:56 PM 10/09/2024

## 2024-10-09 NOTE — TELEPHONE ENCOUNTER
Refill Routing Note   Medication(s) are not appropriate for processing by [facility] for the following reason(s):        Non-participating provider    [facility] action(s):  Route               Appointments  past 12m or future 3m with PCP    Date Provider   Last Visit   6/3/2024 Corinna Atkinson NP   Next Visit   Visit date not found Corinna Atkinson NP   ED visits in past 90 days: 0        Note composed:4:49 PM 10/09/2024

## 2024-11-06 ENCOUNTER — OFFICE VISIT (OUTPATIENT)
Dept: PAIN MEDICINE | Facility: CLINIC | Age: 69
End: 2024-11-06
Payer: MEDICARE

## 2024-11-06 VITALS
HEART RATE: 65 BPM | WEIGHT: 149.25 LBS | SYSTOLIC BLOOD PRESSURE: 105 MMHG | BODY MASS INDEX: 19.78 KG/M2 | DIASTOLIC BLOOD PRESSURE: 72 MMHG | HEIGHT: 73 IN

## 2024-11-06 DIAGNOSIS — G89.4 CHRONIC PAIN DISORDER: ICD-10-CM

## 2024-11-06 DIAGNOSIS — G89.29 CHRONIC ABDOMINAL PAIN: ICD-10-CM

## 2024-11-06 DIAGNOSIS — R10.9 CHRONIC ABDOMINAL PAIN: Primary | ICD-10-CM

## 2024-11-06 DIAGNOSIS — M25.562 ACUTE PAIN OF LEFT KNEE: ICD-10-CM

## 2024-11-06 DIAGNOSIS — G89.29 CHRONIC ABDOMINAL PAIN: Primary | ICD-10-CM

## 2024-11-06 DIAGNOSIS — Z13.31 POSITIVE DEPRESSION SCREENING: ICD-10-CM

## 2024-11-06 DIAGNOSIS — M17.11 OSTEOARTHRITIS OF RIGHT KNEE, UNSPECIFIED OSTEOARTHRITIS TYPE: ICD-10-CM

## 2024-11-06 DIAGNOSIS — M79.18 MYOFASCIAL PAIN: ICD-10-CM

## 2024-11-06 DIAGNOSIS — R10.9 CHRONIC ABDOMINAL PAIN: ICD-10-CM

## 2024-11-06 DIAGNOSIS — M50.30 DDD (DEGENERATIVE DISC DISEASE), CERVICAL: ICD-10-CM

## 2024-11-06 DIAGNOSIS — R10.13 EPIGASTRIC PAIN: ICD-10-CM

## 2024-11-06 PROCEDURE — 3074F SYST BP LT 130 MM HG: CPT | Mod: HCNC,CPTII,S$GLB, | Performed by: PHYSICIAN ASSISTANT

## 2024-11-06 PROCEDURE — 1125F AMNT PAIN NOTED PAIN PRSNT: CPT | Mod: HCNC,CPTII,S$GLB, | Performed by: PHYSICIAN ASSISTANT

## 2024-11-06 PROCEDURE — 3008F BODY MASS INDEX DOCD: CPT | Mod: HCNC,CPTII,S$GLB, | Performed by: PHYSICIAN ASSISTANT

## 2024-11-06 PROCEDURE — 1159F MED LIST DOCD IN RCRD: CPT | Mod: HCNC,CPTII,S$GLB, | Performed by: PHYSICIAN ASSISTANT

## 2024-11-06 PROCEDURE — 3078F DIAST BP <80 MM HG: CPT | Mod: HCNC,CPTII,S$GLB, | Performed by: PHYSICIAN ASSISTANT

## 2024-11-06 PROCEDURE — 99214 OFFICE O/P EST MOD 30 MIN: CPT | Mod: HCNC,S$GLB,, | Performed by: PHYSICIAN ASSISTANT

## 2024-11-06 PROCEDURE — 99999 PR PBB SHADOW E&M-EST. PATIENT-LVL III: CPT | Mod: PBBFAC,HCNC,, | Performed by: PHYSICIAN ASSISTANT

## 2024-11-06 RX ORDER — TRAMADOL HYDROCHLORIDE 50 MG/1
50 TABLET ORAL EVERY 8 HOURS PRN
Qty: 90 TABLET | Refills: 2 | Status: SHIPPED | OUTPATIENT
Start: 2024-11-24 | End: 2025-02-22

## 2024-11-06 NOTE — PROGRESS NOTES
PCP: Corinna Atkinson NP      CC: abdominal pain    Interval history: Mr. Cerda is a 69 y.o. male with an extensive history of crohn's disease who presents today for f/u s/p and medication refill. Continues to have neck pain worse with lateral rotation. He had updated imaging. Denies radicular pain. Robaxin is helpful. He has healed from right hip fracture last year.  He was taking Percocet 5-325 mg q 6 h for post op pain. Acute left knee pain resolved after left intra articular steroid injection with Dr. Villatoro.  Right knee RFA  provided 75% relief.    Abdominal pain remains stable.   He does report diarrhea at times but no blood stools. Reports anal pain and itching 3/2 chronic diarrhea.  He is currently being evaluated by gastroenterology.  OTC Lidocaine 2 % provides some minimal relief.  Compounding cream was too expensive.  He rates his pain 3/10.     Prior HPI:   Mr. Cerda is a 60 year old male with PMH of crohn's disease referred by Dr. Hansen.  Patient states being diagnosed with crohn's disease since the age of 14.    He has had multiple GI surgeries and large bowel and small bowel removals.  During that time, he was being managed by providers in [place].  He was TPN dependent for many years until about two years ago.  He is now stable on an oral diet.  He did not have a primary care physician nor a gastroenterologist for many years.  He is currently trying to establish care.  He has future appointment with Dr. Ch.  He states taking Demerol 50mg q8hrs as needed for pain. It has provided relief of his nausea and pain with diarrhea.  He was last prescribed Demerol in July 2014.  Since then, he has been bearing with the pain.  It is a sharp shooting pain in his mid abdomen.      ROS:  CONSTITUTIONAL: No fevers, chills, night sweats, wt. loss, appetite changes  SKIN: no rashes or itching  ENT: No head trauma, vision changes, or eye pain +Headaches  LYMPH NODES: None noticed   CV:  No chest pain, palpitations.   RESP: No shortness of breath, dyspnea on exertion, cough, wheezing, or hemoptysis  GI: No nausea, emesis, diarrhea, constipation, melena, hematochezia, pain.    : No dysuria, hematuria, urgency, or frequency   HEME: No easy bruising, bleeding problems  PSYCHIATRIC: No depression, anxiety, psychosis, hallucinations.  NEURO: No seizures, memory loss, dizziness or difficulty sleeping  MSK: no joint pain      Past Medical History:   Diagnosis Date    Anxiety     Basal cell carcinoma 07/2017    R forehead and R temple    Crohn's disease 1972    Hypertension     Short bowel syndrome 1994    TB (pulmonary tuberculosis) 1999    Vitamin B deficiency     Vitamin D deficiency      Past Surgical History:   Procedure Laterality Date    3 small bowel resections      1973, 1988, 1994    APPENDECTOMY      CHOLECYSTECTOMY      COLONOSCOPY      COLONOSCOPY N/A 02/14/2017    Procedure: COLONOSCOPY;  Surgeon: Nela Sanchez MD;  Location: [address]);  Service: Endoscopy;  Laterality: N/A;    COLONOSCOPY N/A 03/14/2017    Procedure: COLONOSCOPY;  Surgeon: Nela Sanchez MD;  Location: [address]);  Service: Endoscopy;  Laterality: N/A;  2 days clear liquids before procedure    COLONOSCOPY N/A 07/29/2020    Procedure: COLONOSCOPY;  Surgeon: Andrea Shaver MD;  Location: [facility];  Service: Endoscopy;  Laterality: N/A;    ESOPHAGOGASTRODUODENOSCOPY N/A 11/19/2019    Procedure: EGD (ESOPHAGOGASTRODUODENOSCOPY);  Surgeon: Andrea Shaver MD;  Location: [facility];  Service: Endoscopy;  Laterality: N/A;    INJECTION OF ANESTHETIC AGENT AROUND NERVE Right 10/20/2020    Procedure: Block, Nerve genicular;  Surgeon: Alvin Curry MD;  Location: [facility] OR;  Service: Pain Management;  Laterality: Right;  right knee    INTRAMEDULLARY RODDING OF FEMUR Right 10/20/2022    Procedure: INSERTION, INTRAMEDULLARY SRINIVASA, FEMUR;  Surgeon: Gómez Selby MD;  Location: [facility] OR;  Service: Orthopedics;   Laterality: Right;    KNEE SURGERY Right     possible osteomylitis    RADIOFREQUENCY ABLATION Right 2021    Procedure: Radiofrequency Ablation Right Knee Genicular RFA Coolief;  Surgeon: Alvin Curry MD;  Location: [facility] OR;  Service: Pain Management;  Laterality: Right;  Right knee     SMALL INTESTINE SURGERY      TUBE THORACOTOMY      scrapping of lung lining    UPPER GASTROINTESTINAL ENDOSCOPY       Family History   Problem Relation Name Age of Onset    Diabetes Mother jahaira     Stroke Mother jahaira     Diabetes Father amber     Kidney disease Father amber     Irritable bowel syndrome Cousin      Celiac disease Neg Hx      Cirrhosis Neg Hx      Colon cancer Neg Hx      Colon polyps Neg Hx      Cystic fibrosis Neg Hx      Esophageal cancer Neg Hx      Crohn's disease Neg Hx      Hemochromatosis Neg Hx      Inflammatory bowel disease Neg Hx      Liver cancer Neg Hx      Liver disease Neg Hx      Rectal cancer Neg Hx      Stomach cancer Neg Hx      Ulcerative colitis Neg Hx      Addison's disease Neg Hx      Melanoma Neg Hx      Psoriasis Neg Hx      Lupus Neg Hx      Eczema Neg Hx      Glaucoma Neg Hx      Macular degeneration Neg Hx       Social History     Socioeconomic History    Marital status: Other   Tobacco Use    Smoking status: Former     Current packs/day: 0.00     Average packs/day: 1 pack/day for 15.0 years (15.0 ttl pk-yrs)     Types: Cigarettes     Start date: 3/10/1980     Quit date: 3/10/1995     Years since quittin.6    Smokeless tobacco: Never   Substance and Sexual Activity    Alcohol use: Not Currently     Alcohol/week: 2.0 standard drinks of alcohol     Types: 1 Cans of beer, 1 Shots of liquor per week    Drug use: Never    Sexual activity: Yes     Partners: Female     Birth control/protection: None   Social History Narrative    ** Merged History Encounter **         ** Data from: 22 Enc Dept: [facility]    Retired          ** Data from: 22 Enc Dept: [facility]  "PHARMACY-OUTPATIENT    ** Merged History Encounter **         ** Merged History Encounter **          Social Drivers of Health     Financial Resource Strain: Low Risk  (9/27/2024)    Overall Financial Resource Strain (CARDIA)     Difficulty of Paying Living Expenses: Not hard at all   Food Insecurity: No Food Insecurity (9/27/2024)    Hunger Vital Sign     Worried About Running Out of Food in the Last Year: Never true     Ran Out of Food in the Last Year: Never true   Transportation Needs: No Transportation Needs (9/27/2024)    PRAPARE - Transportation     Lack of Transportation (Medical): No     Lack of Transportation (Non-Medical): No   Physical Activity: Sufficiently Active (9/27/2024)    Exercise Vital Sign     Days of Exercise per Week: 7 days     Minutes of Exercise per Session: 30 min   Stress: No Stress Concern Present (9/27/2024)    [employer] [place] [employer] - Occupational Stress Questionnaire     Feeling of Stress : Not at all   Housing Stability: Low Risk  (9/27/2024)    Housing Stability Vital Sign     Unable to Pay for Housing in the Last Year: No     Homeless in the Last Year: No     Medications/Allergies: See med card    Vitals:    11/06/24 1012   BP: 105/72   Pulse: 65   Weight: 67.7 kg (149 lb 4 oz)   Height: 6' 1" (1.854 m)   PainSc:   2   PainLoc: Abdomen     Physical exam:    GENERAL: A and O x3, the patient appears well groomed and is in no acute distress.  Skin: healing lesion on nose  HEENT: normocephalic, atraumatic  CARDIOVASCULAR:  RRR  LUNGS: non labored breathing  ABDOMEN: soft, nontender. No rebound   UPPER EXTREMITIES: Normal alignment, normal range of motion, no atrophy, no skin changes,  hair growth and nail growth normal and equal bilaterally. No swelling, no tenderness.    LOWER EXTREMITIES:  TTP left patella, swelling along medial and superior aspect. No redness or warmth.     moderate TTP SCM on right. Pain with right lateral rotation. No midline cervical " tenderness. Negative spurlings.   LUMBAR SPINE  Lumbar spine: ROM is full with flexion extension and oblique extension with no increased pain.    Lam's test causes no increased pain on either side.    Supine straight leg raise is negative bilaterally.    Internal and external rotation of the hip causes no increased pain on either side.  Myofascial exam: No tenderness to palpation across lumbar paraspinous muscles.    MENTAL STATUS: normal orientation, speech, language, and fund of knowledge for social situation.  Emotional state appropriate.    CRANIAL NERVES:  II:  PERRL bilaterally,   III,IV,VI: EOMI.    V:  Facial sensation equal bilaterally  VII:  Facial motor function normal.  VIII:  Hearing equal to finger rub bilaterally  IX/X: Gag normal, palate symmetric  XI:  Shoulder shrug equal, head turn equal  XII:  Tongue midline without fasciculations    MOTOR: Tone and bulk: normal bilateral upper and lower Strength: normal   SENSATION: Light touch and pinprick intact bilaterally  REFLEXES: normal, symmetric, nonbrisk.  Toes down, no clonus. No hoffmans.  GAIT: normal rise, base, steps, and arm swing.      Imaging:  Left knee xray 3/6/22  Mild tricompartmental degenerative osteoarthrosis with mild joint space narrowing and small osteophyte formation.  Subtle calcification highlighted cartilage consistent with chondrocalcinosis.     There is a small suprapatellar effusion.     Impression:     1. Mild tricompartmental degenerative osteoarthrosis  2. Chondrocalcinosis.  3. Small suprapatellar effusion.       Assessment:  Mr. Cerda is a 69 y.o. male with abdominal pain  1. Chronic abdominal pain    2. Positive depression screening    3. Myofascial pain    4. DDD (degenerative disc disease), cervical    5. Osteoarthritis of right knee, unspecified osteoarthritis type    6. Acute pain of left knee        Plan:  1. Patient with long history of crohn's disease and chronic abdominal pain.  Continue care with GI.  2.  Tramadol 50 mg q 8 h prn.     reviewed.  No signs of aberrant behavior.  Previous UDS consistent.  Script provided for 3 months  3. Continue OTC lidocaine cream  4. Monitor progress from right sided peripheral nerve block.   5. F/u with orthopedics as needed  6. Reviewed cervical xray results . Discussed lumbar MBB vs ALLISON as well as PT. He will consider this.   7. I have used clinical judgement based on duration and functional status to consider definite necessity for treatment. Discuss with PCP  8. Robaxin 500 mg TID prn   9. F/u 3  months or sooner  All medication management was performed by Dr. Alvin Curry

## 2025-01-07 ENCOUNTER — PATIENT MESSAGE (OUTPATIENT)
Dept: PAIN MEDICINE | Facility: CLINIC | Age: 70
End: 2025-01-07
Payer: MEDICARE

## 2025-01-07 DIAGNOSIS — R10.13 EPIGASTRIC PAIN: ICD-10-CM

## 2025-01-07 RX ORDER — SUCRALFATE 1 G/1
1 TABLET ORAL
Qty: 120 TABLET | Refills: 1 | Status: SHIPPED | OUTPATIENT
Start: 2025-01-07

## 2025-01-07 RX ORDER — METHOCARBAMOL 500 MG/1
500 TABLET, FILM COATED ORAL 3 TIMES DAILY
Qty: 90 TABLET | Refills: 0 | Status: SHIPPED | OUTPATIENT
Start: 2025-01-07

## 2025-01-07 NOTE — TELEPHONE ENCOUNTER
Refill Routing Note   Medication(s) are not appropriate for processing by [facility] for the following reason(s):        Outside of protocol  Non-participating provider    [facility] action(s):  Route               Appointments  past 12m or future 3m with PCP    Date Provider   Last Visit   6/3/2024 Corinna Atkinson NP   Next Visit   Visit date not found Corinna Atkinson NP   ED visits in past 90 days: 0        Note composed:12:01 PM 01/07/2025

## 2025-01-27 ENCOUNTER — OFFICE VISIT (OUTPATIENT)
Dept: PAIN MEDICINE | Facility: CLINIC | Age: 70
End: 2025-01-27
Payer: MEDICARE

## 2025-01-27 VITALS
HEIGHT: 73 IN | SYSTOLIC BLOOD PRESSURE: 127 MMHG | DIASTOLIC BLOOD PRESSURE: 70 MMHG | BODY MASS INDEX: 19.78 KG/M2 | HEART RATE: 67 BPM | WEIGHT: 149.25 LBS

## 2025-01-27 DIAGNOSIS — G89.29 CHRONIC ABDOMINAL PAIN: ICD-10-CM

## 2025-01-27 DIAGNOSIS — R10.9 CHRONIC ABDOMINAL PAIN: ICD-10-CM

## 2025-01-27 DIAGNOSIS — G89.4 CHRONIC PAIN DISORDER: ICD-10-CM

## 2025-01-27 DIAGNOSIS — M50.30 DDD (DEGENERATIVE DISC DISEASE), CERVICAL: ICD-10-CM

## 2025-01-27 DIAGNOSIS — Z79.891 CHRONIC USE OF OPIATE FOR THERAPEUTIC PURPOSE: Primary | ICD-10-CM

## 2025-01-27 DIAGNOSIS — M79.18 MYOFASCIAL PAIN: ICD-10-CM

## 2025-01-27 PROCEDURE — 99999 PR PBB SHADOW E&M-EST. PATIENT-LVL III: CPT | Mod: PBBFAC,HCNC,, | Performed by: PHYSICIAN ASSISTANT

## 2025-01-27 PROCEDURE — 80359 METHYLENEDIOXYAMPHETAMINES: CPT | Mod: HCNC | Performed by: PHYSICIAN ASSISTANT

## 2025-01-27 RX ORDER — TRAMADOL HYDROCHLORIDE 50 MG/1
50 TABLET ORAL EVERY 8 HOURS PRN
Qty: 90 TABLET | Refills: 1 | Status: SHIPPED | OUTPATIENT
Start: 2025-02-21 | End: 2025-04-22

## 2025-01-27 NOTE — PROGRESS NOTES
PCP: Corinna Atkinson NP      CC: abdominal pain    Interval history: Mr. Cerda is a 70 y.o. male with an extensive history of crohn's disease who presents today for f/u s/p and medication refill. Continues to have neck pain worse with lateral rotation. He had updated imaging. Denies radicular pain. Robaxin is helpful. He has healed from right hip fracture last year.  He was taking Percocet 5-325 mg q 6 h for post op pain. Acute left knee pain resolved after left intra articular steroid injection with Dr. Villatoro.  Right knee RFA  provided 75% relief.    Abdominal pain remains stable.   He does report diarrhea at times but no blood stools. Reports anal pain and itching 3/2 chronic diarrhea.  He is currently being evaluated by gastroenterology.  OTC Lidocaine 2 % provides some minimal relief.  Compounding cream was too expensive.  He rates his pain 2/10.     Prior HPI:   Mr. Cerda is a 60 year old male with PMH of crohn's disease referred by Dr. Hansen.  Patient states being diagnosed with crohn's disease since the age of 14.    He has had multiple GI surgeries and large bowel and small bowel removals.  During that time, he was being managed by providers in [place].  He was TPN dependent for many years until about two years ago.  He is now stable on an oral diet.  He did not have a primary care physician nor a gastroenterologist for many years.  He is currently trying to establish care.  He has future appointment with Dr. Ch.  He states taking Demerol 50mg q8hrs as needed for pain. It has provided relief of his nausea and pain with diarrhea.  He was last prescribed Demerol in July 2014.  Since then, he has been bearing with the pain.  It is a sharp shooting pain in his mid abdomen.      ROS:  CONSTITUTIONAL: No fevers, chills, night sweats, wt. loss, appetite changes  SKIN: no rashes or itching  ENT: No head trauma, vision changes, or eye pain +Headaches  LYMPH NODES: None noticed   CV:  No chest pain, palpitations.   RESP: No shortness of breath, dyspnea on exertion, cough, wheezing, or hemoptysis  GI: No nausea, emesis, diarrhea, constipation, melena, hematochezia, pain.    : No dysuria, hematuria, urgency, or frequency   HEME: No easy bruising, bleeding problems  PSYCHIATRIC: No depression, anxiety, psychosis, hallucinations.  NEURO: No seizures, memory loss, dizziness or difficulty sleeping  MSK: no joint pain      Past Medical History:   Diagnosis Date    Anxiety     Basal cell carcinoma 07/2017    R forehead and R temple    Crohn's disease 1972    Hypertension     Short bowel syndrome 1994    TB (pulmonary tuberculosis) 1999    Vitamin B deficiency     Vitamin D deficiency      Past Surgical History:   Procedure Laterality Date    3 small bowel resections      1973, 1988, 1994    APPENDECTOMY      CHOLECYSTECTOMY      COLONOSCOPY      COLONOSCOPY N/A 02/14/2017    Procedure: COLONOSCOPY;  Surgeon: Nela Sanchez MD;  Location: [address]);  Service: Endoscopy;  Laterality: N/A;    COLONOSCOPY N/A 03/14/2017    Procedure: COLONOSCOPY;  Surgeon: Nela Sanchez MD;  Location: [address]);  Service: Endoscopy;  Laterality: N/A;  2 days clear liquids before procedure    COLONOSCOPY N/A 07/29/2020    Procedure: COLONOSCOPY;  Surgeon: Andrea Shaver MD;  Location: [facility];  Service: Endoscopy;  Laterality: N/A;    ESOPHAGOGASTRODUODENOSCOPY N/A 11/19/2019    Procedure: EGD (ESOPHAGOGASTRODUODENOSCOPY);  Surgeon: Andrea Shaver MD;  Location: [facility];  Service: Endoscopy;  Laterality: N/A;    INJECTION OF ANESTHETIC AGENT AROUND NERVE Right 10/20/2020    Procedure: Block, Nerve genicular;  Surgeon: Alvin Curry MD;  Location: [facility] OR;  Service: Pain Management;  Laterality: Right;  right knee    INTRAMEDULLARY RODDING OF FEMUR Right 10/20/2022    Procedure: INSERTION, INTRAMEDULLARY SRINIVASA, FEMUR;  Surgeon: Gómez Selby MD;  Location: [facility] OR;  Service: Orthopedics;   Laterality: Right;    KNEE SURGERY Right     possible osteomylitis    RADIOFREQUENCY ABLATION Right 2021    Procedure: Radiofrequency Ablation Right Knee Genicular RFA Coolief;  Surgeon: Alvin Curry MD;  Location: [facility] OR;  Service: Pain Management;  Laterality: Right;  Right knee     SMALL INTESTINE SURGERY      TUBE THORACOTOMY      scrapping of lung lining    UPPER GASTROINTESTINAL ENDOSCOPY       Family History   Problem Relation Name Age of Onset    Diabetes Mother jahaira     Stroke Mother jahaira     Diabetes Father amber     Kidney disease Father amber     Irritable bowel syndrome Cousin      Celiac disease Neg Hx      Cirrhosis Neg Hx      Colon cancer Neg Hx      Colon polyps Neg Hx      Cystic fibrosis Neg Hx      Esophageal cancer Neg Hx      Crohn's disease Neg Hx      Hemochromatosis Neg Hx      Inflammatory bowel disease Neg Hx      Liver cancer Neg Hx      Liver disease Neg Hx      Rectal cancer Neg Hx      Stomach cancer Neg Hx      Ulcerative colitis Neg Hx      Addison's disease Neg Hx      Melanoma Neg Hx      Psoriasis Neg Hx      Lupus Neg Hx      Eczema Neg Hx      Glaucoma Neg Hx      Macular degeneration Neg Hx       Social History     Socioeconomic History    Marital status: Other   Tobacco Use    Smoking status: Former     Current packs/day: 0.00     Average packs/day: 1 pack/day for 15.0 years (15.0 ttl pk-yrs)     Types: Cigarettes     Start date: 3/10/1980     Quit date: 3/10/1995     Years since quittin.9    Smokeless tobacco: Never   Substance and Sexual Activity    Alcohol use: Not Currently     Alcohol/week: 2.0 standard drinks of alcohol     Types: 1 Cans of beer, 1 Shots of liquor per week    Drug use: Never    Sexual activity: Yes     Partners: Female     Birth control/protection: None   Social History Narrative    ** Merged History Encounter **         ** Data from: 22 Enc Dept: [facility]    Retired          ** Data from: 22 Enc Dept: [facility]  "PHARMACY-OUTPATIENT    ** Merged History Encounter **         ** Merged History Encounter **          Social Drivers of Health     Financial Resource Strain: Low Risk  (9/27/2024)    Overall Financial Resource Strain (CARDIA)     Difficulty of Paying Living Expenses: Not hard at all   Food Insecurity: No Food Insecurity (9/27/2024)    Hunger Vital Sign     Worried About Running Out of Food in the Last Year: Never true     Ran Out of Food in the Last Year: Never true   Transportation Needs: No Transportation Needs (9/27/2024)    PRAPARE - Transportation     Lack of Transportation (Medical): No     Lack of Transportation (Non-Medical): No   Physical Activity: Sufficiently Active (9/27/2024)    Exercise Vital Sign     Days of Exercise per Week: 7 days     Minutes of Exercise per Session: 30 min   Stress: No Stress Concern Present (9/27/2024)    [employer] [place] [employer] - Occupational Stress Questionnaire     Feeling of Stress : Not at all   Housing Stability: Low Risk  (9/27/2024)    Housing Stability Vital Sign     Unable to Pay for Housing in the Last Year: No     Homeless in the Last Year: No     Medications/Allergies: See med card    Vitals:    01/27/25 0921   BP: 127/70   Pulse: 67   Weight: 67.7 kg (149 lb 4 oz)   Height: 6' 1" (1.854 m)   PainSc:   2   PainLoc: Abdomen     Physical exam:    GENERAL: A and O x3, the patient appears well groomed and is in no acute distress.  Skin: healing lesion on nose  HEENT: normocephalic, atraumatic  CARDIOVASCULAR:  RRR  LUNGS: non labored breathing  ABDOMEN: soft, nontender. No rebound   UPPER EXTREMITIES: Normal alignment, normal range of motion, no atrophy, no skin changes,  hair growth and nail growth normal and equal bilaterally. No swelling, no tenderness.    LOWER EXTREMITIES:  TTP left patella, swelling along medial and superior aspect. No redness or warmth.     moderate TTP SCM on right. Pain with right lateral rotation. No midline cervical " tenderness. Negative spurlings.   LUMBAR SPINE  Lumbar spine: ROM is full with flexion extension and oblique extension with no increased pain.    Lam's test causes no increased pain on either side.    Supine straight leg raise is negative bilaterally.    Internal and external rotation of the hip causes no increased pain on either side.  Myofascial exam: No tenderness to palpation across lumbar paraspinous muscles.    MENTAL STATUS: normal orientation, speech, language, and fund of knowledge for social situation.  Emotional state appropriate.    CRANIAL NERVES:  II:  PERRL bilaterally,   III,IV,VI: EOMI.    V:  Facial sensation equal bilaterally  VII:  Facial motor function normal.  VIII:  Hearing equal to finger rub bilaterally  IX/X: Gag normal, palate symmetric  XI:  Shoulder shrug equal, head turn equal  XII:  Tongue midline without fasciculations    MOTOR: Tone and bulk: normal bilateral upper and lower Strength: normal   SENSATION: Light touch and pinprick intact bilaterally  REFLEXES: normal, symmetric, nonbrisk.  Toes down, no clonus. No hoffmans.  GAIT: normal rise, base, steps, and arm swing.      Imaging:  Left knee xray 3/6/22  Mild tricompartmental degenerative osteoarthrosis with mild joint space narrowing and small osteophyte formation.  Subtle calcification highlighted cartilage consistent with chondrocalcinosis.     There is a small suprapatellar effusion.     Impression:     1. Mild tricompartmental degenerative osteoarthrosis  2. Chondrocalcinosis.  3. Small suprapatellar effusion.       Assessment:  Mr. Cerda is a 70 y.o. male with abdominal pain  1. Chronic use of opiate for therapeutic purpose    2. Chronic pain disorder    3. Chronic abdominal pain    4. DDD (degenerative disc disease), cervical    5. Myofascial pain        Plan:  1. Patient with long history of crohn's disease and chronic abdominal pain.  Continue care with GI.  2. Tramadol 50 mg q 8 h prn.     reviewed.  No signs of  aberrant behavior.  Previous UDS consistent.  Script provided for 3 months  3. Continue OTC lidocaine cream  4. Monitor progress from right sided peripheral nerve block.   5. F/u with orthopedics as needed  6. Reviewed cervical xray results . Discussed lumbar MBB vs ALLISON as well as PT. He will consider this.   7. I have used clinical judgement based on duration and functional status to consider definite necessity for treatment. Discuss with PCP  8. Robaxin 500 mg TID prn Call for refill  9. F/u 3  months or sooner  All medication management was performed by Dr. Alvin Curry

## 2025-02-01 LAB
1OH-MIDAZOLAM UR QL SCN: NOT DETECTED
6MAM UR QL: NOT DETECTED
7AMINOCLONAZEPAM UR QL: NOT DETECTED
A-OH ALPRAZ UR QL: NOT DETECTED
ALPRAZ UR QL: NOT DETECTED
AMPHET UR QL SCN: NOT DETECTED
ANNOTATION COMMENT IMP: NORMAL
BARBITURATES UR QL: NEGATIVE
BUPRENORPHINE UR QL: NOT DETECTED
BZE UR QL: NEGATIVE
CARBOXYTHC UR QL: NEGATIVE
CARISOPRODOL UR QL: NEGATIVE
CLONAZEPAM UR QL: NOT DETECTED
CODEINE UR QL: NOT DETECTED
CREAT UR-MCNC: 45.7 MG/DL (ref 20–400)
DIAZEPAM UR QL: NOT DETECTED
ETHYL GLUCURONIDE UR QL: NEGATIVE
FENTANYL UR QL: NOT DETECTED
GABAPENTIN UR QL CFM: NOT DETECTED
HYDROCODONE UR QL: NOT DETECTED
HYDROMORPHONE UR QL: NOT DETECTED
LORAZEPAM UR QL: NOT DETECTED
MDA UR QL: NOT DETECTED
MDEA UR QL: NOT DETECTED
MDMA UR QL: NOT DETECTED
ME-PHENIDATE UR QL: NOT DETECTED
METHADONE UR QL: NEGATIVE
METHAMPHET UR QL: NOT DETECTED
MIDAZOLAM UR QL SCN: NOT DETECTED
MORPHINE UR QL: NOT DETECTED
NALOXONE UR QL CFM: NOT DETECTED
NORBUPRENORPHINE UR QL CFM: NOT DETECTED
NORDIAZEPAM UR QL: NOT DETECTED
NORFENTANYL UR QL: NOT DETECTED
NORHYDROCODONE UR QL CFM: NOT DETECTED
NORMEPERIDINE UR QL CFM: NOT DETECTED
NOROXYCODONE UR QL CFM: NOT DETECTED
NOROXYMORPHONE UR QL SCN: NOT DETECTED
OXAZEPAM UR QL: NOT DETECTED
OXYCODONE UR QL: NOT DETECTED
OXYMORPHONE UR QL: NOT DETECTED
PATHOLOGY STUDY: NORMAL
PCP UR QL: NEGATIVE
PHENTERMINE UR QL: NOT DETECTED
PREGABALIN UR QL CFM: NOT DETECTED
SERVICE CMNT-IMP: NORMAL
TAPENTADOL UR QL SCN: NOT DETECTED
TAPENTADOL UR QL SCN: NOT DETECTED
TEMAZEPAM UR QL: NOT DETECTED
TRAMADOL UR QL: NORMAL
ZOLPIDEM PHENYL-4-CARB UR QL SCN: NOT DETECTED
ZOLPIDEM UR QL: NOT DETECTED

## 2025-02-05 RX ORDER — APIXABAN 5 MG/1
5 TABLET, FILM COATED ORAL 2 TIMES DAILY
Qty: 180 TABLET | Refills: 3 | Status: SHIPPED | OUTPATIENT
Start: 2025-02-05

## 2025-02-05 NOTE — TELEPHONE ENCOUNTER
Refill Routing Note   Medication(s) are not appropriate for processing by [facility] for the following reason(s):        Outside of protocol  Non-participating provider    [facility] action(s):  Route               Appointments  past 12m or future 3m with PCP    Date Provider   Last Visit   6/3/2024 Corinna Atkinson NP   Next Visit   Visit date not found Corinna Atkinson NP   ED visits in past 90 days: 0        Note composed:9:20 AM 02/05/2025

## 2025-02-11 ENCOUNTER — OFFICE VISIT (OUTPATIENT)
Dept: DERMATOLOGY | Facility: CLINIC | Age: 70
End: 2025-02-11
Payer: MEDICARE

## 2025-02-11 VITALS — WEIGHT: 149.25 LBS | BODY MASS INDEX: 19.78 KG/M2 | HEIGHT: 73 IN

## 2025-02-11 DIAGNOSIS — D22.9 MULTIPLE BENIGN NEVI: ICD-10-CM

## 2025-02-11 DIAGNOSIS — D18.01 CHERRY ANGIOMA: ICD-10-CM

## 2025-02-11 DIAGNOSIS — L21.9 SEBORRHEIC DERMATITIS: ICD-10-CM

## 2025-02-11 DIAGNOSIS — L82.1 SEBORRHEIC KERATOSES: ICD-10-CM

## 2025-02-11 DIAGNOSIS — L81.4 SOLAR LENTIGO: ICD-10-CM

## 2025-02-11 DIAGNOSIS — Z08 ENCOUNTER FOR FOLLOW-UP SURVEILLANCE OF SKIN CANCER: ICD-10-CM

## 2025-02-11 DIAGNOSIS — L57.0 ACTINIC KERATOSES: ICD-10-CM

## 2025-02-11 DIAGNOSIS — D48.5 NEOPLASM OF UNCERTAIN BEHAVIOR OF SKIN: Primary | ICD-10-CM

## 2025-02-11 DIAGNOSIS — L57.8 ACTINIC SKIN DAMAGE: ICD-10-CM

## 2025-02-11 DIAGNOSIS — Z85.828 ENCOUNTER FOR FOLLOW-UP SURVEILLANCE OF SKIN CANCER: ICD-10-CM

## 2025-02-11 PROCEDURE — 17000 DESTRUCT PREMALG LESION: CPT | Mod: XS,S$GLB,, | Performed by: DERMATOLOGY

## 2025-02-11 PROCEDURE — 1159F MED LIST DOCD IN RCRD: CPT | Mod: CPTII,S$GLB,, | Performed by: DERMATOLOGY

## 2025-02-11 PROCEDURE — 3288F FALL RISK ASSESSMENT DOCD: CPT | Mod: CPTII,S$GLB,, | Performed by: DERMATOLOGY

## 2025-02-11 PROCEDURE — 1101F PT FALLS ASSESS-DOCD LE1/YR: CPT | Mod: CPTII,S$GLB,, | Performed by: DERMATOLOGY

## 2025-02-11 PROCEDURE — 11102 TANGNTL BX SKIN SINGLE LES: CPT | Mod: S$GLB,,, | Performed by: DERMATOLOGY

## 2025-02-11 PROCEDURE — 99213 OFFICE O/P EST LOW 20 MIN: CPT | Mod: 25,S$GLB,, | Performed by: DERMATOLOGY

## 2025-02-11 PROCEDURE — 11103 TANGNTL BX SKIN EA SEP/ADDL: CPT | Mod: S$GLB,,, | Performed by: DERMATOLOGY

## 2025-02-11 PROCEDURE — 17003 DESTRUCT PREMALG LES 2-14: CPT | Mod: S$GLB,,, | Performed by: DERMATOLOGY

## 2025-02-11 PROCEDURE — 1160F RVW MEDS BY RX/DR IN RCRD: CPT | Mod: CPTII,S$GLB,, | Performed by: DERMATOLOGY

## 2025-02-11 PROCEDURE — 88305 TISSUE EXAM BY PATHOLOGIST: CPT | Mod: HCNC | Performed by: PATHOLOGY

## 2025-02-11 PROCEDURE — 3008F BODY MASS INDEX DOCD: CPT | Mod: CPTII,S$GLB,, | Performed by: DERMATOLOGY

## 2025-02-11 RX ORDER — HYDROCORTISONE 25 MG/ML
LOTION TOPICAL 2 TIMES DAILY
Qty: 59 ML | Refills: 5 | Status: SHIPPED | OUTPATIENT
Start: 2025-02-11

## 2025-02-11 NOTE — PROGRESS NOTES
Subjective:      Patient ID:  Tristin Cerda is a 70 y.o. male who presents for   Chief Complaint   Patient presents with    Skin Check     UBSC      LOV 10/11/23 BCC back     Patient here today for UBSC.  C/O spots to posterior neck, no symptoms.     Derm Hx:  SKIN, LEFT SCAPULA, SHAVE BIOPSY:   - Basal cell carcinoma, superficial and nodular-type, transected.      LEFT NECK -- mohs with Dr. REIS  - Basal cell carcinoma, superficial and nodular-type with a focal micronodular and infiltrative pattern, transected.   - left nasal dorsum- BCC- mohs  - left inferior shoulder- bcc- E&S  - left superiror shoulder- sup bcc- ED&C  - left upper lateral back- BCC-ED&C  - left upper medial back- BCC_ E&S     Current Outpatient Medications:   ·  aspirin-acetaminophen-caffeine 250-250-65 mg (EXCEDRIN MIGRAINE) 250-250-65 mg per tablet, Take 1 tablet by mouth every 8 (eight) hours as needed for Pain. , Disp: , Rfl:   ·  cyanocobalamin 1,000 mcg/mL injection, Inject 1 mL (1,000 mcg total) into the skin every 30 days., Disp: 3 mL, Rfl: 3  ·  dicyclomine (BENTYL) 20 mg tablet, TAKE 1 TABLET BY MOUTH FOUR TIMES DAILY, Disp: 360 tablet, Rfl: 3  ·  ELIQUIS 5 mg Tab, TAKE 1 TABLET TWICE DAILY, Disp: 180 tablet, Rfl: 3  ·  hydrocortisone 2.5 % lotion, Apply topically 2 (two) times daily., Disp: 59 mL, Rfl: 1  ·  meclizine (ANTIVERT) 12.5 mg tablet, Take 1 tablet (12.5 mg total) by mouth 3 (three) times daily as needed for Dizziness., Disp: 90 tablet, Rfl: 3  ·  methocarbamoL (ROBAXIN) 500 MG Tab, Take 1 tablet (500 mg total) by mouth 3 (three) times daily., Disp: 90 tablet, Rfl: 0  ·  ondansetron (ZOFRAN) 8 MG tablet, Take 1 tablet (8 mg total) by mouth every 12 (twelve) hours as needed for Nausea., Disp: 60 tablet, Rfl: 5  ·  pantoprazole (PROTONIX) 40 MG tablet, Take 1 tablet (40 mg total) by mouth once daily., Disp: 30 tablet, Rfl: 3  ·  sucralfate (CARAFATE) 1 gram tablet, Take 1 tablet (1 g total) by mouth 4 (four) times daily  "before meals and nightly., Disp: 120 tablet, Rfl: 1  ·  syringe with needle 1 mL 28 gauge x 1/2" Syrg, 1 Device by Misc.(Non-Drug; Combo Route) route every 30 days., Disp: 100 each, Rfl: 11  ·  [START ON 2/21/2025] traMADoL (ULTRAM) 50 mg tablet, Take 1 tablet (50 mg total) by mouth every 8 (eight) hours as needed for Pain., Disp: 90 tablet, Rfl: 1  No current facility-administered medications for this visit.    Facility-Administered Medications Ordered in Other Visits:   ·  lactated ringers infusion, , Intravenous, Continuous, Vu, Alvin CADENA MD          Review of Systems   Constitutional:  Negative for fever, chills and fatigue.   Respiratory:  Negative for cough and shortness of breath.    Gastrointestinal:  Negative for nausea and vomiting.   Skin:  Positive for activity-related sunscreen use and wears hat. Negative for daily sunscreen use.   Hematologic/Lymphatic: Bruises/bleeds easily (eliquis).       Objective:   Physical Exam   Constitutional: He appears well-developed and well-nourished. No distress.   Neurological: He is alert and oriented to person, place, and time. He is not disoriented.   Psychiatric: He has a normal mood and affect.   Skin:   Areas Examined (abnormalities noted in diagram):   Scalp / Hair Palpated and Inspected  Head / Face Inspection Performed  Neck Inspection Performed  Chest / Axilla Inspection Performed  Abdomen Inspection Performed  Back Inspection Performed  RUE Inspected  LUE Inspection Performed  Nails and Digits Inspection Performed                     Diagram Legend     Erythematous scaling macule/papule c/w actinic keratosis       Vascular papule c/w angioma      Pigmented verrucoid papule/plaque c/w seborrheic keratosis      Yellow umbilicated papule c/w sebaceous hyperplasia      Irregularly shaped tan macule c/w lentigo     1-2 mm smooth white papules consistent with Milia      Movable subcutaneous cyst with punctum c/w epidermal inclusion cyst      Subcutaneous movable cyst " c/w pilar cyst      Firm pink to brown papule c/w dermatofibroma      Pedunculated fleshy papule(s) c/w skin tag(s)      Evenly pigmented macule c/w junctional nevus     Mildly variegated pigmented, slightly irregular-bordered macule c/w mildly atypical nevus      Flesh colored to evenly pigmented papule c/w intradermal nevus       Pink pearly papule/plaque c/w basal cell carcinoma      Erythematous hyperkeratotic cursted plaque c/w SCC      Surgical scar with no sign of skin cancer recurrence      Open and closed comedones      Inflammatory papules and pustules      Verrucoid papule consistent consistent with wart     Erythematous eczematous patches and plaques     Dystrophic onycholytic nail with subungual debris c/w onychomycosis     Umbilicated papule    Erythematous-base heme-crusted tan verrucoid plaque consistent with inflamed seborrheic keratosis     Erythematous Silvery Scaling Plaque c/w Psoriasis     See annotation                    Assessment / Plan:      Pathology Orders:       Normal Orders This Visit    Specimen to Pathology, Dermatology     Comments:    Number of Specimens:->4  ------------------------->-------------------------  Spec 1 Procedure:->Biopsy  Spec 1 Clinical Impression:->HAK vs SCC  Spec 1 Source:->R dorsal hand  ------------------------->-------------------------  Spec 2 Procedure:->Biopsy  Spec 2 Clinical Impression:->SCC vs other  Spec 2 Source:->midline forehead  ------------------------->-------------------------  Spec 3 Procedure:->Biopsy  Spec 3 Clinical Impression:->SCC vs other  Spec 3 Source:->left neck  ------------------------->-------------------------  Spec 4 Procedure:->Biopsy  Spec 4 Clinical Impression:->BCC vs other  Spec 4 Source:->midline upper back    Questions:    Procedure Type: Dermatology and skin neoplasms    Number of Specimens: 4    ------------------------: -------------------------    Spec 1 Procedure: Biopsy    Spec 1 Clinical Impression: HAK vs SCC     Spec 1 Source: R dorsal hand    ------------------------: -------------------------    Spec 2 Procedure: Biopsy    Spec 2 Clinical Impression: SCC vs other    Spec 2 Source: midline forehead    ------------------------: -------------------------    Spec 3 Procedure: Biopsy    Spec 3 Clinical Impression: SCC vs other    Spec 3 Source: left neck    ------------------------: -------------------------    Spec 4 Procedure: Biopsy    Spec 4 Clinical Impression: BCC vs other    Spec 4 Source: midline upper back    Release to patient:           Neoplasm of uncertain behavior of skin  -     Specimen to Pathology, Dermatology  Shave biopsy procedure note:x4    Shave biopsy performed after verbal consent including risk of infection, scar, recurrence, need for additional treatment of site. Area prepped with alcohol, anesthetized with approximately 1.0cc of 1% lidocaine with epinephrine. Lesional tissue shaved with razor blade. Hemostasis achieved with application of aluminum chloride followed by hyfrecation. No complications. Dressing applied. Wound care explained.    Actinic keratoses  Cryosurgery Procedure Note    Verbal consent from the patient is obtained and the patient is aware of the precancerous quality and need for treatment of these lesions. Liquid nitrogen cryosurgery is applied to the 9 actinic keratoses, as detailed in the physical exam, to produce a freeze injury. The patient is aware that blisters may form and is instructed on wound care with gentle cleansing and use of vaseline ointment to keep moist until healed. The patient is supplied a handout on cryosurgery and is instructed to call if lesions do not completely resolve.    Encounter for follow-up surveillance of skin cancer  Area of previous NMSC (multiple) examined. Sites well healed with no signs of recurrence.  Upper body skin examination performed today including at least 9 points as noted in physical examination. No lesions suspicious for malignancy  noted.    Seborrheic keratoses  These are benign inherited growths without a malignant potential. Reassurance given to patient. No treatment is necessary.     Solar lentigo  This is a benign hyperpigmented sun induced lesion. Daily sun protection will reduce the number of new lesions. Treatment of these benign lesions are considered cosmetic.    Multiple benign nevi  Careful dermoscopy evaluation of nevi performed with none identified as needing biopsy today  Monitor for new mole or moles that are becoming bigger, darker, irritated, or developing irregular borders.     Cherry angioma  This is a benign vascular lesion. Reassurance given. No treatment required.     Actinic skin damage  Patient instructed in importance in daily broad spectrum sun protection of at least spf 30. Mineral sunscreen ingredients preferred (Zinc +/- Titanium) and can be found OTC.   Patient encouraged to wear hat for all outdoor exposure.   Also discussed sun avoidance and use of protective clothing.    Seborrheic dermatitis  -     hydrocortisone 2.5 % lotion; Apply topically 2 (two) times daily.  Dispense: 59 mL; Refill: 5           No follow-ups on file.

## 2025-02-12 ENCOUNTER — OFFICE VISIT (OUTPATIENT)
Dept: PODIATRY | Facility: CLINIC | Age: 70
End: 2025-02-12
Payer: MEDICARE

## 2025-02-12 VITALS
BODY MASS INDEX: 19.88 KG/M2 | SYSTOLIC BLOOD PRESSURE: 161 MMHG | HEIGHT: 73 IN | WEIGHT: 150 LBS | DIASTOLIC BLOOD PRESSURE: 72 MMHG | HEART RATE: 68 BPM

## 2025-02-12 DIAGNOSIS — M79.671 FOOT PAIN, BILATERAL: Primary | ICD-10-CM

## 2025-02-12 DIAGNOSIS — M79.672 FOOT PAIN, BILATERAL: Primary | ICD-10-CM

## 2025-02-12 DIAGNOSIS — M21.6X1 PLANTARFLEXION DEFORMITY OF BOTH FEET: ICD-10-CM

## 2025-02-12 DIAGNOSIS — L97.521 ULCER OF BOTH FEET, LIMITED TO BREAKDOWN OF SKIN: ICD-10-CM

## 2025-02-12 DIAGNOSIS — K50.018 CROHN'S DISEASE OF SMALL INTESTINE WITH OTHER COMPLICATION: ICD-10-CM

## 2025-02-12 DIAGNOSIS — M21.6X2 PLANTARFLEXION DEFORMITY OF BOTH FEET: ICD-10-CM

## 2025-02-12 DIAGNOSIS — L97.511 ULCER OF BOTH FEET, LIMITED TO BREAKDOWN OF SKIN: ICD-10-CM

## 2025-02-12 PROCEDURE — 99203 OFFICE O/P NEW LOW 30 MIN: CPT | Mod: HCWC,S$GLB,, | Performed by: PODIATRIST

## 2025-02-12 PROCEDURE — 1160F RVW MEDS BY RX/DR IN RCRD: CPT | Mod: HCWC,CPTII,S$GLB, | Performed by: PODIATRIST

## 2025-02-12 PROCEDURE — 3288F FALL RISK ASSESSMENT DOCD: CPT | Mod: HCWC,CPTII,S$GLB, | Performed by: PODIATRIST

## 2025-02-12 PROCEDURE — 1101F PT FALLS ASSESS-DOCD LE1/YR: CPT | Mod: HCWC,CPTII,S$GLB, | Performed by: PODIATRIST

## 2025-02-12 PROCEDURE — 3077F SYST BP >= 140 MM HG: CPT | Mod: HCWC,CPTII,S$GLB, | Performed by: PODIATRIST

## 2025-02-12 PROCEDURE — 3078F DIAST BP <80 MM HG: CPT | Mod: HCWC,CPTII,S$GLB, | Performed by: PODIATRIST

## 2025-02-12 PROCEDURE — 99999 PR PBB SHADOW E&M-EST. PATIENT-LVL III: CPT | Mod: PBBFAC,HCWC,, | Performed by: PODIATRIST

## 2025-02-12 PROCEDURE — 3008F BODY MASS INDEX DOCD: CPT | Mod: HCWC,CPTII,S$GLB, | Performed by: PODIATRIST

## 2025-02-12 PROCEDURE — 1159F MED LIST DOCD IN RCRD: CPT | Mod: HCWC,CPTII,S$GLB, | Performed by: PODIATRIST

## 2025-02-13 LAB
FINAL PATHOLOGIC DIAGNOSIS: NORMAL
GROSS: NORMAL
Lab: NORMAL
MICROSCOPIC EXAM: NORMAL

## 2025-02-15 PROBLEM — L97.521 ULCER OF BOTH FEET, LIMITED TO BREAKDOWN OF SKIN: Status: ACTIVE | Noted: 2025-02-15

## 2025-02-15 PROBLEM — M21.6X1 PLANTARFLEXION DEFORMITY OF BOTH FEET: Status: ACTIVE | Noted: 2025-02-15

## 2025-02-15 PROBLEM — M79.672 FOOT PAIN, BILATERAL: Status: ACTIVE | Noted: 2025-02-15

## 2025-02-15 PROBLEM — M79.671 FOOT PAIN, BILATERAL: Status: ACTIVE | Noted: 2025-02-15

## 2025-02-15 PROBLEM — L97.511 ULCER OF BOTH FEET, LIMITED TO BREAKDOWN OF SKIN: Status: ACTIVE | Noted: 2025-02-15

## 2025-02-15 PROBLEM — M21.6X2 PLANTARFLEXION DEFORMITY OF BOTH FEET: Status: ACTIVE | Noted: 2025-02-15

## 2025-02-15 NOTE — PROGRESS NOTES
Subjective:       Patient ID: Tristin Cerda is a 70 y.o. male.    Chief Complaint: No chief complaint on file.  Patient presents today he is complaining of bilateral foot pain he also experiences numbness.  Patient relates he had lost 30 lb he has a history of Crohn's disease and states that his feet started to bother him a couple of years ago but it has gotten a lot worse patient states he was on TPN for 21 years and after he stopped the TPN he lost an additional 30 lb that is when his feet started to really bother him.  Patient states he had seen other providers that just trimmed the little calluses on the bottom of his for feet.    Past Medical History:   Diagnosis Date    Anxiety     Basal cell carcinoma 07/2017    R forehead and R temple    Crohn's disease 1972    Hypertension     Short bowel syndrome 1994    TB (pulmonary tuberculosis) 1999    Vitamin B deficiency     Vitamin D deficiency      Past Surgical History:   Procedure Laterality Date    3 small bowel resections      1973, 1988, 1994    APPENDECTOMY      CHOLECYSTECTOMY      COLONOSCOPY      COLONOSCOPY N/A 02/14/2017    Procedure: COLONOSCOPY;  Surgeon: Nela Sanchez MD;  Location: [address]);  Service: Endoscopy;  Laterality: N/A;    COLONOSCOPY N/A 03/14/2017    Procedure: COLONOSCOPY;  Surgeon: Nela Sanchez MD;  Location: [address]);  Service: Endoscopy;  Laterality: N/A;  2 days clear liquids before procedure    COLONOSCOPY N/A 07/29/2020    Procedure: COLONOSCOPY;  Surgeon: Andrea Shaver MD;  Location: [facility];  Service: Endoscopy;  Laterality: N/A;    ESOPHAGOGASTRODUODENOSCOPY N/A 11/19/2019    Procedure: EGD (ESOPHAGOGASTRODUODENOSCOPY);  Surgeon: Andrea Shaver MD;  Location: [facility];  Service: Endoscopy;  Laterality: N/A;    INJECTION OF ANESTHETIC AGENT AROUND NERVE Right 10/20/2020    Procedure: Block, Nerve genicular;  Surgeon: Alvin Curry MD;  Location: [facility] OR;  Service: Pain Management;   Laterality: Right;  right knee    INTRAMEDULLARY RODDING OF FEMUR Right 10/20/2022    Procedure: INSERTION, INTRAMEDULLARY SRINIVASA, FEMUR;  Surgeon: Gómez Selby MD;  Location: [facility] OR;  Service: Orthopedics;  Laterality: Right;    KNEE SURGERY Right 1984    possible osteomylitis    RADIOFREQUENCY ABLATION Right 2021    Procedure: Radiofrequency Ablation Right Knee Genicular RFA [place];  Surgeon: Alvin Curry MD;  Location: [facility] OR;  Service: Pain Management;  Laterality: Right;  Right knee     SMALL INTESTINE SURGERY      TUBE THORACOTOMY      scrapping of lung lining    UPPER GASTROINTESTINAL ENDOSCOPY       Family History   Problem Relation Name Age of Onset    Diabetes Mother jahaira     Stroke Mother jahaira     Diabetes Father amber     Kidney disease Father amber     Irritable bowel syndrome Cousin      Celiac disease Neg Hx      Cirrhosis Neg Hx      Colon cancer Neg Hx      Colon polyps Neg Hx      Cystic fibrosis Neg Hx      Esophageal cancer Neg Hx      Crohn's disease Neg Hx      Hemochromatosis Neg Hx      Inflammatory bowel disease Neg Hx      Liver cancer Neg Hx      Liver disease Neg Hx      Rectal cancer Neg Hx      Stomach cancer Neg Hx      Ulcerative colitis Neg Hx      Addison's disease Neg Hx      Melanoma Neg Hx      Psoriasis Neg Hx      Lupus Neg Hx      Eczema Neg Hx      Glaucoma Neg Hx      Macular degeneration Neg Hx       Social History     Socioeconomic History    Marital status: Other   Tobacco Use    Smoking status: Former     Current packs/day: 0.00     Average packs/day: 1 pack/day for 15.0 years (15.0 ttl pk-yrs)     Types: Cigarettes     Start date: 3/10/1980     Quit date: 3/10/1995     Years since quittin.9    Smokeless tobacco: Never   Substance and Sexual Activity    Alcohol use: Not Currently     Alcohol/week: 2.0 standard drinks of alcohol     Types: 1 Cans of beer, 1 Shots of liquor per week    Drug use: Never    Sexual activity: Yes     Partners: Female     Birth  "control/protection: None   Social History Narrative    ** Merged History Encounter **         ** Data from: 11/21/22 Enc Dept: [facility]    Retired          ** Data from: 12/14/22 Enc Dept: [facility]    ** Merged History Encounter **         ** Merged History Encounter **          Social Drivers of Health     Financial Resource Strain: Low Risk  (9/27/2024)    Overall Financial Resource Strain (CARDIA)     Difficulty of Paying Living Expenses: Not hard at all   Food Insecurity: No Food Insecurity (9/27/2024)    Hunger Vital Sign     Worried About Running Out of Food in the Last Year: Never true     Ran Out of Food in the Last Year: Never true   Transportation Needs: No Transportation Needs (9/27/2024)    PRAPARE - Transportation     Lack of Transportation (Medical): No     Lack of Transportation (Non-Medical): No   Physical Activity: Sufficiently Active (9/27/2024)    Exercise Vital Sign     Days of Exercise per Week: 7 days     Minutes of Exercise per Session: 30 min   Stress: No Stress Concern Present (9/27/2024)    [employer] [place] [employer] - Occupational Stress Questionnaire     Feeling of Stress : Not at all   Housing Stability: Unknown (9/27/2024)    Housing Stability Vital Sign     Unable to Pay for Housing in the Last Year: No     Homeless in the Last Year: No       Current Medications[1]  Review of patient's allergies indicates:   Allergen Reactions    Ciprofloxacin      Thinks joint pain    Codeine Itching    Compazine [prochlorperazine]      Tight muscles- body became tight    Keflex [cephalexin]      Can not remeber    Erythromycin Nausea Only       Review of Systems   Musculoskeletal:  Positive for arthralgias.   Skin:  Positive for color change.   All other systems reviewed and are negative.      Objective:      Vitals:    02/12/25 1033   BP: (!) 161/72   Pulse: 68   Weight: 68 kg (150 lb)   Height: 6' 1" (1.854 m)     Physical Exam  Vitals and nursing note " reviewed.   Constitutional:       Appearance: Normal appearance.   Cardiovascular:      Pulses:           Dorsalis pedis pulses are 1+ on the right side and 1+ on the left side.        Posterior tibial pulses are 1+ on the right side and 1+ on the left side.   Pulmonary:      Effort: Pulmonary effort is normal.   Musculoskeletal:         General: Tenderness and deformity present.      Right foot: Deformity and prominent metatarsal heads present.      Left foot: Deformity and prominent metatarsal heads present.        Feet:    Feet:      Right foot:      Protective Sensation: 2 sites tested.  2 sites sensed.      Skin integrity: Skin breakdown, callus and dry skin present.      Left foot:      Protective Sensation: 2 sites tested.  2 sites sensed.      Skin integrity: Skin breakdown, callus and dry skin present.   Skin:     General: Skin is warm.      Capillary Refill: Capillary refill takes 2 to 3 seconds.   Neurological:      General: No focal deficit present.      Mental Status: He is alert.   Psychiatric:         Mood and Affect: Mood normal.         Behavior: Behavior normal.                          Assessment:       1. Foot pain, bilateral    2. Plantarflexion deformity of both feet    3. Ulcer of both feet, limited to breakdown of skin    4. Crohn's disease of small intestine with other complication        Plan:       Patient presents today he is complaining of bilateral foot pain he also experiences numbness.  Patient relates he had lost 30 lb he has a history of Crohn's disease and states that his feet started to bother him a couple of years ago but it has gotten a lot worse patient states he was on TPN for 21 years and after he stopped the TPN he lost an additional 30 lb that is when his feet started to really bother him.  Patient states he had seen other providers that just trimmed the little calluses on the bottom of his for feet.  A comprehensive new patient evaluation was performed I advised the  patient clearly with that weight loss he lost much of the fat pad he needs across the ball of the foot he has significantly elevated arches bilateral this is causing excessive pressure on the metatarsals when he is walking he also has mild digital contractures work which are contributing to the plantar flexion of the metatarsal bones.  I did explain to the patient the largest factor in this whole issue is the loss of fat pad even with the elevated arch simply trimming these little hard areas of callus is not going to give him relief long term I have advised the patient he needs to keep the skin well moisturized well hydrated at all times to help break down the callus tissue I will consider recommending a 40% urea cream but let us see how the patient does 1st with better insoles and supports.  The patient never goes barefoot he states he can not stand to go barefoot he did relate that his feet feel numb at times however the patient did not have any significant loss of protective sensation.  The numbness may be related to the discomfort he is feeling in the compression of the intermetatarsal nerves at the site of plantar flexed metatarsals.  Patient states he does wear Crocs when he is in the house he has good running shoes when he is outside of the house I did recommend something with more support he has very high arches and it is important that we transfer the weight to the arch I dispensed the patient some diabetic insoles as a simple starting point I have also added additional arch support in the form of a blue pad bilateral and a large metatarsal bar bilateral to try to offload pressure from the forefoot I have advised the patient will have to see how he does with this over the next several weeks and we can make adjustments to the inserts depending upon how well he tolerates them as needed.  Patient was in understanding and agreement with this I have encouraged the patient to follow up with us over a period of  "anywhere from 2-4 weeks depending upon how well he is doing how well he tolerates the supports.This note was created using [email or web address] voice recognition software that occasionally misinterpreted phrases or words.        [1]   Current Outpatient Medications   Medication Sig Dispense Refill    aspirin-acetaminophen-caffeine 250-250-65 mg (EXCEDRIN MIGRAINE) 250-250-65 mg per tablet Take 1 tablet by mouth every 8 (eight) hours as needed for Pain.       cyanocobalamin 1,000 mcg/mL injection Inject 1 mL (1,000 mcg total) into the skin every 30 days. 3 mL 3    dicyclomine (BENTYL) 20 mg tablet TAKE 1 TABLET BY MOUTH FOUR TIMES DAILY 360 tablet 3    ELIQUIS 5 mg Tab TAKE 1 TABLET TWICE DAILY 180 tablet 3    hydrocortisone 2.5 % lotion Apply topically 2 (two) times daily. 59 mL 5    meclizine (ANTIVERT) 12.5 mg tablet Take 1 tablet (12.5 mg total) by mouth 3 (three) times daily as needed for Dizziness. 90 tablet 3    methocarbamoL (ROBAXIN) 500 MG Tab Take 1 tablet (500 mg total) by mouth 3 (three) times daily. 90 tablet 0    ondansetron (ZOFRAN) 8 MG tablet Take 1 tablet (8 mg total) by mouth every 12 (twelve) hours as needed for Nausea. 60 tablet 5    pantoprazole (PROTONIX) 40 MG tablet Take 1 tablet (40 mg total) by mouth once daily. 30 tablet 3    sucralfate (CARAFATE) 1 gram tablet Take 1 tablet (1 g total) by mouth 4 (four) times daily before meals and nightly. 120 tablet 1    syringe with needle 1 mL 28 gauge x 1/2" Syrg 1 Device by Misc.(Non-Drug; Combo Route) route every 30 days. 100 each 11    [START ON 2/21/2025] traMADoL (ULTRAM) 50 mg tablet Take 1 tablet (50 mg total) by mouth every 8 (eight) hours as needed for Pain. 90 tablet 1     No current facility-administered medications for this visit.     Facility-Administered Medications Ordered in Other Visits   Medication Dose Route Frequency Provider Last Rate Last Admin    lactated ringers infusion   Intravenous Continuous VuAlvin MD         "

## 2025-02-17 ENCOUNTER — RESULTS FOLLOW-UP (OUTPATIENT)
Dept: DERMATOLOGY | Facility: CLINIC | Age: 70
End: 2025-02-17

## 2025-02-17 RX ORDER — METHOCARBAMOL 500 MG/1
500 TABLET, FILM COATED ORAL 3 TIMES DAILY
Qty: 90 TABLET | Refills: 0 | Status: SHIPPED | OUTPATIENT
Start: 2025-02-17

## 2025-02-18 ENCOUNTER — PATIENT MESSAGE (OUTPATIENT)
Dept: DERMATOLOGY | Facility: CLINIC | Age: 70
End: 2025-02-18
Payer: MEDICARE

## 2025-02-24 DIAGNOSIS — Z00.00 ENCOUNTER FOR MEDICARE ANNUAL WELLNESS EXAM: ICD-10-CM

## 2025-02-25 ENCOUNTER — TELEPHONE (OUTPATIENT)
Dept: FAMILY MEDICINE | Facility: CLINIC | Age: 70
End: 2025-02-25
Payer: MEDICARE

## 2025-02-25 DIAGNOSIS — R60.0 LOCALIZED EDEMA: ICD-10-CM

## 2025-02-25 DIAGNOSIS — I82.5Z1 CHRONIC DEEP VEIN THROMBOSIS (DVT) OF DISTAL VEIN OF RIGHT LOWER EXTREMITY: Primary | ICD-10-CM

## 2025-02-27 ENCOUNTER — HOSPITAL ENCOUNTER (OUTPATIENT)
Dept: RADIOLOGY | Facility: HOSPITAL | Age: 70
Discharge: HOME | End: 2025-02-27
Attending: NURSE PRACTITIONER
Payer: MEDICARE

## 2025-02-27 ENCOUNTER — RESULTS FOLLOW-UP (OUTPATIENT)
Dept: FAMILY MEDICINE | Facility: CLINIC | Age: 70
End: 2025-02-27
Payer: MEDICARE

## 2025-02-27 DIAGNOSIS — I82.5Z1 CHRONIC DEEP VEIN THROMBOSIS (DVT) OF DISTAL VEIN OF RIGHT LOWER EXTREMITY: ICD-10-CM

## 2025-02-27 DIAGNOSIS — R60.0 LOCALIZED EDEMA: ICD-10-CM

## 2025-02-27 PROCEDURE — 93971 EXTREMITY STUDY: CPT | Mod: TC,HCWC,RT

## 2025-03-12 ENCOUNTER — OFFICE VISIT (OUTPATIENT)
Dept: PODIATRY | Facility: CLINIC | Age: 70
End: 2025-03-12
Payer: MEDICARE

## 2025-03-12 VITALS
HEART RATE: 61 BPM | BODY MASS INDEX: 19.87 KG/M2 | WEIGHT: 149.94 LBS | SYSTOLIC BLOOD PRESSURE: 138 MMHG | HEIGHT: 73 IN | DIASTOLIC BLOOD PRESSURE: 63 MMHG

## 2025-03-12 DIAGNOSIS — M21.6X1 PLANTARFLEXION DEFORMITY OF BOTH FEET: ICD-10-CM

## 2025-03-12 DIAGNOSIS — M79.672 FOOT PAIN, BILATERAL: ICD-10-CM

## 2025-03-12 DIAGNOSIS — L97.511 ULCER OF BOTH FEET, LIMITED TO BREAKDOWN OF SKIN: Primary | ICD-10-CM

## 2025-03-12 DIAGNOSIS — M21.6X2 PLANTARFLEXION DEFORMITY OF BOTH FEET: ICD-10-CM

## 2025-03-12 DIAGNOSIS — L97.521 ULCER OF BOTH FEET, LIMITED TO BREAKDOWN OF SKIN: Primary | ICD-10-CM

## 2025-03-12 DIAGNOSIS — M79.671 FOOT PAIN, BILATERAL: ICD-10-CM

## 2025-03-12 PROCEDURE — 99213 OFFICE O/P EST LOW 20 MIN: CPT | Mod: HCWC,S$GLB,, | Performed by: PODIATRIST

## 2025-03-12 PROCEDURE — 1101F PT FALLS ASSESS-DOCD LE1/YR: CPT | Mod: HCWC,CPTII,S$GLB, | Performed by: PODIATRIST

## 2025-03-12 PROCEDURE — 1125F AMNT PAIN NOTED PAIN PRSNT: CPT | Mod: HCWC,CPTII,S$GLB, | Performed by: PODIATRIST

## 2025-03-12 PROCEDURE — 99999 PR PBB SHADOW E&M-EST. PATIENT-LVL IV: CPT | Mod: PBBFAC,HCWC,, | Performed by: PODIATRIST

## 2025-03-12 PROCEDURE — 3078F DIAST BP <80 MM HG: CPT | Mod: HCWC,CPTII,S$GLB, | Performed by: PODIATRIST

## 2025-03-12 PROCEDURE — 3008F BODY MASS INDEX DOCD: CPT | Mod: HCWC,CPTII,S$GLB, | Performed by: PODIATRIST

## 2025-03-12 PROCEDURE — 1160F RVW MEDS BY RX/DR IN RCRD: CPT | Mod: HCWC,CPTII,S$GLB, | Performed by: PODIATRIST

## 2025-03-12 PROCEDURE — 1159F MED LIST DOCD IN RCRD: CPT | Mod: HCWC,CPTII,S$GLB, | Performed by: PODIATRIST

## 2025-03-12 PROCEDURE — 3075F SYST BP GE 130 - 139MM HG: CPT | Mod: HCWC,CPTII,S$GLB, | Performed by: PODIATRIST

## 2025-03-12 PROCEDURE — 3288F FALL RISK ASSESSMENT DOCD: CPT | Mod: HCWC,CPTII,S$GLB, | Performed by: PODIATRIST

## 2025-03-12 RX ORDER — POLYETHYLENE GLYCOL 3350, SODIUM SULFATE ANHYDROUS, SODIUM BICARBONATE, SODIUM CHLORIDE, POTASSIUM CHLORIDE 236; 22.74; 6.74; 5.86; 2.97 G/4L; G/4L; G/4L; G/4L; G/4L
POWDER, FOR SOLUTION ORAL
COMMUNITY
Start: 2025-02-24

## 2025-03-15 NOTE — PROGRESS NOTES
Subjective:       Patient ID: Tristin Cerda is a 70 y.o. male.    Chief Complaint: Foot Pain  Patient presents today for follow-up he is complaining of bilateral foot pain he also experiences numbness.  Patient relates he had lost 30 lb he has a history of Crohn's disease and states that his feet started to bother him a couple of years ago but it has gotten a lot worse patient states he was on TPN for 21 years and after he stopped the TPN he lost an additional 30 lb that is when his feet started to really bother him.     Past Medical History:   Diagnosis Date    Anxiety     Basal cell carcinoma 07/2017    R forehead and R temple    Crohn's disease 1972    Hypertension     Short bowel syndrome 1994    TB (pulmonary tuberculosis) 1999    Vitamin B deficiency     Vitamin D deficiency      Past Surgical History:   Procedure Laterality Date    3 small bowel resections      1973, 1988, 1994    APPENDECTOMY      CHOLECYSTECTOMY      COLONOSCOPY      COLONOSCOPY N/A 02/14/2017    Procedure: COLONOSCOPY;  Surgeon: Nela Sanchez MD;  Location: [address]);  Service: Endoscopy;  Laterality: N/A;    COLONOSCOPY N/A 03/14/2017    Procedure: COLONOSCOPY;  Surgeon: Nela Sanchez MD;  Location: [facility] ([address] [place] [address]);  Service: Endoscopy;  Laterality: N/A;  2 days clear liquids before procedure    COLONOSCOPY N/A 07/29/2020    Procedure: COLONOSCOPY;  Surgeon: Andrea Shaver MD;  Location: [facility];  Service: Endoscopy;  Laterality: N/A;    ESOPHAGOGASTRODUODENOSCOPY N/A 11/19/2019    Procedure: EGD (ESOPHAGOGASTRODUODENOSCOPY);  Surgeon: Andrea Shaver MD;  Location: [facility];  Service: Endoscopy;  Laterality: N/A;    INJECTION OF ANESTHETIC AGENT AROUND NERVE Right 10/20/2020    Procedure: Block, Nerve genicular;  Surgeon: Alvin Curry MD;  Location: [facility] OR;  Service: Pain Management;  Laterality: Right;  right knee    INTRAMEDULLARY RODDING OF FEMUR Right 10/20/2022    Procedure: INSERTION, INTRAMEDULLARY  SRINIVASA, FEMUR;  Surgeon: Gómez Selby MD;  Location: [facility] OR;  Service: Orthopedics;  Laterality: Right;    KNEE SURGERY Right     possible osteomylitis    RADIOFREQUENCY ABLATION Right 2021    Procedure: Radiofrequency Ablation Right Knee Genicular RFA [place];  Surgeon: Alvin Curry MD;  Location: [facility] OR;  Service: Pain Management;  Laterality: Right;  Right knee     SMALL INTESTINE SURGERY      TUBE THORACOTOMY      scrapping of lung lining    UPPER GASTROINTESTINAL ENDOSCOPY       Family History   Problem Relation Name Age of Onset    Diabetes Mother jahaira     Stroke Mother jahaira     Diabetes Father amber     Kidney disease Father amber     Irritable bowel syndrome Cousin      Celiac disease Neg Hx      Cirrhosis Neg Hx      Colon cancer Neg Hx      Colon polyps Neg Hx      Cystic fibrosis Neg Hx      Esophageal cancer Neg Hx      Crohn's disease Neg Hx      Hemochromatosis Neg Hx      Inflammatory bowel disease Neg Hx      Liver cancer Neg Hx      Liver disease Neg Hx      Rectal cancer Neg Hx      Stomach cancer Neg Hx      Ulcerative colitis Neg Hx      Addison's disease Neg Hx      Melanoma Neg Hx      Psoriasis Neg Hx      Lupus Neg Hx      Eczema Neg Hx      Glaucoma Neg Hx      Macular degeneration Neg Hx       Social History     Socioeconomic History    Marital status: Other   Tobacco Use    Smoking status: Former     Current packs/day: 0.00     Average packs/day: 1 pack/day for 15.0 years (15.0 ttl pk-yrs)     Types: Cigarettes     Start date: 3/10/1980     Quit date: 3/10/1995     Years since quittin.0    Smokeless tobacco: Never   Substance and Sexual Activity    Alcohol use: Not Currently     Alcohol/week: 2.0 standard drinks of alcohol     Types: 1 Cans of beer, 1 Shots of liquor per week    Drug use: Never    Sexual activity: Yes     Partners: Female     Birth control/protection: None   Social History Narrative    ** Merged History Encounter **         ** Data from: 22 Enc  "Dept: [facility]    Retired          ** Data from: 12/14/22 [facility] Dept: [facility]    ** Merged History Encounter **         ** Merged History Encounter **          Social Drivers of Health     Financial Resource Strain: Low Risk  (9/27/2024)    Overall Financial Resource Strain (CARDIA)     Difficulty of Paying Living Expenses: Not hard at all   Food Insecurity: No Food Insecurity (9/27/2024)    Hunger Vital Sign     Worried About Running Out of Food in the Last Year: Never true     Ran Out of Food in the Last Year: Never true   Transportation Needs: No Transportation Needs (9/27/2024)    PRAPARE - Transportation     Lack of Transportation (Medical): No     Lack of Transportation (Non-Medical): No   Physical Activity: Sufficiently Active (9/27/2024)    Exercise Vital Sign     Days of Exercise per Week: 7 days     Minutes of Exercise per Session: 30 min   Stress: No Stress Concern Present (9/27/2024)    [place] [employer] - Occupational Stress Questionnaire     Feeling of Stress : Not at all   Housing Stability: Unknown (9/27/2024)    Housing Stability Vital Sign     Unable to Pay for Housing in the Last Year: No     Homeless in the Last Year: No       Current Medications[1]  Review of patient's allergies indicates:   Allergen Reactions    Ciprofloxacin      Thinks joint pain    Codeine Itching    Compazine [prochlorperazine]      Tight muscles- body became tight    Keflex [cephalexin]      Can not remeber    Erythromycin Nausea Only       Review of Systems   Musculoskeletal:  Positive for arthralgias.   Skin:  Positive for color change.   All other systems reviewed and are negative.      Objective:      Vitals:    03/12/25 1042   BP: 138/63   Pulse: 61   Weight: 68 kg (149 lb 14.6 oz)   Height: 6' 1" (1.854 m)     Physical Exam  Vitals and nursing note reviewed.   Constitutional:       Appearance: Normal appearance.   Cardiovascular:      Pulses:           Dorsalis pedis " pulses are 1+ on the right side and 1+ on the left side.        Posterior tibial pulses are 1+ on the right side and 1+ on the left side.   Pulmonary:      Effort: Pulmonary effort is normal.   Musculoskeletal:         General: Tenderness and deformity present.      Right foot: Deformity and prominent metatarsal heads present.      Left foot: Deformity and prominent metatarsal heads present.        Feet:    Feet:      Right foot:      Protective Sensation: 2 sites tested.  2 sites sensed.      Skin integrity: Skin breakdown, callus and dry skin present.      Left foot:      Protective Sensation: 2 sites tested.  2 sites sensed.      Skin integrity: Skin breakdown, callus and dry skin present.   Skin:     General: Skin is warm.      Capillary Refill: Capillary refill takes 2 to 3 seconds.   Neurological:      General: No focal deficit present.      Mental Status: He is alert.   Psychiatric:         Mood and Affect: Mood normal.         Behavior: Behavior normal.                                            Assessment:       1. Ulcer of both feet, limited to breakdown of skin    2. Plantarflexion deformity of both feet    3. Foot pain, bilateral        Plan:       Patient presents today for follow-up he is complaining of bilateral foot pain he also experiences numbness.  Patient states he is doing a lot better he relates that he has tolerated the diabetic insoles with the additional arch padding and the metatarsal pad bilateral.  Patient is using the 40% urea cream he states this does help considerably.  I did recommend the patient look into purchasing some Oofos to wear in the house he currently wears Crocs but I felt as if these would give him much more soft cushion support especially with his significantly elevated arches.  I did trim the areas of hyperkeratotic pre ulcerative buildup patient advised to continue using the arch supports at all times continue using the 40% urea cream I plan to follow up with him as  "needed.  This note was created using [employer] voice recognition software that occasionally misinterpreted phrases or words.            [1]   Current Outpatient Medications   Medication Sig Dispense Refill    aspirin-acetaminophen-caffeine 250-250-65 mg (EXCEDRIN MIGRAINE) 250-250-65 mg per tablet Take 1 tablet by mouth every 8 (eight) hours as needed for Pain.       cyanocobalamin 1,000 mcg/mL injection Inject 1 mL (1,000 mcg total) into the skin every 30 days. 3 mL 3    dicyclomine (BENTYL) 20 mg tablet TAKE 1 TABLET BY MOUTH FOUR TIMES DAILY 360 tablet 3    ELIQUIS 5 mg Tab TAKE 1 TABLET TWICE DAILY 180 tablet 3    hydrocortisone 2.5 % lotion Apply topically 2 (two) times daily. 59 mL 5    meclizine (ANTIVERT) 12.5 mg tablet Take 1 tablet (12.5 mg total) by mouth 3 (three) times daily as needed for Dizziness. 90 tablet 3    methocarbamoL (ROBAXIN) 500 MG Tab Take 1 tablet (500 mg total) by mouth 3 (three) times daily. 90 tablet 0    ondansetron (ZOFRAN) 8 MG tablet Take 1 tablet (8 mg total) by mouth every 12 (twelve) hours as needed for Nausea. 60 tablet 5    pantoprazole (PROTONIX) 40 MG tablet Take 1 tablet (40 mg total) by mouth once daily. 30 tablet 3    polyethylene glycol (GOLYTELY) 236-22.74-6.74 -5.86 gram suspension Use as directed for bowel prep      sucralfate (CARAFATE) 1 gram tablet Take 1 tablet (1 g total) by mouth 4 (four) times daily before meals and nightly. 120 tablet 1    syringe with needle 1 mL 28 gauge x 1/2" Syrg 1 Device by Misc.(Non-Drug; Combo Route) route every 30 days. 100 each 11    traMADoL (ULTRAM) 50 mg tablet Take 1 tablet (50 mg total) by mouth every 8 (eight) hours as needed for Pain. 90 tablet 1     No current facility-administered medications for this visit.     Facility-Administered Medications Ordered in Other Visits   Medication Dose Route Frequency Provider Last Rate Last Admin    lactated ringers infusion   Intravenous Continuous VuAlvin MD         "

## 2025-03-21 ENCOUNTER — PROCEDURE VISIT (OUTPATIENT)
Dept: DERMATOLOGY | Facility: CLINIC | Age: 70
End: 2025-03-21
Payer: MEDICARE

## 2025-03-21 VITALS — BODY MASS INDEX: 19.87 KG/M2 | WEIGHT: 149.94 LBS | HEIGHT: 73 IN

## 2025-03-21 DIAGNOSIS — C44.519 BASAL CELL CARCINOMA (BCC) OF UPPER BACK: Primary | ICD-10-CM

## 2025-03-21 RX ORDER — METHOCARBAMOL 500 MG/1
500 TABLET, FILM COATED ORAL 3 TIMES DAILY
Qty: 90 TABLET | Refills: 0 | Status: SHIPPED | OUTPATIENT
Start: 2025-03-21

## 2025-03-21 NOTE — PROGRESS NOTES
Subjective:      Patient ID:  Tristin Cerda is a 70 y.o. male who presents for   Chief Complaint   Patient presents with    Basal Cell Carcinoma     Pt here for definitive excision and suture of midline upper back.  Feeling well today.   Denies pacemaker, defibrillator.  No blood thinners.   No additional cutaneous complaints.     1. Skin, right dorsal hand, shave biopsy:  - HYPERTROPHIC ACTINIC KERATOSIS WITH FOLLICULAR INVOLVEMENT AND FOCAL TRANSITION TO SQUAMOUS CELL CARCINOMA IN SITU.  - MARGINS ARE NEGATIVE FOR FULL THICKNESS SQUAMOUS ATYPIA IN THE PLANES OF SECTION EXAMINED.    This lesion is atypical and further treatment may be required. You will be contacted by your provider's office.      2. Skin, midline forehead, shave biopsy:  - SQUAMOUS CELL CARCINOMA IN SITU.  - THE TUMOR FOCALLY EXTENDS TO THE DEEP AND LATERAL BIOPSY MARGINS.    This lesion is skin cancer. You will be contacted regarding treatment.      3. Skin, left neck, shave biopsy:  - SQUAMOUS CELL CARCINOMA IN SITU.  - MARGINS ARE NEGATIVE IN THE PLANES OF SECTION EXAMINED.    This lesion is skin cancer. You will be contacted regarding treatment.      4. Skin, midline upper back, shave biopsy:  - BASAL CELL CARCINOMA.  - THE TUMOR EXTENDS TO THE DEEP AND LATERAL BIOPSY MARGINS.    This lesion is skin cancer. You will be contacted regarding treatment.             Review of Systems   Constitutional:  Negative for fever, chills and fatigue.   Respiratory:  Negative for cough and shortness of breath.    Gastrointestinal:  Negative for nausea and vomiting.   Skin:  Positive for activity-related sunscreen use and wears hat. Negative for daily sunscreen use.   Hematologic/Lymphatic: Bruises/bleeds easily (eliquis).       Objective:   Physical Exam   Constitutional: He appears well-developed and well-nourished.   Neurological: He is alert and oriented to person, place, and time.   Psychiatric: He has a normal mood and affect.   Skin:                Diagram Legend     Erythematous scaling macule/papule c/w actinic keratosis       Vascular papule c/w angioma      Pigmented verrucoid papule/plaque c/w seborrheic keratosis      Yellow umbilicated papule c/w sebaceous hyperplasia      Irregularly shaped tan macule c/w lentigo     1-2 mm smooth white papules consistent with Milia      Movable subcutaneous cyst with punctum c/w epidermal inclusion cyst      Subcutaneous movable cyst c/w pilar cyst      Firm pink to brown papule c/w dermatofibroma      Pedunculated fleshy papule(s) c/w skin tag(s)      Evenly pigmented macule c/w junctional nevus     Mildly variegated pigmented, slightly irregular-bordered macule c/w mildly atypical nevus      Flesh colored to evenly pigmented papule c/w intradermal nevus       Pink pearly papule/plaque c/w basal cell carcinoma      Erythematous hyperkeratotic cursted plaque c/w SCC      Surgical scar with no sign of skin cancer recurrence      Open and closed comedones      Inflammatory papules and pustules      Verrucoid papule consistent consistent with wart     Erythematous eczematous patches and plaques     Dystrophic onycholytic nail with subungual debris c/w onychomycosis     Umbilicated papule    Erythematous-base heme-crusted tan verrucoid plaque consistent with inflamed seborrheic keratosis     Erythematous Silvery Scaling Plaque c/w Psoriasis     See annotation        Assessment / Plan:      Pathology Orders:       Normal Orders This Visit    Specimen to Pathology, Dermatology     Questions:    Procedure Type: Dermatology and skin neoplasms    Number of Specimens: 1    ------------------------: -------------------------    Spec 1 Procedure: Excision >2cm    Spec 1 Clinical Impression: bcc reexcision, check margins    Spec 1 Source: midline upper back    Release to patient:           Basal cell carcinoma (BCC) of upper back  -     Specimen to Pathology, Dermatology    PROCEDURE: Elliptical excision with intermediate  layered repair in order to decrease dead space, decrease tension, and close large gap.    ANESTHETIC: 7 cc 1% Xylocaine with Epinephrine 1:100,000, buffered    SURGEON: Ira Bang MD  ASSISTANTS: Rosa Sanchez RN,  Eliz Zuniga MA, Barbara King MA    PREOPERATIVE DIAGNOSIS:  Biopsy-proven Basal Cell Carcinoma    POSTOPERATIVE DIAGNOSIS:  Same as preoperative diagnosis    PATHOLOGIC DIAGNOSIS: Pending    LOCATION: upper back    INITIAL LESION SIZE: 0.7 cm    EXCISED DIAMETER: 1.3 cm    PREPARATION: The diagnosis, procedure, alternatives, benefits and risks, including but not limited to: infection, bleeding/bruising, drug reactions, pain, scar or cosmetic defect, local sensation disturbances, wound dehiscence (separation of wound edges after sutures removed) and/or recurrence of present condition were explained to the patient. The patient elected to proceed.  Patient's identity was verified using 2 patient identifiers and the side and site was verified.  Time out period with surgeon, assistant and patient in surgical suite was taken.    PROCEDURE: The location noted above was prepped and draped in the usual sterile fashion. The area was anesthetized with intradermal buffered xylocaine. Lesional tissue was carefully marked with at least 3 mm margins of clinically normal skin in all directions. A fusiform elliptical excision was done with #15 blade carried down completely through the dermis into the subcutaneous tissues to the level of the subcutaneous fat, and dissection was carried out in that plane.  Electrocoagulation was used to obtain hemostasis. Blood loss was minimal. The wound was then approximated in a layered fashion with subcutaneous and intradermal sutures of 3.0 Monocryl, approximately 5 in number, and the wound was then superficially closed with simple interrupted sutures of 3.0 Prolene.    The patient tolerated the procedure well.    The area was cleaned and dressed appropriately and the  patient was given wound care instructions, as well as an appointment for follow-up evaluation.    LENGTH OF REPAIR: 4 cm            No follow-ups on file.

## 2025-03-21 NOTE — PATIENT INSTRUCTIONS
Surgery Wound Care    Your doctor has performed an excision today.  A bandage and vaseline ointment has been placed over the site.  This should remain in place for 24 hours.  It is recommended that you keep the area dry for the first 24 hours.  After 24 hours, you may remove the band aid and wash the area with warm soap and water and apply Vaseline jelly or aquaphore.  Many patients prefer to use Neosporin or Bacitracin ointment.  This is acceptable; however know that you can develop an allergy to this medication even if you have used it safely for years.  It is important to keep the area moist.  Letting it dry out and get air slows healing time, will worsen the scar, and make it more difficult to remove the stitches if they were placed.        If you notice increasing redness, tenderness, pain, or yellow drainage at the biopsy or surgical site, please notify your doctor.  These are signs of an infection.    If your biopsy/surgical site is bleeding, apply firm pressure for 15 minutes straight.  Repeat for another 15 minutes, if it is still bleeding.   If the surgical site continues to bleed, then please contact your doctor.

## 2025-03-24 ENCOUNTER — PROCEDURE VISIT (OUTPATIENT)
Dept: DERMATOLOGY | Facility: CLINIC | Age: 70
End: 2025-03-24
Payer: MEDICARE

## 2025-03-24 VITALS — SYSTOLIC BLOOD PRESSURE: 133 MMHG | DIASTOLIC BLOOD PRESSURE: 73 MMHG | HEART RATE: 67 BPM

## 2025-03-24 DIAGNOSIS — D04.39 SQUAMOUS CELL CARCINOMA IN SITU OF SKIN OF FOREHEAD: Primary | ICD-10-CM

## 2025-03-24 PROCEDURE — 17311 MOHS 1 STAGE H/N/HF/G: CPT | Mod: HCNC,51,79,S$GLB | Performed by: DERMATOLOGY

## 2025-03-24 PROCEDURE — 99499 UNLISTED E&M SERVICE: CPT | Mod: HCNC,S$GLB,, | Performed by: DERMATOLOGY

## 2025-03-24 PROCEDURE — 13132 CMPLX RPR F/C/C/M/N/AX/G/H/F: CPT | Mod: HCNC,79,S$GLB, | Performed by: DERMATOLOGY

## 2025-03-24 NOTE — PROGRESS NOTES
PROCEDURE: Mohs' Micrographic Surgery    INDICATION: Location in mask areas of face including central face, nose, eyelids, eyebrows, lips, chin, preauricular, temple, and ear. Biopsy-proven skin cancer of cosmetically and functionally important areas, including head, neck, genital, hand, foot, or areas known for having difficulty in healing, such as the lower anterior legs. Tumor with ill-defined borders.    REFERRING PROVIDER: Ira Bang MD    CASE NUMBER:     ANESTHETIC: 1.5 cc 0.5% Lidocaine with Epi 1:200,000 mixed 1:1 with 0.5% Bupivacaine    SURGICAL PREP: Hibiclens    SURGEON: Teoflio Fernandez MD    ASSISTANTS: Maria Del Carmen Balbuena PA-C and Jacquelyn Caraballo MA    PREOPERATIVE DIAGNOSIS: squamous cell carcinoma in situ    POSTOPERATIVE DIAGNOSIS: squamous cell carcinoma in situ    PATHOLOGIC DIAGNOSIS: squamous cell carcinoma in situ    HISTOLOGY OF SPECIMENS IN FIRST STAGE:   Tumor Type:  No tumor seen.    STAGES OF MOHS' SURGERY PERFORMED: 1    TUMOR-FREE PLANE ACHIEVED: Yes    HEMOSTASIS: electrocoagulation     SPECIMENS: 2     LOCATION: midline forehead. Location verified with Dr. Bang's clinical photograph. Patient also verified location with hand held mirror.    INITIAL LESION SIZE: 0.4 x 0.5 cm    FINAL DEFECT SIZE: 0.7 x 0.9 cm    WOUND REPAIR/DISPOSITION: The patient tolerated Mohs' Micrographic Surgery for a squamous cell carcinoma in situ very well. When the tumor was completely removed, a repair of the surgical defect was undertaken.      PROCEDURE: Complex Linear Repair    INDICATION: Status post Mohs' Micrographic Surgery for squamous cell carcinoma in situ.    CASE NUMBER:     SURGEON: Teofilo Fernandez MD    ASSISTANTS: Maria Del Carmen Balbuena PA-C and Jacquelyn Caraballo MA    ANESTHETIC: 1 cc 1% Lidocaine with Epinephrine 1:100,000    SURGICAL PREP: Hibiclens, prepped by Jacquelyn Caraballo MA    LOCATION: midline forehead    DEFECT SIZE: 0.7 x 0.9 cm    WOUND REPAIR/DISPOSITION:  After the patient's  carcinoma had been completely removed with Mohs' Micrographic Surgery, a repair of the surgical defect was undertaken. The patient was returned to the operating suite where the area of midline forehead was prepped, draped, and anesthetized in the usual sterile fashion. The wound was widely undermined in all directions. The wound was undermined to a distance at least the maximum width of the defect as measured perpendicular to the closure line along at least one entire edge of the defect, in this case 1.5 cm. Then, electrocoagulation was used to obtain meticulous hemostasis. 5-0 Monocryl buried vertical mattress sutures were placed into the subcutaneous and dermal plane to close the wound and emily the cutaneous wound edge. Bilateral dog ears were identified and were removed by a standard Burow's triangle technique. The cutaneous wound edges were closed using interrupted 5-0 Prolene suture.    The patient tolerated the procedure well.    The area was cleaned and dressed appropriately and the patient was given wound care instructions, as well as appointment for follow-up evaluation and suture removal in 7 days.    LENGTH OF REPAIR: 2.6 cm    Vitals:    03/24/25 1209 03/24/25 1411   BP: (!) 154/80 133/73   BP Location: Right arm    Patient Position: Sitting    Pulse: 67 67

## 2025-03-25 ENCOUNTER — TELEPHONE (OUTPATIENT)
Dept: DERMATOLOGY | Facility: CLINIC | Age: 70
End: 2025-03-25
Payer: MEDICARE

## 2025-03-25 ENCOUNTER — RESULTS FOLLOW-UP (OUTPATIENT)
Dept: DERMATOLOGY | Facility: CLINIC | Age: 70
End: 2025-03-25
Payer: MEDICARE

## 2025-03-25 NOTE — TELEPHONE ENCOUNTER
Attempted to contact patient, no answer, [facility] for a return call.     ----- Message from Ira Bang MD sent at 3/25/2025  7:55 AM CDT -----  We received the pathology results from your surgery. I am pleased to inform you that the skin cancer has been completely excised. No further treatment is needed. Please contact my office if you have   any questions.   Thank you,  Dr Bang   1. Skin, midline upper back, excision:   - RESIDUAL BASAL CELL CARCINOMA.   - MARGINS ARE NEGATIVE.   - DERMAL SCAR.   ----- Message -----  From: [facility]  Sent: 3/24/2025   2:47 PM CDT  To: Ira Bang MD

## 2025-03-27 ENCOUNTER — OFFICE VISIT (OUTPATIENT)
Dept: FAMILY MEDICINE | Facility: CLINIC | Age: 70
End: 2025-03-27
Payer: MEDICARE

## 2025-03-27 VITALS
HEART RATE: 64 BPM | RESPIRATION RATE: 15 BRPM | SYSTOLIC BLOOD PRESSURE: 120 MMHG | BODY MASS INDEX: 19.3 KG/M2 | WEIGHT: 145.63 LBS | HEIGHT: 73 IN | DIASTOLIC BLOOD PRESSURE: 72 MMHG | OXYGEN SATURATION: 98 %

## 2025-03-27 DIAGNOSIS — I82.5Z1 CHRONIC DEEP VEIN THROMBOSIS (DVT) OF DISTAL VEIN OF RIGHT LOWER EXTREMITY: ICD-10-CM

## 2025-03-27 DIAGNOSIS — G89.29 CHRONIC ABDOMINAL PAIN: ICD-10-CM

## 2025-03-27 DIAGNOSIS — K50.018 CROHN'S DISEASE OF SMALL INTESTINE WITH OTHER COMPLICATION: Primary | ICD-10-CM

## 2025-03-27 DIAGNOSIS — R10.9 CHRONIC ABDOMINAL PAIN: ICD-10-CM

## 2025-03-27 PROCEDURE — 3074F SYST BP LT 130 MM HG: CPT | Mod: HCWC,CPTII,S$GLB, | Performed by: FAMILY MEDICINE

## 2025-03-27 PROCEDURE — 3288F FALL RISK ASSESSMENT DOCD: CPT | Mod: HCWC,CPTII,S$GLB, | Performed by: FAMILY MEDICINE

## 2025-03-27 PROCEDURE — 1101F PT FALLS ASSESS-DOCD LE1/YR: CPT | Mod: HCWC,CPTII,S$GLB, | Performed by: FAMILY MEDICINE

## 2025-03-27 PROCEDURE — 1125F AMNT PAIN NOTED PAIN PRSNT: CPT | Mod: HCWC,CPTII,S$GLB, | Performed by: FAMILY MEDICINE

## 2025-03-27 PROCEDURE — 99214 OFFICE O/P EST MOD 30 MIN: CPT | Mod: HCWC,S$GLB,, | Performed by: FAMILY MEDICINE

## 2025-03-27 PROCEDURE — 1159F MED LIST DOCD IN RCRD: CPT | Mod: HCWC,CPTII,S$GLB, | Performed by: FAMILY MEDICINE

## 2025-03-27 PROCEDURE — 99999 PR PBB SHADOW E&M-EST. PATIENT-LVL IV: CPT | Mod: PBBFAC,HCWC,, | Performed by: FAMILY MEDICINE

## 2025-03-27 PROCEDURE — 3078F DIAST BP <80 MM HG: CPT | Mod: HCWC,CPTII,S$GLB, | Performed by: FAMILY MEDICINE

## 2025-03-27 PROCEDURE — 3008F BODY MASS INDEX DOCD: CPT | Mod: HCWC,CPTII,S$GLB, | Performed by: FAMILY MEDICINE

## 2025-03-27 NOTE — PROGRESS NOTES
Patient ID: Tristin Cerda is a 70 y.o. male.    Chief Complaint: Follow-up (Had U/S wants to know about stopping eliquis for a procedure)    History of Present Illness    CHIEF COMPLAINT:  Tristin presents today for follow up regarding test results and concerns about stopping Eliquis for upcoming procedures    DEEP VEIN THROMBOSIS:  He has a history of blood clots, with the most recent discovered in the common femoral vein one month ago concurrent with a leg fracture. He currently takes Eliquis twice daily for anticoagulation. A previous non-occlusive thrombus of the common femoral vein was documented in October 2022, which resulted in significant foot swelling affecting ambulation. He notes the current clot likely remains present.    GASTROINTESTINAL HISTORY:  He has a history of Crohn's disease, currently asymptomatic for several years. Due to short bowel syndrome, he has very little small intestine remaining, with only the large intestine intact. He has required total parenteral nutrition (TPN) for over 20 years, resulting in occluded subclavian veins from long-term central line use.    UPCOMING PROCEDURES:  He has a routine EGD and colonoscopy scheduled for April. His last scope was performed 3-5 years ago.      ROS:  ROS as indicated in HPI.         Physical Exam  Constitutional:       Appearance: Normal appearance.   HENT:      Head: Normocephalic and atraumatic.      Nose: Nose normal.   Eyes:      Extraocular Movements: Extraocular movements intact.      Pupils: Pupils are equal, round, and reactive to light.   Pulmonary:      Effort: Pulmonary effort is normal. No respiratory distress.   Musculoskeletal:         General: Normal range of motion.   Skin:     General: Skin is warm and dry.   Neurological:      General: No focal deficit present.      Mental Status: He is alert and oriented to person, place, and time.   Psychiatric:         Mood and Affect: Mood normal.         Behavior: Behavior normal.          Thought Content: Thought content normal.          Assessment & Plan    I82.5Z1 Chronic DVT of distal vein of right lower extremity  K50.90 Crohn's disease, unspecified, without complications  K91.2 Postsurgical malabsorption, not elsewhere classified  I87.2 Venous insufficiency (chronic) (peripheral)  Z99.89 Dependence on other enabling machines and devices  Z79.01 Long term (current) use of anticoagulants    IMPRESSION:  - Chronic thrombus in common femoral vein, present for at least 2 years.  - Advised against stopping Eliquis for routine EGD and colonoscopy due to clot risk.  - Considered consultation with cardiovascular surgeon regarding potential clot removal or management options.  - Assessed urgency of GI procedures in light of clot presence.  - Will discuss case with Nurse Wagner to explore options for clot management, including potential surgical consultation.    CHRONIC DEEP VEIN THROMBOSIS (DVT) OF DISTAL VEIN OF RIGHT LOWER EXTREMITY:  - Monitored the chronic thrombus in the common femoral vein, present since at least October 2022, noting slight reduction in size since the last ultrasound.  - Evaluated recent ultrasound showing chronic thrombus in the common femoral vein, with femoral popliteal, greater saphenous, and deep femoral veins clear of clots.  - Assessed the chronic nature of the clot and its potential permanence, considering options for clot removal or continued management.  - Advised against discontinuing Eliquis for non-urgent procedures due to risk of clot extension.  - Instructed the patient to continue taking Eliquis 2 times daily.  - Educated the patient on risks associated with stopping anticoagulation, including potential for pulmonary embolism.  - Explained that chronic clots may not dissolve and could be permanent.  - Recommend deferring routine GI procedures until clot situation is further evaluated.  - Instructed the patient to inform GI office of the clot and need to consult  with cardiothoracic specialist before stopping Eliquis, if contacted.  - Planned consultation with Nurse Wagner about clot management and necessity of stopping Eliquis for proposed GI procedures.  - Considered consulting a cardiovascular surgeon about potential clot removal.  - Scheduled follow-up with Nurse Wagner to discuss clot management and GI procedure concerns.    CROHN'S DISEASE:  - Tristin's history of Crohn's disease shows no problems for many years.  - It's been 5 years since the last endoscopic exam.  - The gastroenterologist recommended EGD with possible dilatation and biopsy, and colonoscopy.    SHORT BOWEL SYNDROME:  - Tristin has a history of short bowel syndrome and has been using TPN for over 20 years.  - Recently, an attempt to use a PICC line for TPN was unsuccessful due to infections.  - Reinaldos long-term TPN use and central line access continue to be monitored.    CHRONIC VENOUS INSUFFICIENCY:  - A chronic thrombus has been present in the common femoral vein for at least 2 years.    LONG-TERM USE OF ANTICOAGULANTS:  - Tristin is advised to continue the current Eliquis regimen of twice daily.         Plan:          Crohn's disease of small intestine with other complication  Comments:  Colonoscopy planned by GI for 04/2025    Chronic deep vein thrombosis (DVT) of distal vein of right lower extremity  Comments:  Continue Eliquis.    Chronic abdominal pain  Comments:  EG planned by GI for 04/2025        Follow up if symptoms worsen or fail to improve.    This note was generated with the assistance of ambient listening technology. Verbal consent was obtained by the patient and accompanying visitor(s) for the recording of patient appointment to facilitate this note. I attest to having reviewed and edited the generated note for accuracy, though some syntax or spelling errors may persist. Please contact the author of this note for any clarification.

## 2025-03-31 ENCOUNTER — OFFICE VISIT (OUTPATIENT)
Dept: DERMATOLOGY | Facility: CLINIC | Age: 70
End: 2025-03-31
Payer: MEDICARE

## 2025-03-31 DIAGNOSIS — Z09 POSTOP CHECK: Primary | ICD-10-CM

## 2025-03-31 PROCEDURE — 1101F PT FALLS ASSESS-DOCD LE1/YR: CPT | Mod: HCNC,CPTII,S$GLB, | Performed by: DERMATOLOGY

## 2025-03-31 PROCEDURE — 99024 POSTOP FOLLOW-UP VISIT: CPT | Mod: HCNC,S$GLB,, | Performed by: DERMATOLOGY

## 2025-03-31 PROCEDURE — 99999 PR PBB SHADOW E&M-EST. PATIENT-LVL III: CPT | Mod: PBBFAC,HCNC,, | Performed by: DERMATOLOGY

## 2025-03-31 PROCEDURE — 1126F AMNT PAIN NOTED NONE PRSNT: CPT | Mod: HCNC,CPTII,S$GLB, | Performed by: DERMATOLOGY

## 2025-03-31 PROCEDURE — 3288F FALL RISK ASSESSMENT DOCD: CPT | Mod: HCNC,CPTII,S$GLB, | Performed by: DERMATOLOGY

## 2025-03-31 PROCEDURE — 1159F MED LIST DOCD IN RCRD: CPT | Mod: HCNC,CPTII,S$GLB, | Performed by: DERMATOLOGY

## 2025-03-31 NOTE — PROGRESS NOTES
70 y.o. male patient is here for suture removal following Mohs' surgery.    Patient reports no problems.    WOUND PE:  The midline forehead sutures intact. Wound healing well. Good skin edges. No signs or symptoms of infection.    IMPRESSION:  Healing operative site from Mohs' surgery SCCIS midline forehead s/p Mohs with CLC, postop day #7.    PLAN:  Sutures removed today by  Tatyana Thomas MA . Steri-strips applied.  Continue wound care.  Keep moist with Aquaphor.  Call if any issues arise    RTC:  In 3-6 months with  Ira Bang MD  for skin check or sooner if new concern arises.

## 2025-04-03 ENCOUNTER — CLINICAL SUPPORT (OUTPATIENT)
Dept: DERMATOLOGY | Facility: CLINIC | Age: 70
End: 2025-04-03
Payer: MEDICARE

## 2025-04-03 DIAGNOSIS — Z48.02 VISIT FOR SUTURE REMOVAL: Primary | ICD-10-CM

## 2025-04-03 PROCEDURE — 99024 POSTOP FOLLOW-UP VISIT: CPT | Mod: S$GLB,,, | Performed by: DERMATOLOGY

## 2025-04-15 ENCOUNTER — OFFICE VISIT (OUTPATIENT)
Dept: PAIN MEDICINE | Facility: CLINIC | Age: 70
End: 2025-04-15
Payer: MEDICARE

## 2025-04-15 VITALS — HEART RATE: 68 BPM | SYSTOLIC BLOOD PRESSURE: 106 MMHG | DIASTOLIC BLOOD PRESSURE: 67 MMHG

## 2025-04-15 DIAGNOSIS — G89.29 CHRONIC ABDOMINAL PAIN: Primary | ICD-10-CM

## 2025-04-15 DIAGNOSIS — Z79.891 CHRONIC USE OF OPIATE FOR THERAPEUTIC PURPOSE: ICD-10-CM

## 2025-04-15 DIAGNOSIS — M79.18 MYOFASCIAL PAIN: ICD-10-CM

## 2025-04-15 DIAGNOSIS — G89.29 CHRONIC ABDOMINAL PAIN: ICD-10-CM

## 2025-04-15 DIAGNOSIS — G89.4 CHRONIC PAIN DISORDER: ICD-10-CM

## 2025-04-15 DIAGNOSIS — M25.562 ACUTE PAIN OF LEFT KNEE: ICD-10-CM

## 2025-04-15 DIAGNOSIS — R10.9 CHRONIC ABDOMINAL PAIN: ICD-10-CM

## 2025-04-15 DIAGNOSIS — M50.30 DDD (DEGENERATIVE DISC DISEASE), CERVICAL: ICD-10-CM

## 2025-04-15 DIAGNOSIS — M17.11 OSTEOARTHRITIS OF RIGHT KNEE, UNSPECIFIED OSTEOARTHRITIS TYPE: ICD-10-CM

## 2025-04-15 DIAGNOSIS — R10.9 CHRONIC ABDOMINAL PAIN: Primary | ICD-10-CM

## 2025-04-15 PROCEDURE — 3074F SYST BP LT 130 MM HG: CPT | Mod: HCNC,CPTII,S$GLB, | Performed by: PHYSICIAN ASSISTANT

## 2025-04-15 PROCEDURE — 3288F FALL RISK ASSESSMENT DOCD: CPT | Mod: HCNC,CPTII,S$GLB, | Performed by: PHYSICIAN ASSISTANT

## 2025-04-15 PROCEDURE — 3078F DIAST BP <80 MM HG: CPT | Mod: HCNC,CPTII,S$GLB, | Performed by: PHYSICIAN ASSISTANT

## 2025-04-15 PROCEDURE — 1125F AMNT PAIN NOTED PAIN PRSNT: CPT | Mod: HCNC,CPTII,S$GLB, | Performed by: PHYSICIAN ASSISTANT

## 2025-04-15 PROCEDURE — 99999 PR PBB SHADOW E&M-EST. PATIENT-LVL III: CPT | Mod: PBBFAC,HCNC,, | Performed by: PHYSICIAN ASSISTANT

## 2025-04-15 PROCEDURE — 1159F MED LIST DOCD IN RCRD: CPT | Mod: HCNC,CPTII,S$GLB, | Performed by: PHYSICIAN ASSISTANT

## 2025-04-15 PROCEDURE — 1101F PT FALLS ASSESS-DOCD LE1/YR: CPT | Mod: HCNC,CPTII,S$GLB, | Performed by: PHYSICIAN ASSISTANT

## 2025-04-15 PROCEDURE — 99214 OFFICE O/P EST MOD 30 MIN: CPT | Mod: HCNC,S$GLB,, | Performed by: PHYSICIAN ASSISTANT

## 2025-04-15 RX ORDER — TRAMADOL HYDROCHLORIDE 50 MG/1
50 TABLET ORAL EVERY 8 HOURS PRN
Qty: 90 TABLET | Refills: 2 | Status: SHIPPED | OUTPATIENT
Start: 2025-04-20 | End: 2025-06-19

## 2025-04-15 RX ORDER — METHOCARBAMOL 500 MG/1
500 TABLET, FILM COATED ORAL 3 TIMES DAILY
Qty: 90 TABLET | Refills: 1 | Status: SHIPPED | OUTPATIENT
Start: 2025-04-15

## 2025-04-15 NOTE — PROGRESS NOTES
PCP: Corinna Atkinson NP      CC: abdominal pain    Interval history: Mr. Cerda is a 70 y.o. male with an extensive history of crohn's disease who presents today for f/u s/p and medication refill. Continues to have neck pain worse with lateral rotation. He had updated imaging. Denies radicular pain. Robaxin is helpful. He has healed from right hip fracture last year.  He was taking Percocet 5-325 mg q 6 h for post op pain. Acute left knee pain resolved after left intra articular steroid injection with Dr. Villatoro.  Right knee RFA  provided 75% relief.    Abdominal pain remains stable.   He does report diarrhea at times but no blood stools. Reports anal pain and itching 3/2 chronic diarrhea.  He is currently being evaluated by gastroenterology.  OTC Lidocaine 2 % provides some minimal relief.  Compounding cream was too expensive.  He rates his pain 4/10.     Prior HPI:   Mr. Cerda is a 60 year old male with PMH of crohn's disease referred by Dr. Hansen.  Patient states being diagnosed with crohn's disease since the age of 14.    He has had multiple GI surgeries and large bowel and small bowel removals.  During that time, he was being managed by providers in [place].  He was TPN dependent for many years until about two years ago.  He is now stable on an oral diet.  He did not have a primary care physician nor a gastroenterologist for many years.  He is currently trying to establish care.  He has future appointment with Dr. Ch.  He states taking Demerol 50mg q8hrs as needed for pain. It has provided relief of his nausea and pain with diarrhea.  He was last prescribed Demerol in July 2014.  Since then, he has been bearing with the pain.  It is a sharp shooting pain in his mid abdomen.      ROS:  CONSTITUTIONAL: No fevers, chills, night sweats, wt. loss, appetite changes  SKIN: no rashes or itching  ENT: No head trauma, vision changes, or eye pain +Headaches  LYMPH NODES: None noticed   CV:  No chest pain, palpitations.   RESP: No shortness of breath, dyspnea on exertion, cough, wheezing, or hemoptysis  GI: No nausea, emesis, diarrhea, constipation, melena, hematochezia, pain.    : No dysuria, hematuria, urgency, or frequency   HEME: No easy bruising, bleeding problems  PSYCHIATRIC: No depression, anxiety, psychosis, hallucinations.  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    midline forehead    TB (pulmonary tuberculosis) 1999    Vitamin B deficiency     Vitamin D deficiency      Past Surgical History:   Procedure Laterality Date    3 small bowel resections      1973, 1988, 1994    APPENDECTOMY      CHOLECYSTECTOMY      COLONOSCOPY      COLONOSCOPY N/A 02/14/2017    Procedure: COLONOSCOPY;  Surgeon: Nela Sanchez MD;  Location: [address]);  Service: Endoscopy;  Laterality: N/A;    COLONOSCOPY N/A 03/14/2017    Procedure: COLONOSCOPY;  Surgeon: Nela Sanchez MD;  Location: [facility] ([address] [place] [address]);  Service: Endoscopy;  Laterality: N/A;  2 days clear liquids before procedure    COLONOSCOPY N/A 07/29/2020    Procedure: COLONOSCOPY;  Surgeon: Andrea Shaver MD;  Location: [facility];  Service: Endoscopy;  Laterality: N/A;    ESOPHAGOGASTRODUODENOSCOPY N/A 11/19/2019    Procedure: EGD (ESOPHAGOGASTRODUODENOSCOPY);  Surgeon: Andrea Shaver MD;  Location: [facility];  Service: Endoscopy;  Laterality: N/A;    INJECTION OF ANESTHETIC AGENT AROUND NERVE Right 10/20/2020    Procedure: Block, Nerve genicular;  Surgeon: Alvin Curry MD;  Location: [facility] OR;  Service: Pain Management;  Laterality: Right;  right knee    INTRAMEDULLARY RODDING OF FEMUR Right 10/20/2022    Procedure: INSERTION, INTRAMEDULLARY  SRINIVASA, FEMUR;  Surgeon: Gómez Selby MD;  Location: [facility] OR;  Service: Orthopedics;  Laterality: Right;    KNEE SURGERY Right     possible osteomylitis    RADIOFREQUENCY ABLATION Right 2021    Procedure: Radiofrequency Ablation Right Knee Genicular RFA [place];  Surgeon: Alvin Curry MD;  Location: [facility] OR;  Service: Pain Management;  Laterality: Right;  Right knee     SMALL INTESTINE SURGERY      TUBE THORACOTOMY      scrapping of lung lining    UPPER GASTROINTESTINAL ENDOSCOPY       Family History   Problem Relation Name Age of Onset    Diabetes Mother jahaira     Stroke Mother jahaira     Diabetes Father amber     Kidney disease Father amber     Irritable bowel syndrome Cousin      Celiac disease Neg Hx      Cirrhosis Neg Hx      Colon cancer Neg Hx      Colon polyps Neg Hx      Cystic fibrosis Neg Hx      Esophageal cancer Neg Hx      Crohn's disease Neg Hx      Hemochromatosis Neg Hx      Inflammatory bowel disease Neg Hx      Liver cancer Neg Hx      Liver disease Neg Hx      Rectal cancer Neg Hx      Stomach cancer Neg Hx      Ulcerative colitis Neg Hx      Addison's disease Neg Hx      Melanoma Neg Hx      Psoriasis Neg Hx      Lupus Neg Hx      Eczema Neg Hx      Glaucoma Neg Hx      Macular degeneration Neg Hx       Social History     Socioeconomic History    Marital status: Other   Tobacco Use    Smoking status: Former     Current packs/day: 0.00     Average packs/day: 1 pack/day for 15.0 years (15.0 ttl pk-yrs)     Types: Cigarettes     Start date: 3/10/1980     Quit date: 3/10/1995     Years since quittin.1    Smokeless tobacco: Never   Substance and Sexual Activity    Alcohol use: Never     Alcohol/week: 2.0 standard drinks of alcohol     Types: 1 Cans of beer, 1 Shots of liquor per week    Drug use: Never    Sexual activity: Yes     Partners: Female     Birth control/protection: None   Social History Narrative    ** Merged History Encounter **         ** Data from: [employer] [address] Dept: [facility]  FAMILY MEDICINE    Retired          ** Data from: 12/14/22 [facility] Dept: [facility]OUTPATIENT    ** Merged History Encounter **         ** Merged History Encounter **          Social Drivers of Health     Financial Resource Strain: Low Risk  (9/27/2024)    Overall Financial Resource Strain (CARDIA)     Difficulty of Paying Living Expenses: Not hard at all   Food Insecurity: No Food Insecurity (9/27/2024)    Hunger Vital Sign     Worried About Running Out of Food in the Last Year: Never true     Ran Out of Food in the Last Year: Never true   Transportation Needs: No Transportation Needs (9/27/2024)    PRAPARE - Transportation     Lack of Transportation (Medical): No     Lack of Transportation (Non-Medical): No   Physical Activity: Sufficiently Active (9/27/2024)    Exercise Vital Sign     Days of Exercise per Week: 7 days     Minutes of Exercise per Session: 30 min   Stress: No Stress Concern Present (9/27/2024)    [employer] [place] [employer] - Occupational Stress Questionnaire     Feeling of Stress : Not at all   Housing Stability: Unknown (9/27/2024)    Housing Stability Vital Sign     Unable to Pay for Housing in the Last Year: No     Homeless in the Last Year: No     Medications/Allergies: See med card    Vitals:    04/15/25 0933   BP: 106/67   Pulse: 68   PainSc:   5     Physical exam:    GENERAL: A and O x3, the patient appears well groomed and is in no acute distress.  Skin: healing lesion on nose  HEENT: normocephalic, atraumatic  CARDIOVASCULAR:  RRR  LUNGS: non labored breathing  ABDOMEN: soft, nontender. No rebound   UPPER EXTREMITIES: Normal alignment, normal range of motion, no atrophy, no skin changes,  hair growth and nail growth normal and equal bilaterally. No swelling, no tenderness.    LOWER EXTREMITIES:  TTP left patella, swelling along medial and superior aspect. No redness or warmth.     moderate TTP SCM on right. Pain with right lateral rotation. No midline cervical tenderness.  Negative spurlings.   LUMBAR SPINE  Lumbar spine: ROM is full with flexion extension and oblique extension with no increased pain.    Lam's test causes no increased pain on either side.    Supine straight leg raise is negative bilaterally.    Internal and external rotation of the hip causes no increased pain on either side.  Myofascial exam: No tenderness to palpation across lumbar paraspinous muscles.    MENTAL STATUS: normal orientation, speech, language, and fund of knowledge for social situation.  Emotional state appropriate.    CRANIAL NERVES:  II:  PERRL bilaterally,   III,IV,VI: EOMI.    V:  Facial sensation equal bilaterally  VII:  Facial motor function normal.  VIII:  Hearing equal to finger rub bilaterally  IX/X: Gag normal, palate symmetric  XI:  Shoulder shrug equal, head turn equal  XII:  Tongue midline without fasciculations    MOTOR: Tone and bulk: normal bilateral upper and lower Strength: normal   SENSATION: Light touch and pinprick intact bilaterally  REFLEXES: normal, symmetric, nonbrisk.  Toes down, no clonus. No hoffmans.  GAIT: normal rise, base, steps, and arm swing.      Imaging:  Left knee xray 3/6/22  Mild tricompartmental degenerative osteoarthrosis with mild joint space narrowing and small osteophyte formation.  Subtle calcification highlighted cartilage consistent with chondrocalcinosis.     There is a small suprapatellar effusion.     Impression:     1. Mild tricompartmental degenerative osteoarthrosis  2. Chondrocalcinosis.  3. Small suprapatellar effusion.       Assessment:  Mr. Cerda is a 70 y.o. male with abdominal pain  1. Chronic abdominal pain    2. Chronic use of opiate for therapeutic purpose    3. DDD (degenerative disc disease), cervical    4. Myofascial pain    5. Osteoarthritis of right knee, unspecified osteoarthritis type    6. Acute pain of left knee          Plan:  1. Patient with long history of crohn's disease and chronic abdominal pain.  Continue care with  GI.  2. Tramadol 50 mg q 8 h prn.     reviewed.  No signs of aberrant behavior.  Previous UDS consistent.  Script provided for 3 months  3. Continue OTC lidocaine cream  4. Monitor progress from right sided peripheral nerve block.   5. F/u with orthopedics as needed  6. Reviewed cervical xray results . Discussed lumbar MBB vs ALLISON as well as PT. He will consider this.   7. I have used clinical judgement based on duration and functional status to consider definite necessity for treatment. Discuss with PCP  8. Robaxin 500 mg TID prn Call for refill  9. F/u 3  months or sooner  All medication management was performed by Dr. Alvin Curry

## 2025-05-04 DIAGNOSIS — R10.13 EPIGASTRIC PAIN: ICD-10-CM

## 2025-05-05 RX ORDER — SUCRALFATE 1 G/1
TABLET ORAL
Qty: 120 TABLET | Refills: 1 | Status: SHIPPED | OUTPATIENT
Start: 2025-05-05

## 2025-07-09 ENCOUNTER — OFFICE VISIT (OUTPATIENT)
Dept: FAMILY MEDICINE | Facility: CLINIC | Age: 70
End: 2025-07-09
Payer: MEDICARE

## 2025-07-09 ENCOUNTER — LAB VISIT (OUTPATIENT)
Dept: LAB | Facility: HOSPITAL | Age: 70
End: 2025-07-09
Attending: FAMILY MEDICINE
Payer: MEDICARE

## 2025-07-09 ENCOUNTER — RESULTS FOLLOW-UP (OUTPATIENT)
Dept: FAMILY MEDICINE | Facility: CLINIC | Age: 70
End: 2025-07-09

## 2025-07-09 VITALS
SYSTOLIC BLOOD PRESSURE: 116 MMHG | HEIGHT: 73 IN | HEART RATE: 74 BPM | OXYGEN SATURATION: 97 % | WEIGHT: 151.81 LBS | DIASTOLIC BLOOD PRESSURE: 78 MMHG | BODY MASS INDEX: 20.12 KG/M2

## 2025-07-09 DIAGNOSIS — Z86.2 HISTORY OF ANEMIA: ICD-10-CM

## 2025-07-09 DIAGNOSIS — Z13.1 DIABETES MELLITUS SCREENING: ICD-10-CM

## 2025-07-09 DIAGNOSIS — E55.9 VITAMIN D DEFICIENCY: Primary | ICD-10-CM

## 2025-07-09 DIAGNOSIS — E53.8 B12 DEFICIENCY: ICD-10-CM

## 2025-07-09 DIAGNOSIS — Z13.29 THYROID DISORDER SCREEN: ICD-10-CM

## 2025-07-09 DIAGNOSIS — E55.9 VITAMIN D DEFICIENCY: ICD-10-CM

## 2025-07-09 DIAGNOSIS — Z13.6 ENCOUNTER FOR LIPID SCREENING FOR CARDIOVASCULAR DISEASE: ICD-10-CM

## 2025-07-09 DIAGNOSIS — Z12.5 PROSTATE CANCER SCREENING: ICD-10-CM

## 2025-07-09 DIAGNOSIS — Z00.00 ANNUAL VISIT FOR GENERAL ADULT MEDICAL EXAMINATION WITHOUT ABNORMAL FINDINGS: ICD-10-CM

## 2025-07-09 DIAGNOSIS — Z13.220 ENCOUNTER FOR LIPID SCREENING FOR CARDIOVASCULAR DISEASE: ICD-10-CM

## 2025-07-09 DIAGNOSIS — E53.8 B12 DEFICIENCY: Primary | ICD-10-CM

## 2025-07-09 LAB
ABSOLUTE EOSINOPHIL (OHS): 0.09 K/UL
ABSOLUTE MONOCYTE (OHS): 0.48 K/UL (ref 0.3–1)
ABSOLUTE NEUTROPHIL COUNT (OHS): 3 K/UL (ref 1.8–7.7)
ALBUMIN SERPL BCP-MCNC: 3.4 G/DL (ref 3.5–5.2)
ALP SERPL-CCNC: 118 UNIT/L (ref 40–150)
ALT SERPL W/O P-5'-P-CCNC: 22 UNIT/L (ref 10–44)
ANION GAP (OHS): 11 MMOL/L (ref 8–16)
AST SERPL-CCNC: 20 UNIT/L (ref 11–45)
BASOPHILS # BLD AUTO: 0.03 K/UL
BASOPHILS NFR BLD AUTO: 0.7 %
BILIRUB SERPL-MCNC: 0.3 MG/DL (ref 0.1–1)
BUN SERPL-MCNC: 19 MG/DL (ref 8–23)
CALCIUM SERPL-MCNC: 7.1 MG/DL (ref 8.7–10.5)
CHLORIDE SERPL-SCNC: 109 MMOL/L (ref 95–110)
CHOLEST SERPL-MCNC: 107 MG/DL (ref 120–199)
CHOLEST/HDLC SERPL: 3 {RATIO} (ref 2–5)
CO2 SERPL-SCNC: 22 MMOL/L (ref 23–29)
CREAT SERPL-MCNC: 0.8 MG/DL (ref 0.5–1.4)
EAG (OHS): 97 MG/DL (ref 68–131)
ERYTHROCYTE [DISTWIDTH] IN BLOOD BY AUTOMATED COUNT: 14.1 % (ref 11.5–14.5)
GFR SERPLBLD CREATININE-BSD FMLA CKD-EPI: >60 ML/MIN/1.73/M2
GLUCOSE SERPL-MCNC: 97 MG/DL (ref 70–110)
HBA1C MFR BLD: 5 % (ref 4–5.6)
HCT VFR BLD AUTO: 30.2 % (ref 40–54)
HDLC SERPL-MCNC: 36 MG/DL (ref 40–75)
HDLC SERPL: 33.6 % (ref 20–50)
HGB BLD-MCNC: 9.2 GM/DL (ref 14–18)
IMM GRANULOCYTES # BLD AUTO: 0.02 K/UL (ref 0–0.04)
IMM GRANULOCYTES NFR BLD AUTO: 0.5 % (ref 0–0.5)
LDLC SERPL CALC-MCNC: 60 MG/DL (ref 63–159)
LYMPHOCYTES # BLD AUTO: 0.6 K/UL (ref 1–4.8)
MCH RBC QN AUTO: 24.6 PG (ref 27–31)
MCHC RBC AUTO-ENTMCNC: 30.5 G/DL (ref 32–36)
MCV RBC AUTO: 81 FL (ref 82–98)
NONHDLC SERPL-MCNC: 71 MG/DL
NUCLEATED RBC (/100WBC) (OHS): 0 /100 WBC
PLATELET # BLD AUTO: 164 K/UL (ref 150–450)
PMV BLD AUTO: 9.6 FL (ref 9.2–12.9)
POTASSIUM SERPL-SCNC: 3.8 MMOL/L (ref 3.5–5.1)
PROT SERPL-MCNC: 6.6 GM/DL (ref 6–8.4)
PSA SERPL-MCNC: 2.61 NG/ML
RBC # BLD AUTO: 3.74 M/UL (ref 4.6–6.2)
RELATIVE EOSINOPHIL (OHS): 2.1 %
RELATIVE LYMPHOCYTE (OHS): 14.2 % (ref 18–48)
RELATIVE MONOCYTE (OHS): 11.4 % (ref 4–15)
RELATIVE NEUTROPHIL (OHS): 71.1 % (ref 38–73)
SODIUM SERPL-SCNC: 142 MMOL/L (ref 136–145)
TRIGL SERPL-MCNC: 55 MG/DL (ref 30–150)
TSH SERPL-ACNC: 1.96 UIU/ML (ref 0.4–4)
WBC # BLD AUTO: 4.22 K/UL (ref 3.9–12.7)

## 2025-07-09 PROCEDURE — 84153 ASSAY OF PSA TOTAL: CPT | Mod: HCWC

## 2025-07-09 PROCEDURE — 82570 ASSAY OF URINE CREATININE: CPT | Mod: HCWC

## 2025-07-09 PROCEDURE — 84075 ASSAY ALKALINE PHOSPHATASE: CPT | Mod: HCWC

## 2025-07-09 PROCEDURE — 82607 VITAMIN B-12: CPT | Mod: HCWC

## 2025-07-09 PROCEDURE — 80061 LIPID PANEL: CPT | Mod: HCWC

## 2025-07-09 PROCEDURE — 84443 ASSAY THYROID STIM HORMONE: CPT | Mod: HCWC

## 2025-07-09 PROCEDURE — 99999 PR PBB SHADOW E&M-EST. PATIENT-LVL IV: CPT | Mod: PBBFAC,HCWC,, | Performed by: FAMILY MEDICINE

## 2025-07-09 PROCEDURE — 36415 COLL VENOUS BLD VENIPUNCTURE: CPT | Mod: HCWC

## 2025-07-09 PROCEDURE — 85025 COMPLETE CBC W/AUTO DIFF WBC: CPT | Mod: HCWC

## 2025-07-09 PROCEDURE — 83036 HEMOGLOBIN GLYCOSYLATED A1C: CPT | Mod: HCWC

## 2025-07-09 PROCEDURE — 82306 VITAMIN D 25 HYDROXY: CPT | Mod: HCWC

## 2025-07-09 RX ORDER — METHOCARBAMOL 500 MG/1
500 TABLET, FILM COATED ORAL 3 TIMES DAILY
Qty: 90 TABLET | Refills: 1 | Status: SHIPPED | OUTPATIENT
Start: 2025-07-09

## 2025-07-09 NOTE — PROGRESS NOTES
Patient ID: Tristin Cerda is a 70 y.o. male.    Chief Complaint: Generalized Body Aches (PAIN ALLOVER  JOINT ,MUSCLE ,) and Labs Only (Labs  full )    History of Present Illness    CHIEF COMPLAINT:  Tristin presents today with generalized body pain.    HISTORY OF PRESENT ILLNESS:  He reports generalized body pain that started approximately five weeks ago, which he attributes to not taking potassium for 2-3 days. Pain has been widespread, affecting shoulders, back, legs, and buttocks. He initially described the pain as severe, stating he was barely able to walk or move. Pain worsened this past week after cutting grass, with symptoms intensifying to the point of significant mobility limitation. He currently experiences specific left arm pain with difficulty lifting and moving the arm, radiating up the neck and localized in the shoulder area. The pain has recently begun to ease, and he is more mobile today compared to previous days. He suspects the symptoms may be related to electrolyte disruption, specifically potassium deficiency.    MEDICAL HISTORY:  He has short gut syndrome with no small intestine. He has neck degeneration managed with muscle relaxers. He is a former smoker who quit over 30 years ago.    CURRENT SYMPTOMS:  He reports increased frequency of bowel movements related to his short gut condition. He has not been feeling well for the past 3-4 days. He denies urinary difficulties and nausea.    LABS:  Last labs were performed in 2023. Potassium levels were 4.0 in June (normal range) and decreased to 3.3 in October (slightly low).      ROS:  ROS as indicated in HPI.         Physical Exam  Constitutional:       Comments: Appears pale today   HENT:      Head: Normocephalic and atraumatic.      Nose: Nose normal.   Eyes:      Extraocular Movements: Extraocular movements intact.      Pupils: Pupils are equal, round, and reactive to light.   Cardiovascular:      Rate and Rhythm: Normal rate and regular  rhythm.      Pulses: Normal pulses.   Pulmonary:      Effort: Pulmonary effort is normal. No respiratory distress.      Breath sounds: Normal breath sounds. No wheezing or rhonchi.   Musculoskeletal:         General: Normal range of motion.   Skin:     General: Skin is warm and dry.   Neurological:      General: No focal deficit present.      Mental Status: He is alert and oriented to person, place, and time.   Psychiatric:         Mood and Affect: Mood normal.         Behavior: Behavior normal.         Thought Content: Thought content normal.          Assessment & Plan    K91.2 Postsurgical malabsorption, not elsewhere classified  M50.30 Other cervical disc degeneration, unspecified cervical region  M54.12 Radiculopathy, cervical region  M79.18 Myalgia, other site  R01.1 Cardiac murmur, unspecified  E87.6 Hypokalemia  R71.0 Precipitous drop in hematocrit  Z87.891 Personal history of nicotine dependence    MALABSORPTION:  - Tristin reports increased bowel frequency, likely related to history of short gut syndrome.  - Will order labs to assess malabsorption and identify potential causes of symptoms.    CERVICAL DISC DEGENERATION:  - Tristin has known degeneration in cervical vertebrae causing neck and shoulder pain.  - Currently taking muscle relaxers for pain management.  - This condition may be contributing to the patient's widespread pain and weakness.    CERVICAL RADICULOPATHY:  - Pain radiating from neck to shoulder suggests possible nerve impingement or entrapment in the spine.  - This may be related to the cervical disc degeneration and could be one cause of the patient's widespread symptoms.  - Current muscle relaxers may help alleviate these symptoms.    MYALGIA:  - Tristin reports widespread pain in shoulders, legs, back, and buttocks, though notes slight improvement allowing better mobility.  - Multiple potential causes for this myalgia will be investigated through lab work.    CARDIAC MURMUR:  - Detected  "previously undiagnosed heart murmur, potentially indicating valve calcification.  - Explained to patient that heart murmurs often result from valves not opening and closing properly, commonly due to age-related changes.  - Will consider further cardiac evaluation if labs are unremarkable.    HYPOKALEMIA:  - Tristin reports not taking potassium for 2-3 days.  - Previous labs showed normal potassium (4.0) in June last year, but decreased to 3.3 in October '23, indicating hypokalemia.  - This electrolyte imbalance could contribute to weakness and will be assessed in current labs.    ANEMIA:  - Observed pallor suggesting possible anemia or drop in hematocrit, which could explain some weakness and fatigue symptoms.  - Will assess hematocrit levels in ordered labs.    HISTORY OF SMOKING:  - Tristin quit smoking over 30 years ago.    GENERAL HEALTH CONSIDERATIONS:  - Discussed how dehydration and heat exposure can exacerbate weakness and fatigue.  - Due to severity of symptoms, proceeding with non-fasting labs to investigate overall health status despite non-ideal timing.    FOLLOW-UP PLAN:  - Follow up in 3 months.  - Tristin advised to contact office earlier if lab results indicate need for immediate attention.         Review of patient's allergies indicates:   Allergen Reactions    Ciprofloxacin      Thinks joint pain    Codeine Itching    Compazine [prochlorperazine]      Tight muscles- body became tight    Keflex [cephalexin]      Can not remeber    Erythromycin Nausea Only      Vitals:    07/09/25 1352   BP: 116/78   BP Location: Right arm   Patient Position: Sitting   Pulse: 74   SpO2: 97%   Weight: 68.9 kg (151 lb 12.8 oz)   Height: 6' 1" (1.854 m)           Results for orders placed or performed in visit on 03/21/25   Specimen to Pathology, Dermatology    Collection Time: 03/21/25  2:03 PM   Result Value Ref Range    Final Pathologic Diagnosis       1. Skin, midline upper back, excision:   - RESIDUAL BASAL CELL " CARCINOMA.  - MARGINS ARE NEGATIVE.  - DERMAL SCAR.      Gross       Patient ID/MRN:  [id number]  Pathology label MRN:  [id number]   The specimen is received in formalin labeled &quot;midline upper back&quot;. The specimen is an elliptical excision of tan-white to tan-brown, smooth, firm, lightly hair-bearing fragment of unoriented skin measuring 3.1 x 1.4 cm and excised to a depth of   0.4 cm.  The skin surface is grossly remarkable for a 0.7 x 0.5 cm tan-white to tan-pink, flat discoloration, located 0.3 cm away from the nearest biopsy margin. The deep surface is inked blue, and the specimen is serially sectioned as follows:  QJW--1-A :  Tips  QCO--1-B, CJC--1-C, DHG--1-D :  Serially sectioned body     Caitlin Mrad   Grossing Technologist      Microscopic Exam       Sections show a basaloid tumor within the dermis exhibiting peripheral palisading, mitotic activity, and apoptotic tumor cells. The tumor is located in close proximity to an area of dermal fibrosis consistent with a scar. All margins are negative.    Disclaimer       Unless the case is a 'gross only' or additional testing only, the final diagnosis for each specimen is based on a microscopic examination of appropriate tissue sections.      Plan:          B12 deficiency  -     Vitamin B12; Future; Expected date: 07/09/2025    Encounter for lipid screening for cardiovascular disease  -     Lipid Panel; Future; Expected date: 07/09/2025    Prostate cancer screening  -     PSA, Screening; Future; Expected date: 07/09/2025    Vitamin D deficiency  -     Vitamin D; Future; Expected date: 07/09/2025    History of anemia  -     Vitamin B12; Future; Expected date: 07/09/2025  -     CBC Auto Differential; Future; Expected date: 07/09/2025    Thyroid disorder screen  -     TSH; Future; Expected date: 07/09/2025    Diabetes mellitus screening  -     Hemoglobin A1C; Future; Expected date: 07/09/2025    Annual visit for general adult medical  examination without abnormal findings  -     Microalbumin/Creatinine Ratio, Urine; Future; Expected date: 07/09/2025  -     Vitamin D; Future; Expected date: 07/09/2025  -     Vitamin B12; Future; Expected date: 07/09/2025  -     Lipid Panel; Future; Expected date: 07/09/2025  -     CBC Auto Differential; Future; Expected date: 07/09/2025  -     Comprehensive Metabolic Panel; Future; Expected date: 07/09/2025  -     Hemoglobin A1C; Future; Expected date: 07/09/2025  -     TSH; Future; Expected date: 07/09/2025  -     PSA, Screening; Future; Expected date: 07/09/2025        Follow up in about 3 months (around 10/9/2025), or if symptoms worsen or fail to improve.    This note was generated with the assistance of ambient listening technology. Verbal consent was obtained by the patient and accompanying visitor(s) for the recording of patient appointment to facilitate this note. I attest to having reviewed and edited the generated note for accuracy, though some syntax or spelling errors may persist. Please contact the author of this note for any clarification.

## 2025-07-10 LAB
25(OH)D3+25(OH)D2 SERPL-MCNC: 19 NG/ML (ref 30–96)
ALBUMIN/CREAT UR: 6.7 UG/MG
CREAT UR-MCNC: 134 MG/DL (ref 23–375)
MICROALBUMIN UR-MCNC: 9 UG/ML (ref ?–5000)
VIT B12 SERPL-MCNC: 470 PG/ML (ref 210–950)

## 2025-07-10 RX ORDER — ERGOCALCIFEROL 1.25 MG/1
50000 CAPSULE ORAL
Qty: 13 CAPSULE | Refills: 3 | Status: SHIPPED | OUTPATIENT
Start: 2025-07-10

## 2025-07-15 ENCOUNTER — OFFICE VISIT (OUTPATIENT)
Dept: PAIN MEDICINE | Facility: CLINIC | Age: 70
End: 2025-07-15
Payer: MEDICARE

## 2025-07-15 VITALS
HEART RATE: 83 BPM | HEIGHT: 73 IN | SYSTOLIC BLOOD PRESSURE: 129 MMHG | DIASTOLIC BLOOD PRESSURE: 79 MMHG | BODY MASS INDEX: 20.13 KG/M2 | WEIGHT: 151.88 LBS

## 2025-07-15 DIAGNOSIS — G89.4 CHRONIC PAIN DISORDER: ICD-10-CM

## 2025-07-15 DIAGNOSIS — M79.18 MYOFASCIAL PAIN: ICD-10-CM

## 2025-07-15 DIAGNOSIS — G89.29 CHRONIC ABDOMINAL PAIN: ICD-10-CM

## 2025-07-15 DIAGNOSIS — M50.30 DDD (DEGENERATIVE DISC DISEASE), CERVICAL: ICD-10-CM

## 2025-07-15 DIAGNOSIS — R10.9 CHRONIC ABDOMINAL PAIN: ICD-10-CM

## 2025-07-15 DIAGNOSIS — R10.9 CHRONIC ABDOMINAL PAIN: Primary | ICD-10-CM

## 2025-07-15 DIAGNOSIS — G89.29 CHRONIC ABDOMINAL PAIN: Primary | ICD-10-CM

## 2025-07-15 PROCEDURE — 99214 OFFICE O/P EST MOD 30 MIN: CPT | Mod: HCNC,S$GLB,, | Performed by: PHYSICIAN ASSISTANT

## 2025-07-15 PROCEDURE — 1125F AMNT PAIN NOTED PAIN PRSNT: CPT | Mod: CPTII,HCNC,S$GLB, | Performed by: PHYSICIAN ASSISTANT

## 2025-07-15 PROCEDURE — 3078F DIAST BP <80 MM HG: CPT | Mod: CPTII,HCNC,S$GLB, | Performed by: PHYSICIAN ASSISTANT

## 2025-07-15 PROCEDURE — 1159F MED LIST DOCD IN RCRD: CPT | Mod: CPTII,HCNC,S$GLB, | Performed by: PHYSICIAN ASSISTANT

## 2025-07-15 PROCEDURE — 99999 PR PBB SHADOW E&M-EST. PATIENT-LVL III: CPT | Mod: PBBFAC,HCNC,, | Performed by: PHYSICIAN ASSISTANT

## 2025-07-15 PROCEDURE — 3044F HG A1C LEVEL LT 7.0%: CPT | Mod: CPTII,HCNC,S$GLB, | Performed by: PHYSICIAN ASSISTANT

## 2025-07-15 PROCEDURE — 3008F BODY MASS INDEX DOCD: CPT | Mod: CPTII,HCNC,S$GLB, | Performed by: PHYSICIAN ASSISTANT

## 2025-07-15 PROCEDURE — 3074F SYST BP LT 130 MM HG: CPT | Mod: CPTII,HCNC,S$GLB, | Performed by: PHYSICIAN ASSISTANT

## 2025-07-15 RX ORDER — TRAMADOL HYDROCHLORIDE 50 MG/1
50 TABLET, FILM COATED ORAL EVERY 8 HOURS PRN
Qty: 90 TABLET | Refills: 2 | Status: SHIPPED | OUTPATIENT
Start: 2025-07-15 | End: 2025-09-13

## 2025-07-15 NOTE — PROGRESS NOTES
PCP: Corinna Atkinson NP      CC: abdominal pain    Interval history: Mr. Cerda is a 70 y.o. male with an extensive history of crohn's disease who presents today for f/u s/p and medication refill. C/o worsening all over pain and fatigue. He has appt with hematology for abnormal CBC. Continues to have neck pain worse with lateral rotation. He had updated imaging. Denies radicular pain. Robaxin is helpful. He has healed from right hip fracture last year.  He was taking Percocet 5-325 mg q 6 h for post op pain. Acute left knee pain resolved after left intra articular steroid injection with Dr. Villatoro.  Right knee RFA  provided 75% relief.    Abdominal pain remains stable.   He does report diarrhea at times but no blood stools. Reports anal pain and itching 3/2 chronic diarrhea.  He is currently being evaluated by gastroenterology.  OTC Lidocaine 2 % provides some minimal relief.  Compounding cream was too expensive.  He rates his pain 2/10.     Prior HPI:   Mr. Cerda is a 60 year old male with PMH of crohn's disease referred by Dr. Hansen.  Patient states being diagnosed with crohn's disease since the age of 14.    He has had multiple GI surgeries and large bowel and small bowel removals.  During that time, he was being managed by providers in [place].  He was TPN dependent for many years until about two years ago.  He is now stable on an oral diet.  He did not have a primary care physician nor a gastroenterologist for many years.  He is currently trying to establish care.  He has future appointment with Dr. Ch.  He states taking Demerol 50mg q8hrs as needed for pain. It has provided relief of his nausea and pain with diarrhea.  He was last prescribed Demerol in July 2014.  Since then, he has been bearing with the pain.  It is a sharp shooting pain in his mid abdomen.      ROS:  CONSTITUTIONAL: No fevers, chills, night sweats, wt. loss, appetite changes  SKIN: no rashes or itching  ENT:  No head trauma, vision changes, or eye pain +Headaches  LYMPH NODES: None noticed   CV: No chest pain, palpitations.   RESP: No shortness of breath, dyspnea on exertion, cough, wheezing, or hemoptysis  GI: No nausea, emesis, diarrhea, constipation, melena, hematochezia, pain.    : No dysuria, hematuria, urgency, or frequency   HEME: No easy bruising, bleeding problems  PSYCHIATRIC: No depression, anxiety, psychosis, hallucinations.  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    midline forehead    TB (pulmonary tuberculosis) 1999    Vitamin B deficiency     Vitamin D deficiency      Past Surgical History:   Procedure Laterality Date    3 small bowel resections      1973, 1988, 1994    APPENDECTOMY      CHOLECYSTECTOMY      COLONOSCOPY      COLONOSCOPY N/A 02/14/2017    Procedure: COLONOSCOPY;  Surgeon: Nela Sanchez MD;  Location: [address]);  Service: Endoscopy;  Laterality: N/A;    COLONOSCOPY N/A 03/14/2017    Procedure: COLONOSCOPY;  Surgeon: Nela Sanchez MD;  Location: [facility] ([address] [place] [address]);  Service: Endoscopy;  Laterality: N/A;  2 days clear liquids before procedure    COLONOSCOPY N/A 07/29/2020    Procedure: COLONOSCOPY;  Surgeon: Andrea Shaver MD;  Location: [facility];  Service: Endoscopy;  Laterality: N/A;    ESOPHAGOGASTRODUODENOSCOPY N/A 11/19/2019    Procedure: EGD (ESOPHAGOGASTRODUODENOSCOPY);  Surgeon: Andrea Shaevr MD;  Location: [facility];  Service: Endoscopy;  Laterality: N/A;    INJECTION OF ANESTHETIC AGENT AROUND NERVE Right 10/20/2020    Procedure: Block, Nerve genicular;  Surgeon: Alvin Curry MD;  Location: [facility];  Service: Pain Management;  Laterality: Right;  right knee     INTRAMEDULLARY RODDING OF FEMUR Right 10/20/2022    Procedure: INSERTION, INTRAMEDULLARY SRINIVASA, FEMUR;  Surgeon: Gómez Selby MD;  Location: [facility] OR;  Service: Orthopedics;  Laterality: Right;    KNEE SURGERY Right     possible osteomylitis    RADIOFREQUENCY ABLATION Right 2021    Procedure: Radiofrequency Ablation Right Knee Genicular RFA [place];  Surgeon: Alvin Curry MD;  Location: [facility] OR;  Service: Pain Management;  Laterality: Right;  Right knee     SMALL INTESTINE SURGERY      TUBE THORACOTOMY      scrapping of lung lining    UPPER GASTROINTESTINAL ENDOSCOPY       Family History   Problem Relation Name Age of Onset    Diabetes Mother jahaira     Stroke Mother jahaira     Diabetes Father amber     Kidney disease Father amber     Irritable bowel syndrome Cousin      Celiac disease Neg Hx      Cirrhosis Neg Hx      Colon cancer Neg Hx      Colon polyps Neg Hx      Cystic fibrosis Neg Hx      Esophageal cancer Neg Hx      Crohn's disease Neg Hx      Hemochromatosis Neg Hx      Inflammatory bowel disease Neg Hx      Liver cancer Neg Hx      Liver disease Neg Hx      Rectal cancer Neg Hx      Stomach cancer Neg Hx      Ulcerative colitis Neg Hx      Addison's disease Neg Hx      Melanoma Neg Hx      Psoriasis Neg Hx      Lupus Neg Hx      Eczema Neg Hx      Glaucoma Neg Hx      Macular degeneration Neg Hx       Social History     Socioeconomic History    Marital status: Other   Tobacco Use    Smoking status: Former     Current packs/day: 0.00     Average packs/day: 1 pack/day for 15.0 years (15.0 ttl pk-yrs)     Types: Cigarettes     Start date: 3/10/1980     Quit date: 3/10/1995     Years since quittin.3    Smokeless tobacco: Never   Substance and Sexual Activity    Alcohol use: Never     Alcohol/week: 2.0 standard drinks of alcohol     Types: 1 Cans of beer, 1 Shots of liquor per week    Drug use: Never    Sexual activity: Yes     Partners: Female     Birth control/protection: None   Social History  "Narrative    ** Merged History Encounter **         ** Data from: 11/21/22 Enc Dept: [facility]    Retired          ** Data from: 12/14/22 Enc Dept: [facility]    ** Merged History Encounter **         ** Merged History Encounter **          Social Drivers of Health     Financial Resource Strain: Low Risk  (9/27/2024)    Overall Financial Resource Strain (CARDIA)     Difficulty of Paying Living Expenses: Not hard at all   Food Insecurity: No Food Insecurity (9/27/2024)    Hunger Vital Sign     Worried About Running Out of Food in the Last Year: Never true     Ran Out of Food in the Last Year: Never true   Transportation Needs: No Transportation Needs (9/27/2024)    PRAPARE - Transportation     Lack of Transportation (Medical): No     Lack of Transportation (Non-Medical): No   Physical Activity: Sufficiently Active (9/27/2024)    Exercise Vital Sign     Days of Exercise per Week: 7 days     Minutes of Exercise per Session: 30 min   Stress: No Stress Concern Present (9/27/2024)    [employer] [place] [employer] - Occupational Stress Questionnaire     Feeling of Stress : Not at all   Housing Stability: Unknown (9/27/2024)    Housing Stability Vital Sign     Unable to Pay for Housing in the Last Year: No     Homeless in the Last Year: No     Medications/Allergies: See med card    Vitals:    07/15/25 0840   BP: 129/79   Pulse: 83   Weight: 68.9 kg (151 lb 14.4 oz)   Height: 6' 1" (1.854 m)   PainSc:   2   PainLoc: Abdomen     Physical exam:    GENERAL: A and O x3, the patient appears well groomed and is in no acute distress.  Skin: healing lesion on nose  HEENT: normocephalic, atraumatic  CARDIOVASCULAR:  RRR  LUNGS: non labored breathing  ABDOMEN: soft, nontender. No rebound   UPPER EXTREMITIES: Normal alignment, normal range of motion, no atrophy, no skin changes,  hair growth and nail growth normal and equal bilaterally. No swelling, no tenderness.    LOWER EXTREMITIES:  TTP left " patella, swelling along medial and superior aspect. No redness or warmth.     moderate TTP SCM on right. Pain with right lateral rotation. No midline cervical tenderness. Negative spurlings.   LUMBAR SPINE  Lumbar spine: ROM is full with flexion extension and oblique extension with no increased pain.    Lam's test causes no increased pain on either side.    Supine straight leg raise is negative bilaterally.    Internal and external rotation of the hip causes no increased pain on either side.  Myofascial exam: No tenderness to palpation across lumbar paraspinous muscles.    MENTAL STATUS: normal orientation, speech, language, and fund of knowledge for social situation.  Emotional state appropriate.    CRANIAL NERVES:  II:  PERRL bilaterally,   III,IV,VI: EOMI.    V:  Facial sensation equal bilaterally  VII:  Facial motor function normal.  VIII:  Hearing equal to finger rub bilaterally  IX/X: Gag normal, palate symmetric  XI:  Shoulder shrug equal, head turn equal  XII:  Tongue midline without fasciculations    MOTOR: Tone and bulk: normal bilateral upper and lower Strength: normal   SENSATION: Light touch and pinprick intact bilaterally  REFLEXES: normal, symmetric, nonbrisk.  Toes down, no clonus. No hoffmans.  GAIT: normal rise, base, steps, and arm swing.      Imaging:  Left knee xray 3/6/22  Mild tricompartmental degenerative osteoarthrosis with mild joint space narrowing and small osteophyte formation.  Subtle calcification highlighted cartilage consistent with chondrocalcinosis.     There is a small suprapatellar effusion.     Impression:     1. Mild tricompartmental degenerative osteoarthrosis  2. Chondrocalcinosis.  3. Small suprapatellar effusion.       Assessment:  Mr. Cerda is a 70 y.o. male with abdominal pain  1. Chronic abdominal pain    2. DDD (degenerative disc disease), cervical    3. Myofascial pain    4. Chronic pain disorder        Plan:  1. Patient with long history of crohn's disease and  chronic abdominal pain.  Continue care with GI.  2. Tramadol 50 mg q 8 h prn.     reviewed.  No signs of aberrant behavior.  Previous UDS consistent.  Script provided for 3 months  3. Continue OTC lidocaine cream  4. Monitor progress from right sided peripheral nerve block.   5. F/u with orthopedics as needed  6. Reviewed cervical xray results . Discussed lumbar MBB vs ALLISON as well as PT. He will consider this.   7. I have used clinical judgement based on duration and functional status to consider definite necessity for treatment. Discuss with PCP  8. Robaxin 500 mg TID prn Call for refill  9. F/u 3  months or sooner  All medication management was performed by Dr. Alvin Curry

## 2025-07-31 ENCOUNTER — TELEPHONE (OUTPATIENT)
Dept: HEMATOLOGY/ONCOLOGY | Facility: CLINIC | Age: 70
End: 2025-07-31
Payer: MEDICARE

## 2025-07-31 ENCOUNTER — PATIENT MESSAGE (OUTPATIENT)
Dept: HEMATOLOGY/ONCOLOGY | Facility: CLINIC | Age: 70
End: 2025-07-31
Payer: MEDICARE

## 2025-08-04 ENCOUNTER — TELEPHONE (OUTPATIENT)
Facility: CLINIC | Age: 70
End: 2025-08-04
Payer: MEDICARE

## 2025-08-04 DIAGNOSIS — R10.13 EPIGASTRIC PAIN: ICD-10-CM

## 2025-08-06 ENCOUNTER — PATIENT MESSAGE (OUTPATIENT)
Dept: FAMILY MEDICINE | Facility: CLINIC | Age: 70
End: 2025-08-06
Payer: MEDICARE

## 2025-08-08 ENCOUNTER — LAB VISIT (OUTPATIENT)
Dept: LAB | Facility: HOSPITAL | Age: 70
End: 2025-08-08
Attending: NURSE PRACTITIONER
Payer: MEDICARE

## 2025-08-08 ENCOUNTER — OFFICE VISIT (OUTPATIENT)
Dept: HEMATOLOGY/ONCOLOGY | Facility: CLINIC | Age: 70
End: 2025-08-08
Payer: MEDICARE

## 2025-08-08 VITALS
OXYGEN SATURATION: 98 % | HEIGHT: 72 IN | DIASTOLIC BLOOD PRESSURE: 72 MMHG | TEMPERATURE: 98 F | SYSTOLIC BLOOD PRESSURE: 145 MMHG | WEIGHT: 147.69 LBS | BODY MASS INDEX: 20 KG/M2 | HEART RATE: 63 BPM | RESPIRATION RATE: 18 BRPM

## 2025-08-08 DIAGNOSIS — D64.9 ANEMIA, UNSPECIFIED TYPE: Primary | ICD-10-CM

## 2025-08-08 DIAGNOSIS — D64.9 ANEMIA, UNSPECIFIED TYPE: ICD-10-CM

## 2025-08-08 LAB
FERRITIN SERPL-MCNC: 3.7 NG/ML (ref 20–300)
IRON SATN MFR SERPL: <10 % (ref 20–50)
IRON SERPL-MCNC: 22 UG/DL (ref 45–160)
TIBC SERPL-MCNC: 552 UG/DL (ref 250–450)
TRANSFERRIN SERPL-MCNC: 394 MG/DL (ref 200–375)

## 2025-08-08 PROCEDURE — 84466 ASSAY OF TRANSFERRIN: CPT

## 2025-08-08 PROCEDURE — 82728 ASSAY OF FERRITIN: CPT

## 2025-08-08 PROCEDURE — 99999 PR PBB SHADOW E&M-EST. PATIENT-LVL IV: CPT | Mod: PBBFAC,HCNC,, | Performed by: NURSE PRACTITIONER

## 2025-08-08 PROCEDURE — 36415 COLL VENOUS BLD VENIPUNCTURE: CPT

## 2025-08-08 NOTE — PROGRESS NOTES
Service Date:  8/8/25    Chief Complaint: Acute deep vein thrombosis (DVT) of distal vein of right lo (Acute deep vein thrombosis (DVT) of RLE /Blood is not right /Labs)    Tristin Cerda is a 70 y.o. male referred to me for right femoral vein nonocclusive acute DVT.  Patient states he had a fall and broke his right hip and had surgery about 2 weeks ago.  Since then he has had pain in his right thigh with swelling.  An ultrasound done revealed a DVT.  His pain is now improved while he has been on Eliquis, along with the swelling.  He has no complaints me.  He is here for further treatment recommendations.  He states that he had a clot in his left lower extremity about 15 years ago and was placed on anticoagulation for about 2 weeks.  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  He does not follow with vascular surgery.    He presents today for follow-up.  He was last seen by Dr. REIS in 2022.  He is concerned about some recent labs that were drawn by his PCP and would like me to review them with him    Review of Systems   Constitutional:  Positive for malaise/fatigue.   HENT: Negative.     Eyes: Negative.    Respiratory: Negative.     Cardiovascular: Negative.    Gastrointestinal: Negative.    Genitourinary: Negative.    Musculoskeletal: Negative.    Skin: Negative.    Neurological: Negative.    Endo/Heme/Allergies: Negative.    Psychiatric/Behavioral: Negative.         Current Outpatient Medications   Medication Instructions    aspirin-acetaminophen-caffeine 250-250-65 mg (EXCEDRIN MIGRAINE) 250-250-65 mg per tablet 1 tablet, Every 8 hours PRN    cyanocobalamin 1,000 mcg, Subcutaneous, Every 30 days    dicyclomine (BENTYL) 20 mg tablet TAKE 1 TABLET BY MOUTH FOUR TIMES DAILY    ELIQUIS 5 mg, Oral, 2 times daily    ergocalciferol (ERGOCALCIFEROL) 50,000 Units, Oral, Every 7 days    hydrocortisone 2.5 % lotion Topical (Top), 2 times daily    meclizine (ANTIVERT) 12.5 mg,  "Oral, 3 times daily PRN    methocarbamoL (ROBAXIN) 500 mg, Oral, 3 times daily    ondansetron (ZOFRAN) 8 mg, Oral, Every 12 hours PRN    pantoprazole (PROTONIX) 40 mg, Oral, Daily    polyethylene glycol (GOLYTELY) 236-22.74-6.74 -5.86 gram suspension Use as directed for bowel prep    sucralfate (CARAFATE) 1 gram tablet TAKE 1 TABLET(1 GRAM) BY MOUTH FOUR TIMES DAILY BEFORE MEALS AND AT NIGHT    syringe with needle 1 mL 28 gauge x 1/2" Syrg 1 Device, Misc.(Non-Drug; Combo Route), Every 30 days    traMADoL (ULTRAM) 50 mg, Oral, Every 8 hours PRN        Past Medical History:   Diagnosis Date    Anxiety     Basal cell carcinoma 07/2017    R forehead and R temple    Cancer 07/2017    Crohn's disease 1972    Hypertension     Short bowel syndrome 1994    Squamous cell carcinoma in situ (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/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    midline forehead    TB (pulmonary tuberculosis) 1999    Vitamin B deficiency     Vitamin D deficiency         Past Surgical History:   Procedure Laterality Date    3 small bowel resections      1973, 1988, 1994    APPENDECTOMY      CHOLECYSTECTOMY      COLONOSCOPY      COLONOSCOPY N/A 02/14/2017    Procedure: COLONOSCOPY;  Surgeon: Nela Sanchez MD;  Location: [facility] ([address] [place] [address]);  Service: Endoscopy;  Laterality: N/A;    COLONOSCOPY N/A 03/14/2017    Procedure: COLONOSCOPY;  Surgeon: Nela Sanchez MD;  Location: [facility] ([address] [place] [address]);  Service: Endoscopy;  Laterality: N/A;  2 days clear liquids before procedure    COLONOSCOPY N/A 07/29/2020    Procedure: COLONOSCOPY;  Surgeon: Andrea Shaver MD;  Location: [facility];  Service: Endoscopy;  Laterality: N/A;    ESOPHAGOGASTRODUODENOSCOPY N/A 11/19/2019    Procedure: EGD (ESOPHAGOGASTRODUODENOSCOPY);  Surgeon: Andrea Shaver MD;  Location: [facility];  Service: Endoscopy;  Laterality: N/A;    INJECTION OF ANESTHETIC AGENT AROUND NERVE Right 10/20/2020    Procedure: Block, Nerve genicular;  Surgeon: Alvin Curry MD;  Location: [facility] OR;  Service: Pain Management;  " Laterality: Right;  right knee    INTRAMEDULLARY RODDING OF FEMUR Right 10/20/2022    Procedure: INSERTION, INTRAMEDULLARY SRINIVASA, FEMUR;  Surgeon: Gómez Selby MD;  Location: [facility] OR;  Service: Orthopedics;  Laterality: Right;    KNEE SURGERY Right     possible osteomylitis    RADIOFREQUENCY ABLATION Right 2021    Procedure: Radiofrequency Ablation Right Knee Genicular RFA [place];  Surgeon: Alvin Curry MD;  Location: [facility] OR;  Service: Pain Management;  Laterality: Right;  Right knee     SMALL INTESTINE SURGERY      TUBE THORACOTOMY      scrapping of lung lining    UPPER GASTROINTESTINAL ENDOSCOPY          Family History   Problem Relation Name Age of Onset    Diabetes Mother jhaaira     Stroke Mother jahaira     Diabetes Father amber     Kidney disease Father amber     Irritable bowel syndrome Cousin      Celiac disease Neg Hx      Cirrhosis Neg Hx      Colon cancer Neg Hx      Colon polyps Neg Hx      Cystic fibrosis Neg Hx      Esophageal cancer Neg Hx      Crohn's disease Neg Hx      Hemochromatosis Neg Hx      Inflammatory bowel disease Neg Hx      Liver cancer Neg Hx      Liver disease Neg Hx      Rectal cancer Neg Hx      Stomach cancer Neg Hx      Ulcerative colitis Neg Hx      Addison's disease Neg Hx      Melanoma Neg Hx      Psoriasis Neg Hx      Lupus Neg Hx      Eczema Neg Hx      Glaucoma Neg Hx      Macular degeneration Neg Hx         Social History     Tobacco Use    Smoking status: Former     Current packs/day: 0.00     Average packs/day: 1 pack/day for 15.0 years (15.0 ttl pk-yrs)     Types: Cigarettes     Start date: 3/10/1980     Quit date: 3/10/1995     Years since quittin.4    Smokeless tobacco: Never   Substance Use Topics    Alcohol use: Never     Alcohol/week: 2.0 standard drinks of alcohol     Types: 1 Cans of beer, 1 Shots of liquor per week    Drug use: Never         Vitals:    25 0952   BP: (!) 145/72   Pulse: 63   Resp: 18   Temp: 97.5 °F (36.4 °C)        Physical  Exam:  BP (!) 145/72 (BP Location: Right arm, Patient Position: Sitting)   Pulse 63   Temp 97.5 °F (36.4 °C) (Temporal)   Resp 18   Ht 6' (1.829 m)   Wt 67 kg (147 lb 11.3 oz)   SpO2 98%   BMI 20.03 kg/m²     Physical Exam  Vitals and nursing note reviewed.   Constitutional:       Appearance: Normal appearance.   HENT:      Head: Normocephalic and atraumatic.      Nose: Nose normal.      Mouth/Throat:      Mouth: Mucous membranes are moist.      Pharynx: Oropharynx is clear.   Eyes:      Extraocular Movements: Extraocular movements intact.      Conjunctiva/sclera: Conjunctivae normal.   Cardiovascular:      Rate and Rhythm: Normal rate and regular rhythm.      Heart sounds: Normal heart sounds.   Pulmonary:      Effort: Pulmonary effort is normal.      Breath sounds: Normal breath sounds.   Abdominal:      General: Abdomen is flat. Bowel sounds are normal.      Palpations: Abdomen is soft.   Musculoskeletal:         General: Normal range of motion.      Cervical back: Normal range of motion and neck supple.   Skin:     General: Skin is warm and dry.   Neurological:      General: No focal deficit present.      Mental Status: He is alert and oriented to person, place, and time. Mental status is at baseline.   Psychiatric:         Mood and Affect: Mood normal.        Labs:  Lab Results   Component Value Date    WBC 4.22 07/09/2025    RBC 3.74 (L) 07/09/2025    HGB 9.2 (L) 07/09/2025    HCT 30.2 (L) 07/09/2025    MCV 81 (L) 07/09/2025    MCH 24.6 (L) 07/09/2025    MCHC 30.5 (L) 07/09/2025    RDW 14.1 07/09/2025     07/09/2025    MPV 9.6 07/09/2025    GRAN 2.7 10/27/2023    GRAN 71.2 10/27/2023    LYMPH 14.2 (L) 07/09/2025    LYMPH 0.60 (L) 07/09/2025    MONO 11.4 07/09/2025    MONO 0.48 07/09/2025    EOS 2.1 07/09/2025    EOS 0.09 07/09/2025    BASO 40 06/10/2024    EOSINOPHIL 1.9 06/10/2024    BASOPHIL 0.7 07/09/2025    BASOPHIL 0.03 07/09/2025     Sodium   Date Value Ref Range Status   07/09/2025 142 136  - 145 mmol/L Final   06/10/2024 140 135 - 146 mmol/L Final     Potassium   Date Value Ref Range Status   07/09/2025 3.8 3.5 - 5.1 mmol/L Final   06/10/2024 4.0 3.5 - 5.3 mmol/L Final     Chloride   Date Value Ref Range Status   07/09/2025 109 95 - 110 mmol/L Final   06/10/2024 104 98 - 110 mmol/L Final     CO2   Date Value Ref Range Status   07/09/2025 22 (L) 23 - 29 mmol/L Final   06/10/2024 25 20 - 32 mmol/L Final     Glucose   Date Value Ref Range Status   07/09/2025 97 70 - 110 mg/dL Final   06/10/2024 95 65 - 139 mg/dL Final     Comment:               Non-fasting reference interval          BUN   Date Value Ref Range Status   07/09/2025 19 8 - 23 mg/dL Final     Creatinine   Date Value Ref Range Status   07/09/2025 0.8 0.5 - 1.4 mg/dL Final     Calcium   Date Value Ref Range Status   07/09/2025 7.1 (L) 8.7 - 10.5 mg/dL Final   06/10/2024 9.3 8.6 - 10.3 mg/dL Final     Protein Total   Date Value Ref Range Status   07/09/2025 6.6 6.0 - 8.4 gm/dL Final     Total Protein   Date Value Ref Range Status   06/10/2024 6.4 6.1 - 8.1 g/dL Final     Albumin   Date Value Ref Range Status   07/09/2025 3.4 (L) 3.5 - 5.2 g/dL Final   06/10/2024 4.2 3.6 - 5.1 g/dL Final     Total Bilirubin   Date Value Ref Range Status   06/10/2024 0.5 0.2 - 1.2 mg/dL Final     Bilirubin Total   Date Value Ref Range Status   07/09/2025 0.3 0.1 - 1.0 mg/dL Final     Comment:     For infants and newborns, interpretation of results should be based   on gestational age, weight and in agreement with clinical   observations.    Premature Infant recommended reference ranges:   0-24 hours:  <8.0 mg/dL   24-48 hours: <12.0 mg/dL   3-5 days:    <15.0 mg/dL   6-29 days:   <15.0 mg/dL     Alkaline Phosphatase   Date Value Ref Range Status   10/27/2023 163 (H) 55 - 135 U/L Final     ALP   Date Value Ref Range Status   07/09/2025 118 40 - 150 unit/L Final     AST   Date Value Ref Range Status   07/09/2025 20 11 - 45 unit/L Final   06/10/2024 22 10 - 35 U/L  Final     ALT   Date Value Ref Range Status   07/09/2025 22 10 - 44 unit/L Final   06/10/2024 26 9 - 46 U/L Final     Anion Gap   Date Value Ref Range Status   07/09/2025 11 8 - 16 mmol/L Final     eGFR if    Date Value Ref Range Status   11/22/2021 112 > OR = 60 mL/min/1.73m2 Final     eGFR if non    Date Value Ref Range Status   11/22/2021 97 > OR = 60 mL/min/1.73m2 Final       A/P:    Right lower extremity DVT  -patient has had a left lower extremity DVT about 15 years ago  -this 1 is provoked in nature  -repeat ultrasound shows chronic DVT  -recommend continuing Eliquis    Anemia:   -review of labs show microcytic anemia  -I will check iron stores today as patient will likely need IV iron  -see me in one-week to discuss results

## 2025-08-11 RX ORDER — PANTOPRAZOLE SODIUM 40 MG/1
40 TABLET, DELAYED RELEASE ORAL DAILY
Qty: 30 TABLET | Refills: 3 | Status: SHIPPED | OUTPATIENT
Start: 2025-08-11

## 2025-08-13 ENCOUNTER — TELEPHONE (OUTPATIENT)
Dept: HEMATOLOGY/ONCOLOGY | Facility: CLINIC | Age: 70
End: 2025-08-13
Payer: MEDICARE

## 2025-08-14 ENCOUNTER — OFFICE VISIT (OUTPATIENT)
Dept: HEMATOLOGY/ONCOLOGY | Facility: CLINIC | Age: 70
End: 2025-08-14
Payer: MEDICARE

## 2025-08-14 VITALS
HEART RATE: 61 BPM | WEIGHT: 148.81 LBS | SYSTOLIC BLOOD PRESSURE: 158 MMHG | DIASTOLIC BLOOD PRESSURE: 71 MMHG | HEIGHT: 72 IN | OXYGEN SATURATION: 97 % | TEMPERATURE: 98 F | BODY MASS INDEX: 20.16 KG/M2

## 2025-08-14 DIAGNOSIS — D50.8 IRON DEFICIENCY ANEMIA SECONDARY TO INADEQUATE DIETARY IRON INTAKE: Primary | ICD-10-CM

## 2025-08-14 DIAGNOSIS — Z12.11 COLON CANCER SCREENING: ICD-10-CM

## 2025-08-14 PROBLEM — D50.9 IRON DEFICIENCY ANEMIA, UNSPECIFIED: Status: ACTIVE | Noted: 2025-08-14

## 2025-08-14 PROCEDURE — 3078F DIAST BP <80 MM HG: CPT | Mod: CPTII,HCNC,S$GLB, | Performed by: NURSE PRACTITIONER

## 2025-08-14 PROCEDURE — 3008F BODY MASS INDEX DOCD: CPT | Mod: CPTII,HCNC,S$GLB, | Performed by: NURSE PRACTITIONER

## 2025-08-14 PROCEDURE — 99999 PR PBB SHADOW E&M-EST. PATIENT-LVL IV: CPT | Mod: PBBFAC,HCNC,, | Performed by: NURSE PRACTITIONER

## 2025-08-14 PROCEDURE — 99214 OFFICE O/P EST MOD 30 MIN: CPT | Mod: HCNC,S$GLB,, | Performed by: NURSE PRACTITIONER

## 2025-08-14 PROCEDURE — 1101F PT FALLS ASSESS-DOCD LE1/YR: CPT | Mod: CPTII,HCNC,S$GLB, | Performed by: NURSE PRACTITIONER

## 2025-08-14 PROCEDURE — 3288F FALL RISK ASSESSMENT DOCD: CPT | Mod: CPTII,HCNC,S$GLB, | Performed by: NURSE PRACTITIONER

## 2025-08-14 PROCEDURE — 3077F SYST BP >= 140 MM HG: CPT | Mod: CPTII,HCNC,S$GLB, | Performed by: NURSE PRACTITIONER

## 2025-08-14 PROCEDURE — 3044F HG A1C LEVEL LT 7.0%: CPT | Mod: CPTII,HCNC,S$GLB, | Performed by: NURSE PRACTITIONER

## 2025-08-14 PROCEDURE — 1159F MED LIST DOCD IN RCRD: CPT | Mod: CPTII,HCNC,S$GLB, | Performed by: NURSE PRACTITIONER

## 2025-08-14 PROCEDURE — 1125F AMNT PAIN NOTED PAIN PRSNT: CPT | Mod: CPTII,HCNC,S$GLB, | Performed by: NURSE PRACTITIONER

## 2025-08-14 RX ORDER — EPINEPHRINE 0.3 MG/.3ML
0.3 INJECTION SUBCUTANEOUS ONCE AS NEEDED
OUTPATIENT
Start: 2025-08-14

## 2025-08-14 RX ORDER — HEPARIN 100 UNIT/ML
500 SYRINGE INTRAVENOUS
OUTPATIENT
Start: 2025-08-14

## 2025-08-14 RX ORDER — SODIUM CHLORIDE 0.9 % (FLUSH) 0.9 %
10 SYRINGE (ML) INJECTION
OUTPATIENT
Start: 2025-08-14

## 2025-08-19 ENCOUNTER — TELEPHONE (OUTPATIENT)
Dept: INFUSION THERAPY | Facility: HOSPITAL | Age: 70
End: 2025-08-19

## 2025-08-19 ENCOUNTER — PATIENT MESSAGE (OUTPATIENT)
Dept: HEMATOLOGY/ONCOLOGY | Facility: CLINIC | Age: 70
End: 2025-08-19
Payer: MEDICARE

## 2025-08-25 ENCOUNTER — INFUSION (OUTPATIENT)
Dept: INFUSION THERAPY | Facility: HOSPITAL | Age: 70
End: 2025-08-25
Attending: NURSE PRACTITIONER
Payer: MEDICARE

## 2025-08-25 VITALS
HEART RATE: 65 BPM | OXYGEN SATURATION: 97 % | SYSTOLIC BLOOD PRESSURE: 139 MMHG | HEIGHT: 72 IN | TEMPERATURE: 97 F | DIASTOLIC BLOOD PRESSURE: 73 MMHG | BODY MASS INDEX: 19.67 KG/M2 | RESPIRATION RATE: 16 BRPM | WEIGHT: 145.19 LBS

## 2025-08-25 DIAGNOSIS — D50.8 IRON DEFICIENCY ANEMIA SECONDARY TO INADEQUATE DIETARY IRON INTAKE: Primary | ICD-10-CM

## 2025-08-25 PROCEDURE — 25000003 PHARM REV CODE 250: Performed by: NURSE PRACTITIONER

## 2025-08-25 PROCEDURE — 63600175 PHARM REV CODE 636 W HCPCS: Performed by: NURSE PRACTITIONER

## 2025-08-25 PROCEDURE — 96374 THER/PROPH/DIAG INJ IV PUSH: CPT

## 2025-08-25 PROCEDURE — A4216 STERILE WATER/SALINE, 10 ML: HCPCS | Performed by: NURSE PRACTITIONER

## 2025-08-25 RX ORDER — EPINEPHRINE 0.3 MG/.3ML
0.3 INJECTION SUBCUTANEOUS ONCE AS NEEDED
Status: DISCONTINUED | OUTPATIENT
Start: 2025-08-25 | End: 2025-08-25 | Stop reason: HOSPADM

## 2025-08-25 RX ORDER — HEPARIN 100 UNIT/ML
500 SYRINGE INTRAVENOUS
OUTPATIENT
Start: 2025-08-25

## 2025-08-25 RX ORDER — SODIUM CHLORIDE 0.9 % (FLUSH) 0.9 %
10 SYRINGE (ML) INJECTION
Status: DISCONTINUED | OUTPATIENT
Start: 2025-08-25 | End: 2025-08-25 | Stop reason: HOSPADM

## 2025-08-25 RX ORDER — SODIUM CHLORIDE 0.9 % (FLUSH) 0.9 %
10 SYRINGE (ML) INJECTION
OUTPATIENT
Start: 2025-08-25

## 2025-08-25 RX ORDER — EPINEPHRINE 0.3 MG/.3ML
0.3 INJECTION SUBCUTANEOUS ONCE AS NEEDED
OUTPATIENT
Start: 2025-08-25 | End: 2037-01-21

## 2025-08-25 RX ORDER — HEPARIN 100 UNIT/ML
500 SYRINGE INTRAVENOUS
Status: DISCONTINUED | OUTPATIENT
Start: 2025-08-25 | End: 2025-08-25 | Stop reason: HOSPADM

## 2025-08-25 RX ADMIN — Medication 10 ML: at 11:08

## 2025-08-25 RX ADMIN — IRON SUCROSE 200 MG: 20 INJECTION, SOLUTION INTRAVENOUS at 11:08

## 2025-08-25 RX ADMIN — Medication 10 ML: at 10:08

## 2025-09-03 ENCOUNTER — INFUSION (OUTPATIENT)
Dept: INFUSION THERAPY | Facility: HOSPITAL | Age: 70
End: 2025-09-03
Attending: NURSE PRACTITIONER
Payer: MEDICARE

## 2025-09-03 VITALS
OXYGEN SATURATION: 99 % | HEART RATE: 65 BPM | TEMPERATURE: 98 F | SYSTOLIC BLOOD PRESSURE: 158 MMHG | DIASTOLIC BLOOD PRESSURE: 77 MMHG | RESPIRATION RATE: 16 BRPM

## 2025-09-03 DIAGNOSIS — D50.8 IRON DEFICIENCY ANEMIA SECONDARY TO INADEQUATE DIETARY IRON INTAKE: Primary | ICD-10-CM

## 2025-09-03 PROCEDURE — A4216 STERILE WATER/SALINE, 10 ML: HCPCS | Performed by: NURSE PRACTITIONER

## 2025-09-03 PROCEDURE — 96374 THER/PROPH/DIAG INJ IV PUSH: CPT

## 2025-09-03 PROCEDURE — 25000003 PHARM REV CODE 250: Performed by: NURSE PRACTITIONER

## 2025-09-03 PROCEDURE — 63600175 PHARM REV CODE 636 W HCPCS: Performed by: NURSE PRACTITIONER

## 2025-09-03 RX ORDER — SODIUM CHLORIDE 0.9 % (FLUSH) 0.9 %
10 SYRINGE (ML) INJECTION
OUTPATIENT
Start: 2025-09-03

## 2025-09-03 RX ORDER — HEPARIN 100 UNIT/ML
500 SYRINGE INTRAVENOUS
OUTPATIENT
Start: 2025-09-03

## 2025-09-03 RX ORDER — SODIUM CHLORIDE 0.9 % (FLUSH) 0.9 %
10 SYRINGE (ML) INJECTION
Status: DISCONTINUED | OUTPATIENT
Start: 2025-09-03 | End: 2025-09-03 | Stop reason: HOSPADM

## 2025-09-03 RX ORDER — EPINEPHRINE 0.3 MG/.3ML
0.3 INJECTION SUBCUTANEOUS ONCE AS NEEDED
OUTPATIENT
Start: 2025-09-03 | End: 2037-01-30

## 2025-09-03 RX ADMIN — Medication 10 ML: at 09:09

## 2025-09-03 RX ADMIN — IRON SUCROSE 200 MG: 20 INJECTION, SOLUTION INTRAVENOUS at 09:09

## (undated) DEVICE — DRAPE IOBAN 2 STERI

## (undated) DEVICE — WIRE GUIDE 3.2MM 400MM
Type: IMPLANTABLE DEVICE | Site: FEMUR | Status: NON-FUNCTIONAL
Removed: 2022-10-20

## (undated) DEVICE — LINER SUCTION 3000CC

## (undated) DEVICE — BNDG COFLEX FOAM LF2 ST 6X5YD

## (undated) DEVICE — NDL SAFETY 25G X 1.5 ECLIPSE

## (undated) DEVICE — SEE MEDLINE ITEM 146292

## (undated) DEVICE — Device

## (undated) DEVICE — DRAPE C ARM 42 X 120 10/BX

## (undated) DEVICE — APPLICATOR CHLORAPREP ORN 26ML

## (undated) DEVICE — SLEEVE SCD EXPRESS KNEE MEDIUM

## (undated) DEVICE — GLOVE SURG ULTRA TOUCH 7.5

## (undated) DEVICE — DRAPE C-ARMOR EQUIPMENT COVER

## (undated) DEVICE — PAD PERI POST REPLACEMNT

## (undated) DEVICE — TOWEL OR DISP STRL BLUE 4/PK

## (undated) DEVICE — SYS LABEL CORRECT MED

## (undated) DEVICE — GOWN POLY REINF BRTH SLV XL

## (undated) DEVICE — SEE MEDLINE ITEM 146416

## (undated) DEVICE — NDL HYPODERMIC BLUNT 18G 1.5IN

## (undated) DEVICE — DRESSING AQUACEL AG SILVER 4X4

## (undated) DEVICE — TAPE MEDIPORE 3 X 10YD

## (undated) DEVICE — SUT 2-0 VICRYL / CT-1

## (undated) DEVICE — SEE MEDLINE ITEM 152680

## (undated) DEVICE — IMPLANTABLE DEVICE
Type: IMPLANTABLE DEVICE | Site: FEMUR | Status: NON-FUNCTIONAL
Removed: 2022-10-20

## (undated) DEVICE — STRAP OR TABLE 5IN X 72IN

## (undated) DEVICE — PADDING WYTEX UNDRCST 6INX4YD

## (undated) DEVICE — ELECTRODE REM PLYHSV RETURN 9

## (undated) DEVICE — PACK CUSTOM UNIV BASIN SLI

## (undated) DEVICE — SEE MEDLINE ITEM 152622

## (undated) DEVICE — SOL WATER STRL IRR 1000ML

## (undated) DEVICE — SUT ETHILON 3-0 FS-1 30

## (undated) DEVICE — SEE MEDLINE ITEM 157131

## (undated) DEVICE — ALCOHOL 70% ISOP RUBBING 4OZ

## (undated) DEVICE — SPONGE BULKEE II ABSRB 6X6.75

## (undated) DEVICE — SUT 0 VICRYL / CT-1

## (undated) DEVICE — DRESSING TRANS 2X2 TEGADERM

## (undated) DEVICE — TAPE SILK 3IN

## (undated) DEVICE — DRAPE THREE-QTR REINF 53X77IN

## (undated) DEVICE — DRESSING TRANS 4X4 TEGADERM

## (undated) DEVICE — SYR 10CC LUER LOCK

## (undated) DEVICE — FORCEP BIOSY RJ 4 HOT 2.2X240

## (undated) DEVICE — SOL 9P NACL IRR PIC IL

## (undated) DEVICE — NDL SPINAL 25GX3.5 SPINOCAN

## (undated) DEVICE — SYR DISP LL 5CC

## (undated) DEVICE — COVER PROXIMA MAYO STAND

## (undated) DEVICE — SPONGE SUPER KERLIX 6X6.75IN

## (undated) DEVICE — CHLORAPREP 10.5 ML APPLICATOR

## (undated) DEVICE — NDL FLTR 5MCRN BLNT TIP 18GX1

## (undated) DEVICE — PAD GROUNDING DISPER ELECTRODE

## (undated) DEVICE — GLOVE SURG ULTRA TOUCH 8

## (undated) DEVICE — DRAPE STERI-DRAPE 1000 17X11IN

## (undated) DEVICE — DRAPE STERI U-SHAPED 47X51IN

## (undated) DEVICE — UNDERGLOVES BIOGEL PI SIZE 8

## (undated) DEVICE — BANDAGE MATRIX HK LOOP 6IN 5YD

## (undated) DEVICE — GOWN POLY REINF BRTH SLV LG

## (undated) DEVICE — KIT RF COOLED 17G 50MM

## (undated) DEVICE — CANISTER SUCTION 3000CC